# Patient Record
Sex: MALE | Race: WHITE
[De-identification: names, ages, dates, MRNs, and addresses within clinical notes are randomized per-mention and may not be internally consistent; named-entity substitution may affect disease eponyms.]

---

## 2017-07-19 NOTE — NUR
CALLED [facility]'S TELESTROKE HOTLINE, SPOKE WITH LYNN, PRESENTED PT, AWAITING 
CALL BACK FROM  (NEUROLOGIST)
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
## 2017-07-19 NOTE — NUR
PT BIB [facility] C/O CP 5/10 DULL ACHY AND SLURRED SPEECH SINCE 1530 TODAY. CP 
NON-PROVOKED AND NO RADIATION. UPON NEURO ASSESSMENT, NOTED WEAK  STRENGTH 
ON L ARM AND SLIGHTLY WEAKER LLE. REPORTS MILD SENSORY LOSS ON L SIDE. RESP 
EVEN UNLABORED. SKIN WARM NONDIAPHORETIC. REPORTS HX OF CVA 2 MONTHS AGO WHICH 
LEFT HIM UNABLE TO AMBULATE. DENIES SLURRED SPEECH OR LUE RESIDUAL DEFICITS. IN 
ER BED 10. MD NOTIFIED IMMEDIATELY OF PT'S CONDITION.

## 2017-07-19 NOTE — NUR
PT TRANSPORTED TO [facility] IN STABLE CONDITION VIA ACLS PROTOCOL. ECHO WAS 
COMPLETED PRIOR TO TRANSFER.
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
## 2017-07-20 NOTE — NUR
RN NOTES

PT REFUSED SCDS. EDUCATION PROVIDED REGARDING IMPORTANCE OF KEEPING THEM ON BUT PT STILL 
REFUSED.

## 2017-07-20 NOTE — NUR
RN NOTES:



RECEIVED PATIENT AWAKE IN BED WITH NO RESPIRATORY DISTRESS OR SHORTNESS OF BREATH. BREATHING 
EVEN AND UNLABORED. COMPLAINT OF LEFT SHOULDER PAIN. ALERT AND ORIENTED. VERBALIZES NEEDS. 
NEEDS ATTENDED. KEPT CLEAN AND DRY.

## 2017-07-20 NOTE — NUR
RN NOTES

PT IN STABLE CONDITION RESTING COMFORTABLY IN BED. RESPIRATIONS EVEN AND UNLABORED 2L NASAL 
CANNULA. NO NEUROLOGICAL CHANGES NOTED THROUGHOUT THIS SHIFT.  DVT PUMPS AT BEDSIDE, PT 
REFUSED. SIDE RAILS UP X3, CALL LIGHT WITHIN EASY REACH, BED LOCKED AND IN LOWEST POSITION 
WITH BED ALARM ON FOR PT SAFETY.

## 2017-07-20 NOTE — NUR
met with patient at bedside. Patient was oriented to place, self, and  
situation. Patient's mood and affect were anxious. Patient was cooperative and willing to 
answer the social workers questions. Per patient, he is currently homeless and was recently 
at [facility] in [place] in the behavioral health unit for one day and 
than felt chest pains and was transferred to the [facility]. Per patient, he 
would like to go back to [facility] in [place] to there behavioral health 
unit or he wants to go to a short term rehab facility (SNF). Per patient, he recently came 
from [facility] and has been in [place] for a couple of days. Patient stated that 
he is single and has no family in [place]. Patient stated that he receives SSI 
($807/month). Patient denied drug and alcohol abuse. Patient denied suicidal and homicidal 
ideations. Patient became more anxious toward the end of the assessment and stated, "I don't 
know, I am really confused."  informed patient that she was available if 
needed.

## 2017-07-20 NOTE — NUR
RN NOTES

PT RECEIVED ON BED IN STABLE CONDITION. A/O X3 EXPRESSIVE APHASIA WITH SOME SLURRED SPEECH 
NOTED. ON TELE MONITOR SR IN 80S. RESPIRATIONS EVEN AND UNLABORED ON 2L NASAL CANNULA. USES 
URINAL. LEFT SIDED WEAKNESS NOTED. R HAND 18 G IV SITE CLEAN, DRY, AND INTACT. NPO THIS AM 
FOR SWALLOW EVAL. SIDE RAILS UP X3, CALL LIGHT WITHIN REACH WILL CONTINUE TO MONITOR. .

## 2017-07-21 NOTE — NUR
CALLED [facility] FOR THE PT DISCHARGE ORDER . TALKED TO NURSE GABY , 
GAVE THE SUPERVISOR (ARCHIE) NUMBER [phone]   TO TALK TO REGARDING THE DISCHARGE .

## 2017-07-21 NOTE — NUR
RN NOTES 



RECEIVED PATIENT IN BED ALERT, AWAKE, ORIENTED X3 WITH BREATHING NORMAL, EVEN AND UNLABORED. 
NO SOB NOTED. ON 2L O2 VIA NC, SATURATING WELL. NO ACUTE DISTRESS NOTED. TELE MONITOR 
REVEALS SR, HR=84. IV R HAND IS PATENT AND INTACT, NO INFILTRATION NOTED. BOWELS SOUND 
PRESENT. PULSES PRESENT. SAFETY MEASURE OBSERVED. ALL NEEDS ATTENDED. CALL LIGHT WITH IN 
REACH. WILL CONT TO MONITOR.

## 2017-07-21 NOTE — NUR
CALLED [facility] FOR THE TRANSPORTATION . TALKED TO MAMTA, VERBALIZED THAT 
THEY WILL BE HERE IN 30 MINS TO  .

## 2017-07-21 NOTE — NUR
RN CLOSING NOTES



NO EPISODE OF DISTRESS, OBTUNDED. GTUBE PATENT WITH LARGE AMOUNT OF RESIDUAL. PATIENT;S 
BLOOD SUGAR AT 6AM LOW 52MG/DL. DEXTROSE ADMINISTERED. STILL WAITING FOR RESULTS. WOUND 
DRESSING CHANGED. NO NEW SKIN ISSUE. KEPT CLEAN AND DRY

-------------------------------------------------------------------------------

Addendum: 07/21/17 at 0757 by GAYATHRI GARBER RN

-------------------------------------------------------------------------------

RN CLOSING NOTES FOR ANOTHER PATIENT: PLS SEE ANOTHER CLOSING NOTES

## 2017-07-21 NOTE — NUR
CALLED SUPERVISOR ARCHIE AGAIN AND GAVE HER REPORT AND WANTS US TO FAX PT's TODAY's BMP REPORT 
THAN SHE WILL LET US KNOW IF SHE ACCEPT THE PATIENT OR NOT. PRIMARY NURSE GAYATHRI  FAXED THE 
BMP . WILL F/O

## 2017-07-21 NOTE — NUR
Patient would like to be discharged to [facility] at [place] [address] 
[facility] [address]. ([phone])  spoke to Arianne from [facility] who stated that they can take the patient back as long as he is 
medically cleared. Arianne requested that Labs and a note that states, "Patient is 
medically cleared for discharge." should be faxed over to ([phone]) and intake should 
be called ([phone]) before patient can return.  will follow-up.

## 2017-07-21 NOTE — NUR
ARCHIE THE SUPERVISOR FROM [facility] CALLED TO INFORM OF ACCEPTANCE THE PT 
AND SAID THAT THE PRIMARY NURSE (GABY) WILL CALL WITH THE BED NUMBER . WILL CALL [facility] FOR THE TRANSPORTATION.

## 2017-07-21 NOTE — NUR
RN CLOSING NOTES.



ALERT AND ORIENTED. WITH EPISODES OF RESTLESSNESS AND ANXIETY. COMPLAINED OF PAIN. MORPHINE 
ADMINISTERED EVERY FOUR HOURS, WITH RELIEF. NO EPISODE OF DISTRESS. BREATHING EVEN AND 
UNLABORED. KEPT CLEAN AND DRY. ENDORSED TO AM SHIFT FOR CONTINUITY OF CARE.

## 2017-07-21 NOTE — NUR
CALLED BEVOR NURSE TO ASK FOR THE ROOM NUMBER WHERE PT IS GOING TO , ROOM NUMBER PROVIDED. 
PT IS GOING TO ROOM -A .

## 2017-07-21 NOTE — NUR
CALLED SUPERVISOR ARCHIE , VERBALIZED SHE CANNOT TALK RIGHT NOW COZ SHE IS DOING HUDDLE RIGHT 
NOW AND TO CALL HER LATER AGAIN . WILL CALL HER LATER .

## 2017-07-21 NOTE — NUR
faxed Labs and a note that states, "Patient is medically cleared for discharge 
to ([phone]) [facility] the intake department.  spoke to 
Omid from intake ([phone]) who stated that patient is to medically compromised for 
their [place] locations and had faxed over the paperwork to their [place] location as 
that location has an ER department. Omid stated that they are just waiting for a response 
from the [place] location.  informed Soon the charge nurse and provided 
Omid with charge nurse Steve's number for follow-up.  informed case management 
and notified them that patient is pending acceptance to [facility] in 
[place] [address] ([phone]).

## 2017-07-21 NOTE — NUR
ARCHIE THE SUPERVISOR FROM [facility] CALLED TO INFORM OF ACCEPTANCE THE PT 
AND SAID THAT . WILL CALL [facility] FOR THE TRANSPORTATION.  

-------------------------------------------------------------------------------

Addendum: 07/22/17 at 0005 by MANUEL SALOMON RN

-------------------------------------------------------------------------------

PLS AVOID THIS NOTE AND SEE OTHER NOTE.

## 2017-07-21 NOTE — NUR
RN NOTES 



PATIENT ENDORSED TO NEXT SHIFT IN STABLE CONDITION FOR CONTINUITY OF CARE. NO SIGNIFICANT 
CHANGES NOTED. KEPT CLEAN, DRY AND COMFORTABLE. ALL NEEDS ATTENDED. SAFETY MEASURE OBSERVED. 
CALL LIGHT WITH IN REACH. WILL CONT TO MONITOR.

## 2017-07-22 NOTE — NUR
RN NOTES:



[id number] RECEIVED PATIENT AWAKE STANDING ON THE DOORWAY ASKING FOR SANDWICH. ALERT AND 
ORIENTED, VERBALLY COMMUNICATE NEEDS. NOTED WITH MOOD SWINGS FROM VERY AGITATED TO VERY 
APOLOGETIC. NO RESPIRATORY DISTRESS OR SHORTNESS OF BREATH. BREATHING EVEN AND UNLABORED. NO 
COMPLAINT OF PAIN OR DISCOMFORT. 



2015 PATIENT IS SCHEDULED FOR TRANSFER TO [facility]. CALLED HOSPITAL TO 
GIVE REPORT REGARDING PATIENT. CALLED WAS ANSWERED BY ONE OF THE NURSE AND SAID HE NEEDS TO 
REFER IT TO HIS SUPERVISOR AS THERE WAS NO NOTICE TO THEM OF THE ADMISSION. TOLD STAFF THAT 
[employer] HAS BEEN MADE FOR HIS TRANSFER UNDER THE CARE OF DR. JIMENEZ. HE ASKED FOR 
[employer] NUMBER AND WILL VERY FROM HIS SUPERVISOR.



2205 CALLED JOES[facility] SUPERVISOR, AND GAVE REPORT REGARDING THE PATIENT. SUPERVISOR WAS ASKING 
FOR BMP RESULT AND NEEDS TO FAX IT TO THEM BEFORE SHE COULD ACCEPT THE PATIENT BECAUSE THE 
REPORT SHE HAS WAS THAT THE PATIENTS GLUCOSE IN BLOOD .  GOT THE COPY OF THE BMP 
RESULT AND FAX TO THEM. VERIFIED IF FAXED WAS RECEIVED AFTER ABOUT 15 MINUTES.

CHARGE NURSE MANUEL ARRANGE FOR THE TRANSFER.



PATIENT WAS SENT OUT VIA AMBULANCE ON A GURNEY ACCOMPANIED BY 2 EMTS @0000 IN STABLE 
CONDITION. VITAL SIGNS WITHIN NORMAL LEVEL

## 2017-09-27 ENCOUNTER — HOSPITAL ENCOUNTER (INPATIENT)
Dept: HOSPITAL 72 - EMR | Age: 37
LOS: 6 days | Discharge: NURSING, REHAB OR LONG-TERM CARE | DRG: 191 | End: 2017-10-03
Payer: MEDICARE

## 2017-09-27 VITALS — SYSTOLIC BLOOD PRESSURE: 131 MMHG | DIASTOLIC BLOOD PRESSURE: 79 MMHG

## 2017-09-27 VITALS
DIASTOLIC BLOOD PRESSURE: 67 MMHG | SYSTOLIC BLOOD PRESSURE: 129 MMHG | WEIGHT: 232 LBS | HEIGHT: 71 IN | BODY MASS INDEX: 32.48 KG/M2

## 2017-09-27 VITALS — SYSTOLIC BLOOD PRESSURE: 129 MMHG | DIASTOLIC BLOOD PRESSURE: 79 MMHG

## 2017-09-27 DIAGNOSIS — G89.29: ICD-10-CM

## 2017-09-27 DIAGNOSIS — F41.9: ICD-10-CM

## 2017-09-27 DIAGNOSIS — E11.65: ICD-10-CM

## 2017-09-27 DIAGNOSIS — J20.9: ICD-10-CM

## 2017-09-27 DIAGNOSIS — E11.42: ICD-10-CM

## 2017-09-27 DIAGNOSIS — M50.30: ICD-10-CM

## 2017-09-27 DIAGNOSIS — F11.20: ICD-10-CM

## 2017-09-27 DIAGNOSIS — Q07.02: ICD-10-CM

## 2017-09-27 DIAGNOSIS — M51.36: ICD-10-CM

## 2017-09-27 DIAGNOSIS — J44.0: ICD-10-CM

## 2017-09-27 DIAGNOSIS — I10: ICD-10-CM

## 2017-09-27 DIAGNOSIS — F17.210: ICD-10-CM

## 2017-09-27 DIAGNOSIS — J44.1: Primary | ICD-10-CM

## 2017-09-27 DIAGNOSIS — F25.0: ICD-10-CM

## 2017-09-27 LAB
ALBUMIN/GLOB SERPL: 1.6 {RATIO} (ref 1–2.7)
ALT SERPL-CCNC: 25 U/L (ref 3–41)
ANION GAP SERPL CALC-SCNC: 15 MMOL/L (ref 5–15)
APPEARANCE UR: CLEAR
AST SERPL-CCNC: 19 U/L (ref 5–40)
BACTERIA #/AREA URNS HPF: (no result) /HPF
BASOPHILS NFR BLD AUTO: 0.8 % (ref 0–2)
CALCIUM SERPL-MCNC: 9.5 MG/DL (ref 8.6–10.2)
CHLORIDE SERPL-SCNC: 101 MEQ/L (ref 98–107)
CK MB SERPL-MCNC: 3.4 NG/ML (ref ?–6.7)
CO2 SERPL-SCNC: 25 MEQ/L (ref 20–30)
CREAT SERPL-MCNC: 0.8 MG/DL (ref 0.7–1.2)
EOSINOPHIL NFR BLD AUTO: 1.2 % (ref 0–3)
ERYTHROCYTE [DISTWIDTH] IN BLOOD BY AUTOMATED COUNT: 12.5 % (ref 11.6–14.8)
GFR SERPLBLD BASED ON 1.73 SQ M-ARVRAT: > 60 ML/MIN (ref 60–?)
GLOBULIN SER-MCNC: 2.5 G/DL
HEMOLYSIS: 5
KETONES UR QL STRIP: (no result)
LEUKOCYTE ESTERASE UR QL STRIP: (no result)
LYMPHOCYTES NFR BLD AUTO: 24.9 % (ref 20–45)
MAGNESIUM SERPL-MCNC: 1.8 MG/DL (ref 1.7–2.5)
MCH RBC QN AUTO: 29.8 PG (ref 27–31)
MCHC RBC AUTO-ENTMCNC: 34.4 G/DL (ref 32–36)
MCV RBC AUTO: 87 FL (ref 80–99)
MONOCYTES NFR BLD AUTO: 9.7 % (ref 1–10)
NEUTROPHILS NFR BLD AUTO: 63.4 % (ref 45–75)
NITRITE UR QL STRIP: NEGATIVE
PH UR STRIP: 7 [PH] (ref 4.5–8)
PLATELET # BLD: 266 K/UL (ref 150–450)
PMV BLD AUTO: 7.8 FL (ref 6.5–10.1)
POTASSIUM SERPL-SCNC: 4 MEQ/L (ref 3.4–4.9)
PROT SERPL-MCNC: 6.6 G/DL (ref 6.6–8.7)
PROT UR QL STRIP: NEGATIVE
RBC # BLD AUTO: 4.49 M/UL (ref 4.7–6.1)
RBC #/AREA URNS HPF: (no result) /HPF (ref 0–0)
SODIUM SERPL-SCNC: 141 MEQ/L (ref 135–145)
SP GR UR STRIP: 1.01 (ref 1–1.03)
SQUAMOUS #/AREA URNS LPF: (no result) /LPF
TROPONIN I SERPL-MCNC: < 0.3 NG/ML (ref ?–0.3)
UROBILINOGEN UR-MCNC: NORMAL MG/DL (ref 0–1)
WBC # BLD AUTO: 12 K/UL (ref 4.8–10.8)
WBC #/AREA URNS HPF: (no result) /HPF (ref 0–0)

## 2017-09-27 PROCEDURE — 87040 BLOOD CULTURE FOR BACTERIA: CPT

## 2017-09-27 PROCEDURE — 94760 N-INVAS EAR/PLS OXIMETRY 1: CPT

## 2017-09-27 PROCEDURE — 99285 EMERGENCY DEPT VISIT HI MDM: CPT

## 2017-09-27 PROCEDURE — 80048 BASIC METABOLIC PNL TOTAL CA: CPT

## 2017-09-27 PROCEDURE — 82553 CREATINE MB FRACTION: CPT

## 2017-09-27 PROCEDURE — 85007 BL SMEAR W/DIFF WBC COUNT: CPT

## 2017-09-27 PROCEDURE — 71010: CPT

## 2017-09-27 PROCEDURE — 87081 CULTURE SCREEN ONLY: CPT

## 2017-09-27 PROCEDURE — 82550 ASSAY OF CK (CPK): CPT

## 2017-09-27 PROCEDURE — 94640 AIRWAY INHALATION TREATMENT: CPT

## 2017-09-27 PROCEDURE — 80053 COMPREHEN METABOLIC PANEL: CPT

## 2017-09-27 PROCEDURE — 84484 ASSAY OF TROPONIN QUANT: CPT

## 2017-09-27 PROCEDURE — 94664 DEMO&/EVAL PT USE INHALER: CPT

## 2017-09-27 PROCEDURE — 83735 ASSAY OF MAGNESIUM: CPT

## 2017-09-27 PROCEDURE — 85025 COMPLETE CBC W/AUTO DIFF WBC: CPT

## 2017-09-27 PROCEDURE — 83605 ASSAY OF LACTIC ACID: CPT

## 2017-09-27 PROCEDURE — 93005 ELECTROCARDIOGRAM TRACING: CPT

## 2017-09-27 PROCEDURE — 83880 ASSAY OF NATRIURETIC PEPTIDE: CPT

## 2017-09-27 PROCEDURE — 82009 KETONE BODYS QUAL: CPT

## 2017-09-27 PROCEDURE — 36415 COLL VENOUS BLD VENIPUNCTURE: CPT

## 2017-09-27 PROCEDURE — 81003 URINALYSIS AUTO W/O SCOPE: CPT

## 2017-09-27 PROCEDURE — 82962 GLUCOSE BLOOD TEST: CPT

## 2017-09-27 NOTE — EMERGENCY ROOM REPORT
History of Present Illness


General


Chief Complaint:  Upper Respiratory Illness


Source:  Patient





Present Illness


HPI


36-year-old male presents ED for evaluation.  Patient resides in a skilled 

nursing facility.  Patient has had a persistent cough times one week with 

wheezing.  Has history of COPD.  Patient denies any fevers or chills.  States 

cough is dry.  Denies chest pain or shortness of breath.  Accu-Chek at nursing 

home was also high fashion 400.  Patient has a history of diabetes.  Patient 

was given insulin afterwards.  No aggravating relieving factors.  Denies any 

other associated symptoms


Allergies:  


Coded Allergies:  


     No Known Allergies (Unverified , 4/14/15)





Patient History


Past Medical History:  DM, HTN


Past Surgical History:  none


Pertinent Family History:  none


Social History:  Denies: smoking, alcohol use, drug use


Immunizations:  UTD


Reviewed Nursing Documentation:  PMH: Agreed, PSxH: Agreed





Nursing Documentation-PMH


Past Medical History:  Deferred


Hx Cardiac Problems:  Yes


Hx Hypertension:  Yes


Hx Diabetes:  Yes


Hx Cancer:  No


Hx Gastrointestinal Problems:  No


Hx Neurological Problems:  No


Hx Peripheral Neuropathy:  Yes


Hx Dizziness:  Yes


Hx Weakness:  Yes





Review of Systems


All Other Systems:  negative except mentioned in HPI





Physical Exam





Vital Signs








  Date Time  Temp Pulse Resp B/P (MAP) Pulse Ox O2 Delivery O2 Flow Rate FiO2


 


9/27/17 14:12 97.3 103 15 129/67 97 Room Air  








Sp02 EP Interpretation:  reviewed, normal


General Appearance:  no apparent distress, alert, GCS 15, non-toxic


Head:  normocephalic, atraumatic


Eyes:  bilateral eye normal inspection, bilateral eye PERRL


ENT:  hearing grossly normal, normal pharynx, no angioedema, normal voice


Neck:  full range of motion, supple/symm/no masses


Respiratory:  speaking full sentences, wheezing


Cardiovascular #1:  regular rate, rhythm, no edema


Cardiovascular #2:  2+ carotid (R), 2+ carotid (L), 2+ radial (R), 2+ radial (L)

, 2+ dorsalis pedis (R), 2+ dorsalis pedis (L)


Gastrointestinal:  normal bowel sounds, non tender, soft, non-distended, no 

guarding, no rebound


Rectal:  deferred


Genitourinary:  normal inspection, no CVA tenderness


Musculoskeletal:  back normal, gait/station normal, normal range of motion, non-

tender


Neurologic:  alert, oriented x3, responsive, motor strength/tone normal, 

sensory intact, speech normal


Psychiatric:  judgement/insight normal, memory normal, mood/affect normal, no 

suicidal/homicidal ideation


Reflexes:  3+ bicep (R), 3+ bicep (L), 3+ tricep (R), 3+ tricep (L), 3+ knee (R)

, 3+ knee (L)


Skin:  normal color, no rash, warm/dry, well hydrated


Lymphatic:  no adenopathy





Medical Decision Making


Diagnostic Impression:  


 Primary Impression:  


 COPD exacerbation


ER Course


Hospital Course 


36-year-old M presenting to ED with cough, wheezing  h/o COPD





Differential diagnoses include: Pneumonia, CHF exacerbation, pneumothorax, 

fluid overload





Clinical course


Patient placed on stretcher.  On cardiac monitor with stable vitals.  After 

initial history and physical, I ordered nebulizer treatments.  I ordered labs, 

IV fluids, EKG, chest x-ray, blood cultures, UA.  





Labs - minimal leukocytosis noted, hemoglobin/hematocrit stable, electrolytes  

okay, lactate okay, troponins negative 


CXR - hyperinflated lungs. no infiltrates 





abx given. IVFs given. 





Case discussed with Dr. Irving and he agreed to the patient to his service for 

further care and support





I feel this is a highly complex case requiring extensive working including EKG/

Rhythm strip, Xray/CT/US, Blood/urine lab work, repeat exams while in ED, and 

administration of strong opiates/narcotics for pain control, admission to 

hospital or close patient follow up.  





Diagnosis - COPD exacerbation 





Patient admitted to floor in serious condition





Labs








Test


  9/27/17


15:55 9/27/17


16:45


 


White Blood Count


  12.0 K/UL


(4.8-10.8) 


 


 


Red Blood Count


  4.49 M/UL


(4.70-6.10) 


 


 


Hemoglobin


  13.4 G/DL


(14.2-18.0) 


 


 


Hematocrit


  38.9 %


(42.0-52.0) 


 


 


Mean Corpuscular Volume 87 FL (80-99)  


 


Mean Corpuscular Hemoglobin


  29.8 PG


(27.0-31.0) 


 


 


Mean Corpuscular Hemoglobin


Concent 34.4 G/DL


(32.0-36.0) 


 


 


Red Cell Distribution Width


  12.5 %


(11.6-14.8) 


 


 


Platelet Count


  266 K/UL


(150-450) 


 


 


Mean Platelet Volume


  7.8 FL


(6.5-10.1) 


 


 


Neutrophils (%) (Auto)


  63.4 %


(45.0-75.0) 


 


 


Lymphocytes (%) (Auto)


  24.9 %


(20.0-45.0) 


 


 


Monocytes (%) (Auto)


  9.7 %


(1.0-10.0) 


 


 


Eosinophils (%) (Auto)


  1.2 %


(0.0-3.0) 


 


 


Basophils (%) (Auto)


  0.8 %


(0.0-2.0) 


 


 


Sodium Level


  141 mEQ/L


(135-145) 


 


 


Potassium Level


  4.0 mEQ/L


(3.4-4.9) 


 


 


Chloride Level


  101 mEQ/L


() 


 


 


Carbon Dioxide Level


  25 mEQ/L


(20-30) 


 


 


Anion Gap 15 (5-15)  


 


Blood Urea Nitrogen


  12 mg/dL


(7-23) 


 


 


Creatinine


  0.8 mg/dL


(0.7-1.2) 


 


 


Estimat Glomerular Filtration


Rate > 60 mL/min


(>60) 


 


 


Glucose Level


  78 mg/dL


() 


 


 


Lactic Acid Level


  1.90 mmol/L


(0.66-2.22) 


 


 


Calcium Level


  9.5 mg/dL


(8.6-10.2) 


 


 


Magnesium Level


  1.8 mg/dL


(1.7-2.5) 


 


 


Total Bilirubin


  0.2 mg/dL


(0.0-1.2) 


 


 


Aspartate Amino Transf


(AST/SGOT) 19 U/L (5-40) 


  


 


 


Alanine Aminotransferase


(ALT/SGPT) 25 U/L (3-41) 


  


 


 


Alkaline Phosphatase


  66 U/L


() 


 


 


Total Creatine Kinase


  562 U/L


() 


 


 


Creatine Kinase MB


  3.4 ng/mL (<


6.7) 


 


 


Creatine Kinase MB Relative


Index 0.6 


  


 


 


Troponin I


  < 0.30 ng/mL


(<=0.30) 


 


 


Pro-B-Type Natriuretic Peptide


  42 pg/mL


(0-125) 


 


 


Total Protein


  6.6 g/dL


(6.6-8.7) 


 


 


Albumin


  4.1 g/dL


(3.5-5.2) 


 


 


Globulin 2.5 g/dL  


 


Albumin/Globulin Ratio 1.6 (1.0-2.7)  


 


Acetone Level


  Negative


(NEGATIVE) 


 








EKG Diagnostic Results


Rate:  normal


Rhythm:  NSR


ST Segments:  no acute changes


ASA given to the pt in ED:  No





Rhythm Strip Diag. Results


EP Interpretation:  yes


Rhythm:  NSR, no PVC's, no ectopy





Chest X-Ray Diagnostic Results


Chest X-Ray Diagnostic Results :  


   Chest X-Ray Ordered:  Yes


   # of Views/Limited/Complete:  1 View


   Indication:  Shortness of Breath


   EP Interpretation:  Yes


   Interpretation:  no consolidation, no effusion, no pneumothorax, no acute 

cardiopulmonary disease


   Impression:  No acute disease


   Electronically Signed by:  Electronically signed by Shoaib Goodwin MD





Last Vital Signs








  Date Time  Temp Pulse Resp B/P (MAP) Pulse Ox O2 Delivery O2 Flow Rate FiO2


 


9/27/17 16:10  96 18  99 Room Air  


 


9/27/17 14:21 97.3   129/67    








Status:  improved


Disposition:  ADMITTED AS INPATIENT


Condition:  Serious


Referrals:  


ROSE MARIE IRVING (PCP)











SHOAIB GOODWIN M.D. Sep 27, 2017 16:43

## 2017-09-27 NOTE — DIAGNOSTIC IMAGING REPORT
Indication: SOB



Technique: One view of the chest



Comparison: 1/25/2016



Findings: Lungs and pleural spaces are clear. Heart size is normal. No 

significant

change



Impression: No acute process

## 2017-09-27 NOTE — HISTORY AND PHYSICAL REPORT
DATE OF ADMISSION:  09/27/2017



TIME:  2 p.m.



CONSULTANTS:

1. Lissette Steward M.D.

2. Dr. Kumar.

3. Gulshan Armstrong M.D.

4. Dusty Denis M.D.

5. Behnoush Zarrini, M.D.



CHIEF COMPLAINT:  Cough, wheeze, and hyperglycemia.



Brief History:  The patient is a 36-year-old male from [facility]

presents with above-mentioned diagnoses, cough and wheezing for about two

days, getting worse, lost his voice.  He also had blood sugar of about 450

this morning.  The patient came to [place], diagnosed with the above,

awaiting bed to be transferred to medical floor.  Currently, calm in bed,

slight short of breath,.  No complaints.



PAST MEDICAL HISTORY:  Diabetes, COPD, and encephalopathy.



PAST SURGICAL HISTORY:  Neck.



MEDICATIONS:  Albuterol, Atrovent, Solu-Medrol, IV fluids.



ALLERGIES:  The patient claims Norco.



Social History:  Positive smoke.  No alcohol.  No intravenous drug abuse.



FAMILY HISTORY:  Noncontributory.



Review of Systems:  No chest pain.  Slight short of breath.  No nausea,

vomiting or diarrhea.



PHYSICAL EXAMINATION:

GENERAL:  Slightly anxious in bed, oriented x3, no acute distress.

Vital Signs:  Temperature is 97 degrees, pulse 103, respirations 15, and

blood pressure 129/67.

CARDIOVASCULAR:  No murmurs.

LUNGS:  Poor exchange.  Slight wheeze bilaterally.

ABDOMEN:  Bowel sounds are positive.  Nontender and nondistended.

EXTREMITIES:  No cyanosis, clubbing, or edema.

NEUROLOGICAL:  The patient moves all extremities, slightly weak.



LABORATORY DATA:  The lab results are pending.



ASSESSMENT:

1. Cough.

2. Wheeze.

3. Chronic obstructive pulmonary disease exacerbation.

4. Diabetes.

5. Hyperglycemia.

6. Encephalopathy.

7. Chronic pain.



Plan:  Continue pre-medications.  OT/PT.  Dietary evaluation.  O2 and

pulmonary treatment.  Taper off steroids.  Antibiotics per Infectious

Diseases.  Resume home medications.  CBC and BMP in the morning.  Dr. Steward, Dr. Kumar, Dr. Armstrong, Dr. Denis, and Dr. Franco to consult.  We

will continue to follow this patient medically.









  ______________________________________________

  Samir Cabrera D.O.





DR:  DONELL

D:  09/27/2017 14:56

T:  09/27/2017 22:59

JOB#:  [id number]

CC:

## 2017-09-28 VITALS — DIASTOLIC BLOOD PRESSURE: 63 MMHG | SYSTOLIC BLOOD PRESSURE: 107 MMHG

## 2017-09-28 VITALS — SYSTOLIC BLOOD PRESSURE: 116 MMHG | DIASTOLIC BLOOD PRESSURE: 60 MMHG

## 2017-09-28 VITALS — DIASTOLIC BLOOD PRESSURE: 51 MMHG | SYSTOLIC BLOOD PRESSURE: 116 MMHG

## 2017-09-28 VITALS — SYSTOLIC BLOOD PRESSURE: 127 MMHG | DIASTOLIC BLOOD PRESSURE: 63 MMHG

## 2017-09-28 VITALS — DIASTOLIC BLOOD PRESSURE: 65 MMHG | SYSTOLIC BLOOD PRESSURE: 123 MMHG

## 2017-09-28 VITALS — SYSTOLIC BLOOD PRESSURE: 126 MMHG | DIASTOLIC BLOOD PRESSURE: 68 MMHG

## 2017-09-28 LAB
ALBUMIN/GLOB SERPL: 1.5 {RATIO} (ref 1–2.7)
ALT SERPL-CCNC: 22 U/L (ref 3–41)
ANION GAP SERPL CALC-SCNC: 17 MMOL/L (ref 5–15)
AST SERPL-CCNC: 14 U/L (ref 5–40)
BASOPHILS NFR BLD MANUAL: 0 % (ref 0–2)
CALCIUM SERPL-MCNC: 9.1 MG/DL (ref 8.6–10.2)
CHLORIDE SERPL-SCNC: 96 MEQ/L (ref 98–107)
CO2 SERPL-SCNC: 23 MEQ/L (ref 20–30)
CREAT SERPL-MCNC: 0.8 MG/DL (ref 0.7–1.2)
EOSINOPHIL NFR BLD MANUAL: 0 % (ref 0–3)
ERYTHROCYTE [DISTWIDTH] IN BLOOD BY AUTOMATED COUNT: 12.6 % (ref 11.6–14.8)
GFR SERPLBLD BASED ON 1.73 SQ M-ARVRAT: > 60 ML/MIN (ref 60–?)
GLOBULIN SER-MCNC: 2.6 G/DL
HEMOLYSIS: 0
MCH RBC QN AUTO: 30.2 PG (ref 27–31)
MCHC RBC AUTO-ENTMCNC: 34.3 G/DL (ref 32–36)
MCV RBC AUTO: 88 FL (ref 80–99)
NEUTS BAND NFR BLD MANUAL: 6 % (ref 0–8)
NEUTS BAND NFR BLD MANUAL: 78 % (ref 45–75)
PLAT MORPH BLD: NORMAL
PLATELET # BLD EST: ADEQUATE 10*3/UL
PLATELET # BLD: 232 K/UL (ref 150–450)
PMV BLD AUTO: 7.7 FL (ref 6.5–10.1)
POTASSIUM SERPL-SCNC: 4.2 MEQ/L (ref 3.4–4.9)
PROT SERPL-MCNC: 6.5 G/DL (ref 6.6–8.7)
RBC # BLD AUTO: 4.31 M/UL (ref 4.7–6.1)
SODIUM SERPL-SCNC: 136 MEQ/L (ref 135–145)
TOTAL CELLS COUNTED BLD: 100
VARIANT LYMPHS NFR BLD MANUAL: 16 % (ref 20–45)
WBC # BLD AUTO: 11.8 K/UL (ref 4.8–10.8)

## 2017-09-28 NOTE — INFECTIOUS DISEASES PROG NOTE
Assessment/Plan


Problems:  


(1) COPD exacerbation


Assessment & Plan:  will start doxycycline, continue inhalers and steroids





(2) Hyperglycemia due to type 2 diabetes mellitus


Assessment & Plan:  suspect poor compliance , monitor blood sugurar to keep 

tight glycemic control between 





(3) Diabetes mellitus


Assessment & Plan:  recommend tight glycemic control to keep blood glucose 

between 








Subjective


Allergies:  


Coded Allergies:  


     No Known Allergies (Unverified , 4/14/15)





Objective


Vital Signs





Last 24 Hour Vital Signs








  Date Time  Temp Pulse Resp B/P (MAP) Pulse Ox O2 Delivery O2 Flow Rate FiO2


 


9/28/17 13:31 97.9       


 


9/28/17 12:00 97.9 124 19 107/63 94 Nasal Cannula 4.0 


 


9/28/17 11:10  103 16  98 Nasal Cannula 2.0 28 9/28/17 11:00  103 18  99 Nasal Cannula 2.0 28 9/28/17 10:11 97.7       


 


9/28/17 08:00 98.1 90 18 116/51 96 Nasal Cannula  


 


9/28/17 07:38  105 16  98 Nasal Cannula 2.0 28 9/28/17 07:25     99 Nasal Cannula 2.0 28 9/28/17 07:25  105 20   Room Air  


 


9/28/17 07:25      Nasal Cannula 2.0 28 9/28/17 07:25  105 20  99 Nasal Cannula 2.0 28 9/28/17 04:00 97.7 112 19 116/60 95 Room Air  


 


9/28/17 03:05  109 16  98 Nasal Cannula 2.0 28 9/28/17 02:56  112 18  95 Nasal Cannula 2.0 28 9/28/17 00:00 97.9 101 18 126/68 95 Nasal Cannula 2.0 


 


9/27/17 23:08  109 18  99 Nasal Cannula 2.0 28 9/27/17 23:01  109 18  97 Nasal Cannula 2.0 28 9/27/17 20:50  105 18  97 Nasal Cannula 2.0 28 9/27/17 20:49  102 20   Nasal Cannula 2.0 28 9/27/17 20:49      Nasal Cannula 2.0 28 9/27/17 20:49     94 Nasal Cannula 2.0 28 9/27/17 20:40  102 20  94 Nasal Cannula 2.0 28 9/27/17 20:06 98.1 102 20 131/79 93 Room Air  


 


9/27/17 18:58 98.2 106 18 129/79 95   


 


9/27/17 17:48 97.3 97 19 127/67 98 Room Air  


 


9/27/17 16:10  96 18  99 Room Air  


 


9/27/17 16:00  100 20   Room Air  


 


9/27/17 15:33  103 20  100 Room Air  


 


9/27/17 15:16  101 18  99 Room Air  


 


9/27/17 15:15  99 20  100 Room Air  


 


9/27/17 15:00  99 18   Room Air  


 


9/27/17 15:00  99 18  99 Room Air  


 


9/27/17 14:21 97.3 100 15 129/67 97 Room Air  


 


9/27/17 14:12 97.3 103 15 129/67 97 Room Air  








Height (Feet):  5


Height (Inches):  11.00


Weight (Pounds):  232





Laboratory Tests








Test


  9/27/17


15:55 9/27/17


16:45 9/28/17


05:10


 


White Blood Count


  12.0 K/UL


(4.8-10.8)  H 


  11.8 K/UL


(4.8-10.8)  H


 


Red Blood Count


  4.49 M/UL


(4.70-6.10)  L 


  4.31 M/UL


(4.70-6.10)  L


 


Hemoglobin


  13.4 G/DL


(14.2-18.0)  L 


  13.0 G/DL


(14.2-18.0)  L


 


Hematocrit


  38.9 %


(42.0-52.0)  L 


  37.9 %


(42.0-52.0)  L


 


Mean Corpuscular Volume 87 FL (80-99)    88 FL (80-99)  


 


Mean Corpuscular Hemoglobin


  29.8 PG


(27.0-31.0) 


  30.2 PG


(27.0-31.0)


 


Mean Corpuscular Hemoglobin


Concent 34.4 G/DL


(32.0-36.0) 


  34.3 G/DL


(32.0-36.0)


 


Red Cell Distribution Width


  12.5 %


(11.6-14.8) 


  12.6 %


(11.6-14.8)


 


Platelet Count


  266 K/UL


(150-450) 


  232 K/UL


(150-450)


 


Mean Platelet Volume


  7.8 FL


(6.5-10.1) 


  7.7 FL


(6.5-10.1)


 


Neutrophils (%) (Auto)


  63.4 %


(45.0-75.0) 


  % (45.0-75.0)


 


 


Lymphocytes (%) (Auto)


  24.9 %


(20.0-45.0) 


  % (20.0-45.0)


 


 


Monocytes (%) (Auto)


  9.7 %


(1.0-10.0) 


   % (1.0-10.0)  


 


 


Eosinophils (%) (Auto)


  1.2 %


(0.0-3.0) 


   % (0.0-3.0)  


 


 


Basophils (%) (Auto)


  0.8 %


(0.0-2.0) 


   % (0.0-2.0)  


 


 


Sodium Level


  141 mEQ/L


(135-145) 


  136 mEQ/L


(135-145)


 


Potassium Level


  4.0 mEQ/L


(3.4-4.9) 


  4.2 mEQ/L


(3.4-4.9)


 


Chloride Level


  101 mEQ/L


() 


  96 mEQ/L


()  L


 


Carbon Dioxide Level


  25 mEQ/L


(20-30) 


  23 mEQ/L


(20-30)


 


Anion Gap 15 (5-15)    17 (5-15)  H


 


Blood Urea Nitrogen


  12 mg/dL


(7-23) 


  15 mg/dL


(7-23)


 


Creatinine


  0.8 mg/dL


(0.7-1.2) 


  0.8 mg/dL


(0.7-1.2)


 


Estimat Glomerular Filtration


Rate > 60 mL/min


(>60) 


  > 60 mL/min


(>60)


 


Glucose Level


  78 mg/dL


() 


  335 mg/dL


()  #H


 


Lactic Acid Level


  1.90 mmol/L


(0.66-2.22) 


  


 


 


Calcium Level


  9.5 mg/dL


(8.6-10.2) 


  9.1 mg/dL


(8.6-10.2)


 


Magnesium Level


  1.8 mg/dL


(1.7-2.5) 


  


 


 


Total Bilirubin


  0.2 mg/dL


(0.0-1.2) 


  0.3 mg/dL


(0.0-1.2)


 


Aspartate Amino Transf


(AST/SGOT) 19 U/L (5-40)  


  


  14 U/L (5-40)  


 


 


Alanine Aminotransferase


(ALT/SGPT) 25 U/L (3-41)  


  


  22 U/L (3-41)  


 


 


Alkaline Phosphatase


  66 U/L


() 


  64 U/L


()


 


Total Creatine Kinase


  562 U/L


()  H 


  


 


 


Creatine Kinase MB


  3.4 ng/mL (<


6.7) 


  


 


 


Creatine Kinase MB Relative


Index 0.6  


  


  


 


 


Troponin I


  < 0.30 ng/mL


(<=0.30) 


  


 


 


Pro-B-Type Natriuretic Peptide


  42 pg/mL


(0-125) 


  


 


 


Total Protein


  6.6 g/dL


(6.6-8.7) 


  6.5 g/dL


(6.6-8.7)  L


 


Albumin


  4.1 g/dL


(3.5-5.2) 


  3.9 g/dL


(3.5-5.2)


 


Globulin 2.5 g/dL    2.6 g/dL  


 


Albumin/Globulin Ratio 1.6 (1.0-2.7)    1.5 (1.0-2.7)  


 


Acetone Level


  Negative


(NEGATIVE) 


  


 


 


Urine Color  Pale yellow   


 


Urine Appearance  Clear   


 


Urine pH  7 (4.5-8.0)   


 


Urine Specific Gravity


  


  1.010


(1.005-1.035) 


 


 


Urine Protein


  


  Negative


(NEGATIVE) 


 


 


Urine Glucose (UA)


  


  Negative


(NEGATIVE) 


 


 


Urine Ketones


  


  1+ (NEGATIVE)


H 


 


 


Urine Occult Blood


  


  Negative


(NEGATIVE) 


 


 


Urine Nitrite


  


  Negative


(NEGATIVE) 


 


 


Urine Bilirubin


  


  Negative


(NEGATIVE) 


 


 


Urine Urobilinogen


  


  Normal MG/DL


(0.0-1.0) 


 


 


Urine Leukocyte Esterase


  


  1+ (NEGATIVE)


H 


 


 


Urine RBC


  


  0-2 /HPF (0 -


0)  H 


 


 


Urine WBC


  


  0-2 /HPF (0 -


0) 


 


 


Urine Squamous Epithelial


Cells 


  None /LPF


(NONE/OCC) 


 


 


Urine Bacteria


  


  None /HPF


(NONE) 


 


 


Differential Total Cells


Counted 


  


  100  


 


 


Neutrophils % (Manual)   78 % (45-75)  H


 


Lymphocytes % (Manual)   16 % (20-45)  L


 


Monocytes % (Manual)   0 % (1-10)  L


 


Eosinophils % (Manual)   0 % (0-3)  


 


Basophils % (Manual)   0 % (0-2)  


 


Band Neutrophils   6 % (0-8)  


 


Platelet Estimate   Adequate  


 


Platelet Morphology   Normal  











Current Medications








 Medications


  (Trade)  Dose


 Ordered  Sig/Henry


 Route


 PRN Reason  Start Time


 Stop Time Status Last Admin


Dose Admin


 


 Acetaminophen


  (Tylenol)  650 mg  Q4H  PRN


 ORAL


 FEVER>100.5  9/27/17 15:30


 10/27/17 15:29   


 


 


 Albuterol/


 Ipratropium


  (DuoNeb


 0.5-3(2.5)mg/3ml)  3 ml  Q4HRT


 HHN


   9/27/17 23:00


 10/2/17 22:59  9/28/17 11:00


 


 


 Clonidine HCl


  (Catapres)  0.1 mg  Q4H  PRN


 ORAL


 sbp more than 160  9/27/17 15:30


 10/27/17 15:29   


 


 


 Dextrose


  (Dextrose 50%)    STAT  PRN


 IV


 Hypoglycemia  9/27/17 18:30


 10/27/17 18:29   


 


 


 Heparin Sodium


  (Porcine)


  (Heparin 5000


 units/ml)  5,000 units  EVERY 12  HOURS


 SUBQ


   9/27/17 21:00


 10/27/17 20:59  9/28/17 08:34


 


 


 Insulin Aspart


  (NovoLOG)    BEFORE MEALS AND  HS


 SUBQ


   9/27/17 21:00


 10/27/17 20:59  9/28/17 11:39


 


 


 Insulin Aspart


  (NovoLOG)  8 units  NOVOTIAC


 SUBQ


   9/28/17 06:30


 10/28/17 06:29  9/28/17 11:38


 


 


 Insulin Detemir


  (Levemir)  10 units  BEDTIME


 SUBQ


   9/27/17 21:00


 10/27/17 20:59  9/27/17 20:49


 


 


 Lorazepam


  (Ativan 2mg/ml


 1ml)  0.5 mg  Q4H  PRN


 IV


 For Anxiety  9/27/17 15:30


 10/4/17 15:29   


 


 


 Methylprednisolone


 Sodium Succinate


  (Solu-MEDROL)  60 mg  EVERY 6  HOURS


 IV


   9/27/17 19:30


 10/27/17 19:29  9/28/17 12:32


 


 


 Morphine Sulfate


  (Morphine


 Sulfate)  2 mg  Q4H  PRN


 IVP


 severe pain 7-10  9/27/17 15:30


 10/4/17 15:29  9/28/17 13:49


 


 


 Nitroglycerin


  (Ntg)  0.4 mg  Q5M X 3 DOSES PRN


 SL


 Prn Chest Pain  9/27/17 15:30


 10/27/17 15:29   


 


 


 Ondansetron HCl


  (Zofran)  4 mg  Q6H  PRN


 IVP


 Nausea & Vomiting  9/27/17 15:30


 10/27/17 15:29   


 


 


 Pregabalin


  (Lyrica)  75 mg  THREE TIMES A  DAY


 ORAL


   9/27/17 19:30


 10/27/17 19:29  9/28/17 12:32


 


 


 Promethazine HCl/


 Codeine


  (Phenergan with


 Codeine)  5 ml  Q6H  PRN


 ORAL


 cough  9/27/17 15:30


 10/27/17 15:29   


 


 


 Temazepam


  (Restoril)  15 mg  HSPRN  PRN


 ORAL


 Insomnia  9/27/17 21:00


 10/4/17 20:59  9/27/17 22:57


 


 


 Theophylline


  (Florin-Dur)  100 mg  EVERY 12  HOURS


 ORAL


   9/27/17 21:00


 10/27/17 20:59  9/28/17 08:27


 


 


 Ziprasidone


  (Geodon)  20 mg  DAILY


 ORAL


   9/28/17 09:00


 10/28/17 08:59  9/28/17 08:29


 

















Donnie North M.D. Sep 28, 2017 14:07

## 2017-09-28 NOTE — GENERAL PROGRESS NOTE
Assessment/Plan


Problem List:  


(1) Diabetes mellitus


ICD Codes:  E11.9 - Diabetes mellitus


SNOMED:  48549131


(2) Generalized weakness


ICD Codes:  R53.1 - Weakness


SNOMED:  46888868


(3) COPD exacerbation


ICD Codes:  J44.1 - Chronic obstructive pulmonary disease with (acute) 

exacerbation


SNOMED:  869055091, 556980941


(4) Hyperglycemia due to type 2 diabetes mellitus


ICD Codes:  E11.65 - Type 2 diabetes mellitus with hyperglycemia


SNOMED:  012451123295164


(5) Opiate dependence


ICD Codes:  F11.20 - Opioid dependence


SNOMED:  36816258


(6) Anxiety


ICD Codes:  F41.9 - Anxiety disorder, unspecified; Z68.41 - Body mass index (BMI

) 40.0-44.9, adult


SNOMED:  59066593


Status:  stable, progressing, tolerating diet


Assessment/Plan


o2 pulm tx abx ot pt diet cbc bmp am





Subjective


Constitutional:  Reports: weakness


Respiratory:  Reports: shortness of breath


Allergies:  


Coded Allergies:  


     No Known Allergies (Unverified , 4/14/15)


All Systems:  reviewed and negative except above


Subjective


o2 nc sleepy





Objective





Last 24 Hour Vital Signs








  Date Time  Temp Pulse Resp B/P (MAP) Pulse Ox O2 Delivery O2 Flow Rate FiO2


 


9/28/17 12:00 97.9 124 19 107/63 94 Nasal Cannula 4.0 


 


9/28/17 11:00  103 18  99 Nasal Cannula 2.0 28 9/28/17 10:11 97.7       


 


9/28/17 09:28 97.7       


 


9/28/17 08:00 98.1 90 18 116/51 96 Nasal Cannula  


 


9/28/17 07:38  105 16  98 Nasal Cannula 2.0 28 9/28/17 07:25     99 Nasal Cannula 2.0 28 9/28/17 07:25  105 20   Room Air  


 


9/28/17 07:25      Nasal Cannula 2.0 28 9/28/17 07:25  105 20  99 Nasal Cannula 2.0 28 9/28/17 04:00 97.7 112 19 116/60 95 Room Air  


 


9/28/17 03:05  109 16  98 Nasal Cannula 2.0 28 9/28/17 02:56  112 18  95 Nasal Cannula 2.0 28 9/28/17 00:00 97.9 101 18 126/68 95 Nasal Cannula 2.0 


 


9/27/17 23:08  109 18  99 Nasal Cannula 2.0 28 9/27/17 23:01  109 18  97 Nasal Cannula 2.0 28 9/27/17 20:50  105 18  97 Nasal Cannula 2.0 28 9/27/17 20:49  102 20   Nasal Cannula 2.0 28 9/27/17 20:49      Nasal Cannula 2.0 28 9/27/17 20:49     94 Nasal Cannula 2.0 28 9/27/17 20:40  102 20  94 Nasal Cannula 2.0 28 9/27/17 20:06 98.1 102 20 131/79 93 Room Air  


 


9/27/17 18:58 98.2 106 18 129/79 95   


 


9/27/17 17:48 97.3 97 19 127/67 98 Room Air  


 


9/27/17 16:10  96 18  99 Room Air  


 


9/27/17 16:00  100 20   Room Air  


 


9/27/17 15:33  103 20  100 Room Air  


 


9/27/17 15:16  101 18  99 Room Air  


 


9/27/17 15:15  99 20  100 Room Air  


 


9/27/17 15:00  99 18   Room Air  


 


9/27/17 15:00  99 18  99 Room Air  


 


9/27/17 14:21 97.3 100 15 129/67 97 Room Air  


 


9/27/17 14:12 97.3 103 15 129/67 97 Room Air  








Laboratory Tests


9/27/17 15:55: 


White Blood Count 12.0H, Red Blood Count 4.49L, Hemoglobin 13.4L, Hematocrit 

38.9L, Mean Corpuscular Volume 87, Mean Corpuscular Hemoglobin 29.8, Mean 

Corpuscular Hemoglobin Concent 34.4, Red Cell Distribution Width 12.5, Platelet 

Count 266, Mean Platelet Volume 7.8, Neutrophils (%) (Auto) 63.4, Lymphocytes (%

) (Auto) 24.9, Monocytes (%) (Auto) 9.7, Eosinophils (%) (Auto) 1.2, Basophils (

%) (Auto) 0.8, Sodium Level 141, Potassium Level 4.0, Chloride Level 101, 

Carbon Dioxide Level 25, Anion Gap 15, Blood Urea Nitrogen 12, Creatinine 0.8, 

Estimat Glomerular Filtration Rate > 60, Glucose Level 78, Lactic Acid Level 

1.90, Calcium Level 9.5, Magnesium Level 1.8, Total Bilirubin 0.2, Aspartate 

Amino Transf (AST/SGOT) 19, Alanine Aminotransferase (ALT/SGPT) 25, Alkaline 

Phosphatase 66, Total Creatine Kinase 562H, Creatine Kinase MB 3.4, Creatine 

Kinase MB Relative Index 0.6, Troponin I < 0.30, Pro-B-Type Natriuretic Peptide 

42, Total Protein 6.6, Albumin 4.1, Globulin 2.5, Albumin/Globulin Ratio 1.6, 

Acetone Level Negative


9/27/17 16:45: 


Urine Color Pale yellow, Urine Appearance Clear, Urine pH 7, Urine Specific 

Gravity 1.010, Urine Protein Negative, Urine Glucose (UA) Negative, Urine 

Ketones 1+H, Urine Occult Blood Negative, Urine Nitrite Negative, Urine 

Bilirubin Negative, Urine Urobilinogen Normal, Urine Leukocyte Esterase 1+H, 

Urine RBC 0-2H, Urine WBC 0-2, Urine Squamous Epithelial Cells None, Urine 

Bacteria None


9/28/17 05:10: 


White Blood Count 11.8H, Red Blood Count 4.31L, Hemoglobin 13.0L, Hematocrit 

37.9L, Mean Corpuscular Volume 88, Mean Corpuscular Hemoglobin 30.2, Mean 

Corpuscular Hemoglobin Concent 34.3, Red Cell Distribution Width 12.6, Platelet 

Count 232, Mean Platelet Volume 7.7, Neutrophils (%) (Auto) , Lymphocytes (%) (

Auto) , Monocytes (%) (Auto) , Eosinophils (%) (Auto) , Basophils (%) (Auto) , 

Sodium Level 136, Potassium Level 4.2, Chloride Level 96L, Carbon Dioxide Level 

23, Anion Gap 17H, Blood Urea Nitrogen 15, Creatinine 0.8, Estimat Glomerular 

Filtration Rate > 60, Glucose Level 335#H, Calcium Level 9.1, Total Bilirubin 

0.3, Aspartate Amino Transf (AST/SGOT) 14, Alanine Aminotransferase (ALT/SGPT) 

22, Alkaline Phosphatase 64, Total Protein 6.5L, Albumin 3.9, Globulin 2.6, 

Albumin/Globulin Ratio 1.5, Differential Total Cells Counted 100, Neutrophils % 

(Manual) 78H, Lymphocytes % (Manual) 16L, Monocytes % (Manual) 0L, Eosinophils 

% (Manual) 0, Basophils % (Manual) 0, Band Neutrophils 6, Platelet Estimate 

Adequate, Platelet Morphology Normal


Height (Feet):  5


Height (Inches):  11.00


Weight (Pounds):  232


General Appearance:  lethargic


EENT:  normal ENT inspection


Neck:  normal alignment


Cardiovascular:  normal peripheral pulses, normal rate, regular rhythm


Respiratory/Chest:  chest wall non-tender, lungs clear, decreased breath sounds


Abdomen:  normal bowel sounds, non tender, soft


Extremities:  normal inspection


Edema:  no edema noted Arm (L), no edema noted Arm (R), no edema noted Leg (L), 

no edema noted Leg (R), no edema noted Pedal (L), no edema noted Pedal (R), no 

edema noted Generalized


Neurologic:  responsive, motor weakness


Skin:  normal pigmentation, warm/dry











ROSE MARIE IRVING Sep 28, 2017 13:11

## 2017-09-28 NOTE — GENERAL PROGRESS NOTE
Assessment/Plan


Assessment/Plan


(1) Chiari malformation type II


(2) Degenerative disc disease, cervical


(3) Lumbar degenerative disc disease


(4) Arthritis, lumbar spine


(5) Peripheral neuropathy


(6) Hydrocephalus


(7) Diabetes mellitus


Pt will be continued on Morphine as needed. 


Pt was d/w Dr. Franco and he concurred. 


Thank you for the courtesy of this consultation.





Subjective


Date patient seen:  Sep 28, 2017


Time patient seen:  07:30 - am


Allergies:  


Coded Allergies:  


     No Known Allergies (Unverified , 4/14/15)


Subjective


Constitutional:  Reports: weakness, Denies: chills, diaphoresis, fever, malaise

, no symptoms, other


HEENT:  Denies: blurred vision, double vision, ear discharge, ear pain, eye pain

, mouth pain, mouth swelling, no symptoms, nose congestion, nose pain, other, 

tearing, throat pain, throat swelling


Cardiovascular:  Denies: chest pain, edema, irregular heart rate, 

lightheadedness, no symptoms, other, palpitations, syncope


Respiratory:  Denies: SOB at rest, SOB with excertion, cough, no symptoms, 

orthopnea, other, shortness of breath, sputum, stridor, wheezing


Gastrointestinal/Abdominal:  Denies: abdomen distended, abdominal pain, black 

stools, blood in stool, constipated, diarrhea, difficulty swallowing, nausea, 

no symptoms, other, poor appetite, poor fluid intake, rectal bleeding, tarry 

stools, vomiting


Genitourinary:  Denies: burning, discharge, flank pain, frequency, hematuria, 

incontinence, no symptoms, other, pain, urgency


Neurologic/Psychiatric:  Reports: headache, numbness, tingling, weakness, Denies

: anxiety, depressed, emotional problems, no symptoms, other, paresthesia, pre-

existing deficit, seizure, tremors


Endocrine:  Denies: excessive sweating, flushing, increased hunger, increased 

thirst, increased urine, intolerance to cold, intolerance to heat, no symptoms, 

other, unexplained weight gain, unexplained weight loss


Hematologic/Lymphatic:  Denies: anemia, easy bleeding, easy bruising, no 

symptoms, other


 


Subjective


Pt is a known patient from prior admissions and has been admitted under the 

care of Dr. Cabrera for COPD exacerbation. He has been started on Morphine 2mg IV 

Q4H PRN which has help to reduce his pain as per the patient.





Objective





Last 24 Hour Vital Signs








  Date Time  Temp Pulse Resp B/P (MAP) Pulse Ox O2 Delivery O2 Flow Rate FiO2


 


9/28/17 07:38  105 16  98 Nasal Cannula 2.0 28 9/28/17 07:25     99 Nasal Cannula 2.0 28 9/28/17 07:25  105 20   Room Air  


 


9/28/17 07:25      Nasal Cannula 2.0 28 9/28/17 07:25  105 20  99 Nasal Cannula 2.0 28 9/28/17 04:00 97.7 112 19 116/60 95 Room Air  


 


9/28/17 03:05  109 16  98 Nasal Cannula 2.0 28 9/28/17 02:56  112 18  95 Nasal Cannula 2.0 28 9/28/17 00:00 97.9 101 18 126/68 95 Nasal Cannula 2.0 


 


9/27/17 23:08  109 18  99 Nasal Cannula 2.0 28 9/27/17 23:01  109 18  97 Nasal Cannula 2.0 28 9/27/17 20:50  105 18  97 Nasal Cannula 2.0 28 9/27/17 20:49  102 20   Nasal Cannula 2.0 28 9/27/17 20:49      Nasal Cannula 2.0 28 9/27/17 20:49     94 Nasal Cannula 2.0 28 9/27/17 20:40  102 20  94 Nasal Cannula 2.0 28 9/27/17 20:06 98.1 102 20 131/79 93 Room Air  


 


9/27/17 18:58 98.2 106 18 129/79 95   


 


9/27/17 17:48 97.3 97 19 127/67 98 Room Air  


 


9/27/17 16:10  96 18  99 Room Air  


 


9/27/17 16:00  100 20   Room Air  


 


9/27/17 15:33  103 20  100 Room Air  


 


9/27/17 15:16  101 18  99 Room Air  


 


9/27/17 15:15  99 20  100 Room Air  


 


9/27/17 15:00  99 18   Room Air  


 


9/27/17 15:00  99 18  99 Room Air  


 


9/27/17 14:21 97.3 100 15 129/67 97 Room Air  


 


9/27/17 14:12 97.3 103 15 129/67 97 Room Air  








Laboratory Tests


9/27/17 15:55: 


White Blood Count 12.0H, Red Blood Count 4.49L, Hemoglobin 13.4L, Hematocrit 

38.9L, Mean Corpuscular Volume 87, Mean Corpuscular Hemoglobin 29.8, Mean 

Corpuscular Hemoglobin Concent 34.4, Red Cell Distribution Width 12.5, Platelet 

Count 266, Mean Platelet Volume 7.8, Neutrophils (%) (Auto) 63.4, Lymphocytes (%

) (Auto) 24.9, Monocytes (%) (Auto) 9.7, Eosinophils (%) (Auto) 1.2, Basophils (

%) (Auto) 0.8, Sodium Level 141, Potassium Level 4.0, Chloride Level 101, 

Carbon Dioxide Level 25, Anion Gap 15, Blood Urea Nitrogen 12, Creatinine 0.8, 

Estimat Glomerular Filtration Rate > 60, Glucose Level 78, Lactic Acid Level 

1.90, Calcium Level 9.5, Magnesium Level 1.8, Total Bilirubin 0.2, Aspartate 

Amino Transf (AST/SGOT) 19, Alanine Aminotransferase (ALT/SGPT) 25, Alkaline 

Phosphatase 66, Total Creatine Kinase 562H, Creatine Kinase MB 3.4, Creatine 

Kinase MB Relative Index 0.6, Troponin I < 0.30, Pro-B-Type Natriuretic Peptide 

42, Total Protein 6.6, Albumin 4.1, Globulin 2.5, Albumin/Globulin Ratio 1.6, 

Acetone Level Negative


9/27/17 16:45: 


Urine Color Pale yellow, Urine Appearance Clear, Urine pH 7, Urine Specific 

Gravity 1.010, Urine Protein Negative, Urine Glucose (UA) Negative, Urine 

Ketones 1+H, Urine Occult Blood Negative, Urine Nitrite Negative, Urine 

Bilirubin Negative, Urine Urobilinogen Normal, Urine Leukocyte Esterase 1+H, 

Urine RBC 0-2H, Urine WBC 0-2, Urine Squamous Epithelial Cells None, Urine 

Bacteria None


9/28/17 05:10: 


White Blood Count 11.8H, Red Blood Count 4.31L, Hemoglobin 13.0L, Hematocrit 

37.9L, Mean Corpuscular Volume 88, Mean Corpuscular Hemoglobin 30.2, Mean 

Corpuscular Hemoglobin Concent 34.3, Red Cell Distribution Width 12.6, Platelet 

Count 232, Mean Platelet Volume 7.7, Neutrophils (%) (Auto) , Lymphocytes (%) (

Auto) , Monocytes (%) (Auto) , Eosinophils (%) (Auto) , Basophils (%) (Auto) , 

Sodium Level 136, Potassium Level 4.2, Chloride Level 96L, Carbon Dioxide Level 

23, Anion Gap 17H, Blood Urea Nitrogen 15, Creatinine 0.8, Estimat Glomerular 

Filtration Rate > 60, Glucose Level 335#H, Calcium Level 9.1, Total Bilirubin 

0.3, Aspartate Amino Transf (AST/SGOT) 14, Alanine Aminotransferase (ALT/SGPT) 

22, Alkaline Phosphatase 64, Total Protein 6.5L, Albumin 3.9, Globulin 2.6, 

Albumin/Globulin Ratio 1.5


Height (Feet):  5


Height (Inches):  11.00


Weight (Pounds):  232


Objective


General Appearance:  no apparent distress, alert


EENT:  normal ENT inspection, TMs normal


Neck:  supple, limited range of motion, muscle spasm


Cardiovascular:  normal rate, regular rhythm


Respiratory/Chest:  decreased breath sounds


Abdomen:  non tender, soft


Extremities:  non-tender


Edema:  no edema noted Arm (L), no edema noted Arm (R), no edema noted Leg (L), 

no edema noted Leg (R), no edema noted Pedal (L), no edema noted Pedal (R), no 

edema noted Generalized


Neurologic:  alert, oriented x 3


Skin:  warm/dry











AMAN CABRERA Sep 28, 2017 08:18

## 2017-09-28 NOTE — GENERAL PROGRESS NOTE
Assessment/Plan


Problem List:  


(1) Hyperglycemia due to type 2 diabetes mellitus


ICD Codes:  E11.65 - Type 2 diabetes mellitus with hyperglycemia


SNOMED:  092644943738120


(2) COPD exacerbation


ICD Codes:  J44.1 - Chronic obstructive pulmonary disease with (acute) 

exacerbation


SNOMED:  868048383, [id number]


Assessment/Plan


increase Levemir to 24 units qhs 


increase Novolog to 18 units ac tid 


continue sliding scale ac / hs 


add Metformin





Subjective


Allergies:  


Coded Allergies:  


     No Known Allergies (Unverified , 4/14/15)


All Systems:  reviewed and negative except above


Subjective


admitted with bronchospasm started on treatment with steroid which exacerbated 

his diabetes





Objective





Last 24 Hour Vital Signs








  Date Time  Temp Pulse Resp B/P (MAP) Pulse Ox O2 Delivery O2 Flow Rate FiO2


 


9/28/17 18:16 97.9       


 


9/28/17 16:00 97.9 116 17 127/63 94 Nasal Cannula 4.0 


 


9/28/17 14:57  100 16  99 Nasal Cannula 3.0 32 9/28/17 14:47  100 18  99 Nasal Cannula 2.0 28 9/28/17 13:31 97.9       


 


9/28/17 12:00 97.9 124 19 107/63 94 Nasal Cannula 4.0 


 


9/28/17 11:10  103 16  98 Nasal Cannula 2.0 28 9/28/17 11:00  103 18  99 Nasal Cannula 2.0 28 9/28/17 08:00 98.1 90 18 116/51 96 Nasal Cannula  


 


9/28/17 07:38  105 16  98 Nasal Cannula 2.0 28 9/28/17 07:25     99 Nasal Cannula 2.0 28 9/28/17 07:25  105 20   Room Air  


 


9/28/17 07:25      Nasal Cannula 2.0 28 9/28/17 07:25  105 20  99 Nasal Cannula 2.0 28 9/28/17 04:00 97.7 112 19 116/60 95 Room Air  


 


9/28/17 03:05  109 16  98 Nasal Cannula 2.0 28 9/28/17 02:56  112 18  95 Nasal Cannula 2.0 28 9/28/17 00:00 97.9 101 18 126/68 95 Nasal Cannula 2.0 


 


9/27/17 23:08  109 18  99 Nasal Cannula 2.0 28 9/27/17 23:01  109 18  97 Nasal Cannula 2.0 28 9/27/17 20:50  105 18  97 Nasal Cannula 2.0 28 9/27/17 20:49  102 20   Nasal Cannula 2.0 28 9/27/17 20:49      Nasal Cannula 2.0 28 9/27/17 20:49     94 Nasal Cannula 2.0 28 9/27/17 20:40  102 20  94 Nasal Cannula 2.0 28 9/27/17 20:06 98.1 102 20 131/79 93 Room Air  


 


9/27/17 18:58 98.2 106 18 129/79 95   








Laboratory Tests


9/28/17 05:10: 


White Blood Count 11.8H, Red Blood Count 4.31L, Hemoglobin 13.0L, Hematocrit 

37.9L, Mean Corpuscular Volume 88, Mean Corpuscular Hemoglobin 30.2, Mean 

Corpuscular Hemoglobin Concent 34.3, Red Cell Distribution Width 12.6, Platelet 

Count 232, Mean Platelet Volume 7.7, Neutrophils (%) (Auto) , Lymphocytes (%) (

Auto) , Monocytes (%) (Auto) , Eosinophils (%) (Auto) , Basophils (%) (Auto) , 

Differential Total Cells Counted 100, Neutrophils % (Manual) 78H, Lymphocytes % 

(Manual) 16L, Monocytes % (Manual) 0L, Eosinophils % (Manual) 0, Basophils % (

Manual) 0, Band Neutrophils 6, Platelet Estimate Adequate, Platelet Morphology 

Normal, Sodium Level 136, Potassium Level 4.2, Chloride Level 96L, Carbon 

Dioxide Level 23, Anion Gap 17H, Blood Urea Nitrogen 15, Creatinine 0.8, 

Estimat Glomerular Filtration Rate > 60, Glucose Level 335#H, Calcium Level 9.1

, Total Bilirubin 0.3, Aspartate Amino Transf (AST/SGOT) 14, Alanine 

Aminotransferase (ALT/SGPT) 22, Alkaline Phosphatase 64, Total Protein 6.5L, 

Albumin 3.9, Globulin 2.6, Albumin/Globulin Ratio 1.5


Height (Feet):  5


Height (Inches):  11.00


Weight (Pounds):  232


General Appearance:  no apparent distress


Neck:  normal alignment


Cardiovascular:  normal peripheral pulses


Respiratory/Chest:  crackles/rales


Abdomen:  normal bowel sounds


Pelvis:  normal external exam


Edema:  no edema noted Arm (L), no edema noted Arm (R), no edema noted Leg (L), 

no edema noted Leg (R), no edema noted Pedal (L), no edema noted Pedal (R), no 

edema noted Generalized


Objective





Current Medications








 Medications


  (Trade)  Dose


 Ordered  Sig/Henry


 Route


 PRN Reason  Start Time


 Stop Time Status Last Admin


Dose Admin


 


 Acetaminophen


  (Tylenol)  650 mg  Q4H  PRN


 ORAL


 FEVER>100.5  9/27/17 15:30


 10/27/17 15:29   


 


 


 Albuterol/


 Ipratropium


  (DuoNeb


 0.5-3(2.5)mg/3ml)  3 ml  Q4HRT


 HHN


   9/27/17 23:00


 10/2/17 22:59  9/28/17 14:47


 


 


 Clonidine HCl


  (Catapres)  0.1 mg  Q4H  PRN


 ORAL


 sbp more than 160  9/27/17 15:30


 10/27/17 15:29   


 


 


 Dextrose


  (Dextrose 50%)    STAT  PRN


 IV


 Hypoglycemia  9/27/17 18:30


 10/27/17 18:29   


 


 


 Doxycycline


 Monohydrate


  (Vibramycin)  100 mg  EVERY 12  HOURS


 ORAL


   9/28/17 15:00


 10/5/17 14:59  9/28/17 15:08


 


 


 Heparin Sodium


  (Porcine)


  (Heparin 5000


 units/ml)  5,000 units  EVERY 12  HOURS


 SUBQ


   9/27/17 21:00


 10/27/17 20:59  9/28/17 08:34


 


 


 Insulin Aspart


  (NovoLOG)    BEFORE MEALS AND  HS


 SUBQ


   9/27/17 21:00


 10/27/17 20:59  9/28/17 17:06


 


 


 Insulin Aspart


  (NovoLOG)  8 units  NOVOTIAC


 SUBQ


   9/28/17 06:30


 10/28/17 06:29  9/28/17 17:06


 


 


 Insulin Detemir


  (Levemir)  10 units  BEDTIME


 SUBQ


   9/27/17 21:00


 10/27/17 20:59  9/27/17 20:49


 


 


 Lorazepam


  (Ativan 2mg/ml


 1ml)  0.5 mg  Q4H  PRN


 IV


 For Anxiety  9/27/17 15:30


 10/4/17 15:29   


 


 


 Methylprednisolone


 Sodium Succinate


  (Solu-MEDROL)  40 mg  EVERY 8  HOURS


 IV


   9/28/17 22:00


 10/27/17 19:29   


 


 


 Morphine Sulfate


  (Morphine


 Sulfate)  2 mg  Q4H  PRN


 IVP


 severe pain 7-10  9/27/17 15:30


 10/4/17 15:29  9/28/17 17:46


 


 


 Nitroglycerin


  (Ntg)  0.4 mg  Q5M X 3 DOSES PRN


 SL


 Prn Chest Pain  9/27/17 15:30


 10/27/17 15:29   


 


 


 Ondansetron HCl


  (Zofran)  4 mg  Q6H  PRN


 IVP


 Nausea & Vomiting  9/27/17 15:30


 10/27/17 15:29   


 


 


 Pregabalin


  (Lyrica)  75 mg  THREE TIMES A  DAY


 ORAL


   9/27/17 19:30


 10/27/17 19:29  9/28/17 18:21


 


 


 Promethazine HCl/


 Codeine


  (Phenergan with


 Codeine)  5 ml  Q6H  PRN


 ORAL


 cough  9/27/17 15:30


 10/27/17 15:29   


 


 


 Temazepam


  (Restoril)  15 mg  HSPRN  PRN


 ORAL


 Insomnia  9/27/17 21:00


 10/4/17 20:59  9/27/17 22:57


 


 


 Theophylline


  (Florin-Dur)  100 mg  EVERY 12  HOURS


 ORAL


   9/27/17 21:00


 10/27/17 20:59  9/28/17 08:27


 


 


 Ziprasidone


  (Geodon)  20 mg  DAILY


 ORAL


   9/28/17 09:00


 10/28/17 08:59  9/28/17 08:29


 














Item Value  Date Time


 


Bedside Blood Glucose 413 mg/dl H 9/28/17 1706


 


Bedside Blood Glucose 332 mg/dl H 9/28/17 1139


 


Bedside Blood Glucose 307 mg/dl H 9/28/17 0616

















FITO DENTON Sep 28, 2017 18:51

## 2017-09-28 NOTE — PULMONOLOGY PROGRESS NOTE
Assessment/Plan


Problems:  


(1) COPD exacerbation


(2) Lumbar back pain


(3) Anxiety


(4) Diabetes mellitus


Assessment/Plan


taper steroids


IV abx


check sputum


sliding scale





Subjective


ROS Limited/Unobtainable:  No


Interval Events:  not much change, still coughing


Allergies:  


Coded Allergies:  


     No Known Allergies (Unverified , 4/14/15)





Objective





Last 24 Hour Vital Signs








  Date Time  Temp Pulse Resp B/P (MAP) Pulse Ox O2 Delivery O2 Flow Rate FiO2


 


9/28/17 14:57  100 16  99 Nasal Cannula 3.0 32


 


9/28/17 14:47  100 18  99 Nasal Cannula 2.0 28 9/28/17 14:19 97.9       


 


9/28/17 13:31 97.9       


 


9/28/17 12:00 97.9 124 19 107/63 94 Nasal Cannula 4.0 


 


9/28/17 11:10  103 16  98 Nasal Cannula 2.0 28 9/28/17 11:00  103 18  99 Nasal Cannula 2.0 28 9/28/17 08:00 98.1 90 18 116/51 96 Nasal Cannula  


 


9/28/17 07:38  105 16  98 Nasal Cannula 2.0 28 9/28/17 07:25     99 Nasal Cannula 2.0 28 9/28/17 07:25  105 20   Room Air  


 


9/28/17 07:25      Nasal Cannula 2.0 28 9/28/17 07:25  105 20  99 Nasal Cannula 2.0 28 9/28/17 04:00 97.7 112 19 116/60 95 Room Air  


 


9/28/17 03:05  109 16  98 Nasal Cannula 2.0 28 9/28/17 02:56  112 18  95 Nasal Cannula 2.0 28 9/28/17 00:00 97.9 101 18 126/68 95 Nasal Cannula 2.0 


 


9/27/17 23:08  109 18  99 Nasal Cannula 2.0 28 9/27/17 23:01  109 18  97 Nasal Cannula 2.0 28 9/27/17 20:50  105 18  97 Nasal Cannula 2.0 28 9/27/17 20:49  102 20   Nasal Cannula 2.0 28 9/27/17 20:49      Nasal Cannula 2.0 28 9/27/17 20:49     94 Nasal Cannula 2.0 28 9/27/17 20:40  102 20  94 Nasal Cannula 2.0 28 9/27/17 20:06 98.1 102 20 131/79 93 Room Air  


 


9/27/17 18:58 98.2 106 18 129/79 95   


 


9/27/17 17:48 97.3 97 19 127/67 98 Room Air  








General Appearance:  WD/WN


HEENT:  normocephalic, atraumatic


Respiratory/Chest:  chest wall non-tender, lungs clear


Cardiovascular:  normal peripheral pulses, normal rate


Abdomen:  normal bowel sounds, soft, non tender


Genitourinary:  normal external genitalia


Extremities:  no cyanosis


Laboratory Tests


9/27/17 16:45: 


Urine Color Pale yellow, Urine Appearance Clear, Urine pH 7, Urine Specific 

Gravity 1.010, Urine Protein Negative, Urine Glucose (UA) Negative, Urine 

Ketones 1+H, Urine Occult Blood Negative, Urine Nitrite Negative, Urine 

Bilirubin Negative, Urine Urobilinogen Normal, Urine Leukocyte Esterase 1+H, 

Urine RBC 0-2H, Urine WBC 0-2, Urine Squamous Epithelial Cells None, Urine 

Bacteria None


9/28/17 05:10: 


White Blood Count 11.8H, Red Blood Count 4.31L, Hemoglobin 13.0L, Hematocrit 

37.9L, Mean Corpuscular Volume 88, Mean Corpuscular Hemoglobin 30.2, Mean 

Corpuscular Hemoglobin Concent 34.3, Red Cell Distribution Width 12.6, Platelet 

Count 232, Mean Platelet Volume 7.7, Neutrophils (%) (Auto) , Lymphocytes (%) (

Auto) , Monocytes (%) (Auto) , Eosinophils (%) (Auto) , Basophils (%) (Auto) , 

Differential Total Cells Counted 100, Neutrophils % (Manual) 78H, Lymphocytes % 

(Manual) 16L, Monocytes % (Manual) 0L, Eosinophils % (Manual) 0, Basophils % (

Manual) 0, Band Neutrophils 6, Platelet Estimate Adequate, Platelet Morphology 

Normal, Sodium Level 136, Potassium Level 4.2, Chloride Level 96L, Carbon 

Dioxide Level 23, Anion Gap 17H, Blood Urea Nitrogen 15, Creatinine 0.8, 

Estimat Glomerular Filtration Rate > 60, Glucose Level 335#H, Calcium Level 9.1

, Total Bilirubin 0.3, Aspartate Amino Transf (AST/SGOT) 14, Alanine 

Aminotransferase (ALT/SGPT) 22, Alkaline Phosphatase 64, Total Protein 6.5L, 

Albumin 3.9, Globulin 2.6, Albumin/Globulin Ratio 1.5





Current Medications








 Medications


  (Trade)  Dose


 Ordered  Sig/Henry


 Route


 PRN Reason  Start Time


 Stop Time Status Last Admin


Dose Admin


 


 Acetaminophen


  (Tylenol)  650 mg  Q4H  PRN


 ORAL


 FEVER>100.5  9/27/17 15:30


 10/27/17 15:29   


 


 


 Albuterol/


 Ipratropium


  (DuoNeb


 0.5-3(2.5)mg/3ml)  3 ml  Q4HRT


 HHN


   9/27/17 23:00


 10/2/17 22:59  9/28/17 14:47


 


 


 Clonidine HCl


  (Catapres)  0.1 mg  Q4H  PRN


 ORAL


 sbp more than 160  9/27/17 15:30


 10/27/17 15:29   


 


 


 Dextrose


  (Dextrose 50%)    STAT  PRN


 IV


 Hypoglycemia  9/27/17 18:30


 10/27/17 18:29   


 


 


 Doxycycline


 Monohydrate


  (Vibramycin)  100 mg  EVERY 12  HOURS


 ORAL


   9/28/17 15:00


 10/5/17 14:59  9/28/17 15:08


 


 


 Heparin Sodium


  (Porcine)


  (Heparin 5000


 units/ml)  5,000 units  EVERY 12  HOURS


 SUBQ


   9/27/17 21:00


 10/27/17 20:59  9/28/17 08:34


 


 


 Insulin Aspart


  (NovoLOG)    BEFORE MEALS AND  HS


 SUBQ


   9/27/17 21:00


 10/27/17 20:59  9/28/17 11:39


 


 


 Insulin Aspart


  (NovoLOG)  8 units  NOVOTIAC


 SUBQ


   9/28/17 06:30


 10/28/17 06:29  9/28/17 11:38


 


 


 Insulin Detemir


  (Levemir)  10 units  BEDTIME


 SUBQ


   9/27/17 21:00


 10/27/17 20:59  9/27/17 20:49


 


 


 Lorazepam


  (Ativan 2mg/ml


 1ml)  0.5 mg  Q4H  PRN


 IV


 For Anxiety  9/27/17 15:30


 10/4/17 15:29   


 


 


 Methylprednisolone


 Sodium Succinate


  (Solu-MEDROL)  60 mg  EVERY 6  HOURS


 IV


   9/27/17 19:30


 10/27/17 19:29  9/28/17 12:32


 


 


 Morphine Sulfate


  (Morphine


 Sulfate)  2 mg  Q4H  PRN


 IVP


 severe pain 7-10  9/27/17 15:30


 10/4/17 15:29  9/28/17 13:49


 


 


 Nitroglycerin


  (Ntg)  0.4 mg  Q5M X 3 DOSES PRN


 SL


 Prn Chest Pain  9/27/17 15:30


 10/27/17 15:29   


 


 


 Ondansetron HCl


  (Zofran)  4 mg  Q6H  PRN


 IVP


 Nausea & Vomiting  9/27/17 15:30


 10/27/17 15:29   


 


 


 Pregabalin


  (Lyrica)  75 mg  THREE TIMES A  DAY


 ORAL


   9/27/17 19:30


 10/27/17 19:29  9/28/17 12:32


 


 


 Promethazine HCl/


 Codeine


  (Phenergan with


 Codeine)  5 ml  Q6H  PRN


 ORAL


 cough  9/27/17 15:30


 10/27/17 15:29   


 


 


 Temazepam


  (Restoril)  15 mg  HSPRN  PRN


 ORAL


 Insomnia  9/27/17 21:00


 10/4/17 20:59  9/27/17 22:57


 


 


 Theophylline


  (Florin-Dur)  100 mg  EVERY 12  HOURS


 ORAL


   9/27/17 21:00


 10/27/17 20:59  9/28/17 08:27


 


 


 Ziprasidone


  (Geodon)  20 mg  DAILY


 ORAL


   9/28/17 09:00


 10/28/17 08:59  9/28/17 08:29


 

















SERENITY MOCTEZUMA Sep 28, 2017 16:21

## 2017-09-29 VITALS — SYSTOLIC BLOOD PRESSURE: 120 MMHG | DIASTOLIC BLOOD PRESSURE: 76 MMHG

## 2017-09-29 VITALS — SYSTOLIC BLOOD PRESSURE: 107 MMHG | DIASTOLIC BLOOD PRESSURE: 58 MMHG

## 2017-09-29 VITALS — SYSTOLIC BLOOD PRESSURE: 142 MMHG | DIASTOLIC BLOOD PRESSURE: 69 MMHG

## 2017-09-29 VITALS — DIASTOLIC BLOOD PRESSURE: 56 MMHG | SYSTOLIC BLOOD PRESSURE: 102 MMHG

## 2017-09-29 VITALS — DIASTOLIC BLOOD PRESSURE: 53 MMHG | SYSTOLIC BLOOD PRESSURE: 122 MMHG

## 2017-09-29 VITALS — SYSTOLIC BLOOD PRESSURE: 136 MMHG | DIASTOLIC BLOOD PRESSURE: 74 MMHG

## 2017-09-29 LAB
ANION GAP SERPL CALC-SCNC: 12 MMOL/L (ref 5–15)
BASOPHILS NFR BLD MANUAL: 0 % (ref 0–2)
CALCIUM SERPL-MCNC: 9.3 MG/DL (ref 8.6–10.2)
CHLORIDE SERPL-SCNC: 96 MEQ/L (ref 98–107)
CO2 SERPL-SCNC: 24 MEQ/L (ref 20–30)
CREAT SERPL-MCNC: 0.8 MG/DL (ref 0.7–1.2)
EOSINOPHIL NFR BLD MANUAL: 0 % (ref 0–3)
ERYTHROCYTE [DISTWIDTH] IN BLOOD BY AUTOMATED COUNT: 12.8 % (ref 11.6–14.8)
GFR SERPLBLD BASED ON 1.73 SQ M-ARVRAT: > 60 ML/MIN (ref 60–?)
HEMOLYSIS: 1
MCH RBC QN AUTO: 30.3 PG (ref 27–31)
MCHC RBC AUTO-ENTMCNC: 34.2 G/DL (ref 32–36)
MCV RBC AUTO: 89 FL (ref 80–99)
NEUTS BAND NFR BLD MANUAL: 3 % (ref 0–8)
NEUTS BAND NFR BLD MANUAL: 83 % (ref 45–75)
PLAT MORPH BLD: NORMAL
PLATELET # BLD EST: ADEQUATE 10*3/UL
PLATELET # BLD: 245 K/UL (ref 150–450)
PMV BLD AUTO: 7.6 FL (ref 6.5–10.1)
POTASSIUM SERPL-SCNC: 4.8 MEQ/L (ref 3.4–4.9)
RBC # BLD AUTO: 4.11 M/UL (ref 4.7–6.1)
SODIUM SERPL-SCNC: 132 MEQ/L (ref 135–145)
TOTAL CELLS COUNTED BLD: 100
VARIANT LYMPHS NFR BLD MANUAL: 9 % (ref 20–45)
WBC # BLD AUTO: 18.1 K/UL (ref 4.8–10.8)

## 2017-09-29 NOTE — GENERAL PROGRESS NOTE
Assessment/Plan


Assessment/Plan


(1) Chiari malformation type II


(2) Degenerative disc disease, cervical


(3) Lumbar degenerative disc disease


(4) Arthritis, lumbar spine


(5) Peripheral neuropathy


(6) Hydrocephalus


(7) Diabetes mellitus


Pt will be continued on Morphine as needed. 


Pt was d/w Dr. Franco and he concurred.





Subjective


Date patient seen:  Sep 29, 2017


Time patient seen:  11:00 - am


Allergies:  


Coded Allergies:  


     No Known Allergies (Unverified , 4/14/15)


Subjective


Constitutional:  Reports: weakness, Denies: chills, diaphoresis, fever, malaise

, no symptoms, other


HEENT:  Denies: blurred vision, double vision, ear discharge, ear pain, eye pain

, mouth pain, mouth swelling, no symptoms, nose congestion, nose pain, other, 

tearing, throat pain, throat swelling


Cardiovascular:  Denies: chest pain, edema, irregular heart rate, 

lightheadedness, no symptoms, other, palpitations, syncope


Respiratory:  Denies: SOB at rest, SOB with excertion, cough, no symptoms, 

orthopnea, other, shortness of breath, sputum, stridor, wheezing


Gastrointestinal/Abdominal:  Denies: abdomen distended, abdominal pain, black 

stools, blood in stool, constipated, diarrhea, difficulty swallowing, nausea, 

no symptoms, other, poor appetite, poor fluid intake, rectal bleeding, tarry 

stools, vomiting


Genitourinary:  Denies: burning, discharge, flank pain, frequency, hematuria, 

incontinence, no symptoms, other, pain, urgency


Neurologic/Psychiatric:  Reports: headache, numbness, tingling, weakness, Denies

: anxiety, depressed, emotional problems, no symptoms, other, paresthesia, pre-

existing deficit, seizure, tremors


Endocrine:  Denies: excessive sweating, flushing, increased hunger, increased 

thirst, increased urine, intolerance to cold, intolerance to heat, no symptoms, 

other, unexplained weight gain, unexplained weight loss


Hematologic/Lymphatic:  Denies: anemia, easy bleeding, easy bruising, no 

symptoms, other


 


Subjective


Pt is in bed no signs of pain or distress at this time the pain is tolerated on 

the Morphine rating it a 5/10.





Objective





Last 24 Hour Vital Signs








  Date Time  Temp Pulse Resp B/P (MAP) Pulse Ox O2 Delivery O2 Flow Rate FiO2


 


9/29/17 11:06  116 18  93 Nasal Cannula 3.0 32


 


9/29/17 11:06  115 20  97 Nasal Cannula 3.0 32


 


9/29/17 08:03  115 20  98 Nasal Cannula 3.0 32


 


9/29/17 08:00 97.0 113 20 120/76 97 Nasal Cannula 7.0 


 


9/29/17 07:58  110 20  93 Nasal Cannula 3.0 32


 


9/29/17 07:58      Nasal Cannula 3.0 32


 


9/29/17 07:58     93 Nasal Cannula 3.0 32


 


9/29/17 06:28 97.9       


 


9/29/17 04:00 97.9 123 21 102/56 96 Nasal Cannula 2.0 


 


9/29/17 03:38  110 20  98 Nasal Cannula 3.0 32


 


9/29/17 03:29  109 20  94 Nasal Cannula 3.0 32


 


9/29/17 00:00 98.1 106 19 136/74 93 Nasal Cannula 2.0 


 


9/28/17 23:24  116 20  99 Nasal Cannula 3.0 32


 


9/28/17 23:15  112 20  95 Nasal Cannula 3.0 32


 


9/28/17 20:00 98.4 123 19 123/65 93 Nasal Cannula 2.0 


 


9/28/17 19:27  114 20  98 Nasal Cannula 3.0 32


 


9/28/17 19:17     94 Nasal Cannula 3.0 32


 


9/28/17 19:17  110 18  94 Nasal Cannula 3.0 32


 


9/28/17 19:17      Nasal Cannula 3.0 32


 


9/28/17 16:00 97.9 116 17 127/63 94 Nasal Cannula 4.0 


 


9/28/17 14:57  100 16  99 Nasal Cannula 3.0 32


 


9/28/17 14:47  100 18  99 Nasal Cannula 2.0 28


 


9/28/17 13:31 97.9       


 


9/28/17 12:00 97.9 124 19 107/63 94 Nasal Cannula 4.0 

















Intake and Output  


 


 9/29/17 9/30/17





 19:00 07:00


 


Intake Total 240 ml 


 


Output Total 500 ml 


 


Balance -260 ml 


 


  


 


Intake Oral 240 ml 


 


Output Urine Total 500 ml 


 


# Bowel Movements 1 








Laboratory Tests


9/29/17 05:15: 


White Blood Count 18.1#H, Red Blood Count 4.11L, Hemoglobin 12.5L, Hematocrit 

36.5L, Mean Corpuscular Volume 89, Mean Corpuscular Hemoglobin 30.3, Mean 

Corpuscular Hemoglobin Concent 34.2, Red Cell Distribution Width 12.8, Platelet 

Count 245, Mean Platelet Volume 7.6, Neutrophils (%) (Auto) , Lymphocytes (%) (

Auto) , Monocytes (%) (Auto) , Eosinophils (%) (Auto) , Basophils (%) (Auto) , 

Differential Total Cells Counted 100, Neutrophils % (Manual) 83H, Lymphocytes % 

(Manual) 9L, Monocytes % (Manual) 5, Eosinophils % (Manual) 0, Basophils % (

Manual) 0, Band Neutrophils 3, Platelet Estimate Adequate, Platelet Morphology 

Normal, Sodium Level 132L, Potassium Level 4.8, Chloride Level 96L, Carbon 

Dioxide Level 24, Anion Gap 12, Blood Urea Nitrogen 21, Creatinine 0.8, Estimat 

Glomerular Filtration Rate > 60, Glucose Level 475#H, Calcium Level 9.3


Height (Feet):  5


Height (Inches):  11.00


Weight (Pounds):  232


Objective


General Appearance:  no apparent distress, alert


EENT:  normal ENT inspection, TMs normal


Neck:  supple, limited range of motion, muscle spasm


Cardiovascular:  normal rate, regular rhythm


Respiratory/Chest:  decreased breath sounds


Abdomen:  non tender, soft


Extremities:  non-tender


Edema:  no edema noted Arm (L), no edema noted Arm (R), no edema noted Leg (L), 

no edema noted Leg (R), no edema noted Pedal (L), no edema noted Pedal (R), no 

edema noted Generalized


Neurologic:  alert, oriented x 3


Skin:  warm/dry











AMAN CABRERA Sep 29, 2017 11:54

## 2017-09-29 NOTE — GENERAL PROGRESS NOTE
Assessment/Plan


Problem List:  


(1) Hyperglycemia due to type 2 diabetes mellitus


ICD Codes:  E11.65 - Type 2 diabetes mellitus with hyperglycemia


SNOMED:  064280765510616


(2) COPD exacerbation


ICD Codes:  J44.1 - Chronic obstructive pulmonary disease with (acute) 

exacerbation


SNOMED:  479824074, [id number]


Assessment/Plan


increase Levemir to 40 units qhs 


increase Novolog to 30 units ac tid 


continue sliding scale ac / hs 


continue Metformin





Subjective


Allergies:  


Coded Allergies:  


     No Known Allergies (Unverified , 4/14/15)


All Systems:  reviewed and negative except above


Subjective


glucose out of control despite insulin dose adjustment 


patient is ordering food from outside





Objective





Last 24 Hour Vital Signs








  Date Time  Temp Pulse Resp B/P (MAP) Pulse Ox O2 Delivery O2 Flow Rate FiO2


 


9/29/17 15:13  120 20  94 Nasal Cannula 3.0 32


 


9/29/17 15:00  115 20  98 Nasal Cannula 3.0 32


 


9/29/17 12:00 98.1 115 21 122/53 93 Nasal Cannula 7.0 


 


9/29/17 11:06  116 18  93 Nasal Cannula 3.0 32


 


9/29/17 11:06  115 20  97 Nasal Cannula 3.0 32


 


9/29/17 08:03  115 20  98 Nasal Cannula 3.0 32


 


9/29/17 08:00 97.0 113 20 120/76 97 Nasal Cannula 7.0 


 


9/29/17 07:58  110 20  93 Nasal Cannula 3.0 32


 


9/29/17 07:58      Nasal Cannula 3.0 32


 


9/29/17 07:58     93 Nasal Cannula 3.0 32


 


9/29/17 06:28 97.9       


 


9/29/17 04:00 97.9 123 21 102/56 96 Nasal Cannula 2.0 


 


9/29/17 03:38  110 20  98 Nasal Cannula 3.0 32


 


9/29/17 03:29  109 20  94 Nasal Cannula 3.0 32


 


9/29/17 00:00 98.1 106 19 136/74 93 Nasal Cannula 2.0 


 


9/28/17 23:24  116 20  99 Nasal Cannula 3.0 32


 


9/28/17 23:15  112 20  95 Nasal Cannula 3.0 32


 


9/28/17 20:00 98.4 123 19 123/65 93 Nasal Cannula 2.0 


 


9/28/17 19:27  114 20  98 Nasal Cannula 3.0 32


 


9/28/17 19:17     94 Nasal Cannula 3.0 32


 


9/28/17 19:17  110 18  94 Nasal Cannula 3.0 32


 


9/28/17 19:17      Nasal Cannula 3.0 32

















Intake and Output  


 


 9/29/17 9/30/17





 19:00 07:00


 


Intake Total 240 ml 


 


Output Total 500 ml 


 


Balance -260 ml 


 


  


 


Intake Oral 240 ml 


 


Output Urine Total 500 ml 


 


# Bowel Movements 1 








Laboratory Tests


9/29/17 05:15: 


White Blood Count 18.1#H, Red Blood Count 4.11L, Hemoglobin 12.5L, Hematocrit 

36.5L, Mean Corpuscular Volume 89, Mean Corpuscular Hemoglobin 30.3, Mean 

Corpuscular Hemoglobin Concent 34.2, Red Cell Distribution Width 12.8, Platelet 

Count 245, Mean Platelet Volume 7.6, Neutrophils (%) (Auto) , Lymphocytes (%) (

Auto) , Monocytes (%) (Auto) , Eosinophils (%) (Auto) , Basophils (%) (Auto) , 

Differential Total Cells Counted 100, Neutrophils % (Manual) 83H, Lymphocytes % 

(Manual) 9L, Monocytes % (Manual) 5, Eosinophils % (Manual) 0, Basophils % (

Manual) 0, Band Neutrophils 3, Platelet Estimate Adequate, Platelet Morphology 

Normal, Sodium Level 132L, Potassium Level 4.8, Chloride Level 96L, Carbon 

Dioxide Level 24, Anion Gap 12, Blood Urea Nitrogen 21, Creatinine 0.8, Estimat 

Glomerular Filtration Rate > 60, Glucose Level 475#H, Calcium Level 9.3


Height (Feet):  5


Height (Inches):  11.00


Weight (Pounds):  232


General Appearance:  no apparent distress


Neck:  normal alignment


Cardiovascular:  normal rate


Respiratory/Chest:  lungs clear


Abdomen:  normal bowel sounds


Edema:  no edema noted Arm (L), no edema noted Arm (R), no edema noted Leg (L), 

no edema noted Leg (R), no edema noted Pedal (L), no edema noted Pedal (R), no 

edema noted Generalized


Objective





Current Medications








 Medications


  (Trade)  Dose


 Ordered  Sig/Henry


 Route


 PRN Reason  Start Time


 Stop Time Status Last Admin


Dose Admin


 


 Acetaminophen


  (Tylenol)  650 mg  Q4H  PRN


 ORAL


 FEVER>100.5  9/27/17 15:30


 10/27/17 15:29   


 


 


 Albuterol/


 Ipratropium


  (DuoNeb


 0.5-3(2.5)mg/3ml)  3 ml  Q4HRT


 HHN


   9/27/17 23:00


 10/2/17 22:59  9/29/17 15:11


 


 


 Clonidine HCl


  (Catapres)  0.1 mg  Q4H  PRN


 ORAL


 sbp more than 160  9/27/17 15:30


 10/27/17 15:29   


 


 


 Dextrose


  (Dextrose 50%)    STAT  PRN


 IV


 Hypoglycemia  9/27/17 18:30


 10/27/17 18:29   


 


 


 Divalproex Sodium


  (Depakote ER)  500 mg  EVERY 12  HOURS


 ORAL


   9/29/17 21:00


 10/29/17 20:59   


 


 


 Doxycycline


 Monohydrate


  (Vibramycin)  100 mg  EVERY 12  HOURS


 ORAL


   9/28/17 15:00


 10/5/17 14:59  9/29/17 08:46


 


 


 Heparin Sodium


  (Porcine)


  (Heparin 5000


 units/ml)  5,000 units  EVERY 12  HOURS


 SUBQ


   9/27/17 21:00


 10/27/17 20:59  9/28/17 08:34


 


 


 Insulin Aspart


  (NovoLOG)    BEFORE MEALS AND  HS


 SUBQ


   9/27/17 21:00


 10/27/17 20:59  9/29/17 12:02


 


 


 Insulin Aspart


  (NovoLOG)  18 units  NOVOTIAC


 SUBQ


   9/29/17 06:30


 10/29/17 06:29  9/29/17 12:01


 


 


 Insulin Detemir


  (Levemir)  24 units  BEDTIME


 SUBQ


   9/28/17 21:00


 10/28/17 20:59  9/28/17 20:42


 


 


 Lorazepam


  (Ativan 2mg/ml


 1ml)  0.5 mg  Q4H  PRN


 IV


 For Anxiety  9/27/17 15:30


 10/4/17 15:29   


 


 


 Metformin HCl


  (Glucophage)  500 mg  TIAC


 ORAL


   9/29/17 06:30


 10/29/17 06:29  9/29/17 12:00


 


 


 Methylprednisolone


 Sodium Succinate


  (Solu-MEDROL)  40 mg  EVERY 8  HOURS


 IV


   9/28/17 22:00


 10/27/17 19:29  9/29/17 14:51


 


 


 Morphine Sulfate


  (Morphine


 Sulfate)  2 mg  Q4H  PRN


 IVP


 severe pain 7-10  9/27/17 15:30


 10/4/17 15:29  9/29/17 14:02


 


 


 Nitroglycerin


  (Ntg)  0.4 mg  Q5M X 3 DOSES PRN


 SL


 Prn Chest Pain  9/27/17 15:30


 10/27/17 15:29   


 


 


 Ondansetron HCl


  (Zofran)  4 mg  Q6H  PRN


 IVP


 Nausea & Vomiting  9/27/17 15:30


 10/27/17 15:29   


 


 


 Pregabalin


  (Lyrica)  75 mg  THREE TIMES A  DAY


 ORAL


   9/27/17 19:30


 10/27/17 19:29  9/29/17 13:01


 


 


 Promethazine HCl/


 Codeine


  (Phenergan with


 Codeine)  5 ml  Q6H  PRN


 ORAL


 cough  9/27/17 15:30


 10/27/17 15:29   


 


 


 Quetiapine


 Fumarate


  (SEROquel)  300 mg  QHS


 ORAL


   9/29/17 21:00


 10/29/17 20:59   


 


 


 Theophylline


  (Florin-Dur)  100 mg  EVERY 12  HOURS


 ORAL


   9/27/17 21:00


 10/27/17 20:59  9/29/17 08:46


 


 


 Ziprasidone


  (Geodon)  20 mg  DAILY


 ORAL


   9/28/17 09:00


 10/28/17 08:59  9/29/17 09:14


 


 


 Zolpidem Tartrate


  (Ambien)  5 mg  HSPRN  PRN


 ORAL


 Insomnia  9/29/17 21:00


 10/6/17 20:59   


 














Item Value  Date Time


 


Bedside Blood Glucose 492 mg/dl H 9/29/17 1202


 


Bedside Blood Glucose Critically High Result 9/29/17 0621


 


Bedside Blood Glucose 466 mg/dl H 9/28/17 2043

















FITO DENTON Sep 29, 2017 16:17

## 2017-09-29 NOTE — CONSULTATION
DATE OF CONSULTATION:  09/28/2017



NOTE:  "INCOMPLETE DICTATION"



INFECTIOUS DISEASE CONSULTATION



CONSULTING PHYSICIAN:  Donnie North M.D.



REQUESTING PHYSICIAN:  Samir Cabrera D.O.



Reason For Consultation:  Bronchitis, recommendation for antibiotics

treatment.



History Of Present Illness:  The patient is a 36-year-old male, who is a

chronic smoker, was sent from [facility] nursing [facility] for cough

productive with shortness of breath and wheezing.  The patient has been

smoker since age of 9 and he continued to smoke up to the date of

admission.  He denies any fever or chills, but his cough was productive of

yellowish phlegm.  The patient was found to have hyperglycemia on

admission, so he was admitted to the hospital for antibiotics treatment

and further management and I was consulted by the primary provider for

further evaluation and medication treatment of his acute bronchitis.



PAST MEDICAL HISTORY:  Significant for diabetes and hypertension.



PAST SURGICAL HISTORY:  Negative.



SOCIAL HISTORY:  The patient is a chronic smoker since the age of

9.



FAMILY HISTORY:  Noncontributory.









  ______________________________________________

  Donnie North M.D.





DR:  YANDEL

D:  09/28/2017 18:21

T:  09/29/2017 00:23

JOB#:  [id number]

CC:

## 2017-09-29 NOTE — INFECTIOUS DISEASES PROG NOTE
Assessment/Plan


Problems:  


(1) COPD exacerbation


Assessment & Plan:  continue  doxycycline,  inhalers and taper steroids





(2) Hyperglycemia due to type 2 diabetes mellitus


Assessment & Plan:  suspect poor compliance , monitor blood sugurar to keep 

tight glycemic control between 





(3) Diabetes mellitus


Assessment & Plan:  recommend tight glycemic control to keep blood glucose 

between 








Subjective


Constitutional:  Reports: no symptoms


HEENT:  Reports: no symptoms


Respiratory:  Reports: dry cough


Breasts:  Reports: no symptoms


Cardiovascular:  Reports: no symptoms


Gastrointestinal/Abdominal:  Reports: no symptoms


Genitourinary:  Reports: no symptoms


Neurologic:  Reports: no symptoms


Psychiatric:  Reports: no symptoms


Skin:  Reports: no symptoms


Endocrine:  Reports: no symptoms


Allergies:  


Coded Allergies:  


     No Known Allergies (Unverified , 4/14/15)





Objective


Vital Signs





Last 24 Hour Vital Signs








  Date Time  Temp Pulse Resp B/P (MAP) Pulse Ox O2 Delivery O2 Flow Rate FiO2


 


9/29/17 16:00 98.1 121 18 107/58 93 Nasal Cannula 4.0 


 


9/29/17 15:13  120 20  94 Nasal Cannula 3.0 32


 


9/29/17 15:00  115 20  98 Nasal Cannula 3.0 32


 


9/29/17 12:00 98.1 115 21 122/53 93 Nasal Cannula 7.0 


 


9/29/17 11:06  116 18  93 Nasal Cannula 3.0 32


 


9/29/17 11:06  115 20  97 Nasal Cannula 3.0 32


 


9/29/17 08:03  115 20  98 Nasal Cannula 3.0 32


 


9/29/17 08:00 97.0 113 20 120/76 97 Nasal Cannula 7.0 


 


9/29/17 07:58  110 20  93 Nasal Cannula 3.0 32


 


9/29/17 07:58      Nasal Cannula 3.0 32


 


9/29/17 07:58     93 Nasal Cannula 3.0 32


 


9/29/17 06:28 97.9       


 


9/29/17 04:00 97.9 123 21 102/56 96 Nasal Cannula 2.0 


 


9/29/17 03:38  110 20  98 Nasal Cannula 3.0 32


 


9/29/17 03:29  109 20  94 Nasal Cannula 3.0 32


 


9/29/17 00:00 98.1 106 19 136/74 93 Nasal Cannula 2.0 


 


9/28/17 23:24  116 20  99 Nasal Cannula 3.0 32


 


9/28/17 23:15  112 20  95 Nasal Cannula 3.0 32


 


9/28/17 20:00 98.4 123 19 123/65 93 Nasal Cannula 2.0 


 


9/28/17 19:27  114 20  98 Nasal Cannula 3.0 32


 


9/28/17 19:17     94 Nasal Cannula 3.0 32


 


9/28/17 19:17  110 18  94 Nasal Cannula 3.0 32


 


9/28/17 19:17      Nasal Cannula 3.0 32








Height (Feet):  5


Height (Inches):  11.00


Weight (Pounds):  232


General Appearance:  WD/WN, no acute distress


HEENT:  normocephalic, atraumatic, anicteric, mucous membranes moist, PERRL


Respiratory/Chest:  chest wall non-tender, no respiratory distress, no 

accessory muscle use, decreased breath sounds, inspiratory wheezing


Cardiovascular:  normal peripheral pulses, normal rate, regular rhythm, no 

gallop/murmur, no JVD


Abdomen:  normal bowel sounds, soft, non tender, no organomegaly, non distended

, no mass, no scars


Extremities:  no cyanosis, no clubbing


Skin:  no rash, no lesions, no ulcers





Microbiology








 Date/Time


Source Procedure


Growth Status


 


 


 9/27/17 16:00


Blood Blood Culture - Preliminary


NO GROWTH AFTER 24 HOURS Resulted


 


 9/27/17 15:45


Blood Blood Culture - Preliminary


NO GROWTH AFTER 24 HOURS Resulted


 


 9/27/17 17:05


Nasal Nares MRSA Culture - Final


Staphylococcus Aureus - Mrsa Complete


 


 9/27/17 17:05


Rectum VRE Culture - Final


NO VANCOMYCIN RESISTANT ENTEROCOCCUS ... Complete











Laboratory Tests








Test


  9/29/17


05:15


 


White Blood Count


  18.1 K/UL


(4.8-10.8)  #H


 


Red Blood Count


  4.11 M/UL


(4.70-6.10)  L


 


Hemoglobin


  12.5 G/DL


(14.2-18.0)  L


 


Hematocrit


  36.5 %


(42.0-52.0)  L


 


Mean Corpuscular Volume 89 FL (80-99)  


 


Mean Corpuscular Hemoglobin


  30.3 PG


(27.0-31.0)


 


Mean Corpuscular Hemoglobin


Concent 34.2 G/DL


(32.0-36.0)


 


Red Cell Distribution Width


  12.8 %


(11.6-14.8)


 


Platelet Count


  245 K/UL


(150-450)


 


Mean Platelet Volume


  7.6 FL


(6.5-10.1)


 


Neutrophils (%) (Auto)


  % (45.0-75.0)


 


 


Lymphocytes (%) (Auto)


  % (20.0-45.0)


 


 


Monocytes (%) (Auto)  % (1.0-10.0)  


 


Eosinophils (%) (Auto)  % (0.0-3.0)  


 


Basophils (%) (Auto)  % (0.0-2.0)  


 


Differential Total Cells


Counted 100  


 


 


Neutrophils % (Manual) 83 % (45-75)  H


 


Lymphocytes % (Manual) 9 % (20-45)  L


 


Monocytes % (Manual) 5 % (1-10)  


 


Eosinophils % (Manual) 0 % (0-3)  


 


Basophils % (Manual) 0 % (0-2)  


 


Band Neutrophils 3 % (0-8)  


 


Platelet Estimate Adequate  


 


Platelet Morphology Normal  


 


Sodium Level


  132 mEQ/L


(135-145)  L


 


Potassium Level


  4.8 mEQ/L


(3.4-4.9)


 


Chloride Level


  96 mEQ/L


()  L


 


Carbon Dioxide Level


  24 mEQ/L


(20-30)


 


Anion Gap 12 (5-15)  


 


Blood Urea Nitrogen


  21 mg/dL


(7-23)


 


Creatinine


  0.8 mg/dL


(0.7-1.2)


 


Estimat Glomerular Filtration


Rate > 60 mL/min


(>60)


 


Glucose Level


  475 mg/dL


()  #H


 


Calcium Level


  9.3 mg/dL


(8.6-10.2)











Current Medications








 Medications


  (Trade)  Dose


 Ordered  Sig/Henry


 Route


 PRN Reason  Start Time


 Stop Time Status Last Admin


Dose Admin


 


 Acetaminophen


  (Tylenol)  650 mg  Q4H  PRN


 ORAL


 FEVER>100.5  9/27/17 15:30


 10/27/17 15:29   


 


 


 Albuterol/


 Ipratropium


  (DuoNeb


 0.5-3(2.5)mg/3ml)  3 ml  Q4HRT


 HHN


   9/27/17 23:00


 10/2/17 22:59  9/29/17 15:11


 


 


 Clonidine HCl


  (Catapres)  0.1 mg  Q4H  PRN


 ORAL


 sbp more than 160  9/27/17 15:30


 10/27/17 15:29   


 


 


 Dextrose


  (Dextrose 50%)    STAT  PRN


 IV


 Hypoglycemia  9/27/17 18:30


 10/27/17 18:29   


 


 


 Divalproex Sodium


  (Depakote ER)  500 mg  EVERY 12  HOURS


 ORAL


   9/29/17 21:00


 10/29/17 20:59   


 


 


 Doxycycline


 Monohydrate


  (Vibramycin)  100 mg  EVERY 12  HOURS


 ORAL


   9/28/17 15:00


 10/5/17 14:59  9/29/17 08:46


 


 


 Heparin Sodium


  (Porcine)


  (Heparin 5000


 units/ml)  5,000 units  EVERY 12  HOURS


 SUBQ


   9/27/17 21:00


 10/27/17 20:59  9/28/17 08:34


 


 


 Insulin Aspart


  (NovoLOG)    BEFORE MEALS AND  HS


 SUBQ


   9/27/17 21:00


 10/27/17 20:59  9/29/17 17:24


 


 


 Insulin Aspart


  (NovoLOG)  30 units  NOVOTIAC


 SUBQ


   9/29/17 16:50


 10/29/17 16:49  9/29/17 17:25


 


 


 Insulin Detemir


  (Levemir)  40 units  BEDTIME


 SUBQ


   9/29/17 21:00


 10/29/17 20:59   


 


 


 Lorazepam


  (Ativan 2mg/ml


 1ml)  0.5 mg  Q4H  PRN


 IV


 For Anxiety  9/27/17 15:30


 10/4/17 15:29   


 


 


 Metformin HCl


  (Glucophage)  500 mg  TIAC


 ORAL


   9/29/17 06:30


 10/29/17 06:29  9/29/17 17:22


 


 


 Methylprednisolone


 Sodium Succinate


  (Solu-MEDROL)  40 mg  EVERY 8  HOURS


 IV


   9/28/17 22:00


 10/27/17 19:29  9/29/17 14:51


 


 


 Morphine Sulfate


  (Morphine


 Sulfate)  2 mg  Q4H  PRN


 IVP


 severe pain 7-10  9/27/17 15:30


 10/4/17 15:29  9/29/17 18:07


 


 


 Nitroglycerin


  (Ntg)  0.4 mg  Q5M X 3 DOSES PRN


 SL


 Prn Chest Pain  9/27/17 15:30


 10/27/17 15:29   


 


 


 Ondansetron HCl


  (Zofran)  4 mg  Q6H  PRN


 IVP


 Nausea & Vomiting  9/27/17 15:30


 10/27/17 15:29   


 


 


 Pregabalin


  (Lyrica)  75 mg  THREE TIMES A  DAY


 ORAL


   9/27/17 19:30


 10/27/17 19:29  9/29/17 18:07


 


 


 Promethazine HCl/


 Codeine


  (Phenergan with


 Codeine)  5 ml  Q6H  PRN


 ORAL


 cough  9/27/17 15:30


 10/27/17 15:29   


 


 


 Quetiapine


 Fumarate


  (SEROquel)  300 mg  QHS


 ORAL


   9/29/17 21:00


 10/29/17 20:59   


 


 


 Theophylline


  (Florin-Dur)  100 mg  EVERY 12  HOURS


 ORAL


   9/27/17 21:00


 10/27/17 20:59  9/29/17 08:46


 


 


 Ziprasidone


  (Geodon)  20 mg  DAILY


 ORAL


   9/28/17 09:00


 10/28/17 08:59  9/29/17 09:14


 


 


 Zolpidem Tartrate


  (Ambien)  5 mg  HSPRN  PRN


 ORAL


 Insomnia  9/29/17 21:00


 10/6/17 20:59   


 

















Donnie North M.D. Sep 29, 2017 18:35

## 2017-09-29 NOTE — PULMONOLOGY PROGRESS NOTE
Assessment/Plan


Problems:  


(1) COPD exacerbation


(2) Lumbar back pain


(3) Anxiety


(4) Diabetes mellitus


Assessment/Plan


taper steroids


IV abx


check sputum


sliding scale


improving slowly





Subjective


ROS Limited/Unobtainable:  No


Constitutional:  Reports: no symptoms


HEENT:  Repors: no symptoms


Respiratory:  Reports: no symptoms


Allergies:  


Coded Allergies:  


     No Known Allergies (Unverified , 4/14/15)





Objective





Last 24 Hour Vital Signs








  Date Time  Temp Pulse Resp B/P (MAP) Pulse Ox O2 Delivery O2 Flow Rate FiO2


 


9/29/17 15:13  120 20  94 Nasal Cannula 3.0 32


 


9/29/17 15:00  115 20  98 Nasal Cannula 3.0 32


 


9/29/17 12:00 98.1 115 21 122/53 93 Nasal Cannula 7.0 


 


9/29/17 11:06  116 18  93 Nasal Cannula 3.0 32


 


9/29/17 11:06  115 20  97 Nasal Cannula 3.0 32


 


9/29/17 08:03  115 20  98 Nasal Cannula 3.0 32


 


9/29/17 08:00 97.0 113 20 120/76 97 Nasal Cannula 7.0 


 


9/29/17 07:58  110 20  93 Nasal Cannula 3.0 32


 


9/29/17 07:58      Nasal Cannula 3.0 32


 


9/29/17 07:58     93 Nasal Cannula 3.0 32


 


9/29/17 06:28 97.9       


 


9/29/17 04:00 97.9 123 21 102/56 96 Nasal Cannula 2.0 


 


9/29/17 03:38  110 20  98 Nasal Cannula 3.0 32


 


9/29/17 03:29  109 20  94 Nasal Cannula 3.0 32


 


9/29/17 00:00 98.1 106 19 136/74 93 Nasal Cannula 2.0 


 


9/28/17 23:24  116 20  99 Nasal Cannula 3.0 32


 


9/28/17 23:15  112 20  95 Nasal Cannula 3.0 32


 


9/28/17 20:00 98.4 123 19 123/65 93 Nasal Cannula 2.0 


 


9/28/17 19:27  114 20  98 Nasal Cannula 3.0 32


 


9/28/17 19:17     94 Nasal Cannula 3.0 32


 


9/28/17 19:17  110 18  94 Nasal Cannula 3.0 32


 


9/28/17 19:17      Nasal Cannula 3.0 32


 


9/28/17 16:00 97.9 116 17 127/63 94 Nasal Cannula 4.0 

















Intake and Output  


 


 9/29/17 9/30/17





 19:00 07:00


 


Intake Total 240 ml 


 


Output Total 500 ml 


 


Balance -260 ml 


 


  


 


Intake Oral 240 ml 


 


Output Urine Total 500 ml 


 


# Bowel Movements 1 








General Appearance:  WD/WN


HEENT:  normocephalic, atraumatic


Respiratory/Chest:  chest wall non-tender, lungs clear


Cardiovascular:  normal peripheral pulses, normal rate


Abdomen:  soft, non tender


Extremities:  no cyanosis


Skin:  no rash


Neurologic/Psychiatric:  CNs II-XII grossly normal, no motor/sensory deficits


Lymphatic:  no neck adenopathy





Microbiology








 Date/Time


Source Procedure


Growth Status


 


 


 9/27/17 16:00


Blood Blood Culture - Preliminary


NO GROWTH AFTER 24 HOURS Resulted


 


 9/27/17 15:45


Blood Blood Culture - Preliminary


NO GROWTH AFTER 24 HOURS Resulted


 


 9/27/17 17:05


Nasal Nares MRSA Culture - Final


Staphylococcus Aureus - Mrsa Complete


 


 9/27/17 17:05


Rectum VRE Culture - Final


NO VANCOMYCIN RESISTANT ENTEROCOCCUS ... Complete








Laboratory Tests


9/29/17 05:15: 


White Blood Count 18.1#H, Red Blood Count 4.11L, Hemoglobin 12.5L, Hematocrit 

36.5L, Mean Corpuscular Volume 89, Mean Corpuscular Hemoglobin 30.3, Mean 

Corpuscular Hemoglobin Concent 34.2, Red Cell Distribution Width 12.8, Platelet 

Count 245, Mean Platelet Volume 7.6, Neutrophils (%) (Auto) , Lymphocytes (%) (

Auto) , Monocytes (%) (Auto) , Eosinophils (%) (Auto) , Basophils (%) (Auto) , 

Differential Total Cells Counted 100, Neutrophils % (Manual) 83H, Lymphocytes % 

(Manual) 9L, Monocytes % (Manual) 5, Eosinophils % (Manual) 0, Basophils % (

Manual) 0, Band Neutrophils 3, Platelet Estimate Adequate, Platelet Morphology 

Normal, Sodium Level 132L, Potassium Level 4.8, Chloride Level 96L, Carbon 

Dioxide Level 24, Anion Gap 12, Blood Urea Nitrogen 21, Creatinine 0.8, Estimat 

Glomerular Filtration Rate > 60, Glucose Level 475#H, Calcium Level 9.3





Current Medications








 Medications


  (Trade)  Dose


 Ordered  Sig/Henry


 Route


 PRN Reason  Start Time


 Stop Time Status Last Admin


Dose Admin


 


 Acetaminophen


  (Tylenol)  650 mg  Q4H  PRN


 ORAL


 FEVER>100.5  9/27/17 15:30


 10/27/17 15:29   


 


 


 Albuterol/


 Ipratropium


  (DuoNeb


 0.5-3(2.5)mg/3ml)  3 ml  Q4HRT


 HHN


   9/27/17 23:00


 10/2/17 22:59  9/29/17 15:11


 


 


 Clonidine HCl


  (Catapres)  0.1 mg  Q4H  PRN


 ORAL


 sbp more than 160  9/27/17 15:30


 10/27/17 15:29   


 


 


 Dextrose


  (Dextrose 50%)    STAT  PRN


 IV


 Hypoglycemia  9/27/17 18:30


 10/27/17 18:29   


 


 


 Divalproex Sodium


  (Depakote ER)  500 mg  EVERY 12  HOURS


 ORAL


   9/29/17 21:00


 10/29/17 20:59   


 


 


 Doxycycline


 Monohydrate


  (Vibramycin)  100 mg  EVERY 12  HOURS


 ORAL


   9/28/17 15:00


 10/5/17 14:59  9/29/17 08:46


 


 


 Heparin Sodium


  (Porcine)


  (Heparin 5000


 units/ml)  5,000 units  EVERY 12  HOURS


 SUBQ


   9/27/17 21:00


 10/27/17 20:59  9/28/17 08:34


 


 


 Insulin Aspart


  (NovoLOG)    BEFORE MEALS AND  HS


 SUBQ


   9/27/17 21:00


 10/27/17 20:59  9/29/17 12:02


 


 


 Insulin Aspart


  (NovoLOG)  18 units  NOVOTIAC


 SUBQ


   9/29/17 06:30


 10/29/17 06:29  9/29/17 12:01


 


 


 Insulin Detemir


  (Levemir)  24 units  BEDTIME


 SUBQ


   9/28/17 21:00


 10/28/17 20:59  9/28/17 20:42


 


 


 Lorazepam


  (Ativan 2mg/ml


 1ml)  0.5 mg  Q4H  PRN


 IV


 For Anxiety  9/27/17 15:30


 10/4/17 15:29   


 


 


 Metformin HCl


  (Glucophage)  500 mg  TIAC


 ORAL


   9/29/17 06:30


 10/29/17 06:29  9/29/17 12:00


 


 


 Methylprednisolone


 Sodium Succinate


  (Solu-MEDROL)  40 mg  EVERY 8  HOURS


 IV


   9/28/17 22:00


 10/27/17 19:29  9/29/17 14:51


 


 


 Morphine Sulfate


  (Morphine


 Sulfate)  2 mg  Q4H  PRN


 IVP


 severe pain 7-10  9/27/17 15:30


 10/4/17 15:29  9/29/17 14:02


 


 


 Nitroglycerin


  (Ntg)  0.4 mg  Q5M X 3 DOSES PRN


 SL


 Prn Chest Pain  9/27/17 15:30


 10/27/17 15:29   


 


 


 Ondansetron HCl


  (Zofran)  4 mg  Q6H  PRN


 IVP


 Nausea & Vomiting  9/27/17 15:30


 10/27/17 15:29   


 


 


 Pregabalin


  (Lyrica)  75 mg  THREE TIMES A  DAY


 ORAL


   9/27/17 19:30


 10/27/17 19:29  9/29/17 13:01


 


 


 Promethazine HCl/


 Codeine


  (Phenergan with


 Codeine)  5 ml  Q6H  PRN


 ORAL


 cough  9/27/17 15:30


 10/27/17 15:29   


 


 


 Quetiapine


 Fumarate


  (SEROquel)  300 mg  QHS


 ORAL


   9/29/17 21:00


 10/29/17 20:59   


 


 


 Theophylline


  (Florin-Dur)  100 mg  EVERY 12  HOURS


 ORAL


   9/27/17 21:00


 10/27/17 20:59  9/29/17 08:46


 


 


 Ziprasidone


  (Geodon)  20 mg  DAILY


 ORAL


   9/28/17 09:00


 10/28/17 08:59  9/29/17 09:14


 


 


 Zolpidem Tartrate


  (Ambien)  5 mg  HSPRN  PRN


 ORAL


 Insomnia  9/29/17 21:00


 10/6/17 20:59   


 

















SERENITY MOCTEZUMA Sep 29, 2017 15:35

## 2017-09-29 NOTE — GENERAL PROGRESS NOTE
Assessment/Plan


Problem List:  


(1) Diabetes mellitus


ICD Codes:  E11.9 - Diabetes mellitus


SNOMED:  49173485


(2) Generalized weakness


ICD Codes:  R53.1 - Weakness


SNOMED:  18333460


(3) COPD exacerbation


ICD Codes:  J44.1 - Chronic obstructive pulmonary disease with (acute) 

exacerbation


SNOMED:  763262681, 177079687


(4) Hyperglycemia due to type 2 diabetes mellitus


ICD Codes:  E11.65 - Type 2 diabetes mellitus with hyperglycemia


SNOMED:  904151502774803


(5) Opiate dependence


ICD Codes:  F11.20 - Opioid dependence


SNOMED:  02560744


(6) Anxiety


ICD Codes:  F41.9 - Anxiety disorder, unspecified; Z68.41 - Body mass index (BMI

) 40.0-44.9, adult


SNOMED:  91574898


Status:  stable, progressing, tolerating diet


Assessment/Plan


o2 pulm tx abx ot pt diet cbc bmp am





Subjective


Constitutional:  Reports: weakness


Allergies:  


Coded Allergies:  


     No Known Allergies (Unverified , 4/14/15)


All Systems:  reviewed and negative except above


Subjective


o2 nc sleepy





Objective





Last 24 Hour Vital Signs








  Date Time  Temp Pulse Resp B/P (MAP) Pulse Ox O2 Delivery O2 Flow Rate FiO2


 


9/29/17 12:00 98.1 115 21 122/53 93 Nasal Cannula 7.0 


 


9/29/17 11:06  116 18  93 Nasal Cannula 3.0 32


 


9/29/17 11:06  115 20  97 Nasal Cannula 3.0 32


 


9/29/17 08:03  115 20  98 Nasal Cannula 3.0 32


 


9/29/17 08:00 97.0 113 20 120/76 97 Nasal Cannula 7.0 


 


9/29/17 07:58  110 20  93 Nasal Cannula 3.0 32


 


9/29/17 07:58      Nasal Cannula 3.0 32


 


9/29/17 07:58     93 Nasal Cannula 3.0 32


 


9/29/17 06:28 97.9       


 


9/29/17 04:00 97.9 123 21 102/56 96 Nasal Cannula 2.0 


 


9/29/17 03:38  110 20  98 Nasal Cannula 3.0 32


 


9/29/17 03:29  109 20  94 Nasal Cannula 3.0 32


 


9/29/17 00:00 98.1 106 19 136/74 93 Nasal Cannula 2.0 


 


9/28/17 23:24  116 20  99 Nasal Cannula 3.0 32


 


9/28/17 23:15  112 20  95 Nasal Cannula 3.0 32


 


9/28/17 20:00 98.4 123 19 123/65 93 Nasal Cannula 2.0 


 


9/28/17 19:27  114 20  98 Nasal Cannula 3.0 32


 


9/28/17 19:17     94 Nasal Cannula 3.0 32


 


9/28/17 19:17  110 18  94 Nasal Cannula 3.0 32


 


9/28/17 19:17      Nasal Cannula 3.0 32


 


9/28/17 16:00 97.9 116 17 127/63 94 Nasal Cannula 4.0 

















Intake and Output  


 


 9/29/17 9/30/17





 19:00 07:00


 


Intake Total 240 ml 


 


Output Total 500 ml 


 


Balance -260 ml 


 


  


 


Intake Oral 240 ml 


 


Output Urine Total 500 ml 


 


# Bowel Movements 1 








Laboratory Tests


9/29/17 05:15: 


White Blood Count 18.1#H, Red Blood Count 4.11L, Hemoglobin 12.5L, Hematocrit 

36.5L, Mean Corpuscular Volume 89, Mean Corpuscular Hemoglobin 30.3, Mean 

Corpuscular Hemoglobin Concent 34.2, Red Cell Distribution Width 12.8, Platelet 

Count 245, Mean Platelet Volume 7.6, Neutrophils (%) (Auto) , Lymphocytes (%) (

Auto) , Monocytes (%) (Auto) , Eosinophils (%) (Auto) , Basophils (%) (Auto) , 

Differential Total Cells Counted 100, Neutrophils % (Manual) 83H, Lymphocytes % 

(Manual) 9L, Monocytes % (Manual) 5, Eosinophils % (Manual) 0, Basophils % (

Manual) 0, Band Neutrophils 3, Platelet Estimate Adequate, Platelet Morphology 

Normal, Sodium Level 132L, Potassium Level 4.8, Chloride Level 96L, Carbon 

Dioxide Level 24, Anion Gap 12, Blood Urea Nitrogen 21, Creatinine 0.8, Estimat 

Glomerular Filtration Rate > 60, Glucose Level 475#H, Calcium Level 9.3


Height (Feet):  5


Height (Inches):  11.00


Weight (Pounds):  232


General Appearance:  lethargic


EENT:  normal ENT inspection


Neck:  normal alignment


Cardiovascular:  normal peripheral pulses, normal rate, regular rhythm


Respiratory/Chest:  chest wall non-tender, lungs clear, normal breath sounds


Abdomen:  normal bowel sounds, non tender, soft


Extremities:  normal inspection


Edema:  no edema noted Arm (L), no edema noted Arm (R), no edema noted Leg (L), 

no edema noted Leg (R), no edema noted Pedal (L), no edema noted Pedal (R), no 

edema noted Generalized











ROSE MARIE IRVING Sep 29, 2017 15:08

## 2017-09-30 VITALS — SYSTOLIC BLOOD PRESSURE: 121 MMHG | DIASTOLIC BLOOD PRESSURE: 64 MMHG

## 2017-09-30 VITALS — SYSTOLIC BLOOD PRESSURE: 132 MMHG | DIASTOLIC BLOOD PRESSURE: 60 MMHG

## 2017-09-30 VITALS — DIASTOLIC BLOOD PRESSURE: 99 MMHG | SYSTOLIC BLOOD PRESSURE: 151 MMHG

## 2017-09-30 VITALS — SYSTOLIC BLOOD PRESSURE: 116 MMHG | DIASTOLIC BLOOD PRESSURE: 78 MMHG

## 2017-09-30 VITALS — SYSTOLIC BLOOD PRESSURE: 115 MMHG | DIASTOLIC BLOOD PRESSURE: 64 MMHG

## 2017-09-30 VITALS — DIASTOLIC BLOOD PRESSURE: 62 MMHG | SYSTOLIC BLOOD PRESSURE: 119 MMHG

## 2017-09-30 VITALS — SYSTOLIC BLOOD PRESSURE: 119 MMHG | DIASTOLIC BLOOD PRESSURE: 63 MMHG

## 2017-09-30 LAB
ANION GAP SERPL CALC-SCNC: 16 MMOL/L (ref 5–15)
BASOPHILS NFR BLD MANUAL: 0 % (ref 0–2)
CALCIUM SERPL-MCNC: 9.1 MG/DL (ref 8.6–10.2)
CHLORIDE SERPL-SCNC: 94 MEQ/L (ref 98–107)
CO2 SERPL-SCNC: 23 MEQ/L (ref 20–30)
CREAT SERPL-MCNC: 0.8 MG/DL (ref 0.7–1.2)
EOSINOPHIL NFR BLD MANUAL: 0 % (ref 0–3)
ERYTHROCYTE [DISTWIDTH] IN BLOOD BY AUTOMATED COUNT: 12.9 % (ref 11.6–14.8)
GFR SERPLBLD BASED ON 1.73 SQ M-ARVRAT: > 60 ML/MIN (ref 60–?)
HEMOLYSIS: 5
MCH RBC QN AUTO: 30.1 PG (ref 27–31)
MCHC RBC AUTO-ENTMCNC: 34.1 G/DL (ref 32–36)
MCV RBC AUTO: 88 FL (ref 80–99)
NEUTS BAND NFR BLD MANUAL: 4 % (ref 0–8)
NEUTS BAND NFR BLD MANUAL: 87 % (ref 45–75)
PLAT MORPH BLD: NORMAL
PLATELET # BLD EST: ADEQUATE 10*3/UL
PLATELET # BLD: 245 K/UL (ref 150–450)
PMV BLD AUTO: 8 FL (ref 6.5–10.1)
POTASSIUM SERPL-SCNC: 4.4 MEQ/L (ref 3.4–4.9)
RBC # BLD AUTO: 4.12 M/UL (ref 4.7–6.1)
SODIUM SERPL-SCNC: 133 MEQ/L (ref 135–145)
TOTAL CELLS COUNTED BLD: 100
VARIANT LYMPHS NFR BLD MANUAL: 8 % (ref 20–45)
WBC # BLD AUTO: 14 K/UL (ref 4.8–10.8)

## 2017-09-30 NOTE — INFECTIOUS DISEASES PROG NOTE
Assessment/Plan


Problems:  


(1) COPD exacerbation


Assessment & Plan:  continue  doxycycline,  inhalers and taper steroids





(2) Hyperglycemia due to type 2 diabetes mellitus


Assessment & Plan:  suspect poor compliance , monitor blood sugurar to keep 

tight glycemic control between 





(3) Diabetes mellitus


Assessment & Plan:  recommend tight glycemic control to keep blood glucose 

between 








Subjective


Constitutional:  Reports: no symptoms


HEENT:  Reports: no symptoms


Respiratory:  Reports: no symptoms


Breasts:  Reports: no symptoms


Cardiovascular:  Reports: no symptoms


Gastrointestinal/Abdominal:  Reports: no symptoms


Genitourinary:  Reports: no symptoms


Neurologic:  Reports: no symptoms


Psychiatric:  Reports: no symptoms


Skin:  Reports: no symptoms


Endocrine:  Reports: no symptoms


Hematologic:  Reports: no symptoms


Allergies:  


Coded Allergies:  


     No Known Allergies (Unverified , 4/14/15)





Objective


Vital Signs





Last 24 Hour Vital Signs








  Date Time  Temp Pulse Resp B/P (MAP) Pulse Ox O2 Delivery O2 Flow Rate FiO2


 


9/30/17 15:44 97.6 110 20 119/63 99 Room Air  


 


9/30/17 15:05  110 22  98 Nasal Cannula 3.0 32


 


9/30/17 14:55  104 22  95 Nasal Cannula 3.0 32


 


9/30/17 14:34 97.6       


 


9/30/17 14:34 97.6       


 


9/30/17 12:06 98.6 108 22 116/78 99 Room Air  


 


9/30/17 11:49  102 20  100 Nasal Cannula 3.0 32


 


9/30/17 11:39  100 20  96 Nasal Cannula 3.0 32


 


9/30/17 08:08  92 22  94 Nasal Cannula 3.0 32


 


9/30/17 08:00 97.7 114 20 115/64 92 Nasal Cannula 2.0 


 


9/30/17 07:58  92 22  92 Nasal Cannula 3.0 32


 


9/30/17 07:57      Nasal Cannula 3.0 32


 


9/30/17 07:56     92 Nasal Cannula 3.0 32


 


9/30/17 04:00 98.2 102 20 121/64 94 Room Air  


 


9/30/17 03:41  105 20  95 Nasal Cannula 3.0 32


 


9/30/17 03:33  102 20  95 Nasal Cannula 3.0 32


 


9/30/17 00:00 97.0 109 20 119/62 92 Nasal Cannula  


 


9/29/17 23:37  111 20  94 Nasal Cannula 3.0 32


 


9/29/17 23:29  110 20  91 Nasal Cannula 3.0 32


 


9/29/17 20:00 98.1 115 20 142/69 95 Nasal Cannula  


 


9/29/17 19:37  116 20  92 Nasal Cannula 3.0 32


 


9/29/17 19:29  111 20  93 Nasal Cannula 3.0 32


 


9/29/17 19:28      Nasal Cannula 3.0 32


 


9/29/17 19:28     93 Nasal Cannula 3.0 32








Height (Feet):  5


Height (Inches):  11.00


Weight (Pounds):  232


General Appearance:  WD/WN, no acute distress


HEENT:  normocephalic, atraumatic, anicteric, mucous membranes moist


Respiratory/Chest:  chest wall non-tender, lungs clear, no respiratory distress

, no accessory muscle use, decreased breath sounds


Cardiovascular:  normal peripheral pulses, normal rate, regular rhythm, no 

gallop/murmur, no JVD


Abdomen:  normal bowel sounds, soft, non tender, no organomegaly, non distended

, no mass


Extremities:  no cyanosis, no clubbing


Skin:  no rash, no lesions





Microbiology








 Date/Time


Source Procedure


Growth Status


 


 


 9/27/17 17:05


Nasal Nares MRSA Culture - Final


Staphylococcus Aureus - Mrsa Complete


 


 9/27/17 17:05


Rectum VRE Culture - Final


NO VANCOMYCIN RESISTANT ENTEROCOCCUS ... Complete











Laboratory Tests








Test


  9/30/17


07:47


 


White Blood Count


  14.0 K/UL


(4.8-10.8)  H


 


Red Blood Count


  4.12 M/UL


(4.70-6.10)  L


 


Hemoglobin


  12.4 G/DL


(14.2-18.0)  L


 


Hematocrit


  36.4 %


(42.0-52.0)  L


 


Mean Corpuscular Volume 88 FL (80-99)  


 


Mean Corpuscular Hemoglobin


  30.1 PG


(27.0-31.0)


 


Mean Corpuscular Hemoglobin


Concent 34.1 G/DL


(32.0-36.0)


 


Red Cell Distribution Width


  12.9 %


(11.6-14.8)


 


Platelet Count


  245 K/UL


(150-450)


 


Mean Platelet Volume


  8.0 FL


(6.5-10.1)


 


Neutrophils (%) (Auto)


  % (45.0-75.0)


 


 


Lymphocytes (%) (Auto)


  % (20.0-45.0)


 


 


Monocytes (%) (Auto)  % (1.0-10.0)  


 


Eosinophils (%) (Auto)  % (0.0-3.0)  


 


Basophils (%) (Auto)  % (0.0-2.0)  


 


Differential Total Cells


Counted 100  


 


 


Neutrophils % (Manual) 87 % (45-75)  H


 


Lymphocytes % (Manual) 8 % (20-45)  L


 


Monocytes % (Manual) 1 % (1-10)  


 


Eosinophils % (Manual) 0 % (0-3)  


 


Basophils % (Manual) 0 % (0-2)  


 


Band Neutrophils 4 % (0-8)  


 


Platelet Estimate Adequate  


 


Platelet Morphology Normal  


 


Sodium Level


  133 mEQ/L


(135-145)  L


 


Potassium Level


  4.4 mEQ/L


(3.4-4.9)


 


Chloride Level


  94 mEQ/L


()  L


 


Carbon Dioxide Level


  23 mEQ/L


(20-30)


 


Anion Gap 16 (5-15)  H


 


Blood Urea Nitrogen


  20 mg/dL


(7-23)


 


Creatinine


  0.8 mg/dL


(0.7-1.2)


 


Estimat Glomerular Filtration


Rate > 60 mL/min


(>60)


 


Glucose Level


  434 mg/dL


()  H


 


Calcium Level


  9.1 mg/dL


(8.6-10.2)











Current Medications








 Medications


  (Trade)  Dose


 Ordered  Sig/Henry


 Route


 PRN Reason  Start Time


 Stop Time Status Last Admin


Dose Admin


 


 Acetaminophen


  (Tylenol)  650 mg  Q4H  PRN


 ORAL


 FEVER>100.5  9/27/17 15:30


 10/27/17 15:29   


 


 


 Albuterol/


 Ipratropium


  (DuoNeb


 0.5-3(2.5)mg/3ml)  3 ml  Q4HRT


 HHN


   9/27/17 23:00


 10/2/17 22:59  9/30/17 14:55


 


 


 Clonidine HCl


  (Catapres)  0.1 mg  Q4H  PRN


 ORAL


 sbp more than 160  9/27/17 15:30


 10/27/17 15:29   


 


 


 Dextrose


  (Dextrose 50%)    STAT  PRN


 IV


 Hypoglycemia  9/27/17 18:30


 10/27/17 18:29   


 


 


 Divalproex Sodium


  (Depakote ER)  500 mg  EVERY 12  HOURS


 ORAL


   9/29/17 21:00


 10/29/17 20:59  9/30/17 09:45


 


 


 Doxycycline


 Monohydrate


  (Vibramycin)  100 mg  EVERY 12  HOURS


 ORAL


   9/28/17 15:00


 10/5/17 14:59  9/30/17 09:45


 


 


 Heparin Sodium


  (Porcine)


  (Heparin 5000


 units/ml)  5,000 units  EVERY 12  HOURS


 SUBQ


   9/27/17 21:00


 10/27/17 20:59  9/30/17 09:47


 


 


 Insulin Aspart


  (NovoLOG)    BEFORE MEALS AND  HS


 SUBQ


   9/27/17 21:00


 10/27/17 20:59  9/30/17 16:18


 


 


 Insulin Aspart


  (NovoLOG)  30 units  NOVOTIAC


 SUBQ


   9/29/17 16:50


 10/29/17 16:49  9/30/17 16:19


 


 


 Insulin Detemir


  (Levemir)  40 units  BEDTIME


 SUBQ


   9/29/17 21:00


 10/29/17 20:59  9/29/17 21:29


 


 


 Lorazepam


  (Ativan 2mg/ml


 1ml)  0.5 mg  Q4H  PRN


 IV


 For Anxiety  9/27/17 15:30


 10/4/17 15:29   


 


 


 Metformin HCl


  (Glucophage)  500 mg  TIAC


 ORAL


   9/29/17 06:30


 10/29/17 06:29  9/30/17 16:03


 


 


 Methylprednisolone


 Sodium Succinate


  (Solu-MEDROL)  40 mg  EVERY 8  HOURS


 IV


   9/28/17 22:00


 10/27/17 19:29  9/30/17 13:35


 


 


 Morphine Sulfate


  (Morphine


 Sulfate)  2 mg  Q4H  PRN


 IVP


 severe pain 7-10  9/27/17 15:30


 10/4/17 15:29  9/30/17 14:33


 


 


 Nitroglycerin


  (Ntg)  0.4 mg  Q5M X 3 DOSES PRN


 SL


 Prn Chest Pain  9/27/17 15:30


 10/27/17 15:29   


 


 


 Ondansetron HCl


  (Zofran)  4 mg  Q6H  PRN


 IVP


 Nausea & Vomiting  9/27/17 15:30


 10/27/17 15:29   


 


 


 Pregabalin


  (Lyrica)  75 mg  THREE TIMES A  DAY


 ORAL


   9/27/17 19:30


 10/27/17 19:29  9/30/17 13:35


 


 


 Promethazine HCl/


 Codeine


  (Phenergan with


 Codeine)  5 ml  Q6H  PRN


 ORAL


 cough  9/27/17 15:30


 10/27/17 15:29   


 


 


 Quetiapine


 Fumarate


  (SEROquel)  300 mg  QHS


 ORAL


   9/29/17 21:00


 10/29/17 20:59  9/29/17 21:24


 


 


 Theophylline


  (Florin-Dur)  100 mg  EVERY 12  HOURS


 ORAL


   9/27/17 21:00


 10/27/17 20:59  9/30/17 09:45


 


 


 Ziprasidone


  (Geodon)  20 mg  DAILY


 ORAL


   9/28/17 09:00


 10/28/17 08:59  9/30/17 09:45


 


 


 Zolpidem Tartrate


  (Ambien)  5 mg  HSPRN  PRN


 ORAL


 Insomnia  9/29/17 21:00


 10/6/17 20:59  9/29/17 22:14


 

















Donnie North M.D. Sep 30, 2017 16:30

## 2017-09-30 NOTE — GENERAL PROGRESS NOTE
Assessment/Plan


Problem List:  


(1) Diabetes mellitus


ICD Codes:  E11.9 - Diabetes mellitus


SNOMED:  05319213


(2) Generalized weakness


ICD Codes:  R53.1 - Weakness


SNOMED:  16264872


(3) COPD exacerbation


ICD Codes:  J44.1 - Chronic obstructive pulmonary disease with (acute) 

exacerbation


SNOMED:  969406498, 117407598


(4) Hyperglycemia due to type 2 diabetes mellitus


ICD Codes:  E11.65 - Type 2 diabetes mellitus with hyperglycemia


SNOMED:  973729148642843


(5) Opiate dependence


ICD Codes:  F11.20 - Opioid dependence


SNOMED:  44371875


(6) Anxiety


ICD Codes:  F41.9 - Anxiety disorder, unspecified; Z68.41 - Body mass index (BMI

) 40.0-44.9, adult


SNOMED:  35238127


Status:  stable, progressing, tolerating diet


Assessment/Plan


o2 pulm tx abx ot pt diet cbc bmp am ltach eval transfer





Subjective


Constitutional:  Reports: weakness


Respiratory:  Reports: shortness of breath


Allergies:  


Coded Allergies:  


     No Known Allergies (Unverified , 4/14/15)


All Systems:  reviewed and negative except above


Subjective


o2 nc sleepy





Objective





Last 24 Hour Vital Signs








  Date Time  Temp Pulse Resp B/P (MAP) Pulse Ox O2 Delivery O2 Flow Rate FiO2


 


9/30/17 08:08  92 22  94 Nasal Cannula 3.0 32


 


9/30/17 08:00 97.7 114 20 115/64 92 Nasal Cannula 2.0 


 


9/30/17 07:58  92 22  92 Nasal Cannula 3.0 32


 


9/30/17 07:57      Nasal Cannula 3.0 32


 


9/30/17 07:56     92 Nasal Cannula 3.0 32


 


9/30/17 04:00 98.2 102 20 121/64 94 Room Air  


 


9/30/17 03:41  105 20  95 Nasal Cannula 3.0 32


 


9/30/17 03:33  102 20  95 Nasal Cannula 3.0 32


 


9/30/17 00:00 97.0 109 20 119/62 92 Nasal Cannula  


 


9/29/17 23:37  111 20  94 Nasal Cannula 3.0 32


 


9/29/17 23:29  110 20  91 Nasal Cannula 3.0 32


 


9/29/17 20:00 98.1 115 20 142/69 95 Nasal Cannula  


 


9/29/17 19:37  116 20  92 Nasal Cannula 3.0 32


 


9/29/17 19:29  111 20  93 Nasal Cannula 3.0 32


 


9/29/17 19:28      Nasal Cannula 3.0 32


 


9/29/17 19:28     93 Nasal Cannula 3.0 32


 


9/29/17 16:00 98.1 121 18 107/58 93 Nasal Cannula 4.0 


 


9/29/17 15:13  120 20  94 Nasal Cannula 3.0 32


 


9/29/17 15:00  115 20  98 Nasal Cannula 3.0 32


 


9/29/17 12:00 98.1 115 21 122/53 93 Nasal Cannula 7.0 


 


9/29/17 11:06  116 18  93 Nasal Cannula 3.0 32


 


9/29/17 11:06  115 20  97 Nasal Cannula 3.0 32

















Intake and Output  


 


 9/30/17 10/1/17





 19:00 07:00


 


Intake Total 240 ml 


 


Balance 240 ml 


 


  


 


Intake Oral 240 ml 








Laboratory Tests


9/30/17 07:47: 


White Blood Count 14.0H, Red Blood Count 4.12L, Hemoglobin 12.4L, Hematocrit 

36.4L, Mean Corpuscular Volume 88, Mean Corpuscular Hemoglobin 30.1, Mean 

Corpuscular Hemoglobin Concent 34.1, Red Cell Distribution Width 12.9, Platelet 

Count 245, Mean Platelet Volume 8.0, Neutrophils (%) (Auto) , Lymphocytes (%) (

Auto) , Monocytes (%) (Auto) , Eosinophils (%) (Auto) , Basophils (%) (Auto) , 

Neutrophils % (Manual) [Pending], Lymphocytes % (Manual) [Pending], Platelet 

Estimate [Pending], Platelet Morphology [Pending], Sodium Level 133L, Potassium 

Level 4.4, Chloride Level 94L, Carbon Dioxide Level 23, Anion Gap 16H, Blood 

Urea Nitrogen 20, Creatinine 0.8, Estimat Glomerular Filtration Rate > 60, 

Glucose Level 434H, Calcium Level 9.1


Height (Feet):  5


Height (Inches):  11.00


Weight (Pounds):  232


General Appearance:  lethargic


EENT:  normal ENT inspection


Neck:  normal alignment


Cardiovascular:  normal peripheral pulses, normal rate, regular rhythm


Respiratory/Chest:  chest wall non-tender, lungs clear, normal breath sounds


Abdomen:  normal bowel sounds, non tender, soft


Extremities:  normal inspection


Edema:  no edema noted Arm (L), no edema noted Arm (R), no edema noted Leg (L), 

no edema noted Leg (R), no edema noted Pedal (L), no edema noted Pedal (R), no 

edema noted Generalized


Neurologic:  responsive, motor weakness


Skin:  normal pigmentation, warm/dry











ROSE MARIE IRVING Sep 30, 2017 10:14

## 2017-09-30 NOTE — PULMONOLOGY PROGRESS NOTE
Assessment/Plan


Problems:  


(1) COPD exacerbation


(2) Lumbar back pain


(3) Anxiety


(4) Diabetes mellitus


Assessment/Plan


taper steroids


IV abx


check sputum


sliding scale


improving slowly





Subjective


ROS Limited/Unobtainable:  No


Constitutional:  Reports: no symptoms


Respiratory:  Reports: no symptoms


Allergies:  


Coded Allergies:  


     No Known Allergies (Unverified , 4/14/15)





Objective





Last 24 Hour Vital Signs








  Date Time  Temp Pulse Resp B/P (MAP) Pulse Ox O2 Delivery O2 Flow Rate FiO2


 


9/30/17 15:05  110 22  98 Nasal Cannula 3.0 32


 


9/30/17 14:55  104 22  95 Nasal Cannula 3.0 32


 


9/30/17 12:06 98.6 108 22 116/78 99 Room Air  


 


9/30/17 11:49  102 20  100 Nasal Cannula 3.0 32


 


9/30/17 11:39  100 20  96 Nasal Cannula 3.0 32


 


9/30/17 08:08  92 22  94 Nasal Cannula 3.0 32


 


9/30/17 08:00 97.7 114 20 115/64 92 Nasal Cannula 2.0 


 


9/30/17 07:58  92 22  92 Nasal Cannula 3.0 32


 


9/30/17 07:57      Nasal Cannula 3.0 32


 


9/30/17 07:56     92 Nasal Cannula 3.0 32


 


9/30/17 04:00 98.2 102 20 121/64 94 Room Air  


 


9/30/17 03:41  105 20  95 Nasal Cannula 3.0 32


 


9/30/17 03:33  102 20  95 Nasal Cannula 3.0 32


 


9/30/17 00:00 97.0 109 20 119/62 92 Nasal Cannula  


 


9/29/17 23:37  111 20  94 Nasal Cannula 3.0 32


 


9/29/17 23:29  110 20  91 Nasal Cannula 3.0 32


 


9/29/17 20:00 98.1 115 20 142/69 95 Nasal Cannula  


 


9/29/17 19:37  116 20  92 Nasal Cannula 3.0 32


 


9/29/17 19:29  111 20  93 Nasal Cannula 3.0 32


 


9/29/17 19:28      Nasal Cannula 3.0 32


 


9/29/17 19:28     93 Nasal Cannula 3.0 32


 


9/29/17 16:00 98.1 121 18 107/58 93 Nasal Cannula 4.0 

















Intake and Output  


 


 9/30/17 10/1/17





 19:00 07:00


 


Intake Total 240 ml 


 


Balance 240 ml 


 


  


 


Intake Oral 240 ml 








Objective


General Appearance:  WD/WN


HEENT:  normocephalic, atraumatic


Respiratory/Chest:  chest wall non-tender, lungs clear


Cardiovascular:  normal peripheral pulses, normal rate


Abdomen:  soft, non tender


Extremities:  no cyanosis


Skin:  no rash


Neurologic/Psychiatric:  CNs II-XII grossly normal, no motor/sensory deficits


Lymphatic:  no neck adenopathy





Microbiology








 Date/Time


Source Procedure


Growth Status


 


 


 9/27/17 16:00


Blood Blood Culture - Preliminary


NO GROWTH AFTER 48 HOURS Resulted


 


 9/27/17 15:45


Blood Blood Culture - Preliminary


NO GROWTH AFTER 48 HOURS Resulted


 


 9/27/17 17:05


Nasal Nares MRSA Culture - Final


Staphylococcus Aureus - Mrsa Complete


 


 9/27/17 17:05


Rectum VRE Culture - Final


NO VANCOMYCIN RESISTANT ENTEROCOCCUS ... Complete








Laboratory Tests


9/30/17 07:47: 


White Blood Count 14.0H, Red Blood Count 4.12L, Hemoglobin 12.4L, Hematocrit 

36.4L, Mean Corpuscular Volume 88, Mean Corpuscular Hemoglobin 30.1, Mean 

Corpuscular Hemoglobin Concent 34.1, Red Cell Distribution Width 12.9, Platelet 

Count 245, Mean Platelet Volume 8.0, Neutrophils (%) (Auto) , Lymphocytes (%) (

Auto) , Monocytes (%) (Auto) , Eosinophils (%) (Auto) , Basophils (%) (Auto) , 

Differential Total Cells Counted 100, Neutrophils % (Manual) 87H, Lymphocytes % 

(Manual) 8L, Monocytes % (Manual) 1, Eosinophils % (Manual) 0, Basophils % (

Manual) 0, Band Neutrophils 4, Platelet Estimate Adequate, Platelet Morphology 

Normal, Sodium Level 133L, Potassium Level 4.4, Chloride Level 94L, Carbon 

Dioxide Level 23, Anion Gap 16H, Blood Urea Nitrogen 20, Creatinine 0.8, 

Estimat Glomerular Filtration Rate > 60, Glucose Level 434H, Calcium Level 9.1





Current Medications








 Medications


  (Trade)  Dose


 Ordered  Sig/Henry


 Route


 PRN Reason  Start Time


 Stop Time Status Last Admin


Dose Admin


 


 Acetaminophen


  (Tylenol)  650 mg  Q4H  PRN


 ORAL


 FEVER>100.5  9/27/17 15:30


 10/27/17 15:29   


 


 


 Albuterol/


 Ipratropium


  (DuoNeb


 0.5-3(2.5)mg/3ml)  3 ml  Q4HRT


 HHN


   9/27/17 23:00


 10/2/17 22:59  9/30/17 14:55


 


 


 Clonidine HCl


  (Catapres)  0.1 mg  Q4H  PRN


 ORAL


 sbp more than 160  9/27/17 15:30


 10/27/17 15:29   


 


 


 Dextrose


  (Dextrose 50%)    STAT  PRN


 IV


 Hypoglycemia  9/27/17 18:30


 10/27/17 18:29   


 


 


 Divalproex Sodium


  (Depakote ER)  500 mg  EVERY 12  HOURS


 ORAL


   9/29/17 21:00


 10/29/17 20:59  9/30/17 09:45


 


 


 Doxycycline


 Monohydrate


  (Vibramycin)  100 mg  EVERY 12  HOURS


 ORAL


   9/28/17 15:00


 10/5/17 14:59  9/30/17 09:45


 


 


 Heparin Sodium


  (Porcine)


  (Heparin 5000


 units/ml)  5,000 units  EVERY 12  HOURS


 SUBQ


   9/27/17 21:00


 10/27/17 20:59  9/30/17 09:47


 


 


 Insulin Aspart


  (NovoLOG)    BEFORE MEALS AND  HS


 SUBQ


   9/27/17 21:00


 10/27/17 20:59  9/30/17 11:30


 


 


 Insulin Aspart


  (NovoLOG)  30 units  NOVOTIAC


 SUBQ


   9/29/17 16:50


 10/29/17 16:49  9/30/17 11:30


 


 


 Insulin Detemir


  (Levemir)  40 units  BEDTIME


 SUBQ


   9/29/17 21:00


 10/29/17 20:59  9/29/17 21:29


 


 


 Lorazepam


  (Ativan 2mg/ml


 1ml)  0.5 mg  Q4H  PRN


 IV


 For Anxiety  9/27/17 15:30


 10/4/17 15:29   


 


 


 Metformin HCl


  (Glucophage)  500 mg  TIAC


 ORAL


   9/29/17 06:30


 10/29/17 06:29  9/30/17 11:31


 


 


 Methylprednisolone


 Sodium Succinate


  (Solu-MEDROL)  40 mg  EVERY 8  HOURS


 IV


   9/28/17 22:00


 10/27/17 19:29  9/30/17 13:35


 


 


 Morphine Sulfate


  (Morphine


 Sulfate)  2 mg  Q4H  PRN


 IVP


 severe pain 7-10  9/27/17 15:30


 10/4/17 15:29  9/30/17 14:33


 


 


 Nitroglycerin


  (Ntg)  0.4 mg  Q5M X 3 DOSES PRN


 SL


 Prn Chest Pain  9/27/17 15:30


 10/27/17 15:29   


 


 


 Ondansetron HCl


  (Zofran)  4 mg  Q6H  PRN


 IVP


 Nausea & Vomiting  9/27/17 15:30


 10/27/17 15:29   


 


 


 Pregabalin


  (Lyrica)  75 mg  THREE TIMES A  DAY


 ORAL


   9/27/17 19:30


 10/27/17 19:29  9/30/17 13:35


 


 


 Promethazine HCl/


 Codeine


  (Phenergan with


 Codeine)  5 ml  Q6H  PRN


 ORAL


 cough  9/27/17 15:30


 10/27/17 15:29   


 


 


 Quetiapine


 Fumarate


  (SEROquel)  300 mg  QHS


 ORAL


   9/29/17 21:00


 10/29/17 20:59  9/29/17 21:24


 


 


 Theophylline


  (Florin-Dur)  100 mg  EVERY 12  HOURS


 ORAL


   9/27/17 21:00


 10/27/17 20:59  9/30/17 09:45


 


 


 Ziprasidone


  (Geodon)  20 mg  DAILY


 ORAL


   9/28/17 09:00


 10/28/17 08:59  9/30/17 09:45


 


 


 Zolpidem Tartrate


  (Ambien)  5 mg  HSPRN  PRN


 ORAL


 Insomnia  9/29/17 21:00


 10/6/17 20:59  9/29/17 22:14


 

















SERENITY MOCTEZUMA Sep 30, 2017 15:24

## 2017-10-01 VITALS — SYSTOLIC BLOOD PRESSURE: 140 MMHG | DIASTOLIC BLOOD PRESSURE: 67 MMHG

## 2017-10-01 VITALS — DIASTOLIC BLOOD PRESSURE: 65 MMHG | SYSTOLIC BLOOD PRESSURE: 122 MMHG

## 2017-10-01 VITALS — SYSTOLIC BLOOD PRESSURE: 138 MMHG | DIASTOLIC BLOOD PRESSURE: 70 MMHG

## 2017-10-01 VITALS — SYSTOLIC BLOOD PRESSURE: 114 MMHG | DIASTOLIC BLOOD PRESSURE: 65 MMHG

## 2017-10-01 VITALS — DIASTOLIC BLOOD PRESSURE: 67 MMHG | SYSTOLIC BLOOD PRESSURE: 137 MMHG

## 2017-10-01 VITALS — DIASTOLIC BLOOD PRESSURE: 72 MMHG | SYSTOLIC BLOOD PRESSURE: 133 MMHG

## 2017-10-01 LAB
ANION GAP SERPL CALC-SCNC: 18 MMOL/L (ref 5–15)
BASOPHILS NFR BLD AUTO: 0.4 % (ref 0–2)
CALCIUM SERPL-MCNC: 9.1 MG/DL (ref 8.6–10.2)
CHLORIDE SERPL-SCNC: 92 MEQ/L (ref 98–107)
CO2 SERPL-SCNC: 21 MEQ/L (ref 20–30)
CREAT SERPL-MCNC: 0.8 MG/DL (ref 0.7–1.2)
EOSINOPHIL NFR BLD AUTO: 0 % (ref 0–3)
ERYTHROCYTE [DISTWIDTH] IN BLOOD BY AUTOMATED COUNT: 12.6 % (ref 11.6–14.8)
GFR SERPLBLD BASED ON 1.73 SQ M-ARVRAT: > 60 ML/MIN (ref 60–?)
HEMOLYSIS: 2
LYMPHOCYTES NFR BLD AUTO: 10.5 % (ref 20–45)
MCH RBC QN AUTO: 29.5 PG (ref 27–31)
MCHC RBC AUTO-ENTMCNC: 33.5 G/DL (ref 32–36)
MCV RBC AUTO: 88 FL (ref 80–99)
MONOCYTES NFR BLD AUTO: 5.3 % (ref 1–10)
NEUTROPHILS NFR BLD AUTO: 83.8 % (ref 45–75)
PLATELET # BLD: 221 K/UL (ref 150–450)
PMV BLD AUTO: 7.6 FL (ref 6.5–10.1)
POTASSIUM SERPL-SCNC: 4.1 MEQ/L (ref 3.4–4.9)
RBC # BLD AUTO: 4.25 M/UL (ref 4.7–6.1)
SODIUM SERPL-SCNC: 131 MEQ/L (ref 135–145)
WBC # BLD AUTO: 14.1 K/UL (ref 4.8–10.8)

## 2017-10-01 RX ADMIN — INSULIN ASPART SCH UNITS: 100 INJECTION, SOLUTION INTRAVENOUS; SUBCUTANEOUS at 16:18

## 2017-10-01 RX ADMIN — IPRATROPIUM BROMIDE AND ALBUTEROL SULFATE SCH ML: .5; 3 SOLUTION RESPIRATORY (INHALATION) at 23:00

## 2017-10-01 RX ADMIN — MORPHINE SULFATE PRN MG: 2 INJECTION, SOLUTION INTRAMUSCULAR; INTRAVENOUS at 18:37

## 2017-10-01 RX ADMIN — METHYLPREDNISOLONE SODIUM SUCCINATE SCH MG: 125 INJECTION, POWDER, FOR SOLUTION INTRAMUSCULAR; INTRAVENOUS at 14:01

## 2017-10-01 RX ADMIN — PREGABALIN SCH MG: 75 CAPSULE ORAL at 13:39

## 2017-10-01 RX ADMIN — INSULIN DETEMIR SCH UNITS: 100 INJECTION, SOLUTION SUBCUTANEOUS at 18:15

## 2017-10-01 RX ADMIN — PREGABALIN SCH MG: 75 CAPSULE ORAL at 18:09

## 2017-10-01 RX ADMIN — THEOPHYLLINE ANHYDROUS SCH MG: 100 CAPSULE, EXTENDED RELEASE ORAL at 09:28

## 2017-10-01 RX ADMIN — METFORMIN HYDROCHLORIDE SCH MG: 500 TABLET ORAL at 05:49

## 2017-10-01 RX ADMIN — PREGABALIN SCH MG: 75 CAPSULE ORAL at 09:28

## 2017-10-01 RX ADMIN — MORPHINE SULFATE PRN MG: 2 INJECTION, SOLUTION INTRAMUSCULAR; INTRAVENOUS at 14:34

## 2017-10-01 RX ADMIN — INSULIN ASPART SCH UNITS: 100 INJECTION, SOLUTION INTRAVENOUS; SUBCUTANEOUS at 05:50

## 2017-10-01 RX ADMIN — NITROGLYCERIN PRN MG: 0.4 TABLET SUBLINGUAL at 23:42

## 2017-10-01 RX ADMIN — INSULIN DETEMIR SCH UNITS: 100 INJECTION, SOLUTION SUBCUTANEOUS at 09:27

## 2017-10-01 RX ADMIN — METFORMIN HYDROCHLORIDE SCH MG: 500 TABLET ORAL at 16:15

## 2017-10-01 RX ADMIN — METHYLPREDNISOLONE SODIUM SUCCINATE SCH MG: 125 INJECTION, POWDER, FOR SOLUTION INTRAMUSCULAR; INTRAVENOUS at 05:48

## 2017-10-01 RX ADMIN — INSULIN ASPART SCH UNITS: 100 INJECTION, SOLUTION INTRAVENOUS; SUBCUTANEOUS at 21:00

## 2017-10-01 RX ADMIN — ZOLPIDEM TARTRATE PRN MG: 5 TABLET ORAL at 22:43

## 2017-10-01 RX ADMIN — DIVALPROEX SODIUM SCH MG: 500 TABLET, FILM COATED, EXTENDED RELEASE ORAL at 20:57

## 2017-10-01 RX ADMIN — INSULIN ASPART SCH UNITS: 100 INJECTION, SOLUTION INTRAVENOUS; SUBCUTANEOUS at 11:13

## 2017-10-01 RX ADMIN — HEPARIN SODIUM SCH UNITS: 5000 INJECTION INTRAVENOUS; SUBCUTANEOUS at 20:59

## 2017-10-01 RX ADMIN — NITROGLYCERIN PRN MG: 0.4 TABLET SUBLINGUAL at 23:35

## 2017-10-01 RX ADMIN — MORPHINE SULFATE PRN MG: 2 INJECTION, SOLUTION INTRAMUSCULAR; INTRAVENOUS at 06:37

## 2017-10-01 RX ADMIN — THEOPHYLLINE ANHYDROUS SCH MG: 100 CAPSULE, EXTENDED RELEASE ORAL at 20:57

## 2017-10-01 RX ADMIN — MORPHINE SULFATE PRN MG: 2 INJECTION, SOLUTION INTRAMUSCULAR; INTRAVENOUS at 10:35

## 2017-10-01 RX ADMIN — IPRATROPIUM BROMIDE AND ALBUTEROL SULFATE SCH ML: .5; 3 SOLUTION RESPIRATORY (INHALATION) at 11:12

## 2017-10-01 RX ADMIN — HEPARIN SODIUM SCH UNITS: 5000 INJECTION INTRAVENOUS; SUBCUTANEOUS at 09:28

## 2017-10-01 RX ADMIN — IPRATROPIUM BROMIDE AND ALBUTEROL SULFATE SCH ML: .5; 3 SOLUTION RESPIRATORY (INHALATION) at 15:00

## 2017-10-01 RX ADMIN — MORPHINE SULFATE PRN MG: 2 INJECTION, SOLUTION INTRAMUSCULAR; INTRAVENOUS at 02:34

## 2017-10-01 RX ADMIN — IPRATROPIUM BROMIDE AND ALBUTEROL SULFATE SCH ML: .5; 3 SOLUTION RESPIRATORY (INHALATION) at 07:34

## 2017-10-01 RX ADMIN — IPRATROPIUM BROMIDE AND ALBUTEROL SULFATE SCH ML: .5; 3 SOLUTION RESPIRATORY (INHALATION) at 20:04

## 2017-10-01 RX ADMIN — INSULIN ASPART SCH UNITS: 100 INJECTION, SOLUTION INTRAVENOUS; SUBCUTANEOUS at 11:12

## 2017-10-01 RX ADMIN — MORPHINE SULFATE PRN MG: 2 INJECTION, SOLUTION INTRAMUSCULAR; INTRAVENOUS at 22:40

## 2017-10-01 RX ADMIN — DIVALPROEX SODIUM SCH MG: 500 TABLET, FILM COATED, EXTENDED RELEASE ORAL at 09:28

## 2017-10-01 RX ADMIN — IPRATROPIUM BROMIDE AND ALBUTEROL SULFATE SCH ML: .5; 3 SOLUTION RESPIRATORY (INHALATION) at 03:29

## 2017-10-01 RX ADMIN — METFORMIN HYDROCHLORIDE SCH MG: 500 TABLET ORAL at 11:11

## 2017-10-01 RX ADMIN — NITROGLYCERIN PRN MG: 0.4 TABLET SUBLINGUAL at 23:26

## 2017-10-01 RX ADMIN — ZIPRASIDONE HYDROCHLORIDE SCH MG: 20 CAPSULE ORAL at 09:28

## 2017-10-01 NOTE — NEPHROLOGY PROGRESS NOTE
Objective


Objective





Last 24 Hour Vital Signs








  Date Time  Temp Pulse Resp B/P (MAP) Pulse Ox O2 Delivery O2 Flow Rate FiO2


 


10/1/17 23:42    86/54    


 


10/1/17 23:35    93/55    


 


10/1/17 23:26    110/59    


 


10/1/17 23:14  107 18  97 Nasal Cannula 3.0 32


 


10/1/17 23:00  105 18  96 Nasal Cannula 3.0 32


 


10/1/17 23:00        32


 


10/1/17 20:37  120 18  96 Nasal Cannula 3.0 32


 


10/1/17 20:07  117 18   Nasal Cannula 3.0 32


 


10/1/17 20:04  117 18  97 Nasal Cannula 3.0 32


 


10/1/17 20:04     97 Nasal Cannula 3.0 32


 


10/1/17 20:04      Nasal Cannula 3.0 32


 


10/1/17 20:04        32


 


10/1/17 20:00 98.6 119 21 137/67 99 Nasal Cannula 2.0 


 


10/1/17 15:57 97.4 103 21 133/72 97 Room Air  


 


10/1/17 15:35  96 20  100 Nasal Cannula 3.0 32


 


10/1/17 15:24  104 20  96 Nasal Cannula 3.0 32


 


10/1/17 14:38 98.8       


 


10/1/17 14:38 98.8       


 


10/1/17 12:00 98.8 98 18 138/70 95 Room Air  


 


10/1/17 11:10  115 20  98 Nasal Cannula 3.0 32


 


10/1/17 11:05  114 20  95 Nasal Cannula 3.0 32


 


10/1/17 08:00 97.7 115 18 140/67  Room Air  


 


10/1/17 07:25      Nasal Cannula 3.0 32


 


10/1/17 07:25  111 20  98 Nasal Cannula 3.0 32


 


10/1/17 07:25     98 Nasal Cannula 3.0 32


 


10/1/17 07:20  110 20  96 Nasal Cannula 3.0 32


 


10/1/17 04:00 98.1 117 20 114/65 94   


 


10/1/17 03:39  118 20  97 Nasal Cannula 3.0 32


 


10/1/17 03:29  112 20  94 Nasal Cannula 3.0 32


 


10/1/17 00:00 97.5 108 21 122/65 95 Nasal Cannula 2.0 

















Intake and Output  


 


 10/1/17 10/2/17





 19:00 07:00


 


Intake Total 420 ml 


 


Balance 420 ml 


 


  


 


IV Total 420 ml 








Laboratory Tests


10/1/17 07:20: 


White Blood Count 14.1H, Red Blood Count 4.25L, Hemoglobin 12.5L, Hematocrit 

37.4L, Mean Corpuscular Volume 88, Mean Corpuscular Hemoglobin 29.5, Mean 

Corpuscular Hemoglobin Concent 33.5, Red Cell Distribution Width 12.6, Platelet 

Count 221, Mean Platelet Volume 7.6, Neutrophils (%) (Auto) 83.8H, Lymphocytes (

%) (Auto) 10.5L, Monocytes (%) (Auto) 5.3, Eosinophils (%) (Auto) 0.0, 

Basophils (%) (Auto) 0.4, Sodium Level 131L, Potassium Level 4.1, Chloride 

Level 92L, Carbon Dioxide Level 21, Anion Gap 18H, Blood Urea Nitrogen 20, 

Creatinine 0.8, Estimat Glomerular Filtration Rate > 60, Glucose Level 463H, 

Calcium Level 9.1


Height (Feet):  5


Height (Inches):  11.00


Weight (Pounds):  232











ANDRIY VILLEDA Oct 1, 2017 23:47

## 2017-10-01 NOTE — PROGRESS NOTE
DATE:  10/01/2017



SUBJECTIVE:  The patient is a 36-year-old male patient with COPD.



Plan:  I will continue treatment with Geodon, Depakote, and Ambien and

continued followed by psychiatry throughout hospital course.  Chart

reviewed and discussed with staff.  Seen and assessed at the bedside.









  ______________________________________________

  Gulshan Armstrong M.D.





DR:  MYRA

D:  10/01/2017 14:42

T:  10/01/2017 21:01

JOB#:  [id number]

CC:

## 2017-10-01 NOTE — GENERAL PROGRESS NOTE
Assessment/Plan


Assessment/Plan


(1) Chiari malformation type II


(2) Degenerative disc disease, cervical


(3) Lumbar degenerative disc disease


(4) Arthritis, lumbar spine


(5) Peripheral neuropathy


(6) Hydrocephalus


(7) Diabetes mellitus


Pt will be continued on Morphine as needed. 


Pt was d/w Dr. Franco and he concurred.





Subjective


Date patient seen:  Oct 1, 2017


Time patient seen:  10:30 - am


Allergies:  


Coded Allergies:  


     No Known Allergies (Unverified , 4/14/15)


Subjective


Constitutional:  Reports: weakness, Denies: chills, diaphoresis, fever, malaise

, no symptoms, other


HEENT:  Denies: blurred vision, double vision, ear discharge, ear pain, eye pain

, mouth pain, mouth swelling, no symptoms, nose congestion, nose pain, other, 

tearing, throat pain, throat swelling


Cardiovascular:  Denies: chest pain, edema, irregular heart rate, 

lightheadedness, no symptoms, other, palpitations, syncope


Respiratory:  Denies: SOB at rest, SOB with excertion, cough, no symptoms, 

orthopnea, other, shortness of breath, sputum, stridor, wheezing


Gastrointestinal/Abdominal:  Denies: abdomen distended, abdominal pain, black 

stools, blood in stool, constipated, diarrhea, difficulty swallowing, nausea, 

no symptoms, other, poor appetite, poor fluid intake, rectal bleeding, tarry 

stools, vomiting


Genitourinary:  Denies: burning, discharge, flank pain, frequency, hematuria, 

incontinence, no symptoms, other, pain, urgency


Neurologic/Psychiatric:  Reports: headache, numbness, tingling, weakness, Denies

: anxiety, depressed, emotional problems, no symptoms, other, paresthesia, pre-

existing deficit, seizure, tremors


Endocrine:  Denies: excessive sweating, flushing, increased hunger, increased 

thirst, increased urine, intolerance to cold, intolerance to heat, no symptoms, 

other, unexplained weight gain, unexplained weight loss


Hematologic/Lymphatic:  Denies: anemia, easy bleeding, easy bruising, no 

symptoms, other


 


Subjective


Patient is in bed is c/o pain which has been at a moderate level on the 

morphine.





Objective





Last 24 Hour Vital Signs








  Date Time  Temp Pulse Resp B/P (MAP) Pulse Ox O2 Delivery O2 Flow Rate FiO2


 


10/1/17 11:10  115 20  98 Nasal Cannula 3.0 32


 


10/1/17 11:05  114 20  95 Nasal Cannula 3.0 32


 


10/1/17 11:05 98.1       


 


10/1/17 10:27 98.1       


 


10/1/17 08:00 97.7 115 18 140/67  Room Air  


 


10/1/17 07:25      Nasal Cannula 3.0 32


 


10/1/17 07:25  111 20  98 Nasal Cannula 3.0 32


 


10/1/17 07:25     98 Nasal Cannula 3.0 32


 


10/1/17 07:20  110 20  96 Nasal Cannula 3.0 32


 


10/1/17 04:00 98.1 117 20 114/65 94   


 


10/1/17 03:39  118 20  97 Nasal Cannula 3.0 32


 


10/1/17 03:29  112 20  94 Nasal Cannula 3.0 32


 


10/1/17 00:00 97.5 108 21 122/65 95 Nasal Cannula 2.0 


 


9/30/17 23:30  110 20  92 Nasal Cannula 3.0 32


 


9/30/17 23:23  96 20  93 Nasal Cannula 3.0 32


 


9/30/17 20:00 97.5 115 22 132/60 92 Room Air  


 


9/30/17 19:35  122 20  91 Nasal Cannula 3.0 32


 


9/30/17 19:26  111 20  92 Nasal Cannula 3.0 32


 


9/30/17 19:22      Nasal Cannula 3.0 32


 


9/30/17 19:22     92 Nasal Cannula 3.0 32


 


9/30/17 17:20 98.2 87 22 151/99 92 Nasal Cannula 2.0 


 


9/30/17 15:44 97.6 110 20 119/63 99 Room Air  


 


9/30/17 15:05  110 22  98 Nasal Cannula 3.0 32


 


9/30/17 14:55  104 22  95 Nasal Cannula 3.0 32


 


9/30/17 12:06 98.6 108 22 116/78 99 Room Air  


 


9/30/17 11:49  102 20  100 Nasal Cannula 3.0 32


 


9/30/17 11:39  100 20  96 Nasal Cannula 3.0 32








Laboratory Tests


10/1/17 07:20: 


White Blood Count 14.1H, Red Blood Count 4.25L, Hemoglobin 12.5L, Hematocrit 

37.4L, Mean Corpuscular Volume 88, Mean Corpuscular Hemoglobin 29.5, Mean 

Corpuscular Hemoglobin Concent 33.5, Red Cell Distribution Width 12.6, Platelet 

Count 221, Mean Platelet Volume 7.6, Neutrophils (%) (Auto) 83.8H, Lymphocytes (

%) (Auto) 10.5L, Monocytes (%) (Auto) 5.3, Eosinophils (%) (Auto) 0.0, 

Basophils (%) (Auto) 0.4, Sodium Level 131L, Potassium Level 4.1, Chloride 

Level 92L, Carbon Dioxide Level 21, Anion Gap 18H, Blood Urea Nitrogen 20, 

Creatinine 0.8, Estimat Glomerular Filtration Rate > 60, Glucose Level 463H, 

Calcium Level 9.1


Height (Feet):  5


Height (Inches):  11.00


Weight (Pounds):  232


Objective


General Appearance:  no apparent distress, alert


EENT:  normal ENT inspection, TMs normal


Neck:  supple, limited range of motion, muscle spasm


Cardiovascular:  normal rate, regular rhythm


Respiratory/Chest:  decreased breath sounds


Abdomen:  non tender, soft


Extremities:  non-tender


Edema:  no edema noted Arm (L), no edema noted Arm (R), no edema noted Leg (L), 

no edema noted Leg (R), no edema noted Pedal (L), no edema noted Pedal (R), no 

edema noted Generalized


Neurologic:  alert, oriented x 3


Skin:  warm/dry











AMAN CABRERA Oct 1, 2017 11:36

## 2017-10-01 NOTE — INFECTIOUS DISEASES PROG NOTE
Assessment/Plan


Problems:  


(1) COPD exacerbation


Assessment & Plan:  continue  doxycycline,  inhalers and taper steroids





(2) Hyperglycemia due to type 2 diabetes mellitus


Assessment & Plan:  suspect poor compliance , monitor blood sugurar to keep 

tight glycemic control between 





(3) Diabetes mellitus


Assessment & Plan:  recommend tight glycemic control to keep blood glucose 

between 








Subjective


Constitutional:  Reports: no symptoms


HEENT:  Reports: no symptoms


Respiratory:  Reports: dry cough


Breasts:  Reports: no symptoms


Cardiovascular:  Reports: no symptoms


Gastrointestinal/Abdominal:  Reports: no symptoms


Genitourinary:  Reports: no symptoms


Neurologic:  Reports: no symptoms


Psychiatric:  Reports: no symptoms


Allergies:  


Coded Allergies:  


     No Known Allergies (Unverified , 4/14/15)





Objective


Vital Signs





Last 24 Hour Vital Signs








  Date Time  Temp Pulse Resp B/P (MAP) Pulse Ox O2 Delivery O2 Flow Rate FiO2


 


10/1/17 15:24  104 20  96 Nasal Cannula 3.0 32


 


10/1/17 14:38 98.8       


 


10/1/17 14:38 98.8       


 


10/1/17 12:00 98.8 98 18 138/70 95 Room Air  


 


10/1/17 11:10  115 20  98 Nasal Cannula 3.0 32


 


10/1/17 11:05  114 20  95 Nasal Cannula 3.0 32


 


10/1/17 08:00 97.7 115 18 140/67  Room Air  


 


10/1/17 07:25      Nasal Cannula 3.0 32


 


10/1/17 07:25  111 20  98 Nasal Cannula 3.0 32


 


10/1/17 07:25     98 Nasal Cannula 3.0 32


 


10/1/17 07:20  110 20  96 Nasal Cannula 3.0 32


 


10/1/17 04:00 98.1 117 20 114/65 94   


 


10/1/17 03:39  118 20  97 Nasal Cannula 3.0 32


 


10/1/17 03:29  112 20  94 Nasal Cannula 3.0 32


 


10/1/17 00:00 97.5 108 21 122/65 95 Nasal Cannula 2.0 


 


9/30/17 23:30  110 20  92 Nasal Cannula 3.0 32


 


9/30/17 23:23  96 20  93 Nasal Cannula 3.0 32


 


9/30/17 20:00 97.5 115 22 132/60 92 Room Air  


 


9/30/17 19:35  122 20  91 Nasal Cannula 3.0 32


 


9/30/17 19:26  111 20  92 Nasal Cannula 3.0 32


 


9/30/17 19:22      Nasal Cannula 3.0 32


 


9/30/17 19:22     92 Nasal Cannula 3.0 32


 


9/30/17 17:20 98.2 87 22 151/99 92 Nasal Cannula 2.0 








Height (Feet):  5


Height (Inches):  11.00


Weight (Pounds):  232


General Appearance:  WD/WN, no acute distress


HEENT:  normocephalic, atraumatic, anicteric, mucous membranes moist


Respiratory/Chest:  chest wall non-tender, no respiratory distress, no 

accessory muscle use, decreased breath sounds, expiratory wheezing


Cardiovascular:  normal peripheral pulses, normal rate, regular rhythm, no 

gallop/murmur


Abdomen:  normal bowel sounds, soft, non tender, no organomegaly, non distended

, no mass


Extremities:  no cyanosis, no clubbing


Skin:  no rash, no lesions, no ulcers





Laboratory Tests








Test


  10/1/17


07:20


 


White Blood Count


  14.1 K/UL


(4.8-10.8)  H


 


Red Blood Count


  4.25 M/UL


(4.70-6.10)  L


 


Hemoglobin


  12.5 G/DL


(14.2-18.0)  L


 


Hematocrit


  37.4 %


(42.0-52.0)  L


 


Mean Corpuscular Volume 88 FL (80-99)  


 


Mean Corpuscular Hemoglobin


  29.5 PG


(27.0-31.0)


 


Mean Corpuscular Hemoglobin


Concent 33.5 G/DL


(32.0-36.0)


 


Red Cell Distribution Width


  12.6 %


(11.6-14.8)


 


Platelet Count


  221 K/UL


(150-450)


 


Mean Platelet Volume


  7.6 FL


(6.5-10.1)


 


Neutrophils (%) (Auto)


  83.8 %


(45.0-75.0)  H


 


Lymphocytes (%) (Auto)


  10.5 %


(20.0-45.0)  L


 


Monocytes (%) (Auto)


  5.3 %


(1.0-10.0)


 


Eosinophils (%) (Auto)


  0.0 %


(0.0-3.0)


 


Basophils (%) (Auto)


  0.4 %


(0.0-2.0)


 


Sodium Level


  131 mEQ/L


(135-145)  L


 


Potassium Level


  4.1 mEQ/L


(3.4-4.9)


 


Chloride Level


  92 mEQ/L


()  L


 


Carbon Dioxide Level


  21 mEQ/L


(20-30)


 


Anion Gap 18 (5-15)  H


 


Blood Urea Nitrogen


  20 mg/dL


(7-23)


 


Creatinine


  0.8 mg/dL


(0.7-1.2)


 


Estimat Glomerular Filtration


Rate > 60 mL/min


(>60)


 


Glucose Level


  463 mg/dL


()  H


 


Calcium Level


  9.1 mg/dL


(8.6-10.2)











Current Medications








 Medications


  (Trade)  Dose


 Ordered  Sig/Henry


 Route


 PRN Reason  Start Time


 Stop Time Status Last Admin


Dose Admin


 


 Acetaminophen


  (Tylenol)  650 mg  Q4H  PRN


 ORAL


 FEVER>100.5  9/27/17 15:30


 10/27/17 15:29   


 


 


 Albuterol/


 Ipratropium


  (DuoNeb


 0.5-3(2.5)mg/3ml)  3 ml  Q4HRT


 HHN


   9/27/17 23:00


 10/2/17 22:59  10/1/17 15:00


 


 


 Clonidine HCl


  (Catapres)  0.1 mg  Q4H  PRN


 ORAL


 sbp more than 160  9/27/17 15:30


 10/27/17 15:29   


 


 


 Dextrose


  (Dextrose 50%)    STAT  PRN


 IV


 Hypoglycemia  9/27/17 18:30


 10/27/17 18:29   


 


 


 Divalproex Sodium


  (Depakote ER)  500 mg  EVERY 12  HOURS


 ORAL


   9/29/17 21:00


 10/29/17 20:59  10/1/17 09:28


 


 


 Doxycycline


 Monohydrate


  (Vibramycin)  100 mg  EVERY 12  HOURS


 ORAL


   9/28/17 15:00


 10/5/17 14:59  10/1/17 09:28


 


 


 Heparin Sodium


  (Porcine)


  (Heparin 5000


 units/ml)  5,000 units  EVERY 12  HOURS


 SUBQ


   9/27/17 21:00


 10/27/17 20:59  10/1/17 09:28


 


 


 Insulin Aspart


  (NovoLOG)    BEFORE MEALS AND  HS


 SUBQ


   9/27/17 21:00


 10/27/17 20:59  10/1/17 11:12


 


 


 Insulin Aspart


  (NovoLOG)  45 units  NOVOTIAC


 SUBQ


   10/1/17 11:50


 10/31/17 11:49  10/1/17 11:13


 


 


 Insulin Detemir


  (Levemir)  30 units  BID


 SUBQ


   10/1/17 09:00


 10/29/17 20:59  10/1/17 09:27


 


 


 Lorazepam


  (Ativan 2mg/ml


 1ml)  0.5 mg  Q4H  PRN


 IV


 For Anxiety  9/27/17 15:30


 10/4/17 15:29   


 


 


 Metformin HCl


  (Glucophage)  500 mg  TIAC


 ORAL


   9/29/17 06:30


 10/29/17 06:29  10/1/17 11:11


 


 


 Methylprednisolone


 Sodium Succinate


  (Solu-MEDROL)  40 mg  EVERY 8  HOURS


 IV


   9/28/17 22:00


 10/27/17 19:29  10/1/17 14:01


 


 


 Morphine Sulfate


  (Morphine


 Sulfate)  2 mg  Q4H  PRN


 IVP


 severe pain 7-10  9/27/17 15:30


 10/4/17 15:29  10/1/17 14:34


 


 


 Nitroglycerin


  (Ntg)  0.4 mg  Q5M X 3 DOSES PRN


 SL


 Prn Chest Pain  9/27/17 15:30


 10/27/17 15:29   


 


 


 Ondansetron HCl


  (Zofran)  4 mg  Q6H  PRN


 IVP


 Nausea & Vomiting  9/27/17 15:30


 10/27/17 15:29   


 


 


 Pregabalin


  (Lyrica)  75 mg  THREE TIMES A  DAY


 ORAL


   9/27/17 19:30


 10/27/17 19:29  10/1/17 13:39


 


 


 Promethazine HCl/


 Codeine


  (Phenergan with


 Codeine)  5 ml  Q6H  PRN


 ORAL


 cough  9/27/17 15:30


 10/27/17 15:29   


 


 


 Quetiapine


 Fumarate


  (SEROquel)  300 mg  QHS


 ORAL


   9/29/17 21:00


 10/29/17 20:59  9/30/17 20:31


 


 


 Sodium Chloride  1,000 ml @ 


 60 mls/hr  [id number]


 IV


   10/1/17 11:00


 10/31/17 10:59  10/1/17 11:13


 


 


 Theophylline


  (Florin-Dur)  100 mg  EVERY 12  HOURS


 ORAL


   9/27/17 21:00


 10/27/17 20:59  10/1/17 09:28


 


 


 Ziprasidone


  (Geodon)  20 mg  DAILY


 ORAL


   9/28/17 09:00


 10/28/17 08:59  10/1/17 09:28


 


 


 Zolpidem Tartrate


  (Ambien)  5 mg  HSPRN  PRN


 ORAL


 Insomnia  9/29/17 21:00


 10/6/17 20:59  9/30/17 23:48


 

















Donnie North M.D. Oct 1, 2017 15:55

## 2017-10-01 NOTE — CONSULTATION
DATE OF CONSULTATION:  09/29/2017



NOTE:  "POOR AUDIO QUALITY"



PSYCHIATRIC CONSULTATION



History of Present Illness:  The patient is a 36-year-old male patient,

_____.  The patient admitted with diagnoses of COPD, hyperglycemia, _____,

mood lability, _____ distress, but not on the CPAP _____.  It affected

_____.  So, the consultation requested for this patient _____

complications _____.



Social History:  The patient _____.  Financially supported by [employer] and

Medicare.  He lives in _____.



SUBSTANCE ABUSE HISTORY:  Denies drug and alcohol use.



PSYCHIATRIC HISTORY:  Schizoaffective disorder. _____



ALLERGIES:  No known drug allergies.



Medical History:  There is a history of diabetes, COPD, wheezing, and

chronic cough with shortness of breath.



Mental Status Examination:  This is a male, 36 years old with psychomotor

agitation.  Mood is irritable and agitated.  Affect is guarded and

restricted.  Thought process disorganized.  He denies any suicidal or

homicidal thoughts.  Insight and judgment is poor.



DIAGNOSES:  Schizoaffective, bipolar type.



Plan:  We will go ahead and restart his Seroquel 300 mg nightly.  Continue

Geodon 20 mg daily.  Also, restart his Depakote 500 mg twice a day to

stabilize his mood and encourage him to interact appropriately with staff.

Medications _____.



I would like to thank, Dr. Samir Cabrera, for this interesting

consultation.  Chart reviewed.  Discussed with staff.









  ______________________________________________

  Gulshan Armstrong M.D.





DR:  FLIP

D:  09/29/2017 15:14

T:  09/30/2017 04:40

JOB#:  [id number]

CC:

## 2017-10-01 NOTE — GENERAL PROGRESS NOTE
Assessment/Plan


Problem List:  


(1) Diabetes mellitus


ICD Codes:  E11.9 - Diabetes mellitus


SNOMED:  57682645


(2) Generalized weakness


ICD Codes:  R53.1 - Weakness


SNOMED:  07712626


(3) COPD exacerbation


ICD Codes:  J44.1 - Chronic obstructive pulmonary disease with (acute) 

exacerbation


SNOMED:  565266277, 977233017


(4) Hyperglycemia due to type 2 diabetes mellitus


ICD Codes:  E11.65 - Type 2 diabetes mellitus with hyperglycemia


SNOMED:  581633893185772


(5) Opiate dependence


ICD Codes:  F11.20 - Opioid dependence


SNOMED:  54145667


(6) Anxiety


ICD Codes:  F41.9 - Anxiety disorder, unspecified; Z68.41 - Body mass index (BMI

) 40.0-44.9, adult


SNOMED:  09804635


Status:  stable, progressing, tolerating diet


Assessment/Plan


o2 pulm tx abx ot pt diet cbc bmp am ltach eval transfer





Subjective


Constitutional:  Reports: weakness


Respiratory:  Reports: shortness of breath


Allergies:  


Coded Allergies:  


     No Known Allergies (Unverified , 4/14/15)


All Systems:  reviewed and negative except above


Subjective


o2 nc sleepy





Objective





Last 24 Hour Vital Signs








  Date Time  Temp Pulse Resp B/P (MAP) Pulse Ox O2 Delivery O2 Flow Rate FiO2


 


10/1/17 08:00 97.7 115 18 140/67  Room Air  


 


10/1/17 07:25      Nasal Cannula 3.0 32


 


10/1/17 07:25  111 20  98 Nasal Cannula 3.0 32


 


10/1/17 07:25     98 Nasal Cannula 3.0 32


 


10/1/17 07:20  110 20  96 Nasal Cannula 3.0 32


 


10/1/17 04:00 98.1 117 20 114/65 94   


 


10/1/17 03:39  118 20  97 Nasal Cannula 3.0 32


 


10/1/17 03:29  112 20  94 Nasal Cannula 3.0 32


 


10/1/17 00:00 97.5 108 21 122/65 95 Nasal Cannula 2.0 


 


9/30/17 23:30  110 20  92 Nasal Cannula 3.0 32


 


9/30/17 23:23  96 20  93 Nasal Cannula 3.0 32


 


9/30/17 20:00 97.5 115 22 132/60 92 Room Air  


 


9/30/17 19:35  122 20  91 Nasal Cannula 3.0 32


 


9/30/17 19:26  111 20  92 Nasal Cannula 3.0 32


 


9/30/17 19:22      Nasal Cannula 3.0 32


 


9/30/17 19:22     92 Nasal Cannula 3.0 32


 


9/30/17 18:07 98.2       


 


9/30/17 17:20 98.2 87 22 151/99 92 Nasal Cannula 2.0 


 


9/30/17 15:44 97.6 110 20 119/63 99 Room Air  


 


9/30/17 15:05  110 22  98 Nasal Cannula 3.0 32


 


9/30/17 14:55  104 22  95 Nasal Cannula 3.0 32


 


9/30/17 14:34 97.6       


 


9/30/17 12:06 98.6 108 22 116/78 99 Room Air  


 


9/30/17 11:49  102 20  100 Nasal Cannula 3.0 32


 


9/30/17 11:39  100 20  96 Nasal Cannula 3.0 32








Laboratory Tests


10/1/17 07:20: 


White Blood Count 14.1H, Red Blood Count 4.25L, Hemoglobin 12.5L, Hematocrit 

37.4L, Mean Corpuscular Volume 88, Mean Corpuscular Hemoglobin 29.5, Mean 

Corpuscular Hemoglobin Concent 33.5, Red Cell Distribution Width 12.6, Platelet 

Count 221, Mean Platelet Volume 7.6, Neutrophils (%) (Auto) 83.8H, Lymphocytes (

%) (Auto) 10.5L, Monocytes (%) (Auto) 5.3, Eosinophils (%) (Auto) 0.0, 

Basophils (%) (Auto) 0.4, Sodium Level 131L, Potassium Level 4.1, Chloride 

Level 92L, Carbon Dioxide Level 21, Anion Gap 18H, Blood Urea Nitrogen 20, 

Creatinine 0.8, Estimat Glomerular Filtration Rate > 60, Glucose Level 463H, 

Calcium Level 9.1


Height (Feet):  5


Height (Inches):  11.00


Weight (Pounds):  232


General Appearance:  lethargic


EENT:  normal ENT inspection


Neck:  normal alignment


Cardiovascular:  normal peripheral pulses, normal rate, regular rhythm


Respiratory/Chest:  chest wall non-tender, lungs clear, decreased breath sounds


Abdomen:  normal bowel sounds, non tender, soft


Extremities:  normal inspection


Edema:  no edema noted Arm (L), no edema noted Arm (R), no edema noted Leg (L), 

no edema noted Leg (R), no edema noted Pedal (L), no edema noted Pedal (R), no 

edema noted Generalized


Neurologic:  responsive, motor weakness


Skin:  normal pigmentation, warm/dry











ROSE MARIE IRVING Oct 1, 2017 09:54

## 2017-10-01 NOTE — GENERAL PROGRESS NOTE
Assessment/Plan


Problem List:  


(1) Hyperglycemia due to type 2 diabetes mellitus


ICD Codes:  E11.65 - Type 2 diabetes mellitus with hyperglycemia


SNOMED:  145330943078646


(2) COPD exacerbation


ICD Codes:  J44.1 - Chronic obstructive pulmonary disease with (acute) 

exacerbation


SNOMED:  311805555, 780743831


Assessment/Plan


diabetes exacerbated by IVSM and non compliance with diet 


increase Levemir to 30 units bid units qhs 


increase Novolog to 45 units ac tid 


continue sliding scale ac / hs 


continue Metformin





Subjective


Allergies:  


Coded Allergies:  


     No Known Allergies (Unverified , 4/14/15)


All Systems:  reviewed and negative except above


Subjective


glucose out of control despite insulin dose adjustment 


non compliant with diet





Objective





Last 24 Hour Vital Signs








  Date Time  Temp Pulse Resp B/P (MAP) Pulse Ox O2 Delivery O2 Flow Rate FiO2


 


10/1/17 07:25      Nasal Cannula 3.0 32


 


10/1/17 07:25  111 20  98 Nasal Cannula 3.0 32


 


10/1/17 07:25     98 Nasal Cannula 3.0 32


 


10/1/17 07:20  110 20  96 Nasal Cannula 3.0 32


 


10/1/17 04:00 98.1 117 20 114/65 94   


 


10/1/17 03:39  118 20  97 Nasal Cannula 3.0 32


 


10/1/17 03:29  112 20  94 Nasal Cannula 3.0 32


 


10/1/17 00:00 97.5 108 21 122/65 95 Nasal Cannula 2.0 


 


9/30/17 23:30  110 20  92 Nasal Cannula 3.0 32


 


9/30/17 23:23  96 20  93 Nasal Cannula 3.0 32


 


9/30/17 20:00 97.5 115 22 132/60 92 Room Air  


 


9/30/17 19:35  122 20  91 Nasal Cannula 3.0 32


 


9/30/17 19:26  111 20  92 Nasal Cannula 3.0 32


 


9/30/17 19:22      Nasal Cannula 3.0 32


 


9/30/17 19:22     92 Nasal Cannula 3.0 32


 


9/30/17 18:07 98.2       


 


9/30/17 17:20 98.2 87 22 151/99 92 Nasal Cannula 2.0 


 


9/30/17 15:44 97.6 110 20 119/63 99 Room Air  


 


9/30/17 15:05  110 22  98 Nasal Cannula 3.0 32


 


9/30/17 14:55  104 22  95 Nasal Cannula 3.0 32


 


9/30/17 14:34 97.6       


 


9/30/17 12:06 98.6 108 22 116/78 99 Room Air  


 


9/30/17 11:49  102 20  100 Nasal Cannula 3.0 32


 


9/30/17 11:39  100 20  96 Nasal Cannula 3.0 32


 


9/30/17 08:08  92 22  94 Nasal Cannula 3.0 32


 


9/30/17 08:00 97.7 114 20 115/64 92 Nasal Cannula 2.0 


 


9/30/17 07:58  92 22  92 Nasal Cannula 3.0 32


 


9/30/17 07:57      Nasal Cannula 3.0 32


 


9/30/17 07:56     92 Nasal Cannula 3.0 32








Laboratory Tests


9/30/17 07:47: 


White Blood Count 14.0H, Red Blood Count 4.12L, Hemoglobin 12.4L, Hematocrit 

36.4L, Mean Corpuscular Volume 88, Mean Corpuscular Hemoglobin 30.1, Mean 

Corpuscular Hemoglobin Concent 34.1, Red Cell Distribution Width 12.9, Platelet 

Count 245, Mean Platelet Volume 8.0, Neutrophils (%) (Auto) , Lymphocytes (%) (

Auto) , Monocytes (%) (Auto) , Eosinophils (%) (Auto) , Basophils (%) (Auto) , 

Differential Total Cells Counted 100, Neutrophils % (Manual) 87H, Lymphocytes % 

(Manual) 8L, Monocytes % (Manual) 1, Eosinophils % (Manual) 0, Basophils % (

Manual) 0, Band Neutrophils 4, Platelet Estimate Adequate, Platelet Morphology 

Normal, Sodium Level 133L, Potassium Level 4.4, Chloride Level 94L, Carbon 

Dioxide Level 23, Anion Gap 16H, Blood Urea Nitrogen 20, Creatinine 0.8, 

Estimat Glomerular Filtration Rate > 60, Glucose Level 434H, Calcium Level 9.1


Height (Feet):  5


Height (Inches):  11.00


Weight (Pounds):  232


General Appearance:  no apparent distress


EENT:  PERRL/EOMI


Neck:  normal alignment


Cardiovascular:  normal rate


Respiratory/Chest:  decreased breath sounds


Abdomen:  normal bowel sounds


Pelvis:  normal external exam


Edema:  no edema noted Arm (L), no edema noted Arm (R), no edema noted Leg (L), 

no edema noted Leg (R), no edema noted Pedal (L), no edema noted Pedal (R), no 

edema noted Generalized


Objective





Current Medications








 Medications


  (Trade)  Dose


 Ordered  Sig/Henry


 Route


 PRN Reason  Start Time


 Stop Time Status Last Admin


Dose Admin


 


 Acetaminophen


  (Tylenol)  650 mg  Q4H  PRN


 ORAL


 FEVER>100.5  9/27/17 15:30


 10/27/17 15:29   


 


 


 Albuterol/


 Ipratropium


  (DuoNeb


 0.5-3(2.5)mg/3ml)  3 ml  Q4HRT


 HHN


   9/27/17 23:00


 10/2/17 22:59  10/1/17 07:34


 


 


 Clonidine HCl


  (Catapres)  0.1 mg  Q4H  PRN


 ORAL


 sbp more than 160  9/27/17 15:30


 10/27/17 15:29   


 


 


 Dextrose


  (Dextrose 50%)    STAT  PRN


 IV


 Hypoglycemia  9/27/17 18:30


 10/27/17 18:29   


 


 


 Divalproex Sodium


  (Depakote ER)  500 mg  EVERY 12  HOURS


 ORAL


   9/29/17 21:00


 10/29/17 20:59  9/30/17 20:31


 


 


 Doxycycline


 Monohydrate


  (Vibramycin)  100 mg  EVERY 12  HOURS


 ORAL


   9/28/17 15:00


 10/5/17 14:59  9/30/17 20:31


 


 


 Heparin Sodium


  (Porcine)


  (Heparin 5000


 units/ml)  5,000 units  EVERY 12  HOURS


 SUBQ


   9/27/17 21:00


 10/27/17 20:59  9/30/17 09:47


 


 


 Insulin Aspart


  (NovoLOG)    BEFORE MEALS AND  HS


 SUBQ


   9/27/17 21:00


 10/27/17 20:59  10/1/17 05:50


 


 


 Insulin Aspart


  (NovoLOG)  30 units  NOVOTIAC


 SUBQ


   9/29/17 16:50


 10/29/17 16:49  10/1/17 05:50


 


 


 Insulin Detemir


  (Levemir)  40 units  BEDTIME


 SUBQ


   9/29/17 21:00


 10/29/17 20:59  9/30/17 20:38


 


 


 Lorazepam


  (Ativan 2mg/ml


 1ml)  0.5 mg  Q4H  PRN


 IV


 For Anxiety  9/27/17 15:30


 10/4/17 15:29   


 


 


 Metformin HCl


  (Glucophage)  500 mg  TIAC


 ORAL


   9/29/17 06:30


 10/29/17 06:29  10/1/17 05:49


 


 


 Methylprednisolone


 Sodium Succinate


  (Solu-MEDROL)  40 mg  EVERY 8  HOURS


 IV


   9/28/17 22:00


 10/27/17 19:29  10/1/17 05:48


 


 


 Morphine Sulfate


  (Morphine


 Sulfate)  2 mg  Q4H  PRN


 IVP


 severe pain 7-10  9/27/17 15:30


 10/4/17 15:29  10/1/17 06:37


 


 


 Nitroglycerin


  (Ntg)  0.4 mg  Q5M X 3 DOSES PRN


 SL


 Prn Chest Pain  9/27/17 15:30


 10/27/17 15:29   


 


 


 Ondansetron HCl


  (Zofran)  4 mg  Q6H  PRN


 IVP


 Nausea & Vomiting  9/27/17 15:30


 10/27/17 15:29   


 


 


 Pregabalin


  (Lyrica)  75 mg  THREE TIMES A  DAY


 ORAL


   9/27/17 19:30


 10/27/17 19:29  9/30/17 17:08


 


 


 Promethazine HCl/


 Codeine


  (Phenergan with


 Codeine)  5 ml  Q6H  PRN


 ORAL


 cough  9/27/17 15:30


 10/27/17 15:29   


 


 


 Quetiapine


 Fumarate


  (SEROquel)  300 mg  QHS


 ORAL


   9/29/17 21:00


 10/29/17 20:59  9/30/17 20:31


 


 


 Theophylline


  (Florin-Dur)  100 mg  EVERY 12  HOURS


 ORAL


   9/27/17 21:00


 10/27/17 20:59  9/30/17 20:31


 


 


 Ziprasidone


  (Geodon)  20 mg  DAILY


 ORAL


   9/28/17 09:00


 10/28/17 08:59  9/30/17 09:45


 


 


 Zolpidem Tartrate


  (Ambien)  5 mg  HSPRN  PRN


 ORAL


 Insomnia  9/29/17 21:00


 10/6/17 20:59  9/30/17 23:48


 














Item Value  Date Time


 


Bedside Blood Glucose 455 mg/dl H 10/1/17 0630


 


Bedside Blood Glucose 476 mg/dl H 9/30/17 2100


 


Bedside Blood Glucose 400 mg/dl H 9/30/17 1619


 


Bedside Blood Glucose 443 mg/dl H 9/30/17 1130

















FITO DENTON Oct 1, 2017 07:39

## 2017-10-01 NOTE — CONSULTATION
DATE OF CONSULTATION:  09/28/2017



PSYCHOTHERAPY CONSULTATION AND PROGRESS NOTE



CONSULTING PHYSICIAN:  Mynor Mayen M.D.



TREATING ATTENDING PHYSICIAN:  Samir Cabrera D.O.



History of Present Illness:  The patient is a 36-year-old male patient.

The patient has a history of schizoaffective disorder and history of

possible bipolar disorder.  The patient brought to the hospital for

chronic obstructive pulmonary disorder and hyperglycemia.  The patient is

from [facility].  The patient states that he was

brought to the hospital, admitted for treatment due to _____ mood was

fluctuating, _____ his current medical condition.  _____ hypertension,

some depression and for these reasons the patient is referred for

psychotherapeutic services.  The patient does have a history of

schizoaffective disorder, bipolar type.  Denies suicidal or homicidal

thoughts of ideation.  Denies auditory or visual hallucinations at this

time.  _____.



Past Medical History:  Includes a history of neuropathy, diabetes, and

lower extremity weakness.



ALLERGIES:  The patient has no known drug allergies.



Substance Abuse History:  The patient does have a history of smoking

cigarettes; however, denies history of alcohol use or illicit drug use.



Psychiatric History:  The patient schizoaffective disorder, bipolar type.

The patient has been treated with psychotropic medications in the past.

 



Social History:  The patient is a 36-year-old male patient.  The patient is

currently a resident at [facility].  The patient states that

he is .



Mental Status Examination:  The patient is alert and oriented x4 to person,

place, time, and situation.  His mood is depressed.  Affect is blunted.

Thought process is fairly disorganized.  The patient has poor attention

and concentration.  Poor insight, judgment, and impulse control.



DIAGNOSES:

AXIS I  Schizoaffective disorder, bipolar type.

AXIS II  Deferred.

AXIS III  Per History and Physical.



Plan:  This clinician assessed the patient, provided the patient with

reality orientation and supportive psychotherapy.  Encouraging the patient

to participate in treatment milieu as well as _____.  Continue with

medication management and behavioral management.  This clinician has

reviewed the patient's chart and discussed the treatment with the nursing

staff.









  ______________________________________________

  Mynor Mayen PsyD.





DR:  AA/as

D:  09/29/2017 16:09

T:  09/30/2017 04:31

JOB#:  [id number]

CC:

## 2017-10-01 NOTE — PULMONOLOGY PROGRESS NOTE
Assessment/Plan


Problems:  


(1) COPD exacerbation


(2) Lumbar back pain


(3) Anxiety


(4) Diabetes mellitus


Assessment/Plan


taper steroids


IV abx


check sputum


sliding scale


improving slowly





Subjective


ROS Limited/Unobtainable:  No


Constitutional:  Reports: no symptoms


HEENT:  Repors: no symptoms


Allergies:  


Coded Allergies:  


     No Known Allergies (Unverified , 4/14/15)





Objective





Last 24 Hour Vital Signs








  Date Time  Temp Pulse Resp B/P (MAP) Pulse Ox O2 Delivery O2 Flow Rate FiO2


 


10/1/17 15:57 97.4 103 21 133/72 97 Room Air  


 


10/1/17 15:35  96 20  100 Nasal Cannula 3.0 32


 


10/1/17 15:24  104 20  96 Nasal Cannula 3.0 32


 


10/1/17 14:38 98.8       


 


10/1/17 14:38 98.8       


 


10/1/17 12:00 98.8 98 18 138/70 95 Room Air  


 


10/1/17 11:10  115 20  98 Nasal Cannula 3.0 32


 


10/1/17 11:05  114 20  95 Nasal Cannula 3.0 32


 


10/1/17 08:00 97.7 115 18 140/67  Room Air  


 


10/1/17 07:25      Nasal Cannula 3.0 32


 


10/1/17 07:25  111 20  98 Nasal Cannula 3.0 32


 


10/1/17 07:25     98 Nasal Cannula 3.0 32


 


10/1/17 07:20  110 20  96 Nasal Cannula 3.0 32


 


10/1/17 04:00 98.1 117 20 114/65 94   


 


10/1/17 03:39  118 20  97 Nasal Cannula 3.0 32


 


10/1/17 03:29  112 20  94 Nasal Cannula 3.0 32


 


10/1/17 00:00 97.5 108 21 122/65 95 Nasal Cannula 2.0 


 


9/30/17 23:30  110 20  92 Nasal Cannula 3.0 32


 


9/30/17 23:23  96 20  93 Nasal Cannula 3.0 32


 


9/30/17 20:00 97.5 115 22 132/60 92 Room Air  


 


9/30/17 19:35  122 20  91 Nasal Cannula 3.0 32


 


9/30/17 19:26  111 20  92 Nasal Cannula 3.0 32


 


9/30/17 19:22      Nasal Cannula 3.0 32


 


9/30/17 19:22     92 Nasal Cannula 3.0 32


 


9/30/17 17:20 98.2 87 22 151/99 92 Nasal Cannula 2.0 

















Intake and Output  


 


 10/1/17 10/2/17





 19:00 07:00


 


Intake Total 240 ml 


 


Balance 240 ml 


 


  


 


IV Total 240 ml 








General Appearance:  WD/WN


HEENT:  normocephalic, atraumatic


Respiratory/Chest:  chest wall non-tender, lungs clear


Cardiovascular:  normal peripheral pulses, normal rate


Abdomen:  normal bowel sounds, no organomegaly


Extremities:  no cyanosis


Skin:  no rash


Neurologic/Psychiatric:  CNs II-XII grossly normal


Laboratory Tests


10/1/17 07:20: 


White Blood Count 14.1H, Red Blood Count 4.25L, Hemoglobin 12.5L, Hematocrit 

37.4L, Mean Corpuscular Volume 88, Mean Corpuscular Hemoglobin 29.5, Mean 

Corpuscular Hemoglobin Concent 33.5, Red Cell Distribution Width 12.6, Platelet 

Count 221, Mean Platelet Volume 7.6, Neutrophils (%) (Auto) 83.8H, Lymphocytes (

%) (Auto) 10.5L, Monocytes (%) (Auto) 5.3, Eosinophils (%) (Auto) 0.0, 

Basophils (%) (Auto) 0.4, Sodium Level 131L, Potassium Level 4.1, Chloride 

Level 92L, Carbon Dioxide Level 21, Anion Gap 18H, Blood Urea Nitrogen 20, 

Creatinine 0.8, Estimat Glomerular Filtration Rate > 60, Glucose Level 463H, 

Calcium Level 9.1





Current Medications








 Medications


  (Trade)  Dose


 Ordered  Sig/Henry


 Route


 PRN Reason  Start Time


 Stop Time Status Last Admin


Dose Admin


 


 Acetaminophen


  (Tylenol)  650 mg  Q4H  PRN


 ORAL


 FEVER>100.5  9/27/17 15:30


 10/27/17 15:29   


 


 


 Albuterol/


 Ipratropium


  (DuoNeb


 0.5-3(2.5)mg/3ml)  3 ml  Q4HRT


 HHN


   9/27/17 23:00


 10/2/17 22:59  10/1/17 15:00


 


 


 Clonidine HCl


  (Catapres)  0.1 mg  Q4H  PRN


 ORAL


 sbp more than 160  9/27/17 15:30


 10/27/17 15:29   


 


 


 Dextrose


  (Dextrose 50%)    STAT  PRN


 IV


 Hypoglycemia  9/27/17 18:30


 10/27/17 18:29   


 


 


 Divalproex Sodium


  (Depakote ER)  500 mg  EVERY 12  HOURS


 ORAL


   9/29/17 21:00


 10/29/17 20:59  10/1/17 09:28


 


 


 Doxycycline


 Monohydrate


  (Vibramycin)  100 mg  EVERY 12  HOURS


 ORAL


   9/28/17 15:00


 10/5/17 14:59  10/1/17 09:28


 


 


 Heparin Sodium


  (Porcine)


  (Heparin 5000


 units/ml)  5,000 units  EVERY 12  HOURS


 SUBQ


   9/27/17 21:00


 10/27/17 20:59  10/1/17 09:28


 


 


 Insulin Aspart


  (NovoLOG)    BEFORE MEALS AND  HS


 SUBQ


   9/27/17 21:00


 10/27/17 20:59  10/1/17 16:18


 


 


 Insulin Aspart


  (NovoLOG)  45 units  NOVOTIAC


 SUBQ


   10/1/17 11:50


 10/31/17 11:49  10/1/17 16:18


 


 


 Insulin Detemir


  (Levemir)  30 units  BID


 SUBQ


   10/1/17 09:00


 10/29/17 20:59  10/1/17 09:27


 


 


 Lorazepam


  (Ativan 2mg/ml


 1ml)  0.5 mg  Q4H  PRN


 IV


 For Anxiety  9/27/17 15:30


 10/4/17 15:29   


 


 


 Metformin HCl


  (Glucophage)  500 mg  TIAC


 ORAL


   9/29/17 06:30


 10/29/17 06:29  10/1/17 16:15


 


 


 Methylprednisolone


 Sodium Succinate


  (Solu-MEDROL)  40 mg  EVERY 8  HOURS


 IV


   9/28/17 22:00


 10/27/17 19:29  10/1/17 14:01


 


 


 Morphine Sulfate


  (Morphine


 Sulfate)  2 mg  Q4H  PRN


 IVP


 severe pain 7-10  9/27/17 15:30


 10/4/17 15:29  10/1/17 14:34


 


 


 Nitroglycerin


  (Ntg)  0.4 mg  Q5M X 3 DOSES PRN


 SL


 Prn Chest Pain  9/27/17 15:30


 10/27/17 15:29   


 


 


 Ondansetron HCl


  (Zofran)  4 mg  Q6H  PRN


 IVP


 Nausea & Vomiting  9/27/17 15:30


 10/27/17 15:29   


 


 


 Pregabalin


  (Lyrica)  75 mg  THREE TIMES A  DAY


 ORAL


   9/27/17 19:30


 10/27/17 19:29  10/1/17 13:39


 


 


 Promethazine HCl/


 Codeine


  (Phenergan with


 Codeine)  5 ml  Q6H  PRN


 ORAL


 cough  9/27/17 15:30


 10/27/17 15:29   


 


 


 Quetiapine


 Fumarate


  (SEROquel)  300 mg  QHS


 ORAL


   9/29/17 21:00


 10/29/17 20:59  9/30/17 20:31


 


 


 Sodium Chloride  1,000 ml @ 


 60 mls/hr  L36P20H


 IV


   10/1/17 11:00


 10/31/17 10:59  10/1/17 11:13


 


 


 Theophylline


  (Florin-Dur)  100 mg  EVERY 12  HOURS


 ORAL


   9/27/17 21:00


 10/27/17 20:59  10/1/17 09:28


 


 


 Ziprasidone


  (Geodon)  20 mg  DAILY


 ORAL


   9/28/17 09:00


 10/28/17 08:59  10/1/17 09:28


 


 


 Zolpidem Tartrate


  (Ambien)  5 mg  HSPRN  PRN


 ORAL


 Insomnia  9/29/17 21:00


 10/6/17 20:59  9/30/17 23:48


 

















SERENITY MOCTEZUMA Oct 1, 2017 16:20

## 2017-10-02 VITALS — SYSTOLIC BLOOD PRESSURE: 99 MMHG | DIASTOLIC BLOOD PRESSURE: 48 MMHG

## 2017-10-02 VITALS — SYSTOLIC BLOOD PRESSURE: 120 MMHG | DIASTOLIC BLOOD PRESSURE: 61 MMHG

## 2017-10-02 VITALS — SYSTOLIC BLOOD PRESSURE: 116 MMHG | DIASTOLIC BLOOD PRESSURE: 71 MMHG

## 2017-10-02 VITALS — SYSTOLIC BLOOD PRESSURE: 101 MMHG | DIASTOLIC BLOOD PRESSURE: 60 MMHG

## 2017-10-02 VITALS — SYSTOLIC BLOOD PRESSURE: 107 MMHG | DIASTOLIC BLOOD PRESSURE: 58 MMHG

## 2017-10-02 VITALS — DIASTOLIC BLOOD PRESSURE: 60 MMHG | SYSTOLIC BLOOD PRESSURE: 119 MMHG

## 2017-10-02 LAB
ANION GAP SERPL CALC-SCNC: 14 MMOL/L (ref 5–15)
BASOPHILS NFR BLD AUTO: 0.6 % (ref 0–2)
CALCIUM SERPL-MCNC: 9 MG/DL (ref 8.6–10.2)
CHLORIDE SERPL-SCNC: 95 MEQ/L (ref 98–107)
CO2 SERPL-SCNC: 25 MEQ/L (ref 20–30)
CREAT SERPL-MCNC: 0.9 MG/DL (ref 0.7–1.2)
EOSINOPHIL NFR BLD AUTO: 0.1 % (ref 0–3)
ERYTHROCYTE [DISTWIDTH] IN BLOOD BY AUTOMATED COUNT: 12.8 % (ref 11.6–14.8)
GFR SERPLBLD BASED ON 1.73 SQ M-ARVRAT: > 60 ML/MIN (ref 60–?)
HEMOLYSIS: 2
LYMPHOCYTES NFR BLD AUTO: 31.9 % (ref 20–45)
MCH RBC QN AUTO: 30.1 PG (ref 27–31)
MCHC RBC AUTO-ENTMCNC: 34.5 G/DL (ref 32–36)
MCV RBC AUTO: 87 FL (ref 80–99)
MONOCYTES NFR BLD AUTO: 5.4 % (ref 1–10)
NEUTROPHILS NFR BLD AUTO: 62 % (ref 45–75)
PLATELET # BLD: 209 K/UL (ref 150–450)
PMV BLD AUTO: 7.7 FL (ref 6.5–10.1)
POTASSIUM SERPL-SCNC: 3.7 MEQ/L (ref 3.4–4.9)
RBC # BLD AUTO: 4.38 M/UL (ref 4.7–6.1)
SODIUM SERPL-SCNC: 134 MEQ/L (ref 135–145)
TROPONIN I SERPL-MCNC: < 0.3 NG/ML (ref ?–0.3)
WBC # BLD AUTO: 14.6 K/UL (ref 4.8–10.8)

## 2017-10-02 RX ADMIN — INSULIN ASPART SCH UNITS: 100 INJECTION, SOLUTION INTRAVENOUS; SUBCUTANEOUS at 21:02

## 2017-10-02 RX ADMIN — METFORMIN HYDROCHLORIDE SCH MG: 500 TABLET ORAL at 06:41

## 2017-10-02 RX ADMIN — MORPHINE SULFATE PRN MG: 2 INJECTION, SOLUTION INTRAMUSCULAR; INTRAVENOUS at 02:42

## 2017-10-02 RX ADMIN — INSULIN ASPART SCH UNITS: 100 INJECTION, SOLUTION INTRAVENOUS; SUBCUTANEOUS at 12:07

## 2017-10-02 RX ADMIN — HEPARIN SODIUM SCH UNITS: 5000 INJECTION INTRAVENOUS; SUBCUTANEOUS at 20:58

## 2017-10-02 RX ADMIN — INSULIN ASPART SCH UNITS: 100 INJECTION, SOLUTION INTRAVENOUS; SUBCUTANEOUS at 17:07

## 2017-10-02 RX ADMIN — DIVALPROEX SODIUM SCH MG: 500 TABLET, FILM COATED, EXTENDED RELEASE ORAL at 20:57

## 2017-10-02 RX ADMIN — IPRATROPIUM BROMIDE AND ALBUTEROL SULFATE SCH ML: .5; 3 SOLUTION RESPIRATORY (INHALATION) at 15:27

## 2017-10-02 RX ADMIN — INSULIN DETEMIR SCH UNITS: 100 INJECTION, SOLUTION SUBCUTANEOUS at 09:41

## 2017-10-02 RX ADMIN — MORPHINE SULFATE PRN MG: 2 INJECTION, SOLUTION INTRAMUSCULAR; INTRAVENOUS at 10:41

## 2017-10-02 RX ADMIN — METFORMIN HYDROCHLORIDE SCH MG: 500 TABLET ORAL at 17:04

## 2017-10-02 RX ADMIN — PREGABALIN SCH MG: 75 CAPSULE ORAL at 08:29

## 2017-10-02 RX ADMIN — DIVALPROEX SODIUM SCH MG: 500 TABLET, FILM COATED, EXTENDED RELEASE ORAL at 08:29

## 2017-10-02 RX ADMIN — INSULIN ASPART SCH UNITS: 100 INJECTION, SOLUTION INTRAVENOUS; SUBCUTANEOUS at 06:45

## 2017-10-02 RX ADMIN — MORPHINE SULFATE PRN MG: 2 INJECTION, SOLUTION INTRAMUSCULAR; INTRAVENOUS at 06:42

## 2017-10-02 RX ADMIN — PREGABALIN SCH MG: 75 CAPSULE ORAL at 17:56

## 2017-10-02 RX ADMIN — MORPHINE SULFATE PRN MG: 2 INJECTION, SOLUTION INTRAMUSCULAR; INTRAVENOUS at 18:46

## 2017-10-02 RX ADMIN — INSULIN DETEMIR SCH UNITS: 100 INJECTION, SOLUTION SUBCUTANEOUS at 17:58

## 2017-10-02 RX ADMIN — INSULIN ASPART SCH UNITS: 100 INJECTION, SOLUTION INTRAVENOUS; SUBCUTANEOUS at 17:09

## 2017-10-02 RX ADMIN — IPRATROPIUM BROMIDE AND ALBUTEROL SULFATE SCH ML: .5; 3 SOLUTION RESPIRATORY (INHALATION) at 11:42

## 2017-10-02 RX ADMIN — METFORMIN HYDROCHLORIDE SCH MG: 500 TABLET ORAL at 11:29

## 2017-10-02 RX ADMIN — INSULIN ASPART SCH UNITS: 100 INJECTION, SOLUTION INTRAVENOUS; SUBCUTANEOUS at 06:46

## 2017-10-02 RX ADMIN — THEOPHYLLINE ANHYDROUS SCH MG: 100 CAPSULE, EXTENDED RELEASE ORAL at 20:58

## 2017-10-02 RX ADMIN — MORPHINE SULFATE PRN MG: 2 INJECTION, SOLUTION INTRAMUSCULAR; INTRAVENOUS at 22:43

## 2017-10-02 RX ADMIN — PREGABALIN SCH MG: 75 CAPSULE ORAL at 13:00

## 2017-10-02 RX ADMIN — MORPHINE SULFATE PRN MG: 2 INJECTION, SOLUTION INTRAMUSCULAR; INTRAVENOUS at 14:47

## 2017-10-02 RX ADMIN — IPRATROPIUM BROMIDE AND ALBUTEROL SULFATE SCH ML: .5; 3 SOLUTION RESPIRATORY (INHALATION) at 19:00

## 2017-10-02 RX ADMIN — HEPARIN SODIUM SCH UNITS: 5000 INJECTION INTRAVENOUS; SUBCUTANEOUS at 08:32

## 2017-10-02 RX ADMIN — INSULIN ASPART SCH UNITS: 100 INJECTION, SOLUTION INTRAVENOUS; SUBCUTANEOUS at 11:29

## 2017-10-02 RX ADMIN — IPRATROPIUM BROMIDE AND ALBUTEROL SULFATE SCH ML: .5; 3 SOLUTION RESPIRATORY (INHALATION) at 07:37

## 2017-10-02 RX ADMIN — THEOPHYLLINE ANHYDROUS SCH MG: 100 CAPSULE, EXTENDED RELEASE ORAL at 08:29

## 2017-10-02 RX ADMIN — IPRATROPIUM BROMIDE AND ALBUTEROL SULFATE SCH ML: .5; 3 SOLUTION RESPIRATORY (INHALATION) at 03:44

## 2017-10-02 RX ADMIN — ZIPRASIDONE HYDROCHLORIDE SCH MG: 20 CAPSULE ORAL at 08:30

## 2017-10-02 NOTE — PROGRESS NOTE
DATE:  09/29/2017



PSYCHOTHERAPY CONSULTATION PROGRESS NOTE



TREATING ATTENDING PHYSICIAN:  Samir Cabrera D.O.



Subjective:  The patient is a 36-year-old male patient diagnosed with

schizoaffective disorder, bipolar type.  The patient has been irritable

and agitated.  He states that he is concerned about _____00:30.



Plan:  This clinician assessed the patient.  Provided the patient with

reality orientation and supportive psychotherapy.  Encouraging the patient

to participate in treatment milieu.  Continue with medication management

and behavioral management. This clinician has reviewed the patient's chart

and discussed the treatment with the nursing staff.









  ______________________________________________

  Mynor Mayen PsyD.





DR:  YAMILETH

D:  10/01/2017 18:00

T:  10/01/2017 23:39

JOB#:  [id number]

CC:

## 2017-10-02 NOTE — GENERAL PROGRESS NOTE
Assessment/Plan


Problem List:  


(1) Diabetes mellitus


ICD Codes:  E11.9 - Diabetes mellitus


SNOMED:  94174513


(2) Generalized weakness


ICD Codes:  R53.1 - Weakness


SNOMED:  80137212


(3) COPD exacerbation


ICD Codes:  J44.1 - Chronic obstructive pulmonary disease with (acute) 

exacerbation


SNOMED:  154222504, 104507168


(4) Hyperglycemia due to type 2 diabetes mellitus


ICD Codes:  E11.65 - Type 2 diabetes mellitus with hyperglycemia


SNOMED:  413848680819006


(5) Opiate dependence


ICD Codes:  F11.20 - Opioid dependence


SNOMED:  20884842


(6) Anxiety


ICD Codes:  F41.9 - Anxiety disorder, unspecified; Z68.41 - Body mass index (BMI

) 40.0-44.9, adult


SNOMED:  01188079


Status:  stable, progressing, tolerating diet


Assessment/Plan


o2 pulm tx abx ot pt diet cbc bmp am ltach eval transfer





Subjective


Constitutional:  Reports: weakness


Respiratory:  Reports: shortness of breath


Allergies:  


Coded Allergies:  


     No Known Allergies (Unverified , 4/14/15)


All Systems:  reviewed and negative except above


Subjective


o2 nc sleepy





Objective





Last 24 Hour Vital Signs








  Date Time  Temp Pulse Resp B/P (MAP) Pulse Ox O2 Delivery O2 Flow Rate FiO2


 


10/2/17 12:00 98.2 119 20 107/58 95 Nasal Cannula 2.0 


 


10/2/17 11:51  105 16  99 Nasal Cannula 3.0 32


 


10/2/17 11:40        32


 


10/2/17 11:40  102 18  96 Nasal Cannula 3.0 32


 


10/2/17 11:11 97.9       


 


10/2/17 09:28 97.9       


 


10/2/17 08:00 97.5 113 20 119/60 94 Nasal Cannula 2.0 


 


10/2/17 07:42  108 18  99 Nasal Cannula 3.0 32


 


10/2/17 07:39      Nasal Cannula 3.0 32


 


10/2/17 07:30  102 18  96 Nasal Cannula 3.0 32


 


10/2/17 07:30        32


 


10/2/17 07:25     96 Nasal Cannula 3.0 32


 


10/2/17 04:00 97.9 104 20 101/60 94 Nasal Cannula 2.0 


 


10/2/17 03:47  105 18  96 Nasal Cannula 3.0 32


 


10/2/17 03:45  104 18  94 Nasal Cannula 3.0 32


 


10/2/17 03:45        32


 


10/2/17 00:00 97.7 113 21 99/48 93 Nasal Cannula 2.0 


 


10/1/17 23:42    86/54    


 


10/1/17 23:35    93/55    


 


10/1/17 23:26    110/59    


 


10/1/17 23:14  107 18  97 Nasal Cannula 3.0 32


 


10/1/17 23:00  105 18  96 Nasal Cannula 3.0 32


 


10/1/17 23:00        32


 


10/1/17 20:37  120 18  96 Nasal Cannula 3.0 32


 


10/1/17 20:07  117 18   Nasal Cannula 3.0 32


 


10/1/17 20:04  117 18  97 Nasal Cannula 3.0 32


 


10/1/17 20:04     97 Nasal Cannula 3.0 32


 


10/1/17 20:04      Nasal Cannula 3.0 32


 


10/1/17 20:04        32


 


10/1/17 20:00 98.6 119 21 137/67 99 Nasal Cannula 2.0 


 


10/1/17 15:57 97.4 103 21 133/72 97 Room Air  


 


10/1/17 15:35  96 20  100 Nasal Cannula 3.0 32


 


10/1/17 15:24  104 20  96 Nasal Cannula 3.0 32


 


10/1/17 14:38 98.8       








Laboratory Tests


10/2/17 08:35: 


White Blood Count 14.6H, Red Blood Count 4.38L, Hemoglobin 13.2L, Hematocrit 

38.3L, Mean Corpuscular Volume 87, Mean Corpuscular Hemoglobin 30.1, Mean 

Corpuscular Hemoglobin Concent 34.5, Red Cell Distribution Width 12.8, Platelet 

Count 209, Mean Platelet Volume 7.7, Neutrophils (%) (Auto) 62.0, Lymphocytes (%

) (Auto) 31.9, Monocytes (%) (Auto) 5.4, Eosinophils (%) (Auto) 0.1, Basophils (

%) (Auto) 0.6, Sodium Level 134L, Potassium Level 3.7, Chloride Level 95L, 

Carbon Dioxide Level 25, Anion Gap 14, Blood Urea Nitrogen 23, Creatinine 0.9, 

Estimat Glomerular Filtration Rate > 60, Glucose Level 285#H, Calcium Level 9.0

, Troponin I < 0.30


Height (Feet):  5


Height (Inches):  11.00


Weight (Pounds):  232


General Appearance:  lethargic


EENT:  normal ENT inspection


Neck:  normal alignment


Cardiovascular:  normal peripheral pulses, normal rate, regular rhythm


Respiratory/Chest:  chest wall non-tender, lungs clear, decreased breath sounds


Abdomen:  normal bowel sounds, non tender, soft


Extremities:  normal inspection


Edema:  no edema noted Arm (L), no edema noted Arm (R), no edema noted Leg (L), 

no edema noted Leg (R), no edema noted Pedal (L), no edema noted Pedal (R), no 

edema noted Generalized


Neurologic:  responsive, motor weakness


Skin:  normal pigmentation, warm/dry











ROSE MARIE IRVING Oct 2, 2017 13:53

## 2017-10-02 NOTE — PULMONOLOGY PROGRESS NOTE
Assessment/Plan


Problems:  


(1) COPD exacerbation


(2) Lumbar back pain


(3) Anxiety


(4) Diabetes mellitus


Assessment/Plan


taper steroids


IV abx


check sputum


sliding scale


improving slowly





Subjective


ROS Limited/Unobtainable:  No


Constitutional:  Reports: no symptoms


HEENT:  Repors: no symptoms


Respiratory:  Reports: no symptoms


Allergies:  


Coded Allergies:  


     No Known Allergies (Unverified , 4/14/15)





Objective





Last 24 Hour Vital Signs








  Date Time  Temp Pulse Resp B/P (MAP) Pulse Ox O2 Delivery O2 Flow Rate FiO2


 


10/2/17 16:00 97.9 120 21 120/61 95 Nasal Cannula 2.0 


 


10/2/17 15:28  101 16   Nasal Cannula 3.0 32


 


10/2/17 15:20  99 18  96 Nasal Cannula 3.0 32


 


10/2/17 15:20        32


 


10/2/17 15:17 98.2       


 


10/2/17 12:00 98.2 119 20 107/58 95 Nasal Cannula 2.0 


 


10/2/17 11:51  105 16  99 Nasal Cannula 3.0 32


 


10/2/17 11:40        32


 


10/2/17 11:40  102 18  96 Nasal Cannula 3.0 32


 


10/2/17 09:28 97.9       


 


10/2/17 08:00 97.5 113 20 119/60 94 Nasal Cannula 2.0 


 


10/2/17 07:42  108 18  99 Nasal Cannula 3.0 32


 


10/2/17 07:39      Nasal Cannula 3.0 32


 


10/2/17 07:30  102 18  96 Nasal Cannula 3.0 32


 


10/2/17 07:30        32


 


10/2/17 07:25     96 Nasal Cannula 3.0 32


 


10/2/17 04:00 97.9 104 20 101/60 94 Nasal Cannula 2.0 


 


10/2/17 03:47  105 18  96 Nasal Cannula 3.0 32


 


10/2/17 03:45  104 18  94 Nasal Cannula 3.0 32


 


10/2/17 03:45        32


 


10/2/17 00:00 97.7 113 21 99/48 93 Nasal Cannula 2.0 


 


10/1/17 23:42    86/54    


 


10/1/17 23:35    93/55    


 


10/1/17 23:26    110/59    


 


10/1/17 23:14  107 18  97 Nasal Cannula 3.0 32


 


10/1/17 23:00  105 18  96 Nasal Cannula 3.0 32


 


10/1/17 23:00        32


 


10/1/17 20:37  120 18  96 Nasal Cannula 3.0 32


 


10/1/17 20:07  117 18   Nasal Cannula 3.0 32


 


10/1/17 20:04  117 18  97 Nasal Cannula 3.0 32


 


10/1/17 20:04     97 Nasal Cannula 3.0 32


 


10/1/17 20:04      Nasal Cannula 3.0 32


 


10/1/17 20:04        32


 


10/1/17 20:00 98.6 119 21 137/67 99 Nasal Cannula 2.0 








Objective


General Appearance:  WD/WN


HEENT:  normocephalic, atraumatic


Respiratory/Chest:  chest wall non-tender, lungs clear


Cardiovascular:  normal peripheral pulses, normal rate


Abdomen:  soft, non tender


Extremities:  no cyanosis


Skin:  no rash


Neurologic/Psychiatric:  CNs II-XII grossly normal, no motor/sensory deficits


Lymphatic:  no neck adenopathy


Laboratory Tests


10/2/17 08:35: 


White Blood Count 14.6H, Red Blood Count 4.38L, Hemoglobin 13.2L, Hematocrit 

38.3L, Mean Corpuscular Volume 87, Mean Corpuscular Hemoglobin 30.1, Mean 

Corpuscular Hemoglobin Concent 34.5, Red Cell Distribution Width 12.8, Platelet 

Count 209, Mean Platelet Volume 7.7, Neutrophils (%) (Auto) 62.0, Lymphocytes (%

) (Auto) 31.9, Monocytes (%) (Auto) 5.4, Eosinophils (%) (Auto) 0.1, Basophils (

%) (Auto) 0.6, Sodium Level 134L, Potassium Level 3.7, Chloride Level 95L, 

Carbon Dioxide Level 25, Anion Gap 14, Blood Urea Nitrogen 23, Creatinine 0.9, 

Estimat Glomerular Filtration Rate > 60, Glucose Level 285#H, Calcium Level 9.0

, Troponin I < 0.30





Current Medications








 Medications


  (Trade)  Dose


 Ordered  Sig/Henry


 Route


 PRN Reason  Start Time


 Stop Time Status Last Admin


Dose Admin


 


 Acetaminophen


  (Tylenol)  650 mg  Q4H  PRN


 ORAL


 FEVER>100.5  9/27/17 15:30


 10/27/17 15:29   


 


 


 Albuterol/


 Ipratropium


  (DuoNeb


 0.5-3(2.5)mg/3ml)  3 ml  Q4HRT


 HHN


   9/27/17 23:00


 10/2/17 22:59  10/2/17 15:27


 


 


 Clonidine HCl


  (Catapres)  0.1 mg  Q4H  PRN


 ORAL


 sbp more than 160  9/27/17 15:30


 10/27/17 15:29   


 


 


 Dextrose


  (Dextrose 50%)    STAT  PRN


 IV


 Hypoglycemia  9/27/17 18:30


 10/27/17 18:29   


 


 


 Divalproex Sodium


  (Depakote ER)  500 mg  EVERY 12  HOURS


 ORAL


   9/29/17 21:00


 10/29/17 20:59  10/2/17 08:29


 


 


 Doxycycline


 Monohydrate


  (Vibramycin)  100 mg  EVERY 12  HOURS


 ORAL


   9/28/17 15:00


 10/5/17 14:59  10/2/17 08:29


 


 


 Heparin Sodium


  (Porcine)


  (Heparin 5000


 units/ml)  5,000 units  EVERY 12  HOURS


 SUBQ


   9/27/17 21:00


 10/27/17 20:59  10/1/17 20:59


 


 


 Insulin Aspart


  (NovoLOG)    BEFORE MEALS AND  HS


 SUBQ


   9/27/17 21:00


 10/27/17 20:59  10/2/17 17:07


 


 


 Insulin Aspart


  (NovoLOG)  30 units  NOVOTIAC


 SUBQ


   10/2/17 11:50


 11/1/17 11:49  10/2/17 17:09


 


 


 Insulin Detemir


  (Levemir)  30 units  BID


 SUBQ


   10/1/17 09:00


 10/29/17 20:59  10/2/17 17:58


 


 


 Lorazepam


  (Ativan 2mg/ml


 1ml)  0.5 mg  Q4H  PRN


 IV


 For Anxiety  9/27/17 15:30


 10/4/17 15:29   


 


 


 Metformin HCl


  (Glucophage)  500 mg  TIAC


 ORAL


   9/29/17 06:30


 10/29/17 06:29  10/2/17 17:04


 


 


 Morphine Sulfate


  (Morphine


 Sulfate)  2 mg  Q4H  PRN


 IVP


 severe pain 7-10  10/2/17 11:30


 10/9/17 23:59  10/2/17 14:47


 


 


 Nitroglycerin


  (Ntg)  0.4 mg  Q5M X 3 DOSES PRN


 SL


 Prn Chest Pain  9/27/17 15:30


 10/27/17 15:29  10/1/17 23:42


 


 


 Ondansetron HCl


  (Zofran)  4 mg  Q6H  PRN


 IVP


 Nausea & Vomiting  9/27/17 15:30


 10/27/17 15:29   


 


 


 Pregabalin


  (Lyrica)  150 mg  THREE TIMES A  DAY


 ORAL


   10/2/17 18:00


 11/1/17 17:59  10/2/17 17:56


 


 


 Promethazine HCl/


 Codeine


  (Phenergan with


 Codeine)  5 ml  Q6H  PRN


 ORAL


 cough  9/27/17 15:30


 10/27/17 15:29   


 


 


 Quetiapine


 Fumarate


  (SEROquel)  300 mg  QHS


 ORAL


   9/29/17 21:00


 10/29/17 20:59  10/1/17 20:57


 


 


 Theophylline


  (Florin-Dur)  100 mg  EVERY 12  HOURS


 ORAL


   9/27/17 21:00


 10/27/17 20:59  10/2/17 08:29


 


 


 Ziprasidone


  (Geodon)  20 mg  DAILY


 ORAL


   9/28/17 09:00


 10/28/17 08:59  10/2/17 08:30


 


 


 Zolpidem Tartrate


  (Ambien)  5 mg  HSPRN  PRN


 ORAL


 Insomnia  9/29/17 21:00


 10/6/17 20:59  10/1/17 22:43


 

















SERENITY MOCTEZUMA Oct 2, 2017 18:32

## 2017-10-02 NOTE — NEPHROLOGY PROGRESS NOTE
Assessment/Plan


Problem List:  


(1) Diabetes mellitus


(2) Anxiety


(3) Generalized weakness


(4) Leukocytosis


(5) COPD exacerbation


Plan


Continue current treatment plan


Free water restriction


Strict glycemic control


Monitor lytes, correct prn


O2 therapy prn


Pain management prn





Objective


Objective





Last 24 Hour Vital Signs








  Date Time  Temp Pulse Resp B/P (MAP) Pulse Ox O2 Delivery O2 Flow Rate FiO2


 


10/2/17 22:01   20     


 


10/2/17 22:00   20     


 


10/2/17 20:00 98.4 123 21 116/71 94 Room Air  


 


10/2/17 19:30      Nasal Cannula 2.0 28


 


10/2/17 19:30     95 Nasal Cannula 2.0 28


 


10/2/17 19:16 97.9       


 


10/2/17 18:55 97.9       


 


10/2/17 16:00 97.9 120 21 120/61 95 Nasal Cannula 2.0 


 


10/2/17 15:28  101 16   Nasal Cannula 3.0 32


 


10/2/17 15:20  99 18  96 Nasal Cannula 3.0 32


 


10/2/17 15:20        32


 


10/2/17 12:00 98.2 119 20 107/58 95 Nasal Cannula 2.0 


 


10/2/17 11:51  105 16  99 Nasal Cannula 3.0 32


 


10/2/17 11:40        32


 


10/2/17 11:40  102 18  96 Nasal Cannula 3.0 32


 


10/2/17 09:28 97.9       


 


10/2/17 08:00 97.5 113 20 119/60 94 Nasal Cannula 2.0 


 


10/2/17 07:42  108 18  99 Nasal Cannula 3.0 32


 


10/2/17 07:39      Nasal Cannula 3.0 32


 


10/2/17 07:30  102 18  96 Nasal Cannula 3.0 32


 


10/2/17 07:30        32


 


10/2/17 07:25     96 Nasal Cannula 3.0 32


 


10/2/17 04:00 97.9 104 20 101/60 94 Nasal Cannula 2.0 


 


10/2/17 03:47  105 18  96 Nasal Cannula 3.0 32


 


10/2/17 03:45  104 18  94 Nasal Cannula 3.0 32


 


10/2/17 03:45        32


 


10/2/17 00:00 97.7 113 21 99/48 93 Nasal Cannula 2.0 


 


10/1/17 23:42    86/54    


 


10/1/17 23:35    93/55    


 


10/1/17 23:26    110/59    


 


10/1/17 23:14  107 18  97 Nasal Cannula 3.0 32


 


10/1/17 23:00  105 18  96 Nasal Cannula 3.0 32


 


10/1/17 23:00        32

















Intake and Output  


 


 10/2/17 10/3/17





 19:00 07:00


 


Intake Total 600 ml 


 


Output Total 2200 ml 


 


Balance -1600 ml 


 


  


 


Intake Oral 600 ml 


 


Output Urine Total 2200 ml 


 


# Voids 2 








Laboratory Tests


10/2/17 08:35: 


White Blood Count 14.6H, Red Blood Count 4.38L, Hemoglobin 13.2L, Hematocrit 

38.3L, Mean Corpuscular Volume 87, Mean Corpuscular Hemoglobin 30.1, Mean 

Corpuscular Hemoglobin Concent 34.5, Red Cell Distribution Width 12.8, Platelet 

Count 209, Mean Platelet Volume 7.7, Neutrophils (%) (Auto) 62.0, Lymphocytes (%

) (Auto) 31.9, Monocytes (%) (Auto) 5.4, Eosinophils (%) (Auto) 0.1, Basophils (

%) (Auto) 0.6, Sodium Level 134L, Potassium Level 3.7, Chloride Level 95L, 

Carbon Dioxide Level 25, Anion Gap 14, Blood Urea Nitrogen 23, Creatinine 0.9, 

Estimat Glomerular Filtration Rate > 60, Glucose Level 285#H, Calcium Level 9.0

, Troponin I < 0.30


Height (Feet):  5


Height (Inches):  11.00


Weight (Pounds):  232











ANDRIY VILLEDA Oct 2, 2017 22:57

## 2017-10-02 NOTE — PROGRESS NOTE
Subjective:  The patient is a 36-year-old male patient with COPD, diagnosis

of bipolar II  _____.



Plan:  Treat him with Geodon 40 mg daily _____ to stabilize his mood and

Ambien for insomnia _____.  Chart reviewed and discussed with staff.  Seen

and assessed at the bedside.









  ______________________________________________

  Gulshan Armstrong M.D.





DR:  PHYLLIS

D:  09/30/2017 16:22

T:  10/01/2017 17:47

JOB#:  [id number]

CC:

## 2017-10-02 NOTE — INFECTIOUS DISEASES PROG NOTE
Assessment/Plan


Problems:  


(1) COPD exacerbation


Assessment & Plan:  improving on doxycycline,  inhalers and steroids, recommend 

pulmonary function test as an out patient, , taper steroids 





(2) Hyperglycemia due to type 2 diabetes mellitus


Assessment & Plan:  suspect poor compliance , monitor blood sugurar to keep 

tight glycemic control between 





(3) Diabetes mellitus


Assessment & Plan:  recommend tight glycemic control to keep blood glucose 

between 








Subjective


Constitutional:  Reports: no symptoms


HEENT:  Reports: no symptoms


Respiratory:  Reports: no symptoms


Breasts:  Reports: no symptoms


Cardiovascular:  Reports: no symptoms


Gastrointestinal/Abdominal:  Reports: no symptoms


Genitourinary:  Reports: no symptoms


Neurologic:  Reports: no symptoms


Psychiatric:  Reports: no symptoms


Skin:  Reports: no symptoms


Endocrine:  Reports: no symptoms


Hematologic:  Reports: no symptoms


Allergies:  


Coded Allergies:  


     No Known Allergies (Unverified , 4/14/15)





Objective


Vital Signs





Last 24 Hour Vital Signs








  Date Time  Temp Pulse Resp B/P (MAP) Pulse Ox O2 Delivery O2 Flow Rate FiO2


 


10/2/17 16:00 97.9 120 21 120/61 95 Nasal Cannula 2.0 


 


10/2/17 15:28  101 16   Nasal Cannula 3.0 32


 


10/2/17 15:20  99 18  96 Nasal Cannula 3.0 32


 


10/2/17 15:20        32


 


10/2/17 15:17 98.2       


 


10/2/17 12:00 98.2 119 20 107/58 95 Nasal Cannula 2.0 


 


10/2/17 11:51  105 16  99 Nasal Cannula 3.0 32


 


10/2/17 11:40        32


 


10/2/17 11:40  102 18  96 Nasal Cannula 3.0 32


 


10/2/17 09:28 97.9       


 


10/2/17 08:00 97.5 113 20 119/60 94 Nasal Cannula 2.0 


 


10/2/17 07:42  108 18  99 Nasal Cannula 3.0 32


 


10/2/17 07:39      Nasal Cannula 3.0 32


 


10/2/17 07:30  102 18  96 Nasal Cannula 3.0 32


 


10/2/17 07:30        32


 


10/2/17 07:25     96 Nasal Cannula 3.0 32


 


10/2/17 04:00 97.9 104 20 101/60 94 Nasal Cannula 2.0 


 


10/2/17 03:47  105 18  96 Nasal Cannula 3.0 32


 


10/2/17 03:45  104 18  94 Nasal Cannula 3.0 32


 


10/2/17 03:45        32


 


10/2/17 00:00 97.7 113 21 99/48 93 Nasal Cannula 2.0 


 


10/1/17 23:42    86/54    


 


10/1/17 23:35    93/55    


 


10/1/17 23:26    110/59    


 


10/1/17 23:14  107 18  97 Nasal Cannula 3.0 32


 


10/1/17 23:00  105 18  96 Nasal Cannula 3.0 32


 


10/1/17 23:00        32


 


10/1/17 20:37  120 18  96 Nasal Cannula 3.0 32


 


10/1/17 20:07  117 18   Nasal Cannula 3.0 32


 


10/1/17 20:04  117 18  97 Nasal Cannula 3.0 32


 


10/1/17 20:04     97 Nasal Cannula 3.0 32


 


10/1/17 20:04      Nasal Cannula 3.0 32


 


10/1/17 20:04        32


 


10/1/17 20:00 98.6 119 21 137/67 99 Nasal Cannula 2.0 








Height (Feet):  5


Height (Inches):  11.00


Weight (Pounds):  232


General Appearance:  WD/WN, no acute distress


HEENT:  normocephalic, atraumatic, anicteric


Respiratory/Chest:  chest wall non-tender, no respiratory distress, no 

accessory muscle use, respiratory distress, decreased breath sounds, expiratory 

wheezing


Cardiovascular:  normal peripheral pulses, normal rate, regular rhythm, no 

gallop/murmur, no JVD


Abdomen:  normal bowel sounds, soft, non tender, no organomegaly, non distended

, no mass


Extremities:  no cyanosis, no clubbing


Skin:  no rash, no lesions, no ulcers


Neurologic/Psychiatric:  alert, oriented x 3





Laboratory Tests








Test


  10/2/17


08:35


 


White Blood Count


  14.6 K/UL


(4.8-10.8)  H


 


Red Blood Count


  4.38 M/UL


(4.70-6.10)  L


 


Hemoglobin


  13.2 G/DL


(14.2-18.0)  L


 


Hematocrit


  38.3 %


(42.0-52.0)  L


 


Mean Corpuscular Volume 87 FL (80-99)  


 


Mean Corpuscular Hemoglobin


  30.1 PG


(27.0-31.0)


 


Mean Corpuscular Hemoglobin


Concent 34.5 G/DL


(32.0-36.0)


 


Red Cell Distribution Width


  12.8 %


(11.6-14.8)


 


Platelet Count


  209 K/UL


(150-450)


 


Mean Platelet Volume


  7.7 FL


(6.5-10.1)


 


Neutrophils (%) (Auto)


  62.0 %


(45.0-75.0)


 


Lymphocytes (%) (Auto)


  31.9 %


(20.0-45.0)


 


Monocytes (%) (Auto)


  5.4 %


(1.0-10.0)


 


Eosinophils (%) (Auto)


  0.1 %


(0.0-3.0)


 


Basophils (%) (Auto)


  0.6 %


(0.0-2.0)


 


Sodium Level


  134 mEQ/L


(135-145)  L


 


Potassium Level


  3.7 mEQ/L


(3.4-4.9)


 


Chloride Level


  95 mEQ/L


()  L


 


Carbon Dioxide Level


  25 mEQ/L


(20-30)


 


Anion Gap 14 (5-15)  


 


Blood Urea Nitrogen


  23 mg/dL


(7-23)


 


Creatinine


  0.9 mg/dL


(0.7-1.2)


 


Estimat Glomerular Filtration


Rate > 60 mL/min


(>60)


 


Glucose Level


  285 mg/dL


()  #H


 


Calcium Level


  9.0 mg/dL


(8.6-10.2)


 


Troponin I


  < 0.30 ng/mL


(<=0.30)











Current Medications








 Medications


  (Trade)  Dose


 Ordered  Sig/Henry


 Route


 PRN Reason  Start Time


 Stop Time Status Last Admin


Dose Admin


 


 Acetaminophen


  (Tylenol)  650 mg  Q4H  PRN


 ORAL


 FEVER>100.5  9/27/17 15:30


 10/27/17 15:29   


 


 


 Albuterol/


 Ipratropium


  (DuoNeb


 0.5-3(2.5)mg/3ml)  3 ml  Q4HRT


 HHN


   9/27/17 23:00


 10/2/17 22:59  10/2/17 15:27


 


 


 Clonidine HCl


  (Catapres)  0.1 mg  Q4H  PRN


 ORAL


 sbp more than 160  9/27/17 15:30


 10/27/17 15:29   


 


 


 Dextrose


  (Dextrose 50%)    STAT  PRN


 IV


 Hypoglycemia  9/27/17 18:30


 10/27/17 18:29   


 


 


 Divalproex Sodium


  (Depakote ER)  500 mg  EVERY 12  HOURS


 ORAL


   9/29/17 21:00


 10/29/17 20:59  10/2/17 08:29


 


 


 Doxycycline


 Monohydrate


  (Vibramycin)  100 mg  EVERY 12  HOURS


 ORAL


   9/28/17 15:00


 10/5/17 14:59  10/2/17 08:29


 


 


 Heparin Sodium


  (Porcine)


  (Heparin 5000


 units/ml)  5,000 units  EVERY 12  HOURS


 SUBQ


   9/27/17 21:00


 10/27/17 20:59  10/1/17 20:59


 


 


 Insulin Aspart


  (NovoLOG)    BEFORE MEALS AND  HS


 SUBQ


   9/27/17 21:00


 10/27/17 20:59  10/2/17 17:07


 


 


 Insulin Aspart


  (NovoLOG)  30 units  NOVOTIAC


 SUBQ


   10/2/17 11:50


 11/1/17 11:49  10/2/17 17:09


 


 


 Insulin Detemir


  (Levemir)  30 units  BID


 SUBQ


   10/1/17 09:00


 10/29/17 20:59  10/2/17 17:58


 


 


 Lorazepam


  (Ativan 2mg/ml


 1ml)  0.5 mg  Q4H  PRN


 IV


 For Anxiety  9/27/17 15:30


 10/4/17 15:29   


 


 


 Metformin HCl


  (Glucophage)  500 mg  TIAC


 ORAL


   9/29/17 06:30


 10/29/17 06:29  10/2/17 17:04


 


 


 Morphine Sulfate


  (Morphine


 Sulfate)  2 mg  Q4H  PRN


 IVP


 severe pain 7-10  10/2/17 11:30


 10/9/17 23:59  10/2/17 14:47


 


 


 Nitroglycerin


  (Ntg)  0.4 mg  Q5M X 3 DOSES PRN


 SL


 Prn Chest Pain  9/27/17 15:30


 10/27/17 15:29  10/1/17 23:42


 


 


 Ondansetron HCl


  (Zofran)  4 mg  Q6H  PRN


 IVP


 Nausea & Vomiting  9/27/17 15:30


 10/27/17 15:29   


 


 


 Pregabalin


  (Lyrica)  150 mg  THREE TIMES A  DAY


 ORAL


   10/2/17 18:00


 11/1/17 17:59  10/2/17 17:56


 


 


 Promethazine HCl/


 Codeine


  (Phenergan with


 Codeine)  5 ml  Q6H  PRN


 ORAL


 cough  9/27/17 15:30


 10/27/17 15:29   


 


 


 Quetiapine


 Fumarate


  (SEROquel)  300 mg  QHS


 ORAL


   9/29/17 21:00


 10/29/17 20:59  10/1/17 20:57


 


 


 Theophylline


  (Florin-Dur)  100 mg  EVERY 12  HOURS


 ORAL


   9/27/17 21:00


 10/27/17 20:59  10/2/17 08:29


 


 


 Ziprasidone


  (Geodon)  20 mg  DAILY


 ORAL


   9/28/17 09:00


 10/28/17 08:59  10/2/17 08:30


 


 


 Zolpidem Tartrate


  (Ambien)  5 mg  HSPRN  PRN


 ORAL


 Insomnia  9/29/17 21:00


 10/6/17 20:59  10/1/17 22:43


 

















Donnie North M.D. Oct 2, 2017 18:06

## 2017-10-02 NOTE — GENERAL PROGRESS NOTE
Assessment/Plan


Problem List:  


(1) Hyperglycemia due to type 2 diabetes mellitus


ICD Codes:  E11.65 - Type 2 diabetes mellitus with hyperglycemia


SNOMED:  440564453472645


(2) COPD exacerbation


ICD Codes:  J44.1 - Chronic obstructive pulmonary disease with (acute) 

exacerbation


SNOMED:  873660289, [id number]


Assessment/Plan


diabetes still out of control but IVSM is DC'ed therefore lower insulin 

requirement is expected 


continue Levemir 30 units bid  


reduce Novolog to 30 units ac tid 


continue sliding scale ac / hs 


continue Metformin





Subjective


Allergies:  


Coded Allergies:  


     No Known Allergies (Unverified , 4/14/15)


All Systems:  reviewed and negative except above


Subjective


events noted





Objective





Last 24 Hour Vital Signs








  Date Time  Temp Pulse Resp B/P (MAP) Pulse Ox O2 Delivery O2 Flow Rate FiO2


 


10/2/17 07:42  108 18  99 Nasal Cannula 3.0 32


 


10/2/17 07:39      Nasal Cannula 3.0 32


 


10/2/17 07:30  102 18  96 Nasal Cannula 3.0 32


 


10/2/17 07:30        32


 


10/2/17 07:25     96 Nasal Cannula 3.0 32


 


10/2/17 04:00 97.9 104 20 101/60 94 Nasal Cannula 2.0 


 


10/2/17 03:47  105 18  96 Nasal Cannula 3.0 32


 


10/2/17 03:45  104 18  94 Nasal Cannula 3.0 32


 


10/2/17 03:45        32


 


10/2/17 00:00 97.7 113 21 99/48 93 Nasal Cannula 2.0 


 


10/1/17 23:42    86/54    


 


10/1/17 23:35    93/55    


 


10/1/17 23:26    110/59    


 


10/1/17 23:14  107 18  97 Nasal Cannula 3.0 32


 


10/1/17 23:00  105 18  96 Nasal Cannula 3.0 32


 


10/1/17 23:00        32


 


10/1/17 20:37  120 18  96 Nasal Cannula 3.0 32


 


10/1/17 20:07  117 18   Nasal Cannula 3.0 32


 


10/1/17 20:04  117 18  97 Nasal Cannula 3.0 32


 


10/1/17 20:04     97 Nasal Cannula 3.0 32


 


10/1/17 20:04      Nasal Cannula 3.0 32


 


10/1/17 20:04        32


 


10/1/17 20:00 98.6 119 21 137/67 99 Nasal Cannula 2.0 


 


10/1/17 15:57 97.4 103 21 133/72 97 Room Air  


 


10/1/17 15:35  96 20  100 Nasal Cannula 3.0 32


 


10/1/17 15:24  104 20  96 Nasal Cannula 3.0 32


 


10/1/17 14:38 98.8       


 


10/1/17 14:38 98.8       


 


10/1/17 12:00 98.8 98 18 138/70 95 Room Air  


 


10/1/17 11:10  115 20  98 Nasal Cannula 3.0 32


 


10/1/17 11:05  114 20  95 Nasal Cannula 3.0 32








Height (Feet):  5


Height (Inches):  11.00


Weight (Pounds):  232


General Appearance:  no apparent distress


Neck:  normal alignment


Cardiovascular:  normal rate


Respiratory/Chest:  normal breath sounds


Abdomen:  normal bowel sounds


Pelvis:  normal external exam


Edema:  no edema noted Arm (L), no edema noted Arm (R), no edema noted Leg (L), 

no edema noted Leg (R), no edema noted Pedal (L), no edema noted Pedal (R), no 

edema noted Generalized


Objective





Current Medications








 Medications


  (Trade)  Dose


 Ordered  Sig/Henry


 Route


 PRN Reason  Start Time


 Stop Time Status Last Admin


Dose Admin


 


 Acetaminophen


  (Tylenol)  650 mg  Q4H  PRN


 ORAL


 FEVER>100.5  9/27/17 15:30


 10/27/17 15:29   


 


 


 Albuterol/


 Ipratropium


  (DuoNeb


 0.5-3(2.5)mg/3ml)  3 ml  Q4HRT


 HHN


   9/27/17 23:00


 10/2/17 22:59  10/2/17 07:37


 


 


 Clonidine HCl


  (Catapres)  0.1 mg  Q4H  PRN


 ORAL


 sbp more than 160  9/27/17 15:30


 10/27/17 15:29   


 


 


 Dextrose


  (Dextrose 50%)    STAT  PRN


 IV


 Hypoglycemia  9/27/17 18:30


 10/27/17 18:29   


 


 


 Divalproex Sodium


  (Depakote ER)  500 mg  EVERY 12  HOURS


 ORAL


   9/29/17 21:00


 10/29/17 20:59  10/1/17 20:57


 


 


 Doxycycline


 Monohydrate


  (Vibramycin)  100 mg  EVERY 12  HOURS


 ORAL


   9/28/17 15:00


 10/5/17 14:59  10/1/17 20:57


 


 


 Heparin Sodium


  (Porcine)


  (Heparin 5000


 units/ml)  5,000 units  EVERY 12  HOURS


 SUBQ


   9/27/17 21:00


 10/27/17 20:59  10/1/17 20:59


 


 


 Insulin Aspart


  (NovoLOG)    BEFORE MEALS AND  HS


 SUBQ


   9/27/17 21:00


 10/27/17 20:59  10/2/17 06:45


 


 


 Insulin Aspart


  (NovoLOG)  45 units  NOVOTIAC


 SUBQ


   10/1/17 11:50


 10/31/17 11:49  10/2/17 06:46


 


 


 Insulin Detemir


  (Levemir)  30 units  BID


 SUBQ


   10/1/17 09:00


 10/29/17 20:59  10/1/17 18:15


 


 


 Lorazepam


  (Ativan 2mg/ml


 1ml)  0.5 mg  Q4H  PRN


 IV


 For Anxiety  9/27/17 15:30


 10/4/17 15:29   


 


 


 Metformin HCl


  (Glucophage)  500 mg  TIAC


 ORAL


   9/29/17 06:30


 10/29/17 06:29  10/2/17 06:41


 


 


 Morphine Sulfate


  (Morphine


 Sulfate)  2 mg  Q4H  PRN


 IVP


 severe pain 7-10  9/27/17 15:30


 10/4/17 15:29  10/2/17 06:42


 


 


 Nitroglycerin


  (Ntg)  0.4 mg  Q5M X 3 DOSES PRN


 SL


 Prn Chest Pain  9/27/17 15:30


 10/27/17 15:29  10/1/17 23:42


 


 


 Ondansetron HCl


  (Zofran)  4 mg  Q6H  PRN


 IVP


 Nausea & Vomiting  9/27/17 15:30


 10/27/17 15:29   


 


 


 Pregabalin


  (Lyrica)  75 mg  THREE TIMES A  DAY


 ORAL


   9/27/17 19:30


 10/27/17 19:29  10/1/17 18:09


 


 


 Promethazine HCl/


 Codeine


  (Phenergan with


 Codeine)  5 ml  Q6H  PRN


 ORAL


 cough  9/27/17 15:30


 10/27/17 15:29   


 


 


 Quetiapine


 Fumarate


  (SEROquel)  300 mg  QHS


 ORAL


   9/29/17 21:00


 10/29/17 20:59  10/1/17 20:57


 


 


 Theophylline


  (Florin-Dur)  100 mg  EVERY 12  HOURS


 ORAL


   9/27/17 21:00


 10/27/17 20:59  10/1/17 20:57


 


 


 Ziprasidone


  (Geodon)  20 mg  DAILY


 ORAL


   9/28/17 09:00


 10/28/17 08:59  10/1/17 09:28


 


 


 Zolpidem Tartrate


  (Ambien)  5 mg  HSPRN  PRN


 ORAL


 Insomnia  9/29/17 21:00


 10/6/17 20:59  10/1/17 22:43


 














Item Value  Date Time


 


Bedside Blood Glucose 392 mg/dl H 10/2/17 0646


 


Bedside Blood Glucose 380 mg/dl H 10/1/17 2100


 


Bedside Blood Glucose 319 mg/dl H 10/1/17 1815


 


Bedside Blood Glucose 401 mg/dl H 10/1/17 1113


 


Bedside Blood Glucose 455 mg/dl H 10/1/17 0927


 


Bedside Blood Glucose 455 mg/dl H 10/1/17 0630

















FITO DENTON Oct 2, 2017 08:04

## 2017-10-02 NOTE — GENERAL PROGRESS NOTE
Assessment/Plan


Assessment/Plan


(1) Chiari malformation type II


(2) Degenerative disc disease, cervical


(3) Lumbar degenerative disc disease


(4) Arthritis, lumbar spine


(5) Peripheral neuropathy


(6) Hydrocephalus


(7) Diabetes mellitus


Pt will be continued on Morphine as needed. 


Pt was d/w Dr. Franco and he concurred.





Subjective


Date patient seen:  Oct 2, 2017


Time patient seen:  07:45 - am


Allergies:  


Coded Allergies:  


     No Known Allergies (Unverified , 4/14/15)


Subjective


Constitutional:  Reports: weakness, Denies: chills, diaphoresis, fever, malaise

, no symptoms, other


HEENT:  Denies: blurred vision, double vision, ear discharge, ear pain, eye pain

, mouth pain, mouth swelling, no symptoms, nose congestion, nose pain, other, 

tearing, throat pain, throat swelling


Cardiovascular:  Denies: chest pain, edema, irregular heart rate, 

lightheadedness, no symptoms, other, palpitations, syncope


Respiratory:  Denies: SOB at rest, SOB with excertion, cough, no symptoms, 

orthopnea, other, shortness of breath, sputum, stridor, wheezing


Gastrointestinal/Abdominal:  Denies: abdomen distended, abdominal pain, black 

stools, blood in stool, constipated, diarrhea, difficulty swallowing, nausea, 

no symptoms, other, poor appetite, poor fluid intake, rectal bleeding, tarry 

stools, vomiting


Genitourinary:  Denies: burning, discharge, flank pain, frequency, hematuria, 

incontinence, no symptoms, other, pain, urgency


Neurologic/Psychiatric:  Reports: headache, numbness, tingling, weakness, Denies

: anxiety, depressed, emotional problems, no symptoms, other, paresthesia, pre-

existing deficit, seizure, tremors


Endocrine:  Denies: excessive sweating, flushing, increased hunger, increased 

thirst, increased urine, intolerance to cold, intolerance to heat, no symptoms, 

other, unexplained weight gain, unexplained weight loss


Hematologic/Lymphatic:  Denies: anemia, easy bleeding, easy bruising, no 

symptoms, other


 


Subjective


Patient reports that his pain is at a stable and tolerated on the morphine.





Objective





Last 24 Hour Vital Signs








  Date Time  Temp Pulse Resp B/P (MAP) Pulse Ox O2 Delivery O2 Flow Rate FiO2


 


10/2/17 07:42  108 18  99 Nasal Cannula 3.0 32


 


10/2/17 07:39      Nasal Cannula 3.0 32


 


10/2/17 07:30  102 18  96 Nasal Cannula 3.0 32


 


10/2/17 07:30        32


 


10/2/17 07:25     96 Nasal Cannula 3.0 32


 


10/2/17 04:00 97.9 104 20 101/60 94 Nasal Cannula 2.0 


 


10/2/17 03:47  105 18  96 Nasal Cannula 3.0 32


 


10/2/17 03:45  104 18  94 Nasal Cannula 3.0 32


 


10/2/17 03:45        32


 


10/2/17 00:00 97.7 113 21 99/48 93 Nasal Cannula 2.0 


 


10/1/17 23:42    86/54    


 


10/1/17 23:35    93/55    


 


10/1/17 23:26    110/59    


 


10/1/17 23:14  107 18  97 Nasal Cannula 3.0 32


 


10/1/17 23:00  105 18  96 Nasal Cannula 3.0 32


 


10/1/17 23:00        32


 


10/1/17 20:37  120 18  96 Nasal Cannula 3.0 32


 


10/1/17 20:07  117 18   Nasal Cannula 3.0 32


 


10/1/17 20:04  117 18  97 Nasal Cannula 3.0 32


 


10/1/17 20:04     97 Nasal Cannula 3.0 32


 


10/1/17 20:04      Nasal Cannula 3.0 32


 


10/1/17 20:04        32


 


10/1/17 20:00 98.6 119 21 137/67 99 Nasal Cannula 2.0 


 


10/1/17 15:57 97.4 103 21 133/72 97 Room Air  


 


10/1/17 15:35  96 20  100 Nasal Cannula 3.0 32


 


10/1/17 15:24  104 20  96 Nasal Cannula 3.0 32


 


10/1/17 14:38 98.8       


 


10/1/17 14:38 98.8       


 


10/1/17 12:00 98.8 98 18 138/70 95 Room Air  


 


10/1/17 11:10  115 20  98 Nasal Cannula 3.0 32


 


10/1/17 11:05  114 20  95 Nasal Cannula 3.0 32








Height (Feet):  5


Height (Inches):  11.00


Weight (Pounds):  232


Objective


General Appearance:  no apparent distress, alert


EENT:  normal ENT inspection, TMs normal


Neck:  supple, limited range of motion, muscle spasm


Cardiovascular:  normal rate, regular rhythm


Respiratory/Chest:  decreased breath sounds


Abdomen:  non tender, soft


Extremities:  non-tender


Edema:  no edema noted Arm (L), no edema noted Arm (R), no edema noted Leg (L), 

no edema noted Leg (R), no edema noted Pedal (L), no edema noted Pedal (R), no 

edema noted Generalized


Neurologic:  alert, oriented x 3


Skin:  warm/dry











AMAN CABRERA Oct 2, 2017 08:20

## 2017-10-03 VITALS — DIASTOLIC BLOOD PRESSURE: 61 MMHG | SYSTOLIC BLOOD PRESSURE: 107 MMHG

## 2017-10-03 VITALS — DIASTOLIC BLOOD PRESSURE: 55 MMHG | SYSTOLIC BLOOD PRESSURE: 102 MMHG

## 2017-10-03 LAB
ANION GAP SERPL CALC-SCNC: 12 MMOL/L (ref 5–15)
BASOPHILS NFR BLD AUTO: 0.6 % (ref 0–2)
CALCIUM SERPL-MCNC: 9.2 MG/DL (ref 8.6–10.2)
CHLORIDE SERPL-SCNC: 95 MEQ/L (ref 98–107)
CO2 SERPL-SCNC: 26 MEQ/L (ref 20–30)
CREAT SERPL-MCNC: 0.8 MG/DL (ref 0.7–1.2)
EOSINOPHIL NFR BLD AUTO: 0.9 % (ref 0–3)
ERYTHROCYTE [DISTWIDTH] IN BLOOD BY AUTOMATED COUNT: 13 % (ref 11.6–14.8)
GFR SERPLBLD BASED ON 1.73 SQ M-ARVRAT: > 60 ML/MIN (ref 60–?)
HEMOLYSIS: 3
LYMPHOCYTES NFR BLD AUTO: 31.8 % (ref 20–45)
MCH RBC QN AUTO: 29.4 PG (ref 27–31)
MCHC RBC AUTO-ENTMCNC: 33.7 G/DL (ref 32–36)
MCV RBC AUTO: 87 FL (ref 80–99)
MONOCYTES NFR BLD AUTO: 5.7 % (ref 1–10)
NEUTROPHILS NFR BLD AUTO: 61 % (ref 45–75)
PLATELET # BLD: 194 K/UL (ref 150–450)
PMV BLD AUTO: 7.8 FL (ref 6.5–10.1)
POTASSIUM SERPL-SCNC: 3.9 MEQ/L (ref 3.4–4.9)
RBC # BLD AUTO: 4.46 M/UL (ref 4.7–6.1)
SODIUM SERPL-SCNC: 133 MEQ/L (ref 135–145)
WBC # BLD AUTO: 13.6 K/UL (ref 4.8–10.8)

## 2017-10-03 RX ADMIN — METFORMIN HYDROCHLORIDE SCH MG: 500 TABLET ORAL at 06:37

## 2017-10-03 RX ADMIN — IPRATROPIUM BROMIDE AND ALBUTEROL SULFATE SCH ML: .5; 3 SOLUTION RESPIRATORY (INHALATION) at 00:45

## 2017-10-03 RX ADMIN — DIVALPROEX SODIUM SCH MG: 500 TABLET, FILM COATED, EXTENDED RELEASE ORAL at 09:39

## 2017-10-03 RX ADMIN — MORPHINE SULFATE PRN MG: 2 INJECTION, SOLUTION INTRAMUSCULAR; INTRAVENOUS at 06:35

## 2017-10-03 RX ADMIN — INSULIN ASPART SCH UNITS: 100 INJECTION, SOLUTION INTRAVENOUS; SUBCUTANEOUS at 06:36

## 2017-10-03 RX ADMIN — ZOLPIDEM TARTRATE PRN MG: 5 TABLET ORAL at 00:15

## 2017-10-03 RX ADMIN — INSULIN ASPART SCH UNITS: 100 INJECTION, SOLUTION INTRAVENOUS; SUBCUTANEOUS at 12:39

## 2017-10-03 RX ADMIN — INSULIN ASPART SCH UNITS: 100 INJECTION, SOLUTION INTRAVENOUS; SUBCUTANEOUS at 06:37

## 2017-10-03 RX ADMIN — ZIPRASIDONE HYDROCHLORIDE SCH MG: 20 CAPSULE ORAL at 09:57

## 2017-10-03 RX ADMIN — IPRATROPIUM BROMIDE AND ALBUTEROL SULFATE SCH ML: .5; 3 SOLUTION RESPIRATORY (INHALATION) at 07:20

## 2017-10-03 RX ADMIN — PREGABALIN SCH MG: 75 CAPSULE ORAL at 13:56

## 2017-10-03 RX ADMIN — THEOPHYLLINE ANHYDROUS SCH MG: 100 CAPSULE, EXTENDED RELEASE ORAL at 09:40

## 2017-10-03 RX ADMIN — HEPARIN SODIUM SCH UNITS: 5000 INJECTION INTRAVENOUS; SUBCUTANEOUS at 09:00

## 2017-10-03 RX ADMIN — INSULIN ASPART SCH UNITS: 100 INJECTION, SOLUTION INTRAVENOUS; SUBCUTANEOUS at 12:36

## 2017-10-03 RX ADMIN — METFORMIN HYDROCHLORIDE SCH MG: 500 TABLET ORAL at 12:34

## 2017-10-03 RX ADMIN — IPRATROPIUM BROMIDE AND ALBUTEROL SULFATE SCH ML: .5; 3 SOLUTION RESPIRATORY (INHALATION) at 03:41

## 2017-10-03 RX ADMIN — MORPHINE SULFATE PRN MG: 2 INJECTION, SOLUTION INTRAMUSCULAR; INTRAVENOUS at 02:42

## 2017-10-03 RX ADMIN — PREGABALIN SCH MG: 75 CAPSULE ORAL at 09:40

## 2017-10-03 RX ADMIN — INSULIN DETEMIR SCH UNITS: 100 INJECTION, SOLUTION SUBCUTANEOUS at 09:48

## 2017-10-03 RX ADMIN — IPRATROPIUM BROMIDE AND ALBUTEROL SULFATE SCH ML: .5; 3 SOLUTION RESPIRATORY (INHALATION) at 11:12

## 2017-10-03 NOTE — GENERAL PROGRESS NOTE
Assessment/Plan


Problem List:  


(1) Hyperglycemia due to type 2 diabetes mellitus


ICD Codes:  E11.65 - Type 2 diabetes mellitus with hyperglycemia


SNOMED:  165367254715056


(2) COPD exacerbation


ICD Codes:  J44.1 - Chronic obstructive pulmonary disease with (acute) 

exacerbation


SNOMED:  972383678, 245021576


Assessment/Plan


glycemic control improving 


continue Levemir 30 units bid  


continue Novolog to 30 units ac tid 


continue sliding scale ac / hs 


continue Metformin





Subjective


Allergies:  


Coded Allergies:  


     No Known Allergies (Unverified , 4/14/15)


All Systems:  reviewed and negative except above


Subjective


events noted





Objective





Last 24 Hour Vital Signs








  Date Time  Temp Pulse Resp B/P (MAP) Pulse Ox O2 Delivery O2 Flow Rate FiO2


 


10/3/17 07:25  114 20  99 Room Air  21


 


10/3/17 07:23      Nasal Cannula 2.0 28


 


10/3/17 07:16  118 20  96 Room Air  21


 


10/3/17 07:15     96 Room Air  21


 


10/3/17 04:00 97.9 103 21 102/55 95 Nasal Cannula 2.0 


 


10/3/17 03:42  104 20  99 Nasal Cannula 2.0 28


 


10/3/17 03:41  101 20  97 Nasal Cannula 2.0 28


 


10/3/17 00:00 98.2 108 20 107/61 96 Room Air  


 


10/2/17 23:00   20     


 


10/2/17 23:00   20     


 


10/2/17 22:01   20     


 


10/2/17 22:00   20     


 


10/2/17 20:00 98.4 123 21 116/71 94 Room Air  


 


10/2/17 19:30      Nasal Cannula 2.0 28


 


10/2/17 19:30     95 Nasal Cannula 2.0 28


 


10/2/17 19:16 97.9       


 


10/2/17 18:55 97.9       


 


10/2/17 16:00 97.9 120 21 120/61 95 Nasal Cannula 2.0 


 


10/2/17 15:28  101 16   Nasal Cannula 3.0 32


 


10/2/17 15:20  99 18  96 Nasal Cannula 3.0 32


 


10/2/17 15:20        32


 


10/2/17 12:00 98.2 119 20 107/58 95 Nasal Cannula 2.0 


 


10/2/17 11:51  105 16  99 Nasal Cannula 3.0 32


 


10/2/17 11:40        32


 


10/2/17 11:40  102 18  96 Nasal Cannula 3.0 32


 


10/2/17 09:28 97.9       








Height (Feet):  5


Height (Inches):  11.00


Weight (Pounds):  232


General Appearance:  no apparent distress


Neck:  non-tender


Cardiovascular:  normal rate


Respiratory/Chest:  lungs clear


Abdomen:  normal bowel sounds


Pelvis:  normal external exam


Objective





Current Medications








 Medications


  (Trade)  Dose


 Ordered  Sig/Henry


 Route


 PRN Reason  Start Time


 Stop Time Status Last Admin


Dose Admin


 


 Acetaminophen


  (Tylenol)  650 mg  Q4H  PRN


 ORAL


 FEVER>100.5  9/27/17 15:30


 10/27/17 15:29   


 


 


 Albuterol/


 Ipratropium


  (DuoNeb


 0.5-3(2.5)mg/3ml)  3 ml  Q4HRT


 HHN


   10/3/17 00:45


 10/8/17 00:44  10/3/17 07:20


 


 


 Clonidine HCl


  (Catapres)  0.1 mg  Q4H  PRN


 ORAL


 sbp more than 160  9/27/17 15:30


 10/27/17 15:29   


 


 


 Dextrose


  (Dextrose 50%)    STAT  PRN


 IV


 Hypoglycemia  9/27/17 18:30


 10/27/17 18:29   


 


 


 Divalproex Sodium


  (Depakote ER)  500 mg  EVERY 12  HOURS


 ORAL


   9/29/17 21:00


 10/29/17 20:59  10/2/17 20:57


 


 


 Doxycycline


 Monohydrate


  (Vibramycin)  100 mg  EVERY 12  HOURS


 ORAL


   9/28/17 15:00


 10/5/17 14:59  10/2/17 20:58


 


 


 Heparin Sodium


  (Porcine)


  (Heparin 5000


 units/ml)  5,000 units  EVERY 12  HOURS


 SUBQ


   9/27/17 21:00


 10/27/17 20:59  10/1/17 20:59


 


 


 Insulin Aspart


  (NovoLOG)    BEFORE MEALS AND  HS


 SUBQ


   9/27/17 21:00


 10/27/17 20:59  10/3/17 06:37


 


 


 Insulin Aspart


  (NovoLOG)  30 units  NOVOTIAC


 SUBQ


   10/2/17 11:50


 11/1/17 11:49  10/3/17 06:36


 


 


 Insulin Detemir


  (Levemir)  30 units  BID


 SUBQ


   10/1/17 09:00


 10/29/17 20:59  10/2/17 17:58


 


 


 Lorazepam


  (Ativan 2mg/ml


 1ml)  0.5 mg  Q4H  PRN


 IV


 For Anxiety  9/27/17 15:30


 10/4/17 15:29   


 


 


 Metformin HCl


  (Glucophage)  500 mg  TIAC


 ORAL


   9/29/17 06:30


 10/29/17 06:29  10/3/17 06:37


 


 


 Nitroglycerin


  (Ntg)  0.4 mg  Q5M X 3 DOSES PRN


 SL


 Prn Chest Pain  9/27/17 15:30


 10/27/17 15:29  10/1/17 23:42


 


 


 Ondansetron HCl


  (Zofran)  4 mg  Q6H  PRN


 IVP


 Nausea & Vomiting  9/27/17 15:30


 10/27/17 15:29   


 


 


 Oxycodone/


 Acetaminophen


  (Percocet 5-325)  1 tab  Q4H  PRN


 ORAL


 severe pain   10/3/17 08:15


 10/10/17 08:14   


 


 


 Pregabalin


  (Lyrica)  150 mg  THREE TIMES A  DAY


 ORAL


   10/2/17 18:00


 11/1/17 17:59  10/2/17 17:56


 


 


 Promethazine HCl/


 Codeine


  (Phenergan with


 Codeine)  5 ml  Q6H  PRN


 ORAL


 cough  9/27/17 15:30


 10/27/17 15:29   


 


 


 Quetiapine


 Fumarate


  (SEROquel)  300 mg  QHS


 ORAL


   9/29/17 21:00


 10/29/17 20:59  10/2/17 20:58


 


 


 Theophylline


  (Florin-Dur)  100 mg  EVERY 12  HOURS


 ORAL


   9/27/17 21:00


 10/27/17 20:59  10/2/17 20:58


 


 


 Ziprasidone


  (Geodon)  20 mg  DAILY


 ORAL


   9/28/17 09:00


 10/28/17 08:59  10/2/17 08:30


 


 


 Zolpidem Tartrate


  (Ambien)  5 mg  HSPRN  PRN


 ORAL


 Insomnia  9/29/17 21:00


 10/6/17 20:59  10/3/17 00:15


 














Item Value  Date Time


 


Bedside Blood Glucose 253 mg/dl H 10/3/17 0637


 


Bedside Blood Glucose 225 mg/dl H 10/2/17 2102


 


Bedside Blood Glucose 279 mg/dl H 10/2/17 1758


 


Bedside Blood Glucose 288 mg/dl H 10/2/17 1207


 


Bedside Blood Glucose 392 mg/dl H 10/2/17 0941


 


Bedside Blood Glucose 392 mg/dl H 10/2/17 0646

















FITO DENTON Oct 3, 2017 09:19

## 2017-10-03 NOTE — PULMONOLOGY PROGRESS NOTE
Assessment/Plan


Problems:  


(1) COPD exacerbation


(2) Lumbar back pain


(3) Anxiety


(4) Diabetes mellitus


Assessment/Plan


taper steroids


IV abx


check sputum


sliding scale


improving slowly





Subjective


ROS Limited/Unobtainable:  No


Constitutional:  Reports: no symptoms


HEENT:  Repors: no symptoms


Respiratory:  Reports: no symptoms


Cardiovascular:  Reports: no symptoms


Allergies:  


Coded Allergies:  


     No Known Allergies (Unverified , 4/14/15)





Objective





Last 24 Hour Vital Signs








  Date Time  Temp Pulse Resp B/P (MAP) Pulse Ox O2 Delivery O2 Flow Rate FiO2


 


10/3/17 11:25  112 20   Room Air  21


 


10/3/17 11:10  108 20   Room Air  21


 


10/3/17 07:25  114 20  99 Room Air  21


 


10/3/17 07:23      Nasal Cannula 2.0 28


 


10/3/17 07:16  118 20  96 Room Air  21


 


10/3/17 07:15     96 Room Air  21


 


10/3/17 04:00 97.9 103 21 102/55 95 Nasal Cannula 2.0 


 


10/3/17 03:42  104 20  99 Nasal Cannula 2.0 28


 


10/3/17 03:41  101 20  97 Nasal Cannula 2.0 28


 


10/3/17 00:00 98.2 108 20 107/61 96 Room Air  


 


10/2/17 23:00   20     


 


10/2/17 23:00   20     








Objective


General Appearance:  WD/WN


HEENT:  normocephalic, atraumatic


Respiratory/Chest:  chest wall non-tender, lungs clear


Cardiovascular:  normal peripheral pulses, normal rate


Abdomen:  soft, non tender


Extremities:  no cyanosis


Skin:  no rash


Neurologic/Psychiatric:  CNs II-XII grossly normal, no motor/sensory deficits


Lymphatic:  no neck adenopathy


Laboratory Tests


10/3/17 09:20: 


White Blood Count 13.6H, Red Blood Count 4.46L, Hemoglobin 13.1L, Hematocrit 

38.8L, Mean Corpuscular Volume 87, Mean Corpuscular Hemoglobin 29.4, Mean 

Corpuscular Hemoglobin Concent 33.7, Red Cell Distribution Width 13.0, Platelet 

Count 194, Mean Platelet Volume 7.8, Neutrophils (%) (Auto) 61.0, Lymphocytes (%

) (Auto) 31.8, Monocytes (%) (Auto) 5.7, Eosinophils (%) (Auto) 0.9, Basophils (

%) (Auto) 0.6, Sodium Level 133L, Potassium Level 3.9, Chloride Level 95L, 

Carbon Dioxide Level 26, Anion Gap 12, Blood Urea Nitrogen 21, Creatinine 0.8, 

Estimat Glomerular Filtration Rate > 60, Glucose Level 238H, Calcium Level 9.2











SERENITY MOCTEZUMA Oct 3, 2017 22:01

## 2017-10-03 NOTE — GENERAL PROGRESS NOTE
Assessment/Plan


Assessment/Plan


(1) Chiari malformation type II


(2) Degenerative disc disease, cervical


(3) Lumbar degenerative disc disease


(4) Arthritis, lumbar spine


(5) Peripheral neuropathy


(6) Hydrocephalus


(7) Diabetes mellitus


Pt will be discontinued off Morphine and we will start Percocet 5/325mg PO 1 

tab Q4H PRN severe pain. 


Pt was d/w Dr. Franco and he concurred.





Subjective


Date patient seen:  Oct 3, 2017


Time patient seen:  07:00 - am


Allergies:  


Coded Allergies:  


     No Known Allergies (Unverified , 4/14/15)


Subjective


Constitutional:  Reports: weakness, Denies: chills, diaphoresis, fever, malaise

, no symptoms, other


HEENT:  Denies: blurred vision, double vision, ear discharge, ear pain, eye pain

, mouth pain, mouth swelling, no symptoms, nose congestion, nose pain, other, 

tearing, throat pain, throat swelling


Cardiovascular:  Denies: chest pain, edema, irregular heart rate, 

lightheadedness, no symptoms, other, palpitations, syncope


Respiratory:  Denies: SOB at rest, SOB with excertion, cough, no symptoms, 

orthopnea, other, shortness of breath, sputum, stridor, wheezing


Gastrointestinal/Abdominal:  Denies: abdomen distended, abdominal pain, black 

stools, blood in stool, constipated, diarrhea, difficulty swallowing, nausea, 

no symptoms, other, poor appetite, poor fluid intake, rectal bleeding, tarry 

stools, vomiting


Genitourinary:  Denies: burning, discharge, flank pain, frequency, hematuria, 

incontinence, no symptoms, other, pain, urgency


Neurologic/Psychiatric:  Reports: headache, numbness, tingling, weakness, Denies

: anxiety, depressed, emotional problems, no symptoms, other, paresthesia, pre-

existing deficit, seizure, tremors


Endocrine:  Denies: excessive sweating, flushing, increased hunger, increased 

thirst, increased urine, intolerance to cold, intolerance to heat, no symptoms, 

other, unexplained weight gain, unexplained weight loss


Hematologic/Lymphatic:  Denies: anemia, easy bleeding, easy bruising, no 

symptoms, other


 


Subjective


Pain has been stable and is comfortable. I d/w pt about stopping the morphine 

and starting Percocet he understands.





Objective





Last 24 Hour Vital Signs








  Date Time  Temp Pulse Resp B/P (MAP) Pulse Ox O2 Delivery O2 Flow Rate FiO2


 


10/3/17 07:25  114 20  99 Room Air  21


 


10/3/17 07:23      Nasal Cannula 2.0 28


 


10/3/17 07:16  118 20  96 Room Air  21


 


10/3/17 07:15     96 Room Air  21


 


10/3/17 04:00 97.9 103 21 102/55 95 Nasal Cannula 2.0 


 


10/3/17 03:42  104 20  99 Nasal Cannula 2.0 28


 


10/3/17 03:41  101 20  97 Nasal Cannula 2.0 28


 


10/3/17 00:00 98.2 108 20 107/61 96 Room Air  


 


10/2/17 23:00   20     


 


10/2/17 23:00   20     


 


10/2/17 22:01   20     


 


10/2/17 22:00   20     


 


10/2/17 20:00 98.4 123 21 116/71 94 Room Air  


 


10/2/17 19:30      Nasal Cannula 2.0 28


 


10/2/17 19:30     95 Nasal Cannula 2.0 28


 


10/2/17 19:16 97.9       


 


10/2/17 18:55 97.9       


 


10/2/17 16:00 97.9 120 21 120/61 95 Nasal Cannula 2.0 


 


10/2/17 15:28  101 16   Nasal Cannula 3.0 32


 


10/2/17 15:20  99 18  96 Nasal Cannula 3.0 32


 


10/2/17 15:20        32


 


10/2/17 12:00 98.2 119 20 107/58 95 Nasal Cannula 2.0 


 


10/2/17 11:51  105 16  99 Nasal Cannula 3.0 32


 


10/2/17 11:40        32


 


10/2/17 11:40  102 18  96 Nasal Cannula 3.0 32


 


10/2/17 09:28 97.9       








Laboratory Tests


10/2/17 08:35: 


White Blood Count 14.6H, Red Blood Count 4.38L, Hemoglobin 13.2L, Hematocrit 

38.3L, Mean Corpuscular Volume 87, Mean Corpuscular Hemoglobin 30.1, Mean 

Corpuscular Hemoglobin Concent 34.5, Red Cell Distribution Width 12.8, Platelet 

Count 209, Mean Platelet Volume 7.7, Neutrophils (%) (Auto) 62.0, Lymphocytes (%

) (Auto) 31.9, Monocytes (%) (Auto) 5.4, Eosinophils (%) (Auto) 0.1, Basophils (

%) (Auto) 0.6, Sodium Level 134L, Potassium Level 3.7, Chloride Level 95L, 

Carbon Dioxide Level 25, Anion Gap 14, Blood Urea Nitrogen 23, Creatinine 0.9, 

Estimat Glomerular Filtration Rate > 60, Glucose Level 285#H, Calcium Level 9.0

, Troponin I < 0.30


Height (Feet):  5


Height (Inches):  11.00


Weight (Pounds):  232


Objective


General Appearance:  no apparent distress, alert


EENT:  normal ENT inspection, TMs normal


Neck:  supple, limited range of motion, muscle spasm


Cardiovascular:  normal rate, regular rhythm


Respiratory/Chest:  decreased breath sounds


Abdomen:  non tender, soft


Extremities:  non-tender


Edema:  no edema noted Arm (L), no edema noted Arm (R), no edema noted Leg (L), 

no edema noted Leg (R), no edema noted Pedal (L), no edema noted Pedal (R), no 

edema noted Generalized


Neurologic:  alert, oriented x 3


Skin:  warm/dry











AMAN CABRERA PARMANDO Oct 3, 2017 08:20

## 2017-10-03 NOTE — CARDIOLOGY REPORT
--------------- APPROVED REPORT --------------





EKG Measurement

Heart [id number]

KS [id number]

[id number]

[id number]

[id number]





Sinus tachycardia

Otherwise normal ECG

## 2017-10-03 NOTE — PROGRESS NOTE
DATE:  10/02/2017



Subjective:  This is a 36-year-old male patient with bipolar II disorder

and schizoaffective, bipolar type.



Plan:  Continue him on Geodon 20 mg daily and also continue him on Depakote

_____ to stabilize his mood.  We would continue to monitor the patient's

course.  I encouraged him to interact appropriately with staff and other

patients.  Chart reviewed and discussed with staff.  Seen and assessed at

the bedside.









  ______________________________________________

  Gulshan Armstrong M.D.





DR:  MATEUSZ

D:  10/02/2017 05:17

T:  10/02/2017 21:43

JOB#:  [id number]

CC:

## 2017-10-03 NOTE — GENERAL PROGRESS NOTE
Assessment/Plan


Problem List:  


(1) Diabetes mellitus


ICD Codes:  E11.9 - Diabetes mellitus


SNOMED:  34811030


(2) Generalized weakness


ICD Codes:  R53.1 - Weakness


SNOMED:  92280434


(3) COPD exacerbation


ICD Codes:  J44.1 - Chronic obstructive pulmonary disease with (acute) 

exacerbation


SNOMED:  469189877, 065122700


(4) Hyperglycemia due to type 2 diabetes mellitus


ICD Codes:  E11.65 - Type 2 diabetes mellitus with hyperglycemia


SNOMED:  321541615847487


(5) Opiate dependence


ICD Codes:  F11.20 - Opioid dependence


SNOMED:  31077751


(6) Anxiety


ICD Codes:  F41.9 - Anxiety disorder, unspecified; Z68.41 - Body mass index (BMI

) 40.0-44.9, adult


SNOMED:  92863831


Status:  stable, progressing, tolerating diet


Assessment/Plan


o2 pulm tx abx ot pt diet cbc bmp am dc if clear





Subjective


Allergies:  


Coded Allergies:  


     No Known Allergies (Unverified , 4/14/15)


All Systems:  reviewed and negative except above


Subjective


calm no complaints





Objective





Last 24 Hour Vital Signs








  Date Time  Temp Pulse Resp B/P (MAP) Pulse Ox O2 Delivery O2 Flow Rate FiO2


 


10/3/17 11:25  112 20   Room Air  21


 


10/3/17 11:10  108 20   Room Air  21


 


10/3/17 07:25  114 20  99 Room Air  21


 


10/3/17 07:23      Nasal Cannula 2.0 28


 


10/3/17 07:16  118 20  96 Room Air  21


 


10/3/17 07:15     96 Room Air  21


 


10/3/17 04:00 97.9 103 21 102/55 95 Nasal Cannula 2.0 


 


10/3/17 03:42  104 20  99 Nasal Cannula 2.0 28


 


10/3/17 03:41  101 20  97 Nasal Cannula 2.0 28


 


10/3/17 00:00 98.2 108 20 107/61 96 Room Air  


 


10/2/17 23:00   20     


 


10/2/17 23:00   20     


 


10/2/17 22:01   20     


 


10/2/17 22:00   20     


 


10/2/17 20:00 98.4 123 21 116/71 94 Room Air  


 


10/2/17 19:30      Nasal Cannula 2.0 28


 


10/2/17 19:30     95 Nasal Cannula 2.0 28


 


10/2/17 19:16 97.9       


 


10/2/17 18:55 97.9       


 


10/2/17 16:00 97.9 120 21 120/61 95 Nasal Cannula 2.0 


 


10/2/17 15:28  101 16   Nasal Cannula 3.0 32


 


10/2/17 15:20  99 18  96 Nasal Cannula 3.0 32


 


10/2/17 15:20        32








Laboratory Tests


10/3/17 09:20: 


White Blood Count 13.6H, Red Blood Count 4.46L, Hemoglobin 13.1L, Hematocrit 

38.8L, Mean Corpuscular Volume 87, Mean Corpuscular Hemoglobin 29.4, Mean 

Corpuscular Hemoglobin Concent 33.7, Red Cell Distribution Width 13.0, Platelet 

Count 194, Mean Platelet Volume 7.8, Neutrophils (%) (Auto) 61.0, Lymphocytes (%

) (Auto) 31.8, Monocytes (%) (Auto) 5.7, Eosinophils (%) (Auto) 0.9, Basophils (

%) (Auto) 0.6, Sodium Level 133L, Potassium Level 3.9, Chloride Level 95L, 

Carbon Dioxide Level 26, Anion Gap 12, Blood Urea Nitrogen 21, Creatinine 0.8, 

Estimat Glomerular Filtration Rate > 60, Glucose Level 238H, Calcium Level 9.2


Height (Feet):  5


Height (Inches):  11.00


Weight (Pounds):  232


General Appearance:  alert


EENT:  normal ENT inspection


Neck:  normal alignment


Cardiovascular:  normal peripheral pulses, normal rate, regular rhythm


Respiratory/Chest:  chest wall non-tender, lungs clear, normal breath sounds


Abdomen:  normal bowel sounds, non tender, soft


Edema:  no edema noted Arm (L), no edema noted Arm (R), no edema noted Leg (L), 

no edema noted Leg (R), no edema noted Pedal (L), no edema noted Pedal (R), no 

edema noted Generalized


Neurologic:  responsive, motor weakness


Skin:  normal pigmentation, warm/dry











ROSE MARIE IRVING Oct 3, 2017 13:03

## 2017-10-05 NOTE — DISCHARGE SUMMARY
Discharge Summary


Hospital Course


Date of Admission


Sep 27, 2017 at 16:00


Date of Discharge


Oct 3, 2017 at 15:26


Admitting Diagnosis


COPD, hyperglycemia


HPI


Shadi Zapien is a 36 year old male who was admitted on Sep 27, 2017 at 16:00 

for Chronic Obstructive Pulmonary Disorder, Hyperglyce


Hospital Course


[id number]





Discharge


Discharge Disposition


Patient was discharged to SNF/Subacute Facility(03)


Discharge Diagnoses:  











Evelyn Garcia NP Oct 5, 2017 11:27

## 2017-10-05 NOTE — PROGRESS NOTE
DATE:  10/03/2017



Subjective:  The patient is a 36-year-old  male patient who came

in with COPD.  _____ altered mental status.  Continue treatment with

Geodon and Depakote, Geodon to stabilize his mood and to prevent mood

lability _____.  Chart reviewed and discussed with staff.  Seen and

assessed at the bedside.









  ______________________________________________

  Gulshan Armstrong M.D.





DR:  GJ/PM

D:  10/04/2017 05:29

T:  10/04/2017 22:28

JOB#:  [id number]

CC:

## 2017-10-06 NOTE — DISCHARGE SUMMARY 2 SIG
DATE OF ADMISSION:  09/27/2017



DATE OF DISCHARGE:  10/03/2017



CONSULTANTS:

1. Lissette Steward M.D.

2. Donnie North M.D.

3. Dusty Denis M.D.

4. Gulshan Armstrong M.D.

5. Mynor Mayen M.D.

6. Behnoush Zarrini, M.D.



Brief Hospital Course:  The patient is a 36-year-old male from [facility] presented to ED with cough, wheezing,  for 2

days which was getting worse and lost his voice.  Blood sugar was 450.  On

evaluation at ED, laboratories showed minimal leukocytosis.  Lactate was

negative.  Chest x-ray done showed no consolidation, effusion or

pneumothorax.  No acute cardiopulmonary disease.  EKG was in normal sinus

rhythm.  He was admitted for COPD exacerbation and was started on IV

Solu-Medrol and was given doxycycline.  He had poor compliance with

medications.  He had hyperglycemia secondary to type 2 diabetes mellitus.

Insulin were titrated and he was started on metformin.  At that time of

discharge, he was being given Levemir 30 units b.i.d. with NovoLog 30

units before meals b.i.d. and metformin 500 mg t.i.d. before meals.  He

has schizoaffective bipolar-type disorder and was given Geodon and

Depakote to help stabilize mood.  Steroid was eventually tapered, and the

patient was discharged back to nursing home.



FINAL DIAGNOSES:

1. Acute chronic obstructive pulmonary disease exacerbation.

2. Diabetes mellitus, out of control.

3. Opiate dependence.

4. Anxiety.

5. Generalized weakness.

6. Lumbar back pain.

7. Schizoaffective bipolar-type disorder.

8. Diabetes mellitus, out of control.



DISPOSITION:  The patient was discharged back to SNF.



DISCHARGE MEDICATIONS:  Refer to med list.







  ______________________________________________

  Samir Cabrera D.O.



I have been assigned to dictate discharge summary on this account and I

was not involved in the patient's management.



  ______________________________________________

  Evelyn Garcia N.P.





DR:  JL/PM

D:  10/05/2017 11:26

T:  10/06/2017 01:13

JOB#:  [id number]

CC:



DANIEL

## 2017-10-07 NOTE — CONSULTATION
DATE OF CONSULTATION:  09/27/2017



INFECTIOUS DISEASES CONSULTATION



ADDENDUM



MEDICATIONS:  Please refer to the MAR.



ALLERGIES:  The patient has no known drug allergy.



PHYSICAL EXAMINATION:

General:  A middle-aged male, lying in bed, comfortable, awake, alert,

oriented, not in distress, coughing and wheezing.

Vital Signs:  Temperature 97.3 degrees, pulse 100, respirations 15, blood

pressure 129/67 and saturation 97% on room air.

HEENT:  Normocephalic and atraumatic.  Pupils are reactive to light.  Moist

oral mucosa.  No exudate or thrush.

NECK:  Supple.  No lymphadenopathy.

CARDIOVASCULAR:  Regular rate and rhythm.  No murmur or gallop.

Lungs:  He had wheezing and poor air entry to both lung fields.  No

rhonchi.

Abdomen:  Soft, obese, nontender, and nondistended.  Positive bowel sounds.

No hepatosplenomegaly.

EXTREMITIES:  No edema or cyanosis.



Laboratory And Diagnostic Data:  Labs showed white count of 12,000,

hemoglobin of 13.4 and platelet count of 266.  BUN of 12 and creatinine of

0.8.  AST of 19 and ALT of 25.  Urinalysis showed +1 leukocyte esterase

and 0 to 2 WBC.  Imaging, chest x-ray on admission showed no acute

process.



ASSESSMENT AND RECOMMENDATION:

1. Chronic obstructive pulmonary disease exacerbation.  We will start

the patient on doxycycline.  Continue inhalers and steroids.  Recommend

pulmonary function test as an outpatient.  Taper steroids.

2. Hyperglycemia due to poorly controlled diabetes and steroid.  Suspect

poor compliance.  Monitor blood sugar to keep tight glycemic control

between 80 to 120.

3. Diabetes mellitus, poorly controlled.  Recommend tight glycemic

control to keep blood glucose between 80 to 120.



Thank you for the consult.  ID will continue to follow.









  ______________________________________________

  Donnie North M.D.





DR:  KATIE

D:  10/06/2017 15:30

T:  10/07/2017 02:17

JOB#:  [id number]

CC:

## 2017-10-07 NOTE — CARDIOLOGY REPORT
--------------- APPROVED REPORT --------------





EKG Measurement

Heart [id number]

CA [id number]

[id number]

[id number]

[id number]





Normal sinus rhythm

Normal ECG

## 2018-04-03 ENCOUNTER — HOSPITAL ENCOUNTER (INPATIENT)
Dept: HOSPITAL 72 - EMR | Age: 38
LOS: 8 days | Discharge: NURSING, REHAB OR LONG-TERM CARE | DRG: 313 | End: 2018-04-11
Payer: MEDICARE

## 2018-04-03 VITALS — BODY MASS INDEX: 33.18 KG/M2 | HEIGHT: 71 IN | WEIGHT: 237 LBS

## 2018-04-03 DIAGNOSIS — Z79.4: ICD-10-CM

## 2018-04-03 DIAGNOSIS — F41.9: ICD-10-CM

## 2018-04-03 DIAGNOSIS — F11.20: ICD-10-CM

## 2018-04-03 DIAGNOSIS — J44.9: ICD-10-CM

## 2018-04-03 DIAGNOSIS — G89.29: ICD-10-CM

## 2018-04-03 DIAGNOSIS — Z86.73: ICD-10-CM

## 2018-04-03 DIAGNOSIS — M79.602: ICD-10-CM

## 2018-04-03 DIAGNOSIS — I10: ICD-10-CM

## 2018-04-03 DIAGNOSIS — R07.89: Primary | ICD-10-CM

## 2018-04-03 DIAGNOSIS — G81.94: ICD-10-CM

## 2018-04-03 DIAGNOSIS — F17.210: ICD-10-CM

## 2018-04-03 DIAGNOSIS — R20.0: ICD-10-CM

## 2018-04-03 DIAGNOSIS — E78.5: ICD-10-CM

## 2018-04-03 DIAGNOSIS — F25.0: ICD-10-CM

## 2018-04-03 DIAGNOSIS — E11.42: ICD-10-CM

## 2018-04-03 DIAGNOSIS — F45.9: ICD-10-CM

## 2018-04-03 PROCEDURE — 72141 MRI NECK SPINE W/O DYE: CPT

## 2018-04-03 PROCEDURE — 82962 GLUCOSE BLOOD TEST: CPT

## 2018-04-03 PROCEDURE — 80307 DRUG TEST PRSMV CHEM ANLYZR: CPT

## 2018-04-03 PROCEDURE — 70450 CT HEAD/BRAIN W/O DYE: CPT

## 2018-04-03 PROCEDURE — 71045 X-RAY EXAM CHEST 1 VIEW: CPT

## 2018-04-03 PROCEDURE — 87081 CULTURE SCREEN ONLY: CPT

## 2018-04-03 PROCEDURE — 99285 EMERGENCY DEPT VISIT HI MDM: CPT

## 2018-04-03 PROCEDURE — 80053 COMPREHEN METABOLIC PANEL: CPT

## 2018-04-03 PROCEDURE — 36415 COLL VENOUS BLD VENIPUNCTURE: CPT

## 2018-04-03 PROCEDURE — 78452 HT MUSCLE IMAGE SPECT MULT: CPT

## 2018-04-03 PROCEDURE — 85610 PROTHROMBIN TIME: CPT

## 2018-04-03 PROCEDURE — 70551 MRI BRAIN STEM W/O DYE: CPT

## 2018-04-03 PROCEDURE — 94664 DEMO&/EVAL PT USE INHALER: CPT

## 2018-04-03 PROCEDURE — 85730 THROMBOPLASTIN TIME PARTIAL: CPT

## 2018-04-03 PROCEDURE — 94640 AIRWAY INHALATION TREATMENT: CPT

## 2018-04-03 PROCEDURE — 83735 ASSAY OF MAGNESIUM: CPT

## 2018-04-03 PROCEDURE — 81003 URINALYSIS AUTO W/O SCOPE: CPT

## 2018-04-03 PROCEDURE — 80048 BASIC METABOLIC PNL TOTAL CA: CPT

## 2018-04-03 PROCEDURE — 93005 ELECTROCARDIOGRAM TRACING: CPT

## 2018-04-03 PROCEDURE — 93017 CV STRESS TEST TRACING ONLY: CPT

## 2018-04-03 PROCEDURE — 93306 TTE W/DOPPLER COMPLETE: CPT

## 2018-04-03 PROCEDURE — 84484 ASSAY OF TROPONIN QUANT: CPT

## 2018-04-03 PROCEDURE — 83880 ASSAY OF NATRIURETIC PEPTIDE: CPT

## 2018-04-03 PROCEDURE — 85025 COMPLETE CBC W/AUTO DIFF WBC: CPT

## 2018-04-03 NOTE — EMERGENCY ROOM REPORT
History of Present Illness


General


Chief Complaint:  Chest Pain


Source:  Patient





Present Illness


HPI


37-year-old male, history of stroke 2 months ago, diabetes, unknown other past 

medical history, very poor historian with odd affect, presenting with chest pain

, high blood sugar


Patient states that the last time he took his blood sugar was 2 days ago, 

states that it was normal, but feels like it is high


Also complaining of vague generalized chest pain.  Started today.  No shortness 

of breath


Also states that his left side felt weak 2 days ago, but states that it now 

feels weak again today, does not recall the last time that he felt like it was 

normal, states that it was may be yesterday


No headache no neck pain no fever no chills





ekg stickers noted to be on patient's chest, patient states that he lives in 

[place]. he was in the hospital 2 days ago and left because he said "they 

were not helping me"


Allergies:  


Coded Allergies:  


     No Known Allergies (Unverified , 4/14/15)





Patient History


Past Medical History:  see triage record


Past Surgical History:  none


Pertinent Family History:  none


Reviewed Nursing Documentation:  PMH: Agreed; PSxH: Agreed





Nursing Documentation-PMH


Hx Cardiac Problems:  Yes - STROKE 2 MONTHS AGO


Hx Hypertension:  Yes


Hx COPD:  Yes


Hx Diabetes:  Yes


Hx Cancer:  No


Hx Gastrointestinal Problems:  No


Hx Neurological Problems:  No


Hx Peripheral Neuropathy:  Yes


Hx Dizziness:  Yes


Hx Weakness:  Yes





Review of Systems


All Other Systems:  negative except mentioned in HPI





Physical Exam





Vital Signs








  Date Time  Temp Pulse Resp B/P (MAP) Pulse Ox O2 Delivery O2 Flow Rate FiO2


 


4/3/18 23:25 98.2 119 18 124/74 96 Room Air  





 98.2       








Sp02 EP Interpretation:  reviewed, normal


General Appearance:  other - Withdrawn affect, however not in acute distress, 

calm and cooperative


Head:  normocephalic, atraumatic


Eyes:  bilateral eye normal inspection, bilateral eye PERRL, bilateral eye EOMI


ENT:  normal ENT inspection, normal pharynx, normal voice, moist mucus membranes


Neck:  normal inspection, full range of motion, supple


Respiratory:  normal inspection, lungs clear, normal breath sounds, no 

respiratory distress, no retraction, no wheezing, speaking full sentences, 

chest symmetrical


Cardiovascular #1:  tachycardia


Cardiovascular #2:  2+ radial (R), 2+ radial (L)


Gastrointestinal:  normal inspection, non tender, soft, non-distended, no 

guarding


Genitourinary:  no CVA tenderness


Musculoskeletal:  normal inspection, back normal, normal range of motion, non-

tender


Neurologic:  other - Oriented 3, cranial nerves are intact, no facial droop, 

left-sided motor strength upper and lower extremity is 3-5, right-sided is 5 

out of 5, sensory intact, gait is normal


Psychiatric:  normal inspection, judgement/insight normal, memory normal


Skin:  normal inspection, normal color, no rash, warm/dry, well hydrated, 

normal turgor





Medical Decision Making


Diagnostic Impression:  


 Primary Impression:  


 Acute coronary syndrome


 Additional Impression:  


 Left hemiparesis


ER Course


37-year-old male with chest pain, left-sided weakness, reports of blood sugar 

is high





DDX: 


ACS vs. CHF vs. pneumonia vs. gastritis/GERD vs. pneumothorax 


Left-sided hemiparesis, last normal was 24 hours ago, CVA, electrolyte 

disturbance








Plan:


IV access, obtain labs including troponin, EKG, CXR


CT head





ER course:


Glucose is normal


CT head normal


VSS


trop neg





Disposition:


Patient will be admitted to telemetry


DW Dr Samir Cabrera





Please note that this Emergency Department Report was dictated using [employer] technology software, occasionally this can lead to 

erroneous entry secondary to interpretation by the dictation equipment.





EKG Diagnostic Results


EP Interpretation: Yes


Rate: tachy


Rhythm: NSR


ST Segments: No acute changes 


ASA given to patient: no





Rhythm Strip


EP Interpretation: Yes


Rate: 110


Rhythm: NSR, no PVCs, no ectopy





Chest X-ray 


CXR: Ordered: Yes


1 view


Indication: Chest pain


EP interpretation: Yes


Interpretation: No consolidation, no effusion, no PTX, no acute cardiopulmonary 

disease


Impression: No acute disease





Electronically signed by Kitty Guerra MD





Laboratory Tests








Test


  4/4/18


00:20 4/4/18


00:28


 


White Blood Count


  13.8 K/UL


(4.8-10.8)  H 


 


 


Red Blood Count


  5.07 M/UL


(4.70-6.10) 


 


 


Hemoglobin


  14.8 G/DL


(14.2-18.0) 


 


 


Hematocrit


  42.7 %


(42.0-52.0) 


 


 


Mean Corpuscular Volume 84 FL (80-99)   


 


Mean Corpuscular Hemoglobin


  29.2 PG


(27.0-31.0) 


 


 


Mean Corpuscular Hemoglobin


Concent 34.8 G/DL


(32.0-36.0) 


 


 


Red Cell Distribution Width


  11.9 %


(11.6-14.8) 


 


 


Platelet Count


  247 K/UL


(150-450) 


 


 


Mean Platelet Volume


  7.7 FL


(6.5-10.1) 


 


 


Neutrophils (%) (Auto)


  70.5 %


(45.0-75.0) 


 


 


Lymphocytes (%) (Auto)


  20.7 %


(20.0-45.0) 


 


 


Monocytes (%) (Auto)


  6.8 %


(1.0-10.0) 


 


 


Eosinophils (%) (Auto)


  1.2 %


(0.0-3.0) 


 


 


Basophils (%) (Auto)


  0.9 %


(0.0-2.0) 


 


 


Prothrombin Time


  8.8 SEC


(9.30-11.50)  L 


 


 


Prothrombin Time INR


  0.8 (0.9-1.1)


L 


 


 


PTT


  26 SEC (23-33)


  


 


 


Sodium Level


  138 MMOL/L


(136-145) 


 


 


Potassium Level


  4.0 MMOL/L


(3.5-5.1) 


 


 


Chloride Level


  103 MMOL/L


() 


 


 


Carbon Dioxide Level


  28 MMOL/L


(21-32) 


 


 


Anion Gap


  7 mmol/L


(5-15) 


 


 


Blood Urea Nitrogen


  16 mg/dL


(7-18) 


 


 


Creatinine


  0.9 MG/DL


(0.55-1.30) 


 


 


Estimate Glomerular


Filtration Rate > 60 mL/min


(>60) 


 


 


Glucose Level


  154 MG/DL


()  H 


 


 


Calcium Level


  9.0 MG/DL


(8.5-10.1) 


 


 


Magnesium Level


  1.8 MG/DL


(1.8-2.4) 


 


 


Total Bilirubin


  0.3 MG/DL


(0.2-1.0) 


 


 


Aspartate Amino Transferase


(AST) 19 U/L (15-37)


  


 


 


Alanine Aminotransferase (ALT)


  31 U/L (12-78)


  


 


 


Alkaline Phosphatase


  73 U/L


() 


 


 


Troponin I


  0.000 ng/mL


(0.000-0.056) 


 


 


Pro-B-Type Natriuretic Peptide


  146 pg/mL


(0-125)  H 


 


 


Total Protein


  6.7 G/DL


(6.4-8.2) 


 


 


Albumin


  3.8 G/DL


(3.4-5.0) 


 


 


Globulin 2.9 g/dL   


 


Albumin/Globulin Ratio 1.3 (1.0-2.7)   


 


Urine Color  Yellow  


 


Urine Appearance  Clear  


 


Urine pH  6 (4.5-8.0)  


 


Urine Specific Gravity


  


  1.015


(1.005-1.035)


 


Urine Protein


  


  Negative


(NEGATIVE)


 


Urine Glucose (UA)


  


  Negative


(NEGATIVE)


 


Urine Ketones


  


  Negative


(NEGATIVE)


 


Urine Occult Blood


  


  Negative


(NEGATIVE)


 


Urine Nitrite


  


  Negative


(NEGATIVE)


 


Urine Bilirubin


  


  Negative


(NEGATIVE)


 


Urine Urobilinogen


  


  Normal MG/DL


(0.0-1.0)


 


Urine Leukocyte Esterase


  


  Negative


(NEGATIVE)








CT/MRI/US Diagnostic Results


CT/MRI/US Diagnostic Results :  


   Imaging Test Ordered:  CT head


   Impression


CT HEAD:





Comparison: 10/30/15





No acute intracranial findings or substantial change.





Last Vital Signs








  Date Time  Temp Pulse Resp B/P (MAP) Pulse Ox O2 Delivery O2 Flow Rate FiO2


 


4/3/18 23:25 98.2 119 18 124/74 96 Room Air  





 98.2       








Disposition:  ADMITTED AS INPATIENT


Condition:  Serious











Kitty Guerra M.D. Apr 3, 2018 23:51

## 2018-04-04 VITALS — SYSTOLIC BLOOD PRESSURE: 124 MMHG | DIASTOLIC BLOOD PRESSURE: 73 MMHG

## 2018-04-04 VITALS — SYSTOLIC BLOOD PRESSURE: 113 MMHG | DIASTOLIC BLOOD PRESSURE: 72 MMHG

## 2018-04-04 VITALS — DIASTOLIC BLOOD PRESSURE: 71 MMHG | SYSTOLIC BLOOD PRESSURE: 124 MMHG

## 2018-04-04 VITALS — SYSTOLIC BLOOD PRESSURE: 111 MMHG | DIASTOLIC BLOOD PRESSURE: 67 MMHG

## 2018-04-04 VITALS — SYSTOLIC BLOOD PRESSURE: 123 MMHG | DIASTOLIC BLOOD PRESSURE: 69 MMHG

## 2018-04-04 VITALS — DIASTOLIC BLOOD PRESSURE: 70 MMHG | SYSTOLIC BLOOD PRESSURE: 139 MMHG

## 2018-04-04 VITALS — SYSTOLIC BLOOD PRESSURE: 130 MMHG | DIASTOLIC BLOOD PRESSURE: 78 MMHG

## 2018-04-04 VITALS — SYSTOLIC BLOOD PRESSURE: 131 MMHG | DIASTOLIC BLOOD PRESSURE: 70 MMHG

## 2018-04-04 LAB
ADD MANUAL DIFF: NO
ALBUMIN SERPL-MCNC: 3.8 G/DL (ref 3.4–5)
ALBUMIN/GLOB SERPL: 1.3 {RATIO} (ref 1–2.7)
ALP SERPL-CCNC: 73 U/L (ref 46–116)
ALT SERPL-CCNC: 31 U/L (ref 12–78)
ANION GAP SERPL CALC-SCNC: 7 MMOL/L (ref 5–15)
APPEARANCE UR: CLEAR
APTT BLD: 26 SEC (ref 23–33)
APTT PPP: YELLOW S
AST SERPL-CCNC: 19 U/L (ref 15–37)
BASOPHILS NFR BLD AUTO: 0.9 % (ref 0–2)
BILIRUB SERPL-MCNC: 0.3 MG/DL (ref 0.2–1)
BUN SERPL-MCNC: 16 MG/DL (ref 7–18)
CALCIUM SERPL-MCNC: 9 MG/DL (ref 8.5–10.1)
CHLORIDE SERPL-SCNC: 103 MMOL/L (ref 98–107)
CO2 SERPL-SCNC: 28 MMOL/L (ref 21–32)
CREAT SERPL-MCNC: 0.9 MG/DL (ref 0.55–1.3)
EOSINOPHIL NFR BLD AUTO: 1.2 % (ref 0–3)
ERYTHROCYTE [DISTWIDTH] IN BLOOD BY AUTOMATED COUNT: 11.9 % (ref 11.6–14.8)
GLOBULIN SER-MCNC: 2.9 G/DL
GLUCOSE UR STRIP-MCNC: NEGATIVE MG/DL
HCT VFR BLD CALC: 42.7 % (ref 42–52)
HGB BLD-MCNC: 14.8 G/DL (ref 14.2–18)
INR PPP: 0.8 (ref 0.9–1.1)
KETONES UR QL STRIP: NEGATIVE
LEUKOCYTE ESTERASE UR QL STRIP: NEGATIVE
LYMPHOCYTES NFR BLD AUTO: 20.7 % (ref 20–45)
MCV RBC AUTO: 84 FL (ref 80–99)
MONOCYTES NFR BLD AUTO: 6.8 % (ref 1–10)
NEUTROPHILS NFR BLD AUTO: 70.5 % (ref 45–75)
NITRITE UR QL STRIP: NEGATIVE
PH UR STRIP: 6 [PH] (ref 4.5–8)
PLATELET # BLD: 247 K/UL (ref 150–450)
POTASSIUM SERPL-SCNC: 4 MMOL/L (ref 3.5–5.1)
PROT UR QL STRIP: NEGATIVE
RBC # BLD AUTO: 5.07 M/UL (ref 4.7–6.1)
SODIUM SERPL-SCNC: 138 MMOL/L (ref 136–145)
SP GR UR STRIP: 1.01 (ref 1–1.03)
UROBILINOGEN UR-MCNC: NORMAL MG/DL (ref 0–1)
WBC # BLD AUTO: 13.8 K/UL (ref 4.8–10.8)

## 2018-04-04 RX ADMIN — HEPARIN SODIUM SCH UNITS: 5000 INJECTION INTRAVENOUS; SUBCUTANEOUS at 21:14

## 2018-04-04 RX ADMIN — HEPARIN SODIUM SCH UNITS: 5000 INJECTION INTRAVENOUS; SUBCUTANEOUS at 10:43

## 2018-04-04 RX ADMIN — PREGABALIN SCH MG: 75 CAPSULE ORAL at 15:12

## 2018-04-04 RX ADMIN — DIVALPROEX SODIUM SCH MG: 500 TABLET, FILM COATED, EXTENDED RELEASE ORAL at 21:10

## 2018-04-04 RX ADMIN — ASPIRIN SCH MG: 81 TABLET, DELAYED RELEASE ORAL at 13:57

## 2018-04-04 RX ADMIN — INSULIN ASPART SCH UNITS: 100 INJECTION, SOLUTION INTRAVENOUS; SUBCUTANEOUS at 16:30

## 2018-04-04 RX ADMIN — PREGABALIN SCH MG: 75 CAPSULE ORAL at 10:17

## 2018-04-04 RX ADMIN — REGADENOSON SCH MG: 0.08 INJECTION, SOLUTION INTRAVENOUS at 15:15

## 2018-04-04 RX ADMIN — ZIPRASIDONE HYDROCHLORIDE SCH MG: 20 CAPSULE ORAL at 10:17

## 2018-04-04 RX ADMIN — ZIPRASIDONE HYDROCHLORIDE SCH MG: 20 CAPSULE ORAL at 18:36

## 2018-04-04 RX ADMIN — MORPHINE SULFATE PRN MG: 4 INJECTION INTRAVENOUS at 09:05

## 2018-04-04 RX ADMIN — INSULIN ASPART SCH UNITS: 100 INJECTION, SOLUTION INTRAVENOUS; SUBCUTANEOUS at 12:47

## 2018-04-04 RX ADMIN — DIVALPROEX SODIUM SCH MG: 500 TABLET, FILM COATED, EXTENDED RELEASE ORAL at 10:18

## 2018-04-04 RX ADMIN — PREGABALIN SCH MG: 75 CAPSULE ORAL at 21:10

## 2018-04-04 RX ADMIN — INSULIN ASPART SCH UNITS: 100 INJECTION, SOLUTION INTRAVENOUS; SUBCUTANEOUS at 21:14

## 2018-04-04 NOTE — DIAGNOSTIC IMAGING REPORT
Indication: Headache

 

Technique: Contiguous 5 mm thick transaxial imaging of the head obtained in a Siemens

Sensation 64 slice CT scanner.  Soft tissue and bone windows generated.  Automatic

Exposure Control was utilized.

 

 

Total Dose length Product (DLP):  1379.49 mGycm

 

CT Dose Index Volume (CTDIvol):   70.38 mGy

 

Comparison: 10/30/2015

 

Findings: The size and configuration of the cortical sulci, basal cisterns, and

ventricles are within normal limits for age. There is no mass effect, midline shift,

or edema identified. There is no evidence of acute hemorrhage or abnormal intra-axial

or extra-axial fluid collections. The bones and soft tissues are unremarkable.

Suboccipital craniectomy again noted.

 

Impression: No mass effect, edema or acute bleed.

 

 

 

The CT scanner at [facility] is accredited by the American College of

Radiology and the scans are performed using dose optimization techniques as

appropriate to a performed exam including Automatic Exposure control.

## 2018-04-04 NOTE — DIAGNOSTIC IMAGING REPORT
Indication: Chest pain

 

Comparison:  9/27/2017

 

A single view chest radiograph was obtained.

 

Findings:

 

Cardiomediastinal appearance is within normal limits for age.  Pulmonary vascularity

is appropriate. The diaphragmatic contour is smooth and costophrenic angles are

sharp. No pleural effusions are identified. The bones are unremarkable.

 

Impression: No acute findings

## 2018-04-04 NOTE — CARDIOLOGY REPORT
--------------- APPROVED REPORT --------------





EKG Measurement

Heart [id number]

WI [id number]

[id number]

[id number]

[id number]





Sinus tachycardia

Otherwise normal ECG

## 2018-04-04 NOTE — CONSULTATION
DATE OF CONSULTATION:  04/04/2018



CARDIOLOGY CONSULTATION



CONSULTING PHYSICIAN:  Oskar Prieto M.D.



REFERRING PHYSICIAN:  Samir Cabrera D.O.



REASON FOR CONSULTATION:  Chest pain.



HISTORY OF PRESENT ILLNESS:  The patient is a 37-year-old 

gentleman, who states that he had a stroke about two months ago.  The

patient also has diabetes who takes insulin as well as hypertension and

COPD.  The patient presented to the emergency room complaining of chest

pain.  The patient also had radiation of pain to the left arm.  In the

emergency room, the patient was noted to have EKG stickers seen on his

chest and he states that he lives in _____ and was hospitalized two days

ago, but he left _____ because he stated they were not helping him.



REVIEW OF SYSTEMS:  Performed and was negative other than what was

mentioned in the history of present illness.



PAST MEDICAL HISTORY:

1. Hypertension.

2. Diabetes.

3. History of prior CVA.

4. Peripheral neuropathy.



FAMILY HISTORY:  Noncontributory.



SOCIAL HISTORY:  He apparently has been using drugs including crystal meth,

but no urine toxicology screen was not performed yet.



PHYSICAL EXAMINATION:

VITAL SIGNS:  Blood pressure of 122/69, pulse is 92, respirations 18, and

temperature 97.9 degrees.

HEAD AND NECK:  No JVD.

LUNGS:  Clear.

CARDIOVASCULAR:  Regular S1 and S2 with no gallop or murmur.

ABDOMEN:  Soft.

EXTREMITIES:  No pitting edema.



LABORATORY AND DIAGNOSTIC DATA:  EKG showed sinus tachycardia, otherwise

normal electrocardiogram.  Rate is around 105.  His labs show INR 0.8.

Sodium 138, potassium 4.0, BUN of 16 and creatinine 0.9 and glucose 174.

First troponin is negative.  BNP is 146.  White count 13.8, hemoglobin

14.8, hematocrit 42.7, and platelet count of 247.



ASSESSMENT AND PLAN:

1. Chest pain.  The pain is atypical.  First troponin is negative and

EKG does not show any acute ischemic changes.  We will repeat cardiac

enzymes as well as echocardiogram.  Schedule the patient for a nuclear

stress test in the morning.

2. Hypertension.  Currently, blood pressure is a stable, hold off on

antihypertensives.

3. Diabetes.

4. History of prior CVA, on aspirin.

5. Asthma, theophylline.



Thank you very much, Dr. Cabrera, for allowing me to participate in the

care of this patient.  Please do not hesitate to contact me for any

questions regarding my evaluation.









  ______________________________________________

  Oskar Prieto M.D.





DR:  RACHEAL

D:  04/04/2018 15:07

T:  04/04/2018 18:43

JOB#:  [id number]

CC:

## 2018-04-04 NOTE — CARDIAC ELECTROPHYSIOLOGY PN
Subjective


Subjective


[id number]





Objective





Last 24 Hour Vital Signs








  Date Time  Temp Pulse Resp B/P (MAP) Pulse Ox O2 Delivery O2 Flow Rate FiO2


 


4/4/18 12:00 97.9 92 19 123/69 96 Room Air  





 97.9       


 


4/4/18 08:00 97.2 95 21 124/71 95 Room Air  





 97.2       


 


4/4/18 04:00 97.5 84 18 113/72 96 Room Air  21





 97.5       


 


4/4/18 04:00  87      


 


4/4/18 03:04  88      


 


4/4/18 02:40 97.3 87 20 130/78 97 Room Air  21





 97.3       


 


4/4/18 02:35 97.7 101 18 139/70 96 Room Air  





 97.7       


 


4/4/18 02:00 97.0 97 22 124/73 95 Room Air  





 97.0       


 


4/4/18 01:26 97.7       


 


4/4/18 01:04 97.7 101 18 139/70 96 Room Air  





 97.7       


 


4/4/18 00:56 98.2       


 


4/3/18 23:44  119 18     


 


4/3/18 23:25 98.2 119 18 124/74 96 Room Air  





 98.2       

















Intake and Output  


 


 4/3/18 4/4/18





 19:00 07:00


 


Intake Total  240 ml


 


Balance  240 ml


 


  


 


Intake Oral  240 ml


 


# Voids  2


 


# Bowel Movements  1











Laboratory Tests








Test


  4/4/18


00:20 4/4/18


00:28


 


White Blood Count


  13.8 K/UL


(4.8-10.8)  H 


 


 


Red Blood Count


  5.07 M/UL


(4.70-6.10) 


 


 


Hemoglobin


  14.8 G/DL


(14.2-18.0) 


 


 


Hematocrit


  42.7 %


(42.0-52.0) 


 


 


Mean Corpuscular Volume 84 FL (80-99)   


 


Mean Corpuscular Hemoglobin


  29.2 PG


(27.0-31.0) 


 


 


Mean Corpuscular Hemoglobin


Concent 34.8 G/DL


(32.0-36.0) 


 


 


Red Cell Distribution Width


  11.9 %


(11.6-14.8) 


 


 


Platelet Count


  247 K/UL


(150-450) 


 


 


Mean Platelet Volume


  7.7 FL


(6.5-10.1) 


 


 


Neutrophils (%) (Auto)


  70.5 %


(45.0-75.0) 


 


 


Lymphocytes (%) (Auto)


  20.7 %


(20.0-45.0) 


 


 


Monocytes (%) (Auto)


  6.8 %


(1.0-10.0) 


 


 


Eosinophils (%) (Auto)


  1.2 %


(0.0-3.0) 


 


 


Basophils (%) (Auto)


  0.9 %


(0.0-2.0) 


 


 


Prothrombin Time


  8.8 SEC


(9.30-11.50)  L 


 


 


Prothromb Time International


Ratio 0.8 (0.9-1.1)


L 


 


 


Activated Partial


Thromboplast Time 26 SEC (23-33)


  


 


 


Sodium Level


  138 MMOL/L


(136-145) 


 


 


Potassium Level


  4.0 MMOL/L


(3.5-5.1) 


 


 


Chloride Level


  103 MMOL/L


() 


 


 


Carbon Dioxide Level


  28 MMOL/L


(21-32) 


 


 


Anion Gap


  7 mmol/L


(5-15) 


 


 


Blood Urea Nitrogen


  16 mg/dL


(7-18) 


 


 


Creatinine


  0.9 MG/DL


(0.55-1.30) 


 


 


Estimat Glomerular Filtration


Rate > 60 mL/min


(>60) 


 


 


Glucose Level


  154 MG/DL


()  H 


 


 


Calcium Level


  9.0 MG/DL


(8.5-10.1) 


 


 


Magnesium Level


  1.8 MG/DL


(1.8-2.4) 


 


 


Total Bilirubin


  0.3 MG/DL


(0.2-1.0) 


 


 


Aspartate Amino Transf


(AST/SGOT) 19 U/L (15-37)


  


 


 


Alanine Aminotransferase


(ALT/SGPT) 31 U/L (12-78)


  


 


 


Alkaline Phosphatase


  73 U/L


() 


 


 


Troponin I


  0.000 ng/mL


(0.000-0.056) 


 


 


Pro-B-Type Natriuretic Peptide


  146 pg/mL


(0-125)  H 


 


 


Total Protein


  6.7 G/DL


(6.4-8.2) 


 


 


Albumin


  3.8 G/DL


(3.4-5.0) 


 


 


Globulin 2.9 g/dL   


 


Albumin/Globulin Ratio 1.3 (1.0-2.7)   


 


Urine Color  Yellow  


 


Urine Appearance  Clear  


 


Urine pH  6 (4.5-8.0)  


 


Urine Specific Gravity


  


  1.015


(1.005-1.035)


 


Urine Protein


  


  Negative


(NEGATIVE)


 


Urine Glucose (UA)


  


  Negative


(NEGATIVE)


 


Urine Ketones


  


  Negative


(NEGATIVE)


 


Urine Occult Blood


  


  Negative


(NEGATIVE)


 


Urine Nitrite


  


  Negative


(NEGATIVE)


 


Urine Bilirubin


  


  Negative


(NEGATIVE)


 


Urine Urobilinogen


  


  Normal MG/DL


(0.0-1.0)


 


Urine Leukocyte Esterase


  


  Negative


(NEGATIVE)

















Oskar Prieto MD Apr 4, 2018 15:07

## 2018-04-04 NOTE — HISTORY AND PHYSICAL REPORT
DATE OF ADMISSION:  04/04/2018



TIME:  2 p.m.



CONSULTANTS:

1. Oskar Prieto M.D.

2. Sam Estrada M.D.

3. Lissette Steward M.D.



CHIEF COMPLAINT:  Chest pain, shortness of breath, and left

weakness.



BRIEF HISTORY:  This is a 37-year-old male, who was in the nursing home

prior became homeless, presents with two days of increased left chest pain

radiating to left arm, slight weakness, and was very short of breath.  The

patient came to the [place] ER, diagnosed with the above, and admitted to

telemetry for further care.  Currently, calm in bed.  Chest pain and left

arm pain 8/10.  No complaints.



PAST MEDICAL HISTORY:  Diabetes, hypertension, and chronic pain.



PAST SURGICAL HISTORY:  Back and neck.



MEDICATION:  Include Seroquel, NicoDerm, NovoLog, Lyrica, Geodon, Depakote,

heparin, Florin-Dur, morphine, dextrose, Percocet, and MiraLAX.



ALLERGIES:  Denies.



SOCIAL HISTORY:  Positive smoking.  No alcohol.  No intravenous drug use.



FAMILY HISTORY:  Noncontributory.



REVIEW OF SYSTEMS:  Slight chest pain.  Slightly short of breath.  No

nausea, vomiting, or diarrhea.



PHYSICAL EXAMINATION:

GENERAL:  Calm in bed, oriented x3, and in no acute distress.

VITAL SIGNS:  Temperature 97, pulse 92, respirations 19, and blood pressure

123/69.

CARDIOVASCULAR:  No murmur.

LUNGS:  Distant and clear.

ABDOMEN:  Bowel sounds positive.  Nontender.  Nondistended.

EXTREMITIES:  No cyanosis or edema.

NEUROLOGIC:  The patient moves all extremities.  Slightly weak.



LABORATORY AND DIAGNOSTIC DATA:  White count 13.8, otherwise CBC is normal.

BMP show glucose 154, otherwise BMP is normal.  INR is 0.8.  Urinalysis

is negative.



ASSESSMENT:

1. Chest pain and left arm pain.

2. Shortness of breath.

3. Left hemiparesis.

4. Diabetes.

5. Hypertension.

6. Chronic pain.

7. Psych history.



PLAN:

1. Continue premeds.

2. Troponin q.8 h. x3.

3. EKG.

4. O2 and pulmonary treatment.

5. Pain control.

6. Blood pressure and blood sugar control.

7. Resume home medications.

8. We will continue to follow this patient.









  ______________________________________________

  Samir Cabrera D.O.





DR:  JACINTO

D:  04/04/2018 14:41

T:  04/04/2018 17:33

JOB#:  [id number]

CC:

## 2018-04-04 NOTE — CONSULTATION
DATE OF CONSULTATION:  04/04/2018



NEUROLOGICAL CONSULTATION



CONSULTING PHYSICIAN:  Sam Estrada M.D.



REQUESTING PHYSICIAN:  Samir Cabrera D.O.



HISTORY OF PRESENT ILLNESS:  The patient is a 37-year-old man, seen in

neurological consultation to evaluate possible acute stroke.  According to

the patient, approximately two months ago, he had episodes of left-sided

weakness, for which he was hospitalized and then following day, he was

discharged as his symptoms obviously were improving.  He was symptom-free

until couple of days ago when he developed weakness in left upper and left

lower extremity and dysarthric speech.  When presented to this hospital,

he was complaining of chest pain, high blood sugars, vague generalized

chest pain.  The patient informed that he now resides in [place], [place] and two days ago when developed left-sided weakness, he was

seen in the emergency room, but then released home.



Following current admission, vital signs were stable, heart rate 119,

had left-sided weakness.  He had a stat CT of the brain done.  This was

negative study.  No acute abnormalities.



EKG, normal sinus rhythm.  Chest x-ray, no acute disease noted.  Lab

work was obtained revealing a CBC study with WBC of 13.8.  Normal

coagulation panel, urinalysis, chemistry panel with BNP of 146 and glucose

154, but otherwise normal including normal troponin.



Following admission until present, there was not much changes in his

status.



PAST MEDICAL HISTORY:  The patient has a history of suboccipital

craniotomy, surgery in upper and lower back, hypertension, diabetes,

diabetic neuropathy, and chronic psychiatric disorder.



MEDICATIONS:  The patient's treatment prior to admission included Depakote

500 mg b.i.d., Dilaudid, insulin, metformin, Percocet p.r.n., Lyrica 75 mg

t.i.d., Seroquel 300 mg at bedtime, Geodon 20 mg daily, and zolpidem.



ALLERGIES:  None reported.



FAMILY HISTORY:  Noncontributory.



SOCIAL HISTORY:  The patient used to be homeless, now lives in apartment,

but apparently he will be moving to _____.



REVIEW OF SYSTEMS:  Left-sided weakness, abnormal speech, normal

swallowing, numbness and weakness in left upper and left lower extremity,

difficulty ambulation.  Currently, no chest pain.  No palpitations.  No

respiratory difficulties.  Denies abdominal pain discomfort.  No urine or

bowel incontinence.  Normal swallowing.



Addendum, the patient has been seen in Neurology consultation in 2015

when he presented with left arm numbness, give-away weakness in left leg.

This was attributed to previous surgery, namely Chiari malformation type 2

surgery.  At that time, MR angiogram of the brain was normal and MRI of

the lumbar spine and cervical spine revealed no acute changes.



PHYSICAL EXAMINATION:

GENERAL:  A well-developed, well-nourished man, not in acute distress,

lying comfortably in bed.

VITAL SIGNS:  His vital signs are stable.  Blood pressure 144/71 and

temperature 97.2 degrees.

HEENT:  Head, normocephalic.  No evidence of trauma.  There is suboccipital

scarring.

MUSCULOSKELETAL:  Peripheral pulses 1+ and symmetric.

MENTAL STATUS:  He is alert and oriented x3 with no evidence of aphasia or

apraxia.  He is a poor historian, poor concentration, but follows

commands.

CRANIAL NERVE II:  Pupils both responding to light and accommodation.

Extraocular movements intact.  No nystagmus.

CRANIAL NERVE V:  Normal corneal responses.  Reduced pin sensation on the

left side of the face.

CRANIAL NERVE VII:  No facial asymmetry.

CRANIAL NERVE VIII:  Normal hearing.

CRANIAL NERVES IX THROUGH XII:  Normal limits.

MOTOR EXAMINATION:  Weakness 3/5 left upper and left lower extremity with

proper movement in his both arms and legs when turning around and

attention is away.  There is a give-away weakness in the left lower

extremity.  This is intermittent.  There is action tremor in his left arm

and left leg.  Deep tendon reflexes depressed, bilaterally symmetric.

Plantar responses flexor.  No pathological responses.

SENSORY EXAMINATION:  Decreased response to pin stimulation on left side of

the face and left side of the body.  Gait unable to test.  The patient was

able to stand up, but was violently shaking, went back to bed.



IMPRESSION:

1. New onset of left sided hemiparesis, hemisensory loss with dysarthria

and give-away weakness.  Rule out acute stroke, rule out exacerbation of

cervical myelopathy following old surgeries.  Rule out a psychosomatic

disorder.

2. Hypertension.

3. Hyperlipidemia.

4. Diabetes type 2 with diabetic polyneuropathy.



RECOMMENDATION:  Get MRI of the brain and MRI of the cervical spine.  Get

PT/OT.  Psychiatry reevaluation to adjust the treatment given significant

behavioral abnormalities.



Treatment will include aspirin and statins.



We will follow with you.  Thank you for allowing me to see this

interesting patient in neurological consultation.









  ______________________________________________

  Sam GUMARO Estrada





DR:  MIGUEL

D:  04/04/2018 11:16

T:  04/04/2018 17:59

JOB#:  [id number]

CC:

## 2018-04-04 NOTE — NEUROLOGY PROGRESS NOTE
Objective


Physical Exam





Last Vital Signs








  Date Time  Temp Pulse Resp B/P (MAP) Pulse Ox O2 Delivery O2 Flow Rate FiO2


 


4/4/18 08:00 97.2 95 21 124/71 95 Room Air  





 97.2       


 


4/4/18 04:00        21











Laboratory Tests








Test


  4/4/18


00:20 4/4/18


00:28


 


White Blood Count


  13.8 K/UL


(4.8-10.8)  H 


 


 


Red Blood Count


  5.07 M/UL


(4.70-6.10) 


 


 


Hemoglobin


  14.8 G/DL


(14.2-18.0) 


 


 


Hematocrit


  42.7 %


(42.0-52.0) 


 


 


Mean Corpuscular Volume 84 FL (80-99)   


 


Mean Corpuscular Hemoglobin


  29.2 PG


(27.0-31.0) 


 


 


Mean Corpuscular Hemoglobin


Concent 34.8 G/DL


(32.0-36.0) 


 


 


Red Cell Distribution Width


  11.9 %


(11.6-14.8) 


 


 


Platelet Count


  247 K/UL


(150-450) 


 


 


Mean Platelet Volume


  7.7 FL


(6.5-10.1) 


 


 


Neutrophils (%) (Auto)


  70.5 %


(45.0-75.0) 


 


 


Lymphocytes (%) (Auto)


  20.7 %


(20.0-45.0) 


 


 


Monocytes (%) (Auto)


  6.8 %


(1.0-10.0) 


 


 


Eosinophils (%) (Auto)


  1.2 %


(0.0-3.0) 


 


 


Basophils (%) (Auto)


  0.9 %


(0.0-2.0) 


 


 


Prothrombin Time


  8.8 SEC


(9.30-11.50)  L 


 


 


Prothromb Time International


Ratio 0.8 (0.9-1.1)


L 


 


 


Activated Partial


Thromboplast Time 26 SEC (23-33)


  


 


 


Sodium Level


  138 MMOL/L


(136-145) 


 


 


Potassium Level


  4.0 MMOL/L


(3.5-5.1) 


 


 


Chloride Level


  103 MMOL/L


() 


 


 


Carbon Dioxide Level


  28 MMOL/L


(21-32) 


 


 


Anion Gap


  7 mmol/L


(5-15) 


 


 


Blood Urea Nitrogen


  16 mg/dL


(7-18) 


 


 


Creatinine


  0.9 MG/DL


(0.55-1.30) 


 


 


Estimat Glomerular Filtration


Rate > 60 mL/min


(>60) 


 


 


Glucose Level


  154 MG/DL


()  H 


 


 


Calcium Level


  9.0 MG/DL


(8.5-10.1) 


 


 


Magnesium Level


  1.8 MG/DL


(1.8-2.4) 


 


 


Total Bilirubin


  0.3 MG/DL


(0.2-1.0) 


 


 


Aspartate Amino Transf


(AST/SGOT) 19 U/L (15-37)


  


 


 


Alanine Aminotransferase


(ALT/SGPT) 31 U/L (12-78)


  


 


 


Alkaline Phosphatase


  73 U/L


() 


 


 


Troponin I


  0.000 ng/mL


(0.000-0.056) 


 


 


Pro-B-Type Natriuretic Peptide


  146 pg/mL


(0-125)  H 


 


 


Total Protein


  6.7 G/DL


(6.4-8.2) 


 


 


Albumin


  3.8 G/DL


(3.4-5.0) 


 


 


Globulin 2.9 g/dL   


 


Albumin/Globulin Ratio 1.3 (1.0-2.7)   


 


Urine Color  Yellow  


 


Urine Appearance  Clear  


 


Urine pH  6 (4.5-8.0)  


 


Urine Specific Gravity


  


  1.015


(1.005-1.035)


 


Urine Protein


  


  Negative


(NEGATIVE)


 


Urine Glucose (UA)


  


  Negative


(NEGATIVE)


 


Urine Ketones


  


  Negative


(NEGATIVE)


 


Urine Occult Blood


  


  Negative


(NEGATIVE)


 


Urine Nitrite


  


  Negative


(NEGATIVE)


 


Urine Bilirubin


  


  Negative


(NEGATIVE)


 


Urine Urobilinogen


  


  Normal MG/DL


(0.0-1.0)


 


Urine Leukocyte Esterase


  


  Negative


(NEGATIVE)











Impression/Recommendations


Recommendations


#[id number]











COMPA FONSECA Apr 4, 2018 11:17

## 2018-04-04 NOTE — CONSULTATION
History of Present Illness


General


Date patient seen:  Apr 4, 2018


Chief Complaint:  Chest Pain


Referring physician:  Dr. Cabrera


Reason for Consultation:  chest pain, dyspnea





Present Illness


HPI


37-year-old male, wtih history of stroke 2 months ago, diabetes, homeless, very 

poor historian presented to ER  with chest pain. 


Also complaining of vague generalized chest pain.   No headache no neck pain no 

fever no chills.  he was in the hospital 2 days ago and left because he said 

"they were not helping me"


Allergies:  


Coded Allergies:  


     No Known Allergies (Unverified , 4/14/15)





Medication History


Scheduled


Divalproex Sodium* (Depakote*), 500 MG PO Q12HR, (Reported)


Insulin Aspart (Novolog Flexpen), 30 SQ TID, (Reported)


Insulin Detemir (Levemir), 30 SUBQ BID, (Reported)


Metformin Hcl* (Metformin Hcl*), 500 MG ORAL TIDAC, (Reported)


Pregabalin* (Lyrica*), 75 MG ORAL THREE TIMES A DAY, (Reported)


Quetiapine Fumarate* (Seroquel*), 300 MG ORAL BEDTIME, (Reported)


Ziprasidone Hcl* (Geodon*), 20 MG ORAL DAILY, (Reported)





Scheduled PRN


Hydromorphone Hcl (Dilaudid), 1 MG PO Q4HR PRN for For Pain, (Reported)


Oxycodone/Acetaminophen 5-325* (Percocet 5-325 Mg Tablet*), 1 TAB ORAL Q4H PRN 

for For Pain, (Reported)


Zolpidem Tartrate* (Ambien*), 5 MG ORAL BEDTIME PRN for Insomnia, (Reported)





Miscellaneous Medications


Insulin Aspart* (Novolog*), 0 SUBQ, (Reported)


Insulin Aspart* (Novolog*), 0 SUBQ, (Reported)


Unable to Obtain Medications (Unable To Obtain Meds), (Reported)





Patient History


Healthcare decision maker





Resuscitation status


Full Code


Advanced Directive on File








Past Medical/Surgical History


Past Medical/Surgical History:  


(1) Acute coronary syndrome


(2) Opiate dependence


(3) Anxiety


(4) Diabetes mellitus


(5) hemiparesis/hemiataxia , left. acute





Review of Systems


All Other Systems:  negative except mentioned in HPI





Physical Exam


General Appearance:  WD/WN


Lines, tubes and drains:  peripheral


HEENT:  normocephalic, atraumatic


Neck:  non-tender, normal alignment


Respiratory/Chest:  chest wall non-tender, lungs clear


Breasts:  no masses


Cardiovascular/Chest:  normal rate


Abdomen:  non tender, soft


Genitourinary/Rectal:  normal genital exam


Extremities:  normal range of motion, non-tender, normal inspection





Last 24 Hour Vital Signs








  Date Time  Temp Pulse Resp B/P (MAP) Pulse Ox O2 Delivery O2 Flow Rate FiO2


 


4/4/18 12:00 97.9 92 19 123/69 96 Room Air  





 97.9       


 


4/4/18 08:00 97.2 95 21 124/71 95 Room Air  





 97.2       


 


4/4/18 04:00 97.5 84 18 113/72 96 Room Air  21





 97.5       


 


4/4/18 04:00  87      


 


4/4/18 03:04  88      


 


4/4/18 02:40 97.3 87 20 130/78 97 Room Air  21





 97.3       


 


4/4/18 02:35 97.7 101 18 139/70 96 Room Air  





 97.7       


 


4/4/18 02:00 97.0 97 22 124/73 95 Room Air  





 97.0       


 


4/4/18 01:26 97.7       


 


4/4/18 01:04 97.7 101 18 139/70 96 Room Air  





 97.7       


 


4/4/18 00:56 98.2       


 


4/3/18 23:44  119 18     


 


4/3/18 23:25 98.2 119 18 124/74 96 Room Air  





 98.2       

















Intake and Output  


 


 4/3/18 4/4/18





 19:00 07:00


 


Intake Total  240 ml


 


Balance  240 ml


 


  


 


Intake Oral  240 ml


 


# Voids  2


 


# Bowel Movements  1











Laboratory Tests








Test


  4/4/18


00:20 4/4/18


00:28


 


White Blood Count


  13.8 K/UL


(4.8-10.8)  H 


 


 


Red Blood Count


  5.07 M/UL


(4.70-6.10) 


 


 


Hemoglobin


  14.8 G/DL


(14.2-18.0) 


 


 


Hematocrit


  42.7 %


(42.0-52.0) 


 


 


Mean Corpuscular Volume 84 FL (80-99)   


 


Mean Corpuscular Hemoglobin


  29.2 PG


(27.0-31.0) 


 


 


Mean Corpuscular Hemoglobin


Concent 34.8 G/DL


(32.0-36.0) 


 


 


Red Cell Distribution Width


  11.9 %


(11.6-14.8) 


 


 


Platelet Count


  247 K/UL


(150-450) 


 


 


Mean Platelet Volume


  7.7 FL


(6.5-10.1) 


 


 


Neutrophils (%) (Auto)


  70.5 %


(45.0-75.0) 


 


 


Lymphocytes (%) (Auto)


  20.7 %


(20.0-45.0) 


 


 


Monocytes (%) (Auto)


  6.8 %


(1.0-10.0) 


 


 


Eosinophils (%) (Auto)


  1.2 %


(0.0-3.0) 


 


 


Basophils (%) (Auto)


  0.9 %


(0.0-2.0) 


 


 


Prothrombin Time


  8.8 SEC


(9.30-11.50)  L 


 


 


Prothromb Time International


Ratio 0.8 (0.9-1.1)


L 


 


 


Activated Partial


Thromboplast Time 26 SEC (23-33)


  


 


 


Sodium Level


  138 MMOL/L


(136-145) 


 


 


Potassium Level


  4.0 MMOL/L


(3.5-5.1) 


 


 


Chloride Level


  103 MMOL/L


() 


 


 


Carbon Dioxide Level


  28 MMOL/L


(21-32) 


 


 


Anion Gap


  7 mmol/L


(5-15) 


 


 


Blood Urea Nitrogen


  16 mg/dL


(7-18) 


 


 


Creatinine


  0.9 MG/DL


(0.55-1.30) 


 


 


Estimat Glomerular Filtration


Rate > 60 mL/min


(>60) 


 


 


Glucose Level


  154 MG/DL


()  H 


 


 


Calcium Level


  9.0 MG/DL


(8.5-10.1) 


 


 


Magnesium Level


  1.8 MG/DL


(1.8-2.4) 


 


 


Total Bilirubin


  0.3 MG/DL


(0.2-1.0) 


 


 


Aspartate Amino Transf


(AST/SGOT) 19 U/L (15-37)


  


 


 


Alanine Aminotransferase


(ALT/SGPT) 31 U/L (12-78)


  


 


 


Alkaline Phosphatase


  73 U/L


() 


 


 


Troponin I


  0.000 ng/mL


(0.000-0.056) 


 


 


Pro-B-Type Natriuretic Peptide


  146 pg/mL


(0-125)  H 


 


 


Total Protein


  6.7 G/DL


(6.4-8.2) 


 


 


Albumin


  3.8 G/DL


(3.4-5.0) 


 


 


Globulin 2.9 g/dL   


 


Albumin/Globulin Ratio 1.3 (1.0-2.7)   


 


Urine Color  Yellow  


 


Urine Appearance  Clear  


 


Urine pH  6 (4.5-8.0)  


 


Urine Specific Gravity


  


  1.015


(1.005-1.035)


 


Urine Protein


  


  Negative


(NEGATIVE)


 


Urine Glucose (UA)


  


  Negative


(NEGATIVE)


 


Urine Ketones


  


  Negative


(NEGATIVE)


 


Urine Occult Blood


  


  Negative


(NEGATIVE)


 


Urine Nitrite


  


  Negative


(NEGATIVE)


 


Urine Bilirubin


  


  Negative


(NEGATIVE)


 


Urine Urobilinogen


  


  Normal MG/DL


(0.0-1.0)


 


Urine Leukocyte Esterase


  


  Negative


(NEGATIVE)








Height (Feet):  5


Height (Inches):  11.00


Weight (Pounds):  237


Medications





Current Medications








 Medications


  (Trade)  Dose


 Ordered  Sig/Henry


 Route


 PRN Reason  Start Time


 Stop Time Status Last Admin


Dose Admin


 


 Acetaminophen


  (Tylenol)  650 mg  Q4H  PRN


 ORAL


 fever>100.5  4/4/18 07:30


 5/4/18 07:29   


 


 


 Al Hydroxide/Mg


 Hydroxide


  (Mylanta II)  30 ml  Q6H  PRN


 ORAL


 dyspepsia  4/4/18 07:30


 5/4/18 07:29   


 


 


 Aspirin


  (Ecotrin)  81 mg  DAILY


 ORAL


   4/4/18 13:00


 5/4/18 12:59  4/4/18 13:57


 


 


 Dextrose


  (Dextrose 50%)  25 ml  PRN  PRN


 IV


 BS 60 TO 69 MG/DL  4/4/18 07:45


 5/4/18 07:44   


 


 


 Dextrose


  (Dextrose 50%)  50 ml  PRN  PRN


 IV


 BS LESS THAN 60 MG/DL  4/4/18 08:00


 5/4/18 07:59   


 


 


 Divalproex Sodium


  (Depakote ER)  500 mg  EVERY 12  HOURS


 ORAL


   4/4/18 09:00


 5/4/18 08:59  4/4/18 10:18


 


 


 Heparin Sodium


  (Porcine)


  (Heparin 5000


 units/ml)  5,000 units  EVERY 12  HOURS


 SUBQ


   4/4/18 09:00


 5/4/18 08:59  4/4/18 10:43


 


 


 Insulin Aspart


  (NovoLOG)    BEFORE MEALS AND  HS


 SUBQ


   4/4/18 11:30


 5/4/18 11:29  4/4/18 12:47


 


 


 Lorazepam


  (Ativan 2mg/ml


 1ml)  0.5 mg  Q4H  PRN


 IV


 For Anxiety  4/4/18 07:30


 4/11/18 07:29   


 


 


 Morphine Sulfate


  (Morphine


 Sulfate)  1 mg  Q4H  PRN


 IVP


 Severe Pain (Pain Scale 7-10)  4/4/18 09:00


 4/11/18 08:59  4/4/18 09:05


 


 


 Nicotine


  (Nicoderm)  1 patch  Q24H


 TDERMAL


   4/4/18 13:30


 5/4/18 13:29  4/4/18 13:58


 


 


 Ondansetron HCl


  (Zofran)  4 mg  Q6H  PRN


 IVP


 Nausea & Vomiting  4/4/18 07:30


 5/4/18 07:29   


 


 


 Oxycodone/


 Acetaminophen


  (Percocet 5-325)  1 tab  Q4H  PRN


 ORAL


 moderate pain (4 to 6)  4/4/18 07:30


 4/11/18 07:29   


 


 


 Polyethylene


 Glycol


  (Miralax)  17 gm  HSPRN  PRN


 ORAL


 Constipation  4/4/18 07:30


 5/4/18 07:29   


 


 


 Pregabalin


  (Lyrica)  75 mg  TID@1000,1500,2000


 ORAL


   4/4/18 10:00


 5/4/18 09:59  4/4/18 10:17


 


 


 Promethazine HCl/


 Codeine


  (Phenergan with


 Codeine)  5 ml  Q4H  PRN


 ORAL


 For Cough  4/4/18 07:30


 5/4/18 07:29   


 


 


 Quetiapine


 Fumarate


  (SEROquel)  300 mg  BEDTIME


 ORAL


   4/4/18 21:00


 5/4/18 20:59   


 


 


 Theophylline


  (Florin-Dur)  100 mg  EVERY 12  HOURS


 ORAL


   4/4/18 09:00


 5/4/18 08:59  4/4/18 10:16


 


 


 Ziprasidone


  (Geodon)  20 mg  TWICE A  DAY


 ORAL


   4/4/18 09:00


 5/4/18 08:59  4/4/18 10:17


 


 


 Zolpidem Tartrate


  (Ambien)  5 mg  HSPRN  PRN


 ORAL


 Insomnia  4/4/18 07:30


 4/11/18 07:29   


 











Assessment/Plan


Problem List:  


(1) Acute coronary syndrome


ICD Codes:  I24.9 - Acute ischemic heart disease, unspecified


SNOMED:  739985153


(2) Opiate dependence


ICD Codes:  F11.20 - Opioid dependence


SNOMED:  45430121


(3) Generalized weakness


ICD Codes:  R53.1 - Weakness


SNOMED:  58903311


(4) hemiparesis/hemiataxia , left. acute


(5) COPD (chronic obstructive pulmonary disease)


ICD Codes:  J44.9 - Chronic obstructive pulmonary disease, unspecified


SNOMED:  56481516


(6) Anxiety


ICD Codes:  F41.9 - Anxiety disorder, unspecified; Z68.41 - Body mass index (BMI

) 40.0-44.9, adult


SNOMED:  50796056


(7) Diabetes mellitus


ICD Codes:  E11.9 - Diabetes mellitus


SNOMED:  37137947


Assessment/Plan


serial ekg, troponin


echo


respiratory treatment


titrate fio2


d/w cardiologist


social service consult











Lissette Steward MD Apr 4, 2018 15:08

## 2018-04-05 VITALS — SYSTOLIC BLOOD PRESSURE: 120 MMHG | DIASTOLIC BLOOD PRESSURE: 65 MMHG

## 2018-04-05 VITALS — DIASTOLIC BLOOD PRESSURE: 54 MMHG | SYSTOLIC BLOOD PRESSURE: 120 MMHG

## 2018-04-05 VITALS — SYSTOLIC BLOOD PRESSURE: 123 MMHG | DIASTOLIC BLOOD PRESSURE: 67 MMHG

## 2018-04-05 VITALS — SYSTOLIC BLOOD PRESSURE: 112 MMHG | DIASTOLIC BLOOD PRESSURE: 68 MMHG

## 2018-04-05 VITALS — SYSTOLIC BLOOD PRESSURE: 111 MMHG | DIASTOLIC BLOOD PRESSURE: 64 MMHG

## 2018-04-05 VITALS — DIASTOLIC BLOOD PRESSURE: 79 MMHG | SYSTOLIC BLOOD PRESSURE: 121 MMHG

## 2018-04-05 RX ADMIN — PREGABALIN SCH MG: 75 CAPSULE ORAL at 09:32

## 2018-04-05 RX ADMIN — MORPHINE SULFATE PRN MG: 4 INJECTION INTRAVENOUS at 23:03

## 2018-04-05 RX ADMIN — INSULIN ASPART SCH UNITS: 100 INJECTION, SOLUTION INTRAVENOUS; SUBCUTANEOUS at 17:26

## 2018-04-05 RX ADMIN — ZIPRASIDONE HYDROCHLORIDE SCH MG: 20 CAPSULE ORAL at 17:23

## 2018-04-05 RX ADMIN — MORPHINE SULFATE PRN MG: 4 INJECTION INTRAVENOUS at 10:02

## 2018-04-05 RX ADMIN — PREGABALIN SCH MG: 75 CAPSULE ORAL at 21:55

## 2018-04-05 RX ADMIN — PREGABALIN SCH MG: 75 CAPSULE ORAL at 16:02

## 2018-04-05 RX ADMIN — ZIPRASIDONE HYDROCHLORIDE SCH MG: 20 CAPSULE ORAL at 09:30

## 2018-04-05 RX ADMIN — HEPARIN SODIUM SCH UNITS: 5000 INJECTION INTRAVENOUS; SUBCUTANEOUS at 22:00

## 2018-04-05 RX ADMIN — HEPARIN SODIUM SCH UNITS: 5000 INJECTION INTRAVENOUS; SUBCUTANEOUS at 09:34

## 2018-04-05 RX ADMIN — MORPHINE SULFATE PRN MG: 4 INJECTION INTRAVENOUS at 18:49

## 2018-04-05 RX ADMIN — INSULIN ASPART SCH UNITS: 100 INJECTION, SOLUTION INTRAVENOUS; SUBCUTANEOUS at 11:30

## 2018-04-05 RX ADMIN — DIVALPROEX SODIUM SCH MG: 500 TABLET, FILM COATED, EXTENDED RELEASE ORAL at 21:54

## 2018-04-05 RX ADMIN — REGADENOSON SCH MG: 0.08 INJECTION, SOLUTION INTRAVENOUS at 15:15

## 2018-04-05 RX ADMIN — DIVALPROEX SODIUM SCH MG: 500 TABLET, FILM COATED, EXTENDED RELEASE ORAL at 09:31

## 2018-04-05 RX ADMIN — MORPHINE SULFATE PRN MG: 4 INJECTION INTRAVENOUS at 14:31

## 2018-04-05 RX ADMIN — INSULIN ASPART SCH UNITS: 100 INJECTION, SOLUTION INTRAVENOUS; SUBCUTANEOUS at 21:53

## 2018-04-05 RX ADMIN — ASPIRIN SCH MG: 81 TABLET, DELAYED RELEASE ORAL at 09:31

## 2018-04-05 RX ADMIN — INSULIN ASPART SCH UNITS: 100 INJECTION, SOLUTION INTRAVENOUS; SUBCUTANEOUS at 06:14

## 2018-04-05 RX ADMIN — MORPHINE SULFATE PRN MG: 4 INJECTION INTRAVENOUS at 05:50

## 2018-04-05 NOTE — NEUROLOGY PROGRESS NOTE
Interim History


Interim History


Complaints:  not  better


Events:  stable





Objective


Physical Exam





Last Vital Signs








  Date Time  Temp Pulse Resp B/P (MAP) Pulse Ox O2 Delivery O2 Flow Rate FiO2


 


4/5/18 10:02 97.7       


 


4/5/18 08:00  86 21 112/68 95 Room Air  


 


4/5/18 06:40        21








General:  well developed, well nourished, no acute distress


Head:  normocophalic, atraumatic


Neck:  no rigidity


EENT:  benign





Neurologic Exam


Mental Status:  awake, alert, oriented x4, other - inappropriate affect


Speech:  normal speech, no dysarthia


Language:  normal language, no aphasia


Cranial Nerve II:  fundus normal, visual fields, no papilledema


Cranial Nerves III, IV, VI:  PERRLA, EOMI, pupils


Cranial Nerve V:  other - L face numbness


Cranial Nerve VII:  normal facial expressions


Cranial Nerve VIII:  normal hearing, no nystagmus


Cranial Nerve IX:  normal palate elevation, gag response


Cranial Nerve X:  no voice hoarseness


Cranial Nerve XI:  SCM symmetric, trapezii function normal


Cranial Nerve XII:  tongue midline, no tongue atrophy/fasciculations


Motor System:  normal muscle tone, other - able to lift all limbs poor effort


Sensory:  other - L hemisensory loss


Coordination:  other - clumsy L side.


Deep Tendon Reflexes:  1+ bicep (L), 1+ bicep (R), 1+ tricep (L), 1+ tricep (R)

, 1+ brachioradialis (L), 1+ brachioradialis (R), 1+ knee (L), 1+ knee (R), 1+ 

ankle (L), 1+ ankle (R)


Reflexes:  mute plantar (L), mute plantar (R)





Impression/Recommendations


Problems:  


(1) hemiparesis/hemiataxia , left. acute


(2) Psychosomatic disorder


Status:  unchanged


Recommendations


#[id number]


pt/ot


psych eval


hold opiates











COMPA FONSECA Apr 5, 2018 13:00

## 2018-04-05 NOTE — DIAGNOSTIC IMAGING REPORT
Indication: Altered mental status 

 

Technique: Sagittal T1 FLAIR PROPELLER, sagittal T2 , sagittal STIR, axial T2

PROPELLER, axial 3D COSMIC ASPIR images were obtained through the cervical spine

 

Comparison: 4/15/2015

 

Findings: 

Images somewhat degraded by patient motion.

 

There is mild tonsillar herniation. There is evidence of prior suboccipital

craniectomy. 

 

There is no definite focal cord signal abnormality.

 

There is multilevel degenerative change of the cervical spine with multilevel disc

desiccation, disc space narrowing and uncovertebral joint hypertrophy and disc

osteophyte complexes. Level by level analysis as below:

 

C2/C3: Unremarkable.

 

C3/C4: Mild to moderate disc disease with uncovertebral joint hypertrophy. There is

mild central canal stenosis. Probable mild right foraminal narrowing.

 

C4/C5: There is a moderate-sized disc osteophyte complex with mild to moderate

central canal stenosis and bilateral foraminal narrowing.

 

C5/C6: Small disc osteophyte complex with mild indentation of the thecal sac.

Question mild foraminal stenosis on the right.

 

C6/C7: Unremarkable.

 

C7/T1: Unremarkable.

 

Imaged paraspinal soft tissues appear grossly unremarkable. No focal abnormality is

seen on large field of view localizer sequences.

 

IMPRESSION:

Motion degraded exam.

 

No definite focal cord signal abnormality.

 

Multilevel degenerative change of the cervical spine most pronounced at C4-C5.

Overall not significantly progressed compared to prior exam of 4/15/2015. 

 

Chiari 1 malformation. Status post suboccipital clinically.

## 2018-04-05 NOTE — PROGRESS NOTE
DATE:  04/05/2018



SUBJECTIVE:  The patient is a 37-year-old male patient with chest pain.  He

has come confusion and disorganized thought process and mood lability and

increased depression.  That is why, his attending has requested daily

psychiatric consultation.  Diagnosis, schizoaffective, bipolar type.



MENTAL STATUS EXAMINATION:  The patient is a 37-year-old male.  Appearance

is disheveled.  Attitude is irritable and agitated.  Affect guarded and

restricted.  Intellect poor.  Mood depressed and anxious.  Motor activity,

psychomotor agitation.  Attention span is poor.  Orientation x2.  Speech

is pressured.  Thought process, disorganized and illogical.  Thought

content, no auditory hallucinations or paranoid delusions.  Insight and

judgment is poor.



DIAGNOSIS:  Schizoaffective, bipolar type.



PLAN:  Plan for this patient is to treat him with Geodon 20 mg twice a day,

Depakote 500 mg twice a day, Seroquel 300 mg at bedtime.  Provided 18-20

minutes of supportive therapy.  Chart is reviewed and discussed with

staff.  Seen and assessed in his room.









  ______________________________________________

  Gulshan Armstrong M.D.





DR:  FLIP

D:  04/05/2018 12:21

T:  04/05/2018 16:49

JOB#:  [id number]

CC:

## 2018-04-05 NOTE — CARDIAC ELECTROPHYSIOLOGY PN
Assessment/Plan


Assessment/Plan


1. Typical Chest pain.  Ruled out for MI.


EKG does not show any acute ischemic changes.  Echocardiogram EF 70%.  Nuclear 

stress test done today. Results pending


2. Hypertension.  Stable, hold off on antihypertensives.


3. Diabetes.


4. History of prior CVA, on aspirin.


5. Asthma, theophylline.





MARCELINO RN





Subjective


Subjective


Comfortable in NAD. No chest pain or SOB or any arrhythmias.





Objective





Last 24 Hour Vital Signs








  Date Time  Temp Pulse Resp B/P (MAP) Pulse Ox O2 Delivery O2 Flow Rate FiO2


 


4/5/18 16:02 97.7       


 


4/5/18 16:00 98.1 97 18 123/67 96 Room Air  21





 98.1       


 


4/5/18 15:25  92 18  96 Room Air  21


 


4/5/18 15:20  92 20  96 Room Air  21


 


4/5/18 14:31 97.7       


 


4/5/18 12:00  77      


 


4/5/18 12:00 97.5 84 20 121/79 95 Room Air  21





 97.5       


 


4/5/18 11:28      Room Air  21


 


4/5/18 11:28      Room Air  21


 


4/5/18 10:31 97.7       


 


4/5/18 10:31 97.7       


 


4/5/18 10:02 97.7       


 


4/5/18 08:00 97.7 86 21 112/68 95 Room Air  





 97.7       


 


4/5/18 08:00  95      


 


4/5/18 06:40  88 18  94 Room Air  21


 


4/5/18 06:38  87 18  94 Room Air  21


 


4/5/18 04:00  78      


 


4/5/18 04:00 97.9 86 18 111/64 95 Room Air  





 97.9       


 


4/5/18 02:31      Room Air  21


 


4/5/18 02:31      Room Air  21


 


4/5/18 00:00  86      


 


4/5/18 00:00 97.2 87 20 120/54 98 Room Air  





 97.2       


 


4/4/18 23:04      Room Air  21


 


4/4/18 23:04      Room Air  21


 


4/4/18 20:00 97.5 75 20 111/67 98 Room Air  





 97.5       


 


4/4/18 20:00  84      


 


4/4/18 18:33  82 18  98 Room Air  21


 


4/4/18 18:32  94 20  97 Room Air  

















Intake and Output  


 


 4/4/18 4/5/18





 19:00 07:00


 


Intake Total 500 ml 400 ml


 


Balance 500 ml 400 ml


 


  


 


Intake Oral 500 ml 400 ml


 


# Voids 2 








Objective


HEAD AND NECK:  No JVD.


LUNGS:  Clear.


CARDIOVASCULAR:  Regular S1 and S2 with no gallop or murmur.


ABDOMEN:  Soft.


EXTREMITIES:  No pitting edema.











Oskar Prieto MD Apr 5, 2018 16:53

## 2018-04-05 NOTE — CARDIOLOGY REPORT
--------------- APPROVED REPORT --------------





EKG Measurement

Heart [id number]

MI [id number]

[id number]

[id number]

[id number]





Normal sinus rhythm

Normal ECG

## 2018-04-05 NOTE — PULMONOLOGY PROGRESS NOTE
Assessment/Plan


Problems:  


(1) Acute coronary syndrome


(2) Opiate dependence


(3) Generalized weakness


(4) hemiparesis/hemiataxia , left. acute


(5) COPD (chronic obstructive pulmonary disease)


(6) Anxiety


(7) Diabetes mellitus


Assessment/Plan


stress test done, 


Imaging done


symptomatic treatment


titrate fio2 to sat of 92%


respiratory treatment


sliding scale





Subjective


ROS Limited/Unobtainable:  No


Interval Events:  comfortable, nad


Allergies:  


Coded Allergies:  


     No Known Allergies (Unverified , 4/14/15)





Objective





Last 24 Hour Vital Signs








  Date Time  Temp Pulse Resp B/P (MAP) Pulse Ox O2 Delivery O2 Flow Rate FiO2


 


4/5/18 14:31 97.7       


 


4/5/18 12:00  77      


 


4/5/18 11:28      Room Air  21 4/5/18 11:28      Room Air  21 4/5/18 10:02 97.7       


 


4/5/18 08:00 97.7 86 21 112/68 95 Room Air  





 97.7       


 


4/5/18 08:00  95      


 


4/5/18 06:40  88 18  94 Room Air  21 4/5/18 06:38  87 18  94 Room Air  21 4/5/18 04:00  78      


 


4/5/18 04:00 97.9 86 18 111/64 95 Room Air  





 97.9       


 


4/5/18 02:31      Room Air  21


 


4/5/18 02:31      Room Air  21


 


4/5/18 00:00  86      


 


4/5/18 00:00 97.2 87 20 120/54 98 Room Air  





 97.2       


 


4/4/18 23:04      Room Air  21 4/4/18 23:04      Room Air  21 4/4/18 20:00 97.5 75 20 111/67 98 Room Air  





 97.5       


 


4/4/18 20:00  84      


 


4/4/18 18:33  82 18  98 Room Air  21


 


4/4/18 18:32  94 20  97 Room Air  


 


4/4/18 16:00  84      


 


4/4/18 16:00 97.9 86 21 131/70 96 Room Air  





 97.9       

















Intake and Output  


 


 4/4/18 4/5/18





 19:00 07:00


 


Intake Total 500 ml 400 ml


 


Balance 500 ml 400 ml


 


  


 


Intake Oral 500 ml 400 ml


 


# Voids 2 








General Appearance:  WD/WN


HEENT:  normocephalic, atraumatic


Respiratory/Chest:  chest wall non-tender, lungs clear


Cardiovascular:  normal peripheral pulses, normal rate


Abdomen:  normal bowel sounds, soft, non tender, no scars


Extremities:  no cyanosis


Neurologic/Psychiatric:  CNs II-XII grossly normal


Lymphatic:  no neck adenopathy





Current Medications








 Medications


  (Trade)  Dose


 Ordered  Sig/Henry


 Route


 PRN Reason  Start Time


 Stop Time Status Last Admin


Dose Admin


 


 Acetaminophen


  (Tylenol)  650 mg  Q4H  PRN


 ORAL


 fever>100.5  4/4/18 07:30


 5/4/18 07:29   


 


 


 Al Hydroxide/Mg


 Hydroxide


  (Mylanta II)  30 ml  Q6H  PRN


 ORAL


 dyspepsia  4/4/18 07:30


 5/4/18 07:29   


 


 


 Aspirin


  (Ecotrin)  81 mg  DAILY


 ORAL


   4/4/18 13:00


 5/4/18 12:59  4/5/18 09:31


 


 


 Dextrose


  (Dextrose 50%)  25 ml  PRN  PRN


 IV


 BS 60 TO 69 MG/DL  4/4/18 07:45


 5/4/18 07:44   


 


 


 Dextrose


  (Dextrose 50%)  50 ml  PRN  PRN


 IV


 BS LESS THAN 60 MG/DL  4/4/18 08:00


 5/4/18 07:59   


 


 


 Divalproex Sodium


  (Depakote ER)  500 mg  EVERY 12  HOURS


 ORAL


   4/4/18 09:00


 5/4/18 08:59  4/5/18 09:31


 


 


 Heparin Sodium


  (Porcine)


  (Heparin 5000


 units/ml)  5,000 units  EVERY 12  HOURS


 SUBQ


   4/4/18 09:00


 5/4/18 08:59  4/5/18 09:34


 


 


 Insulin Aspart


  (NovoLOG)    BEFORE MEALS AND  HS


 SUBQ


   4/4/18 11:30


 5/4/18 11:29  4/5/18 06:14


 


 


 Lorazepam


  (Ativan 2mg/ml


 1ml)  0.5 mg  Q4H  PRN


 IV


 For Anxiety  4/4/18 07:30


 4/11/18 07:29   


 


 


 Morphine Sulfate


  (Morphine


 Sulfate)  1 mg  Q4H  PRN


 IVP


 Severe Pain (Pain Scale 7-10)  4/4/18 09:00


 4/11/18 08:59  4/5/18 14:31


 


 


 Nicotine


  (Nicoderm)  1 patch  Q24H


 TDERMAL


   4/4/18 13:30


 5/4/18 13:29  4/5/18 14:31


 


 


 Ondansetron HCl


  (Zofran)  4 mg  Q6H  PRN


 IVP


 Nausea & Vomiting  4/4/18 07:30


 5/4/18 07:29   


 


 


 Oxycodone/


 Acetaminophen


  (Percocet 5-325)  1 tab  Q4H  PRN


 ORAL


 moderate pain (4 to 6)  4/4/18 07:30


 4/11/18 07:29   


 


 


 Polyethylene


 Glycol


  (Miralax)  17 gm  HSPRN  PRN


 ORAL


 Constipation  4/4/18 07:30


 5/4/18 07:29   


 


 


 Pregabalin


  (Lyrica)  75 mg  TID@1000,1500,2000


 ORAL


   4/4/18 10:00


 5/4/18 09:59  4/5/18 09:32


 


 


 Promethazine HCl/


 Codeine


  (Phenergan with


 Codeine)  5 ml  Q4H  PRN


 ORAL


 For Cough  4/4/18 07:30


 5/4/18 07:29   


 


 


 Quetiapine


 Fumarate


  (SEROquel)  300 mg  BEDTIME


 ORAL


   4/4/18 21:00


 5/4/18 20:59  4/4/18 21:11


 


 


 Regadenoson


  (Lexiscan)  0.4 mg  ONCE


 IV


   4/4/18 15:15


 4/5/18 23:59   


 


 


 Theophylline


  (Florin-Dur)  100 mg  EVERY 12  HOURS


 ORAL


   4/6/18 09:00


 5/6/18 08:59   


 


 


 Ziprasidone


  (Geodon)  20 mg  TWICE A  DAY


 ORAL


   4/4/18 09:00


 5/4/18 08:59  4/5/18 09:30


 


 


 Zolpidem Tartrate


  (Ambien)  5 mg  HSPRN  PRN


 ORAL


 Insomnia  4/4/18 07:30


 4/11/18 07:29   


 

















Lissette Steward MD Apr 5, 2018 15:19

## 2018-04-05 NOTE — GENERAL PROGRESS NOTE
Assessment/Plan


Problem List:  


(1) Left arm numbness prob old residual to neck surgery


(2) Chest wall pain


ICD Codes:  R07.89 - Other chest pain


SNOMED:  102941104


(3) Diabetes


ICD Codes:  E11.9 - Type 2 diabetes mellitus without complications


SNOMED:  19779504


(4) Chest pain


ICD Codes:  R07.9 - Chest pain


SNOMED:  29468831


Status:  unchanged


Assessment/Plan


ot pt diet pain control cbc bmp am dc plan





Subjective


Constitutional:  Reports: weakness


Allergies:  


Coded Allergies:  


     No Known Allergies (Unverified , 4/14/15)


All Systems:  reviewed and negative except above


Subjective


calm in bed sl cp





Objective





Last 24 Hour Vital Signs








  Date Time  Temp Pulse Resp B/P (MAP) Pulse Ox O2 Delivery O2 Flow Rate FiO2


 


4/5/18 11:28      Room Air  21 4/5/18 11:28      Room Air  21 4/5/18 10:02 97.7       


 


4/5/18 08:00 97.7 86 21 112/68 95 Room Air  





 97.7       


 


4/5/18 08:00  95      


 


4/5/18 06:40  88 18  94 Room Air  21 4/5/18 06:38  87 18  94 Room Air  21 4/5/18 04:00  78      


 


4/5/18 04:00 97.9 86 18 111/64 95 Room Air  





 97.9       


 


4/5/18 02:31      Room Air  21 4/5/18 02:31      Room Air  21 4/5/18 00:00  86      


 


4/5/18 00:00 97.2 87 20 120/54 98 Room Air  





 97.2       


 


4/4/18 23:04      Room Air  21 4/4/18 23:04      Room Air  21


 


4/4/18 20:00 97.5 75 20 111/67 98 Room Air  





 97.5       


 


4/4/18 20:00  84      


 


4/4/18 18:33  82 18  98 Room Air  21


 


4/4/18 18:32  94 20  97 Room Air  


 


4/4/18 16:00  84      


 


4/4/18 16:00 97.9 86 21 131/70 96 Room Air  





 97.9       


 


4/4/18 15:00  92 18  98 Room Air  

















Intake and Output  


 


 4/4/18 4/5/18





 18:59 06:59


 


Intake Total 500 ml 400 ml


 


Balance 500 ml 400 ml


 


  


 


Intake Oral 500 ml 400 ml


 


# Voids 2 








Height (Feet):  5


Height (Inches):  11.00


Weight (Pounds):  237


General Appearance:  alert


EENT:  normal ENT inspection


Neck:  normal alignment


Cardiovascular:  normal peripheral pulses, normal rate, regular rhythm


Respiratory/Chest:  chest wall non-tender, lungs clear, normal breath sounds


Abdomen:  normal bowel sounds, non tender, soft


Extremities:  normal inspection


Edema:  no edema noted Arm (L), no edema noted Arm (R), no edema noted Leg (L), 

no edema noted Leg (R), no edema noted Pedal (L), no edema noted Pedal (R), no 

edema noted Generalized


Neurologic:  responsive, motor weakness


Skin:  normal pigmentation, warm/dry











ROSE MARIE IRVING Apr 5, 2018 12:57

## 2018-04-05 NOTE — DIAGNOSTIC IMAGING REPORT
Indication: Altered mental status

 

Technique: MRI the brain performed utilizing T1 sagittal, T2 axial, T1 FLAIR axial,

T2 FLAIR axial, T2*GRE and diffusion axial images without gadolinium. 

 

Comparison: Noncontrast CT of the head 4/3/2018; MRI of the brain 4/15/2015

 

Findings: 

Mild motion degradation.

 

No diffusion abnormalities are seen on diffusion weighted imaging. 

 

The sulci, ventricles and cisterns are within normal limits for age and stable

compared to the prior exam. There is no shift of midline structures. No significant

extra-axial collections of fluid or blood are demonstrated. The sella and parasellar

regions are unremarkable. Expected signal flow voids are seen of the vessels of the

skull base.

 

Visualized mastoid air cells are clear. Very minimal mucosal thickening noted in some

ethmoid air cells. Patient again noted to be status post suboccipital craniotomy.

Very minimal downward cerebellar tonsillar herniation stable.

 

IMPRESSION: 

No evidence of acute infarct. No intracranial hemorrhage. No mass effect, edema or

midline shift.

 

Unchanged mild downward cerebellar tonsillar herniation likely reflective of a Chiari

I. Evidence of prior suboccipital craniectomy.

## 2018-04-06 VITALS — DIASTOLIC BLOOD PRESSURE: 66 MMHG | SYSTOLIC BLOOD PRESSURE: 118 MMHG

## 2018-04-06 VITALS — DIASTOLIC BLOOD PRESSURE: 64 MMHG | SYSTOLIC BLOOD PRESSURE: 119 MMHG

## 2018-04-06 VITALS — SYSTOLIC BLOOD PRESSURE: 117 MMHG | DIASTOLIC BLOOD PRESSURE: 79 MMHG

## 2018-04-06 VITALS — DIASTOLIC BLOOD PRESSURE: 65 MMHG | SYSTOLIC BLOOD PRESSURE: 120 MMHG

## 2018-04-06 VITALS — DIASTOLIC BLOOD PRESSURE: 74 MMHG | SYSTOLIC BLOOD PRESSURE: 127 MMHG

## 2018-04-06 VITALS — DIASTOLIC BLOOD PRESSURE: 66 MMHG | SYSTOLIC BLOOD PRESSURE: 107 MMHG

## 2018-04-06 LAB
ADD MANUAL DIFF: NO
ANION GAP SERPL CALC-SCNC: 6 MMOL/L (ref 5–15)
BASOPHILS NFR BLD AUTO: 0.9 % (ref 0–2)
BUN SERPL-MCNC: 14 MG/DL (ref 7–18)
CALCIUM SERPL-MCNC: 8.6 MG/DL (ref 8.5–10.1)
CHLORIDE SERPL-SCNC: 105 MMOL/L (ref 98–107)
CO2 SERPL-SCNC: 27 MMOL/L (ref 21–32)
CREAT SERPL-MCNC: 0.9 MG/DL (ref 0.55–1.3)
EOSINOPHIL NFR BLD AUTO: 2.6 % (ref 0–3)
ERYTHROCYTE [DISTWIDTH] IN BLOOD BY AUTOMATED COUNT: 12.1 % (ref 11.6–14.8)
HCT VFR BLD CALC: 43 % (ref 42–52)
HGB BLD-MCNC: 14.4 G/DL (ref 14.2–18)
LYMPHOCYTES NFR BLD AUTO: 34.7 % (ref 20–45)
MCV RBC AUTO: 86 FL (ref 80–99)
MONOCYTES NFR BLD AUTO: 7 % (ref 1–10)
NEUTROPHILS NFR BLD AUTO: 54.9 % (ref 45–75)
PLATELET # BLD: 201 K/UL (ref 150–450)
POTASSIUM SERPL-SCNC: 3.8 MMOL/L (ref 3.5–5.1)
RBC # BLD AUTO: 5.01 M/UL (ref 4.7–6.1)
SODIUM SERPL-SCNC: 138 MMOL/L (ref 136–145)
WBC # BLD AUTO: 9.4 K/UL (ref 4.8–10.8)

## 2018-04-06 RX ADMIN — ZIPRASIDONE HYDROCHLORIDE SCH MG: 20 CAPSULE ORAL at 09:31

## 2018-04-06 RX ADMIN — MORPHINE SULFATE PRN MG: 4 INJECTION INTRAVENOUS at 17:16

## 2018-04-06 RX ADMIN — MORPHINE SULFATE PRN MG: 4 INJECTION INTRAVENOUS at 04:27

## 2018-04-06 RX ADMIN — INSULIN ASPART SCH UNITS: 100 INJECTION, SOLUTION INTRAVENOUS; SUBCUTANEOUS at 21:29

## 2018-04-06 RX ADMIN — ZIPRASIDONE HYDROCHLORIDE SCH MG: 20 CAPSULE ORAL at 18:00

## 2018-04-06 RX ADMIN — INSULIN ASPART SCH UNITS: 100 INJECTION, SOLUTION INTRAVENOUS; SUBCUTANEOUS at 06:05

## 2018-04-06 RX ADMIN — DIVALPROEX SODIUM SCH MG: 500 TABLET, FILM COATED, EXTENDED RELEASE ORAL at 21:27

## 2018-04-06 RX ADMIN — PREGABALIN SCH MG: 75 CAPSULE ORAL at 09:32

## 2018-04-06 RX ADMIN — HEPARIN SODIUM SCH UNITS: 5000 INJECTION INTRAVENOUS; SUBCUTANEOUS at 21:28

## 2018-04-06 RX ADMIN — THEOPHYLLINE ANHYDROUS SCH MG: 100 CAPSULE, EXTENDED RELEASE ORAL at 09:31

## 2018-04-06 RX ADMIN — THEOPHYLLINE ANHYDROUS SCH MG: 100 CAPSULE, EXTENDED RELEASE ORAL at 21:27

## 2018-04-06 RX ADMIN — MORPHINE SULFATE PRN MG: 4 INJECTION INTRAVENOUS at 21:27

## 2018-04-06 RX ADMIN — PREGABALIN SCH MG: 75 CAPSULE ORAL at 21:27

## 2018-04-06 RX ADMIN — MORPHINE SULFATE PRN MG: 4 INJECTION INTRAVENOUS at 08:28

## 2018-04-06 RX ADMIN — PREGABALIN SCH MG: 75 CAPSULE ORAL at 16:07

## 2018-04-06 RX ADMIN — INSULIN ASPART SCH UNITS: 100 INJECTION, SOLUTION INTRAVENOUS; SUBCUTANEOUS at 16:16

## 2018-04-06 RX ADMIN — INSULIN ASPART SCH UNITS: 100 INJECTION, SOLUTION INTRAVENOUS; SUBCUTANEOUS at 12:43

## 2018-04-06 RX ADMIN — DIVALPROEX SODIUM SCH MG: 500 TABLET, FILM COATED, EXTENDED RELEASE ORAL at 09:31

## 2018-04-06 RX ADMIN — MORPHINE SULFATE PRN MG: 4 INJECTION INTRAVENOUS at 12:42

## 2018-04-06 RX ADMIN — ASPIRIN SCH MG: 81 TABLET, DELAYED RELEASE ORAL at 09:31

## 2018-04-06 RX ADMIN — HEPARIN SODIUM SCH UNITS: 5000 INJECTION INTRAVENOUS; SUBCUTANEOUS at 09:36

## 2018-04-06 NOTE — NEUROLOGY PROGRESS NOTE
Interim History


Interim History


ROS Limited/Unobtainable:  No


Complaints:  not  better/ mood ok


Events:  stable/movement improving





Objective


Physical Exam





Last Vital Signs








  Date Time  Temp Pulse Resp B/P (MAP) Pulse Ox O2 Delivery O2 Flow Rate FiO2


 


4/6/18 10:31 97.5       


 


4/6/18 08:00  93      


 


4/6/18 08:00   20 118/66 97 Room Air  21











Laboratory Tests








Test


  4/5/18


18:25 4/6/18


09:45


 


Urine Opiates Screen


  Negative


(NEGATIVE) 


 


 


Urine Barbiturates Screen


  Negative


(NEGATIVE) 


 


 


Phencyclidine (PCP) Screen


  Negative


(NEGATIVE) 


 


 


Urine Amphetamines Screen


  Negative


(NEGATIVE) 


 


 


Urine Benzodiazepines Screen


  Negative


(NEGATIVE) 


 


 


Urine Cocaine Screen


  Negative


(NEGATIVE) 


 


 


Urine Marijuana (THC) Screen


  Negative


(NEGATIVE) 


 


 


White Blood Count


  


  9.4 K/UL


(4.8-10.8)


 


Red Blood Count


  


  5.01 M/UL


(4.70-6.10)


 


Hemoglobin


  


  14.4 G/DL


(14.2-18.0)


 


Hematocrit


  


  43.0 %


(42.0-52.0)


 


Mean Corpuscular Volume  86 FL (80-99)  


 


Mean Corpuscular Hemoglobin


  


  28.8 PG


(27.0-31.0)


 


Mean Corpuscular Hemoglobin


Concent 


  33.6 G/DL


(32.0-36.0)


 


Red Cell Distribution Width


  


  12.1 %


(11.6-14.8)


 


Platelet Count


  


  201 K/UL


(150-450)


 


Mean Platelet Volume


  


  7.2 FL


(6.5-10.1)


 


Neutrophils (%) (Auto)


  


  54.9 %


(45.0-75.0)


 


Lymphocytes (%) (Auto)


  


  34.7 %


(20.0-45.0)


 


Monocytes (%) (Auto)


  


  7.0 %


(1.0-10.0)


 


Eosinophils (%) (Auto)


  


  2.6 %


(0.0-3.0)


 


Basophils (%) (Auto)


  


  0.9 %


(0.0-2.0)


 


Sodium Level


  


  138 MMOL/L


(136-145)


 


Potassium Level


  


  3.8 MMOL/L


(3.5-5.1)


 


Chloride Level


  


  105 MMOL/L


()


 


Carbon Dioxide Level


  


  27 MMOL/L


(21-32)


 


Anion Gap


  


  6 mmol/L


(5-15)


 


Blood Urea Nitrogen


  


  14 mg/dL


(7-18)


 


Creatinine


  


  0.9 MG/DL


(0.55-1.30)


 


Estimat Glomerular Filtration


Rate 


  > 60 mL/min


(>60)


 


Glucose Level


  


  136 MG/DL


()  H


 


Calcium Level


  


  8.6 MG/DL


(8.5-10.1)








General:  well developed, well nourished, no acute distress


Head:  normocophalic, atraumatic


Neck:  no rigidity


EENT:  benign





Neurologic Exam


Mental Status:  awake - arousable, alert, oriented x4, other - inappropriate 

affect


Speech:  normal speech, no dysarthia


Language:  normal language, no aphasia


Cranial Nerve II:  fundus normal, visual fields, no papilledema


Cranial Nerves III, IV, VI:  PERRLA, EOMI, pupils


Cranial Nerve V:  other - L face numbness


Cranial Nerve VII:  normal facial expressions


Cranial Nerve VIII:  normal hearing, no nystagmus


Cranial Nerve IX:  normal palate elevation, gag response


Cranial Nerve X:  no voice hoarseness


Cranial Nerve XI:  SCM symmetric, trapezii function normal


Cranial Nerve XII:  tongue midline, no tongue atrophy/fasciculations


Motor System:  normal muscle tone, other - able to lift all limbs poor effort


Sensory:  other - L hemisensory loss


Coordination:  other - clumsy L side.


Deep Tendon Reflexes:  1+ bicep (L), 1+ bicep (R), 1+ tricep (L), 1+ tricep (R)

, 1+ brachioradialis (L), 1+ brachioradialis (R), 1+ knee (L), 1+ knee (R), 1+ 

ankle (L), 1+ ankle (R)


Reflexes:  mute plantar (L), mute plantar (R)


Gait:  other - with assistance





Impression/Recommendations


Problems:  


(1) hemiparesis/hemiataxia , left. acute


(2) Psychosomatic disorder


Status:  unchanged


Recommendations


#[id number]


pt/ot


psych eval


hold opiates











COMPA FONSECA Apr 6, 2018 12:14

## 2018-04-06 NOTE — DIAGNOSTIC IMAGING REPORT
Indications: Chest pain

 

Technique: Single day single isotope protocol utilized. Initially, resting images

obtained using IV administration 9.9 millicuries 99M technetium Myoview.

Subsequently, patient underwent  lexiscan stress testing. See cardiology report for

details. During Lexiscan infusion, IV administration 30.8 mCi 99 M technetium

Myoview. SPECT and planar images obtained. SPECT images gated to 8 phases of the

cardiac cycle were also obtained, and reformatted into cine images for evaluation of

ejection fraction.

 

Comparison:

 

Findings: Presence or absence of symptoms during infusion is not described on the

cardiology report. Per cardiology report, resting EKG demonstrates normal sinus

rhythm presence or absence of EKG changes during the infusion is not described on the

cardiology report.  Imaging demonstrates no fixed or reversible poststress perfusion

defects. Normal cardiac chamber size.. Calculated post stress ejection fraction 68%.

No focal wall motion abnormality demonstrated.

 

Impression: Nonischemic clinical response to pharmacologic stress, per cardiology

report

 

Nonischemic electrocardiographic response to pharmacologic stress, per cardiology

report

 

No imaging findings to suggest ischemia, at level of stress achieved.

 

Calculated post stress ejection fraction 68%

## 2018-04-06 NOTE — GENERAL PROGRESS NOTE
Assessment/Plan


Problem List:  


(1) Left arm numbness prob old residual to neck surgery


(2) Chest wall pain


ICD Codes:  R07.89 - Other chest pain


SNOMED:  520642374


(3) Diabetes


ICD Codes:  E11.9 - Type 2 diabetes mellitus without complications


SNOMED:  80811221


(4) Chest pain


ICD Codes:  R07.9 - Chest pain


SNOMED:  92016694


Status:  unchanged


Assessment/Plan


ot pt diet pain control cbc bmp am dc plan snf





Subjective


Constitutional:  Reports: weakness


Allergies:  


Coded Allergies:  


     No Known Allergies (Unverified , 4/14/15)


All Systems:  reviewed and negative except above


Subjective


calm in bed sl cp





Objective





Last 24 Hour Vital Signs








  Date Time  Temp Pulse Resp B/P (MAP) Pulse Ox O2 Delivery O2 Flow Rate FiO2


 


4/6/18 12:42 98.0       


 


4/6/18 12:00 98.0 95 20 120/65 96 Room Air  21





 98.0       


 


4/6/18 10:31 97.5       


 


4/6/18 09:32 97.5       


 


4/6/18 08:58 97.5       


 


4/6/18 08:28 97.5       


 


4/6/18 08:00  93      


 


4/6/18 08:00 97.5 91 20 118/66 97 Room Air  21





 97.5       


 


4/6/18 07:38      Room Air  


 


4/6/18 07:37      Room Air  


 


4/6/18 04:00  96      


 


4/6/18 04:00 97.5 91 22 107/66 97 Room Air  21





 97.5       


 


4/6/18 03:06      Room Air  


 


4/6/18 03:06      Room Air  


 


4/6/18 00:00 96.9 99 20 119/64 99 Room Air  21





 96.9       


 


4/6/18 00:00  98      


 


4/5/18 23:11  91 20  97 Room Air  21


 


4/5/18 23:08  91 20  97 Room Air  21


 


4/5/18 20:00 98.6 98 18 120/65 97 Room Air  21





 98.6       


 


4/5/18 20:00  97      


 


4/5/18 19:23      Room Air  21


 


4/5/18 19:20  96 20  97 Room Air  21


 


4/5/18 18:49 97.7       


 


4/5/18 16:02 97.7       


 


4/5/18 16:00 98.1 97 18 123/67 96 Room Air  21





 98.1       


 


4/5/18 16:00  78      


 


4/5/18 15:25  92 18  96 Room Air  21


 


4/5/18 15:20  92 20  96 Room Air  21


 


4/5/18 14:31 97.7       

















Intake and Output  


 


 4/5/18 4/6/18





 19:00 07:00


 


Intake Total 350 ml 600 ml


 


Output Total 1200 ml 800 ml


 


Balance -850 ml -200 ml


 


  


 


Intake Oral 350 ml 600 ml


 


Output Urine Total 1200 ml 800 ml








Laboratory Tests


4/5/18 18:25: 


Urine Opiates Screen Negative, Urine Barbiturates Screen Negative, 

Phencyclidine (PCP) Screen Negative, Urine Amphetamines Screen Negative, Urine 

Benzodiazepines Screen Negative, Urine Cocaine Screen Negative, Urine Marijuana 

(THC) Screen Negative


4/6/18 09:45: 


White Blood Count 9.4, Red Blood Count 5.01, Hemoglobin 14.4, Hematocrit 43.0, 

Mean Corpuscular Volume 86, Mean Corpuscular Hemoglobin 28.8, Mean Corpuscular 

Hemoglobin Concent 33.6, Red Cell Distribution Width 12.1, Platelet Count 201, 

Mean Platelet Volume 7.2, Neutrophils (%) (Auto) 54.9, Lymphocytes (%) (Auto) 

34.7, Monocytes (%) (Auto) 7.0, Eosinophils (%) (Auto) 2.6, Basophils (%) (Auto

) 0.9, Sodium Level 138, Potassium Level 3.8, Chloride Level 105, Carbon 

Dioxide Level 27, Anion Gap 6, Blood Urea Nitrogen 14, Creatinine 0.9, Estimat 

Glomerular Filtration Rate > 60, Glucose Level 136H, Calcium Level 8.6


Height (Feet):  5


Height (Inches):  11.00


Weight (Pounds):  237


General Appearance:  alert


EENT:  normal ENT inspection


Neck:  normal alignment


Cardiovascular:  normal peripheral pulses, normal rate, regular rhythm


Respiratory/Chest:  chest wall non-tender, lungs clear, normal breath sounds


Abdomen:  normal bowel sounds, non tender, soft


Extremities:  normal inspection


Edema:  no edema noted Arm (L), no edema noted Arm (R), no edema noted Leg (L), 

no edema noted Leg (R), no edema noted Pedal (L), no edema noted Pedal (R), no 

edema noted Generalized


Neurologic:  responsive, motor weakness


Skin:  normal pigmentation, warm/dry











ROSE MARIE IRVING Apr 6, 2018 12:53

## 2018-04-06 NOTE — CARDIAC ELECTROPHYSIOLOGY PN
Assessment/Plan


Assessment/Plan


1. Atypical Chest pain.  Ruled out for MI.


    EKG does not show any acute ischemic changes.  Echocardiogram EF 70%.  


    Nuclear stress test done yesterday. Results still pending


2. Hypertension.  Stable off on antihypertensives.


3. Diabetes.


4. History of prior CVA, on aspirin.


5. Asthma, theophylline.





MARCELINO RN





Subjective


Subjective


Comfortable in NAD. No chest pain or SOB .





Objective





Last 24 Hour Vital Signs








  Date Time  Temp Pulse Resp B/P (MAP) Pulse Ox O2 Delivery O2 Flow Rate FiO2


 


4/6/18 08:28 97.5       


 


4/6/18 07:38      Room Air  


 


4/6/18 07:37      Room Air  


 


4/6/18 04:00  96      


 


4/6/18 04:00 97.5 91 22 107/66 97 Room Air  21





 97.5       


 


4/6/18 03:06      Room Air  


 


4/6/18 03:06      Room Air  


 


4/6/18 00:00 96.9 99 20 119/64 99 Room Air  21





 96.9       


 


4/6/18 00:00  98      


 


4/5/18 23:11  91 20  97 Room Air  21


 


4/5/18 23:08  91 20  97 Room Air  21


 


4/5/18 20:00 98.6 98 18 120/65 97 Room Air  21





 98.6       


 


4/5/18 20:00  97      


 


4/5/18 19:23      Room Air  21


 


4/5/18 19:20  96 20  97 Room Air  21


 


4/5/18 18:49 97.7       


 


4/5/18 17:07 97.7       


 


4/5/18 16:02 97.7       


 


4/5/18 16:00 98.1 97 18 123/67 96 Room Air  21





 98.1       


 


4/5/18 16:00  78      


 


4/5/18 15:25  92 18  96 Room Air  21


 


4/5/18 15:20  92 20  96 Room Air  21


 


4/5/18 14:31 97.7       


 


4/5/18 12:00  77      


 


4/5/18 12:00 97.5 84 20 121/79 95 Room Air  21





 97.5       


 


4/5/18 11:28      Room Air  21


 


4/5/18 11:28      Room Air  21


 


4/5/18 10:31 97.7       


 


4/5/18 10:02 97.7       

















Intake and Output  


 


 4/5/18 4/6/18





 19:00 07:00


 


Intake Total 350 ml 600 ml


 


Output Total 1200 ml 800 ml


 


Balance -850 ml -200 ml


 


  


 


Intake Oral 350 ml 600 ml


 


Output Urine Total 1200 ml 800 ml











Laboratory Tests








Test


  4/5/18


18:25


 


Urine Opiates Screen


  Negative


(NEGATIVE)


 


Urine Barbiturates Screen


  Negative


(NEGATIVE)


 


Phencyclidine (PCP) Screen


  Negative


(NEGATIVE)


 


Urine Amphetamines Screen


  Negative


(NEGATIVE)


 


Urine Benzodiazepines Screen


  Negative


(NEGATIVE)


 


Urine Cocaine Screen


  Negative


(NEGATIVE)


 


Urine Marijuana (THC) Screen


  Negative


(NEGATIVE)








Objective


HEAD AND NECK:  No JVD.


LUNGS:  Clear.


CARDIOVASCULAR:  Regular S1 and S2 with no gallop or murmur.


ABDOMEN:  Soft.


EXTREMITIES:  No pitting edema.











Oskar Prieto MD Apr 6, 2018 09:09

## 2018-04-06 NOTE — PULMONOLOGY PROGRESS NOTE
Assessment/Plan


Problems:  


(1) Acute coronary syndrome


(2) Opiate dependence


(3) Generalized weakness


(4) hemiparesis/hemiataxia , left. acute


(5) COPD (chronic obstructive pulmonary disease)


(6) Anxiety


(7) Diabetes mellitus


Assessment/Plan


stress test done, negative





Imaging done


symptomatic treatment


titrate fio2 to sat of 92%


respiratory treatment


sliding scale


All medications and treatment were reviewed./





Subjective


ROS Limited/Unobtainable:  No


Constitutional:  Reports: no symptoms


HEENT:  Repors: no symptoms


Respiratory:  Reports: no symptoms


Allergies:  


Coded Allergies:  


     No Known Allergies (Unverified , 4/14/15)





Objective





Last 24 Hour Vital Signs








  Date Time  Temp Pulse Resp B/P (MAP) Pulse Ox O2 Delivery O2 Flow Rate FiO2


 


4/6/18 13:12 98.0       


 


4/6/18 12:42 98.0       


 


4/6/18 12:00  91      


 


4/6/18 12:00 98.0 95 20 120/65 96 Room Air  21





 98.0       


 


4/6/18 10:31 97.5       


 


4/6/18 09:32 97.5       


 


4/6/18 08:28 97.5       


 


4/6/18 08:00  93      


 


4/6/18 08:00 97.5 91 20 118/66 97 Room Air  21





 97.5       


 


4/6/18 07:38      Room Air  


 


4/6/18 07:37      Room Air  


 


4/6/18 04:00  96      


 


4/6/18 04:00 97.5 91 22 107/66 97 Room Air  21





 97.5       


 


4/6/18 03:06      Room Air  


 


4/6/18 03:06      Room Air  


 


4/6/18 00:00 96.9 99 20 119/64 99 Room Air  21





 96.9       


 


4/6/18 00:00  98      


 


4/5/18 23:11  91 20  97 Room Air  21


 


4/5/18 23:08  91 20  97 Room Air  21


 


4/5/18 20:00 98.6 98 18 120/65 97 Room Air  21





 98.6       


 


4/5/18 20:00  97      


 


4/5/18 19:23      Room Air  21


 


4/5/18 19:20  96 20  97 Room Air  21


 


4/5/18 18:49 97.7       


 


4/5/18 16:02 97.7       


 


4/5/18 16:00 98.1 97 18 123/67 96 Room Air  21





 98.1       


 


4/5/18 16:00  78      

















Intake and Output  


 


 4/5/18 4/6/18





 19:00 07:00


 


Intake Total 350 ml 600 ml


 


Output Total 1200 ml 800 ml


 


Balance -850 ml -200 ml


 


  


 


Intake Oral 350 ml 600 ml


 


Output Urine Total 1200 ml 800 ml








General Appearance:  WD/WN


HEENT:  normocephalic, atraumatic


Respiratory/Chest:  chest wall non-tender, normal breath sounds, no accessory 

muscle use


Cardiovascular:  normal peripheral pulses, normal rate


Abdomen:  normal bowel sounds, no organomegaly


Laboratory Tests


4/5/18 18:25: 


Urine Opiates Screen Negative, Urine Barbiturates Screen Negative, 

Phencyclidine (PCP) Screen Negative, Urine Amphetamines Screen Negative, Urine 

Benzodiazepines Screen Negative, Urine Cocaine Screen Negative, Urine Marijuana 

(THC) Screen Negative


4/6/18 09:45: 


White Blood Count 9.4, Red Blood Count 5.01, Hemoglobin 14.4, Hematocrit 43.0, 

Mean Corpuscular Volume 86, Mean Corpuscular Hemoglobin 28.8, Mean Corpuscular 

Hemoglobin Concent 33.6, Red Cell Distribution Width 12.1, Platelet Count 201, 

Mean Platelet Volume 7.2, Neutrophils (%) (Auto) 54.9, Lymphocytes (%) (Auto) 

34.7, Monocytes (%) (Auto) 7.0, Eosinophils (%) (Auto) 2.6, Basophils (%) (Auto

) 0.9, Sodium Level 138, Potassium Level 3.8, Chloride Level 105, Carbon 

Dioxide Level 27, Anion Gap 6, Blood Urea Nitrogen 14, Creatinine 0.9, Estimat 

Glomerular Filtration Rate > 60, Glucose Level 136H, Calcium Level 8.6





Current Medications








 Medications


  (Trade)  Dose


 Ordered  Sig/Henry


 Route


 PRN Reason  Start Time


 Stop Time Status Last Admin


Dose Admin


 


 Acetaminophen


  (Tylenol)  650 mg  Q4H  PRN


 ORAL


 fever>100.5  4/4/18 07:30


 5/4/18 07:29   


 


 


 Al Hydroxide/Mg


 Hydroxide


  (Mylanta II)  30 ml  Q6H  PRN


 ORAL


 dyspepsia  4/4/18 07:30


 5/4/18 07:29   


 


 


 Aspirin


  (Ecotrin)  81 mg  DAILY


 ORAL


   4/4/18 13:00


 5/4/18 12:59  4/6/18 09:31


 


 


 Dextrose


  (Dextrose 50%)  25 ml  PRN  PRN


 IV


 BS 60 TO 69 MG/DL  4/4/18 07:45


 5/4/18 07:44   


 


 


 Dextrose


  (Dextrose 50%)  50 ml  PRN  PRN


 IV


 BS LESS THAN 60 MG/DL  4/4/18 08:00


 5/4/18 07:59   


 


 


 Divalproex Sodium


  (Depakote ER)  500 mg  EVERY 12  HOURS


 ORAL


   4/4/18 09:00


 5/4/18 08:59  4/6/18 09:31


 


 


 Heparin Sodium


  (Porcine)


  (Heparin 5000


 units/ml)  5,000 units  EVERY 12  HOURS


 SUBQ


   4/4/18 09:00


 5/4/18 08:59  4/6/18 09:36


 


 


 Insulin Aspart


  (NovoLOG)    BEFORE MEALS AND  HS


 SUBQ


   4/4/18 11:30


 5/4/18 11:29  4/6/18 12:43


 


 


 Lorazepam


  (Ativan 2mg/ml


 1ml)  0.5 mg  Q4H  PRN


 IV


 For Anxiety  4/4/18 07:30


 4/11/18 07:29   


 


 


 Morphine Sulfate


  (Morphine


 Sulfate)  1 mg  Q4H  PRN


 IVP


 Severe Pain (Pain Scale 7-10)  4/4/18 09:00


 4/11/18 08:59  4/6/18 12:42


 


 


 Nicotine


  (Nicoderm)  1 patch  Q24H


 TDERMAL


   4/4/18 13:30


 5/4/18 13:29  4/6/18 12:41


 


 


 Ondansetron HCl


  (Zofran)  4 mg  Q6H  PRN


 IVP


 Nausea & Vomiting  4/4/18 07:30


 5/4/18 07:29   


 


 


 Oxycodone/


 Acetaminophen


  (Percocet 5-325)  1 tab  Q4H  PRN


 ORAL


 moderate pain (4 to 6)  4/4/18 07:30


 4/11/18 07:29   


 


 


 Polyethylene


 Glycol


  (Miralax)  17 gm  HSPRN  PRN


 ORAL


 Constipation  4/4/18 07:30


 5/4/18 07:29   


 


 


 Pregabalin


  (Lyrica)  75 mg  TID@1000,1500,2000


 ORAL


   4/4/18 10:00


 5/4/18 09:59  4/6/18 09:32


 


 


 Promethazine HCl/


 Codeine


  (Phenergan with


 Codeine)  5 ml  Q4H  PRN


 ORAL


 For Cough  4/4/18 07:30


 5/4/18 07:29   


 


 


 Quetiapine


 Fumarate


  (SEROquel)  300 mg  BEDTIME


 ORAL


   4/4/18 21:00


 5/4/18 20:59  4/5/18 21:54


 


 


 Theophylline


  (Florin-Dur)  100 mg  EVERY 12  HOURS


 ORAL


   4/6/18 09:00


 5/6/18 08:59  4/6/18 09:31


 


 


 Ziprasidone


  (Geodon)  20 mg  TWICE A  DAY


 ORAL


   4/4/18 09:00


 5/4/18 08:59  4/6/18 09:31


 


 


 Zolpidem Tartrate


  (Ambien)  5 mg  HSPRN  PRN


 ORAL


 Insomnia  4/4/18 07:30


 4/11/18 07:29   


 

















Lissette Steward MD Apr 6, 2018 15:48

## 2018-04-06 NOTE — CONSULTATION
DATE OF CONSULTATION:  04/04/2018



NOTE:  POOR AUDIO



HISTORY OF PRESENT ILLNESS:  The patient is a 37-year-old male patient.  He

was admitted to [facility] secondary to chest pain and

dizziness.  This patient has lot of mood lability and agitation worsened

by the stress of his medical illness.  That is why his attending physician

requested daily psychiatric consultation.  This patient to be seen by

Psychiatry.   I saw and assessed him at bedside.  He continues to be

disorganized.  He does have some mood lability.  He has got no logical

plan for own his self-care, feelings of helplessness and hopelessness, low

energy, poor appetite, and lost of interest in activity.  Insight and

judgment is poor _____ attending physician has requested daily psychiatric

consultation.



MEDICATIONS:  As far as psychotropic medications, _____ Geodon 20 mg

b.i.d., Depakote 500 mg twice a day, and Seroquel 300 mg at bedtime.



SOCIAL HISTORY:  He is financially supported by [employer] and Medicare _____.  He

is currently homeless.



SUBSTANCE ABUSE HISTORY:  Denies any _____ drug and alcohol use.  ______.

 



PSYCHIATRIC HISTORY:  Schizoaffective, bipolar type.  He has multiple

psychiatric admissions.



STRENGTHS:  He is motivated to get better.



WEAKNESSES:  He is impulsive  _____.



MENTAL STATUS EXAMINATION:  The patient is a 37-year-old male.  Appearance

is disheveled.  Attitude is irritable and agitated.  Affect is guarded and

restricted.  Intellect poor.  Mood depressed and anxious.  Motor activity,

psychomotor agitation.  Attention span is poor.  Orientation x2.  Speech

is pressured.  Thought process, disorganized.  Thought content, paranoid

delusions and auditory hallucinations.  Insight and judgment is poor.



DIAGNOSES:

1. Schizoaffective, bipolar type.

2. Medical problems, chest pain.

3. Psychosocial stressors, financial.



PLAN:  I am going to restart him on Geodon 20 mg twice a day for psychosis,

Depakote 500 mg twice a day _____, and Seroquel 300 mg at bedtime.

Provide 20 minutes supportive therapy and encourage him to interact

appropriately with staff and other patients.  Chart reviewed and discussed

with staff.  The patient was seen and assessed in his room ______.









  ______________________________________________

  Gulshan Armstrong M.D.





DR:  Thor

D:  04/04/2018 05:06

T:  04/06/2018 00:02

JOB#:  [id number]

CC:

## 2018-04-07 VITALS — DIASTOLIC BLOOD PRESSURE: 74 MMHG | SYSTOLIC BLOOD PRESSURE: 102 MMHG

## 2018-04-07 VITALS — DIASTOLIC BLOOD PRESSURE: 65 MMHG | SYSTOLIC BLOOD PRESSURE: 116 MMHG

## 2018-04-07 VITALS — DIASTOLIC BLOOD PRESSURE: 69 MMHG | SYSTOLIC BLOOD PRESSURE: 114 MMHG

## 2018-04-07 VITALS — SYSTOLIC BLOOD PRESSURE: 108 MMHG | DIASTOLIC BLOOD PRESSURE: 64 MMHG

## 2018-04-07 VITALS — SYSTOLIC BLOOD PRESSURE: 132 MMHG | DIASTOLIC BLOOD PRESSURE: 77 MMHG

## 2018-04-07 VITALS — DIASTOLIC BLOOD PRESSURE: 72 MMHG | SYSTOLIC BLOOD PRESSURE: 119 MMHG

## 2018-04-07 LAB
ADD MANUAL DIFF: NO
ANION GAP SERPL CALC-SCNC: 7 MMOL/L (ref 5–15)
BASOPHILS NFR BLD AUTO: 0.8 % (ref 0–2)
BUN SERPL-MCNC: 18 MG/DL (ref 7–18)
CALCIUM SERPL-MCNC: 8.7 MG/DL (ref 8.5–10.1)
CHLORIDE SERPL-SCNC: 103 MMOL/L (ref 98–107)
CO2 SERPL-SCNC: 27 MMOL/L (ref 21–32)
CREAT SERPL-MCNC: 1 MG/DL (ref 0.55–1.3)
EOSINOPHIL NFR BLD AUTO: 2.7 % (ref 0–3)
ERYTHROCYTE [DISTWIDTH] IN BLOOD BY AUTOMATED COUNT: 12.1 % (ref 11.6–14.8)
HCT VFR BLD CALC: 39.6 % (ref 42–52)
HGB BLD-MCNC: 14.2 G/DL (ref 14.2–18)
LYMPHOCYTES NFR BLD AUTO: 31.6 % (ref 20–45)
MCV RBC AUTO: 85 FL (ref 80–99)
MONOCYTES NFR BLD AUTO: 6.8 % (ref 1–10)
NEUTROPHILS NFR BLD AUTO: 58.2 % (ref 45–75)
PLATELET # BLD: 197 K/UL (ref 150–450)
POTASSIUM SERPL-SCNC: 3.6 MMOL/L (ref 3.5–5.1)
RBC # BLD AUTO: 4.65 M/UL (ref 4.7–6.1)
SODIUM SERPL-SCNC: 137 MMOL/L (ref 136–145)
WBC # BLD AUTO: 9.4 K/UL (ref 4.8–10.8)

## 2018-04-07 RX ADMIN — INSULIN ASPART SCH UNITS: 100 INJECTION, SOLUTION INTRAVENOUS; SUBCUTANEOUS at 16:30

## 2018-04-07 RX ADMIN — MORPHINE SULFATE PRN MG: 4 INJECTION INTRAVENOUS at 15:10

## 2018-04-07 RX ADMIN — PREGABALIN SCH MG: 75 CAPSULE ORAL at 09:04

## 2018-04-07 RX ADMIN — INSULIN ASPART SCH UNITS: 100 INJECTION, SOLUTION INTRAVENOUS; SUBCUTANEOUS at 11:51

## 2018-04-07 RX ADMIN — INSULIN ASPART SCH UNITS: 100 INJECTION, SOLUTION INTRAVENOUS; SUBCUTANEOUS at 06:27

## 2018-04-07 RX ADMIN — PREGABALIN SCH MG: 75 CAPSULE ORAL at 21:03

## 2018-04-07 RX ADMIN — MORPHINE SULFATE PRN MG: 4 INJECTION INTRAVENOUS at 23:53

## 2018-04-07 RX ADMIN — INSULIN ASPART SCH UNITS: 100 INJECTION, SOLUTION INTRAVENOUS; SUBCUTANEOUS at 21:03

## 2018-04-07 RX ADMIN — THEOPHYLLINE ANHYDROUS SCH MG: 100 CAPSULE, EXTENDED RELEASE ORAL at 21:01

## 2018-04-07 RX ADMIN — DIVALPROEX SODIUM SCH MG: 500 TABLET, FILM COATED, EXTENDED RELEASE ORAL at 09:03

## 2018-04-07 RX ADMIN — HEPARIN SODIUM SCH UNITS: 5000 INJECTION INTRAVENOUS; SUBCUTANEOUS at 09:10

## 2018-04-07 RX ADMIN — MORPHINE SULFATE PRN MG: 4 INJECTION INTRAVENOUS at 01:51

## 2018-04-07 RX ADMIN — PREGABALIN SCH MG: 75 CAPSULE ORAL at 15:40

## 2018-04-07 RX ADMIN — DIVALPROEX SODIUM SCH MG: 500 TABLET, FILM COATED, EXTENDED RELEASE ORAL at 21:01

## 2018-04-07 RX ADMIN — MORPHINE SULFATE PRN MG: 4 INJECTION INTRAVENOUS at 06:22

## 2018-04-07 RX ADMIN — MORPHINE SULFATE PRN MG: 4 INJECTION INTRAVENOUS at 10:43

## 2018-04-07 RX ADMIN — ZIPRASIDONE HYDROCHLORIDE SCH MG: 20 CAPSULE ORAL at 17:25

## 2018-04-07 RX ADMIN — ZIPRASIDONE HYDROCHLORIDE SCH MG: 20 CAPSULE ORAL at 09:03

## 2018-04-07 RX ADMIN — HEPARIN SODIUM SCH UNITS: 5000 INJECTION INTRAVENOUS; SUBCUTANEOUS at 21:00

## 2018-04-07 RX ADMIN — MORPHINE SULFATE PRN MG: 4 INJECTION INTRAVENOUS at 19:34

## 2018-04-07 RX ADMIN — THEOPHYLLINE ANHYDROUS SCH MG: 100 CAPSULE, EXTENDED RELEASE ORAL at 09:03

## 2018-04-07 RX ADMIN — ASPIRIN SCH MG: 81 TABLET, DELAYED RELEASE ORAL at 09:03

## 2018-04-07 NOTE — PROGRESS NOTE
DATE:  04/07/2018



SUBJECTIVE:  The patient is a 37-year-old male patient with chest pain,

weakness, confused, disorganized, and mood lability worsened by the stress

of his medical illness.  So, his attending has requested daily psychiatric

consultation.



MENTAL STATUS EXAMINATION:  The patient is a 37-year-old male with

psychomotor agitation.  Mood is irritable and agitated.  Affect is guarded

and restricted.  Thought process is disorganized and illogical.  Endorses

suicidal ideations ______.  Insight and judgment is poor.



DIAGNOSIS:  Schizoaffective, bipolar type.



PLAN:  Plan is to treat with Geodon 20 mg twice a day, Depakote 500 mg

twice a day, and Seroquel 300 mg nightly.  Provided 18-20 minutes of

supportive therapy.  Chart was reviewed and discussed with staff.  Seen

and assessed at bedside.









  ______________________________________________

  Gulshan Armstrong M.D.





DR:  MYRA

D:  04/07/2018 11:41

T:  04/07/2018 22:53

JOB#:  [id number]

CC:

## 2018-04-07 NOTE — CONSULTATION
DATE OF CONSULTATION:  04/05/2018



NOTE:  POOR AUDIO



PSYCHOTHERAPY CONSULTATION PROGRESS NOTE



CONSULTING PHYSICIAN:  Mynor Mayen M.D.



TREATING ATTENDING PHYSICIAN:  Samir Cabrera D.O.



HISTORY:  The patient is a 37-year-old male patient who has a history of

mental illness including schizoaffective disorder, bipolar type.  The

patient has been unstable in his mood, is anxious, irritable, and labile.

For these reasons, he was referred for psychotherapeutic services _____

hospital due to chest pain and dizziness.  When this clinician assessed

this patient, the patient states that he believes that he had a stroke a

few months ago, has been feeling very helpless, hopeless, and devastated

at this time.  This has impacted his functioning, his speech, his

relationships.  For these reasons, he has been having a difficult time

regulating _____ affect.  He denies any suicidal or homicidal thoughts of

ideation.  Denies any auditory or visual hallucinations.



PAST MEDICAL HISTORY:  History of diabetes, hypertension, and chronic

pain.



ALLERGIES:  The patient has no known drug allergies.



SUBSTANCE ABUSE HISTORY:  The patient has a history of smoking cigarettes.

Denies history of alcohol use.  Denies history of illicit substance use.



PSYCHIATRIC HISTORY:  The patient has a history of schizoaffective

disorder, bipolar type and has been treated with psychotropic medications

in the past.



SOCIAL HISTORY:  The patient is a 37-year-old male patient.  He was very

_____ the patient states that he has a fiancee and a son and is

financially sustained through [employer].



MENTAL STATUS EXAMINATION:  The patient is alert and oriented to person and

place.  Mood is irritable.  Affect is labile.  Thought process is

disorganized.  Thought content has been _____.  He has poor attention and

concentration.



PLAN:  This clinician assessed the patient and assessed the patient's

mental status.  Provided the patient reality orientation and supportive

psychotherapy.  Encouraging the patient to participate in treatment

milieu.  Providing him with coping skills.  Continue behavioral

management.  This clinician has reviewed the patient's chart and discussed

the treatment with treatment team.









  ______________________________________________

  Mynor Mayen PsyD.





DR:  TYRON

D:  04/06/2018 14:14

T:  04/07/2018 02:21

JOB#:  [id number]

CC:

## 2018-04-07 NOTE — GENERAL PROGRESS NOTE
Assessment/Plan


Problem List:  


(1) Left arm numbness prob old residual to neck surgery


(2) Chest wall pain


ICD Codes:  R07.89 - Other chest pain


SNOMED:  117930599


(3) Diabetes


ICD Codes:  E11.9 - Type 2 diabetes mellitus without complications


SNOMED:  66867793


(4) Chest pain


ICD Codes:  R07.9 - Chest pain


SNOMED:  00765496


Status:  stable, progressing, tolerating diet


Assessment/Plan


ot pt diet pain control cbc bmp am dc plan snf





Subjective


Constitutional:  Reports: weakness


Allergies:  


Coded Allergies:  


     No Known Allergies (Unverified , 4/14/15)


All Systems:  reviewed and negative except above


Subjective


calm in bed sl cp





Objective





Last 24 Hour Vital Signs








  Date Time  Temp Pulse Resp B/P (MAP) Pulse Ox O2 Delivery O2 Flow Rate FiO2


 


4/7/18 09:04 98.1       


 


4/7/18 08:57 98.1 92 20 114/69 93   





 98.1       


 


4/7/18 04:00 98.1 89 17 108/64 96   





 98.1       


 


4/7/18 02:21 98.2       


 


4/7/18 01:51 98.2       


 


4/7/18 00:00 97.3 102 16 119/72    





 97.3       


 


4/6/18 22:26 98.2       


 


4/6/18 21:27 98.2       


 


4/6/18 21:27 98.2       


 


4/6/18 20:00 98.2 105 16 117/79    





 98.2       


 


4/6/18 16:00 97.7 86 20 127/74 95 Room Air  21





 97.7       


 


4/6/18 13:12 98.0       


 


4/6/18 12:42 98.0       


 


4/6/18 12:00  91      


 


4/6/18 12:00 98.0 95 20 120/65 96 Room Air  21





 98.0       

















Intake and Output  


 


 4/6/18 4/7/18





 19:00 07:00


 


Intake Total 1040 ml 


 


Output Total 1850 ml 900 ml


 


Balance -810 ml -900 ml


 


  


 


Intake Oral 1040 ml 


 


Output Urine Total 1850 ml 900 ml








Laboratory Tests


4/7/18 05:30: 


White Blood Count 9.4, Red Blood Count 4.65L, Hemoglobin 14.2, Hematocrit 39.6L

, Mean Corpuscular Volume 85, Mean Corpuscular Hemoglobin 30.6, Mean 

Corpuscular Hemoglobin Concent 35.9, Red Cell Distribution Width 12.1, Platelet 

Count 197, Mean Platelet Volume 7.8, Neutrophils (%) (Auto) 58.2, Lymphocytes (%

) (Auto) 31.6, Monocytes (%) (Auto) 6.8, Eosinophils (%) (Auto) 2.7, Basophils (

%) (Auto) 0.8, Sodium Level 137, Potassium Level 3.6, Chloride Level 103, 

Carbon Dioxide Level 27, Anion Gap 7, Blood Urea Nitrogen 18, Creatinine 1.0, 

Estimat Glomerular Filtration Rate > 60, Glucose Level 130H, Calcium Level 8.7


Height (Feet):  5


Height (Inches):  11.00


Weight (Pounds):  237


General Appearance:  alert


EENT:  normal ENT inspection


Neck:  non-tender, normal alignment, supple


Cardiovascular:  normal peripheral pulses, normal rate, regular rhythm


Respiratory/Chest:  chest wall non-tender, lungs clear, normal breath sounds


Abdomen:  normal bowel sounds, non tender, soft


Extremities:  normal inspection


Edema:  no edema noted Arm (L), no edema noted Arm (R), no edema noted Leg (L), 

no edema noted Leg (R), no edema noted Pedal (L), no edema noted Pedal (R), no 

edema noted Generalized


Neurologic:  responsive, motor weakness


Skin:  normal pigmentation, warm/dry











ROSE MARIE IRVING Apr 7, 2018 09:49

## 2018-04-07 NOTE — CARDIOLOGY REPORT
--------------- APPROVED REPORT --------------





EXAM: Two-dimensional and M-mode echocardiogram with Doppler and color 

Doppler.



INDICATION

Chest Pain 



M-Mode DIMENSIONS 

IVSd1.2 (0.7-1.1cm)Left Atrium (MM)4.1 (1.6-4.0cm)

LVDd4.4 (3.5-5.6cm)Aortic Root3.8 (2.0-3.7cm)

PWd1.4 (0.7-1.1cm)Aortic Cusp Exc.2.5 (1.5-2.0cm)

IVSs2.3 cm

LVDs2.0 (2.5-4.0cm)





Normal left ventricular chamber size, systolic function and wall motion.

Left ventricular ejection fraction estimated to be 55%.

No evidence of  left ventricular hypertrophy.

No evidence of pericardial effusion. 

All other cardiac chamber sizes are within normal limits. 

Focal aortic valve sclerosis with adequate cusp excursion.

Mildly Thickened mitral valve leaflets with normal excursion.

Mildly Mitral annulus and aortic root calcification.

Normal pulmonic valve structure. 

Normal tricuspid valve structure. 

IVC at normal size with physiologic collapse.



A  color flow and spectral Doppler study was performed and revealed:

No aortic regurgitation.

Trace  mitral regurgitation.

Mitral diastolic velocities suggest reduced left ventricular relaxation c/w mild LV 

diastolic dysfunction (Grade I )

Tricuspid  systolic velocities suggests peak right ventricular systolic pressure of  24  

mmHg

No Pulmonic regurgitation present.

## 2018-04-07 NOTE — CARDIAC ELECTROPHYSIOLOGY PN
Assessment/Plan


Assessment/Plan


1. Atypical Chest pain.  Ruled out for MI.


    EKG does not show any acute ischemic changes.  Echocardiogram EF 70%.  


    Nuclear stress test showed no ischemia


2. Hypertension.  Stable off on antihypertensives.


3. Diabetes.


4. History of prior CVA, on aspirin.


5. Asthma, theophylline.





Awaiting place,ent





Subjective


Subjective


Transferred to [facility]. No chest pain or SOB .





Objective





Last 24 Hour Vital Signs








  Date Time  Temp Pulse Resp B/P (MAP) Pulse Ox O2 Delivery O2 Flow Rate FiO2


 


4/7/18 15:48 98.0 96 20 102/74 97   





 98.0       


 


4/7/18 15:10 98.4       


 


4/7/18 12:05 98.4 90 20 116/65 95   





 98.4       


 


4/7/18 11:13 98.1       


 


4/7/18 10:43 98.1       


 


4/7/18 10:03 98.1       


 


4/7/18 09:04 98.1       


 


4/7/18 08:57 98.1 92 20 114/69 93   





 98.1       


 


4/7/18 04:00 98.1 89 17 108/64 96   





 98.1       


 


4/7/18 01:51 98.2       


 


4/7/18 00:00 97.3 102 16 119/72    





 97.3       


 


4/6/18 22:26 98.2       


 


4/6/18 21:27 98.2       


 


4/6/18 21:27 98.2       


 


4/6/18 20:00 98.2 105 16 117/79    





 98.2       

















Intake and Output  


 


 4/6/18 4/7/18





 19:00 07:00


 


Intake Total 1040 ml 


 


Output Total 1850 ml 900 ml


 


Balance -810 ml -900 ml


 


  


 


Intake Oral 1040 ml 


 


Output Urine Total 1850 ml 900 ml











Laboratory Tests








Test


  4/7/18


05:30


 


White Blood Count


  9.4 K/UL


(4.8-10.8)


 


Red Blood Count


  4.65 M/UL


(4.70-6.10)  L


 


Hemoglobin


  14.2 G/DL


(14.2-18.0)


 


Hematocrit


  39.6 %


(42.0-52.0)  L


 


Mean Corpuscular Volume 85 FL (80-99)  


 


Mean Corpuscular Hemoglobin


  30.6 PG


(27.0-31.0)


 


Mean Corpuscular Hemoglobin


Concent 35.9 G/DL


(32.0-36.0)


 


Red Cell Distribution Width


  12.1 %


(11.6-14.8)


 


Platelet Count


  197 K/UL


(150-450)


 


Mean Platelet Volume


  7.8 FL


(6.5-10.1)


 


Neutrophils (%) (Auto)


  58.2 %


(45.0-75.0)


 


Lymphocytes (%) (Auto)


  31.6 %


(20.0-45.0)


 


Monocytes (%) (Auto)


  6.8 %


(1.0-10.0)


 


Eosinophils (%) (Auto)


  2.7 %


(0.0-3.0)


 


Basophils (%) (Auto)


  0.8 %


(0.0-2.0)


 


Sodium Level


  137 MMOL/L


(136-145)


 


Potassium Level


  3.6 MMOL/L


(3.5-5.1)


 


Chloride Level


  103 MMOL/L


()


 


Carbon Dioxide Level


  27 MMOL/L


(21-32)


 


Anion Gap


  7 mmol/L


(5-15)


 


Blood Urea Nitrogen


  18 mg/dL


(7-18)


 


Creatinine


  1.0 MG/DL


(0.55-1.30)


 


Estimat Glomerular Filtration


Rate > 60 mL/min


(>60)


 


Glucose Level


  130 MG/DL


()  H


 


Calcium Level


  8.7 MG/DL


(8.5-10.1)











Microbiology








 Date/Time


Source Procedure


Growth Status


 


 


 4/5/18 12:25


Nasal Nares MRSA Culture - Final


Staphylococcus Aureus - Mrsa Complete


 


 4/5/18 12:25


Rectum VRE Culture - Final


NO VANCOMYCIN RESISTANT ENTEROCOCCUS ... Complete








Objective


HEAD AND NECK:  No JVD.


LUNGS:  Clear.


CARDIOVASCULAR:  Regular S1 and S2 with no gallop or murmur.


ABDOMEN:  Soft.


EXTREMITIES:  No pitting edema.











Oskar Prieto MD Apr 7, 2018 16:08

## 2018-04-07 NOTE — PULMONOLOGY PROGRESS NOTE
Assessment/Plan


Problems:  


(1) Acute coronary syndrome


(2) Opiate dependence


(3) Generalized weakness


(4) hemiparesis/hemiataxia , left. acute


(5) COPD (chronic obstructive pulmonary disease)


(6) Anxiety


(7) Diabetes mellitus


Assessment/Plan


no new complains


symptomatic treatment


titrate fio2 to sat of 92%


respiratory treatment


sliding scale


dc planning in process


All medications and treatment were reviewed./





Subjective


ROS Limited/Unobtainable:  No


Interval Events:  doing better


Allergies:  


Coded Allergies:  


     No Known Allergies (Unverified , 4/14/15)





Objective





Last 24 Hour Vital Signs








  Date Time  Temp Pulse Resp B/P (MAP) Pulse Ox O2 Delivery O2 Flow Rate FiO2


 


4/7/18 16:39 98.0       


 


4/7/18 15:48 98.0 96 20 102/74 97   





 98.0       


 


4/7/18 15:40 98.0       


 


4/7/18 15:40 98.0       


 


4/7/18 15:10 98.4       


 


4/7/18 12:05 98.4 90 20 116/65 95   





 98.4       


 


4/7/18 10:43 98.1       


 


4/7/18 09:04 98.1       


 


4/7/18 08:57 98.1 92 20 114/69 93   





 98.1       


 


4/7/18 04:00 98.1 89 17 108/64 96   





 98.1       


 


4/7/18 01:51 98.2       


 


4/7/18 00:00 97.3 102 16 119/72    





 97.3       


 


4/6/18 22:26 98.2       


 


4/6/18 21:27 98.2       


 


4/6/18 21:27 98.2       


 


4/6/18 20:00 98.2 105 16 117/79    





 98.2       

















Intake and Output  


 


 4/6/18 4/7/18





 19:00 07:00


 


Intake Total 1040 ml 


 


Output Total 1850 ml 900 ml


 


Balance -810 ml -900 ml


 


  


 


Intake Oral 1040 ml 


 


Output Urine Total 1850 ml 900 ml








General Appearance:  WD/WN


HEENT:  atraumatic, anicteric


Respiratory/Chest:  chest wall non-tender, lungs clear


Cardiovascular:  normal peripheral pulses, regular rhythm


Abdomen:  normal bowel sounds, soft, non tender


Genitourinary:  normal external genitalia


Extremities:  no clubbing


Neurologic/Psychiatric:  CNs II-XII grossly normal, no motor/sensory deficits





Microbiology








 Date/Time


Source Procedure


Growth Status


 


 


 4/5/18 12:25


Nasal Nares MRSA Culture - Final


Staphylococcus Aureus - Mrsa Complete


 


 4/5/18 12:25


Rectum VRE Culture - Final


NO VANCOMYCIN RESISTANT ENTEROCOCCUS ... Complete








Laboratory Tests


4/7/18 05:30: 


White Blood Count 9.4, Red Blood Count 4.65L, Hemoglobin 14.2, Hematocrit 39.6L

, Mean Corpuscular Volume 85, Mean Corpuscular Hemoglobin 30.6, Mean 

Corpuscular Hemoglobin Concent 35.9, Red Cell Distribution Width 12.1, Platelet 

Count 197, Mean Platelet Volume 7.8, Neutrophils (%) (Auto) 58.2, Lymphocytes (%

) (Auto) 31.6, Monocytes (%) (Auto) 6.8, Eosinophils (%) (Auto) 2.7, Basophils (

%) (Auto) 0.8, Sodium Level 137, Potassium Level 3.6, Chloride Level 103, 

Carbon Dioxide Level 27, Anion Gap 7, Blood Urea Nitrogen 18, Creatinine 1.0, 

Estimat Glomerular Filtration Rate > 60, Glucose Level 130H, Calcium Level 8.7





Current Medications








 Medications


  (Trade)  Dose


 Ordered  Sig/Henry


 Route


 PRN Reason  Start Time


 Stop Time Status Last Admin


Dose Admin


 


 Acetaminophen


  (Tylenol)  650 mg  Q4H  PRN


 ORAL


 fever>100.5  4/6/18 22:00


 5/4/18 21:59   


 


 


 Al Hydroxide/Mg


 Hydroxide


  (Mylanta II)  30 ml  Q6H  PRN


 ORAL


 dyspepsia  4/6/18 22:00


 5/6/18 21:59   


 


 


 Aspirin


  (Ecotrin)  81 mg  DAILY


 ORAL


   4/7/18 09:00


 5/4/18 12:59  4/7/18 09:03


 


 


 Dextrose


  (Dextrose 50%)  25 ml  PRN  PRN


 IV


 BS 60 TO 69 MG/DL  4/6/18 22:00


 5/6/18 21:59   


 


 


 Dextrose


  (Dextrose 50%)  50 ml  PRN  PRN


 IV


 BS LESS THAN 60 MG/DL  4/6/18 22:00


 5/6/18 21:59   


 


 


 Divalproex Sodium


  (Depakote ER)  500 mg  EVERY 12  HOURS


 ORAL


   4/7/18 09:00


 5/7/18 08:59  4/7/18 09:03


 


 


 Heparin Sodium


  (Porcine)


  (Heparin 5000


 units/ml)  5,000 units  EVERY 12  HOURS


 SUBQ


   4/7/18 09:00


 5/7/18 08:59  4/7/18 09:10


 


 


 Insulin Aspart


  (NovoLOG)    BEFORE MEALS AND  HS


 SUBQ


   4/7/18 06:30


 5/4/18 11:29  4/7/18 16:30


 


 


 Lorazepam


  (Ativan 2mg/ml


 1ml)  0.5 mg  Q4H  PRN


 IV


 For Anxiety  4/6/18 22:00


 4/11/18 21:59   


 


 


 Morphine Sulfate


  (Morphine


 Sulfate)  1 mg  Q4H  PRN


 IVP


 Severe Pain (Pain Scale 7-10)  4/6/18 22:00


 4/13/18 21:59  4/7/18 15:10


 


 


 Nicotine


  (Nicoderm)  1 patch  Q24H


 TDERMAL


   4/7/18 13:30


 5/4/18 13:29  4/7/18 13:49


 


 


 Ondansetron HCl


  (Zofran)  4 mg  Q6H  PRN


 IVP


 Nausea & Vomiting  4/6/18 22:00


 5/6/18 21:59  4/7/18 06:39


 


 


 Oxycodone/


 Acetaminophen


  (Percocet 5-325)  1 tab  Q4H  PRN


 ORAL


 Moderate Pain (Pain Scale 4-6)  4/6/18 22:00


 4/11/18 21:59   


 


 


 Polyethylene


 Glycol


  (Miralax)  17 gm  HSPRN  PRN


 ORAL


 Constipation  4/6/18 21:00


 5/6/18 20:59   


 


 


 Pregabalin


  (Lyrica)  75 mg  TID@1000,1500,2000


 ORAL


   4/7/18 10:00


 5/4/18 09:59  4/7/18 15:40


 


 


 Promethazine HCl/


 Codeine


  (Phenergan with


 Codeine)  5 ml  Q4H  PRN


 ORAL


 For Cough  4/6/18 22:00


 5/4/18 21:59   


 


 


 Quetiapine


 Fumarate


  (SEROquel)  300 mg  BEDTIME


 ORAL


   4/7/18 21:00


 5/4/18 20:59   


 


 


 Theophylline


  (Florin-Dur)  100 mg  EVERY 12  HOURS


 ORAL


   4/7/18 09:00


 5/7/18 08:59  4/7/18 09:03


 


 


 Ziprasidone


  (Geodon)  20 mg  TWICE A  DAY


 ORAL


   4/7/18 09:00


 5/4/18 08:59  4/7/18 17:25


 


 


 Zolpidem Tartrate


  (Ambien)  5 mg  HSPRN  PRN


 ORAL


 Insomnia  4/6/18 22:00


 4/13/18 21:59   


 

















Lissette Steward MD Apr 7, 2018 17:52

## 2018-04-08 VITALS — DIASTOLIC BLOOD PRESSURE: 67 MMHG | SYSTOLIC BLOOD PRESSURE: 114 MMHG

## 2018-04-08 VITALS — SYSTOLIC BLOOD PRESSURE: 115 MMHG | DIASTOLIC BLOOD PRESSURE: 72 MMHG

## 2018-04-08 VITALS — DIASTOLIC BLOOD PRESSURE: 75 MMHG | SYSTOLIC BLOOD PRESSURE: 115 MMHG

## 2018-04-08 LAB
ADD MANUAL DIFF: NO
ANION GAP SERPL CALC-SCNC: 8 MMOL/L (ref 5–15)
BASOPHILS NFR BLD AUTO: 0.7 % (ref 0–2)
BUN SERPL-MCNC: 18 MG/DL (ref 7–18)
CALCIUM SERPL-MCNC: 9.4 MG/DL (ref 8.5–10.1)
CHLORIDE SERPL-SCNC: 104 MMOL/L (ref 98–107)
CO2 SERPL-SCNC: 27 MMOL/L (ref 21–32)
CREAT SERPL-MCNC: 0.9 MG/DL (ref 0.55–1.3)
EOSINOPHIL NFR BLD AUTO: 2.7 % (ref 0–3)
ERYTHROCYTE [DISTWIDTH] IN BLOOD BY AUTOMATED COUNT: 11.9 % (ref 11.6–14.8)
HCT VFR BLD CALC: 41.9 % (ref 42–52)
HGB BLD-MCNC: 14.1 G/DL (ref 14.2–18)
LYMPHOCYTES NFR BLD AUTO: 35 % (ref 20–45)
MCV RBC AUTO: 86 FL (ref 80–99)
MONOCYTES NFR BLD AUTO: 7.5 % (ref 1–10)
NEUTROPHILS NFR BLD AUTO: 54.1 % (ref 45–75)
PLATELET # BLD: 179 K/UL (ref 150–450)
POTASSIUM SERPL-SCNC: 3.7 MMOL/L (ref 3.5–5.1)
RBC # BLD AUTO: 4.85 M/UL (ref 4.7–6.1)
SODIUM SERPL-SCNC: 139 MMOL/L (ref 136–145)
WBC # BLD AUTO: 8.9 K/UL (ref 4.8–10.8)

## 2018-04-08 RX ADMIN — INSULIN ASPART SCH UNITS: 100 INJECTION, SOLUTION INTRAVENOUS; SUBCUTANEOUS at 21:28

## 2018-04-08 RX ADMIN — PREGABALIN SCH MG: 75 CAPSULE ORAL at 15:17

## 2018-04-08 RX ADMIN — MORPHINE SULFATE PRN MG: 4 INJECTION INTRAVENOUS at 08:31

## 2018-04-08 RX ADMIN — DIVALPROEX SODIUM SCH MG: 500 TABLET, FILM COATED, EXTENDED RELEASE ORAL at 20:35

## 2018-04-08 RX ADMIN — THEOPHYLLINE ANHYDROUS SCH MG: 100 CAPSULE, EXTENDED RELEASE ORAL at 08:31

## 2018-04-08 RX ADMIN — PREGABALIN SCH MG: 75 CAPSULE ORAL at 10:08

## 2018-04-08 RX ADMIN — MORPHINE SULFATE PRN MG: 4 INJECTION INTRAVENOUS at 16:32

## 2018-04-08 RX ADMIN — INSULIN ASPART SCH UNITS: 100 INJECTION, SOLUTION INTRAVENOUS; SUBCUTANEOUS at 11:41

## 2018-04-08 RX ADMIN — HEPARIN SODIUM SCH UNITS: 5000 INJECTION INTRAVENOUS; SUBCUTANEOUS at 08:33

## 2018-04-08 RX ADMIN — INSULIN ASPART SCH UNITS: 100 INJECTION, SOLUTION INTRAVENOUS; SUBCUTANEOUS at 17:07

## 2018-04-08 RX ADMIN — MORPHINE SULFATE PRN MG: 4 INJECTION INTRAVENOUS at 20:34

## 2018-04-08 RX ADMIN — PREGABALIN SCH MG: 75 CAPSULE ORAL at 20:23

## 2018-04-08 RX ADMIN — INSULIN ASPART SCH UNITS: 100 INJECTION, SOLUTION INTRAVENOUS; SUBCUTANEOUS at 06:13

## 2018-04-08 RX ADMIN — DIVALPROEX SODIUM SCH MG: 500 TABLET, FILM COATED, EXTENDED RELEASE ORAL at 08:31

## 2018-04-08 RX ADMIN — IPRATROPIUM BROMIDE AND ALBUTEROL SULFATE PRN ML: .5; 3 SOLUTION RESPIRATORY (INHALATION) at 10:23

## 2018-04-08 RX ADMIN — ASPIRIN SCH MG: 81 TABLET, DELAYED RELEASE ORAL at 08:31

## 2018-04-08 RX ADMIN — THEOPHYLLINE ANHYDROUS SCH MG: 100 CAPSULE, EXTENDED RELEASE ORAL at 20:34

## 2018-04-08 RX ADMIN — HEPARIN SODIUM SCH UNITS: 5000 INJECTION INTRAVENOUS; SUBCUTANEOUS at 20:24

## 2018-04-08 RX ADMIN — PREGABALIN SCH MG: 75 CAPSULE ORAL at 10:09

## 2018-04-08 RX ADMIN — IPRATROPIUM BROMIDE AND ALBUTEROL SULFATE PRN ML: .5; 3 SOLUTION RESPIRATORY (INHALATION) at 16:28

## 2018-04-08 RX ADMIN — MORPHINE SULFATE PRN MG: 4 INJECTION INTRAVENOUS at 12:32

## 2018-04-08 RX ADMIN — IPRATROPIUM BROMIDE AND ALBUTEROL SULFATE PRN ML: .5; 3 SOLUTION RESPIRATORY (INHALATION) at 22:47

## 2018-04-08 RX ADMIN — METOPROLOL TARTRATE SCH MG: 25 TABLET, FILM COATED ORAL at 20:34

## 2018-04-08 RX ADMIN — ZIPRASIDONE HYDROCHLORIDE SCH MG: 20 CAPSULE ORAL at 08:31

## 2018-04-08 RX ADMIN — MORPHINE SULFATE PRN MG: 4 INJECTION INTRAVENOUS at 04:31

## 2018-04-08 RX ADMIN — ZIPRASIDONE HYDROCHLORIDE SCH MG: 20 CAPSULE ORAL at 17:07

## 2018-04-08 RX ADMIN — METOPROLOL TARTRATE SCH MG: 25 TABLET, FILM COATED ORAL at 10:08

## 2018-04-08 NOTE — GENERAL PROGRESS NOTE
Assessment/Plan


Problem List:  


(1) Left arm numbness prob old residual to neck surgery


(2) Chest wall pain


ICD Codes:  R07.89 - Other chest pain


SNOMED:  650214315


(3) Diabetes


ICD Codes:  E11.9 - Type 2 diabetes mellitus without complications


SNOMED:  12247877


(4) Chest pain


ICD Codes:  R07.9 - Chest pain


SNOMED:  51188401


Status:  stable, progressing, tolerating diet


Assessment/Plan


ot pt diet pain control cbc bmp am dc plan snf





Subjective


Constitutional:  Reports: weakness


Allergies:  


Coded Allergies:  


     No Known Allergies (Unverified , 4/14/15)


All Systems:  reviewed and negative except above


Subjective


calm in bed sl cp





Objective





Last 24 Hour Vital Signs








  Date Time  Temp Pulse Resp B/P (MAP) Pulse Ox O2 Delivery O2 Flow Rate FiO2


 


4/8/18 05:03 98.1       


 


4/8/18 04:36      Room Air  


 


4/8/18 04:35 98.1 75 17 114/67 95   





 98.1       


 


4/8/18 04:31 98.6       


 


4/8/18 00:26 98.6 99 19 115/72 93   





 98.6       


 


4/8/18 00:00      Room Air  


 


4/7/18 23:53 98.3       


 


4/7/18 22:02 98.3       


 


4/7/18 21:03 98.3       


 


4/7/18 20:00      Room Air  


 


4/7/18 19:52 98.3 88 18 132/77 96   





 98.3       


 


4/7/18 19:34 98.0       


 


4/7/18 15:48 98.0 96 20 102/74 97   





 98.0       


 


4/7/18 15:40 98.0       


 


4/7/18 15:10 98.4       


 


4/7/18 12:05 98.4 90 20 116/65 95   





 98.4       


 


4/7/18 10:43 98.1       


 


4/7/18 09:04 98.1       


 


4/7/18 08:57 98.1 92 20 114/69 93   





 98.1       

















Intake and Output  


 


 4/7/18 4/8/18





 19:00 07:00


 


Intake Total 1410 ml 800 ml


 


Balance 1410 ml 800 ml


 


  


 


Intake Oral 1410 ml 800 ml


 


# Voids 6 2








Laboratory Tests


4/8/18 04:50: 


White Blood Count 8.9, Red Blood Count 4.85, Hemoglobin 14.1L, Hematocrit 41.9L

, Mean Corpuscular Volume 86, Mean Corpuscular Hemoglobin 29.1, Mean 

Corpuscular Hemoglobin Concent 33.7, Red Cell Distribution Width 11.9, Platelet 

Count 179, Mean Platelet Volume 7.5, Neutrophils (%) (Auto) 54.1, Lymphocytes (%

) (Auto) 35.0, Monocytes (%) (Auto) 7.5, Eosinophils (%) (Auto) 2.7, Basophils (

%) (Auto) 0.7, Sodium Level 139, Potassium Level 3.7, Chloride Level 104, 

Carbon Dioxide Level 27, Anion Gap 8, Blood Urea Nitrogen 18, Creatinine 0.9, 

Estimat Glomerular Filtration Rate > 60, Glucose Level 100, Calcium Level 9.4


Height (Feet):  5


Height (Inches):  11.00


Weight (Pounds):  237


General Appearance:  alert


EENT:  normal ENT inspection


Neck:  normal alignment


Cardiovascular:  normal peripheral pulses, normal rate, regular rhythm


Respiratory/Chest:  chest wall non-tender, lungs clear, normal breath sounds


Abdomen:  normal bowel sounds, non tender, soft


Extremities:  normal inspection


Edema:  no edema noted Arm (L), no edema noted Arm (R), no edema noted Leg (L), 

no edema noted Leg (R), no edema noted Pedal (L), no edema noted Pedal (R), no 

edema noted Generalized


Neurologic:  responsive, motor weakness


Skin:  normal pigmentation, warm/dry











ROSE MARIE IRVING Apr 8, 2018 08:38

## 2018-04-09 VITALS — SYSTOLIC BLOOD PRESSURE: 105 MMHG | DIASTOLIC BLOOD PRESSURE: 62 MMHG

## 2018-04-09 VITALS — DIASTOLIC BLOOD PRESSURE: 68 MMHG | SYSTOLIC BLOOD PRESSURE: 114 MMHG

## 2018-04-09 VITALS — SYSTOLIC BLOOD PRESSURE: 126 MMHG | DIASTOLIC BLOOD PRESSURE: 67 MMHG

## 2018-04-09 VITALS — DIASTOLIC BLOOD PRESSURE: 58 MMHG | SYSTOLIC BLOOD PRESSURE: 129 MMHG

## 2018-04-09 VITALS — DIASTOLIC BLOOD PRESSURE: 65 MMHG | SYSTOLIC BLOOD PRESSURE: 112 MMHG

## 2018-04-09 VITALS — SYSTOLIC BLOOD PRESSURE: 111 MMHG | DIASTOLIC BLOOD PRESSURE: 64 MMHG

## 2018-04-09 LAB
ADD MANUAL DIFF: NO
ANION GAP SERPL CALC-SCNC: 10 MMOL/L (ref 5–15)
BASOPHILS NFR BLD AUTO: 0.8 % (ref 0–2)
BUN SERPL-MCNC: 12 MG/DL (ref 7–18)
CALCIUM SERPL-MCNC: 8.8 MG/DL (ref 8.5–10.1)
CHLORIDE SERPL-SCNC: 103 MMOL/L (ref 98–107)
CO2 SERPL-SCNC: 26 MMOL/L (ref 21–32)
CREAT SERPL-MCNC: 0.8 MG/DL (ref 0.55–1.3)
EOSINOPHIL NFR BLD AUTO: 2.4 % (ref 0–3)
ERYTHROCYTE [DISTWIDTH] IN BLOOD BY AUTOMATED COUNT: 12 % (ref 11.6–14.8)
HCT VFR BLD CALC: 39 % (ref 42–52)
HGB BLD-MCNC: 13.4 G/DL (ref 14.2–18)
LYMPHOCYTES NFR BLD AUTO: 32 % (ref 20–45)
MCV RBC AUTO: 86 FL (ref 80–99)
MONOCYTES NFR BLD AUTO: 8.2 % (ref 1–10)
NEUTROPHILS NFR BLD AUTO: 56.6 % (ref 45–75)
PLATELET # BLD: 188 K/UL (ref 150–450)
POTASSIUM SERPL-SCNC: 3.6 MMOL/L (ref 3.5–5.1)
RBC # BLD AUTO: 4.53 M/UL (ref 4.7–6.1)
SODIUM SERPL-SCNC: 138 MMOL/L (ref 136–145)
WBC # BLD AUTO: 9.3 K/UL (ref 4.8–10.8)

## 2018-04-09 RX ADMIN — INSULIN ASPART SCH UNITS: 100 INJECTION, SOLUTION INTRAVENOUS; SUBCUTANEOUS at 16:54

## 2018-04-09 RX ADMIN — MORPHINE SULFATE PRN MG: 4 INJECTION INTRAVENOUS at 09:38

## 2018-04-09 RX ADMIN — MORPHINE SULFATE PRN MG: 4 INJECTION INTRAVENOUS at 13:47

## 2018-04-09 RX ADMIN — ZIPRASIDONE HYDROCHLORIDE SCH MG: 20 CAPSULE ORAL at 09:36

## 2018-04-09 RX ADMIN — INSULIN ASPART SCH UNITS: 100 INJECTION, SOLUTION INTRAVENOUS; SUBCUTANEOUS at 12:19

## 2018-04-09 RX ADMIN — MORPHINE SULFATE PRN MG: 4 INJECTION INTRAVENOUS at 17:50

## 2018-04-09 RX ADMIN — HEPARIN SODIUM SCH UNITS: 5000 INJECTION INTRAVENOUS; SUBCUTANEOUS at 09:00

## 2018-04-09 RX ADMIN — MORPHINE SULFATE PRN MG: 4 INJECTION INTRAVENOUS at 21:48

## 2018-04-09 RX ADMIN — MORPHINE SULFATE PRN MG: 4 INJECTION INTRAVENOUS at 00:34

## 2018-04-09 RX ADMIN — DIVALPROEX SODIUM SCH MG: 500 TABLET, FILM COATED, EXTENDED RELEASE ORAL at 09:37

## 2018-04-09 RX ADMIN — PREGABALIN SCH MG: 75 CAPSULE ORAL at 20:26

## 2018-04-09 RX ADMIN — ASPIRIN SCH MG: 81 TABLET, DELAYED RELEASE ORAL at 09:37

## 2018-04-09 RX ADMIN — HEPARIN SODIUM SCH UNITS: 5000 INJECTION INTRAVENOUS; SUBCUTANEOUS at 20:26

## 2018-04-09 RX ADMIN — ZIPRASIDONE HYDROCHLORIDE SCH MG: 20 CAPSULE ORAL at 17:49

## 2018-04-09 RX ADMIN — PREGABALIN SCH MG: 75 CAPSULE ORAL at 09:36

## 2018-04-09 RX ADMIN — METOPROLOL TARTRATE SCH MG: 25 TABLET, FILM COATED ORAL at 09:37

## 2018-04-09 RX ADMIN — PREGABALIN SCH MG: 75 CAPSULE ORAL at 15:55

## 2018-04-09 RX ADMIN — THEOPHYLLINE ANHYDROUS SCH MG: 100 CAPSULE, EXTENDED RELEASE ORAL at 09:37

## 2018-04-09 RX ADMIN — INSULIN ASPART SCH UNITS: 100 INJECTION, SOLUTION INTRAVENOUS; SUBCUTANEOUS at 06:18

## 2018-04-09 RX ADMIN — THEOPHYLLINE ANHYDROUS SCH MG: 100 CAPSULE, EXTENDED RELEASE ORAL at 21:47

## 2018-04-09 RX ADMIN — INSULIN ASPART SCH UNITS: 100 INJECTION, SOLUTION INTRAVENOUS; SUBCUTANEOUS at 20:28

## 2018-04-09 RX ADMIN — MORPHINE SULFATE PRN MG: 4 INJECTION INTRAVENOUS at 04:42

## 2018-04-09 RX ADMIN — DIVALPROEX SODIUM SCH MG: 500 TABLET, FILM COATED, EXTENDED RELEASE ORAL at 21:47

## 2018-04-09 NOTE — PULMONOLOGY PROGRESS NOTE
Assessment/Plan


Problems:  


(1) Acute coronary syndrome


(2) Opiate dependence


(3) Generalized weakness


(4) hemiparesis/hemiataxia , left. acute


(5) COPD (chronic obstructive pulmonary disease)


(6) Anxiety


(7) Diabetes mellitus


Assessment/Plan


no new complains


symptomatic treatment


titrate fio2 to sat of 92%


respiratory treatment


sliding scale


dc planning in process


All medications and treatment were reviewed./





Subjective


ROS Limited/Unobtainable:  No


HEENT:  Repors: no symptoms


Allergies:  


Coded Allergies:  


     No Known Allergies (Unverified , 4/14/15)





Objective





Last 24 Hour Vital Signs








  Date Time  Temp Pulse Resp B/P (MAP) Pulse Ox O2 Delivery O2 Flow Rate FiO2


 


4/9/18 19:28  95 20   Room Air  21


 


4/9/18 15:48 98.1 109 19 112/65 97   





 98.1       


 


4/9/18 12:00 98.6 63 19 105/62 95   





 98.6       


 


4/9/18 10:40  97 20   Room Air  21


 


4/9/18 09:37  98  126/67    


 


4/9/18 08:00 98.4 98 21 126/67 99   





 98.4       


 


4/9/18 05:12 98.1       


 


4/9/18 04:46 98.1 94 18 111/64 94 Room Air  





 98.1       


 


4/9/18 04:42 97.5       


 


4/9/18 01:13      Room Air  


 


4/9/18 00:34 97.5       


 


4/9/18 00:00 97.5 104 18 129/58 92 Room Air  





 97.5       


 


4/8/18 23:03  101 20  97 Room Air  21


 


4/8/18 22:46  105 20  97 Room Air  21


 


4/8/18 22:46        21


 


4/8/18 21:33  105 20   Room Air  21


 


4/8/18 21:22 98.1       


 


4/8/18 20:34  105  115/75    


 


4/8/18 20:34 98.1       


 


4/8/18 20:23 98.1       


 


4/8/18 20:00      Room Air  


 


4/8/18 20:00 98.2 105 20 115/75 97 Room Air  





 98.2       

















Intake and Output  


 


 4/8/18 4/9/18





 19:00 07:00


 


Intake Total 2000 ml 680 ml


 


Output Total 800 ml 


 


Balance 1200 ml 680 ml


 


  


 


Intake Oral 2000 ml 680 ml


 


Output Urine Total 800 ml 


 


# Voids 3 3








General Appearance:  WD/WN


HEENT:  normocephalic, atraumatic


Respiratory/Chest:  chest wall non-tender, lungs clear, chest wall tender


Cardiovascular:  regular rhythm


Abdomen:  non distended


Extremities:  no cyanosis


Neurologic/Psychiatric:  no motor/sensory deficits


Laboratory Tests


4/9/18 05:45: 


White Blood Count 9.3, Red Blood Count 4.53L, Hemoglobin 13.4L, Hematocrit 39.0L

, Mean Corpuscular Volume 86, Mean Corpuscular Hemoglobin 29.5, Mean 

Corpuscular Hemoglobin Concent 34.2, Red Cell Distribution Width 12.0, Platelet 

Count 188, Mean Platelet Volume 8.2, Neutrophils (%) (Auto) 56.6, Lymphocytes (%

) (Auto) 32.0, Monocytes (%) (Auto) 8.2, Eosinophils (%) (Auto) 2.4, Basophils (

%) (Auto) 0.8, Sodium Level 138, Potassium Level 3.6, Chloride Level 103, 

Carbon Dioxide Level 26, Anion Gap 10, Blood Urea Nitrogen 12, Creatinine 0.8, 

Estimat Glomerular Filtration Rate > 60, Glucose Level 168H, Calcium Level 8.8





Current Medications








 Medications


  (Trade)  Dose


 Ordered  Sig/Henry


 Route


 PRN Reason  Start Time


 Stop Time Status Last Admin


Dose Admin


 


 Acetaminophen


  (Tylenol)  650 mg  Q4H  PRN


 ORAL


 fever>100.5  4/6/18 22:00


 5/4/18 21:59   


 


 


 Al Hydroxide/Mg


 Hydroxide


  (Mylanta II)  30 ml  Q6H  PRN


 ORAL


 dyspepsia  4/6/18 22:00


 5/6/18 21:59   


 


 


 Albuterol/


 Ipratropium


  (Albuterol/


 Ipratropium)  3 ml  Q4H  PRN


 HHN


 Shortness of Breath  4/8/18 10:15


 4/13/18 10:14  4/8/18 22:47


 


 


 Aspirin


  (Ecotrin)  81 mg  DAILY


 ORAL


   4/7/18 09:00


 5/4/18 12:59  4/9/18 09:37


 


 


 Dextrose


  (Dextrose 50%)  25 ml  PRN  PRN


 IV


 BS 60 TO 69 MG/DL  4/6/18 22:00


 5/6/18 21:59   


 


 


 Dextrose


  (Dextrose 50%)  50 ml  PRN  PRN


 IV


 BS LESS THAN 60 MG/DL  4/6/18 22:00


 5/6/18 21:59   


 


 


 Divalproex Sodium


  (Depakote ER)  500 mg  EVERY 12  HOURS


 ORAL


   4/7/18 09:00


 5/7/18 08:59  4/9/18 09:37


 


 


 Heparin Sodium


  (Porcine)


  (Heparin 5000


 units/ml)  5,000 units  EVERY 12  HOURS


 SUBQ


   4/7/18 09:00


 5/7/18 08:59  4/8/18 08:33


 


 


 Insulin Aspart


  (NovoLOG)    BEFORE MEALS AND  HS


 SUBQ


   4/7/18 06:30


 5/4/18 11:29  4/9/18 16:54


 


 


 Lisinopril


  (Zestril)  10 mg  DAILY


 ORAL


   4/10/18 09:00


 5/10/18 08:59   


 


 


 Lorazepam


  (Ativan 2mg/ml


 1ml)  0.5 mg  Q4H  PRN


 IV


 For Anxiety  4/6/18 22:00


 4/11/18 21:59  4/9/18 12:45


 


 


 Morphine Sulfate


  (Morphine


 Sulfate)  1 mg  Q4H  PRN


 IVP


 Severe Pain (Pain Scale 7-10)  4/6/18 22:00


 4/13/18 21:59  4/9/18 17:50


 


 


 Nicotine


  (Nicoderm)  1 patch  Q24H


 TDERMAL


   4/8/18 10:30


 5/8/18 10:29  4/9/18 09:50


 


 


 Ondansetron HCl


  (Zofran)  4 mg  Q6H  PRN


 IVP


 Nausea & Vomiting  4/6/18 22:00


 5/6/18 21:59  4/7/18 06:39


 


 


 Oxycodone/


 Acetaminophen


  (Percocet 5-325)  1 tab  Q4H  PRN


 ORAL


 Moderate Pain (Pain Scale 4-6)  4/6/18 22:00


 4/11/18 21:59   


 


 


 Polyethylene


 Glycol


  (Miralax)  17 gm  HSPRN  PRN


 ORAL


 Constipation  4/6/18 21:00


 5/6/18 20:59   


 


 


 Pregabalin


  (Lyrica)  75 mg  TID@1000,1500,2000


 ORAL


   4/7/18 10:00


 5/4/18 09:59  4/9/18 15:55


 


 


 Promethazine HCl/


 Codeine


  (Phenergan with


 Codeine)  5 ml  Q4H  PRN


 ORAL


 For Cough  4/6/18 22:00


 5/4/18 21:59   


 


 


 Quetiapine


 Fumarate


  (SEROquel)  300 mg  BEDTIME


 ORAL


   4/7/18 21:00


 5/4/18 20:59  4/8/18 20:35


 


 


 Theophylline


  (Florin-Dur)  100 mg  EVERY 12  HOURS


 ORAL


   4/7/18 09:00


 5/7/18 08:59  4/9/18 09:37


 


 


 Ziprasidone


  (Geodon)  20 mg  TWICE A  DAY


 ORAL


   4/7/18 09:00


 5/4/18 08:59  4/9/18 17:49


 


 


 Zolpidem Tartrate


  (Ambien)  5 mg  HSPRN  PRN


 ORAL


 Insomnia  4/6/18 22:00


 4/13/18 21:59   


 

















Lissette Steward MD Apr 9, 2018 19:36

## 2018-04-09 NOTE — PROGRESS NOTE
DATE:  04/08/2018



NOTE: "POOR AUDIO QUALITY"



SUBJECTIVE:  The patient is a 37-year-old male patient admitted to the

hospital because of chest pain, _____ mood lability and agitation worsened

by the stress of his medical illness, so his attending physician has

requested daily psychiatric consultation for the patient.  As far as _____

mood labile, irritable, and agitated.



MENTAL STATUS EXAMINATION:  This is a 37-year-old male with psychomotor

agitation.  Mood is irritable and agitated.  Affect guarded and

restricted.  Thought process is disorganized and illogical.  Thought

content, auditory hallucinations and paranoid delusions.  Insight and

judgment is poor _____.



DIAGNOSIS:  Schizoaffective, bipolar type.



PLAN:  Continue him on  Geodon 20 twice a day and Depakote to stabilize his

mood.  Provided 18-20 minutes of supportive therapy and encouraged him to

interact appropriately with staff and other patients.  Chart was reviewed

and discussed with staff.  Seen and assessed in his room.









  ______________________________________________

  Gulshan Armstrong M.D.





DR:  Navi

D:  04/08/2018 04:15

T:  04/09/2018 00:22

JOB#:  [id number]

CC:

## 2018-04-09 NOTE — CONSULTATION
DATE OF CONSULTATION:  04/06/2018



CONSULTING PHYSICIAN:  Gulshan Armstrong M.D.



ATTENDING PHYSICIAN:  Samir Cabrera D.O.



REASON FOR ADMISSION:  This is a 37-year-old patient with chest pain.  This

patient has altered mental status and confusion.  Cognition has declined

below baseline.  That is why, his attending has requested daily

psychiatric consultation.  Diagnosis is _____.



MENTAL STATUS EXAMINATION:  This is a 37-year-old male with psychomotor

agitation.  Mood is irritable and agitated.  Affect guarded and

restricted.  Intellect poor.  Mood depressed and anxious.  Motor activity,

psychomotor agitation.  Attention span is poor.  Orientation x2.  Speech

is pressured.  Thought process is disorganized and illogical.  Thought

content is auditory hallucinations and paranoid delusions.  Insight and

judgment is poor.  _____



DIAGNOSIS:  Schizoaffective, bipolar type.



PLAN:  Plan is to treat him with Geodon 20 mg twice a day, Depakote 500

twice a day, Seroquel 300 nightly.  Provided 18-20 minutes of supportive

therapy.  Encouraged him to interact appropriately with staff.  Chart

reviewed.  Discussed with staff.









  ______________________________________________

  Gulshan Armstrong M.D.





DR:  Nguyễn

D:  04/06/2018 19:39

T:  04/06/2018 20:30

JOB#:  [id number]

CC:

## 2018-04-09 NOTE — CARDIAC ELECTROPHYSIOLOGY PN
Assessment/Plan


Assessment/Plan


1. Atypical Chest pain. Ruled out for MI.


    EKG no acute ischemic changes.  Echocardiogram EF 70%.  


    Nuclear stress test showed no ischemia


2. Hypertension.  Change Lopressor to Lisinopril 10 daily in view of Asthma and 

DM


3. Diabetes.


4. History of prior CVA, on aspirin.


5. Asthma, theophylline.





Awaiting placement


MARCELINO RN





Subjective


Subjective


On NMB. No chest pain or SOB .Awaiting placement





Objective





Last 24 Hour Vital Signs








  Date Time  Temp Pulse Resp B/P (MAP) Pulse Ox O2 Delivery O2 Flow Rate FiO2


 


4/9/18 10:40  97 20   Room Air  21


 


4/9/18 09:37  98  126/67    


 


4/9/18 08:00 98.4 98 21 126/67 99   





 98.4       


 


4/9/18 05:12 98.1       


 


4/9/18 04:46 98.1 94 18 111/64 94 Room Air  





 98.1       


 


4/9/18 04:42 97.5       


 


4/9/18 01:13      Room Air  


 


4/9/18 00:34 97.5       


 


4/9/18 00:00 97.5 104 18 129/58 92 Room Air  





 97.5       


 


4/8/18 23:03  101 20  97 Room Air  21


 


4/8/18 22:46  105 20  97 Room Air  21


 


4/8/18 22:46        21


 


4/8/18 21:33  105 20   Room Air  21


 


4/8/18 21:22 98.1       


 


4/8/18 20:34  105  115/75    


 


4/8/18 20:34 98.1       


 


4/8/18 20:23 98.1       


 


4/8/18 20:00      Room Air  


 


4/8/18 20:00 98.2 105 20 115/75 97 Room Air  





 98.2       


 


4/8/18 16:35  85 14   Room Air  21


 


4/8/18 16:32 98.1       


 


4/8/18 16:26  84 14   Room Air  21


 


4/8/18 15:17 98.1       


 


4/8/18 12:32 98.1       

















Intake and Output  


 


 4/8/18 4/9/18





 19:00 07:00


 


Intake Total 2000 ml 680 ml


 


Output Total 800 ml 


 


Balance 1200 ml 680 ml


 


  


 


Intake Oral 2000 ml 680 ml


 


Output Urine Total 800 ml 


 


# Voids 3 3











Laboratory Tests








Test


  4/9/18


05:45


 


White Blood Count


  9.3 K/UL


(4.8-10.8)


 


Red Blood Count


  4.53 M/UL


(4.70-6.10)  L


 


Hemoglobin


  13.4 G/DL


(14.2-18.0)  L


 


Hematocrit


  39.0 %


(42.0-52.0)  L


 


Mean Corpuscular Volume 86 FL (80-99)  


 


Mean Corpuscular Hemoglobin


  29.5 PG


(27.0-31.0)


 


Mean Corpuscular Hemoglobin


Concent 34.2 G/DL


(32.0-36.0)


 


Red Cell Distribution Width


  12.0 %


(11.6-14.8)


 


Platelet Count


  188 K/UL


(150-450)


 


Mean Platelet Volume


  8.2 FL


(6.5-10.1)


 


Neutrophils (%) (Auto)


  56.6 %


(45.0-75.0)


 


Lymphocytes (%) (Auto)


  32.0 %


(20.0-45.0)


 


Monocytes (%) (Auto)


  8.2 %


(1.0-10.0)


 


Eosinophils (%) (Auto)


  2.4 %


(0.0-3.0)


 


Basophils (%) (Auto)


  0.8 %


(0.0-2.0)


 


Sodium Level


  138 MMOL/L


(136-145)


 


Potassium Level


  3.6 MMOL/L


(3.5-5.1)


 


Chloride Level


  103 MMOL/L


()


 


Carbon Dioxide Level


  26 MMOL/L


(21-32)


 


Anion Gap


  10 mmol/L


(5-15)


 


Blood Urea Nitrogen


  12 mg/dL


(7-18)


 


Creatinine


  0.8 MG/DL


(0.55-1.30)


 


Estimat Glomerular Filtration


Rate > 60 mL/min


(>60)


 


Glucose Level


  168 MG/DL


()  H


 


Calcium Level


  8.8 MG/DL


(8.5-10.1)








Objective


HEAD AND NECK:  No JVD.


LUNGS:  Clear.


CARDIOVASCULAR:  Regular S1 and S2 with no gallop or murmur.


ABDOMEN:  Soft.


EXTREMITIES:  No pitting edema.











Oskar Prieto MD Apr 9, 2018 11:02

## 2018-04-09 NOTE — PULMONOLOGY PROGRESS NOTE
Assessment/Plan


Problems:  


(1) Acute coronary syndrome


(2) Opiate dependence


(3) Generalized weakness


(4) hemiparesis/hemiataxia , left. acute


(5) COPD (chronic obstructive pulmonary disease)


(6) Anxiety


(7) Diabetes mellitus


Assessment/Plan


no new complains


symptomatic treatment


titrate fio2 to sat of 92%


respiratory treatment


sliding scale


dc planning in process


All medications and treatment were reviewed./





Subjective


Interval Events:  late note for 4/8/15


Constitutional:  Reports: no symptoms


HEENT:  Repors: no symptoms


Allergies:  


Coded Allergies:  


     No Known Allergies (Unverified , 4/14/15)





Objective





Last 24 Hour Vital Signs








  Date Time  Temp Pulse Resp B/P (MAP) Pulse Ox O2 Delivery O2 Flow Rate FiO2


 


4/9/18 19:28  95 20   Room Air  21


 


4/9/18 15:48 98.1 109 19 112/65 97   





 98.1       


 


4/9/18 12:00 98.6 63 19 105/62 95   





 98.6       


 


4/9/18 10:40  97 20   Room Air  21


 


4/9/18 09:37  98  126/67    


 


4/9/18 08:00 98.4 98 21 126/67 99   





 98.4       


 


4/9/18 05:12 98.1       


 


4/9/18 04:46 98.1 94 18 111/64 94 Room Air  





 98.1       


 


4/9/18 04:42 97.5       


 


4/9/18 01:13      Room Air  


 


4/9/18 00:34 97.5       


 


4/9/18 00:00 97.5 104 18 129/58 92 Room Air  





 97.5       


 


4/8/18 23:03  101 20  97 Room Air  21


 


4/8/18 22:46  105 20  97 Room Air  21


 


4/8/18 22:46        21


 


4/8/18 21:33  105 20   Room Air  21


 


4/8/18 21:22 98.1       


 


4/8/18 20:34  105  115/75    


 


4/8/18 20:34 98.1       


 


4/8/18 20:23 98.1       


 


4/8/18 20:00      Room Air  


 


4/8/18 20:00 98.2 105 20 115/75 97 Room Air  





 98.2       

















Intake and Output  


 


 4/8/18 4/9/18





 19:00 07:00


 


Intake Total 2000 ml 680 ml


 


Output Total 800 ml 


 


Balance 1200 ml 680 ml


 


  


 


Intake Oral 2000 ml 680 ml


 


Output Urine Total 800 ml 


 


# Voids 3 3








General Appearance:  WD/WN


HEENT:  normocephalic, atraumatic


Respiratory/Chest:  chest wall non-tender, lungs clear, no respiratory distress


Cardiovascular:  normal peripheral pulses, normal rate


Abdomen:  normal bowel sounds, soft, non tender


Genitourinary:  normal external genitalia


Skin:  no rash


Neurologic/Psychiatric:  no motor/sensory deficits, oriented x 3, normal mood/

affect


Laboratory Tests


4/9/18 05:45: 


White Blood Count 9.3, Red Blood Count 4.53L, Hemoglobin 13.4L, Hematocrit 39.0L

, Mean Corpuscular Volume 86, Mean Corpuscular Hemoglobin 29.5, Mean 

Corpuscular Hemoglobin Concent 34.2, Red Cell Distribution Width 12.0, Platelet 

Count 188, Mean Platelet Volume 8.2, Neutrophils (%) (Auto) 56.6, Lymphocytes (%

) (Auto) 32.0, Monocytes (%) (Auto) 8.2, Eosinophils (%) (Auto) 2.4, Basophils (

%) (Auto) 0.8, Sodium Level 138, Potassium Level 3.6, Chloride Level 103, 

Carbon Dioxide Level 26, Anion Gap 10, Blood Urea Nitrogen 12, Creatinine 0.8, 

Estimat Glomerular Filtration Rate > 60, Glucose Level 168H, Calcium Level 8.8





Current Medications








 Medications


  (Trade)  Dose


 Ordered  Sig/Henry


 Route


 PRN Reason  Start Time


 Stop Time Status Last Admin


Dose Admin


 


 Acetaminophen


  (Tylenol)  650 mg  Q4H  PRN


 ORAL


 fever>100.5  4/6/18 22:00


 5/4/18 21:59   


 


 


 Al Hydroxide/Mg


 Hydroxide


  (Mylanta II)  30 ml  Q6H  PRN


 ORAL


 dyspepsia  4/6/18 22:00


 5/6/18 21:59   


 


 


 Albuterol/


 Ipratropium


  (Albuterol/


 Ipratropium)  3 ml  Q4H  PRN


 HHN


 Shortness of Breath  4/8/18 10:15


 4/13/18 10:14  4/8/18 22:47


 


 


 Aspirin


  (Ecotrin)  81 mg  DAILY


 ORAL


   4/7/18 09:00


 5/4/18 12:59  4/9/18 09:37


 


 


 Dextrose


  (Dextrose 50%)  25 ml  PRN  PRN


 IV


 BS 60 TO 69 MG/DL  4/6/18 22:00


 5/6/18 21:59   


 


 


 Dextrose


  (Dextrose 50%)  50 ml  PRN  PRN


 IV


 BS LESS THAN 60 MG/DL  4/6/18 22:00


 5/6/18 21:59   


 


 


 Divalproex Sodium


  (Depakote ER)  500 mg  EVERY 12  HOURS


 ORAL


   4/7/18 09:00


 5/7/18 08:59  4/9/18 09:37


 


 


 Heparin Sodium


  (Porcine)


  (Heparin 5000


 units/ml)  5,000 units  EVERY 12  HOURS


 SUBQ


   4/7/18 09:00


 5/7/18 08:59  4/8/18 08:33


 


 


 Insulin Aspart


  (NovoLOG)    BEFORE MEALS AND  HS


 SUBQ


   4/7/18 06:30


 5/4/18 11:29  4/9/18 16:54


 


 


 Lisinopril


  (Zestril)  10 mg  DAILY


 ORAL


   4/10/18 09:00


 5/10/18 08:59   


 


 


 Lorazepam


  (Ativan 2mg/ml


 1ml)  0.5 mg  Q4H  PRN


 IV


 For Anxiety  4/6/18 22:00


 4/11/18 21:59  4/9/18 12:45


 


 


 Morphine Sulfate


  (Morphine


 Sulfate)  1 mg  Q4H  PRN


 IVP


 Severe Pain (Pain Scale 7-10)  4/6/18 22:00


 4/13/18 21:59  4/9/18 17:50


 


 


 Nicotine


  (Nicoderm)  1 patch  Q24H


 TDERMAL


   4/8/18 10:30


 5/8/18 10:29  4/9/18 09:50


 


 


 Ondansetron HCl


  (Zofran)  4 mg  Q6H  PRN


 IVP


 Nausea & Vomiting  4/6/18 22:00


 5/6/18 21:59  4/7/18 06:39


 


 


 Oxycodone/


 Acetaminophen


  (Percocet 5-325)  1 tab  Q4H  PRN


 ORAL


 Moderate Pain (Pain Scale 4-6)  4/6/18 22:00


 4/11/18 21:59   


 


 


 Polyethylene


 Glycol


  (Miralax)  17 gm  HSPRN  PRN


 ORAL


 Constipation  4/6/18 21:00


 5/6/18 20:59   


 


 


 Pregabalin


  (Lyrica)  75 mg  TID@1000,1500,2000


 ORAL


   4/7/18 10:00


 5/4/18 09:59  4/9/18 15:55


 


 


 Promethazine HCl/


 Codeine


  (Phenergan with


 Codeine)  5 ml  Q4H  PRN


 ORAL


 For Cough  4/6/18 22:00


 5/4/18 21:59   


 


 


 Quetiapine


 Fumarate


  (SEROquel)  300 mg  BEDTIME


 ORAL


   4/7/18 21:00


 5/4/18 20:59  4/8/18 20:35


 


 


 Theophylline


  (Florin-Dur)  100 mg  EVERY 12  HOURS


 ORAL


   4/7/18 09:00


 5/7/18 08:59  4/9/18 09:37


 


 


 Ziprasidone


  (Geodon)  20 mg  TWICE A  DAY


 ORAL


   4/7/18 09:00


 5/4/18 08:59  4/9/18 17:49


 


 


 Zolpidem Tartrate


  (Ambien)  5 mg  HSPRN  PRN


 ORAL


 Insomnia  4/6/18 22:00


 4/13/18 21:59   


 

















Lissette Steward MD Apr 9, 2018 19:34

## 2018-04-10 VITALS — DIASTOLIC BLOOD PRESSURE: 53 MMHG | SYSTOLIC BLOOD PRESSURE: 115 MMHG

## 2018-04-10 VITALS — SYSTOLIC BLOOD PRESSURE: 116 MMHG | DIASTOLIC BLOOD PRESSURE: 65 MMHG

## 2018-04-10 VITALS — DIASTOLIC BLOOD PRESSURE: 56 MMHG | SYSTOLIC BLOOD PRESSURE: 116 MMHG

## 2018-04-10 VITALS — DIASTOLIC BLOOD PRESSURE: 68 MMHG | SYSTOLIC BLOOD PRESSURE: 122 MMHG

## 2018-04-10 VITALS — DIASTOLIC BLOOD PRESSURE: 96 MMHG | SYSTOLIC BLOOD PRESSURE: 108 MMHG

## 2018-04-10 VITALS — DIASTOLIC BLOOD PRESSURE: 73 MMHG | SYSTOLIC BLOOD PRESSURE: 123 MMHG

## 2018-04-10 LAB
ADD MANUAL DIFF: NO
ANION GAP SERPL CALC-SCNC: 12 MMOL/L (ref 5–15)
BASOPHILS NFR BLD AUTO: 0.8 % (ref 0–2)
BUN SERPL-MCNC: 15 MG/DL (ref 7–18)
CALCIUM SERPL-MCNC: 9.4 MG/DL (ref 8.5–10.1)
CHLORIDE SERPL-SCNC: 103 MMOL/L (ref 98–107)
CO2 SERPL-SCNC: 23 MMOL/L (ref 21–32)
CREAT SERPL-MCNC: 0.9 MG/DL (ref 0.55–1.3)
EOSINOPHIL NFR BLD AUTO: 3.1 % (ref 0–3)
ERYTHROCYTE [DISTWIDTH] IN BLOOD BY AUTOMATED COUNT: 12.1 % (ref 11.6–14.8)
HCT VFR BLD CALC: 42.5 % (ref 42–52)
HGB BLD-MCNC: 15 G/DL (ref 14.2–18)
LYMPHOCYTES NFR BLD AUTO: 34.8 % (ref 20–45)
MCV RBC AUTO: 87 FL (ref 80–99)
MONOCYTES NFR BLD AUTO: 7.3 % (ref 1–10)
NEUTROPHILS NFR BLD AUTO: 54.1 % (ref 45–75)
PLATELET # BLD: 214 K/UL (ref 150–450)
POTASSIUM SERPL-SCNC: 3.8 MMOL/L (ref 3.5–5.1)
RBC # BLD AUTO: 4.91 M/UL (ref 4.7–6.1)
SODIUM SERPL-SCNC: 138 MMOL/L (ref 136–145)
WBC # BLD AUTO: 9.5 K/UL (ref 4.8–10.8)

## 2018-04-10 RX ADMIN — ZIPRASIDONE HYDROCHLORIDE SCH MG: 20 CAPSULE ORAL at 09:10

## 2018-04-10 RX ADMIN — THEOPHYLLINE ANHYDROUS SCH MG: 100 CAPSULE, EXTENDED RELEASE ORAL at 09:10

## 2018-04-10 RX ADMIN — PREGABALIN SCH MG: 75 CAPSULE ORAL at 21:09

## 2018-04-10 RX ADMIN — MORPHINE SULFATE PRN MG: 4 INJECTION INTRAVENOUS at 05:06

## 2018-04-10 RX ADMIN — ZIPRASIDONE HYDROCHLORIDE SCH MG: 20 CAPSULE ORAL at 17:10

## 2018-04-10 RX ADMIN — MORPHINE SULFATE PRN MG: 4 INJECTION INTRAVENOUS at 21:27

## 2018-04-10 RX ADMIN — DIVALPROEX SODIUM SCH MG: 500 TABLET, FILM COATED, EXTENDED RELEASE ORAL at 09:10

## 2018-04-10 RX ADMIN — PREGABALIN SCH MG: 75 CAPSULE ORAL at 15:40

## 2018-04-10 RX ADMIN — PREGABALIN SCH MG: 75 CAPSULE ORAL at 09:48

## 2018-04-10 RX ADMIN — MORPHINE SULFATE PRN MG: 4 INJECTION INTRAVENOUS at 13:07

## 2018-04-10 RX ADMIN — DIVALPROEX SODIUM SCH MG: 500 TABLET, FILM COATED, EXTENDED RELEASE ORAL at 21:09

## 2018-04-10 RX ADMIN — INSULIN ASPART SCH UNITS: 100 INJECTION, SOLUTION INTRAVENOUS; SUBCUTANEOUS at 21:19

## 2018-04-10 RX ADMIN — MORPHINE SULFATE PRN MG: 4 INJECTION INTRAVENOUS at 09:07

## 2018-04-10 RX ADMIN — THEOPHYLLINE ANHYDROUS SCH MG: 100 CAPSULE, EXTENDED RELEASE ORAL at 21:09

## 2018-04-10 RX ADMIN — INSULIN ASPART SCH UNITS: 100 INJECTION, SOLUTION INTRAVENOUS; SUBCUTANEOUS at 16:32

## 2018-04-10 RX ADMIN — HEPARIN SODIUM SCH UNITS: 5000 INJECTION INTRAVENOUS; SUBCUTANEOUS at 21:11

## 2018-04-10 RX ADMIN — HEPARIN SODIUM SCH UNITS: 5000 INJECTION INTRAVENOUS; SUBCUTANEOUS at 09:00

## 2018-04-10 RX ADMIN — INSULIN ASPART SCH UNITS: 100 INJECTION, SOLUTION INTRAVENOUS; SUBCUTANEOUS at 05:56

## 2018-04-10 RX ADMIN — LISINOPRIL SCH MG: 10 TABLET ORAL at 09:10

## 2018-04-10 RX ADMIN — MORPHINE SULFATE PRN MG: 4 INJECTION INTRAVENOUS at 17:10

## 2018-04-10 RX ADMIN — INSULIN ASPART SCH UNITS: 100 INJECTION, SOLUTION INTRAVENOUS; SUBCUTANEOUS at 11:49

## 2018-04-10 RX ADMIN — ASPIRIN SCH MG: 81 TABLET, DELAYED RELEASE ORAL at 09:10

## 2018-04-10 NOTE — GENERAL PROGRESS NOTE
Assessment/Plan


Problem List:  


(1) Left arm numbness prob old residual to neck surgery


(2) Chest wall pain


ICD Codes:  R07.89 - Other chest pain


SNOMED:  131354580


(3) Diabetes


ICD Codes:  E11.9 - Type 2 diabetes mellitus without complications


SNOMED:  41849304


(4) Chest pain


ICD Codes:  R07.9 - Chest pain


SNOMED:  41766899


Status:  stable, progressing, tolerating diet


Assessment/Plan


ot pt diet pain control dc w legned hh





Subjective


Constitutional:  Reports: weakness


Allergies:  


Coded Allergies:  


     No Known Allergies (Unverified , 4/14/15)


All Systems:  reviewed and negative except above


Subjective


calm in bed sl cp





Objective





Last 24 Hour Vital Signs








  Date Time  Temp Pulse Resp B/P (MAP) Pulse Ox O2 Delivery O2 Flow Rate FiO2


 


4/10/18 13:37 98.1       


 


4/10/18 13:07 98.1       


 


4/10/18 11:38 98.1 102 19 108/96 96   





 98.1       


 


4/10/18 10:47 98.4       


 


4/10/18 09:48 98.4       


 


4/10/18 09:10    122/68    


 


4/10/18 09:07 98.4       


 


4/10/18 08:00 98.4 102 19 122/68 96   





 98.4       


 


4/10/18 07:25  93 16   Room Air  21


 


4/10/18 05:06 97.9       


 


4/10/18 04:00 97.9 101 22 123/73 98 Room Air  





 97.9       


 


4/10/18 00:00 98.6 97 22 116/65 97 Room Air  





 98.6       


 


4/9/18 21:48 98.1       


 


4/9/18 20:26 98.1       


 


4/9/18 20:00 98.2 78 18 114/68 95 Room Air  





 98.2       


 


4/9/18 19:28  95 20   Room Air  21


 


4/9/18 15:48 98.1 109 19 112/65 97   





 98.1       

















Intake and Output  


 


 4/9/18 4/10/18





 19:00 07:00


 


Intake Total 480 ml 200 ml


 


Balance 480 ml 200 ml


 


  


 


Intake Oral 480 ml 200 ml








Laboratory Tests


4/10/18 05:10: 


White Blood Count 9.5, Red Blood Count 4.91, Hemoglobin 15.0, Hematocrit 42.5, 

Mean Corpuscular Volume 87, Mean Corpuscular Hemoglobin 30.5, Mean Corpuscular 

Hemoglobin Concent 35.2, Red Cell Distribution Width 12.1, Platelet Count 214, 

Mean Platelet Volume 8.4, Neutrophils (%) (Auto) 54.1, Lymphocytes (%) (Auto) 

34.8, Monocytes (%) (Auto) 7.3, Eosinophils (%) (Auto) 3.1H, Basophils (%) (Auto

) 0.8, Sodium Level 138, Potassium Level 3.8, Chloride Level 103, Carbon 

Dioxide Level 23, Anion Gap 12, Blood Urea Nitrogen 15, Creatinine 0.9, Estimat 

Glomerular Filtration Rate > 60, Glucose Level 133H, Calcium Level 9.4


Height (Feet):  5


Height (Inches):  11.00


Weight (Pounds):  237


General Appearance:  alert


EENT:  normal ENT inspection


Neck:  normal alignment


Cardiovascular:  normal peripheral pulses, normal rate, regular rhythm


Respiratory/Chest:  chest wall non-tender, lungs clear, normal breath sounds


Abdomen:  normal bowel sounds, non tender, soft


Extremities:  normal inspection


Edema:  no edema noted Arm (L), no edema noted Arm (R), no edema noted Leg (L), 

no edema noted Leg (R), no edema noted Pedal (L), no edema noted Pedal (R), no 

edema noted Generalized


Neurologic:  responsive, motor weakness


Skin:  normal pigmentation, warm/dry











ROSE MARIE IRVING Apr 10, 2018 15:22

## 2018-04-10 NOTE — CARDIAC ELECTROPHYSIOLOGY PN
Assessment/Plan


Assessment/Plan


1. Atypical Chest pain. Ruled out for MI.


    EKG no acute ischemic changes.  Echocardiogram EF 70%.  


    Nuclear stress test showed no ischemia


2. Hypertension. On Lisinopril 10 daily  


3. Diabetes.


4. History of prior CVA, on aspirin.


5. Asthma, theophylline.


 


MARCELINO RN





Subjective


Subjective


On NMB. No chest pain or SOB





Objective





Last 24 Hour Vital Signs








  Date Time  Temp Pulse Resp B/P (MAP) Pulse Ox O2 Delivery O2 Flow Rate FiO2


 


4/10/18 13:37 98.1       


 


4/10/18 13:07 98.1       


 


4/10/18 11:38 98.1 102 19 108/96 96   





 98.1       


 


4/10/18 10:47 98.4       


 


4/10/18 09:48 98.4       


 


4/10/18 09:10    122/68    


 


4/10/18 09:07 98.4       


 


4/10/18 08:00 98.4 102 19 122/68 96   





 98.4       


 


4/10/18 07:25  93 16   Room Air  21


 


4/10/18 05:06 97.9       


 


4/10/18 04:00 97.9 101 22 123/73 98 Room Air  





 97.9       


 


4/10/18 00:00 98.6 97 22 116/65 97 Room Air  





 98.6       


 


4/9/18 21:48 98.1       


 


4/9/18 20:26 98.1       


 


4/9/18 20:00 98.2 78 18 114/68 95 Room Air  





 98.2       


 


4/9/18 19:28  95 20   Room Air  21


 


4/9/18 15:48 98.1 109 19 112/65 97   





 98.1       

















Intake and Output  


 


 4/9/18 4/10/18





 19:00 07:00


 


Intake Total 480 ml 200 ml


 


Balance 480 ml 200 ml


 


  


 


Intake Oral 480 ml 200 ml











Laboratory Tests








Test


  4/10/18


05:10


 


White Blood Count


  9.5 K/UL


(4.8-10.8)


 


Red Blood Count


  4.91 M/UL


(4.70-6.10)


 


Hemoglobin


  15.0 G/DL


(14.2-18.0)


 


Hematocrit


  42.5 %


(42.0-52.0)


 


Mean Corpuscular Volume 87 FL (80-99)  


 


Mean Corpuscular Hemoglobin


  30.5 PG


(27.0-31.0)


 


Mean Corpuscular Hemoglobin


Concent 35.2 G/DL


(32.0-36.0)


 


Red Cell Distribution Width


  12.1 %


(11.6-14.8)


 


Platelet Count


  214 K/UL


(150-450)


 


Mean Platelet Volume


  8.4 FL


(6.5-10.1)


 


Neutrophils (%) (Auto)


  54.1 %


(45.0-75.0)


 


Lymphocytes (%) (Auto)


  34.8 %


(20.0-45.0)


 


Monocytes (%) (Auto)


  7.3 %


(1.0-10.0)


 


Eosinophils (%) (Auto)


  3.1 %


(0.0-3.0)  H


 


Basophils (%) (Auto)


  0.8 %


(0.0-2.0)


 


Sodium Level


  138 MMOL/L


(136-145)


 


Potassium Level


  3.8 MMOL/L


(3.5-5.1)


 


Chloride Level


  103 MMOL/L


()


 


Carbon Dioxide Level


  23 MMOL/L


(21-32)


 


Anion Gap


  12 mmol/L


(5-15)


 


Blood Urea Nitrogen


  15 mg/dL


(7-18)


 


Creatinine


  0.9 MG/DL


(0.55-1.30)


 


Estimat Glomerular Filtration


Rate > 60 mL/min


(>60)


 


Glucose Level


  133 MG/DL


()  H


 


Calcium Level


  9.4 MG/DL


(8.5-10.1)








Objective


HEAD AND NECK:  No JVD.


LUNGS:  Clear.


CARDIOVASCULAR:  Regular S1 and S2 with no gallop or murmur.


ABDOMEN:  Soft.


EXTREMITIES:  No pitting edema.











Oskar Prieto MD Apr 10, 2018 15:28

## 2018-04-10 NOTE — PROGRESS NOTE
DATE:  04/09/2018



NOTE:  POOR AUDIO



SUBJECTIVE:  The patient is a 37-year-old male patient with

schizoaffective, bipolar type, _____, disorganized thought process,

worsened by the stress of his medical illness.  He is very depressed,

confused.  Mood labile.  Insight and judgment is poor.  _____ daily

psychiatric consultation.  _____ agitation and irritability.  _____.



MENTAL STATUS EXAMINATION:  This is a 37-year-old male.  Appearance,

disheveled, agitated, _____.  Affect guarded and restricted.  Intellect

poor.  _____.  Motor activity, psychomotor agitation.  _____.  Insight and

judgement is poor. ______.



DIAGNOSIS:  Schizoaffective, bipolar type.



PLAN:  This patient is to continue treatment with Geodon and Depakote to

stabilize his mood.  Provided 18-20 minutes of supportive therapy.  Chart

was reviewed and discussed with staff.  _____.









  ______________________________________________

  Gulshan Armstrong M.D.





DR:  FLIP

D:  04/09/2018 08:48

T:  04/10/2018 02:14

JOB#:  [id number]

CC:

## 2018-04-10 NOTE — CONSULTATION
DATE OF CONSULTATION:  04/10/2018



HISTORY OF PRESENT ILLNESS:  This is a 37-year-old male patient, again who

is admitted to [facility] secondary to chest pain and

weakness, but this patient does have some extreme mood lability worsened

by stress of his medical illness.  He has a previous diagnosis of

schizoaffective bipolar type and because his mood lability is worsening

secondary to stress of his medical illness, his attending has continued to

request daily psychiatric consultation for this patient to be seen.



MENTAL STATUS EXAMINATION:  This is a 37-year-old male with psychomotor

agitation.  Appearance is disheveled.  Attitude, irritable and agitated.

Affect, guarded and restricted.  Intellect poor.  Mood, depressed and

anxious.  Motor activity, psychomotor retardation.  Attention span is

poor.  Orientation x2.  Speech is pressured.  Thought process,

disorganized and illogical.  Thought content, auditory hallucinations and

paranoid delusions.  Insight and judgment is poor.  _____ is poor.

Description of delusions, delusions of people following him.



Change his medications recently.



DIAGNOSIS:  Schizoaffective, bipolar type.



PLAN:  Plan for this patient is to treat him with Geodon 20 mg twice a day

with meals, Depakote 500 mg q.12 hours, Seroquel 300 mg at bedtime, and

Ativan 0.5 mg IV q.4 h. hours as needed, and encourage him to interact

appropriately with staff and other patients.  An 18 to 20 minutes of

supportive therapy provided.  Chart reviewed and discussed with staff.

Seen and assessed at bedside.  The patient continued to be followed by

Psychiatry.









  ______________________________________________

  Gulshan Armstrong M.D.





DR:  SIMONA

D:  04/10/2018 04:08

T:  04/10/2018 13:19

JOB#:  [id number]

CC:

## 2018-04-10 NOTE — PHYSICIAN QUERY
--------- THIS DOCUMENT IS A PERMANENT PART OF THE MEDICAL RECORD ---------



*****PLEASE COMPLETE THE DOCUMENT BEFORE SIGNING*****



Dear ROSE MARIE Kent                        Date 04/10/18

/CDS' Name Jah Wilhelm          /CDS Phone#: [phone]



Exercise your independent professional judgment when responding to query.  
Questions asked do not imply particular answer is desired or expected.  We 
greatly appreciate your clarification on this issue.



Clinical Documentation States:

Cardiology Progress Note: Dr. Prieto (04/10/18)

"Atypical Chest pain. Ruled out for MI. EKG no acute ischemic changes. 
Echocardiogram EF 70%. Nuclear stress test showed no ischemia."

Progress Note: Dr. Irving (04/09/18)

" Chest wall pain"

Clinical Findings Show: 

 EKG =   no acute ischemic changes       Troponin = 0.000               

 O2% = 94-99   EF = 70%   

________________________________________________________________________________
_______

Please document the suspected etiology of Chest Pain:



a.Type:      []Cardiac      []Non-cardiac        []Unspecified



b.Etiology - cardiac    

   [] Aortic dissection                    []Mitral valve prolapsed      

   [] Acute myocardial infarction               []Spasm of coronary arteries   

   [] Coronary Artery Disease                    []Pericarditis   



c.Etiology - non-cardiac

   [] Anxiety                         []Pleurisy

   [] Cancer                          []Pneumonia, type _____________ 

   [] Costochondritis              []Pneumothorax      

   [] GERD/Esophagitis          []Pulmonary embolism   

   [] Unable to determine   

   []Other:___________________



Condition Present on Admission:       [] Yes                      [] No        
     []Clinically Undeterminable



Please also document in your Progress Notes and/or Discharge Summary and 
indicate if the condition was present on admission.



_____________________                ____________

Dr. ROSE MARIE IRVING                              Date/Time
MTDD

## 2018-04-11 VITALS — SYSTOLIC BLOOD PRESSURE: 114 MMHG | DIASTOLIC BLOOD PRESSURE: 61 MMHG

## 2018-04-11 VITALS — SYSTOLIC BLOOD PRESSURE: 109 MMHG | DIASTOLIC BLOOD PRESSURE: 54 MMHG

## 2018-04-11 VITALS — SYSTOLIC BLOOD PRESSURE: 125 MMHG | DIASTOLIC BLOOD PRESSURE: 59 MMHG

## 2018-04-11 VITALS — DIASTOLIC BLOOD PRESSURE: 55 MMHG | SYSTOLIC BLOOD PRESSURE: 106 MMHG

## 2018-04-11 VITALS — SYSTOLIC BLOOD PRESSURE: 106 MMHG | DIASTOLIC BLOOD PRESSURE: 54 MMHG

## 2018-04-11 RX ADMIN — MORPHINE SULFATE PRN MG: 4 INJECTION INTRAVENOUS at 20:00

## 2018-04-11 RX ADMIN — MORPHINE SULFATE PRN MG: 4 INJECTION INTRAVENOUS at 15:33

## 2018-04-11 RX ADMIN — THEOPHYLLINE ANHYDROUS SCH MG: 100 CAPSULE, EXTENDED RELEASE ORAL at 10:10

## 2018-04-11 RX ADMIN — MORPHINE SULFATE PRN MG: 4 INJECTION INTRAVENOUS at 06:57

## 2018-04-11 RX ADMIN — ASPIRIN SCH MG: 81 TABLET, DELAYED RELEASE ORAL at 10:16

## 2018-04-11 RX ADMIN — PREGABALIN SCH MG: 75 CAPSULE ORAL at 16:08

## 2018-04-11 RX ADMIN — PREGABALIN SCH MG: 75 CAPSULE ORAL at 10:20

## 2018-04-11 RX ADMIN — HEPARIN SODIUM SCH UNITS: 5000 INJECTION INTRAVENOUS; SUBCUTANEOUS at 09:00

## 2018-04-11 RX ADMIN — MORPHINE SULFATE PRN MG: 4 INJECTION INTRAVENOUS at 11:22

## 2018-04-11 RX ADMIN — INSULIN ASPART SCH UNITS: 100 INJECTION, SOLUTION INTRAVENOUS; SUBCUTANEOUS at 11:30

## 2018-04-11 RX ADMIN — LISINOPRIL SCH MG: 10 TABLET ORAL at 10:15

## 2018-04-11 RX ADMIN — MORPHINE SULFATE PRN MG: 4 INJECTION INTRAVENOUS at 02:38

## 2018-04-11 RX ADMIN — ZIPRASIDONE HYDROCHLORIDE SCH MG: 20 CAPSULE ORAL at 18:33

## 2018-04-11 RX ADMIN — INSULIN ASPART SCH UNITS: 100 INJECTION, SOLUTION INTRAVENOUS; SUBCUTANEOUS at 17:11

## 2018-04-11 RX ADMIN — DIVALPROEX SODIUM SCH MG: 500 TABLET, FILM COATED, EXTENDED RELEASE ORAL at 10:35

## 2018-04-11 RX ADMIN — ZIPRASIDONE HYDROCHLORIDE SCH MG: 20 CAPSULE ORAL at 10:34

## 2018-04-11 RX ADMIN — INSULIN ASPART SCH UNITS: 100 INJECTION, SOLUTION INTRAVENOUS; SUBCUTANEOUS at 05:54

## 2018-04-11 NOTE — GENERAL PROGRESS NOTE
Assessment/Plan


Problem List:  


(1) Left arm numbness prob old residual to neck surgery


(2) Chest wall pain


ICD Codes:  R07.89 - Other chest pain


SNOMED:  252619705


(3) Diabetes


ICD Codes:  E11.9 - Type 2 diabetes mellitus without complications


SNOMED:  18660690


(4) Chest pain


ICD Codes:  R07.9 - Chest pain


SNOMED:  34762366


Status:  stable, progressing, tolerating diet


Assessment/Plan


ot pt diet pain control dc w legned hh





Subjective


Constitutional:  Reports: malaise, weakness


Allergies:  


Coded Allergies:  


     No Known Allergies (Unverified , 4/14/15)


All Systems:  reviewed and negative except above


Subjective


calm in bed sl cp





Objective





Last 24 Hour Vital Signs








  Date Time  Temp Pulse Resp B/P (MAP) Pulse Ox O2 Delivery O2 Flow Rate FiO2


 


4/11/18 12:00      Room Air  


 


4/11/18 12:00 97.7 97 17 114/61 99   





 97.7       


 


4/11/18 10:20 97.6       


 


4/11/18 10:15    118/70    


 


4/11/18 08:00 97.2 98 19 106/55 98   





 97.2       


 


4/11/18 08:00      Room Air  


 


4/11/18 06:57 97.5       


 


4/11/18 04:00 97.5 89 19 106/54 100   





 97.5       


 


4/11/18 03:08 98.1       


 


4/11/18 02:38 98.1       


 


4/10/18 23:53 98.1 110 20 116/56 93   





 98.1       


 


4/10/18 21:27 98.2       


 


4/10/18 21:09 98.2       


 


4/10/18 21:00 98.2       


 


4/10/18 20:00 98.2 108 20 115/53 93   





 98.2       


 


4/10/18 19:30  99 18   Room Air  21


 


4/10/18 17:10 98.1       


 


4/10/18 15:43 98.1 94 19 115/53 97   





 98.1       


 


4/10/18 15:40 98.1       

















Intake and Output  


 


 4/10/18 4/11/18





 19:00 07:00


 


Intake Total 580 ml 1000 ml


 


Balance 580 ml 1000 ml


 


  


 


Intake Oral 580 ml 1000 ml


 


# Voids  3








Height (Feet):  5


Height (Inches):  11.00


Weight (Pounds):  237


General Appearance:  alert


EENT:  normal ENT inspection


Neck:  normal alignment


Cardiovascular:  normal peripheral pulses, normal rate, regular rhythm


Respiratory/Chest:  chest wall non-tender, lungs clear, normal breath sounds


Abdomen:  normal bowel sounds, non tender, soft


Extremities:  normal inspection


Edema:  no edema noted Arm (L), no edema noted Arm (R), no edema noted Leg (L), 

no edema noted Leg (R), no edema noted Pedal (L), no edema noted Pedal (R), no 

edema noted Generalized


Neurologic:  responsive, motor weakness


Skin:  normal pigmentation, warm/dry











ROSE MARIE IRVING Apr 11, 2018 13:54

## 2018-04-11 NOTE — CARDIAC ELECTROPHYSIOLOGY PN
Assessment/Plan


Assessment/Plan


1. Atypical Chest pain. Ruled out for MI.


    EKG no acute ischemic changes.  Echocardiogram EF 70%.  


    Nuclear stress test showed no ischemia


 No further


2. Hypertension. On Lisinopril 10 daily  


3. Diabetes.


4. History of prior CVA, on aspirin.


5. Asthma, theophylline.


 


MARCELINO RN


DC today





Subjective


Subjective


On NMB. No chest pain or SOB. DC planning today. Awaiting transportation.





Objective





Last 24 Hour Vital Signs








  Date Time  Temp Pulse Resp B/P (MAP) Pulse Ox O2 Delivery O2 Flow Rate FiO2


 


4/11/18 12:00      Room Air  


 


4/11/18 12:00 97.7 97 17 114/61 99   





 97.7       


 


4/11/18 10:20 97.6       


 


4/11/18 10:15    118/70    


 


4/11/18 08:00 97.2 98 19 106/55 98   





 97.2       


 


4/11/18 08:00      Room Air  


 


4/11/18 06:57 97.5       


 


4/11/18 04:00 97.5 89 19 106/54 100   





 97.5       


 


4/11/18 03:08 98.1       


 


4/11/18 02:38 98.1       


 


4/10/18 23:53 98.1 110 20 116/56 93   





 98.1       


 


4/10/18 21:27 98.2       


 


4/10/18 21:09 98.2       


 


4/10/18 21:00 98.2       


 


4/10/18 20:00 98.2 108 20 115/53 93   





 98.2       


 


4/10/18 19:30  99 18   Room Air  21


 


4/10/18 17:10 98.1       


 


4/10/18 15:43 98.1 94 19 115/53 97   





 98.1       


 


4/10/18 15:40 98.1       

















Intake and Output  


 


 4/10/18 4/11/18





 19:00 07:00


 


Intake Total 580 ml 1000 ml


 


Balance 580 ml 1000 ml


 


  


 


Intake Oral 580 ml 1000 ml


 


# Voids  3








Objective


HEAD AND NECK:  No JVD.


LUNGS:  Clear.


CARDIOVASCULAR:  Regular S1 and S2 with no gallop or murmur.


ABDOMEN:  Soft.


EXTREMITIES:  No pitting edema.











Oskar Prieto MD Apr 11, 2018 14:04

## 2018-04-11 NOTE — PROGRESS NOTE
DATE:  04/11/2018



SUBJECTIVE:  This is a 37-year-old patient with chest pain and weakness

with altered mental status and confusion, but he has a diagnosis of

schizoaffective, bipolar type, and so, _____ requires daily psychiatric

consultation to stabilize his mood.



MENTAL STATUS EXAMINATION:  This is a 37-year-old male.  Appearance

disheveled, irritable, and agitated.  Affect guarded and restricted.

Intellect poor.  Mood depressed and anxious.  Motor activity, psychomotor

agitation.  Attention span is poor.  Orientation x2.  Speech is pressured.

Thought process, disorganized and illogical.  Insight and judgment is

poor.



DIAGNOSIS:  Schizoaffective, bipolar type.



PLAN:  Treat him with Geodon 20 mg twice a day, Depakote 500 mg q.12 h.,

Seroquel 300 mg at bedtime.  Provide 18 to 20 minutes of supportive

therapy.  Encouraged him to interact appropriately with staff and other

patients.  18 to 20 minutes of supportive therapy provided.  Chart

reviewed.  Discussed with staff.  The patient seen and assessed in his

room.









  ______________________________________________

  Gulshan Armstorng M.D.





DR:  MYRA

D:  04/11/2018 11:42

T:  04/11/2018 18:50

JOB#:  [id number]

CC:

## 2018-04-11 NOTE — PULMONOLOGY PROGRESS NOTE
Assessment/Plan


Problems:  


(1) Acute coronary syndrome


(2) Opiate dependence


(3) Generalized weakness


(4) hemiparesis/hemiataxia , left. acute


(5) COPD (chronic obstructive pulmonary disease)


(6) Anxiety


(7) Diabetes mellitus


Assessment/Plan


no new complains


symptomatic treatment


titrate fio2 to sat of 92%


respiratory treatment


sliding scale


dc planning in process


All medications and treatment were reviewed./





dc planning in progress





Subjective


Allergies:  


Coded Allergies:  


     No Known Allergies (Unverified , 4/14/15)





Objective





Last 24 Hour Vital Signs








  Date Time  Temp Pulse Resp B/P (MAP) Pulse Ox O2 Delivery O2 Flow Rate FiO2


 


4/11/18 15:25 97.0 106 18 109/54 98 Room Air  





 97.0       


 


4/11/18 12:00      Room Air  


 


4/11/18 12:00 97.7 97 17 114/61 99   





 97.7       


 


4/11/18 10:20 97.6       


 


4/11/18 10:15    118/70    


 


4/11/18 08:00 97.2 98 19 106/55 98   





 97.2       


 


4/11/18 08:00      Room Air  


 


4/11/18 06:57 97.5       


 


4/11/18 04:00 97.5 89 19 106/54 100   





 97.5       


 


4/11/18 03:08 98.1       


 


4/11/18 02:38 98.1       


 


4/10/18 23:53 98.1 110 20 116/56 93   





 98.1       


 


4/10/18 21:27 98.2       


 


4/10/18 21:09 98.2       


 


4/10/18 21:00 98.2       


 


4/10/18 20:00 98.2 108 20 115/53 93   





 98.2       


 


4/10/18 19:30  99 18   Room Air  21

















Intake and Output  


 


 4/10/18 4/11/18





 19:00 07:00


 


Intake Total 580 ml 1000 ml


 


Balance 580 ml 1000 ml


 


  


 


Intake Oral 580 ml 1000 ml


 


# Voids  3








Objective


General Appearance:  WD/WN


HEENT:  normocephalic


Respiratory/Chest:  chest wall non-tender, lungs clear


Cardiovascular:  normal peripheral pulses, regular rhythm


Abdomen:  normal bowel sounds, no organomegaly


Genitourinary:  normal external genitalia


Extremities:  no clubbing


Neurologic/Psychiatric:  CNs II-XII grossly normal, no motor/sensory deficits, 

oriented x 3





Current Medications








 Medications


  (Trade)  Dose


 Ordered  Sig/Henry


 Route


 PRN Reason  Start Time


 Stop Time Status Last Admin


Dose Admin


 


 Acetaminophen


  (Tylenol)  650 mg  Q4H  PRN


 ORAL


 fever>100.5  4/6/18 22:00


 5/4/18 21:59   


 


 


 Al Hydroxide/Mg


 Hydroxide


  (Mylanta II)  30 ml  Q6H  PRN


 ORAL


 dyspepsia  4/6/18 22:00


 5/6/18 21:59   


 


 


 Albuterol/


 Ipratropium


  (Albuterol/


 Ipratropium)  3 ml  Q4H  PRN


 HHN


 Shortness of Breath  4/8/18 10:15


 4/13/18 10:14  4/8/18 22:47


 


 


 Aspirin


  (Ecotrin)  81 mg  DAILY


 ORAL


   4/7/18 09:00


 5/4/18 12:59  4/11/18 10:16


 


 


 Dextrose


  (Dextrose 50%)  25 ml  PRN  PRN


 IV


 BS 60 TO 69 MG/DL  4/6/18 22:00


 5/6/18 21:59   


 


 


 Dextrose


  (Dextrose 50%)  50 ml  PRN  PRN


 IV


 BS LESS THAN 60 MG/DL  4/6/18 22:00


 5/6/18 21:59   


 


 


 Divalproex Sodium


  (Depakote ER)  500 mg  EVERY 12  HOURS


 ORAL


   4/7/18 09:00


 5/7/18 08:59  4/11/18 10:35


 


 


 Heparin Sodium


  (Porcine)


  (Heparin 5000


 units/ml)  5,000 units  EVERY 12  HOURS


 SUBQ


   4/7/18 09:00


 5/7/18 08:59  4/10/18 21:11


 


 


 Insulin Aspart


  (NovoLOG)    BEFORE MEALS AND  HS


 SUBQ


   4/7/18 06:30


 5/4/18 11:29  4/11/18 17:11


 


 


 Lisinopril


  (Zestril)  10 mg  DAILY


 ORAL


   4/10/18 09:00


 5/10/18 08:59  4/11/18 10:15


 


 


 Lorazepam


  (Ativan 2mg/ml


 1ml)  0.5 mg  Q4H  PRN


 IV


 For Anxiety  4/6/18 22:00


 4/11/18 21:59  4/9/18 12:45


 


 


 Morphine Sulfate


  (Morphine


 Sulfate)  1 mg  Q4H  PRN


 IVP


 Severe Pain (Pain Scale 7-10)  4/6/18 22:00


 4/13/18 21:59  4/11/18 15:33


 


 


 Nicotine


  (Nicoderm)  1 patch  Q24H


 TDERMAL


   4/8/18 10:30


 5/8/18 10:29  4/11/18 10:09


 


 


 Ondansetron HCl


  (Zofran)  4 mg  Q6H  PRN


 IVP


 Nausea & Vomiting  4/6/18 22:00


 5/6/18 21:59  4/10/18 17:51


 


 


 Oxycodone/


 Acetaminophen


  (Percocet 5-325)  1 tab  Q4H  PRN


 ORAL


 Moderate Pain (Pain Scale 4-6)  4/6/18 22:00


 4/11/18 21:59   


 


 


 Polyethylene


 Glycol


  (Miralax)  17 gm  HSPRN  PRN


 ORAL


 Constipation  4/6/18 21:00


 5/6/18 20:59   


 


 


 Pregabalin


  (Lyrica)  75 mg  TID@1000,1500,2000


 ORAL


   4/7/18 10:00


 5/4/18 09:59  4/11/18 16:08


 


 


 Promethazine HCl/


 Codeine


  (Phenergan with


 Codeine)  5 ml  Q4H  PRN


 ORAL


 For Cough  4/6/18 22:00


 5/4/18 21:59   


 


 


 Quetiapine


 Fumarate


  (SEROquel)  300 mg  BEDTIME


 ORAL


   4/7/18 21:00


 5/4/18 20:59  4/10/18 21:09


 


 


 Theophylline


  (Florin-Dur)  100 mg  EVERY 12  HOURS


 ORAL


   4/7/18 09:00


 5/7/18 08:59  4/11/18 10:10


 


 


 Ziprasidone


  (Geodon)  20 mg  TWICE A  DAY


 ORAL


   4/7/18 09:00


 5/4/18 08:59  4/11/18 18:33


 


 


 Zolpidem Tartrate


  (Ambien)  5 mg  HSPRN  PRN


 ORAL


 Insomnia  4/6/18 22:00


 4/13/18 21:59  4/9/18 21:56


 

















Lissette Steward MD Apr 11, 2018 19:21

## 2018-04-12 NOTE — DISCHARGE SUMMARY
Discharge Summary


DATE OF ADMISSION: 04/04/2018


DATE OF DISCHARGE: 04/11/2018





CONSULTANTS: 


Dr. Lissette Mayen PsyD





BRIEF HOSPITAL COURSE:


Patient is a 37-year-old male who was at the nursing home became homeless, 

presented with 2 days of increased left chest pain radiating to the left arm, 

with slight weakness and was very short of breath.  He has medical history of 

COPD, hypertension, diabetes, and chronic pain.  He complained of vague 

generalized chest pain that started on the day of admission.  He is a very poor 

historian.


On evaluation at ED, blood work showed elevated WBC 13.8, initial troponin was 

negative, probe BNP was 146.  Urinalysis was negative.  Chest x-ray showed no 

acute findings.  Head CT with no mass effect, edema or acute bleed.  He was 

admitted for evaluation of chest pain possible acute coronary syndrome, 

diabetes mellitus, anxiety, COPD and generalized weakness.  EKG did not show 

any acute ischemic changes.  Echocardiogram with ejection fraction of 55%, no 

evidence of pericardial effusion, no aortic regurgitation.


Neurological consultation was done to evaluate possible acute stroke.  

According to the patient 2 months ago he had episodes of left-sided weakness, 

following day, his symptoms improved and was discharged.  He had been symptom-

free until couple of days prior to admission, he developed weakness in the left 

upper and left lower extremity with dysarthric speech.  CT of the brain done at 

ED was negative.  Brain MRI was negative for acute infarct.  No intracranial 

hemorrhage, no mass effect edema or midline shift.  Cervical spine MRI showed 

no definite focal cord signal abnormality.  There was multilevel degenerative 

changes more pronounced at C4-C5.  Overall not significantly progressed 

compared to prior exam on 04/15/2015.


Psychiatric evaluation was done.  He has a lot of mood lability and agitation 

worsened by the stress of his medical illness.  He was diagnosed to have 

schizoaffective disorder, bipolar type.  He was started on Reyes and 20 mg 

twice a day for psychosis, Depakote and Seroquel.  He was given PT/OT.


He underwent myocardial perfusion scan, results were negative.  Calculated post 

stress ejection fraction 68%.  He was eventually discharged to a SNF.





FINAL DIAGNOSES: 


Left arm numbness probably old residual to neck surgery


Diabetes type 2


Atypical chest pain ruled out for MI


Old CVA


Asthma/COPD


Hypertension


Schizoaffective bipolar type


Anxiety


Left acute hemiparesis/josesito-ataxia


Psychosomatic disorder





DISPOSITION: He was discharged to [facility].





DISCHARGE MEDICATIONS: Refer to Discharge Medication List.





I have been assigned to dictate discharge summary on this account, and I was 

not involved in the patient's management.











Evelyn Garcia NP Apr 12, 2018 16:10

## 2018-05-11 ENCOUNTER — HOSPITAL ENCOUNTER (EMERGENCY)
Dept: HOSPITAL 15 - ER | Age: 38
Discharge: HOME | End: 2018-05-11
Payer: COMMERCIAL

## 2018-05-11 VITALS — DIASTOLIC BLOOD PRESSURE: 60 MMHG | SYSTOLIC BLOOD PRESSURE: 113 MMHG

## 2018-05-11 VITALS — WEIGHT: 240 LBS | BODY MASS INDEX: 33.6 KG/M2 | HEIGHT: 71 IN

## 2018-05-11 DIAGNOSIS — I10: ICD-10-CM

## 2018-05-11 DIAGNOSIS — B34.9: Primary | ICD-10-CM

## 2018-05-11 DIAGNOSIS — F17.210: ICD-10-CM

## 2018-05-11 DIAGNOSIS — E11.9: ICD-10-CM

## 2018-05-11 PROCEDURE — 93005 ELECTROCARDIOGRAM TRACING: CPT

## 2018-05-11 PROCEDURE — 71046 X-RAY EXAM CHEST 2 VIEWS: CPT

## 2018-05-20 ENCOUNTER — HOSPITAL ENCOUNTER (EMERGENCY)
Dept: HOSPITAL 15 - ER | Age: 38
LOS: 1 days | Discharge: HOME | End: 2018-05-21
Payer: COMMERCIAL

## 2018-05-20 VITALS — HEIGHT: 71 IN | BODY MASS INDEX: 30.8 KG/M2 | WEIGHT: 220 LBS

## 2018-05-20 DIAGNOSIS — R51: ICD-10-CM

## 2018-05-20 DIAGNOSIS — E87.6: ICD-10-CM

## 2018-05-20 DIAGNOSIS — R55: Primary | ICD-10-CM

## 2018-05-20 LAB
ALBUMIN SERPL-MCNC: 3.2 G/DL (ref 3.4–5)
ALCOHOL, URINE: < 3 MG/DL (ref 0–5)
ALP SERPL-CCNC: 51 U/L (ref 45–117)
ALT SERPL-CCNC: 24 U/L (ref 16–61)
AMPHETAMINES UR QL SCN: NEGATIVE
ANION GAP SERPL CALCULATED.3IONS-SCNC: 9 MMOL/L (ref 5–15)
BARBITURATES UR QL SCN: NEGATIVE
BENZODIAZ UR QL SCN: NEGATIVE
BILIRUB SERPL-MCNC: 0.3 MG/DL (ref 0.2–1)
BUN SERPL-MCNC: 10 MG/DL (ref 7–18)
BUN/CREAT SERPL: 15.6
BZE UR QL SCN: NEGATIVE
CALCIUM SERPL-MCNC: 8 MG/DL (ref 8.5–10.1)
CANNABINOIDS UR QL SCN: NEGATIVE
CHLORIDE SERPL-SCNC: 109 MMOL/L (ref 98–107)
CO2 SERPL-SCNC: 23 MMOL/L (ref 21–32)
GLUCOSE SERPL-MCNC: 102 MG/DL (ref 74–106)
HCT VFR BLD AUTO: 36.7 % (ref 41–53)
HGB BLD-MCNC: 12.6 G/DL (ref 13.5–17.5)
MCH RBC QN AUTO: 29.5 PG (ref 28–32)
MCV RBC AUTO: 85.9 FL (ref 80–100)
NRBC BLD QL AUTO: 0 %
OPIATES UR QL SCN: NEGATIVE
PCP UR QL SCN: NEGATIVE
POTASSIUM SERPL-SCNC: 3.3 MMOL/L (ref 3.5–5.1)
PROT SERPL-MCNC: 5.9 G/DL (ref 6.4–8.2)
SODIUM SERPL-SCNC: 141 MMOL/L (ref 136–145)

## 2018-05-20 PROCEDURE — 71045 X-RAY EXAM CHEST 1 VIEW: CPT

## 2018-05-20 PROCEDURE — 81001 URINALYSIS AUTO W/SCOPE: CPT

## 2018-05-20 PROCEDURE — 80053 COMPREHEN METABOLIC PANEL: CPT

## 2018-05-20 PROCEDURE — 36415 COLL VENOUS BLD VENIPUNCTURE: CPT

## 2018-05-20 PROCEDURE — 93005 ELECTROCARDIOGRAM TRACING: CPT

## 2018-05-20 PROCEDURE — 82962 GLUCOSE BLOOD TEST: CPT

## 2018-05-20 PROCEDURE — 85025 COMPLETE CBC W/AUTO DIFF WBC: CPT

## 2018-05-20 PROCEDURE — 80307 DRUG TEST PRSMV CHEM ANLYZR: CPT

## 2018-05-20 PROCEDURE — 70450 CT HEAD/BRAIN W/O DYE: CPT

## 2018-05-21 VITALS — SYSTOLIC BLOOD PRESSURE: 128 MMHG | DIASTOLIC BLOOD PRESSURE: 72 MMHG

## 2018-05-31 ENCOUNTER — HOSPITAL ENCOUNTER (EMERGENCY)
Dept: HOSPITAL 15 - ER | Age: 38
Discharge: HOME | End: 2018-05-31
Payer: COMMERCIAL

## 2018-05-31 VITALS — WEIGHT: 220 LBS | BODY MASS INDEX: 30.8 KG/M2 | HEIGHT: 71 IN

## 2018-05-31 VITALS — SYSTOLIC BLOOD PRESSURE: 122 MMHG | DIASTOLIC BLOOD PRESSURE: 71 MMHG

## 2018-05-31 DIAGNOSIS — F17.210: ICD-10-CM

## 2018-05-31 DIAGNOSIS — Z59.0: ICD-10-CM

## 2018-05-31 DIAGNOSIS — Z86.73: ICD-10-CM

## 2018-05-31 DIAGNOSIS — E11.9: ICD-10-CM

## 2018-05-31 DIAGNOSIS — J44.9: ICD-10-CM

## 2018-05-31 DIAGNOSIS — I10: ICD-10-CM

## 2018-05-31 DIAGNOSIS — R07.89: Primary | ICD-10-CM

## 2018-05-31 LAB
ALBUMIN SERPL-MCNC: 3.6 G/DL (ref 3.4–5)
ALP SERPL-CCNC: 64 U/L (ref 45–117)
ALT SERPL-CCNC: 23 U/L (ref 16–61)
ANION GAP SERPL CALCULATED.3IONS-SCNC: 8 MMOL/L (ref 5–15)
APTT PPP: 25 SEC (ref 23.78–33.04)
BILIRUB SERPL-MCNC: 0.4 MG/DL (ref 0.2–1)
BUN SERPL-MCNC: 9 MG/DL (ref 7–18)
BUN/CREAT SERPL: 9.9
CALCIUM SERPL-MCNC: 8.6 MG/DL (ref 8.5–10.1)
CHLORIDE SERPL-SCNC: 111 MMOL/L (ref 98–107)
CO2 SERPL-SCNC: 24 MMOL/L (ref 21–32)
GLUCOSE SERPL-MCNC: 122 MG/DL (ref 74–106)
HCT VFR BLD AUTO: 42.8 % (ref 41–53)
HGB BLD-MCNC: 14.7 G/DL (ref 13.5–17.5)
INR PPP: 0.89 (ref 0.9–1.15)
MAGNESIUM SERPL-MCNC: 2.1 MG/DL (ref 1.6–2.6)
MCH RBC QN AUTO: 29.5 PG (ref 28–32)
MCV RBC AUTO: 85.9 FL (ref 80–100)
NRBC BLD QL AUTO: 0.1 %
POTASSIUM SERPL-SCNC: 3.3 MMOL/L (ref 3.5–5.1)
PROT SERPL-MCNC: 6.7 G/DL (ref 6.4–8.2)
PROTHROMBIN TIME: 9.6 SEC (ref 9.27–12.13)
SODIUM SERPL-SCNC: 143 MMOL/L (ref 136–145)

## 2018-05-31 PROCEDURE — 80053 COMPREHEN METABOLIC PANEL: CPT

## 2018-05-31 PROCEDURE — 71046 X-RAY EXAM CHEST 2 VIEWS: CPT

## 2018-05-31 PROCEDURE — 96361 HYDRATE IV INFUSION ADD-ON: CPT

## 2018-05-31 PROCEDURE — 85730 THROMBOPLASTIN TIME PARTIAL: CPT

## 2018-05-31 PROCEDURE — 84484 ASSAY OF TROPONIN QUANT: CPT

## 2018-05-31 PROCEDURE — 85025 COMPLETE CBC W/AUTO DIFF WBC: CPT

## 2018-05-31 PROCEDURE — 96376 TX/PRO/DX INJ SAME DRUG ADON: CPT

## 2018-05-31 PROCEDURE — 93005 ELECTROCARDIOGRAM TRACING: CPT

## 2018-05-31 PROCEDURE — 83735 ASSAY OF MAGNESIUM: CPT

## 2018-05-31 PROCEDURE — 96375 TX/PRO/DX INJ NEW DRUG ADDON: CPT

## 2018-05-31 PROCEDURE — 94761 N-INVAS EAR/PLS OXIMETRY MLT: CPT

## 2018-05-31 PROCEDURE — 85610 PROTHROMBIN TIME: CPT

## 2018-05-31 PROCEDURE — 99285 EMERGENCY DEPT VISIT HI MDM: CPT

## 2018-05-31 PROCEDURE — 36415 COLL VENOUS BLD VENIPUNCTURE: CPT

## 2018-05-31 PROCEDURE — 96374 THER/PROPH/DIAG INJ IV PUSH: CPT

## 2018-05-31 SDOH — ECONOMIC STABILITY - HOUSING INSECURITY: HOMELESSNESS: Z59.0

## 2018-06-13 ENCOUNTER — HOSPITAL ENCOUNTER (INPATIENT)
Dept: HOSPITAL 72 - EMR | Age: 38
LOS: 5 days | Discharge: HOME | DRG: 606 | End: 2018-06-18
Payer: MEDICARE

## 2018-06-13 VITALS — SYSTOLIC BLOOD PRESSURE: 122 MMHG | DIASTOLIC BLOOD PRESSURE: 77 MMHG

## 2018-06-13 VITALS — SYSTOLIC BLOOD PRESSURE: 118 MMHG | DIASTOLIC BLOOD PRESSURE: 77 MMHG

## 2018-06-13 VITALS — DIASTOLIC BLOOD PRESSURE: 69 MMHG | SYSTOLIC BLOOD PRESSURE: 125 MMHG

## 2018-06-13 VITALS — WEIGHT: 230 LBS | HEIGHT: 71 IN | BODY MASS INDEX: 32.2 KG/M2

## 2018-06-13 DIAGNOSIS — Z59.0: ICD-10-CM

## 2018-06-13 DIAGNOSIS — M46.86: ICD-10-CM

## 2018-06-13 DIAGNOSIS — G62.9: ICD-10-CM

## 2018-06-13 DIAGNOSIS — S90.821A: ICD-10-CM

## 2018-06-13 DIAGNOSIS — M51.36: ICD-10-CM

## 2018-06-13 DIAGNOSIS — J44.9: ICD-10-CM

## 2018-06-13 DIAGNOSIS — E11.65: ICD-10-CM

## 2018-06-13 DIAGNOSIS — G93.40: ICD-10-CM

## 2018-06-13 DIAGNOSIS — Q07.02: ICD-10-CM

## 2018-06-13 DIAGNOSIS — M50.30: ICD-10-CM

## 2018-06-13 DIAGNOSIS — Z86.73: ICD-10-CM

## 2018-06-13 DIAGNOSIS — F19.21: ICD-10-CM

## 2018-06-13 DIAGNOSIS — I10: ICD-10-CM

## 2018-06-13 DIAGNOSIS — Z91.81: ICD-10-CM

## 2018-06-13 DIAGNOSIS — R53.1: ICD-10-CM

## 2018-06-13 DIAGNOSIS — S90.822A: Primary | ICD-10-CM

## 2018-06-13 DIAGNOSIS — F25.0: ICD-10-CM
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
## 2018-06-13 DIAGNOSIS — R26.2: ICD-10-CM

## 2018-06-13 LAB
ADD MANUAL DIFF: NO
ALBUMIN SERPL-MCNC: 3.7 G/DL (ref 3.4–5)
ALBUMIN/GLOB SERPL: 1.2 {RATIO} (ref 1–2.7)
ALP SERPL-CCNC: 67 U/L (ref 46–116)
ALT SERPL-CCNC: 29 U/L (ref 12–78)
ANION GAP SERPL CALC-SCNC: 8 MMOL/L (ref 5–15)
AST SERPL-CCNC: 21 U/L (ref 15–37)
BASOPHILS NFR BLD AUTO: 0.9 % (ref 0–2)
BILIRUB SERPL-MCNC: 0.4 MG/DL (ref 0.2–1)
BUN SERPL-MCNC: 17 MG/DL (ref 7–18)
CALCIUM SERPL-MCNC: 9.3 MG/DL (ref 8.5–10.1)
CHLORIDE SERPL-SCNC: 105 MMOL/L (ref 98–107)
CK SERPL-CCNC: 317 U/L (ref 26–308)
CO2 SERPL-SCNC: 26 MMOL/L (ref 21–32)
CREAT SERPL-MCNC: 1 MG/DL (ref 0.55–1.3)
EOSINOPHIL NFR BLD AUTO: 1.7 % (ref 0–3)
ERYTHROCYTE [DISTWIDTH] IN BLOOD BY AUTOMATED COUNT: 12.2 % (ref 11.6–14.8)
GLOBULIN SER-MCNC: 3.1 G/DL
HCT VFR BLD CALC: 45.6 % (ref 42–52)
HGB BLD-MCNC: 15.1 G/DL (ref 14.2–18)
LYMPHOCYTES NFR BLD AUTO: 23.4 % (ref 20–45)
MCV RBC AUTO: 86 FL (ref 80–99)
MONOCYTES NFR BLD AUTO: 9.7 % (ref 1–10)
NEUTROPHILS NFR BLD AUTO: 64.3 % (ref 45–75)
PLATELET # BLD: 216 K/UL (ref 150–450)
POTASSIUM SERPL-SCNC: 3.9 MMOL/L (ref 3.5–5.1)
RBC # BLD AUTO: 5.33 M/UL (ref 4.7–6.1)
SODIUM SERPL-SCNC: 139 MMOL/L (ref 136–145)
WBC # BLD AUTO: 10.7 K/UL (ref 4.8–10.8)

## 2018-06-13 PROCEDURE — 82550 ASSAY OF CK (CPK): CPT

## 2018-06-13 PROCEDURE — 99285 EMERGENCY DEPT VISIT HI MDM: CPT

## 2018-06-13 PROCEDURE — 36415 COLL VENOUS BLD VENIPUNCTURE: CPT

## 2018-06-13 PROCEDURE — 80061 LIPID PANEL: CPT

## 2018-06-13 PROCEDURE — 80048 BASIC METABOLIC PNL TOTAL CA: CPT

## 2018-06-13 PROCEDURE — 94640 AIRWAY INHALATION TREATMENT: CPT

## 2018-06-13 PROCEDURE — 83735 ASSAY OF MAGNESIUM: CPT

## 2018-06-13 PROCEDURE — 72110 X-RAY EXAM L-2 SPINE 4/>VWS: CPT

## 2018-06-13 PROCEDURE — 82962 GLUCOSE BLOOD TEST: CPT

## 2018-06-13 PROCEDURE — 94664 DEMO&/EVAL PT USE INHALER: CPT

## 2018-06-13 PROCEDURE — 94760 N-INVAS EAR/PLS OXIMETRY 1: CPT

## 2018-06-13 PROCEDURE — 83605 ASSAY OF LACTIC ACID: CPT

## 2018-06-13 PROCEDURE — 83036 HEMOGLOBIN GLYCOSYLATED A1C: CPT

## 2018-06-13 PROCEDURE — 80053 COMPREHEN METABOLIC PANEL: CPT

## 2018-06-13 PROCEDURE — 85025 COMPLETE CBC W/AUTO DIFF WBC: CPT

## 2018-06-13 PROCEDURE — 71045 X-RAY EXAM CHEST 1 VIEW: CPT

## 2018-06-13 PROCEDURE — 84100 ASSAY OF PHOSPHORUS: CPT

## 2018-06-13 RX ADMIN — MORPHINE SULFATE PRN MG: 2 INJECTION, SOLUTION INTRAMUSCULAR; INTRAVENOUS at 21:15

## 2018-06-13 RX ADMIN — PREGABALIN SCH MG: 75 CAPSULE ORAL at 17:15

## 2018-06-13 RX ADMIN — MORPHINE SULFATE PRN MG: 2 INJECTION, SOLUTION INTRAMUSCULAR; INTRAVENOUS at 17:15

## 2018-06-13 RX ADMIN — INSULIN ASPART SCH UNITS: 100 INJECTION, SOLUTION INTRAVENOUS; SUBCUTANEOUS at 20:49

## 2018-06-13 RX ADMIN — TRAZODONE HYDROCHLORIDE SCH MG: 50 TABLET ORAL at 20:50

## 2018-06-13 RX ADMIN — INSULIN ASPART SCH UNITS: 100 INJECTION, SOLUTION INTRAVENOUS; SUBCUTANEOUS at 17:00

## 2018-06-13 RX ADMIN — THEOPHYLLINE ANHYDROUS SCH MG: 100 CAPSULE, EXTENDED RELEASE ORAL at 20:50

## 2018-06-13 RX ADMIN — HEPARIN SODIUM SCH UNITS: 5000 INJECTION INTRAVENOUS; SUBCUTANEOUS at 20:48

## 2018-06-13 SDOH — ECONOMIC STABILITY - HOUSING INSECURITY: HOMELESSNESS: Z59.0

## 2018-06-13 NOTE — HISTORY AND PHYSICAL REPORT
DATE OF ADMISSION:  06/13/2018



CONSULTANTS:

1. Lissette Steward M.D.

2. Gulshan Armstrong M.D.

3. Judson Gregorio D.P.M.

4. Endy Redding M.D.

5. Behnoush Zarrini, M.D.



CHIEF COMPLAINT:  Blister feet, confusion, and weakness.



BRIEF HISTORY:  This is a 37-year-old male, who lives with friends,

apparently became homeless and got kicked out.  He came into [facility]

last night with severe foot pain and weakness.  He was found to have

multiple blisters on the bottom of the sole.  The patient is being

admitted to medical floor for further treatment.  Currently, calm in bed,

slight general pain.  No complaints.



PAST MEDICAL HISTORY:  Diabetes, hypertension, and CVA.



PAST SURGICAL HISTORY:  Back and neck.



MEDICATIONS:  Currently, just Toradol and intravenous fluids.



ALLERGIES:  Denies.



SOCIAL HISTORY:  Positive smoking.  No alcohol.  No intravenous drug

abuse.



FAMILY HISTORY:  Noncontributory.



REVIEW OF SYSTEMS:  No chest pain.  No shortness of breath.  No nausea,

vomiting, or diarrhea.



PHYSICAL EXAMINATION:

GENERAL:  Calm in bed, in the ER gurney, oriented x3, and in no acute

distress.

VITAL SIGNS:  Temperature 97 degrees, pulse 89, respirations 25, and blood

pressure 118/77.

CARDIOVASCULAR:  No murmurs.

LUNGS:  Distant and clear.

ABDOMEN:  Bowel sounds positive.  Nontender.  Nondistended.

EXTREMITIES:  No cyanosis, clubbing, or edema.  Bottom of the sole with

multiple 1 inch x 0.5 inch blister in the bottom of metatarsal

bilaterally.

NEUROLOGIC:  The patient moves all extremities.  Slightly weak.



LABORATORY DATA:  CBC is normal.  BMP show glucose 138 and creatine kinase

is 317, otherwise BMP is normal.



ASSESSMENT:

1. Severe blister to the foot.

2. Encephalopathy.

3. Weakness.

4. Diabetes.

5. Hypertension.

6. Cerebrovascular accident.



PLAN:

1. Continue premedications.

2. Wound care.

3. OT, PT, and dietary followup.

4. Blood pressure and blood sugar control.

5. Pain control.

6. CBC and BMP in the morning.

7. Dr. Steward, Dr. Armstrong, Dr. Gregorio, Dr. Redding, and Dr. Franco to

consult.









  ______________________________________________

  Samir Cabrera D.O.





DR:  JACINTO

D:  06/13/2018 12:54

T:  06/13/2018 17:17

JOB#:  [id number]

CC:

## 2018-06-13 NOTE — CONSULTATION
History of Present Illness


General


Date patient seen:  Jun 13, 2018


Chief Complaint:  Pain





Present Illness


HPI


37 year old male with history of diabetes, COPD, CVA.  He states that he has 

been walking a lot.  He states that he currently is living on the street 

because he doesn't have enough money to pay for an apartment.  He states that 

he has blisters on both of his feet and this is causing severe pain and 

difficulty walking.  He states that he is also had multiple falls.  He states 

that he has been unsteady and feels disoriented.  He denies chest pain or 

shortness of breath.  He denies abdominal pain.  He has no other complaints.


Allergies:  


Coded Allergies:  


     No Known Allergies (Unverified , 4/14/15)





Medication History


Scheduled


Aspirin Ec* (Aspirin Ec*), 81 MG ORAL DAILY, (Reported)


Divalproex Sodium* (Depakote*), 500 MG PO Q12HR, (Reported)


Divalproex Sodium* (Depakote*), 250 MG PO Q12HR, (Reported)


Insulin Aspart (Novolog Flexpen), 30 SQ TID, (Reported)


Insulin Aspart (Novolog), SUBQ AC+HS, (Reported)


Insulin Detemir (Levemir), 30 SUBQ BID, (Reported)


Lisinopril (Lisinopril*), 10 MG ORAL DAILY, (Reported)


Metformin Hcl* (Metformin Hcl*), 500 MG ORAL TIDAC, (Reported)


Nicotine 14MG Patch* (Nicoderm Cq 14MG*), 1 EACH TD DAILY, (Reported)


Nicotine 14MG Patch* (Nicoderm Cq 14MG*), 1 EACH TD DAILY, (Reported)


Olanzapine* (Zyprexa*), 2.5 MG ORAL DAILY, (Reported)


Olanzapine* (Zyprexa*), 2.5 MG ORAL DAILY, (Reported)


Pregabalin* (Lyrica*), 75 MG ORAL THREE TIMES A DAY, (Reported)


Quetiapine Fumarate* (Seroquel*), 300 MG ORAL BEDTIME, (Reported)


Theophylline (Theodur*), 100 MG ORAL Q12HR, (Reported)


Trazodone* (Trazodone*), 150 MG ORAL BEDTIME, (Reported)


Ziprasidone Hcl* (Geodon*), 20 MG ORAL DAILY, (Reported)





Scheduled PRN


Acetaminophen* (Acetaminophen 325MG Tablet*), 325 MG ORAL Q4H PRN for Mild Pain/

Temp > 100.5, (Reported)


Acetaminophen* (Acetaminophen 325MG Tablet*), 650 MG ORAL Q4H PRN for Prn 

Headache/Temp > 101, (Reported)


Al Hydroxide/mg Hydroxide (Mag-Al Plus Suspension), 30 ML ORAL Q6HR PRN for 

dyspepsia, (Reported)


Codeine/Promethazine Hcl* (Promethazine-Codeine Syrup*), 5 ML ORAL Q4H PRN for 

For Cough, (Reported)


Hydromorphone Hcl (Dilaudid), 1 MG PO Q4HR PRN for For Pain, (Reported)


Ipratropium/Albuterol Sulfate (DuoNeb 0.5-3(2.5)mg/3ml), 3 ML HHN Q4HR PRN for 

Shortness of Breath, (Reported)


Oxycodone Hcl/Acetaminophen 5-325* (Oxycodone-Acetaminophen 5-325*), 1 TAB ORAL 

Q4H PRN for For Pain, (Reported)


Oxycodone Hcl/Acetaminophen 5-325* (Oxycodone-Acetaminophen 5-325*), 1 TAB ORAL 

Q4H PRN for 4-6, (Reported)


Oxycodone/Acetaminophen 5-325* (Percocet 5-325 Mg Tablet*), 1 TAB ORAL Q4H PRN 

for For Pain, (Reported)


Polyethylene Glycol 3350* (Miralax*), 17 GM ORAL DAILY PRN for Constipation, (

Reported)


Zolpidem Tartrate* (Ambien*), 5 MG ORAL BEDTIME PRN for Insomnia, (Reported)





Miscellaneous Medications


Insulin Aspart* (Novolog*), 0 SUBQ, (Reported)


Insulin Aspart* (Novolog*), 0 SUBQ, (Reported)


Unable to Obtain Medications (Unable To Obtain Meds), (Reported)





Patient History


Healthcare decision maker





Resuscitation status





Advanced Directive on File








Review of Systems


All Other Systems:  negative except mentioned in HPI





Physical Exam


General Appearance:  WD/WN


Lines, tubes and drains:  peripheral


HEENT:  anicteric


Neck:  normal alignment, supple


Respiratory/Chest:  lungs clear, normal breath sounds, no accessory muscle use


Cardiovascular/Chest:  normal peripheral pulses, regularly irregular


Abdomen:  normal bowel sounds, soft


Genitourinary/Rectal:  normal genital exam





Last 24 Hour Vital Signs








  Date Time  Temp Pulse Resp B/P (MAP) Pulse Ox O2 Delivery O2 Flow Rate FiO2


 


6/13/18 13:57 97.6 95 23 123/69 96 Room Air  





 97.5       


 


6/13/18 11:49 97.5 89 25 118/77 95 Room Air  





 97.5       


 


6/13/18 09:08 97.2 80 13 122/77 97 Room Air  





 97.2       


 


6/13/18 08:58 97.2       


 


6/13/18 08:28 98.5       


 


6/13/18 07:44 98.5 100 18 127/69 95 Room Air  





 98.4       











Laboratory Tests








Test


  6/13/18


09:00


 


White Blood Count


  10.7 K/UL


(4.8-10.8)


 


Red Blood Count


  5.33 M/UL


(4.70-6.10)


 


Hemoglobin


  15.1 G/DL


(14.2-18.0)


 


Hematocrit


  45.6 %


(42.0-52.0)


 


Mean Corpuscular Volume 86 FL (80-99)  


 


Mean Corpuscular Hemoglobin


  28.3 PG


(27.0-31.0)


 


Mean Corpuscular Hemoglobin


Concent 33.1 G/DL


(32.0-36.0)


 


Red Cell Distribution Width


  12.2 %


(11.6-14.8)


 


Platelet Count


  216 K/UL


(150-450)


 


Mean Platelet Volume


  8.0 FL


(6.5-10.1)


 


Neutrophils (%) (Auto)


  64.3 %


(45.0-75.0)


 


Lymphocytes (%) (Auto)


  23.4 %


(20.0-45.0)


 


Monocytes (%) (Auto)


  9.7 %


(1.0-10.0)


 


Eosinophils (%) (Auto)


  1.7 %


(0.0-3.0)


 


Basophils (%) (Auto)


  0.9 %


(0.0-2.0)


 


Sodium Level


  139 MMOL/L


(136-145)


 


Potassium Level


  3.9 MMOL/L


(3.5-5.1)


 


Chloride Level


  105 MMOL/L


()


 


Carbon Dioxide Level


  26 MMOL/L


(21-32)


 


Anion Gap


  8 mmol/L


(5-15)


 


Blood Urea Nitrogen


  17 mg/dL


(7-18)


 


Creatinine


  1.0 MG/DL


(0.55-1.30)


 


Estimat Glomerular Filtration


Rate > 60 mL/min


(>60)


 


Glucose Level


  138 MG/DL


()  H


 


Lactic Acid Level


  1.40 mmol/L


(0.4-2.0)


 


Calcium Level


  9.3 MG/DL


(8.5-10.1)


 


Total Bilirubin


  0.4 MG/DL


(0.2-1.0)


 


Aspartate Amino Transf


(AST/SGOT) 21 U/L (15-37)


 


 


Alanine Aminotransferase


(ALT/SGPT) 29 U/L (12-78)


 


 


Alkaline Phosphatase


  67 U/L


()


 


Total Creatine Kinase


  317 U/L


()  H


 


Total Protein


  6.8 G/DL


(6.4-8.2)


 


Albumin


  3.7 G/DL


(3.4-5.0)


 


Globulin 3.1 g/dL  


 


Albumin/Globulin Ratio 1.2 (1.0-2.7)  








Height (Feet):  5


Height (Inches):  11.00


Weight (Pounds):  230


Medications





Current Medications








 Medications


  (Trade)  Dose


 Ordered  Sig/Henry


 Route


 PRN Reason  Start Time


 Stop Time Status Last Admin


Dose Admin


 


 Acetaminophen


  (Tylenol)  650 mg  Q4H  PRN


 ORAL


 T>100.5  6/13/18 15:45


 7/13/18 15:44   


 


 


 Al Hydroxide/Mg


 Hydroxide


  (Mylanta II)  30 ml  Q6H  PRN


 ORAL


 dyspepsia  6/13/18 15:45


 7/13/18 15:44   


 


 


 Dextrose


  (Dextrose 50%)  25 ml  PRN


 IV


 Hypoglycemia  6/13/18 16:00


 7/13/18 15:59   


 


 


 Dextrose


  (Dextrose 50%)  50 ml  PRN


 IV


 hypoglycemia  6/13/18 16:00


 7/13/18 15:59   


 


 


 Divalproex Sodium


  (Depakote)  250 mg  Q12HR


 ORAL


   6/13/18 21:00


 7/13/18 20:59   


 


 


 Heparin Sodium


  (Porcine)


  (Heparin 5000


 units/ml)  5,000 units  EVERY 12  HOURS


 SUBQ


   6/13/18 21:00


 7/13/18 20:59   


 


 


 Insulin Aspart


  (NovoLOG)    BEFORE MEALS AND  HS


 SUBQ


   6/13/18 17:00


 7/13/18 16:59   


 


 


 Lorazepam


  (Ativan 2mg/ml


 1ml)  0.5 mg  Q4H  PRN


 IV


 For Anxiety  6/13/18 16:00


 6/20/18 15:59   


 


 


 Morphine Sulfate


  (Morphine


 Sulfate)  1 mg  Q4H  PRN


 IVP


 Severe Pain (Pain Scale 7-10)  6/13/18 15:45


 6/20/18 15:44   


 


 


 Olanzapine


  (ZyPREXA)  2.5 mg  DAILY


 ORAL


   6/14/18 09:00


 7/14/18 08:59   


 


 


 Ondansetron HCl


  (Zofran)  4 mg  Q6H  PRN


 IVP


 Nausea & Vomiting  6/13/18 15:45


 7/13/18 15:44   


 


 


 Oxycodone/


 Acetaminophen


  (Percocet 5-325)  1 tab  Q4H  PRN


 ORAL


 Moderate Pain (Pain Scale 4-6)  6/13/18 15:45


 6/20/18 15:29   


 


 


 Polyethylene


 Glycol


  (Miralax)  17 gm  HSPRN  PRN


 ORAL


 Constipation  6/13/18 21:00


 7/13/18 20:59   


 


 


 Pregabalin


  (Lyrica)  75 mg  Q8HR


 ORAL


   6/13/18 18:00


 7/13/18 17:59   


 


 


 Quetiapine


 Fumarate


  (SEROquel)  300 mg  BEDTIME


 ORAL


   6/13/18 21:00


 7/13/18 20:59   


 


 


 Theophylline


  (Florin-Dur)  100 mg  Q12HR


 ORAL


   6/13/18 21:00


 7/13/18 20:59   


 


 


 Trazodone HCl


  (Desyrel)  150 mg  BEDTIME


 ORAL


   6/13/18 21:00


 7/13/18 20:59   


 


 


 Ziprasidone


  (Geodon)  20 mg  DAILY


 ORAL


   6/14/18 09:00


 7/14/18 08:59   


 


 


 Zolpidem Tartrate


  (Ambien)  5 mg  HSPRN  PRN


 ORAL


 Insomnia  6/13/18 21:00


 6/20/18 20:59   


 











Assessment/Plan


Problem List:  


(1) COPD (chronic obstructive pulmonary disease)


ICD Codes:  J44.9 - Chronic obstructive pulmonary disease, unspecified


SNOMED:  79534798


(2) HTN (hypertension)


ICD Codes:  I10 - Essential (primary) hypertension


SNOMED:  83181102


(3) Diabetes


ICD Codes:  E11.9 - Diabetes mellitus


SNOMED:  99070752


(4) CVA (cerebral vascular accident)


ICD Codes:  I63.9 - Cerebral infarction, unspecified


SNOMED:  832355607


Assessment/Plan


respiratory treatment


symptomatic treatment


check electrolytes


dvt prophylaxis











Lissette Steward MD Jun 13, 2018 16:31

## 2018-06-13 NOTE — CONSULTATION
History of Present Illness


General


Date patient seen:  Jun 13, 2018


Chief Complaint:  Pain





Present Illness


HPI


38 y/o M with hx of COPD, DM, HTN, CVA/TIA, back and neck surgery, peripheral 

neuropathy, psych hx, homelessness presents to ED on 6/13 with blisters on b/l 

feet that is causing severe pain and difficulty walking. Patient has been 

walking a lot recently since now is homeless. Also refers multiple falls. 

Endorses feeling unsteady and disoriented.





Denies CP, SOB, abd pain





afebrile


no leukocytosis


off abx


Allergies:  


Coded Allergies:  


     No Known Allergies (Unverified , 4/14/15)





Medication History


Scheduled


Aspirin Ec* (Aspirin Ec*), 81 MG ORAL DAILY, (Reported)


Divalproex Sodium* (Depakote*), 500 MG PO Q12HR, (Reported)


Divalproex Sodium* (Depakote*), 250 MG PO Q12HR, (Reported)


Insulin Aspart (Novolog Flexpen), 30 SQ TID, (Reported)


Insulin Aspart (Novolog), SUBQ AC+HS, (Reported)


Insulin Detemir (Levemir), 30 SUBQ BID, (Reported)


Lisinopril (Lisinopril*), 10 MG ORAL DAILY, (Reported)


Metformin Hcl* (Metformin Hcl*), 500 MG ORAL TIDAC, (Reported)


Nicotine 14MG Patch* (Nicoderm Cq 14MG*), 1 EACH TD DAILY, (Reported)


Nicotine 14MG Patch* (Nicoderm Cq 14MG*), 1 EACH TD DAILY, (Reported)


Olanzapine* (Zyprexa*), 2.5 MG ORAL DAILY, (Reported)


Pregabalin* (Lyrica*), 75 MG ORAL THREE TIMES A DAY, (Reported)


Quetiapine Fumarate* (Seroquel*), 300 MG ORAL BEDTIME, (Reported)


Theophylline (Theodur*), 100 MG ORAL Q12HR, (Reported)


Trazodone* (Trazodone*), 150 MG ORAL BEDTIME, (Reported)


Ziprasidone Hcl* (Geodon*), 20 MG ORAL DAILY, (Reported)





Scheduled PRN


Acetaminophen* (Acetaminophen 325MG Tablet*), 325 MG ORAL Q4H PRN for Mild Pain/

Temp > 100.5, (Reported)


Al Hydroxide/mg Hydroxide (Mag-Al Plus Suspension), 30 ML ORAL Q6HR PRN for 

dyspepsia, (Reported)


Codeine/Promethazine Hcl* (Promethazine-Codeine Syrup*), 5 ML ORAL Q4H PRN for 

For Cough, (Reported)


Hydromorphone Hcl (Dilaudid), 1 MG PO Q4HR PRN for For Pain, (Reported)


Ipratropium/Albuterol Sulfate (DuoNeb 0.5-3(2.5)mg/3ml), 3 ML HHN Q4HR PRN for 

Shortness of Breath, (Reported)


Oxycodone Hcl/Acetaminophen 5-325* (Oxycodone-Acetaminophen 5-325*), 1 TAB ORAL 

Q4H PRN for For Pain, (Reported)


Oxycodone/Acetaminophen 5-325* (Percocet 5-325 Mg Tablet*), 1 TAB ORAL Q4H PRN 

for For Pain, (Reported)


Polyethylene Glycol 3350* (Miralax*), 17 GM ORAL DAILY PRN for Constipation, (

Reported)


Zolpidem Tartrate* (Ambien*), 5 MG ORAL BEDTIME PRN for Insomnia, (Reported)





Miscellaneous Medications


Insulin Aspart* (Novolog*), 0 SUBQ, (Reported)


Insulin Aspart* (Novolog*), 0 SUBQ, (Reported)


Unable to Obtain Medications (Unable To Obtain Meds), (Reported)





Patient History


Healthcare decision maker





Resuscitation status





Advanced Directive on File





Patient History Narrative








Pmhx: as above





Shx: Reports: smoking; Denies: alcohol use, drug use





Fhx: non contributory





Review of Systems


All Other Systems:  negative except mentioned in HPI





Physical Exam


Physical Exam Narrative





GENERAL:  Calm in bed, in the ER gurney, oriented x3, and in no acute


distress.


CARDIOVASCULAR:  No murmurs.


LUNGS:  Distant and clear.


ABDOMEN:  Bowel sounds positive.  Nontender.  Nondistended.


EXTREMITIES:  No cyanosis, clubbing, or edema.  Bottom of the sole with 

multiple 1 inch x 0.5 inch blister in the bottom of metatarsal bilaterally. no 

purulent discharge


NEUROLOGIC:  The patient moves all extremities.  Slightly weak.





Last 24 Hour Vital Signs








  Date Time  Temp Pulse Resp B/P (MAP) Pulse Ox O2 Delivery O2 Flow Rate FiO2


 


6/13/18 18:14 97.6       


 


6/13/18 17:45 97.6       


 


6/13/18 17:15 97.6       


 


6/13/18 17:15 97.6       


 


6/13/18 13:57 97.6 95 23 123/69 96 Room Air  





 97.5       


 


6/13/18 11:49 97.5 89 25 118/77 95 Room Air  





 97.5       


 


6/13/18 09:08 97.2 80 13 122/77 97 Room Air  





 97.2       


 


6/13/18 08:58 97.2       


 


6/13/18 08:28 98.5       


 


6/13/18 07:44 98.5 100 18 127/69 95 Room Air  





 98.4       











Laboratory Tests








Test


  6/13/18


09:00


 


White Blood Count


  10.7 K/UL


(4.8-10.8)


 


Red Blood Count


  5.33 M/UL


(4.70-6.10)


 


Hemoglobin


  15.1 G/DL


(14.2-18.0)


 


Hematocrit


  45.6 %


(42.0-52.0)


 


Mean Corpuscular Volume 86 FL (80-99)  


 


Mean Corpuscular Hemoglobin


  28.3 PG


(27.0-31.0)


 


Mean Corpuscular Hemoglobin


Concent 33.1 G/DL


(32.0-36.0)


 


Red Cell Distribution Width


  12.2 %


(11.6-14.8)


 


Platelet Count


  216 K/UL


(150-450)


 


Mean Platelet Volume


  8.0 FL


(6.5-10.1)


 


Neutrophils (%) (Auto)


  64.3 %


(45.0-75.0)


 


Lymphocytes (%) (Auto)


  23.4 %


(20.0-45.0)


 


Monocytes (%) (Auto)


  9.7 %


(1.0-10.0)


 


Eosinophils (%) (Auto)


  1.7 %


(0.0-3.0)


 


Basophils (%) (Auto)


  0.9 %


(0.0-2.0)


 


Sodium Level


  139 MMOL/L


(136-145)


 


Potassium Level


  3.9 MMOL/L


(3.5-5.1)


 


Chloride Level


  105 MMOL/L


()


 


Carbon Dioxide Level


  26 MMOL/L


(21-32)


 


Anion Gap


  8 mmol/L


(5-15)


 


Blood Urea Nitrogen


  17 mg/dL


(7-18)


 


Creatinine


  1.0 MG/DL


(0.55-1.30)


 


Estimat Glomerular Filtration


Rate > 60 mL/min


(>60)


 


Glucose Level


  138 MG/DL


()  H


 


Lactic Acid Level


  1.40 mmol/L


(0.4-2.0)


 


Calcium Level


  9.3 MG/DL


(8.5-10.1)


 


Total Bilirubin


  0.4 MG/DL


(0.2-1.0)


 


Aspartate Amino Transf


(AST/SGOT) 21 U/L (15-37)


 


 


Alanine Aminotransferase


(ALT/SGPT) 29 U/L (12-78)


 


 


Alkaline Phosphatase


  67 U/L


()


 


Total Creatine Kinase


  317 U/L


()  H


 


Total Protein


  6.8 G/DL


(6.4-8.2)


 


Albumin


  3.7 G/DL


(3.4-5.0)


 


Globulin 3.1 g/dL  


 


Albumin/Globulin Ratio 1.2 (1.0-2.7)  








Height (Feet):  5


Height (Inches):  11.00


Weight (Pounds):  230


Medications





Current Medications








 Medications


  (Trade)  Dose


 Ordered  Sig/Henry


 Route


 PRN Reason  Start Time


 Stop Time Status Last Admin


Dose Admin


 


 Acetaminophen


  (Tylenol)  650 mg  Q4H  PRN


 ORAL


 T>100.5  6/13/18 15:45


 7/13/18 15:44   


 


 


 Al Hydroxide/Mg


 Hydroxide


  (Mylanta II)  30 ml  Q6H  PRN


 ORAL


 dyspepsia  6/13/18 15:45


 7/13/18 15:44   


 


 


 Dextrose


  (Dextrose 50%)  25 ml  PRN


 IV


 Hypoglycemia  6/13/18 16:00


 7/13/18 15:59   


 


 


 Dextrose


  (Dextrose 50%)  50 ml  PRN


 IV


 hypoglycemia  6/13/18 16:00


 7/13/18 15:59   


 


 


 Divalproex Sodium


  (Depakote)  250 mg  Q12HR


 ORAL


   6/13/18 21:00


 7/13/18 20:59   


 


 


 Heparin Sodium


  (Porcine)


  (Heparin 5000


 units/ml)  5,000 units  EVERY 12  HOURS


 SUBQ


   6/13/18 21:00


 7/13/18 20:59   


 


 


 Insulin Aspart


  (NovoLOG)    BEFORE MEALS AND  HS


 SUBQ


   6/13/18 17:00


 7/13/18 16:59   


 


 


 Lorazepam


  (Ativan 2mg/ml


 1ml)  0.5 mg  Q4H  PRN


 IV


 For Anxiety  6/13/18 16:00


 6/20/18 15:59   


 


 


 Morphine Sulfate


  (Morphine


 Sulfate)  1 mg  Q4H  PRN


 IVP


 Severe Pain (Pain Scale 7-10)  6/13/18 15:45


 6/20/18 15:44  6/13/18 17:15


 


 


 Olanzapine


  (ZyPREXA)  2.5 mg  DAILY


 ORAL


   6/14/18 09:00


 7/14/18 08:59   


 


 


 Ondansetron HCl


  (Zofran)  4 mg  Q6H  PRN


 IVP


 Nausea & Vomiting  6/13/18 15:45


 7/13/18 15:44   


 


 


 Oxycodone/


 Acetaminophen


  (Percocet 5-325)  1 tab  Q4H  PRN


 ORAL


 Moderate Pain (Pain Scale 4-6)  6/13/18 15:45


 6/20/18 15:29   


 


 


 Polyethylene


 Glycol


  (Miralax)  17 gm  HSPRN  PRN


 ORAL


 Constipation  6/13/18 21:00


 7/13/18 20:59   


 


 


 Pregabalin


  (Lyrica)  75 mg  Q8HR


 ORAL


   6/13/18 18:00


 7/13/18 17:59  6/13/18 17:15


 


 


 Quetiapine


 Fumarate


  (SEROquel)  300 mg  BEDTIME


 ORAL


   6/13/18 21:00


 7/13/18 20:59   


 


 


 Theophylline


  (Florin-Dur)  100 mg  Q12HR


 ORAL


   6/13/18 21:00


 7/13/18 20:59   


 


 


 Trazodone HCl


  (Desyrel)  150 mg  BEDTIME


 ORAL


   6/13/18 21:00


 7/13/18 20:59   


 


 


 Ziprasidone


  (Geodon)  20 mg  DAILY


 ORAL


   6/14/18 09:00


 7/14/18 08:59   


 


 


 Zolpidem Tartrate


  (Ambien)  5 mg  HSPRN  PRN


 ORAL


 Insomnia  6/13/18 21:00


 6/20/18 20:59   


 











Assessment/Plan


Assessment/Plan





Abx:


None





Assessment:


b/l Feet blisters (from extensive walking in likely component of diabetic 

neuropathy)- no signs of infection


afebrile, no leukocytosis





COPD


 DM


HTN


CVA/TIA


 back and neck surgery


 peripheral neuropathy


psych hx


homelessness





Plan:


-Continue to monitor off abx


-wound care, pain control


-podiatry eval


-f/u cx


-Monitor CBC/CMP, temperatures





Thank you for this consultation. Will continue to follow along with you.





Discussed with NACHO.











Shagufta Arias M.D. Jun 13, 2018 19:44

## 2018-06-13 NOTE — EMERGENCY ROOM REPORT
History of Present Illness


General


Chief Complaint:  Pain


Source:  Patient





Present Illness


HPI


This patient has a history of diabetes, COPD, CVA.  He states that he has been 

walking a lot.  He states that he currently is living on the street because he 

doesn't have enough money to pay for an apartment.  He states that he has 

blisters on both of his feet and this is causing severe pain and difficulty 

walking.  He states that he is also had multiple falls.  He states that he has 

been unsteady and feels disoriented.  He denies chest pain or shortness of 

breath.  He denies abdominal pain.  He has no other complaints.


Allergies:  


Coded Allergies:  


     No Known Allergies (Unverified , 4/14/15)





Patient History


Past Medical History:  see triage record, DM, HTN, COPD, CVA/TIA, psych hx


Social History:  Reports: smoking; Denies: alcohol use, drug use


Reviewed Nursing Documentation:  PMH: Agreed; PSxH: Agreed





Nursing Documentation-PMH


Hx Cardiac Problems:  Yes


Hx Hypertension:  Yes


Hx COPD:  Yes


Hx Diabetes:  Yes


Hx Cancer:  No


Hx Gastrointestinal Problems:  Yes


Hx Neurological Problems:  Yes - BACK & NECK SURGERY


Hx Cerebrovascular Accident:  Yes - February 2018


Hx Peripheral Neuropathy:  Yes


Hx Head Trauma:  Yes


Hx Dizziness:  Yes


Hx Weakness:  Yes





Review of Systems


All Other Systems:  negative except mentioned in HPI





Physical Exam





Vital Signs








  Date Time  Temp Pulse Resp B/P (MAP) Pulse Ox O2 Delivery O2 Flow Rate FiO2


 


6/13/18 07:44 98.5 100 18 127/69 95 Room Air  





 98.4       








Sp02 EP Interpretation:  reviewed, normal


General Appearance:  no apparent distress, alert, GCS 15, non-toxic


Head:  normocephalic, atraumatic


Eyes:  bilateral eye normal inspection, bilateral eye PERRL


ENT:  hearing grossly normal, normal pharynx, no angioedema, normal voice


Neck:  full range of motion, supple/symm/no masses


Respiratory:  chest non-tender, lungs clear, normal breath sounds, no 

respiratory distress, no retraction, no accessory muscle use, speaking full 

sentences


Cardiovascular #1:  regular rate, rhythm, no edema


Gastrointestinal:  normal bowel sounds, non tender, soft, non-distended, no 

guarding, no rebound


Rectal:  deferred


Musculoskeletal:  back normal, normal range of motion, other - Antalgic gait, 

Multiple bulla on bilateral bottom of feet.


Neurologic:  alert, oriented x3, responsive, motor strength/tone normal, 

sensory intact, speech normal


Psychiatric:  judgement/insight normal, memory normal, mood/affect normal, no 

suicidal/homicidal ideation


Skin:  warm/dry, well hydrated, other - See MSK exam





Medical Decision Making


Diagnostic Impression:  


 Primary Impression:  


 Gravely disabled


 Additional Impression:  


 Blisters of multiple sites


ER Course


This patient has multiple chronic medical conditions.  He is unable to care for 

himself.  He has blisters on his feet from walking all over the city and not 

being able to manage his personal life and social situation.  He has severe 

pain on his feet from blisters from walking.  He is admitted for being gravely 

disabled.  His primary care physician will plan on finding placement for this 

patient where he can receive some medical/nursing assistance with his chronic 

medical conditions and assist in his living conditions.





Laboratory Tests








Test


  6/13/18


09:00


 


White Blood Count


  10.7 K/UL


(4.8-10.8)


 


Red Blood Count


  5.33 M/UL


(4.70-6.10)


 


Hemoglobin


  15.1 G/DL


(14.2-18.0)


 


Hematocrit


  45.6 %


(42.0-52.0)


 


Mean Corpuscular Volume 86 FL (80-99)  


 


Mean Corpuscular Hemoglobin


  28.3 PG


(27.0-31.0)


 


Mean Corpuscular Hemoglobin


Concent 33.1 G/DL


(32.0-36.0)


 


Red Cell Distribution Width


  12.2 %


(11.6-14.8)


 


Platelet Count


  216 K/UL


(150-450)


 


Mean Platelet Volume


  8.0 FL


(6.5-10.1)


 


Neutrophils (%) (Auto)


  64.3 %


(45.0-75.0)


 


Lymphocytes (%) (Auto)


  23.4 %


(20.0-45.0)


 


Monocytes (%) (Auto)


  9.7 %


(1.0-10.0)


 


Eosinophils (%) (Auto)


  1.7 %


(0.0-3.0)


 


Basophils (%) (Auto)


  0.9 %


(0.0-2.0)


 


Sodium Level


  139 MMOL/L


(136-145)


 


Potassium Level


  3.9 MMOL/L


(3.5-5.1)


 


Chloride Level


  105 MMOL/L


()


 


Carbon Dioxide Level


  26 MMOL/L


(21-32)


 


Anion Gap


  8 mmol/L


(5-15)


 


Blood Urea Nitrogen


  17 mg/dL


(7-18)


 


Creatinine


  1.0 MG/DL


(0.55-1.30)


 


Estimate Glomerular


Filtration Rate > 60 mL/min


(>60)


 


Glucose Level


  138 MG/DL


()  H


 


Lactic Acid Level


  1.40 mmol/L


(0.4-2.0)


 


Calcium Level


  9.3 MG/DL


(8.5-10.1)


 


Total Bilirubin


  0.4 MG/DL


(0.2-1.0)


 


Aspartate Amino Transferase


(AST) 21 U/L (15-37)


 


 


Alanine Aminotransferase (ALT)


  29 U/L (12-78)


 


 


Alkaline Phosphatase


  67 U/L


()


 


Total Creatine Kinase


  317 U/L


()  H


 


Total Protein


  6.8 G/DL


(6.4-8.2)


 


Albumin


  3.7 G/DL


(3.4-5.0)


 


Globulin 3.1 g/dL  


 


Albumin/Globulin Ratio 1.2 (1.0-2.7)  











Last Vital Signs








  Date Time  Temp Pulse Resp B/P (MAP) Pulse Ox O2 Delivery O2 Flow Rate FiO2


 


6/13/18 08:28 98.5       


 


6/13/18 07:44  100 18 127/69 95 Room Air  








Disposition:  ADMITTED AS INPATIENT


Condition:  Stable


Referrals:  


Samir Cabrera DO (PCP)











Kathleen Peter DO Jun 13, 2018 09:15

## 2018-06-14 VITALS — DIASTOLIC BLOOD PRESSURE: 75 MMHG | SYSTOLIC BLOOD PRESSURE: 119 MMHG

## 2018-06-14 VITALS — DIASTOLIC BLOOD PRESSURE: 80 MMHG | SYSTOLIC BLOOD PRESSURE: 133 MMHG

## 2018-06-14 VITALS — DIASTOLIC BLOOD PRESSURE: 67 MMHG | SYSTOLIC BLOOD PRESSURE: 105 MMHG

## 2018-06-14 VITALS — SYSTOLIC BLOOD PRESSURE: 102 MMHG | DIASTOLIC BLOOD PRESSURE: 67 MMHG

## 2018-06-14 VITALS — SYSTOLIC BLOOD PRESSURE: 109 MMHG | DIASTOLIC BLOOD PRESSURE: 65 MMHG

## 2018-06-14 VITALS — SYSTOLIC BLOOD PRESSURE: 104 MMHG | DIASTOLIC BLOOD PRESSURE: 65 MMHG

## 2018-06-14 LAB
ADD MANUAL DIFF: NO
ALBUMIN SERPL-MCNC: 3.1 G/DL (ref 3.4–5)
ALBUMIN/GLOB SERPL: 1.1 {RATIO} (ref 1–2.7)
ALP SERPL-CCNC: 60 U/L (ref 46–116)
ALT SERPL-CCNC: 26 U/L (ref 12–78)
ANION GAP SERPL CALC-SCNC: 8 MMOL/L (ref 5–15)
AST SERPL-CCNC: 15 U/L (ref 15–37)
BASOPHILS NFR BLD AUTO: 1.1 % (ref 0–2)
BILIRUB SERPL-MCNC: 0.5 MG/DL (ref 0.2–1)
BUN SERPL-MCNC: 19 MG/DL (ref 7–18)
CALCIUM SERPL-MCNC: 8.5 MG/DL (ref 8.5–10.1)
CHLORIDE SERPL-SCNC: 107 MMOL/L (ref 98–107)
CHOLEST SERPL-MCNC: 99 MG/DL (ref ?–200)
CO2 SERPL-SCNC: 25 MMOL/L (ref 21–32)
CREAT SERPL-MCNC: 1 MG/DL (ref 0.55–1.3)
EOSINOPHIL NFR BLD AUTO: 2.2 % (ref 0–3)
ERYTHROCYTE [DISTWIDTH] IN BLOOD BY AUTOMATED COUNT: 11.9 % (ref 11.6–14.8)
GLOBULIN SER-MCNC: 2.9 G/DL
HCT VFR BLD CALC: 41.1 % (ref 42–52)
HDLC SERPL-MCNC: 30 MG/DL (ref 40–60)
HGB BLD-MCNC: 14 G/DL (ref 14.2–18)
LYMPHOCYTES NFR BLD AUTO: 43 % (ref 20–45)
MCV RBC AUTO: 86 FL (ref 80–99)
MONOCYTES NFR BLD AUTO: 8.9 % (ref 1–10)
NEUTROPHILS NFR BLD AUTO: 44.8 % (ref 45–75)
PLATELET # BLD: 168 K/UL (ref 150–450)
POTASSIUM SERPL-SCNC: 4.1 MMOL/L (ref 3.5–5.1)
RBC # BLD AUTO: 4.79 M/UL (ref 4.7–6.1)
SODIUM SERPL-SCNC: 140 MMOL/L (ref 136–145)
TRIGL SERPL-MCNC: 97 MG/DL (ref 30–150)
WBC # BLD AUTO: 7.1 K/UL (ref 4.8–10.8)

## 2018-06-14 RX ADMIN — MORPHINE SULFATE PRN MG: 2 INJECTION, SOLUTION INTRAMUSCULAR; INTRAVENOUS at 21:15

## 2018-06-14 RX ADMIN — INSULIN ASPART SCH UNITS: 100 INJECTION, SOLUTION INTRAVENOUS; SUBCUTANEOUS at 17:30

## 2018-06-14 RX ADMIN — INSULIN ASPART SCH UNITS: 100 INJECTION, SOLUTION INTRAVENOUS; SUBCUTANEOUS at 05:46

## 2018-06-14 RX ADMIN — INSULIN ASPART SCH UNITS: 100 INJECTION, SOLUTION INTRAVENOUS; SUBCUTANEOUS at 21:21

## 2018-06-14 RX ADMIN — IPRATROPIUM BROMIDE AND ALBUTEROL SULFATE PRN ML: .5; 3 SOLUTION RESPIRATORY (INHALATION) at 10:13

## 2018-06-14 RX ADMIN — PREGABALIN SCH MG: 75 CAPSULE ORAL at 22:18

## 2018-06-14 RX ADMIN — INSULIN ASPART SCH UNITS: 100 INJECTION, SOLUTION INTRAVENOUS; SUBCUTANEOUS at 11:30

## 2018-06-14 RX ADMIN — MORPHINE SULFATE PRN MG: 2 INJECTION, SOLUTION INTRAMUSCULAR; INTRAVENOUS at 03:52

## 2018-06-14 RX ADMIN — THEOPHYLLINE ANHYDROUS SCH MG: 100 CAPSULE, EXTENDED RELEASE ORAL at 08:21

## 2018-06-14 RX ADMIN — TRAZODONE HYDROCHLORIDE SCH MG: 50 TABLET ORAL at 21:08

## 2018-06-14 RX ADMIN — PREGABALIN SCH MG: 75 CAPSULE ORAL at 15:13

## 2018-06-14 RX ADMIN — PREGABALIN SCH MG: 75 CAPSULE ORAL at 05:45

## 2018-06-14 RX ADMIN — HEPARIN SODIUM SCH UNITS: 5000 INJECTION INTRAVENOUS; SUBCUTANEOUS at 21:07

## 2018-06-14 RX ADMIN — MORPHINE SULFATE PRN MG: 2 INJECTION, SOLUTION INTRAMUSCULAR; INTRAVENOUS at 08:23

## 2018-06-14 RX ADMIN — OLANZAPINE SCH MG: 2.5 TABLET, FILM COATED ORAL at 08:22

## 2018-06-14 RX ADMIN — THEOPHYLLINE ANHYDROUS SCH MG: 100 CAPSULE, EXTENDED RELEASE ORAL at 21:02

## 2018-06-14 RX ADMIN — MORPHINE SULFATE PRN MG: 2 INJECTION, SOLUTION INTRAMUSCULAR; INTRAVENOUS at 16:42

## 2018-06-14 RX ADMIN — ZIPRASIDONE HYDROCHLORIDE SCH MG: 20 CAPSULE ORAL at 08:21

## 2018-06-14 RX ADMIN — MORPHINE SULFATE PRN MG: 2 INJECTION, SOLUTION INTRAMUSCULAR; INTRAVENOUS at 12:39

## 2018-06-14 RX ADMIN — IPRATROPIUM BROMIDE AND ALBUTEROL SULFATE PRN ML: .5; 3 SOLUTION RESPIRATORY (INHALATION) at 13:43

## 2018-06-14 RX ADMIN — HEPARIN SODIUM SCH UNITS: 5000 INJECTION INTRAVENOUS; SUBCUTANEOUS at 08:23

## 2018-06-14 NOTE — CONSULTATION
DATE OF CONSULTATION:  06/14/2018



REQUESTING PHYSICIAN:  Samir Cabrera D.O.



CONSULTING PHYSICIAN:  Judson Gregorio D.P.M.



REASON FOR CONSULTATION:  Blisters bilateral feet in the presence of

diabetes mellitus.



HISTORY OF PRESENT ILLNESS:  The patient is a 37-year-old male who was

admitted to [facility] on 06/13/2018 for possible diabetic

foot infection, blistering.  The patient states that he has been walking

with shoes on for long periods of time and has developed blisters on the

bottom of both feet that are painful.  The patient states that he prior to

having blisters had lower extremity pain extending from his lower back all

the way down to his feet with his right being worse than his left and the

pain is diffuse.  Also, states that he uses his friend's glucometer to

assess his blood sugars in order to give himself his insulin.



PAST MEDICAL HISTORY:  Significant for diabetes, hypertension, and

cerebrovascular accident.



PAST SURGICAL HISTORY:  Significant for back and neck surgeries.



MEDICATIONS:  Per MAR and include Lyrica and morphine.



SOCIAL HISTORY:  The patient lives with roommates and friends.  Positive

for smoking.



FAMILY HISTORY:  Noncontributory.



REVIEW OF SYSTEMS:  HEENT:  The patient denies any headaches, blurred

vision, or ringing in the ears.  CARDIOVASCULAR:  The patient denies any

chest pain or shortness of breath.  GENITOURINARY:  The patient denies any

urgency, frequency, burning upon urination, or hematuria.

GASTROINTESTINAL:  The patient denies any constipation, diarrhea or blood

in stool.



PHYSICAL EXAMINATION:

VITAL SIGNS:  Temperature is 98.6 degrees, pulse is 75, respirations 20,

and saturating 98% on room air.

EXTREMITIES:  Lower extremity physical exam, vascular, palpable pedal

pulses noted bilaterally.  Feet are equally warm.  There is no edema or

cyanosis noted.

DERMATOLOGICAL:  There is superficial blistering noted on the plantar

aspect of both feet at the plantar to the second and third

metatarsophalangeal joints bilaterally.  No periwound erythema is noted.

No drainage is noted.  Area is tender to palpation.  No malodor is noted

from the site.

MUSCULOSKELETAL:  He has 4/5 muscle strength noted on anterior lateral and

posterior muscle groups of bilateral lower extremities.  No gross

deformities are noted.

NEUROLOGIC:  The patient has paresthesias.



LABORATORY AND DIAGNOSTIC DATA:  White blood cell count is 7.1 and has been

normal since admission, hemoglobin and hematocrit are 14.0 and 41.1 and

platelet count is 168.  Creatinine is 1.0, BUN is 19, glucose is 108,

potassium is 4.1, and sodium is 140.  Lactic acid is 1.40.  Albumin is

3.1.  No imaging is noted on this admission.



ASSESSMENT:

1. Blisters, bilateral feet.

2. Pain in bilateral lower extremities, neuropathic versus

radiculopathy.

3. Uncontrolled diabetes mellitus.



PLAN:

1. Continue wound care with Adaptic on bilateral blisters, this is

appropriate for this type of injury.  No surgical intervention is

indicated at this time.

2. The patient has diffuse pain in bilateral lower extremities.  He also

complains of lower back pain.  Recommended increased dose of Lyrica, but I

will leave it up for pain management to make that determination.  Ordered

a set of lumbar spine films to evaluate lower back.

3. Hemoglobin A1c was ordered.

4. We will follow.



Thank you for the courtesy of this consultation.









  ______________________________________________

  Judson Gregorio D.P.M.





DR:  SAIMA

D:  06/14/2018 11:25

T:  06/14/2018 16:58

JOB#:  [id number]

CC:

## 2018-06-14 NOTE — PULMONOLOGY PROGRESS NOTE
Assessment/Plan


Problems:  


(1) COPD (chronic obstructive pulmonary disease)


(2) Generalized weakness


(3) HTN (hypertension)


(4) CVA (cerebral vascular accident)


Assessment/Plan


respiratory treatment


symptomatic treatment


check electrolytes


dvt prophylaxis





Subjective


ROS Limited/Unobtainable:  No


Allergies:  


Coded Allergies:  


     No Known Allergies (Unverified , 4/14/15)





Objective





Last 24 Hour Vital Signs








  Date Time  Temp Pulse Resp B/P (MAP) Pulse Ox O2 Delivery O2 Flow Rate FiO2


 


6/14/18 13:51  74 20  98 Nasal Cannula 3.0 32


 


6/14/18 13:43  73 18  98 Nasal Cannula 3.0 32


 


6/14/18 13:43        32


 


6/14/18 13:09 98.6       


 


6/14/18 12:39 98.6       


 


6/14/18 10:22  75 20  98 Nasal Cannula 3.0 32


 


6/14/18 10:13  75 20  97 Nasal Cannula 3.0 32


 


6/14/18 10:13        32


 


6/14/18 10:13  75 20   Nasal Cannula 3.0 32


 


6/14/18 08:23 98.6       


 


6/14/18 08:00 97.6 84 20 105/67 100   





 97.6       


 


6/14/18 06:45 98.6       


 


6/14/18 05:45 98.6       


 


6/14/18 04:00 98.0 83 20 104/65 94 Room Air  





 98.0       


 


6/14/18 03:52 98.6       


 


6/14/18 00:00 98.6 86 20 109/65 99 Room Air  





 98.6       


 


6/13/18 21:15 97.6       


 


6/13/18 21:00 98.4 89 20 125/69 94 Room Air  





 98.4       


 


6/13/18 21:00     94 Room Air  


 


6/13/18 17:15 97.6       


 


6/13/18 17:15 97.6       

















Intake and Output  


 


 6/13/18 6/14/18





 19:00 07:00


 


Intake Total 1150 ml 


 


Balance 1150 ml 


 


  


 


Intake Oral 150 ml 


 


IV Total 1000 ml 


 


# Voids 2 








General Appearance:  WD/WN


HEENT:  normocephalic


Respiratory/Chest:  chest wall non-tender, lungs clear


Cardiovascular:  normal peripheral pulses, normal rate


Abdomen:  normal bowel sounds, no organomegaly


Genitourinary:  normal external genitalia


Neurologic/Psychiatric:  CNs II-XII grossly normal, no motor/sensory deficits


Laboratory Tests


6/14/18 06:00: Hemoglobin A1c 6.2H


6/14/18 06:10: 


White Blood Count 7.1, Red Blood Count 4.79, Hemoglobin 14.0L, Hematocrit 41.1L

, Mean Corpuscular Volume 86, Mean Corpuscular Hemoglobin 29.2, Mean 

Corpuscular Hemoglobin Concent 34.1, Red Cell Distribution Width 11.9, Platelet 

Count 168, Mean Platelet Volume 8.8, Neutrophils (%) (Auto) 44.8L, Lymphocytes (

%) (Auto) 43.0, Monocytes (%) (Auto) 8.9, Eosinophils (%) (Auto) 2.2, Basophils 

(%) (Auto) 1.1, Sodium Level 140, Potassium Level 4.1, Chloride Level 107, 

Carbon Dioxide Level 25, Anion Gap 8, Blood Urea Nitrogen 19H, Creatinine 1.0, 

Estimat Glomerular Filtration Rate > 60, Glucose Level 108H, Calcium Level 8.5, 

Total Bilirubin 0.5, Aspartate Amino Transf (AST/SGOT) 15, Alanine 

Aminotransferase (ALT/SGPT) 26, Alkaline Phosphatase 60, Total Protein 6.0L, 

Albumin 3.1L, Globulin 2.9, Albumin/Globulin Ratio 1.1, Triglycerides Level 97, 

Cholesterol Level 99, LDL Cholesterol 66, HDL Cholesterol 30L, Cholesterol/HDL 

Ratio 3.3





Current Medications








 Medications


  (Trade)  Dose


 Ordered  Sig/Henry


 Route


 PRN Reason  Start Time


 Stop Time Status Last Admin


Dose Admin


 


 Acetaminophen


  (Tylenol)  650 mg  Q4H  PRN


 ORAL


 T>100.5  6/13/18 15:45


 7/13/18 15:44   


 


 


 Al Hydroxide/Mg


 Hydroxide


  (Mylanta II)  30 ml  Q6H  PRN


 ORAL


 dyspepsia  6/13/18 15:45


 7/13/18 15:44   


 


 


 Albuterol/


 Ipratropium


  (Albuterol/


 Ipratropium)  3 ml  Q4H  PRN


 HHN


 Shortness of Breath  6/14/18 10:07


 6/19/18 10:06  6/14/18 13:43


 


 


 Dextrose


  (Dextrose 50%)  25 ml  PRN


 IV


 Hypoglycemia  6/13/18 16:00


 7/13/18 15:59   


 


 


 Dextrose


  (Dextrose 50%)  50 ml  PRN


 IV


 hypoglycemia  6/13/18 16:00


 7/13/18 15:59   


 


 


 Divalproex Sodium


  (Depakote)  250 mg  Q12HR


 ORAL


   6/13/18 21:00


 7/13/18 20:59  6/14/18 08:21


 


 


 Heparin Sodium


  (Porcine)


  (Heparin 5000


 units/ml)  5,000 units  EVERY 12  HOURS


 SUBQ


   6/13/18 21:00


 7/13/18 20:59  6/13/18 20:48


 


 


 Insulin Aspart


  (NovoLOG)    BEFORE MEALS AND  HS


 SUBQ


   6/13/18 17:00


 7/13/18 16:59  6/14/18 05:46


 


 


 Lorazepam


  (Ativan 2mg/ml


 1ml)  0.5 mg  Q4H  PRN


 IV


 For Anxiety  6/13/18 16:00


 6/20/18 15:59   


 


 


 Morphine Sulfate


  (Morphine


 Sulfate)  1 mg  Q4H  PRN


 IVP


 Severe Pain (Pain Scale 7-10)  6/13/18 15:45


 6/20/18 15:44  6/14/18 12:39


 


 


 Olanzapine


  (ZyPREXA)  2.5 mg  DAILY


 ORAL


   6/14/18 09:00


 7/14/18 08:59  6/14/18 08:22


 


 


 Ondansetron HCl


  (Zofran)  4 mg  Q6H  PRN


 IVP


 Nausea & Vomiting  6/13/18 15:45


 7/13/18 15:44   


 


 


 Oxycodone/


 Acetaminophen


  (Percocet 5-325)  1 tab  Q4H  PRN


 ORAL


 Moderate Pain (Pain Scale 4-6)  6/13/18 15:45


 6/20/18 15:29   


 


 


 Polyethylene


 Glycol


  (Miralax)  17 gm  HSPRN  PRN


 ORAL


 Constipation  6/13/18 21:00


 7/13/18 20:59   


 


 


 Pregabalin


  (Lyrica)  75 mg  Q8HR


 ORAL


   6/13/18 18:00


 7/13/18 17:59  6/14/18 05:45


 


 


 Quetiapine


 Fumarate


  (SEROquel)  300 mg  BEDTIME


 ORAL


   6/13/18 21:00


 7/13/18 20:59  6/13/18 20:50


 


 


 Theophylline


  (Florin-Dur)  100 mg  Q12HR


 ORAL


   6/13/18 21:00


 7/13/18 20:59  6/14/18 08:21


 


 


 Trazodone HCl


  (Desyrel)  150 mg  BEDTIME


 ORAL


   6/13/18 21:00


 7/13/18 20:59  6/13/18 20:50


 


 


 Ziprasidone


  (Geodon)  20 mg  DAILY


 ORAL


   6/14/18 09:00


 7/14/18 08:59  6/14/18 08:21


 


 


 Zolpidem Tartrate


  (Ambien)  5 mg  HSPRN  PRN


 ORAL


 Insomnia  6/13/18 21:00


 6/20/18 20:59   


 

















Lissette Steward MD Jun 14, 2018 14:31

## 2018-06-14 NOTE — DIAGNOSTIC IMAGING REPORT
Indication: Cough, shortness of breath

 

Technique: One view of the chest

 

Comparison: 4/4/2018

 

Findings: Lungs and pleural spaces are clear. Heart size is normal. No significant

interim change

 

Impression: No acute process

## 2018-06-14 NOTE — INFECTIOUS DISEASES PROG NOTE
Assessment/Plan


Assessment/Plan





Abx:


None





Assessment:


b/l Feet blisters (from extensive walking in likely component of diabetic 

neuropathy)- no signs of infection


afebrile, no leukocytosis





COPD


 DM


HTN


CVA/TIA


 back and neck surgery


 peripheral neuropathy


psych hx


homelessness





Plan:


-Continue to monitor off abx


-wound care, pain control


-podiatry f/u


-f/u cx


-Monitor CBC/CMP, temperatures





Thank you for this consultation. Will continue to follow along with you.





Discussed with RN.





Subjective


Allergies:  


Coded Allergies:  


     No Known Allergies (Unverified , 4/14/15)


Subjective


afebrile


no leukocytosis





Objective


Vital Signs





Last 24 Hour Vital Signs








  Date Time  Temp Pulse Resp B/P (MAP) Pulse Ox O2 Delivery O2 Flow Rate FiO2


 


6/14/18 10:22  75 20  98 Nasal Cannula 3.0 32


 


6/14/18 10:13  75 20  97 Nasal Cannula 3.0 32


 


6/14/18 10:13        32


 


6/14/18 10:13  75 20   Nasal Cannula 3.0 32


 


6/14/18 08:53 98.6       


 


6/14/18 08:23 98.6       


 


6/14/18 08:00 97.6 84 20 105/67 100   





 97.6       


 


6/14/18 06:45 98.6       


 


6/14/18 05:45 98.6       


 


6/14/18 04:00 98.0 83 20 104/65 94 Room Air  





 98.0       


 


6/14/18 03:52 98.6       


 


6/14/18 00:00 98.6 86 20 109/65 99 Room Air  





 98.6       


 


6/13/18 21:15 97.6       


 


6/13/18 21:00 98.4 89 20 125/69 94 Room Air  





 98.4       


 


6/13/18 21:00     94 Room Air  


 


6/13/18 17:15 97.6       


 


6/13/18 17:15 97.6       


 


6/13/18 13:57 97.6 95 23 123/69 96 Room Air  





 97.5       


 


6/13/18 11:49 97.5 89 25 118/77 95 Room Air  





 97.5       








Height (Feet):  5


Height (Inches):  11.00


Weight (Pounds):  230


Objective





GENERAL:  Calm in bed, in the ER gurney, oriented x3, and in no acute


distress.


CARDIOVASCULAR:  No murmurs.


LUNGS:  Distant and clear.


ABDOMEN:  Bowel sounds positive.  Nontender.  Nondistended.


EXTREMITIES:  No cyanosis, clubbing, or edema.  Bottom of the sole with 

multiple 1 inch x 0.5 inch blister in the bottom of metatarsal bilaterally. no 

purulent discharge


NEUROLOGIC:  The patient moves all extremities.  Slightly weak.





Laboratory Tests








Test


  6/14/18


06:00 6/14/18


06:10


 


Hemoglobin A1c Pending   


 


White Blood Count


  


  7.1 K/UL


(4.8-10.8)


 


Red Blood Count


  


  4.79 M/UL


(4.70-6.10)


 


Hemoglobin


  


  14.0 G/DL


(14.2-18.0)  L


 


Hematocrit


  


  41.1 %


(42.0-52.0)  L


 


Mean Corpuscular Volume  86 FL (80-99)  


 


Mean Corpuscular Hemoglobin


  


  29.2 PG


(27.0-31.0)


 


Mean Corpuscular Hemoglobin


Concent 


  34.1 G/DL


(32.0-36.0)


 


Red Cell Distribution Width


  


  11.9 %


(11.6-14.8)


 


Platelet Count


  


  168 K/UL


(150-450)


 


Mean Platelet Volume


  


  8.8 FL


(6.5-10.1)


 


Neutrophils (%) (Auto)


  


  44.8 %


(45.0-75.0)  L


 


Lymphocytes (%) (Auto)


  


  43.0 %


(20.0-45.0)


 


Monocytes (%) (Auto)


  


  8.9 %


(1.0-10.0)


 


Eosinophils (%) (Auto)


  


  2.2 %


(0.0-3.0)


 


Basophils (%) (Auto)


  


  1.1 %


(0.0-2.0)


 


Sodium Level


  


  140 MMOL/L


(136-145)


 


Potassium Level


  


  4.1 MMOL/L


(3.5-5.1)


 


Chloride Level


  


  107 MMOL/L


()


 


Carbon Dioxide Level


  


  25 MMOL/L


(21-32)


 


Anion Gap


  


  8 mmol/L


(5-15)


 


Blood Urea Nitrogen


  


  19 mg/dL


(7-18)  H


 


Creatinine


  


  1.0 MG/DL


(0.55-1.30)


 


Estimat Glomerular Filtration


Rate 


  > 60 mL/min


(>60)


 


Glucose Level


  


  108 MG/DL


()  H


 


Calcium Level


  


  8.5 MG/DL


(8.5-10.1)


 


Total Bilirubin


  


  0.5 MG/DL


(0.2-1.0)


 


Aspartate Amino Transf


(AST/SGOT) 


  15 U/L (15-37)


 


 


Alanine Aminotransferase


(ALT/SGPT) 


  26 U/L (12-78)


 


 


Alkaline Phosphatase


  


  60 U/L


()


 


Total Protein


  


  6.0 G/DL


(6.4-8.2)  L


 


Albumin


  


  3.1 G/DL


(3.4-5.0)  L


 


Globulin  2.9 g/dL  


 


Albumin/Globulin Ratio  1.1 (1.0-2.7)  


 


Triglycerides Level


  


  97 MG/DL


()


 


Cholesterol Level


  


  99 MG/DL (<


200)


 


LDL Cholesterol


  


  66 mg/dL


(<100)


 


HDL Cholesterol


  


  30 MG/DL


(40-60)  L


 


Cholesterol/HDL Ratio  3.3 (3.3-4.4)  











Current Medications








 Medications


  (Trade)  Dose


 Ordered  Sig/Henry


 Route


 PRN Reason  Start Time


 Stop Time Status Last Admin


Dose Admin


 


 Acetaminophen


  (Tylenol)  650 mg  Q4H  PRN


 ORAL


 T>100.5  6/13/18 15:45


 7/13/18 15:44   


 


 


 Al Hydroxide/Mg


 Hydroxide


  (Mylanta II)  30 ml  Q6H  PRN


 ORAL


 dyspepsia  6/13/18 15:45


 7/13/18 15:44   


 


 


 Albuterol/


 Ipratropium


  (Albuterol/


 Ipratropium)  3 ml  Q4H  PRN


 HHN


 Shortness of Breath  6/14/18 10:07


 6/19/18 10:06  6/14/18 10:13


 


 


 Dextrose


  (Dextrose 50%)  25 ml  PRN


 IV


 Hypoglycemia  6/13/18 16:00


 7/13/18 15:59   


 


 


 Dextrose


  (Dextrose 50%)  50 ml  PRN


 IV


 hypoglycemia  6/13/18 16:00


 7/13/18 15:59   


 


 


 Divalproex Sodium


  (Depakote)  250 mg  Q12HR


 ORAL


   6/13/18 21:00


 7/13/18 20:59  6/14/18 08:21


 


 


 Heparin Sodium


  (Porcine)


  (Heparin 5000


 units/ml)  5,000 units  EVERY 12  HOURS


 SUBQ


   6/13/18 21:00


 7/13/18 20:59  6/13/18 20:48


 


 


 Insulin Aspart


  (NovoLOG)    BEFORE MEALS AND  HS


 SUBQ


   6/13/18 17:00


 7/13/18 16:59  6/14/18 05:46


 


 


 Lorazepam


  (Ativan 2mg/ml


 1ml)  0.5 mg  Q4H  PRN


 IV


 For Anxiety  6/13/18 16:00


 6/20/18 15:59   


 


 


 Morphine Sulfate


  (Morphine


 Sulfate)  1 mg  Q4H  PRN


 IVP


 Severe Pain (Pain Scale 7-10)  6/13/18 15:45


 6/20/18 15:44  6/14/18 08:23


 


 


 Olanzapine


  (ZyPREXA)  2.5 mg  DAILY


 ORAL


   6/14/18 09:00


 7/14/18 08:59  6/14/18 08:22


 


 


 Ondansetron HCl


  (Zofran)  4 mg  Q6H  PRN


 IVP


 Nausea & Vomiting  6/13/18 15:45


 7/13/18 15:44   


 


 


 Oxycodone/


 Acetaminophen


  (Percocet 5-325)  1 tab  Q4H  PRN


 ORAL


 Moderate Pain (Pain Scale 4-6)  6/13/18 15:45


 6/20/18 15:29   


 


 


 Polyethylene


 Glycol


  (Miralax)  17 gm  HSPRN  PRN


 ORAL


 Constipation  6/13/18 21:00


 7/13/18 20:59   


 


 


 Pregabalin


  (Lyrica)  75 mg  Q8HR


 ORAL


   6/13/18 18:00


 7/13/18 17:59  6/14/18 05:45


 


 


 Quetiapine


 Fumarate


  (SEROquel)  300 mg  BEDTIME


 ORAL


   6/13/18 21:00


 7/13/18 20:59  6/13/18 20:50


 


 


 Theophylline


  (Florin-Dur)  100 mg  Q12HR


 ORAL


   6/13/18 21:00


 7/13/18 20:59  6/14/18 08:21


 


 


 Trazodone HCl


  (Desyrel)  150 mg  BEDTIME


 ORAL


   6/13/18 21:00


 7/13/18 20:59  6/13/18 20:50


 


 


 Ziprasidone


  (Geodon)  20 mg  DAILY


 ORAL


   6/14/18 09:00


 7/14/18 08:59  6/14/18 08:21


 


 


 Zolpidem Tartrate


  (Ambien)  5 mg  HSPRN  PRN


 ORAL


 Insomnia  6/13/18 21:00


 6/20/18 20:59   


 

















Shagufta Arias M.D. Jun 14, 2018 11:50

## 2018-06-14 NOTE — GENERAL PROGRESS NOTE
Assessment/Plan


Problem List:  


(1) Blister (nonthermal), unspecified foot, sequela


ICD Codes:  S90.829S - Blister (nonthermal), unspecified foot, sequela


SNOMED:  451617334


(2) Weak


ICD Codes:  R53.1 - Weakness


SNOMED:  08749026


(3) HTN (hypertension)


ICD Codes:  I10 - Essential (primary) hypertension


SNOMED:  02119050


(4) CVA (cerebral vascular accident)


ICD Codes:  I63.9 - Cerebral infarction, unspecified


SNOMED:  413608031


(5) Diabetes


ICD Codes:  E11.9 - Diabetes mellitus


SNOMED:  06259399


(6) Generalized weakness


ICD Codes:  R53.1 - Weakness


SNOMED:  42100582


Status:  unchanged


Assessment/Plan


ot pt diet wound care abx cbc bmp am dc plan snf





Subjective


Constitutional:  Reports: weakness


Allergies:  


Coded Allergies:  


     No Known Allergies (Unverified , 4/14/15)


All Systems:  reviewed and negative except above


Subjective


o2nc calm in bed





Objective





Last 24 Hour Vital Signs








  Date Time  Temp Pulse Resp B/P (MAP) Pulse Ox O2 Delivery O2 Flow Rate FiO2


 


6/14/18 13:51  74 20  98 Nasal Cannula 3.0 32


 


6/14/18 13:43  73 18  98 Nasal Cannula 3.0 32


 


6/14/18 13:43        32


 


6/14/18 13:09 98.6       


 


6/14/18 12:39 98.6       


 


6/14/18 10:22  75 20  98 Nasal Cannula 3.0 32


 


6/14/18 10:13  75 20  97 Nasal Cannula 3.0 32


 


6/14/18 10:13        32


 


6/14/18 10:13  75 20   Nasal Cannula 3.0 32


 


6/14/18 08:23 98.6       


 


6/14/18 08:00 97.6 84 20 105/67 100   





 97.6       


 


6/14/18 06:45 98.6       


 


6/14/18 05:45 98.6       


 


6/14/18 04:00 98.0 83 20 104/65 94 Room Air  





 98.0       


 


6/14/18 03:52 98.6       


 


6/14/18 00:00 98.6 86 20 109/65 99 Room Air  





 98.6       


 


6/13/18 21:15 97.6       


 


6/13/18 21:00 98.4 89 20 125/69 94 Room Air  





 98.4       


 


6/13/18 21:00     94 Room Air  


 


6/13/18 17:15 97.6       


 


6/13/18 17:15 97.6       

















Intake and Output  


 


 6/13/18 6/14/18





 19:00 07:00


 


Intake Total 1150 ml 


 


Balance 1150 ml 


 


  


 


Intake Oral 150 ml 


 


IV Total 1000 ml 


 


# Voids 2 








Laboratory Tests


6/14/18 06:00: Hemoglobin A1c 6.2H


6/14/18 06:10: 


White Blood Count 7.1, Red Blood Count 4.79, Hemoglobin 14.0L, Hematocrit 41.1L

, Mean Corpuscular Volume 86, Mean Corpuscular Hemoglobin 29.2, Mean 

Corpuscular Hemoglobin Concent 34.1, Red Cell Distribution Width 11.9, Platelet 

Count 168, Mean Platelet Volume 8.8, Neutrophils (%) (Auto) 44.8L, Lymphocytes (

%) (Auto) 43.0, Monocytes (%) (Auto) 8.9, Eosinophils (%) (Auto) 2.2, Basophils 

(%) (Auto) 1.1, Sodium Level 140, Potassium Level 4.1, Chloride Level 107, 

Carbon Dioxide Level 25, Anion Gap 8, Blood Urea Nitrogen 19H, Creatinine 1.0, 

Estimat Glomerular Filtration Rate > 60, Glucose Level 108H, Calcium Level 8.5, 

Total Bilirubin 0.5, Aspartate Amino Transf (AST/SGOT) 15, Alanine 

Aminotransferase (ALT/SGPT) 26, Alkaline Phosphatase 60, Total Protein 6.0L, 

Albumin 3.1L, Globulin 2.9, Albumin/Globulin Ratio 1.1, Triglycerides Level 97, 

Cholesterol Level 99, LDL Cholesterol 66, HDL Cholesterol 30L, Cholesterol/HDL 

Ratio 3.3


Height (Feet):  5


Height (Inches):  11.00


Weight (Pounds):  230


General Appearance:  alert


EENT:  normal ENT inspection


Neck:  normal alignment


Cardiovascular:  normal peripheral pulses, normal rate, regular rhythm


Respiratory/Chest:  chest wall non-tender, lungs clear, normal breath sounds


Abdomen:  normal bowel sounds, non tender, soft


Extremities:  normal inspection


Edema:  no edema noted Arm (L), no edema noted Arm (R), no edema noted Leg (L), 

no edema noted Leg (R), no edema noted Pedal (L), no edema noted Pedal (R), no 

edema noted Generalized


Neurologic:  responsive, motor weakness


Skin:  normal pigmentation, warm/dry











Samir Cabrera DO Jun 14, 2018 14:03

## 2018-06-14 NOTE — CONSULTATION
Consult Note


Assessment/Plan


A/


1) Blisters bilateral feet


2) Pain bilateral lower extremities neurpathic vs radiculopathy


3) Uncontrolled DM





P/


1) Cont wound care with adaptic. This is appropriate. No surgical intervention 

indicated


2) Patient has diffuse pain bilateral LE. Also complains of lower back pain.  

Recommend increase dose of Lyrica, but will leave it up to pain management to 

decide. Ordered lumbar spine films to evaluate lower back. 


3) Will continue to follow. 





Thank you Judson Lim DPM Jun 14, 2018 11:16

## 2018-06-15 VITALS — DIASTOLIC BLOOD PRESSURE: 70 MMHG | SYSTOLIC BLOOD PRESSURE: 110 MMHG

## 2018-06-15 VITALS — SYSTOLIC BLOOD PRESSURE: 119 MMHG | DIASTOLIC BLOOD PRESSURE: 70 MMHG

## 2018-06-15 VITALS — DIASTOLIC BLOOD PRESSURE: 72 MMHG | SYSTOLIC BLOOD PRESSURE: 115 MMHG

## 2018-06-15 VITALS — DIASTOLIC BLOOD PRESSURE: 71 MMHG | SYSTOLIC BLOOD PRESSURE: 117 MMHG

## 2018-06-15 LAB
ADD MANUAL DIFF: NO
ALBUMIN SERPL-MCNC: 3.1 G/DL (ref 3.4–5)
ALBUMIN/GLOB SERPL: 1 {RATIO} (ref 1–2.7)
ALP SERPL-CCNC: 56 U/L (ref 46–116)
ALT SERPL-CCNC: 24 U/L (ref 12–78)
ANION GAP SERPL CALC-SCNC: 10 MMOL/L (ref 5–15)
AST SERPL-CCNC: 12 U/L (ref 15–37)
BASOPHILS NFR BLD AUTO: 0.8 % (ref 0–2)
BILIRUB SERPL-MCNC: 0.3 MG/DL (ref 0.2–1)
BUN SERPL-MCNC: 15 MG/DL (ref 7–18)
CALCIUM SERPL-MCNC: 8.6 MG/DL (ref 8.5–10.1)
CHLORIDE SERPL-SCNC: 105 MMOL/L (ref 98–107)
CO2 SERPL-SCNC: 25 MMOL/L (ref 21–32)
CREAT SERPL-MCNC: 1 MG/DL (ref 0.55–1.3)
EOSINOPHIL NFR BLD AUTO: 1.8 % (ref 0–3)
ERYTHROCYTE [DISTWIDTH] IN BLOOD BY AUTOMATED COUNT: 11.7 % (ref 11.6–14.8)
GLOBULIN SER-MCNC: 3 G/DL
HCT VFR BLD CALC: 42 % (ref 42–52)
HGB BLD-MCNC: 14.1 G/DL (ref 14.2–18)
LYMPHOCYTES NFR BLD AUTO: 32.3 % (ref 20–45)
MCV RBC AUTO: 85 FL (ref 80–99)
MONOCYTES NFR BLD AUTO: 7.4 % (ref 1–10)
NEUTROPHILS NFR BLD AUTO: 57.7 % (ref 45–75)
PHOSPHATE SERPL-MCNC: 3.7 MG/DL (ref 2.5–4.9)
PLATELET # BLD: 184 K/UL (ref 150–450)
POTASSIUM SERPL-SCNC: 3.7 MMOL/L (ref 3.5–5.1)
RBC # BLD AUTO: 4.95 M/UL (ref 4.7–6.1)
SODIUM SERPL-SCNC: 140 MMOL/L (ref 136–145)
WBC # BLD AUTO: 7.5 K/UL (ref 4.8–10.8)

## 2018-06-15 RX ADMIN — HEPARIN SODIUM SCH UNITS: 5000 INJECTION INTRAVENOUS; SUBCUTANEOUS at 08:03

## 2018-06-15 RX ADMIN — MORPHINE SULFATE PRN MG: 2 INJECTION, SOLUTION INTRAMUSCULAR; INTRAVENOUS at 06:21

## 2018-06-15 RX ADMIN — PREGABALIN SCH MG: 75 CAPSULE ORAL at 22:16

## 2018-06-15 RX ADMIN — MORPHINE SULFATE PRN MG: 2 INJECTION, SOLUTION INTRAMUSCULAR; INTRAVENOUS at 18:49

## 2018-06-15 RX ADMIN — PREGABALIN SCH MG: 75 CAPSULE ORAL at 13:30

## 2018-06-15 RX ADMIN — INSULIN ASPART SCH UNITS: 100 INJECTION, SOLUTION INTRAVENOUS; SUBCUTANEOUS at 16:39

## 2018-06-15 RX ADMIN — MORPHINE SULFATE PRN MG: 2 INJECTION, SOLUTION INTRAMUSCULAR; INTRAVENOUS at 10:28

## 2018-06-15 RX ADMIN — MORPHINE SULFATE PRN MG: 2 INJECTION, SOLUTION INTRAMUSCULAR; INTRAVENOUS at 14:31

## 2018-06-15 RX ADMIN — MORPHINE SULFATE PRN MG: 2 INJECTION, SOLUTION INTRAMUSCULAR; INTRAVENOUS at 22:50

## 2018-06-15 RX ADMIN — INSULIN ASPART SCH UNITS: 100 INJECTION, SOLUTION INTRAVENOUS; SUBCUTANEOUS at 20:49

## 2018-06-15 RX ADMIN — INSULIN ASPART SCH UNITS: 100 INJECTION, SOLUTION INTRAVENOUS; SUBCUTANEOUS at 12:12

## 2018-06-15 RX ADMIN — ZIPRASIDONE HYDROCHLORIDE SCH MG: 20 CAPSULE ORAL at 08:02

## 2018-06-15 RX ADMIN — THEOPHYLLINE ANHYDROUS SCH MG: 100 CAPSULE, EXTENDED RELEASE ORAL at 08:02

## 2018-06-15 RX ADMIN — HEPARIN SODIUM SCH UNITS: 5000 INJECTION INTRAVENOUS; SUBCUTANEOUS at 20:45

## 2018-06-15 RX ADMIN — OLANZAPINE SCH MG: 2.5 TABLET, FILM COATED ORAL at 08:03

## 2018-06-15 RX ADMIN — TRAZODONE HYDROCHLORIDE SCH MG: 50 TABLET ORAL at 20:44

## 2018-06-15 RX ADMIN — THEOPHYLLINE ANHYDROUS SCH MG: 100 CAPSULE, EXTENDED RELEASE ORAL at 20:45

## 2018-06-15 RX ADMIN — PREGABALIN SCH MG: 75 CAPSULE ORAL at 06:21

## 2018-06-15 RX ADMIN — INSULIN ASPART SCH UNITS: 100 INJECTION, SOLUTION INTRAVENOUS; SUBCUTANEOUS at 06:30

## 2018-06-15 NOTE — INFECTIOUS DISEASES PROG NOTE
Assessment/Plan


Assessment/Plan





Abx:


None





Assessment:


b/l Feet blisters (from extensive walking in likely component of diabetic 

neuropathy)- no signs of infection


afebrile, no leukocytosis





COPD


 DM


HTN


CVA/TIA


 back and neck surgery


 peripheral neuropathy


psych hx


homelessness





Plan:


-Continue to monitor off abx; ok go discharge from ID perspective


-wound care, pain control


-podiatry f/u


-f/u cx


-Monitor CBC/CMP, temperatures





Thank you for this consultation. Will continue to follow along with you.





Discussed with RN.





Subjective


Allergies:  


Coded Allergies:  


     No Known Allergies (Unverified , 4/14/15)


Subjective


afebrile


no leukocytosis





Objective


Vital Signs





Last 24 Hour Vital Signs








  Date Time  Temp Pulse Resp B/P (MAP) Pulse Ox O2 Delivery O2 Flow Rate FiO2


 


6/15/18 18:49 98.2       


 


6/15/18 16:00 98.2 83 20 115/72 98   





 98.2       


 


6/15/18 15:01 98.6       


 


6/15/18 14:31 98.6       


 


6/15/18 14:29 98.6       


 


6/15/18 13:30 98.6       


 


6/15/18 11:25 98.6 85 20 110/70 96   





 98.6       


 


6/15/18 10:28 97.7       


 


6/15/18 08:33      Room Air  


 


6/15/18 08:32     94 Room Air  


 


6/15/18 08:31  76 16   Room Air  


 


6/15/18 04:00 97.7 89 19 119/70 95 Room Air  





 97.7       








Height (Feet):  5


Height (Inches):  11.00


Weight (Pounds):  230


Objective





GENERAL:  Calm in bed, in the ER gurney, oriented x3, and in no acute


distress.


CARDIOVASCULAR:  No murmurs.


LUNGS:  Distant and clear.


ABDOMEN:  Bowel sounds positive.  Nontender.  Nondistended.


EXTREMITIES:  No cyanosis, clubbing, or edema.  Bottom of the sole with 

multiple 1 inch x 0.5 inch blister in the bottom of metatarsal bilaterally. no 

purulent discharge


NEUROLOGIC:  The patient moves all extremities.  Slightly weak.





Laboratory Tests








Test


  6/15/18


09:15


 


White Blood Count


  7.5 K/UL


(4.8-10.8)


 


Red Blood Count


  4.95 M/UL


(4.70-6.10)


 


Hemoglobin


  14.1 G/DL


(14.2-18.0)  L


 


Hematocrit


  42.0 %


(42.0-52.0)


 


Mean Corpuscular Volume 85 FL (80-99)  


 


Mean Corpuscular Hemoglobin


  28.5 PG


(27.0-31.0)


 


Mean Corpuscular Hemoglobin


Concent 33.6 G/DL


(32.0-36.0)


 


Red Cell Distribution Width


  11.7 %


(11.6-14.8)


 


Platelet Count


  184 K/UL


(150-450)


 


Mean Platelet Volume


  8.5 FL


(6.5-10.1)


 


Neutrophils (%) (Auto)


  57.7 %


(45.0-75.0)


 


Lymphocytes (%) (Auto)


  32.3 %


(20.0-45.0)


 


Monocytes (%) (Auto)


  7.4 %


(1.0-10.0)


 


Eosinophils (%) (Auto)


  1.8 %


(0.0-3.0)


 


Basophils (%) (Auto)


  0.8 %


(0.0-2.0)


 


Sodium Level


  140 MMOL/L


(136-145)


 


Potassium Level


  3.7 MMOL/L


(3.5-5.1)


 


Chloride Level


  105 MMOL/L


()


 


Carbon Dioxide Level


  25 MMOL/L


(21-32)


 


Anion Gap


  10 mmol/L


(5-15)


 


Blood Urea Nitrogen


  15 mg/dL


(7-18)


 


Creatinine


  1.0 MG/DL


(0.55-1.30)


 


Estimat Glomerular Filtration


Rate > 60 mL/min


(>60)


 


Glucose Level


  165 MG/DL


()  H


 


Calcium Level


  8.6 MG/DL


(8.5-10.1)


 


Phosphorus Level


  3.7 MG/DL


(2.5-4.9)


 


Magnesium Level


  1.6 MG/DL


(1.8-2.4)  L


 


Total Bilirubin


  0.3 MG/DL


(0.2-1.0)


 


Aspartate Amino Transf


(AST/SGOT) 12 U/L (15-37)


L


 


Alanine Aminotransferase


(ALT/SGPT) 24 U/L (12-78)


 


 


Alkaline Phosphatase


  56 U/L


()


 


Total Protein


  6.1 G/DL


(6.4-8.2)  L


 


Albumin


  3.1 G/DL


(3.4-5.0)  L


 


Globulin 3.0 g/dL  


 


Albumin/Globulin Ratio 1.0 (1.0-2.7)  











Current Medications








 Medications


  (Trade)  Dose


 Ordered  Sig/Henry


 Route


 PRN Reason  Start Time


 Stop Time Status Last Admin


Dose Admin


 


 Acetaminophen


  (Tylenol)  650 mg  Q4H  PRN


 ORAL


 T>100.5  6/13/18 15:45


 7/13/18 15:44   


 


 


 Al Hydroxide/Mg


 Hydroxide


  (Mylanta II)  30 ml  Q6H  PRN


 ORAL


 dyspepsia  6/13/18 15:45


 7/13/18 15:44   


 


 


 Albuterol/


 Ipratropium


  (Albuterol/


 Ipratropium)  3 ml  Q4H  PRN


 HHN


 Shortness of Breath  6/14/18 10:07


 6/19/18 10:06  6/14/18 13:43


 


 


 Dextrose


  (Dextrose 50%)  25 ml  PRN


 IV


 Hypoglycemia  6/13/18 16:00


 7/13/18 15:59   


 


 


 Dextrose


  (Dextrose 50%)  50 ml  PRN


 IV


 hypoglycemia  6/13/18 16:00


 7/13/18 15:59   


 


 


 Divalproex Sodium


  (Depakote)  250 mg  Q12HR


 ORAL


   6/13/18 21:00


 7/13/18 20:59  6/15/18 20:44


 


 


 Heparin Sodium


  (Porcine)


  (Heparin 5000


 units/ml)  5,000 units  EVERY 12  HOURS


 SUBQ


   6/13/18 21:00


 7/13/18 20:59  6/14/18 21:07


 


 


 Insulin Aspart


  (NovoLOG)    BEFORE MEALS AND  HS


 SUBQ


   6/13/18 17:00


 7/13/18 16:59  6/15/18 20:49


 


 


 Lorazepam


  (Ativan 2mg/ml


 1ml)  0.5 mg  Q4H  PRN


 IV


 For Anxiety  6/13/18 16:00


 6/20/18 15:59   


 


 


 Morphine Sulfate


  (Morphine


 Sulfate)  1 mg  Q4H  PRN


 IVP


 Severe Pain (Pain Scale 7-10)  6/13/18 15:45


 6/20/18 15:44  6/15/18 18:49


 


 


 Olanzapine


  (ZyPREXA)  2.5 mg  DAILY


 ORAL


   6/14/18 09:00


 7/14/18 08:59  6/15/18 08:03


 


 


 Ondansetron HCl


  (Zofran)  4 mg  Q6H  PRN


 IVP


 Nausea & Vomiting  6/13/18 15:45


 7/13/18 15:44   


 


 


 Oxycodone/


 Acetaminophen


  (Percocet 5-325)  1 tab  Q4H  PRN


 ORAL


 Moderate Pain (Pain Scale 4-6)  6/13/18 15:45


 6/20/18 15:29   


 


 


 Polyethylene


 Glycol


  (Miralax)  17 gm  HSPRN  PRN


 ORAL


 Constipation  6/13/18 21:00


 7/13/18 20:59   


 


 


 Pregabalin


  (Lyrica)  75 mg  Q8HR


 ORAL


   6/13/18 18:00


 7/13/18 17:59  6/15/18 13:30


 


 


 Quetiapine


 Fumarate


  (SEROquel)  300 mg  BEDTIME


 ORAL


   6/13/18 21:00


 7/13/18 20:59  6/15/18 20:45


 


 


 Theophylline


  (Florin-Dur)  100 mg  Q12HR


 ORAL


   6/13/18 21:00


 7/13/18 20:59  6/15/18 20:45


 


 


 Trazodone HCl


  (Desyrel)  150 mg  BEDTIME


 ORAL


   6/13/18 21:00


 7/13/18 20:59  6/15/18 20:44


 


 


 Ziprasidone


  (Geodon)  20 mg  DAILY


 ORAL


   6/14/18 09:00


 7/14/18 08:59  6/15/18 08:02


 


 


 Zolpidem Tartrate


  (Ambien)  5 mg  HSPRN  PRN


 ORAL


 Insomnia  6/13/18 21:00


 6/20/18 20:59   


 

















Shagufta Arias M.D. Remi 15, 2018 21:00

## 2018-06-15 NOTE — PULMONOLOGY PROGRESS NOTE
Assessment/Plan


Problems:  


(1) COPD (chronic obstructive pulmonary disease)


(2) CVA (cerebral vascular accident)


(3) HTN (hypertension)


(4) Diabetes


Assessment/Plan


respiratory treatment


symptomatic treatment


check electrolytes


dvt prophylaxis





Subjective


ROS Limited/Unobtainable:  No


Constitutional:  Reports: no symptoms


HEENT:  Repors: no symptoms


Respiratory:  Reports: no symptoms


Allergies:  


Coded Allergies:  


     No Known Allergies (Unverified , 4/14/15)





Objective





Last 24 Hour Vital Signs








  Date Time  Temp Pulse Resp B/P (MAP) Pulse Ox O2 Delivery O2 Flow Rate FiO2


 


6/15/18 15:01 98.6       


 


6/15/18 14:31 98.6       


 


6/15/18 14:29 98.6       


 


6/15/18 13:30 98.6       


 


6/15/18 11:25 98.6 85 20 110/70 96   





 98.6       


 


6/15/18 10:28 97.7       


 


6/15/18 08:33      Room Air  


 


6/15/18 08:32     94 Room Air  


 


6/15/18 08:31  76 16   Room Air  


 


6/15/18 04:00 97.7 89 19 119/70 95 Room Air  





 97.7       


 


6/14/18 20:07     98 Nasal Cannula 2.0 28


 


6/14/18 20:07      Nasal Cannula 2.0 28


 


6/14/18 20:07  83 20   Nasal Cannula 2.0 28


 


6/14/18 20:00 98.7 94 19 133/80 95 Room Air  





 98.7       


 


6/14/18 16:42 98.2       


 


6/14/18 16:22 98.2 94 20 119/75 98 Nasal Cannula 2.0 





 98.2       

















Intake and Output  


 


 6/14/18 6/15/18





 19:00 07:00


 


Intake Total 1645 ml 2000 ml


 


Balance 1645 ml 2000 ml


 


  


 


Intake Oral 1645 ml 2000 ml


 


# Voids 6 1








General Appearance:  WD/WN


HEENT:  normocephalic, atraumatic


Respiratory/Chest:  chest wall non-tender, lungs clear


Cardiovascular:  normal peripheral pulses, normal rate


Genitourinary:  normal external genitalia


Extremities:  no cyanosis


Neurologic/Psychiatric:  CNs II-XII grossly normal


Laboratory Tests


6/15/18 09:15: 


White Blood Count 7.5, Red Blood Count 4.95, Hemoglobin 14.1L, Hematocrit 42.0, 

Mean Corpuscular Volume 85, Mean Corpuscular Hemoglobin 28.5, Mean Corpuscular 

Hemoglobin Concent 33.6, Red Cell Distribution Width 11.7, Platelet Count 184, 

Mean Platelet Volume 8.5, Neutrophils (%) (Auto) 57.7, Lymphocytes (%) (Auto) 

32.3, Monocytes (%) (Auto) 7.4, Eosinophils (%) (Auto) 1.8, Basophils (%) (Auto

) 0.8, Sodium Level 140, Potassium Level 3.7, Chloride Level 105, Carbon 

Dioxide Level 25, Anion Gap 10, Blood Urea Nitrogen 15, Creatinine 1.0, Estimat 

Glomerular Filtration Rate > 60, Glucose Level 165H, Calcium Level 8.6, 

Phosphorus Level 3.7, Magnesium Level 1.6L, Total Bilirubin 0.3, Aspartate 

Amino Transf (AST/SGOT) 12L, Alanine Aminotransferase (ALT/SGPT) 24, Alkaline 

Phosphatase 56, Total Protein 6.1L, Albumin 3.1L, Globulin 3.0, Albumin/

Globulin Ratio 1.0





Current Medications








 Medications


  (Trade)  Dose


 Ordered  Sig/Henry


 Route


 PRN Reason  Start Time


 Stop Time Status Last Admin


Dose Admin


 


 Acetaminophen


  (Tylenol)  650 mg  Q4H  PRN


 ORAL


 T>100.5  6/13/18 15:45


 7/13/18 15:44   


 


 


 Al Hydroxide/Mg


 Hydroxide


  (Mylanta II)  30 ml  Q6H  PRN


 ORAL


 dyspepsia  6/13/18 15:45


 7/13/18 15:44   


 


 


 Albuterol/


 Ipratropium


  (Albuterol/


 Ipratropium)  3 ml  Q4H  PRN


 HHN


 Shortness of Breath  6/14/18 10:07


 6/19/18 10:06  6/14/18 13:43


 


 


 Dextrose


  (Dextrose 50%)  25 ml  PRN


 IV


 Hypoglycemia  6/13/18 16:00


 7/13/18 15:59   


 


 


 Dextrose


  (Dextrose 50%)  50 ml  PRN


 IV


 hypoglycemia  6/13/18 16:00


 7/13/18 15:59   


 


 


 Divalproex Sodium


  (Depakote)  250 mg  Q12HR


 ORAL


   6/13/18 21:00


 7/13/18 20:59  6/15/18 08:03


 


 


 Heparin Sodium


  (Porcine)


  (Heparin 5000


 units/ml)  5,000 units  EVERY 12  HOURS


 SUBQ


   6/13/18 21:00


 7/13/18 20:59  6/14/18 21:07


 


 


 Insulin Aspart


  (NovoLOG)    BEFORE MEALS AND  HS


 SUBQ


   6/13/18 17:00


 7/13/18 16:59  6/15/18 12:12


 


 


 Lorazepam


  (Ativan 2mg/ml


 1ml)  0.5 mg  Q4H  PRN


 IV


 For Anxiety  6/13/18 16:00


 6/20/18 15:59   


 


 


 Morphine Sulfate


  (Morphine


 Sulfate)  1 mg  Q4H  PRN


 IVP


 Severe Pain (Pain Scale 7-10)  6/13/18 15:45


 6/20/18 15:44  6/15/18 14:31


 


 


 Olanzapine


  (ZyPREXA)  2.5 mg  DAILY


 ORAL


   6/14/18 09:00


 7/14/18 08:59  6/15/18 08:03


 


 


 Ondansetron HCl


  (Zofran)  4 mg  Q6H  PRN


 IVP


 Nausea & Vomiting  6/13/18 15:45


 7/13/18 15:44   


 


 


 Oxycodone/


 Acetaminophen


  (Percocet 5-325)  1 tab  Q4H  PRN


 ORAL


 Moderate Pain (Pain Scale 4-6)  6/13/18 15:45


 6/20/18 15:29   


 


 


 Polyethylene


 Glycol


  (Miralax)  17 gm  HSPRN  PRN


 ORAL


 Constipation  6/13/18 21:00


 7/13/18 20:59   


 


 


 Pregabalin


  (Lyrica)  75 mg  Q8HR


 ORAL


   6/13/18 18:00


 7/13/18 17:59  6/15/18 13:30


 


 


 Quetiapine


 Fumarate


  (SEROquel)  300 mg  BEDTIME


 ORAL


   6/13/18 21:00


 7/13/18 20:59  6/14/18 21:02


 


 


 Theophylline


  (Florin-Dur)  100 mg  Q12HR


 ORAL


   6/13/18 21:00


 7/13/18 20:59  6/15/18 08:02


 


 


 Trazodone HCl


  (Desyrel)  150 mg  BEDTIME


 ORAL


   6/13/18 21:00


 7/13/18 20:59  6/14/18 21:08


 


 


 Ziprasidone


  (Geodon)  20 mg  DAILY


 ORAL


   6/14/18 09:00


 7/14/18 08:59  6/15/18 08:02


 


 


 Zolpidem Tartrate


  (Ambien)  5 mg  HSPRN  PRN


 ORAL


 Insomnia  6/13/18 21:00


 6/20/18 20:59   


 

















Lissette Steward MD Remi 15, 2018 15:36

## 2018-06-15 NOTE — GENERAL PROGRESS NOTE
Assessment/Plan


Assessment/Plan


(1) Chiari malformation type II


(2) Degenerative disc disease, cervical


(3) Lumbar degenerative disc disease


(4) Arthritis, lumbar spine


(5) Peripheral neuropathy


(6) Hydrocephalus


(7) Diabetes mellitus


Pt will be continued on Morphine and percocet. 


Pt was d/w Dr. Franco and he concurred.





Subjective


Date patient seen:  Remi 15, 2018


Time patient seen:  08:45 - am


Allergies:  


Coded Allergies:  


     No Known Allergies (Unverified , 4/14/15)


Subjective


Constitutional:  Reports: weakness, Denies: chills, diaphoresis, fever, malaise

, no symptoms, other


HEENT:  Denies: blurred vision, double vision, ear discharge, ear pain, eye pain

, mouth pain, mouth swelling, no symptoms, nose congestion, nose pain, other, 

tearing, throat pain, throat swelling


Cardiovascular:  Denies: chest pain, edema, irregular heart rate, 

lightheadedness, no symptoms, other, palpitations, syncope


Respiratory:  Denies: SOB at rest, SOB with excertion, cough, no symptoms, 

orthopnea, other, shortness of breath, sputum, stridor, wheezing


Gastrointestinal/Abdominal:  Denies: abdomen distended, abdominal pain, black 

stools, blood in stool, constipated, diarrhea, difficulty swallowing, nausea, 

no symptoms, other, poor appetite, poor fluid intake, rectal bleeding, tarry 

stools, vomiting


Genitourinary:  Denies: burning, discharge, flank pain, frequency, hematuria, 

incontinence, no symptoms, other, pain, urgency


Neurologic/Psychiatric:  Reports: headache, numbness, tingling, weakness, Denies

: anxiety, depressed, emotional problems, no symptoms, other, paresthesia, pre-

existing deficit, seizure, tremors


Endocrine:  Denies: excessive sweating, flushing, increased hunger, increased 

thirst, increased urine, intolerance to cold, intolerance to heat, no symptoms, 

other, unexplained weight gain, unexplained weight loss


Hematologic/Lymphatic:  Denies: anemia, easy bleeding, easy bruising, no 

symptoms, other


 


Subjective


Pt is a known patient admitted under the care of Dr. Cabrera.


He is in bed and showing no signs of pain. 


started on Morphine 1mg IV Q4H PRN severe pain 


and Percocet 5/325mg PO 1 tab Q4H PRN mod pain.





Objective





Last 24 Hour Vital Signs








  Date Time  Temp Pulse Resp B/P (MAP) Pulse Ox O2 Delivery O2 Flow Rate FiO2


 


6/15/18 04:00 97.7 89 19 119/70 95 Room Air  





 97.7       


 


6/14/18 20:07     98 Nasal Cannula 2.0 28


 


6/14/18 20:07      Nasal Cannula 2.0 28


 


6/14/18 20:07  83 20   Nasal Cannula 2.0 28


 


6/14/18 20:00 98.7 94 19 133/80 95 Room Air  





 98.7       


 


6/14/18 17:12 98.2       


 


6/14/18 16:42 98.2       


 


6/14/18 16:22 98.2 94 20 119/75 98 Nasal Cannula 2.0 





 98.2       


 


6/14/18 16:12 98.2       


 


6/14/18 15:13 98.6       


 


6/14/18 13:51  74 20  98 Nasal Cannula 3.0 32


 


6/14/18 13:43  73 18  98 Nasal Cannula 3.0 32


 


6/14/18 13:43        32


 


6/14/18 12:39 98.6       


 


6/14/18 12:00 98.0 85 20 102/67 97 Nasal Cannula 2.0 





 98.0       


 


6/14/18 10:22  75 20  98 Nasal Cannula 3.0 32


 


6/14/18 10:13  75 20  97 Nasal Cannula 3.0 32


 


6/14/18 10:13        32


 


6/14/18 10:13  75 20   Nasal Cannula 3.0 32

















Intake and Output  


 


 6/14/18 6/15/18





 19:00 07:00


 


Intake Total 1645 ml 2000 ml


 


Balance 1645 ml 2000 ml


 


  


 


Intake Oral 1645 ml 2000 ml


 


# Voids 6 1








Height (Feet):  5


Height (Inches):  11.00


Weight (Pounds):  230


Objective


General Appearance:  no apparent distress, alert


EENT:  normal ENT inspection, TMs normal


Neck:  supple, limited range of motion, muscle spasm


Cardiovascular:  normal rate, regular rhythm


Respiratory/Chest:  decreased breath sounds


Abdomen:  non tender, soft


Extremities:  non-tender


Edema:  no edema noted Arm (L), no edema noted Arm (R), no edema noted Leg (L), 

no edema noted Leg (R), no edema noted Pedal (L), no edema noted Pedal (R), no 

edema noted Generalized


Neurologic:  alert, oriented x 3


Skin:  warm/dry











Bill Sanz Remi 15, 2018 09:41

## 2018-06-15 NOTE — GENERAL PROGRESS NOTE
Assessment/Plan


Problem List:  


(1) Blister (nonthermal), unspecified foot, sequela


ICD Codes:  S90.829S - Blister (nonthermal), unspecified foot, sequela


SNOMED:  871335499


(2) Weak


ICD Codes:  R53.1 - Weakness


SNOMED:  80083626


(3) HTN (hypertension)


ICD Codes:  I10 - Essential (primary) hypertension


SNOMED:  09489146


(4) CVA (cerebral vascular accident)


ICD Codes:  I63.9 - Cerebral infarction, unspecified


SNOMED:  336585494


(5) Diabetes


ICD Codes:  E11.9 - Diabetes mellitus


SNOMED:  70324287


(6) Generalized weakness


ICD Codes:  R53.1 - Weakness


SNOMED:  80199488


Status:  stable, progressing


Assessment/Plan


ot pt diet wound care abx cbc bmp am dc to snf  if clear





Subjective


Constitutional:  Reports: weakness


Allergies:  


Coded Allergies:  


     No Known Allergies (Unverified , 4/14/15)


Subjective


calm in bed





Objective





Last 24 Hour Vital Signs








  Date Time  Temp Pulse Resp B/P (MAP) Pulse Ox O2 Delivery O2 Flow Rate FiO2


 


6/15/18 10:28 97.7       


 


6/15/18 04:00 97.7 89 19 119/70 95 Room Air  





 97.7       


 


6/14/18 20:07     98 Nasal Cannula 2.0 28


 


6/14/18 20:07      Nasal Cannula 2.0 28


 


6/14/18 20:07  83 20   Nasal Cannula 2.0 28


 


6/14/18 20:00 98.7 94 19 133/80 95 Room Air  





 98.7       


 


6/14/18 17:12 98.2       


 


6/14/18 16:42 98.2       


 


6/14/18 16:22 98.2 94 20 119/75 98 Nasal Cannula 2.0 





 98.2       


 


6/14/18 16:12 98.2       


 


6/14/18 15:13 98.6       


 


6/14/18 13:51  74 20  98 Nasal Cannula 3.0 32


 


6/14/18 13:43  73 18  98 Nasal Cannula 3.0 32


 


6/14/18 13:43        32


 


6/14/18 12:39 98.6       


 


6/14/18 12:00 98.0 85 20 102/67 97 Nasal Cannula 2.0 





 98.0       

















Intake and Output  


 


 6/14/18 6/15/18





 19:00 07:00


 


Intake Total 1645 ml 2000 ml


 


Balance 1645 ml 2000 ml


 


  


 


Intake Oral 1645 ml 2000 ml


 


# Voids 6 1








Laboratory Tests


6/15/18 09:15: 


White Blood Count 7.5, Red Blood Count 4.95, Hemoglobin 14.1L, Hematocrit 42.0, 

Mean Corpuscular Volume 85, Mean Corpuscular Hemoglobin 28.5, Mean Corpuscular 

Hemoglobin Concent 33.6, Red Cell Distribution Width 11.7, Platelet Count 184, 

Mean Platelet Volume 8.5, Neutrophils (%) (Auto) 57.7, Lymphocytes (%) (Auto) 

32.3, Monocytes (%) (Auto) 7.4, Eosinophils (%) (Auto) 1.8, Basophils (%) (Auto

) 0.8, Sodium Level 140, Potassium Level 3.7, Chloride Level 105, Carbon 

Dioxide Level 25, Anion Gap 10, Blood Urea Nitrogen 15, Creatinine 1.0, Estimat 

Glomerular Filtration Rate > 60, Glucose Level 165H, Calcium Level 8.6, 

Phosphorus Level 3.7, Magnesium Level 1.6L, Total Bilirubin 0.3, Aspartate 

Amino Transf (AST/SGOT) 12L, Alanine Aminotransferase (ALT/SGPT) 24, Alkaline 

Phosphatase 56, Total Protein 6.1L, Albumin 3.1L, Globulin 3.0, Albumin/

Globulin Ratio 1.0


Height (Feet):  5


Height (Inches):  11.00


Weight (Pounds):  230


General Appearance:  alert


EENT:  normal ENT inspection


Neck:  normal alignment


Cardiovascular:  normal peripheral pulses, normal rate, regular rhythm


Respiratory/Chest:  chest wall non-tender, lungs clear, normal breath sounds


Abdomen:  normal bowel sounds, non tender, soft


Extremities:  normal inspection


Edema:  no edema noted Arm (L), no edema noted Arm (R), no edema noted Leg (L), 

no edema noted Leg (R), no edema noted Pedal (L), no edema noted Pedal (R), no 

edema noted Generalized


Neurologic:  responsive, motor weakness


Skin:  normal pigmentation, warm/dry











Samir Cabrera DO Remi 15, 2018 10:48

## 2018-06-16 VITALS — DIASTOLIC BLOOD PRESSURE: 94 MMHG | SYSTOLIC BLOOD PRESSURE: 117 MMHG

## 2018-06-16 VITALS — SYSTOLIC BLOOD PRESSURE: 126 MMHG | DIASTOLIC BLOOD PRESSURE: 78 MMHG

## 2018-06-16 VITALS — DIASTOLIC BLOOD PRESSURE: 71 MMHG | SYSTOLIC BLOOD PRESSURE: 118 MMHG

## 2018-06-16 VITALS — SYSTOLIC BLOOD PRESSURE: 111 MMHG | DIASTOLIC BLOOD PRESSURE: 69 MMHG

## 2018-06-16 VITALS — SYSTOLIC BLOOD PRESSURE: 114 MMHG | DIASTOLIC BLOOD PRESSURE: 68 MMHG

## 2018-06-16 RX ADMIN — IPRATROPIUM BROMIDE AND ALBUTEROL SULFATE PRN ML: .5; 3 SOLUTION RESPIRATORY (INHALATION) at 11:46

## 2018-06-16 RX ADMIN — PREGABALIN SCH MG: 75 CAPSULE ORAL at 21:28

## 2018-06-16 RX ADMIN — THEOPHYLLINE ANHYDROUS SCH MG: 100 CAPSULE, EXTENDED RELEASE ORAL at 20:37

## 2018-06-16 RX ADMIN — DIVALPROEX SODIUM SCH MG: 500 TABLET, DELAYED RELEASE ORAL at 08:33

## 2018-06-16 RX ADMIN — OLANZAPINE SCH MG: 2.5 TABLET, FILM COATED ORAL at 08:33

## 2018-06-16 RX ADMIN — ZIPRASIDONE HYDROCHLORIDE SCH MG: 20 CAPSULE ORAL at 08:33

## 2018-06-16 RX ADMIN — DIVALPROEX SODIUM SCH MG: 500 TABLET, DELAYED RELEASE ORAL at 20:37

## 2018-06-16 RX ADMIN — MORPHINE SULFATE PRN MG: 2 INJECTION, SOLUTION INTRAMUSCULAR; INTRAVENOUS at 13:14

## 2018-06-16 RX ADMIN — HEPARIN SODIUM SCH UNITS: 5000 INJECTION INTRAVENOUS; SUBCUTANEOUS at 20:51

## 2018-06-16 RX ADMIN — THEOPHYLLINE ANHYDROUS SCH MG: 100 CAPSULE, EXTENDED RELEASE ORAL at 08:33

## 2018-06-16 RX ADMIN — INSULIN ASPART SCH UNITS: 100 INJECTION, SOLUTION INTRAVENOUS; SUBCUTANEOUS at 16:41

## 2018-06-16 RX ADMIN — MORPHINE SULFATE PRN MG: 2 INJECTION, SOLUTION INTRAMUSCULAR; INTRAVENOUS at 20:38

## 2018-06-16 RX ADMIN — PREGABALIN SCH MG: 75 CAPSULE ORAL at 14:07

## 2018-06-16 RX ADMIN — TRAZODONE HYDROCHLORIDE SCH MG: 50 TABLET ORAL at 20:36

## 2018-06-16 RX ADMIN — INSULIN ASPART SCH UNITS: 100 INJECTION, SOLUTION INTRAVENOUS; SUBCUTANEOUS at 20:50

## 2018-06-16 RX ADMIN — HEPARIN SODIUM SCH UNITS: 5000 INJECTION INTRAVENOUS; SUBCUTANEOUS at 09:00

## 2018-06-16 RX ADMIN — MORPHINE SULFATE PRN MG: 2 INJECTION, SOLUTION INTRAMUSCULAR; INTRAVENOUS at 03:19

## 2018-06-16 RX ADMIN — MORPHINE SULFATE PRN MG: 2 INJECTION, SOLUTION INTRAMUSCULAR; INTRAVENOUS at 08:24

## 2018-06-16 RX ADMIN — INSULIN ASPART SCH UNITS: 100 INJECTION, SOLUTION INTRAVENOUS; SUBCUTANEOUS at 12:06

## 2018-06-16 RX ADMIN — PREGABALIN SCH MG: 75 CAPSULE ORAL at 06:05

## 2018-06-16 RX ADMIN — INSULIN ASPART SCH UNITS: 100 INJECTION, SOLUTION INTRAVENOUS; SUBCUTANEOUS at 06:06

## 2018-06-16 NOTE — INFECTIOUS DISEASES PROG NOTE
Assessment/Plan


Assessment/Plan





Abx:


None





Assessment:


b/l Feet blisters (from extensive walking in likely component of diabetic 

neuropathy)- no signs of infection


afebrile, no leukocytosis





COPD


 DM


HTN


CVA/TIA


 back and neck surgery


 peripheral neuropathy


psych hx


homelessness





Plan:


-Continue to monitor off abx; ok go discharge from ID perspective


-wound care, pain control


-podiatry f/u


-f/u cx


-Monitor CBC/CMP, temperatures





Thank you for this consultation. Will continue to follow along with you.





Discussed with RN.





Subjective


Allergies:  


Coded Allergies:  


     No Known Allergies (Unverified , 4/14/15)


Subjective


afebrile


no leukocytosis


off abx


discharge planning





Objective


Vital Signs





Last 24 Hour Vital Signs








  Date Time  Temp Pulse Resp B/P (MAP) Pulse Ox O2 Delivery O2 Flow Rate FiO2


 


6/16/18 11:46        32


 


6/16/18 11:46  83 18  97 Room Air  


 


6/16/18 09:40  88 16   Room Air  


 


6/16/18 09:40      Room Air  


 


6/16/18 09:40     97 Room Air  


 


6/16/18 08:00 97.7 97 18 118/71 93 Room Air  





 97.7       


 


6/16/18 04:28 97.9 74 17 114/68 96   





 97.9       


 


6/16/18 00:00 98.2 85 16 111/69 95   





 98.2       


 


6/15/18 21:37     95 Room Air  


 


6/15/18 21:37  91 18   Room Air  


 


6/15/18 21:37      Room Air  


 


6/15/18 20:00 98.1 102 17 117/71 97   





 98.1       


 


6/15/18 18:49 98.2       


 


6/15/18 16:00 98.2 83 20 115/72 98   





 98.2       


 


6/15/18 15:01 98.6       


 


6/15/18 14:31 98.6       


 


6/15/18 14:29 98.6       


 


6/15/18 13:30 98.6       








Height (Feet):  5


Height (Inches):  11.00


Weight (Pounds):  230


Objective





GENERAL:  Calm in bed, in the ER gurney, oriented x3, and in no acute


distress.


CARDIOVASCULAR:  No murmurs.


LUNGS:  Distant and clear.


ABDOMEN:  Bowel sounds positive.  Nontender.  Nondistended.


EXTREMITIES:  No cyanosis, clubbing, or edema.  Bottom of the sole with 

multiple 1 inch x 0.5 inch blister in the bottom of metatarsal bilaterally. no 

purulent discharge


NEUROLOGIC:  The patient moves all extremities.  Slightly weak.





Current Medications








 Medications


  (Trade)  Dose


 Ordered  Sig/Henry


 Route


 PRN Reason  Start Time


 Stop Time Status Last Admin


Dose Admin


 


 Acetaminophen


  (Tylenol)  650 mg  Q4H  PRN


 ORAL


 T>100.5  6/13/18 15:45


 7/13/18 15:44   


 


 


 Al Hydroxide/Mg


 Hydroxide


  (Mylanta II)  30 ml  Q6H  PRN


 ORAL


 dyspepsia  6/13/18 15:45


 7/13/18 15:44   


 


 


 Albuterol/


 Ipratropium


  (Albuterol/


 Ipratropium)  3 ml  Q4H  PRN


 HHN


 Shortness of Breath  6/14/18 10:07


 6/19/18 10:06  6/16/18 11:46


 


 


 Dextrose


  (Dextrose 50%)  25 ml  PRN


 IV


 Hypoglycemia  6/13/18 16:00


 7/13/18 15:59   


 


 


 Dextrose


  (Dextrose 50%)  50 ml  PRN


 IV


 hypoglycemia  6/13/18 16:00


 7/13/18 15:59   


 


 


 Divalproex Sodium


  (Depakote)  500 mg  Q12HR


 ORAL


   6/16/18 09:00


 7/13/18 20:59  6/16/18 08:33


 


 


 Heparin Sodium


  (Porcine)


  (Heparin 5000


 units/ml)  5,000 units  EVERY 12  HOURS


 SUBQ


   6/13/18 21:00


 7/13/18 20:59  6/14/18 21:07


 


 


 Insulin Aspart


  (NovoLOG)    BEFORE MEALS AND  HS


 SUBQ


   6/13/18 17:00


 7/13/18 16:59  6/16/18 12:06


 


 


 Lorazepam


  (Ativan 2mg/ml


 1ml)  0.5 mg  Q4H  PRN


 IV


 For Anxiety  6/13/18 16:00


 6/20/18 15:59   


 


 


 Morphine Sulfate


  (Morphine


 Sulfate)  1 mg  Q4H  PRN


 IVP


 Severe Pain (Pain Scale 7-10)  6/13/18 15:45


 6/20/18 15:44  6/16/18 08:24


 


 


 Olanzapine


  (ZyPREXA)  2.5 mg  DAILY


 ORAL


   6/14/18 09:00


 7/14/18 08:59  6/16/18 08:33


 


 


 Ondansetron HCl


  (Zofran)  4 mg  Q6H  PRN


 IVP


 Nausea & Vomiting  6/13/18 15:45


 7/13/18 15:44   


 


 


 Oxycodone/


 Acetaminophen


  (Percocet 5-325)  1 tab  Q4H  PRN


 ORAL


 Moderate Pain (Pain Scale 4-6)  6/13/18 15:45


 6/20/18 15:29   


 


 


 Polyethylene


 Glycol


  (Miralax)  17 gm  HSPRN  PRN


 ORAL


 Constipation  6/13/18 21:00


 7/13/18 20:59   


 


 


 Pregabalin


  (Lyrica)  75 mg  Q8HR


 ORAL


   6/13/18 18:00


 7/13/18 17:59  6/16/18 06:05


 


 


 Quetiapine


 Fumarate


  (SEROquel)  300 mg  BEDTIME


 ORAL


   6/13/18 21:00


 7/13/18 20:59  6/15/18 20:45


 


 


 Theophylline


  (Florin-Dur)  100 mg  Q12HR


 ORAL


   6/13/18 21:00


 7/13/18 20:59  6/16/18 08:33


 


 


 Trazodone HCl


  (Desyrel)  150 mg  BEDTIME


 ORAL


   6/13/18 21:00


 7/13/18 20:59  6/15/18 20:44


 


 


 Ziprasidone


  (Geodon)  20 mg  DAILY


 ORAL


   6/14/18 09:00


 7/14/18 08:59  6/16/18 08:33


 


 


 Zolpidem Tartrate


  (Ambien)  5 mg  HSPRN  PRN


 ORAL


 Insomnia  6/13/18 21:00


 6/20/18 20:59   


 

















Shagufta Arias M.D. Jun 16, 2018 13:07

## 2018-06-16 NOTE — GENERAL PROGRESS NOTE
Assessment/Plan


Problem List:  


(1) Blister (nonthermal), unspecified foot, sequela


ICD Codes:  S90.829S - Blister (nonthermal), unspecified foot, sequela


SNOMED:  464444163


(2) Weak


ICD Codes:  R53.1 - Weakness


SNOMED:  76008829


(3) HTN (hypertension)


ICD Codes:  I10 - Essential (primary) hypertension


SNOMED:  65193159


(4) CVA (cerebral vascular accident)


ICD Codes:  I63.9 - Cerebral infarction, unspecified


SNOMED:  847055072


(5) Diabetes


ICD Codes:  E11.9 - Diabetes mellitus


SNOMED:  04822226


(6) Generalized weakness


ICD Codes:  R53.1 - Weakness


SNOMED:  75033539


Status:  stable, progressing


Assessment/Plan


ot pt diet wound care abx cbc bmp am dc to snf  if clear





Subjective


Constitutional:  Reports: weakness


Allergies:  


Coded Allergies:  


     No Known Allergies (Unverified , 4/14/15)


All Systems:  reviewed and negative except above


Subjective


calm in bed





Objective





Last 24 Hour Vital Signs








  Date Time  Temp Pulse Resp B/P (MAP) Pulse Ox O2 Delivery O2 Flow Rate FiO2


 


6/16/18 08:00 97.7 97 18 118/71 93 Room Air  





 97.7       


 


6/16/18 04:28 97.9 74 17 114/68 96   





 97.9       


 


6/16/18 00:00 98.2 85 16 111/69 95   





 98.2       


 


6/15/18 21:37     95 Room Air  


 


6/15/18 21:37  91 18   Room Air  


 


6/15/18 21:37      Room Air  


 


6/15/18 20:00 98.1 102 17 117/71 97   





 98.1       


 


6/15/18 18:49 98.2       


 


6/15/18 16:00 98.2 83 20 115/72 98   





 98.2       


 


6/15/18 15:01 98.6       


 


6/15/18 14:31 98.6       


 


6/15/18 14:29 98.6       


 


6/15/18 13:30 98.6       


 


6/15/18 11:25 98.6 85 20 110/70 96   





 98.6       


 


6/15/18 10:28 97.7       

















Intake and Output  


 


 6/15/18 6/16/18





 19:00 07:00


 


Intake Total 1200 ml 


 


Balance 1200 ml 


 


  


 


Intake Oral 1200 ml 


 


# Voids 4 2








Height (Feet):  5


Height (Inches):  11.00


Weight (Pounds):  230


General Appearance:  alert, lethargic


EENT:  normal ENT inspection


Neck:  normal alignment


Cardiovascular:  normal peripheral pulses, normal rate, regular rhythm


Respiratory/Chest:  chest wall non-tender, lungs clear, normal breath sounds


Abdomen:  normal bowel sounds, non tender, soft


Extremities:  normal inspection


Edema:  no edema noted Arm (L), no edema noted Arm (R), no edema noted Leg (L), 

no edema noted Leg (R), no edema noted Pedal (L), no edema noted Pedal (R), no 

edema noted Generalized


Neurologic:  responsive, motor weakness


Skin:  normal pigmentation, warm/dry











Samir Cabrera DO Jun 16, 2018 10:16

## 2018-06-17 VITALS — SYSTOLIC BLOOD PRESSURE: 130 MMHG | DIASTOLIC BLOOD PRESSURE: 73 MMHG

## 2018-06-17 VITALS — DIASTOLIC BLOOD PRESSURE: 64 MMHG | SYSTOLIC BLOOD PRESSURE: 108 MMHG

## 2018-06-17 VITALS — SYSTOLIC BLOOD PRESSURE: 124 MMHG | DIASTOLIC BLOOD PRESSURE: 72 MMHG

## 2018-06-17 VITALS — SYSTOLIC BLOOD PRESSURE: 121 MMHG | DIASTOLIC BLOOD PRESSURE: 78 MMHG

## 2018-06-17 VITALS — DIASTOLIC BLOOD PRESSURE: 67 MMHG | SYSTOLIC BLOOD PRESSURE: 105 MMHG

## 2018-06-17 VITALS — DIASTOLIC BLOOD PRESSURE: 69 MMHG | SYSTOLIC BLOOD PRESSURE: 105 MMHG

## 2018-06-17 LAB
ADD MANUAL DIFF: NO
ANION GAP SERPL CALC-SCNC: 9 MMOL/L (ref 5–15)
BASOPHILS NFR BLD AUTO: 0.9 % (ref 0–2)
BUN SERPL-MCNC: 15 MG/DL (ref 7–18)
CALCIUM SERPL-MCNC: 8.9 MG/DL (ref 8.5–10.1)
CHLORIDE SERPL-SCNC: 103 MMOL/L (ref 98–107)
CO2 SERPL-SCNC: 27 MMOL/L (ref 21–32)
CREAT SERPL-MCNC: 0.9 MG/DL (ref 0.55–1.3)
EOSINOPHIL NFR BLD AUTO: 2.1 % (ref 0–3)
ERYTHROCYTE [DISTWIDTH] IN BLOOD BY AUTOMATED COUNT: 11.7 % (ref 11.6–14.8)
HCT VFR BLD CALC: 42.9 % (ref 42–52)
HGB BLD-MCNC: 14.7 G/DL (ref 14.2–18)
LYMPHOCYTES NFR BLD AUTO: 34.3 % (ref 20–45)
MCV RBC AUTO: 85 FL (ref 80–99)
MONOCYTES NFR BLD AUTO: 7.7 % (ref 1–10)
NEUTROPHILS NFR BLD AUTO: 55.1 % (ref 45–75)
PLATELET # BLD: 192 K/UL (ref 150–450)
POTASSIUM SERPL-SCNC: 3.8 MMOL/L (ref 3.5–5.1)
RBC # BLD AUTO: 5.06 M/UL (ref 4.7–6.1)
SODIUM SERPL-SCNC: 139 MMOL/L (ref 136–145)
WBC # BLD AUTO: 8.7 K/UL (ref 4.8–10.8)

## 2018-06-17 RX ADMIN — MORPHINE SULFATE PRN MG: 2 INJECTION, SOLUTION INTRAMUSCULAR; INTRAVENOUS at 08:02

## 2018-06-17 RX ADMIN — INSULIN ASPART SCH UNITS: 100 INJECTION, SOLUTION INTRAVENOUS; SUBCUTANEOUS at 16:38

## 2018-06-17 RX ADMIN — HEPARIN SODIUM SCH UNITS: 5000 INJECTION INTRAVENOUS; SUBCUTANEOUS at 21:00

## 2018-06-17 RX ADMIN — THEOPHYLLINE ANHYDROUS SCH MG: 100 CAPSULE, EXTENDED RELEASE ORAL at 09:36

## 2018-06-17 RX ADMIN — OLANZAPINE SCH MG: 2.5 TABLET, FILM COATED ORAL at 09:36

## 2018-06-17 RX ADMIN — MORPHINE SULFATE PRN MG: 2 INJECTION, SOLUTION INTRAMUSCULAR; INTRAVENOUS at 20:50

## 2018-06-17 RX ADMIN — PREGABALIN SCH MG: 75 CAPSULE ORAL at 21:35

## 2018-06-17 RX ADMIN — THEOPHYLLINE ANHYDROUS SCH MG: 100 CAPSULE, EXTENDED RELEASE ORAL at 20:49

## 2018-06-17 RX ADMIN — INSULIN ASPART SCH UNITS: 100 INJECTION, SOLUTION INTRAVENOUS; SUBCUTANEOUS at 20:51

## 2018-06-17 RX ADMIN — MORPHINE SULFATE PRN MG: 2 INJECTION, SOLUTION INTRAMUSCULAR; INTRAVENOUS at 01:31

## 2018-06-17 RX ADMIN — INSULIN ASPART SCH UNITS: 100 INJECTION, SOLUTION INTRAVENOUS; SUBCUTANEOUS at 11:38

## 2018-06-17 RX ADMIN — PREGABALIN SCH MG: 75 CAPSULE ORAL at 05:56

## 2018-06-17 RX ADMIN — INSULIN ASPART SCH UNITS: 100 INJECTION, SOLUTION INTRAVENOUS; SUBCUTANEOUS at 06:00

## 2018-06-17 RX ADMIN — TRAZODONE HYDROCHLORIDE SCH MG: 50 TABLET ORAL at 20:49

## 2018-06-17 RX ADMIN — HEPARIN SODIUM SCH UNITS: 5000 INJECTION INTRAVENOUS; SUBCUTANEOUS at 09:36

## 2018-06-17 RX ADMIN — MORPHINE SULFATE PRN MG: 2 INJECTION, SOLUTION INTRAMUSCULAR; INTRAVENOUS at 12:23

## 2018-06-17 RX ADMIN — MORPHINE SULFATE PRN MG: 2 INJECTION, SOLUTION INTRAMUSCULAR; INTRAVENOUS at 16:37

## 2018-06-17 RX ADMIN — DIVALPROEX SODIUM SCH MG: 500 TABLET, DELAYED RELEASE ORAL at 20:49

## 2018-06-17 RX ADMIN — DIVALPROEX SODIUM SCH MG: 500 TABLET, DELAYED RELEASE ORAL at 09:35

## 2018-06-17 RX ADMIN — PREGABALIN SCH MG: 75 CAPSULE ORAL at 15:22

## 2018-06-17 RX ADMIN — ZIPRASIDONE HYDROCHLORIDE SCH MG: 20 CAPSULE ORAL at 09:36

## 2018-06-17 NOTE — GENERAL PROGRESS NOTE
Assessment/Plan


Problem List:  


(1) Blister (nonthermal), unspecified foot, sequela


ICD Codes:  S90.829S - Blister (nonthermal), unspecified foot, sequela


SNOMED:  485919050


(2) Weak


ICD Codes:  R53.1 - Weakness


SNOMED:  99056772


(3) HTN (hypertension)


ICD Codes:  I10 - Essential (primary) hypertension


SNOMED:  14118412


(4) CVA (cerebral vascular accident)


ICD Codes:  I63.9 - Cerebral infarction, unspecified


SNOMED:  617643241


(5) Diabetes


ICD Codes:  E11.9 - Diabetes mellitus


SNOMED:  97405804


(6) Generalized weakness


ICD Codes:  R53.1 - Weakness


SNOMED:  41265718


Status:  stable, progressing


Assessment/Plan


ot pt diet wound care abx cbc bmp am dc to snf  if clear





Subjective


Constitutional:  Reports: weakness


Allergies:  


Coded Allergies:  


     No Known Allergies (Unverified , 4/14/15)


All Systems:  reviewed and negative except above


Subjective


calm in bed





Objective





Last 24 Hour Vital Signs








  Date Time  Temp Pulse Resp B/P (MAP) Pulse Ox O2 Delivery O2 Flow Rate FiO2


 


6/17/18 08:00 97.8 89 19 108/64 93   





 97.8       


 


6/17/18 07:56     96 Room Air  21


 


6/17/18 07:56      Room Air  


 


6/17/18 07:56  59 18   Room Air  21


 


6/17/18 03:55 98.1 86 18 105/69 98 Nasal Cannula 2.0 





 98.1       


 


6/17/18 00:00 98.4 89 18 121/78 96 Nasal Cannula 2.0 





 98.4       


 


6/16/18 20:00 97.7 85 19 126/78 96 Nasal Cannula 2.0 





 97.7       


 


6/16/18 20:00  88 18   Nasal Cannula 2.0 28


 


6/16/18 20:00      Nasal Cannula 2.0 28


 


6/16/18 20:00     95 Nasal Cannula 2.0 28


 


6/16/18 15:06 97.3       


 


6/16/18 12:00 97.3 94 18 117/94 94 Room Air  





 97.3       


 


6/16/18 11:46        32


 


6/16/18 11:46  83 18  97 Room Air  

















Intake and Output  


 


 6/16/18 6/17/18





 19:00 07:00


 


Intake Total 1500 ml 1200 ml


 


Balance 1500 ml 1200 ml


 


  


 


Intake Oral 1500 ml 1200 ml


 


# Voids  3








Laboratory Tests


6/17/18 05:20: 


White Blood Count 8.7, Red Blood Count 5.06, Hemoglobin 14.7, Hematocrit 42.9, 

Mean Corpuscular Volume 85, Mean Corpuscular Hemoglobin 29.0, Mean Corpuscular 

Hemoglobin Concent 34.2, Red Cell Distribution Width 11.7, Platelet Count 192, 

Mean Platelet Volume 8.8, Neutrophils (%) (Auto) 55.1, Lymphocytes (%) (Auto) 

34.3, Monocytes (%) (Auto) 7.7, Eosinophils (%) (Auto) 2.1, Basophils (%) (Auto

) 0.9, Sodium Level 139, Potassium Level 3.8, Chloride Level 103, Carbon 

Dioxide Level 27, Anion Gap 9, Blood Urea Nitrogen 15, Creatinine 0.9, Estimat 

Glomerular Filtration Rate > 60, Glucose Level 143H, Calcium Level 8.9


Height (Feet):  5


Height (Inches):  11.00


Weight (Pounds):  230


General Appearance:  alert


EENT:  normal ENT inspection


Neck:  normal alignment


Cardiovascular:  normal peripheral pulses, normal rate, regular rhythm


Respiratory/Chest:  chest wall non-tender, lungs clear, normal breath sounds


Abdomen:  normal bowel sounds, non tender, soft


Extremities:  normal inspection


Edema:  no edema noted Arm (L), no edema noted Arm (R), no edema noted Leg (L), 

no edema noted Leg (R), no edema noted Pedal (L), no edema noted Pedal (R), no 

edema noted Generalized


Neurologic:  responsive, motor weakness


Skin:  normal pigmentation, warm/dry











Samir Cabrera DO Jun 17, 2018 11:38

## 2018-06-17 NOTE — GENERAL PROGRESS NOTE
Assessment/Plan


Assessment/Plan


(1) Chiari malformation type II


(2) Degenerative disc disease, cervical


(3) Lumbar degenerative disc disease


(4) Arthritis, lumbar spine


(5) Peripheral neuropathy


(6) Hydrocephalus


(7) Diabetes mellitus


Pt will be continued on Morphine and percocet. 


Pt was d/w Dr. Franco and he concurred.





Subjective


Date patient seen:  Jun 17, 2018


Time patient seen:  12:15 - pm


Allergies:  


Coded Allergies:  


     No Known Allergies (Unverified , 4/14/15)


Subjective


Constitutional:  Reports: weakness, Denies: chills, diaphoresis, fever, malaise

, no symptoms, other


HEENT:  Denies: blurred vision, double vision, ear discharge, ear pain, eye pain

, mouth pain, mouth swelling, no symptoms, nose congestion, nose pain, other, 

tearing, throat pain, throat swelling


Cardiovascular:  Denies: chest pain, edema, irregular heart rate, 

lightheadedness, no symptoms, other, palpitations, syncope


Respiratory:  Denies: SOB at rest, SOB with excertion, cough, no symptoms, 

orthopnea, other, shortness of breath, sputum, stridor, wheezing


Gastrointestinal/Abdominal:  Denies: abdomen distended, abdominal pain, black 

stools, blood in stool, constipated, diarrhea, difficulty swallowing, nausea, 

no symptoms, other, poor appetite, poor fluid intake, rectal bleeding, tarry 

stools, vomiting


Genitourinary:  Denies: burning, discharge, flank pain, frequency, hematuria, 

incontinence, no symptoms, other, pain, urgency


Neurologic/Psychiatric:  Reports: headache, numbness, tingling, weakness, Denies

: anxiety, depressed, emotional problems, no symptoms, other, paresthesia, pre-

existing deficit, seizure, tremors


Endocrine:  Denies: excessive sweating, flushing, increased hunger, increased 

thirst, increased urine, intolerance to cold, intolerance to heat, no symptoms, 

other, unexplained weight gain, unexplained weight loss


Hematologic/Lymphatic:  Denies: anemia, easy bleeding, easy bruising, no 

symptoms, other


 


Subjective


Pt is in bed no signs of pain or distress. 


Comfortable with no new complaints.





Objective





Last 24 Hour Vital Signs








  Date Time  Temp Pulse Resp B/P (MAP) Pulse Ox O2 Delivery O2 Flow Rate FiO2


 


6/17/18 12:00 97.7 92 18 105/67 97 Nasal Cannula 2.0 





 97.7       


 


6/17/18 08:00 97.8 89 19 108/64 93   





 97.8       


 


6/17/18 07:56     96 Room Air  21


 


6/17/18 07:56      Room Air  


 


6/17/18 07:56  59 18   Room Air  21


 


6/17/18 03:55 98.1 86 18 105/69 98 Nasal Cannula 2.0 





 98.1       


 


6/17/18 00:00 98.4 89 18 121/78 96 Nasal Cannula 2.0 





 98.4       


 


6/16/18 20:00 97.7 85 19 126/78 96 Nasal Cannula 2.0 





 97.7       


 


6/16/18 20:00  88 18   Nasal Cannula 2.0 28


 


6/16/18 20:00      Nasal Cannula 2.0 28


 


6/16/18 20:00     95 Nasal Cannula 2.0 28


 


6/16/18 15:06 97.3       

















Intake and Output  


 


 6/16/18 6/17/18





 19:00 07:00


 


Intake Total 1500 ml 1200 ml


 


Balance 1500 ml 1200 ml


 


  


 


Intake Oral 1500 ml 1200 ml


 


# Voids  3








Laboratory Tests


6/17/18 05:20: 


White Blood Count 8.7, Red Blood Count 5.06, Hemoglobin 14.7, Hematocrit 42.9, 

Mean Corpuscular Volume 85, Mean Corpuscular Hemoglobin 29.0, Mean Corpuscular 

Hemoglobin Concent 34.2, Red Cell Distribution Width 11.7, Platelet Count 192, 

Mean Platelet Volume 8.8, Neutrophils (%) (Auto) 55.1, Lymphocytes (%) (Auto) 

34.3, Monocytes (%) (Auto) 7.7, Eosinophils (%) (Auto) 2.1, Basophils (%) (Auto

) 0.9, Sodium Level 139, Potassium Level 3.8, Chloride Level 103, Carbon 

Dioxide Level 27, Anion Gap 9, Blood Urea Nitrogen 15, Creatinine 0.9, Estimat 

Glomerular Filtration Rate > 60, Glucose Level 143H, Calcium Level 8.9


Height (Feet):  5


Height (Inches):  11.00


Weight (Pounds):  230


Objective


General Appearance:  no apparent distress, alert


EENT:  normal ENT inspection, TMs normal


Neck:  supple, limited range of motion, muscle spasm


Cardiovascular:  normal rate, regular rhythm


Respiratory/Chest:  decreased breath sounds


Abdomen:  non tender, soft


Extremities:  non-tender


Edema:  no edema noted Arm (L), no edema noted Arm (R), no edema noted Leg (L), 

no edema noted Leg (R), no edema noted Pedal (L), no edema noted Pedal (R), no 

edema noted Generalized


Neurologic:  alert, oriented x 3


Skin:  warm/dry











Bill Sanz Jun 17, 2018 12:41

## 2018-06-18 VITALS — DIASTOLIC BLOOD PRESSURE: 63 MMHG | SYSTOLIC BLOOD PRESSURE: 111 MMHG

## 2018-06-18 VITALS — SYSTOLIC BLOOD PRESSURE: 110 MMHG | DIASTOLIC BLOOD PRESSURE: 70 MMHG

## 2018-06-18 VITALS — SYSTOLIC BLOOD PRESSURE: 108 MMHG | DIASTOLIC BLOOD PRESSURE: 58 MMHG

## 2018-06-18 VITALS — SYSTOLIC BLOOD PRESSURE: 120 MMHG | DIASTOLIC BLOOD PRESSURE: 59 MMHG

## 2018-06-18 LAB
ADD MANUAL DIFF: NO
ANION GAP SERPL CALC-SCNC: 9 MMOL/L (ref 5–15)
BASOPHILS NFR BLD AUTO: 1 % (ref 0–2)
BUN SERPL-MCNC: 17 MG/DL (ref 7–18)
CALCIUM SERPL-MCNC: 8.4 MG/DL (ref 8.5–10.1)
CHLORIDE SERPL-SCNC: 104 MMOL/L (ref 98–107)
CO2 SERPL-SCNC: 26 MMOL/L (ref 21–32)
CREAT SERPL-MCNC: 0.9 MG/DL (ref 0.55–1.3)
EOSINOPHIL NFR BLD AUTO: 2.1 % (ref 0–3)
ERYTHROCYTE [DISTWIDTH] IN BLOOD BY AUTOMATED COUNT: 11.9 % (ref 11.6–14.8)
HCT VFR BLD CALC: 43.5 % (ref 42–52)
HGB BLD-MCNC: 14.9 G/DL (ref 14.2–18)
LYMPHOCYTES NFR BLD AUTO: 32.1 % (ref 20–45)
MCV RBC AUTO: 85 FL (ref 80–99)
MONOCYTES NFR BLD AUTO: 8.2 % (ref 1–10)
NEUTROPHILS NFR BLD AUTO: 56.7 % (ref 45–75)
PLATELET # BLD: 178 K/UL (ref 150–450)
POTASSIUM SERPL-SCNC: 3.9 MMOL/L (ref 3.5–5.1)
RBC # BLD AUTO: 5.13 M/UL (ref 4.7–6.1)
SODIUM SERPL-SCNC: 139 MMOL/L (ref 136–145)
WBC # BLD AUTO: 8.2 K/UL (ref 4.8–10.8)

## 2018-06-18 RX ADMIN — INSULIN ASPART SCH UNITS: 100 INJECTION, SOLUTION INTRAVENOUS; SUBCUTANEOUS at 06:02

## 2018-06-18 RX ADMIN — THEOPHYLLINE ANHYDROUS SCH MG: 100 CAPSULE, EXTENDED RELEASE ORAL at 08:37

## 2018-06-18 RX ADMIN — MORPHINE SULFATE PRN MG: 2 INJECTION, SOLUTION INTRAMUSCULAR; INTRAVENOUS at 08:37

## 2018-06-18 RX ADMIN — MORPHINE SULFATE PRN MG: 2 INJECTION, SOLUTION INTRAMUSCULAR; INTRAVENOUS at 04:37

## 2018-06-18 RX ADMIN — OLANZAPINE SCH MG: 2.5 TABLET, FILM COATED ORAL at 08:37

## 2018-06-18 RX ADMIN — HEPARIN SODIUM SCH UNITS: 5000 INJECTION INTRAVENOUS; SUBCUTANEOUS at 08:38

## 2018-06-18 RX ADMIN — MORPHINE SULFATE PRN MG: 2 INJECTION, SOLUTION INTRAMUSCULAR; INTRAVENOUS at 12:42

## 2018-06-18 RX ADMIN — ZIPRASIDONE HYDROCHLORIDE SCH MG: 20 CAPSULE ORAL at 08:36

## 2018-06-18 RX ADMIN — PREGABALIN SCH MG: 75 CAPSULE ORAL at 14:10

## 2018-06-18 RX ADMIN — DIVALPROEX SODIUM SCH MG: 500 TABLET, DELAYED RELEASE ORAL at 08:37

## 2018-06-18 RX ADMIN — INSULIN ASPART SCH UNITS: 100 INJECTION, SOLUTION INTRAVENOUS; SUBCUTANEOUS at 12:01

## 2018-06-18 RX ADMIN — PREGABALIN SCH MG: 75 CAPSULE ORAL at 06:03

## 2018-06-18 NOTE — GENERAL PROGRESS NOTE
Assessment/Plan


Problem List:  


(1) Blister (nonthermal), unspecified foot, sequela


ICD Codes:  S90.829S - Blister (nonthermal), unspecified foot, sequela


SNOMED:  869385172


(2) Weak


ICD Codes:  R53.1 - Weakness


SNOMED:  72996284


(3) HTN (hypertension)


ICD Codes:  I10 - Essential (primary) hypertension


SNOMED:  28249624


(4) CVA (cerebral vascular accident)


ICD Codes:  I63.9 - Cerebral infarction, unspecified


SNOMED:  914179268


(5) Diabetes


ICD Codes:  E11.9 - Diabetes mellitus


SNOMED:  84782068


(6) Generalized weakness


ICD Codes:  R53.1 - Weakness


SNOMED:  35220003


Status:  stable, progressing


Assessment/Plan


ot pt diet wound care abx cbc bmp am dc to snf  if clear





Subjective


Constitutional:  Reports: weakness


Allergies:  


Coded Allergies:  


     No Known Allergies (Unverified , 4/14/15)


All Systems:  reviewed and negative except above


Subjective


calm in bed





Objective





Last 24 Hour Vital Signs








  Date Time  Temp Pulse Resp B/P (MAP) Pulse Ox O2 Delivery O2 Flow Rate FiO2


 


6/18/18 08:00 97.9 94 22 108/58 99   





 97.9       


 


6/18/18 05:07 97.3       


 


6/18/18 05:07 97.3       


 


6/18/18 04:00 97.3 87 17 120/59 96 Room Air  





 97.3       


 


6/18/18 00:00  87 18 110/70 94 Room Air  


 


6/17/18 20:16 98.1 91 18 124/72 95 Room Air  





 98.1       


 


6/17/18 20:00  90 18   Room Air  21


 


6/17/18 19:30     95 Room Air  21


 


6/17/18 19:30      Room Air  21


 


6/17/18 16:00 99.1 95 18 130/73 98 Nasal Cannula 2.0 





 99.1       


 


6/17/18 12:00 97.7 92 18 105/67 97 Nasal Cannula 2.0 





 97.7       

















Intake and Output  


 


 6/17/18 6/18/18





 19:00 07:00


 


Intake Total  1500 ml


 


Balance  1500 ml


 


  


 


Intake Oral  1500 ml


 


# Voids  3








Laboratory Tests


6/18/18 07:10: 


White Blood Count 8.2, Red Blood Count 5.13, Hemoglobin 14.9, Hematocrit 43.5, 

Mean Corpuscular Volume 85, Mean Corpuscular Hemoglobin 29.0, Mean Corpuscular 

Hemoglobin Concent 34.2, Red Cell Distribution Width 11.9, Platelet Count 178, 

Mean Platelet Volume 8.3, Neutrophils (%) (Auto) 56.7, Lymphocytes (%) (Auto) 

32.1, Monocytes (%) (Auto) 8.2, Eosinophils (%) (Auto) 2.1, Basophils (%) (Auto

) 1.0, Sodium Level 139, Potassium Level 3.9, Chloride Level 104, Carbon 

Dioxide Level 26, Anion Gap 9, Blood Urea Nitrogen 17, Creatinine 0.9, Estimat 

Glomerular Filtration Rate > 60, Glucose Level 131H, Calcium Level 8.4L


Height (Feet):  5


Height (Inches):  11.00


Weight (Pounds):  230


General Appearance:  alert


EENT:  normal ENT inspection


Neck:  normal alignment


Cardiovascular:  normal peripheral pulses, normal rate, regular rhythm


Respiratory/Chest:  chest wall non-tender, lungs clear, normal breath sounds


Abdomen:  normal bowel sounds, non tender, soft


Extremities:  normal inspection


Edema:  no edema noted Arm (L), no edema noted Arm (R), no edema noted Leg (L), 

no edema noted Leg (R), no edema noted Pedal (L), no edema noted Pedal (R), no 

edema noted Generalized


Neurologic:  responsive, motor weakness


Skin:  normal pigmentation, warm/dry











Samir Cabrera DO Jun 18, 2018 10:33

## 2018-06-18 NOTE — PULMONOLOGY PROGRESS NOTE
Assessment/Plan


Problems:  


(1) COPD (chronic obstructive pulmonary disease)


(2) Generalized weakness


(3) HTN (hypertension)


(4) CVA (cerebral vascular accident)


Assessment/Plan


respiratory treatment


symptomatic treatment


check electrolytes


dvt prophylaxis





Subjective


ROS Limited/Unobtainable:  No


Allergies:  


Coded Allergies:  


     No Known Allergies (Unverified , 4/14/15)





Objective





Last 24 Hour Vital Signs








  Date Time  Temp Pulse Resp B/P (MAP) Pulse Ox O2 Delivery O2 Flow Rate FiO2


 


6/18/18 12:00 97.7 91 21 111/63 97   





 97.7       


 


6/18/18 08:40      Room Air  21


 


6/18/18 08:40  102 20   Room Air  21


 


6/18/18 08:40     96 Room Air  21


 


6/18/18 08:00 97.9 94 22 108/58 99   





 97.9       


 


6/18/18 05:07 97.3       


 


6/18/18 05:07 97.3       


 


6/18/18 04:00 97.3 87 17 120/59 96 Room Air  





 97.3       


 


6/18/18 00:00  87 18 110/70 94 Room Air  


 


6/17/18 20:16 98.1 91 18 124/72 95 Room Air  





 98.1       


 


6/17/18 20:00  90 18   Room Air  21


 


6/17/18 19:30     95 Room Air  21


 


6/17/18 19:30      Room Air  21


 


6/17/18 16:00 99.1 95 18 130/73 98 Nasal Cannula 2.0 





 99.1       

















Intake and Output  


 


 6/17/18 6/18/18





 19:00 07:00


 


Intake Total  1500 ml


 


Balance  1500 ml


 


  


 


Intake Oral  1500 ml


 


# Voids  3








General Appearance:  WD/WN


HEENT:  normocephalic, atraumatic


Respiratory/Chest:  chest wall non-tender, lungs clear


Abdomen:  normal bowel sounds, soft, non tender, no scars


Skin:  no lesions


Neurologic/Psychiatric:  no motor/sensory deficits


Laboratory Tests


6/18/18 07:10: 


White Blood Count 8.2, Red Blood Count 5.13, Hemoglobin 14.9, Hematocrit 43.5, 

Mean Corpuscular Volume 85, Mean Corpuscular Hemoglobin 29.0, Mean Corpuscular 

Hemoglobin Concent 34.2, Red Cell Distribution Width 11.9, Platelet Count 178, 

Mean Platelet Volume 8.3, Neutrophils (%) (Auto) 56.7, Lymphocytes (%) (Auto) 

32.1, Monocytes (%) (Auto) 8.2, Eosinophils (%) (Auto) 2.1, Basophils (%) (Auto

) 1.0, Sodium Level 139, Potassium Level 3.9, Chloride Level 104, Carbon 

Dioxide Level 26, Anion Gap 9, Blood Urea Nitrogen 17, Creatinine 0.9, Estimat 

Glomerular Filtration Rate > 60, Glucose Level 131H, Calcium Level 8.4L





Current Medications








 Medications


  (Trade)  Dose


 Ordered  Sig/Henry


 Route


 PRN Reason  Start Time


 Stop Time Status Last Admin


Dose Admin


 


 Acetaminophen


  (Tylenol)  650 mg  Q4H  PRN


 ORAL


 T>100.5  6/13/18 15:45


 7/13/18 15:44   


 


 


 Al Hydroxide/Mg


 Hydroxide


  (Mylanta II)  30 ml  Q6H  PRN


 ORAL


 dyspepsia  6/13/18 15:45


 7/13/18 15:44   


 


 


 Albuterol/


 Ipratropium


  (Albuterol/


 Ipratropium)  3 ml  Q4H  PRN


 HHN


 Shortness of Breath  6/14/18 10:07


 6/19/18 10:06  6/16/18 11:46


 


 


 Dextrose


  (Dextrose 50%)  25 ml  PRN


 IV


 Hypoglycemia  6/13/18 16:00


 7/13/18 15:59   


 


 


 Dextrose


  (Dextrose 50%)  50 ml  PRN


 IV


 hypoglycemia  6/13/18 16:00


 7/13/18 15:59   


 


 


 Divalproex Sodium


  (Depakote)  500 mg  Q12HR


 ORAL


   6/16/18 09:00


 7/13/18 20:59  6/18/18 08:37


 


 


 Heparin Sodium


  (Porcine)


  (Heparin 5000


 units/ml)  5,000 units  EVERY 12  HOURS


 SUBQ


   6/13/18 21:00


 7/13/18 20:59  6/14/18 21:07


 


 


 Insulin Aspart


  (NovoLOG)    BEFORE MEALS AND  HS


 SUBQ


   6/13/18 17:00


 7/13/18 16:59  6/18/18 12:01


 


 


 Lorazepam


  (Ativan 2mg/ml


 1ml)  0.5 mg  Q4H  PRN


 IV


 For Anxiety  6/13/18 16:00


 6/20/18 15:59   


 


 


 Morphine Sulfate


  (Morphine


 Sulfate)  1 mg  Q4H  PRN


 IVP


 Severe Pain (Pain Scale 7-10)  6/13/18 15:45


 6/20/18 15:44  6/18/18 12:42


 


 


 Olanzapine


  (ZyPREXA)  2.5 mg  DAILY


 ORAL


   6/14/18 09:00


 7/14/18 08:59  6/18/18 08:37


 


 


 Ondansetron HCl


  (Zofran)  4 mg  Q6H  PRN


 IVP


 Nausea & Vomiting  6/13/18 15:45


 7/13/18 15:44   


 


 


 Oxycodone/


 Acetaminophen


  (Percocet 5-325)  1 tab  Q4H  PRN


 ORAL


 Moderate Pain (Pain Scale 4-6)  6/13/18 15:45


 6/20/18 15:29   


 


 


 Polyethylene


 Glycol


  (Miralax)  17 gm  HSPRN  PRN


 ORAL


 Constipation  6/13/18 21:00


 7/13/18 20:59   


 


 


 Pregabalin


  (Lyrica)  75 mg  Q8HR


 ORAL


   6/13/18 18:00


 7/13/18 17:59  6/18/18 06:03


 


 


 Quetiapine


 Fumarate


  (SEROquel)  300 mg  BEDTIME


 ORAL


   6/13/18 21:00


 7/13/18 20:59  6/17/18 20:49


 


 


 Theophylline


  (Florin-Dur)  100 mg  Q12HR


 ORAL


   6/13/18 21:00


 7/13/18 20:59  6/18/18 08:37


 


 


 Trazodone HCl


  (Desyrel)  150 mg  BEDTIME


 ORAL


   6/13/18 21:00


 7/13/18 20:59  6/17/18 20:49


 


 


 Ziprasidone


  (Geodon)  20 mg  DAILY


 ORAL


   6/14/18 09:00


 7/14/18 08:59  6/18/18 08:36


 


 


 Zolpidem Tartrate


  (Ambien)  5 mg  HSPRN  PRN


 ORAL


 Insomnia  6/13/18 21:00


 6/20/18 20:59   


 

















Lissette Steward MD Jun 18, 2018 14:10

## 2018-06-18 NOTE — PROGRESS NOTE
DATE:  06/18/2018



SUBJECTIVE:  This is a 37-year-old male patient is very disabled.  This

patient is confused and disorganized.  Mood labile.  _____.  He is very

depressed.  He feels helplessness, hopelessness.  That is why, his

attending has requested daily psychiatric consultation.  His cognition has

progressively declined and below baseline.  He has altered mental status

and confusion.



MENTAL STATUS EXAMINATION:  A 37-year-old male.  Appearance is disheveled.

Attitude, irritable and agitated.  Affect guarded and restricted.

Intellect poor.  Mood depressed and anxious.  Motor activity, psychomotor

agitation.  Attention span is poor.  Orientation x2.  Speech is pressured.

Thought process, disorganized and illogical.  Thought content, auditory

hallucinations and paranoid delusions.  Insight and judgement is poor.



DIAGNOSIS:  Schizoaffective, bipolar type.



PLAN:  Treat him with Seroquel 300 mg at bedtime, Trileptal 150 mg twice a

day, Depakote 500 mg twice a day,  Geodon 20 mg a day, Zyprexa 2.5 mg

daily.  Provided 20 minutes of supportive cognitive behavior therapy this

morning.  _____ labile.  Psychosocial stressors _____ with a cognitive

behavioral therapy.  _____ environment and that is worsening his

depression and anxiety.  Working on change _____  during the cognitive

behavioral therapy _____ so he has less depression and anxiety and thinks

about stressors in a more adaptive way so it could be better _____ less

depression and anxiety.  Provided 20 minutes of supportive

psychotherapy.









  ______________________________________________

  Gulshan Armstrong M.D.





DR:  MATEUSZ

D:  06/18/2018 07:53

T:  06/18/2018 13:59

JOB#:  [id number]

CC:

## 2018-06-18 NOTE — CONSULTATION
DATE OF CONSULTATION:  06/16/2018



NOTE: "VERY POOR AUDIO QUALITY"



HISTORY OF PRESENT ILLNESS:  This is a 37-year-old male patient gravely

disabled _____.  He came in to the [facility], confused,

disorganized, mood labile, _____ paranoid delusions with extreme mood

lability.  _____ altered because of decline in his cognition and medical

problems.  He stated that he had _____ disabled _____ and he also has

racing thoughts and pressured speech, so because of his decline in

cognition and altered mental status, his attending has requested daily

psychiatric consultation for that reason.



MEDICAL PROBLEMS:  Neuropathy of lower extremity, weakness of lower

extremity, and he has difficulty with ambulation.



ALLERGIES:  No known drug allergies.



SUBSTANCE ABUSE HISTORY:  He has history of polysubstance abuse, but

_____.



SOCIAL HISTORY:  He is financially supported by [employer] and Medicare.



ALLERGIES:  No known drug allergies.



FAMILY PSYCHIATRIC HISTORY:  Denies.



PSYCHIATRIC HISTORY:  He has history of schizoaffective bipolar type,

multiple psychiatric admissions.



STRENGTHS:  He is motivated to be better and relatively healthy _____.

WEAKNESSES:  He is impulsive and _____ support system.



MENTAL STATUS EXAMINATION:  This is a 37-year-old male.  Appearance is

disheveled.  Attitude, irritable and agitated.  Affect, guarded and

restricted.  Intellect poor.  Mood is depressed and anxious.  Motor

activity, psychomotor agitation.  Attention span is poor.  Orientation x2.

Speech is pressured.  Thought process, disorganized and illogical.

Thought content, auditory hallucinations and paranoid delusions.  Insight

and judgment is poor.



DIAGNOSIS:  Schizoaffective, bipolar type.



PLAN:  Plan for this patient is to continue titrating up on his medication

regimen to stabilize his mood.  My plan is to titrate upon his Depakote to

500 mg twice a day, treat him with Geodon 20 mg a day, Seroquel 300

nightly, trazodone at 150 mg nightly.  Provided 20 minutes of supportive

psychotherapy.  Also, Zyprexa 2.5 mg daily as well and encouraged him to

interact appropriately with staff and other patients.  Again, 20 minutes

of supportive therapy provided.  Chart reviewed and discussed with

staff._____03:23









  ______________________________________________

  Gulshan Armstrong M.D.





DR:  JOSSELIN

D:  06/16/2018 03:58

T:  06/16/2018 21:59

JOB#:  [id number]

CC:

## 2018-06-18 NOTE — GENERAL PROGRESS NOTE
Assessment/Plan


Assessment/Plan


(1) Chiari malformation type II


(2) Degenerative disc disease, cervical


(3) Lumbar degenerative disc disease


(4) Arthritis, lumbar spine


(5) Peripheral neuropathy


(6) Hydrocephalus


(7) Diabetes mellitus


Pt will be continued on Morphine and percocet. 


Pt was d/w Dr. Franco and he concurred.





Subjective


Date patient seen:  Jun 18, 2018


Time patient seen:  07:30 - am


Allergies:  


Coded Allergies:  


     No Known Allergies (Unverified , 4/14/15)


Subjective


Constitutional:  Reports: weakness, Denies: chills, diaphoresis, fever, malaise

, no symptoms, other


HEENT:  Denies: blurred vision, double vision, ear discharge, ear pain, eye pain

, mouth pain, mouth swelling, no symptoms, nose congestion, nose pain, other, 

tearing, throat pain, throat swelling


Cardiovascular:  Denies: chest pain, edema, irregular heart rate, 

lightheadedness, no symptoms, other, palpitations, syncope


Respiratory:  Denies: SOB at rest, SOB with excertion, cough, no symptoms, 

orthopnea, other, shortness of breath, sputum, stridor, wheezing


Gastrointestinal/Abdominal:  Denies: abdomen distended, abdominal pain, black 

stools, blood in stool, constipated, diarrhea, difficulty swallowing, nausea, 

no symptoms, other, poor appetite, poor fluid intake, rectal bleeding, tarry 

stools, vomiting


Genitourinary:  Denies: burning, discharge, flank pain, frequency, hematuria, 

incontinence, no symptoms, other, pain, urgency


Neurologic/Psychiatric:  Reports: headache, numbness, tingling, weakness, Denies

: anxiety, depressed, emotional problems, no symptoms, other, paresthesia, pre-

existing deficit, seizure, tremors


Endocrine:  Denies: excessive sweating, flushing, increased hunger, increased 

thirst, increased urine, intolerance to cold, intolerance to heat, no symptoms, 

other, unexplained weight gain, unexplained weight loss


Hematologic/Lymphatic:  Denies: anemia, easy bleeding, easy bruising, no 

symptoms, other


 


Subjective


Pt reports that his pain has been well controlled and tolerated on the Morphine 

and Percocet.


He has no new complaints.





Objective





Last 24 Hour Vital Signs








  Date Time  Temp Pulse Resp B/P (MAP) Pulse Ox O2 Delivery O2 Flow Rate FiO2


 


6/18/18 05:07 97.3       


 


6/18/18 05:07 97.3       


 


6/18/18 04:00 97.3 87 17 120/59 96 Room Air  





 97.3       


 


6/18/18 00:00  87 18 110/70 94 Room Air  


 


6/17/18 20:16 98.1 91 18 124/72 95 Room Air  





 98.1       


 


6/17/18 20:00  90 18   Room Air  21


 


6/17/18 19:30     95 Room Air  21


 


6/17/18 19:30      Room Air  21


 


6/17/18 16:00 99.1 95 18 130/73 98 Nasal Cannula 2.0 





 99.1       


 


6/17/18 12:00 97.7 92 18 105/67 97 Nasal Cannula 2.0 





 97.7       


 


6/17/18 08:00 97.8 89 19 108/64 93   





 97.8       


 


6/17/18 07:56     96 Room Air  21


 


6/17/18 07:56      Room Air  


 


6/17/18 07:56  59 18   Room Air  21

















Intake and Output  


 


 6/17/18 6/18/18





 19:00 07:00


 


Intake Total  1500 ml


 


Balance  1500 ml


 


  


 


Intake Oral  1500 ml


 


# Voids  3








Laboratory Tests


6/18/18 07:10: 


White Blood Count 8.2, Red Blood Count 5.13, Hemoglobin 14.9, Hematocrit 43.5, 

Mean Corpuscular Volume 85, Mean Corpuscular Hemoglobin 29.0, Mean Corpuscular 

Hemoglobin Concent 34.2, Red Cell Distribution Width 11.9, Platelet Count 178, 

Mean Platelet Volume 8.3, Neutrophils (%) (Auto) 56.7, Lymphocytes (%) (Auto) 

32.1, Monocytes (%) (Auto) 8.2, Eosinophils (%) (Auto) 2.1, Basophils (%) (Auto

) 1.0, Sodium Level 139, Potassium Level 3.9, Chloride Level 104, Carbon 

Dioxide Level 26, Anion Gap 9, Blood Urea Nitrogen 17, Creatinine 0.9, Estimat 

Glomerular Filtration Rate > 60, Glucose Level 131H, Calcium Level 8.4L


Height (Feet):  5


Height (Inches):  11.00


Weight (Pounds):  230


Objective


General Appearance:  no apparent distress, alert


EENT:  normal ENT inspection, TMs normal


Neck:  supple, limited range of motion, muscle spasm


Cardiovascular:  normal rate, regular rhythm


Respiratory/Chest:  decreased breath sounds


Abdomen:  non tender, soft


Extremities:  non-tender


Edema:  no edema noted Arm (L), no edema noted Arm (R), no edema noted Leg (L), 

no edema noted Leg (R), no edema noted Pedal (L), no edema noted Pedal (R), no 

edema noted Generalized


Neurologic:  alert, oriented x 3


Skin:  warm/dry











Bill Sanz Jun 18, 2018 07:54

## 2018-06-18 NOTE — PROGRESS NOTE
DATE:  06/17/2018



SUBJECTIVE:  This is a 37-year-old male patient, who still has racing

thoughts, pressured speech, mood lability.  He has no logical plan for his

own self-care.  He has got feelings of helplessness, hopelessness, low

energy, _____00:15 significantly altered mental status.  That is why, his

attending has requested daily psychiatric consultation.



MENTAL STATUS EXAMINATION:  A 37-year-old male.  Appearance is disheveled.

Attitude, irritable and agitated.  Affect guarded and restricted.

Intellect poor.  Mood depressed and anxious.  Motor activity, psychomotor

agitation.  Attention span is poor.  Orientation x2.  Speech is pressured.

Thought process, disorganized and illogical.  Thought content, auditory

hallucinations and paranoid delusions.  Insight and judgement is poor.



DIAGNOSIS:  Schizoaffective, bipolar type.



PLAN:  Treat him with Seroquel 300 mg nightly, Trileptal 150 mg twice day,

_____00:47 100 mg twice day, Geodon 20 mg, _____00:50 daily.  Also

provided 18 to 20 minutes of supportive psychotherapy.  _____00:55 to help

him understand and have _____01:00 symptoms to help control his mood

lability and agitation.  Again, 18 to 20 minutes of psychotherapy

provided.  Chart reviewed and discussed with staff.  Seen and assessed in

his room.









  ______________________________________________

  Gulshan Armstrong M.D.





DR:  Shweta

D:  06/17/2018 10:20

T:  06/17/2018 20:54

JOB#:  [id number]

CC:

## 2018-06-19 NOTE — DISCHARGE SUMMARY
Discharge Summary


Discharge Summary


_


DATE OF ADMISSION: 06/13/2018





DATE OF DISCHARGE: 06/18/2018





REASON FOR ADMISSION: 


37 years old male with past medical history significant for diabetes, 

hypertension, CVA ,COPD , presented to emergency room with complaint of severe  

bilateral feet  pain and blisters.  


Patient recently became homeless .


He had difficulty walking due to pain. 


He stated  that he   had multiple falls.  He stated  that he  felt unsteady and 

disoriented.  


He denied  chest pain or shortness of breath.  


He denied abdominal pain. 


Vital signs were stable.  


Laboratory workup revealed no leukocytosis, stable hemoglobin and hematocrit, 

glucose 138 


Patient admitted for further management with diagnosis of  blister bilateral 

feet, stable bilateral lower extremity pain ,diabetes ,COPD, hypertension, 

history of CVA.


 


CONSULTANTS:


pulmonary Dr. Steward


ID specialist Vahid


psychiatrist Dr. Armstrong


podiatrist  


Pain specialist Dr. Franco


 


[facility] [place] [facility]: 


Patient admitted to Med Surg floor.  


Podiatry evaluation was requested.  


Wound care provided as per podiatrist recommendation.  


No surgical intervention necessary at this time.  


Pain management consult was requested.  


Pain management provided as per pain specialist recommendation  .  


X-ray of L-spine revealed no evidence of acute bony injury.  


Pain was addressed and controlled .


Fall precautions  were maintained.


Patient was working  with physical and occupational therapists.  


Pulmonologist closely followed.


Supplemental oxygen and  pulmonary toilet provided as needed to keep pulse 

oximetry above 92%.  


No evidence of COPD exacerbation.  


Chest x-ray was negative for acute cardiopulmonary pathology. 


Blood  sugar was managed with sliding scale  of insulin , remained stable.  

Hemoglobin A1c 6.2, at goal.


DVT prophylaxis provided.





Infectious disease specialist closely followed.  


Per infectious disease specialist , no leukocytosis , no fevers.  


Infectious disease doctor recommended to keep patient off antibiotic and 

continue with  wound care and pain management





Psychiatrist closely followed.  


Psychiatrist diagnosed patient with schizoaffective bipolar type disorder.  


Psychiatrist   optimized psychiatric medication regimen.  


Supportive cognitive behavioral therapy provided.  


Patient clinically improved.  


Place was found  in  recuperative care.


Patient was stable for discharge to recuperative care via taxi.





FINAL DIAGNOSES: 


Blisters bilateral feet 


Peripheral neuropathy


Schizoaffective bipolar type


COPD


Diabetes mellitus


Hypertension


History of CVA


DDD C-spine


DDD L-spine


Arthritis L-spine


Homelessness 








DISCHARGE MEDICATIONS:


See Medication Reconciliation list.





DISCHARGE INSTRUCTIONS:


Patient was discharged to recuperative care via taxi.  Taxi voucher provided .  

Follow up with primary care provider in one week.





I have been assigned to dictate discharge summary for this account. I was not 

involved in the patient's management.











Shilpa Quezada NP Jun 19, 2018 10:12

## 2018-06-23 ENCOUNTER — HOSPITAL ENCOUNTER (EMERGENCY)
Dept: HOSPITAL 72 - EMR | Age: 38
Discharge: HOME | End: 2018-06-23
Payer: MEDICARE

## 2018-06-23 VITALS — HEIGHT: 71 IN | BODY MASS INDEX: 32.2 KG/M2 | WEIGHT: 230 LBS

## 2018-06-23 VITALS — SYSTOLIC BLOOD PRESSURE: 119 MMHG | DIASTOLIC BLOOD PRESSURE: 73 MMHG

## 2018-06-23 VITALS — DIASTOLIC BLOOD PRESSURE: 73 MMHG | SYSTOLIC BLOOD PRESSURE: 119 MMHG

## 2018-06-23 DIAGNOSIS — J44.9: ICD-10-CM

## 2018-06-23 DIAGNOSIS — Z86.73: ICD-10-CM

## 2018-06-23 DIAGNOSIS — I10: ICD-10-CM

## 2018-06-23 DIAGNOSIS — E11.65: Primary | ICD-10-CM

## 2018-06-23 LAB
ALBUMIN SERPL-MCNC: 3.5 G/DL (ref 3.4–5)
ALBUMIN/GLOB SERPL: 1.1 {RATIO} (ref 1–2.7)
ALP SERPL-CCNC: 66 U/L (ref 46–116)
ALT SERPL-CCNC: 26 U/L (ref 12–78)
ANION GAP SERPL CALC-SCNC: 11 MMOL/L (ref 5–15)
APPEARANCE UR: CLEAR
APTT PPP: (no result) S
AST SERPL-CCNC: 12 U/L (ref 15–37)
BILIRUB SERPL-MCNC: 0.2 MG/DL (ref 0.2–1)
BUN SERPL-MCNC: 9 MG/DL (ref 7–18)
CALCIUM SERPL-MCNC: 9.3 MG/DL (ref 8.5–10.1)
CHLORIDE SERPL-SCNC: 103 MMOL/L (ref 98–107)
CO2 SERPL-SCNC: 24 MMOL/L (ref 21–32)
CREAT SERPL-MCNC: 1.1 MG/DL (ref 0.55–1.3)
GLOBULIN SER-MCNC: 3.3 G/DL
GLUCOSE UR STRIP-MCNC: NEGATIVE MG/DL
KETONES UR QL STRIP: NEGATIVE
LEUKOCYTE ESTERASE UR QL STRIP: NEGATIVE
NITRITE UR QL STRIP: NEGATIVE
PH UR STRIP: 7 [PH] (ref 4.5–8)
POTASSIUM SERPL-SCNC: 3.5 MMOL/L (ref 3.5–5.1)
PROT UR QL STRIP: NEGATIVE
SODIUM SERPL-SCNC: 138 MMOL/L (ref 136–145)
SP GR UR STRIP: 1.01 (ref 1–1.03)
UROBILINOGEN UR-MCNC: NORMAL MG/DL (ref 0–1)

## 2018-06-23 PROCEDURE — 81003 URINALYSIS AUTO W/O SCOPE: CPT

## 2018-06-23 PROCEDURE — 99283 EMERGENCY DEPT VISIT LOW MDM: CPT

## 2018-06-23 PROCEDURE — 80053 COMPREHEN METABOLIC PANEL: CPT

## 2018-06-23 PROCEDURE — 36415 COLL VENOUS BLD VENIPUNCTURE: CPT

## 2018-06-23 PROCEDURE — 96374 THER/PROPH/DIAG INJ IV PUSH: CPT

## 2018-06-23 PROCEDURE — 96375 TX/PRO/DX INJ NEW DRUG ADDON: CPT

## 2018-06-23 PROCEDURE — 83690 ASSAY OF LIPASE: CPT

## 2018-06-23 NOTE — EMERGENCY ROOM REPORT
History of Present Illness


General


Chief Complaint:  General Complaint


Source:  Patient





Present Illness


HPI


Patient presents with complaints of elevated glucose levels


Patient reports that he has had diarrhea recently


His glucose was running around 300


Patient denies any chest pain or shortness of breath he has some mild 

epigastric discomfort





Denies any dysuria frequency denies any fevers or chills reports that he is 

taking his usual insulin dosage





Denies any neck pain or photophobia denies any fevers


Allergies:  


Coded Allergies:  


     No Known Allergies (Unverified , 4/14/15)





Patient History


Past Medical History:  see triage record


Pertinent Family History:  none


Reviewed Nursing Documentation:  PMH: Agreed; PSxH: Agreed





Nursing Documentation-PMH


Hx Cardiac Problems:  Yes


Hx Hypertension:  Yes


Hx COPD:  Yes


Hx Diabetes:  Yes


Hx Cancer:  No


Hx Gastrointestinal Problems:  Yes


Hx Neurological Problems:  Yes - BACK & NECK SURGERY


Hx Cerebrovascular Accident:  Yes - February 2018


Hx Peripheral Neuropathy:  Yes


Hx Head Trauma:  Yes


Hx Dizziness:  Yes


Hx Weakness:  Yes





Review of Systems


All Other Systems:  negative except mentioned in HPI





Physical Exam





Vital Signs








  Date Time  Temp Pulse Resp B/P (MAP) Pulse Ox O2 Delivery O2 Flow Rate FiO2


 


6/23/18 18:00 98.2 101 17 119/73 96 Room Air  





 98.2       








Sp02 EP Interpretation:  reviewed, normal


General Appearance:  well appearing, no apparent distress


Head:  normocephalic, other - Mild abrasion to the right top scalp appears to 

be healing secondarily


Eyes:  bilateral eye PERRL, bilateral eye EOMI


ENT:  hearing grossly normal, normal pharynx, TMs + canals normal, uvula midline


Neck:  full range of motion, supple, no meningismus, no bony tend


Respiratory:  lungs clear, normal breath sounds, no rhonchi, no respiratory 

distress, no retraction, no accessory muscle use


Cardiovascular #1:  normal peripheral pulses, regular rate, rhythm, no edema, 

no gallop, no JVD, no murmur


Gastrointestinal:  normal bowel sounds, non tender, soft, no mass, no 

organomegaly, non-distended, no guarding, no hernia, no pulsatile mass, no 

rebound


Genitourinary:  no CVA tenderness


Musculoskeletal:  normal inspection


Neurologic:  oriented x3, responsive, CNs III-XII nml as tested, motor strength/

tone normal, sensory intact


Psychiatric:  mood/affect normal


Skin:  normal color, no rash, warm/dry, palpation normal


Lymphatic:  normal inspection, no adenopathy





Medical Decision Making


Diagnostic Impression:  


 Primary Impression:  


 Hyperglycemia


ER Course


Given the patient's history and presentation


Initially IV hydration and extensive blood work was initiated





Patient's Accu-Chek however was appropriate


Therefore chemistry was sent





Patient remains clinically well


Appears well-hydrated


Chemistry exam also does not reveal any acidosis


And patient stable for close outpatient follow-up





Labs








Test


  6/23/18


18:10 6/23/18


18:25


 


Urine Color Pale yellow  


 


Urine Appearance Clear  


 


Urine pH 7 (4.5-8.0)  


 


Urine Specific Gravity


  1.010


(1.005-1.035) 


 


 


Urine Protein


  Negative


(NEGATIVE) 


 


 


Urine Glucose (UA)


  Negative


(NEGATIVE) 


 


 


Urine Ketones


  Negative


(NEGATIVE) 


 


 


Urine Occult Blood


  Negative


(NEGATIVE) 


 


 


Urine Nitrite


  Negative


(NEGATIVE) 


 


 


Urine Bilirubin


  Negative


(NEGATIVE) 


 


 


Urine Urobilinogen


  Normal MG/DL


(0.0-1.0) 


 


 


Urine Leukocyte Esterase


  Negative


(NEGATIVE) 


 


 


Sodium Level


  


  138 MMOL/L


(136-145)


 


Potassium Level


  


  3.5 MMOL/L


(3.5-5.1)


 


Chloride Level


  


  103 MMOL/L


()


 


Carbon Dioxide Level


  


  24 MMOL/L


(21-32)


 


Anion Gap


  


  11 mmol/L


(5-15)


 


Blood Urea Nitrogen  9 mg/dL (7-18) 


 


Creatinine


  


  1.1 MG/DL


(0.55-1.30)


 


Estimat Glomerular Filtration


Rate 


  > 60 mL/min


(>60)


 


Glucose Level


  


  183 MG/DL


()


 


Calcium Level


  


  9.3 MG/DL


(8.5-10.1)


 


Total Bilirubin


  


  0.2 MG/DL


(0.2-1.0)


 


Aspartate Amino Transf


(AST/SGOT) 


  12 U/L (15-37) 


 


 


Alanine Aminotransferase


(ALT/SGPT) 


  26 U/L (12-78) 


 


 


Alkaline Phosphatase


  


  66 U/L


()


 


Total Protein


  


  6.8 G/DL


(6.4-8.2)


 


Albumin


  


  3.5 G/DL


(3.4-5.0)


 


Globulin  3.3 g/dL 


 


Albumin/Globulin Ratio  1.1 (1.0-2.7) 


 


Lipase


  


  183 U/L


()











Last Vital Signs








  Date Time  Temp Pulse Resp B/P (MAP) Pulse Ox O2 Delivery O2 Flow Rate FiO2


 


6/23/18 18:00 98.2 101 17 119/73 96 Room Air  





 98.2       








Status:  improved


Disposition:  ASSISTED LIVING


Condition:  Improved





Additional Instructions:  


Patient is provided with the discharge instructions notified to follow up with 

primary doctor in the next 2-3 days otherwise return to the er with any 

worsening symptoms.


Please note that this report is being documented using DRAGON technology.  This 

can lead to erroneous entry secondary to incorrect interpretation by the 

dictating instrument.











Aubrey Morrison DO Jun 23, 2018 18:20

## 2018-10-04 ENCOUNTER — HOSPITAL ENCOUNTER (INPATIENT)
Dept: HOSPITAL 72 - EMR | Age: 38
LOS: 4 days | Discharge: NURSING, REHAB OR LONG-TERM CARE | DRG: 65 | End: 2018-10-08
Payer: MEDICARE

## 2018-10-04 VITALS — BODY MASS INDEX: 35.84 KG/M2 | HEIGHT: 71 IN | WEIGHT: 256 LBS

## 2018-10-04 VITALS — DIASTOLIC BLOOD PRESSURE: 78 MMHG | SYSTOLIC BLOOD PRESSURE: 138 MMHG

## 2018-10-04 VITALS — DIASTOLIC BLOOD PRESSURE: 64 MMHG | SYSTOLIC BLOOD PRESSURE: 106 MMHG

## 2018-10-04 DIAGNOSIS — G81.94: ICD-10-CM

## 2018-10-04 DIAGNOSIS — F31.81: ICD-10-CM

## 2018-10-04 DIAGNOSIS — E46: ICD-10-CM

## 2018-10-04 DIAGNOSIS — G93.40: ICD-10-CM

## 2018-10-04 DIAGNOSIS — F10.21: ICD-10-CM

## 2018-10-04 DIAGNOSIS — N39.0: ICD-10-CM

## 2018-10-04 DIAGNOSIS — Z91.19: ICD-10-CM

## 2018-10-04 DIAGNOSIS — J44.9: ICD-10-CM

## 2018-10-04 DIAGNOSIS — G40.919: ICD-10-CM

## 2018-10-04 DIAGNOSIS — E11.9: ICD-10-CM

## 2018-10-04 DIAGNOSIS — F17.200: ICD-10-CM

## 2018-10-04 DIAGNOSIS — K52.9: ICD-10-CM

## 2018-10-04 DIAGNOSIS — G40.909: ICD-10-CM

## 2018-10-04 DIAGNOSIS — Z88.8: ICD-10-CM

## 2018-10-04 DIAGNOSIS — Z22.322: ICD-10-CM

## 2018-10-04 DIAGNOSIS — G62.9: ICD-10-CM

## 2018-10-04 DIAGNOSIS — Z91.14: ICD-10-CM

## 2018-10-04 DIAGNOSIS — I10: ICD-10-CM

## 2018-10-04 DIAGNOSIS — F41.9: ICD-10-CM

## 2018-10-04 DIAGNOSIS — I63.9: Primary | ICD-10-CM

## 2018-10-04 LAB
ADD MANUAL DIFF: NO
ALBUMIN SERPL-MCNC: 3.7 G/DL (ref 3.4–5)
ALBUMIN/GLOB SERPL: 1.3 {RATIO} (ref 1–2.7)
ALP SERPL-CCNC: 70 U/L (ref 46–116)
ALT SERPL-CCNC: 49 U/L (ref 12–78)
ANION GAP SERPL CALC-SCNC: 12 MMOL/L (ref 5–15)
APPEARANCE UR: CLEAR
APTT BLD: 28 SEC (ref 23–33)
APTT PPP: YELLOW S
AST SERPL-CCNC: 22 U/L (ref 15–37)
BASOPHILS NFR BLD AUTO: 1.3 % (ref 0–2)
BILIRUB SERPL-MCNC: 0.6 MG/DL (ref 0.2–1)
BUN SERPL-MCNC: 11 MG/DL (ref 7–18)
CALCIUM SERPL-MCNC: 8.6 MG/DL (ref 8.5–10.1)
CHLORIDE SERPL-SCNC: 105 MMOL/L (ref 98–107)
CHOLEST SERPL-MCNC: 119 MG/DL (ref ?–200)
CO2 SERPL-SCNC: 23 MMOL/L (ref 21–32)
CREAT SERPL-MCNC: 0.9 MG/DL (ref 0.55–1.3)
EOSINOPHIL NFR BLD AUTO: 1.9 % (ref 0–3)
ERYTHROCYTE [DISTWIDTH] IN BLOOD BY AUTOMATED COUNT: 12.2 % (ref 11.6–14.8)
GLOBULIN SER-MCNC: 2.8 G/DL
GLUCOSE UR STRIP-MCNC: NEGATIVE MG/DL
HCT VFR BLD CALC: 41 % (ref 42–52)
HDLC SERPL-MCNC: 29 MG/DL (ref 40–60)
HGB BLD-MCNC: 14.5 G/DL (ref 14.2–18)
INR PPP: 0.9 (ref 0.9–1.1)
KETONES UR QL STRIP: (no result)
LEUKOCYTE ESTERASE UR QL STRIP: (no result)
LYMPHOCYTES NFR BLD AUTO: 31.1 % (ref 20–45)
MCV RBC AUTO: 86 FL (ref 80–99)
MONOCYTES NFR BLD AUTO: 7.7 % (ref 1–10)
NEUTROPHILS NFR BLD AUTO: 58.1 % (ref 45–75)
NITRITE UR QL STRIP: NEGATIVE
PH UR STRIP: 6.5 [PH] (ref 4.5–8)
PLATELET # BLD: 205 K/UL (ref 150–450)
POTASSIUM SERPL-SCNC: 3.8 MMOL/L (ref 3.5–5.1)
PROT UR QL STRIP: NEGATIVE
RBC # BLD AUTO: 4.77 M/UL (ref 4.7–6.1)
SODIUM SERPL-SCNC: 140 MMOL/L (ref 136–145)
SP GR UR STRIP: 1.01 (ref 1–1.03)
TRIGL SERPL-MCNC: 174 MG/DL (ref 30–150)
UROBILINOGEN UR-MCNC: 4 MG/DL (ref 0–1)
WBC # BLD AUTO: 9.5 K/UL (ref 4.8–10.8)

## 2018-10-04 PROCEDURE — 93970 EXTREMITY STUDY: CPT

## 2018-10-04 PROCEDURE — 90732 PPSV23 VACC 2 YRS+ SUBQ/IM: CPT

## 2018-10-04 PROCEDURE — 70553 MRI BRAIN STEM W/O & W/DYE: CPT

## 2018-10-04 PROCEDURE — 94664 DEMO&/EVAL PT USE INHALER: CPT

## 2018-10-04 PROCEDURE — 82607 VITAMIN B-12: CPT

## 2018-10-04 PROCEDURE — 81003 URINALYSIS AUTO W/O SCOPE: CPT

## 2018-10-04 PROCEDURE — 80053 COMPREHEN METABOLIC PANEL: CPT

## 2018-10-04 PROCEDURE — 83036 HEMOGLOBIN GLYCOSYLATED A1C: CPT

## 2018-10-04 PROCEDURE — 87081 CULTURE SCREEN ONLY: CPT

## 2018-10-04 PROCEDURE — 84443 ASSAY THYROID STIM HORMONE: CPT

## 2018-10-04 PROCEDURE — 85730 THROMBOPLASTIN TIME PARTIAL: CPT

## 2018-10-04 PROCEDURE — 71045 X-RAY EXAM CHEST 1 VIEW: CPT

## 2018-10-04 PROCEDURE — 85610 PROTHROMBIN TIME: CPT

## 2018-10-04 PROCEDURE — 80048 BASIC METABOLIC PNL TOTAL CA: CPT

## 2018-10-04 PROCEDURE — 82306 VITAMIN D 25 HYDROXY: CPT

## 2018-10-04 PROCEDURE — 93880 EXTRACRANIAL BILAT STUDY: CPT

## 2018-10-04 PROCEDURE — 85651 RBC SED RATE NONAUTOMATED: CPT

## 2018-10-04 PROCEDURE — 36415 COLL VENOUS BLD VENIPUNCTURE: CPT

## 2018-10-04 PROCEDURE — 80061 LIPID PANEL: CPT

## 2018-10-04 PROCEDURE — 93306 TTE W/DOPPLER COMPLETE: CPT

## 2018-10-04 PROCEDURE — 85025 COMPLETE CBC W/AUTO DIFF WBC: CPT

## 2018-10-04 PROCEDURE — 70450 CT HEAD/BRAIN W/O DYE: CPT

## 2018-10-04 PROCEDURE — 86592 SYPHILIS TEST NON-TREP QUAL: CPT

## 2018-10-04 PROCEDURE — 82746 ASSAY OF FOLIC ACID SERUM: CPT

## 2018-10-04 PROCEDURE — 80164 ASSAY DIPROPYLACETIC ACD TOT: CPT

## 2018-10-04 PROCEDURE — 82962 GLUCOSE BLOOD TEST: CPT

## 2018-10-04 PROCEDURE — 95819 EEG AWAKE AND ASLEEP: CPT

## 2018-10-04 PROCEDURE — 93005 ELECTROCARDIOGRAM TRACING: CPT

## 2018-10-04 PROCEDURE — 84484 ASSAY OF TROPONIN QUANT: CPT

## 2018-10-04 PROCEDURE — 82140 ASSAY OF AMMONIA: CPT

## 2018-10-04 PROCEDURE — 93971 EXTREMITY STUDY: CPT

## 2018-10-04 NOTE — EMERGENCY ROOM REPORT
History of Present Illness


General


Chief Complaint:  To Be Triaged


Source:  Patient





Present Illness


HPI


37-year-old male with a history of seizure disorder on Depakote, hypertension, 

diabetes, tobacco use presents with left-sided weakness and mild dysarthria for 

one week.  He reports he is not sure the date of his started, but does report 

it was definitely before the weekend, and today is Thursday.  He reports he 

waited until his doctor's appointment today, and so his doctor Samir Cabrera.  

Patient is also on quetiapine and escitalopram.


Allergies:  


Coded Allergies:  


     No Known Allergies (Unverified , 4/14/15)





Patient History


Past Medical History:  see triage record


Social History:  Reports: smoking


Reviewed Nursing Documentation:  PMH: Agreed; PSxH: Agreed





Nursing Documentation-PMH


Hx Cardiac Problems:  Yes


Hx Hypertension:  Yes


Hx COPD:  Yes


Hx Diabetes:  Yes


Hx Cancer:  No


Hx Gastrointestinal Problems:  Yes


Hx Neurological Problems:  Yes - BACK & NECK SURGERY


Hx Cerebrovascular Accident:  Yes


Hx Peripheral Neuropathy:  Yes


Hx Head Trauma:  Yes


Hx Dizziness:  Yes


Hx Weakness:  Yes





Review of Systems


All Other Systems:  negative except mentioned in HPI





Physical Exam


Sp02 EP Interpretation:  reviewed, normal


General Appearance:  no apparent distress, alert, non-toxic


Head:  normocephalic


Eyes:  bilateral eye normal inspection, bilateral eye PERRL, bilateral eye EOMI


ENT:  normal ENT inspection, hearing grossly normal, normal pharynx, no 

angioedema, normal voice, moist mucus membranes


Neck:  normal inspection, full range of motion, supple, supple/symm/no masses


Respiratory:  chest non-tender, lungs clear, normal breath sounds, chest 

symmetrical, palpation of chest normal


Cardiovascular #1:  normal peripheral pulses, regular rate, rhythm, no JVD, no 

murmur, no rub


Cardiovascular #2:  2+ radial (R), 2+ radial (L)


Gastrointestinal:  normal inspection, non tender, soft, no mass, no guarding, 

no rebound


Rectal:  deferred


Genitourinary:  normal inspection, no CVA tenderness


Musculoskeletal:  back normal, gait/station normal, normal range of motion, non-

tender, no calf tenderness


Neurologic:  alert, oriented x3, responsive, CNs III-XII nml as tested - L face 

with decreased sensation, motor strength/tone normal - decreased strength LUE 

and LLE, sensory intact - diminished to sharp sensation in LUE and LLE, speech 

normal, other - antalgic gait


Psychiatric:  judgement/insight normal, memory normal, mood/affect normal


Skin:  normal color, no rash, warm/dry, normal turgor


Lymphatic:  no adenopathy





Medical Decision Making


Diagnostic Impression:  


 Primary Impression:  


 CVA (cerebral vascular accident)


ER Course


Patient with L sided deficits, but symptoms for ~1 week, last known normal at 

least 5 days ago therefore patient is not a tPA or neurointerventional 

candidate.  Sent by Dr. Samir Cabrera, will admit for possible CVA workup.  Given 

aspirin, admitted to Samir Cabrera.


EKG Diagnostic Results


EKG Time:  18:42


EP Interpretation:  no st-t changes, no twi


Rate:  normal


Rhythm:  NSR


ST Segments:  no acute changes


ASA given to the pt in ED:  Yes





Rhythm Strip Diag. Results


Rhythm Strip Time:  18:57


EP Interpretation:  yes


Rate:  95


Rhythm:  NSR, no PVC's, no ectopy





Chest X-Ray Diagnostic Results


Chest X-Ray Diagnostic Results :  


   Chest X-Ray Ordered:  Yes


   # of Views/Limited/Complete:  1 View


   Indication:  Other - cva


   EP Interpretation:  Yes


   Interpretation:  no consolidation, no effusion, no pneumothorax, no acute 

cardiopulmonary disease


   Impression:  No acute disease


   Electronically Signed by:  Sb Alexander MD





CT/MRI/US Diagnostic Results


CT/MRI/US Diagnostic Results :  


   Imaging Test Ordered:  ct head


   Impression


Per Dr. Antwan Mobley, no acute hemorrhage, mass effect or edema, no large 

cortical infarct seen.  Status post suboccipital craniectomy


Disposition:  ADMITTED AS INPATIENT


Admit Decision Time:  18:58


Condition:  Stable











SB ALEXANDER M.D Oct 4, 2018 18:47

## 2018-10-05 VITALS — SYSTOLIC BLOOD PRESSURE: 120 MMHG | DIASTOLIC BLOOD PRESSURE: 67 MMHG

## 2018-10-05 VITALS — SYSTOLIC BLOOD PRESSURE: 110 MMHG | DIASTOLIC BLOOD PRESSURE: 64 MMHG

## 2018-10-05 VITALS — SYSTOLIC BLOOD PRESSURE: 101 MMHG | DIASTOLIC BLOOD PRESSURE: 62 MMHG

## 2018-10-05 VITALS — SYSTOLIC BLOOD PRESSURE: 98 MMHG | DIASTOLIC BLOOD PRESSURE: 59 MMHG

## 2018-10-05 VITALS — DIASTOLIC BLOOD PRESSURE: 72 MMHG | SYSTOLIC BLOOD PRESSURE: 115 MMHG

## 2018-10-05 VITALS — DIASTOLIC BLOOD PRESSURE: 72 MMHG | SYSTOLIC BLOOD PRESSURE: 120 MMHG

## 2018-10-05 LAB
ADD MANUAL DIFF: NO
ALBUMIN SERPL-MCNC: 3.2 G/DL (ref 3.4–5)
ALBUMIN/GLOB SERPL: 1.1 {RATIO} (ref 1–2.7)
ALP SERPL-CCNC: 61 U/L (ref 46–116)
ALT SERPL-CCNC: 42 U/L (ref 12–78)
AMMONIA PLAS-SCNC: 46 UMOL/L (ref 11–32)
ANION GAP SERPL CALC-SCNC: 5 MMOL/L (ref 5–15)
APTT BLD: 21 SEC (ref 23–33)
AST SERPL-CCNC: 19 U/L (ref 15–37)
BASOPHILS NFR BLD AUTO: 1.1 % (ref 0–2)
BILIRUB SERPL-MCNC: 0.8 MG/DL (ref 0.2–1)
BUN SERPL-MCNC: 10 MG/DL (ref 7–18)
CALCIUM SERPL-MCNC: 8.3 MG/DL (ref 8.5–10.1)
CHLORIDE SERPL-SCNC: 107 MMOL/L (ref 98–107)
CO2 SERPL-SCNC: 28 MMOL/L (ref 21–32)
CREAT SERPL-MCNC: 0.9 MG/DL (ref 0.55–1.3)
EOSINOPHIL NFR BLD AUTO: 4.7 % (ref 0–3)
ERYTHROCYTE [DISTWIDTH] IN BLOOD BY AUTOMATED COUNT: 11.6 % (ref 11.6–14.8)
GLOBULIN SER-MCNC: 2.9 G/DL
HCT VFR BLD CALC: 40.2 % (ref 42–52)
HGB BLD-MCNC: 14.4 G/DL (ref 14.2–18)
INR PPP: 0.9 (ref 0.9–1.1)
LYMPHOCYTES NFR BLD AUTO: 16.7 % (ref 20–45)
MCV RBC AUTO: 97 FL (ref 80–99)
MONOCYTES NFR BLD AUTO: 9.9 % (ref 1–10)
NEUTROPHILS NFR BLD AUTO: 67.6 % (ref 45–75)
PLATELET # BLD: 206 K/UL (ref 150–450)
POTASSIUM SERPL-SCNC: 3.4 MMOL/L (ref 3.5–5.1)
RBC # BLD AUTO: 4.16 M/UL (ref 4.7–6.1)
SODIUM SERPL-SCNC: 140 MMOL/L (ref 136–145)
WBC # BLD AUTO: 5.3 K/UL (ref 4.8–10.8)

## 2018-10-05 RX ADMIN — METOPROLOL SUCCINATE SCH MG: 25 TABLET, EXTENDED RELEASE ORAL at 21:27

## 2018-10-05 RX ADMIN — HEPARIN SODIUM SCH UNITS: 5000 INJECTION INTRAVENOUS; SUBCUTANEOUS at 21:34

## 2018-10-05 RX ADMIN — MORPHINE SULFATE PRN MG: 2 INJECTION, SOLUTION INTRAMUSCULAR; INTRAVENOUS at 09:01

## 2018-10-05 RX ADMIN — MORPHINE SULFATE PRN MG: 2 INJECTION, SOLUTION INTRAMUSCULAR; INTRAVENOUS at 14:22

## 2018-10-05 RX ADMIN — QUETIAPINE SCH MG: 200 TABLET, FILM COATED ORAL at 21:26

## 2018-10-05 RX ADMIN — INSULIN ASPART SCH UNITS: 100 INJECTION, SOLUTION INTRAVENOUS; SUBCUTANEOUS at 17:12

## 2018-10-05 RX ADMIN — MORPHINE SULFATE PRN MG: 2 INJECTION, SOLUTION INTRAMUSCULAR; INTRAVENOUS at 05:11

## 2018-10-05 RX ADMIN — MORPHINE SULFATE PRN MG: 2 INJECTION, SOLUTION INTRAMUSCULAR; INTRAVENOUS at 00:10

## 2018-10-05 RX ADMIN — MORPHINE SULFATE PRN MG: 2 INJECTION, SOLUTION INTRAMUSCULAR; INTRAVENOUS at 22:35

## 2018-10-05 RX ADMIN — HEPARIN SODIUM SCH UNITS: 5000 INJECTION INTRAVENOUS; SUBCUTANEOUS at 00:07

## 2018-10-05 RX ADMIN — INSULIN ASPART SCH UNITS: 100 INJECTION, SOLUTION INTRAVENOUS; SUBCUTANEOUS at 21:00

## 2018-10-05 RX ADMIN — MORPHINE SULFATE PRN MG: 2 INJECTION, SOLUTION INTRAMUSCULAR; INTRAVENOUS at 18:30

## 2018-10-05 RX ADMIN — DIVALPROEX SODIUM SCH MG: 500 TABLET, DELAYED RELEASE ORAL at 21:26

## 2018-10-05 RX ADMIN — HEPARIN SODIUM SCH UNITS: 5000 INJECTION INTRAVENOUS; SUBCUTANEOUS at 09:08

## 2018-10-05 NOTE — CONSULTATION
Consult Note


Consult Note


NEUROLOGY CONSULTATION:





Full note dictated #[id number]





36 y/o, RH, CM with long H/O Obesity, BAD, HTN, 


DM, COPD, tobacco abuse, seizures, strokes - the 


symptoms of which he cannot remember.





He was hospitalized on 10/4/18 for speech problems, 


increased left sided weakness, and "seizures" because 


he had run out of his Depakote.





ON EXAM:


Problems with orientation, memory, HCF, Language.


Give way weakness on left side.


Globally absent DTRs.


Unable to walk for more than a few steps as the left leg collapses.





IMPRESSION:


1. BAD, HTN, DM, Smoking, Strokes, Seizures.


2. Seizure disorder of ? type.


3. Unfortunately embellishing amount of left sided weakness.





REC:


Continue Depakote for Sz prophylaxis.


Check trough VA level


MRI of brain 


EEG





Mayi Woody M.D., M.S.P.H.











MAYI WOODY Oct 5, 2018 16:41

## 2018-10-05 NOTE — CONSULTATION
DATE OF CONSULTATION:  10/05/2018



CONSULTING PHYSICIAN:  Gulshan Armstrong M.D.



HISTORY OF PRESENT ILLNESS:  This is a 37-year-old male patient with

cerebrovascular disease.  He continues to have some depression, confusion,

mood lability.  He has got no logical plan for his own self-care.  He has

got feelings of helplessness, hopelessness, low energy, poor appetite, and

loss of interest in activity.  That is why, he does require daily

psychiatric consultation.  He has got some mood lability, confusion, and

disorganized thought process secondary to stress of his medical illness,

but this patient came into the hospital secondary to cerebrovascular

accident.  He does have confusion, disorganized thought process, and mood

lability.  The patient states he does have some mood lability, because his

mood lability has worsened secondary to stress of his medical illness, his

attending physician has requested daily psychiatric consultation.



MEDICAL PROBLEMS:  He has a history of hypertension, COPD, obesity, status

post cerebrovascular accident.



ALLERGIES:  He has allergies to Haldol.



PSYCHOTROPIC MEDICATIONS:  On admission, Seroquel 200 mg at bedtime,

Depakote at a dose of 500 mg twice a day, and Lexapro 10 mg daily.



SUBSTANCE ABUSE HISTORY:  No recent ETOH, drug, and alcohol use recently.



FAMILY PSYCHIATRIC HISTORY:  Denies.



PAIN ASSESSMENT:  0/10 pain.



DEVELOPMENTAL PROBLEMS:  Denies any known developmental problems.



SOCIAL HISTORY:  Financially supported by [employer] and Medicare.



MENTAL STATUS EXAMINATION:  This is a 37-year-old male.  His appearance is

disheveled.  Attitude, irritable and agitated.  Affect, guarded and

restricted.  Intellect poor.  Mood depressed and anxious.  Motor activity,

psychomotor agitation.  Attention span is poor.  Orientation x2.  Speech

is low volume and slurred.  Thought process, disorganized and logical.

Thought content, slight auditory hallucinations, paranoid delusions, and

racing thoughts.  Insight and judgment is poor.  Short-term memory, 3/3

were recalled after 5 minutes delay with good short-term memory.

Long-term memory is intact based on the knowledge of long-term events in

his life such as high school that he went to.



DIAGNOSES:

1. Schizoaffective, bipolar type.

2. Medical includes seizure disorder, lower extremity weakness, status

post CVA, psychosocial stressors, financial.



PLAN:  For now, I am going to continue this patient on Depakote 500 mg

twice a day to stabilize his mood, Seroquel 200 mg at bedtime for

psychosis, Lexapro 10 mg daily, and Ativan as needed, which should be a

dose of 0.5 mg every four hours IV as needed for anxiety and he will

continue to be followed by Psychiatry throughout hospital course.  Chart

reviewed.  Discussed staff.  Seen and assessed at bedside.  _____.  Also

provided 20 minutes of cognitive behavior therapy.  My session today with

the patient again 20 minutes of cognitive behavior therapy to help the

patient identify his automatic negative thoughts and help him to convert

the automatic negative thoughts to more positive thoughts to reduce

depression and anxiety.  A 20 minutes of cognitive behavioral therapy.









  ______________________________________________

  Gulshan Armstrong M.D.





DR:  MATEUSZ

D:  10/05/2018 04:56

T:  10/05/2018 13:25

JOB#:  [id number]

CC:

## 2018-10-05 NOTE — CONSULTATION
DATE OF CONSULTATION:  10/05/2018



NEUROLOGY CONSULTATION



CONSULTING PHYSICIAN:  Jamari Woody M.D.



REQUESTING PHYSICIAN:  Samir Cabrera D.O.



HISTORY:  

Mr. Shadi Zapien is a 37-year-old, right-handed, , 

gentleman, who has a long history of obesity, bipolar affective 

disorder, hypertension, diabetes mellitus, chronic obstructive 

pulmonary disease, tobacco abuse, seizures that are not well 

described, and strokes that are not well described, who was

hospitalized on 10/04/2018 for speech problems, increased 

left-sided weakness, and seizures, because he had run out 

of Depakote.



This consultation was requested to evaluate the patient for 

a possible new stroke, and in addition, to control his seizures.  



Since he has been in the hospital, he has improved.  He has 

had no seizures and his strength is getting better.  



When asked about his seizures, he could not describe them, 

but his girlfriend tells us that when he has one of his events, 

he apparently becomes unresponsive for a brief period of time 

and behaves in an unusual manner again the exact details of 

which she could not describe.  



With regards to his strokes, he cannot remember what exactly 

happened when he had them.



PAST MEDICAL HISTORY:  Significant for obesity, bipolar 

affective disorder, hypertension, diabetes mellitus, chronic 

obstructive pulmonary disease, tobacco abuse, seizures,

and strokes.



FAMILY HISTORY:  Significant for high blood pressure and 

diabetes mellitus in numerous family members.



PERSONAL HISTORY:  

Home: He lives in a group home.  His fiancee stays in the 

group home next to him.  

Work: He used to work as a manager for [employer].  

He is now disabled.

Habits:  He smokes approximately 1 pack of cigarettes per day 

and has been doing so since he was in his early teens.  

He denies the use of alcohol or illicit drugs.



PRESENT MEDICATIONS: aspirin 81 mg daily, Lexapro 10 mg 

daily, Depakote 500 mg q.12 hours, heparin for DVT prophylaxis, 

metoprolol, MiraLAX, Seroquel 200 mg at bedtime, temazepam, 

morphine sulfate p.r.n., insulin, Phenergan With Codeine p.r.n.,

Zofran p.r.n., nitroglycerin p.r.n., Tylenol p.r.n., Mylanta p.r.n., 

clonidine p.r.n., DuoNeb inhaler p.r.n., and lorazepam p.r.n.



PHYSICAL EXAMINATION:

GENERAL:  He is a well-developed, well-nourished, obese, 

 gentleman, lying in bed, in no acute distress.

VITAL SIGNS:  Pulse 79/ minute, blood pressure 110/64 mmHg,

respirations 20/minute, and temperature 97.9 degrees Fahrenheit.

HEAD:  Normocephalic and atraumatic.  

EENT: Examination benign.

NECK:  No neck rigidity was observed.



NEUROLOGICAL EXAMINATION:

MENTAL STATUS EXAMINATION:  

He was awake and alert.  

He was oriented to person, place, and time except for the exact date.  

He was able to recall 3/3 words immediately, but could only remember 

2/3 words in 1 minute and 3 minutes.  

He was able to remember Presidents Trump and Obama, but could 

not remember Presidents prior to that.  

His mathematical skills were impaired except when he used his 

phone as a calculator.  

His visuospatial function was also impaired.  

SPEECH: He had a mild dysarthria.  

LANGUAGE: He had anomia for low-frequency words.

CRANIAL NERVE EXAMINATION:

II:  The visual fields were intact on confrontation testing.

III, IV & VI:  The external ocular movements were full and the 

pupils 3 mm in diameter, equal, round, regular, and reactive to

light.

V:  He had normal facial sensations, and the temporales,

masseters, and pterygoids functioned normally.

VII:  He had normal facial expressions and no facial

asymmetry.

VIII: He was able to hear well bilaterally and had no nystagmus.

IX:  The palate moved symmetrically on phonation.

X:  He had no hoarseness of voice.

XI:  The sternocleidomastoids and trapezii functioned normally.

XII:  The tongue was in the midline without any fasciculations 

or atrophy.

MOTOR SYSTEM:  Tone was normal in all four extremities.  

Examination of muscle mass revealed no focal wasting.  

Examination of power revealed G 5/5 power on the right side.  

On the left side, power was exceedingly difficult to test because 

he exhibited significant give-way weakness.

SENSORY EXAMINATION:  He had intact sensations to pinprick, 

light touch, and graphesthesia.

COORDINATION:  He performed well on finger-to-nose and 

heel-to-shin testing on the right side.  On the left side, these 

were performed in a very erratic manner.

REFLEXES:  0 at the biceps, triceps, brachioradialis, knees 

and ankles.  The plantar responses were flexor bilaterally.

STANCE:  He stood up with support.  After he had been standing 

for a few seconds, he exhibited unusual movements of his left 

leg, which he attributed to weakness.  

GAIT: He was able to take a few steps, but then exhibited 

some left leg collapse as a result of which he was made to 

back up, which he did quite well and sit in bed.



DIAGNOSTIC IMPRESSION:

1. Mr. Shadi Zapien is a 37-year-old, right-handed, , 

gentleman, with a long history of obesity, bipolar affective disorder,

hypertension, diabetes mellitus, chronic obstructive pulmonary 

disease, tobacco abuse, seizures of an unknown type, and strokes 

of an unknown type, who was hospitalized on 10/04/2018 for 

speech problems, increased left-sided weakness, and questionable 

seizures when he had run out of Depakote.

2. On neurological examination at this time, he does demonstrate

problems with orientation, recent and remote memory, higher 

cognitive function, and language.  He also exhibits significant 

give-way weakness on the left side making it impossible to 

determine the amount of strength on that side.  He has globally 

absent deep tendon reflexes and when he is made to stand 

and walk, he exhibits left leg collapse.  However when he is

made to back up, he does that quite well.

3. He did have a CT scan of the brain performed on 10/04/2018 

and it revealed findings consistent with suboccipital craniectomy, 

but no other intracranial pathology was discovered.

4. Laboratory data obtained thus far have revealed a relatively normal

CBC, chemistry panel with an elevated blood glucose at 135 when 

he came in.  Relatively normal lipid panel.  His valproic acid level 

was 53 - it is unclear if this was random, trough, or peak level.

5. The patient's history, neurological examination, laboratory 

data, and imaging studies are most compatible with underlying 

bipolar affective disorder, hypertension, diabetes mellitus, smoking, 

strokes, and seizures. It is unclear if the patient's increased 

left-sided weakness is due to embellishment or due to true 

weakness.  It is also unclear as to what type of seizure disorder 

he has.



RECOMMENDATIONS:

1. Agree with management thus far.

2. Would continue Depakote 500 mg q.12 h. for seizure prophylaxis.

3. We will check a trough valproic acid level.

4. An MRI scan of the brain without and with gadolinium will be 

ordered to evaluate the patient for intracranial pathology.

5. An EEG will be ordered to evaluate the patient for the type of

seizure disorder.

6. The patient should be mobilized rapidly with the help of physical 

and occupational therapy.



Thank you for entrusting me with the care of Mr. Zapien.  

I shall follow him with you.





_________________________

Jamari Woody M.D., M.S.P.H.



DR:  RACHAEL

D:  10/05/2018 16:40

T:  10/05/2018 19:07

JOB#:  [id number]
[id number]

## 2018-10-05 NOTE — CONSULTATION
History of Present Illness


General


Date patient seen:  Oct 5, 2018


Chief Complaint:  Stroke Symptoms





Present Illness


HPI


37-year-old male with a history of seizure disorder on Depakote ( not taking), 

hypertension,  tobacco use presents with left-sided weakness and mild 

dysarthria for one week.  He reports he is not sure the date of his started.  

He reports he waited until his doctor's appointment, who sent him to [facility] 

. Pt was suppose to take Depakote, which he couldn't buy. His girlfriend 

stated that the attacks look like seizure activity.


Allergies:  


Coded Allergies:  


     HALOPERIDOL (Verified  Allergy, Unknown, 10/5/18)





Medication History


Scheduled


Aspirin* (Aspir 81*), 81 MG ORAL DAILY, (Reported)


Divalproex Sodium* (Depakote*), 500 MG PO Q12HR, (Reported)


Escitalopram Oxalate* (Lexapro*), 10 MG ORAL DAILY, (Reported)


Metoprolol Succinate* (Metoprolol Succinate*), 25 MG ORAL Q12HR, (Reported)


Quetiapine Fumarate* (Seroquel*), 200 MG ORAL BEDTIME, (Reported)





Discontinued Medications


Albuterol Sulfate* (Albuterol Sulfate Mdi*), 2 PUFF INH Q4H PRN for cough/

wheezing


   Discontinued Reason: Pt stopped taking med


Insulin Aspart (Novolog Flexpen), 30 SQ TID, (Reported)


   Discontinued Reason: Pt stopped taking med


Insulin Detemir (Levemir), 30 SUBQ BID, (Reported)


   Discontinued Reason: Pt stopped taking med


Lisinopril (Lisinopril*), 10 MG ORAL DAILY, (Reported)


   Discontinued Reason: Pt stopped taking med


Metformin Hcl* (Metformin Hcl*), 500 MG ORAL TIDAC, (Reported)


   Discontinued Reason: Pt stopped taking med


Olanzapine* (Zyprexa*), 2.5 MG ORAL DAILY, (Reported)


   Discontinued Reason: Pt stopped taking med


Prednisone* (Prednisone*), 60 MG ORAL DAILY


   Discontinued Reason: Pt stopped taking med


Pregabalin* (Lyrica*), 75 MG ORAL THREE TIMES A DAY, (Reported)


   Discontinued Reason: Pt stopped taking med





Patient History


Healthcare decision maker





Resuscitation status


Full Code


Advanced Directive on File


No





Past Medical/Surgical History


Past Medical/Surgical History:  


(1) COPD (chronic obstructive pulmonary disease)


(2) HTN (hypertension)


(3) Psychosomatic disorder


(4) Anxiety


(5) Diabetes mellitus


(6) Morbid obesity with BMI of 40.0-44.9, adult


(7) Opioid dependence


(8) Peripheral neuropathy





Review of Systems


All Other Systems:  negative except mentioned in HPI





Physical Exam


General Appearance:  WD/WN, no apparent distress


Lines, tubes and drains:  peripheral


HEENT:  normocephalic, atraumatic


Neck:  non-tender, normal alignment


Respiratory/Chest:  chest wall non-tender, lungs clear


Cardiovascular/Chest:  normal peripheral pulses


Abdomen:  normal bowel sounds


Extremities:  normal range of motion





Last 24 Hour Vital Signs








  Date Time  Temp Pulse Resp B/P (MAP) Pulse Ox O2 Delivery O2 Flow Rate FiO2


 


10/5/18 09:00  76  101/62    


 


10/5/18 08:00 98.0 76 22 101/62 (75) 97   





 98.0       


 


10/5/18 04:00 97.3 74 23 98/59 (72) 97   





 97.3       


 


10/5/18 04:00  74      


 


10/5/18 00:30  74      


 


10/5/18 00:03  92 20   Room Air  


 


10/5/18 00:00 97.5 80 24 115/72 (86) 97   





 97.5       


 


10/4/18 23:30      Room Air  


 


10/4/18 23:25 98.7 88 20 106/64 99 Room Air  





 98.7       


 


10/4/18 22:00 98.7 88 20 106/64 99 Room Air  





 98.7       


 


10/4/18 18:50 98.8 95 23 138/78 99 Room Air  





 98.8       


 


10/4/18 18:44 98.8 103 23 138/78 99 Room Air  





 98.8       

















Intake and Output  


 


 10/4/18 10/5/18





 19:00 07:00


 


Intake Total  400 ml


 


Output Total  150 ml


 


Balance  250 ml


 


  


 


Intake Oral  100 ml


 


IV Total  300 ml


 


Output Urine Total  150 ml


 


# Voids  2











Laboratory Tests








Test


  10/4/18


18:40 10/4/18


19:35 10/5/18


05:04 10/5/18


06:40


 


White Blood Count


  9.5 K/UL


(4.8-10.8) 


  


  5.3 K/UL


(4.8-10.8)


 


Red Blood Count


  4.77 M/UL


(4.70-6.10) 


  


  4.16 M/UL


(4.70-6.10)  L


 


Hemoglobin


  14.5 G/DL


(14.2-18.0) 


  


  14.4 G/DL


(14.2-18.0)


 


Hematocrit


  41.0 %


(42.0-52.0)  L 


  


  40.2 %


(42.0-52.0)  L


 


Mean Corpuscular Volume


  86 FL (80-99)  


  


  


  97 FL (80-99)


#


 


Mean Corpuscular Hemoglobin


  30.3 PG


(27.0-31.0) 


  


  34.6 PG


(27.0-31.0)  H


 


Mean Corpuscular Hemoglobin


Concent 35.3 G/DL


(32.0-36.0) 


  


  35.8 G/DL


(32.0-36.0)


 


Red Cell Distribution Width


  12.2 %


(11.6-14.8) 


  


  11.6 %


(11.6-14.8)


 


Platelet Count


  205 K/UL


(150-450) 


  


  206 K/UL


(150-450)


 


Mean Platelet Volume


  9.5 FL


(6.5-10.1) 


  


  9.4 FL


(6.5-10.1)


 


Neutrophils (%) (Auto)


  58.1 %


(45.0-75.0) 


  


  67.6 %


(45.0-75.0)


 


Lymphocytes (%) (Auto)


  31.1 %


(20.0-45.0) 


  


  16.7 %


(20.0-45.0)  L


 


Monocytes (%) (Auto)


  7.7 %


(1.0-10.0) 


  


  9.9 %


(1.0-10.0)


 


Eosinophils (%) (Auto)


  1.9 %


(0.0-3.0) 


  


  4.7 %


(0.0-3.0)  H


 


Basophils (%) (Auto)


  1.3 %


(0.0-2.0) 


  


  1.1 %


(0.0-2.0)


 


Prothrombin Time


  9.7 SEC


(9.30-11.50) 


  9.9 SEC


(9.30-11.50) 


 


 


Prothromb Time International


Ratio 0.9 (0.9-1.1)  


  


  0.9 (0.9-1.1)  


  


 


 


Activated Partial


Thromboplast Time 28 SEC (23-33)


  


  21 SEC (23-33)


L 


 


 


Sodium Level


  140 MMOL/L


(136-145) 


  140 MMOL/L


(136-145) 


 


 


Potassium Level


  3.8 MMOL/L


(3.5-5.1) 


  3.4 MMOL/L


(3.5-5.1)  L 


 


 


Chloride Level


  105 MMOL/L


() 


  107 MMOL/L


() 


 


 


Carbon Dioxide Level


  23 MMOL/L


(21-32) 


  28 MMOL/L


(21-32) 


 


 


Anion Gap


  12 mmol/L


(5-15) 


  5 mmol/L


(5-15) 


 


 


Blood Urea Nitrogen


  11 mg/dL


(7-18) 


  10 mg/dL


(7-18) 


 


 


Creatinine


  0.9 MG/DL


(0.55-1.30) 


  0.9 MG/DL


(0.55-1.30) 


 


 


Estimat Glomerular Filtration


Rate > 60 mL/min


(>60) 


  > 60 mL/min


(>60) 


 


 


Glucose Level


  135 MG/DL


()  H 


  113 MG/DL


()  H 


 


 


Calcium Level


  8.6 MG/DL


(8.5-10.1) 


  8.3 MG/DL


(8.5-10.1)  L 


 


 


Total Bilirubin


  0.6 MG/DL


(0.2-1.0) 


  0.8 MG/DL


(0.2-1.0) 


 


 


Aspartate Amino Transf


(AST/SGOT) 22 U/L (15-37)


  


  19 U/L (15-37)


  


 


 


Alanine Aminotransferase


(ALT/SGPT) 49 U/L (12-78)


  


  42 U/L (12-78)


  


 


 


Alkaline Phosphatase


  70 U/L


() 


  61 U/L


() 


 


 


Troponin I


  0.004 ng/mL


(0.000-0.056) 


  


  


 


 


Total Protein


  6.5 G/DL


(6.4-8.2) 


  6.1 G/DL


(6.4-8.2)  L 


 


 


Albumin


  3.7 G/DL


(3.4-5.0) 


  3.2 G/DL


(3.4-5.0)  L 


 


 


Globulin 2.8 g/dL    2.9 g/dL   


 


Albumin/Globulin Ratio 1.3 (1.0-2.7)    1.1 (1.0-2.7)   


 


Triglycerides Level


  174 MG/DL


()  H 


  


  


 


 


Cholesterol Level


  119 MG/DL (<


200) 


  


  


 


 


LDL Cholesterol


  64 mg/dL


(<100) 


  


  


 


 


HDL Cholesterol


  29 MG/DL


(40-60)  L 


  


  


 


 


Cholesterol/HDL Ratio 4.1 (3.3-4.4)     


 


Valproic Acid (Depakene) Level


  53 MCG/ML


() 


  


  


 


 


Urine Color  Yellow    


 


Urine Appearance  Clear    


 


Urine pH  6.5 (4.5-8.0)    


 


Urine Specific Gravity


  


  1.015


(1.005-1.035) 


  


 


 


Urine Protein


  


  Negative


(NEGATIVE) 


  


 


 


Urine Glucose (UA)


  


  Negative


(NEGATIVE) 


  


 


 


Urine Ketones


  


  1+ (NEGATIVE)


H 


  


 


 


Urine Blood


  


  Negative


(NEGATIVE) 


  


 


 


Urine Nitrite


  


  Negative


(NEGATIVE) 


  


 


 


Urine Bilirubin


  


  Negative


(NEGATIVE) 


  


 


 


Urine Urobilinogen


  


  4 MG/DL


(0.0-1.0)  H 


  


 


 


Urine Leukocyte Esterase


  


  1+ (NEGATIVE)


H 


  


 


 


Urine RBC


  


  0-2 /HPF (0 -


0)  H 


  


 


 


Urine WBC


  


  0-2 /HPF (0 -


0) 


  


 


 


Urine Squamous Epithelial


Cells 


  None /LPF


(NONE/OCC) 


  


 


 


Urine Amorphous Sediment


  


  Few /LPF


(NONE)  H 


  


 


 


Urine Bacteria


  


  Few /HPF


(NONE) 


  


 


 


Ammonia


  


  


  46 umol/L


(11-32)  H 


 


 


Thyroid Stimulating Hormone


(TSH) 


  


  2.267 uiU/mL


(0.358-3.740) 


 








Height (Feet):  5


Height (Inches):  11.00


Weight (Pounds):  256


Medications





Current Medications








 Medications


  (Trade)  Dose


 Ordered  Sig/Henry


 Route


 PRN Reason  Start Time


 Stop Time Status Last Admin


Dose Admin


 


 Acetaminophen


  (Tylenol)  650 mg  Q4H  PRN


 ORAL


 fever  10/4/18 21:00


 11/3/18 20:59   


 


 


 Al Hydroxide/Mg


 Hydroxide


  (Mylanta II)  30 ml  Q6H  PRN


 ORAL


 dyspepsia  10/4/18 21:00


 11/3/18 20:59   


 


 


 Albuterol/


 Ipratropium


  (Albuterol/


 Ipratropium)  3 ml  Q4H  PRN


 HHN


 Shortness of Breath  10/4/18 21:00


 10/9/18 20:59   


 


 


 Aspirin


  (Ecotrin)  81 mg  DAILY


 ORAL


   10/5/18 09:00


 11/4/18 08:59  10/5/18 08:59


 


 


 Clonidine HCl


  (Catapres Tab)  0.1 mg  Q4H  PRN


 ORAL


 For High Blood Pressure  10/4/18 21:00


 11/3/18 20:59   


 


 


 Dextrose


  (Dextrose 50%)  25 ml  Q30M  PRN


 IV


 Hypoglycemia  10/4/18 21:00


 11/3/18 20:59   


 


 


 Dextrose


  (Dextrose 50%)  50 ml  Q30M  PRN


 IV


 Hypoglycemia  10/4/18 21:00


 11/3/18 20:59   


 


 


 Divalproex Sodium


  (Depakote)  500 mg  EVERY 12  HOURS


 ORAL


   10/5/18 10:45


 11/4/18 10:44 UNV  


 


 


 Divalproex Sodium


  (Depakote)  500 mg  Q12HR


 ORAL


   10/5/18 09:00


 11/3/18 20:59  10/5/18 08:59


 


 


 Escitalopram


 Oxalate


  (Lexapro)  10 mg  DAILY


 ORAL


   10/5/18 09:00


 11/4/18 08:59  10/5/18 08:59


 


 


 Heparin Sodium


  (Porcine)


  (Heparin 5000


 units/ml)  5,000 units  EVERY 12  HOURS


 SUBQ


   10/4/18 21:00


 11/3/18 20:59  10/5/18 09:08


 


 


 Lorazepam


  (Ativan 2mg/ml


 1ml)  0.5 mg  Q4H  PRN


 IV


 For Anxiety  10/4/18 21:00


 10/11/18 20:59   


 


 


 Metoprolol


 Succinate


  (Toprol XL)  25 mg  Q12HR


 ORAL


   10/5/18 09:00


 11/4/18 08:59  10/5/18 09:00


 


 


 Morphine Sulfate


  (Morphine


 Sulfate)  1 mg  Q4H  PRN


 IVP


 For Pain 7-10  10/4/18 21:00


 10/11/18 20:59  10/5/18 09:01


 


 


 Nitroglycerin


  (Ntg)  0.4 mg  Q5M X 3 DOSES PRN


 SL


 Prn Chest Pain  10/4/18 21:00


 11/3/18 20:59   


 


 


 Ondansetron HCl


  (Zofran)  4 mg  Q6H  PRN


 IVP


 Nausea & Vomiting  10/4/18 21:00


 11/3/18 20:59  10/5/18 00:07


 


 


 Polyethylene


 Glycol


  (Miralax)  17 gm  HSPRN  PRN


 ORAL


 Constipation  10/4/18 21:00


 11/3/18 20:59   


 


 


 Promethazine HCl/


 Codeine


  (Phenergan with


 Codeine)  5 ml  Q4H  PRN


 ORAL


 For Cough  10/4/18 21:00


 11/3/18 20:59   


 


 


 Quetiapine


 Fumarate


  (SEROquel)  200 mg  BEDTIME


 ORAL


   10/4/18 21:00


 11/3/18 20:59  10/5/18 00:07


 


 


 Temazepam


  (Restoril)  15 mg  HSPRN  PRN


 ORAL


 Insomnia  10/4/18 21:00


 10/11/18 20:59   


 











Assessment/Plan


Problem List:  


(1) Uncontrolled seizures


ICD Codes:  R56.9 - Unspecified convulsions


SNOMED:  90040515


(2) Noncompliance


ICD Codes:  Z91.19 - Patient's noncompliance with other medical treatment and 

regimen


SNOMED:  2772346


(3) COPD (chronic obstructive pulmonary disease)


ICD Codes:  J44.9 - Chronic obstructive pulmonary disease, unspecified


SNOMED:  45015320


(4) HTN (hypertension)


ICD Codes:  I10 - Essential (primary) hypertension


SNOMED:  56558504


(5) Psychosomatic disorder


ICD Codes:  F45.9 - Somatoform disorder, unspecified


SNOMED:  39533527


(6) Anxiety


ICD Codes:  F41.9 - Anxiety disorder, unspecified; Z68.41 - Body mass index (BMI

) 40.0-44.9, adult


SNOMED:  31519141


Assessment/Plan


restart Depakote


pt/ot


neuro evaluation


monitor BP


check electrolytes











Lissette Steward MD Oct 5, 2018 10:51

## 2018-10-05 NOTE — DIAGNOSTIC IMAGING REPORT
Indication: Chest pain

 

Technique: One view of the chest

 

Comparison: 8/29/2018

 

Findings: Lungs and pleural spaces are clear. Heart size is normal. No significant

change

 

Impression: No acute process

## 2018-10-05 NOTE — DIAGNOSTIC IMAGING REPORT
Indication: Seizure disorder, left-sided weakness and mild dysarthria for one week

 

Technique: Continuous helical CT scanning of the head was performed without

intravenous contrast material. Axial and coronal 5 mm sections were generated.

Radiation dose was minimized using automated exposure control

 

Dose:

Total Dose Length Product - DLP 1414.51 mGycm.

Volume CT Dose Index - CTDIvol(s) 70.38 mGy.

 

Comparison: none

 

Findings: The ventricular system is normal in size and configuration. There is no

shift of midline structures. No abnormal extra-axial fluid collections are noted.

There is no evidence of intracerebral bleeding. No other abnormal high or low density

areas are noted within the brain.  Normal gray-white differentiation. Visualized

orbits and sinuses are unremarkable. There is evidence of prior suboccipital

craniectomy..

 

Impression: Evidence of prior suboccipital craniectomy

 

Negative for acute intracranial bleed or mass effect

 

This agrees with the preliminary interpretation provided overnight by Dr. Mobley

 

 

 

 

The CT scanner at [facility] is accredited by the American College of

Radiology and the scans are performed using protocols designed to limit radiation

exposure to as low as reasonably achievable to attain images of sufficient resolution

adequate for diagnostic evaluation.

## 2018-10-06 VITALS — DIASTOLIC BLOOD PRESSURE: 85 MMHG | SYSTOLIC BLOOD PRESSURE: 109 MMHG

## 2018-10-06 VITALS — SYSTOLIC BLOOD PRESSURE: 123 MMHG | DIASTOLIC BLOOD PRESSURE: 70 MMHG

## 2018-10-06 VITALS — DIASTOLIC BLOOD PRESSURE: 64 MMHG | SYSTOLIC BLOOD PRESSURE: 112 MMHG

## 2018-10-06 VITALS — SYSTOLIC BLOOD PRESSURE: 111 MMHG | DIASTOLIC BLOOD PRESSURE: 70 MMHG

## 2018-10-06 VITALS — DIASTOLIC BLOOD PRESSURE: 63 MMHG | SYSTOLIC BLOOD PRESSURE: 99 MMHG

## 2018-10-06 VITALS — DIASTOLIC BLOOD PRESSURE: 69 MMHG | SYSTOLIC BLOOD PRESSURE: 112 MMHG

## 2018-10-06 LAB
ADD MANUAL DIFF: NO
ANION GAP SERPL CALC-SCNC: 11 MMOL/L (ref 5–15)
BASOPHILS NFR BLD AUTO: 0.8 % (ref 0–2)
BUN SERPL-MCNC: 14 MG/DL (ref 7–18)
CALCIUM SERPL-MCNC: 8.8 MG/DL (ref 8.5–10.1)
CHLORIDE SERPL-SCNC: 106 MMOL/L (ref 98–107)
CO2 SERPL-SCNC: 25 MMOL/L (ref 21–32)
CREAT SERPL-MCNC: 1 MG/DL (ref 0.55–1.3)
EOSINOPHIL NFR BLD AUTO: 2.4 % (ref 0–3)
ERYTHROCYTE [DISTWIDTH] IN BLOOD BY AUTOMATED COUNT: 12.4 % (ref 11.6–14.8)
HCT VFR BLD CALC: 41.2 % (ref 42–52)
HGB BLD-MCNC: 14.5 G/DL (ref 14.2–18)
LYMPHOCYTES NFR BLD AUTO: 31 % (ref 20–45)
MCV RBC AUTO: 86 FL (ref 80–99)
MONOCYTES NFR BLD AUTO: 7 % (ref 1–10)
NEUTROPHILS NFR BLD AUTO: 58.7 % (ref 45–75)
PLATELET # BLD: 166 K/UL (ref 150–450)
POTASSIUM SERPL-SCNC: 4.2 MMOL/L (ref 3.5–5.1)
RBC # BLD AUTO: 4.8 M/UL (ref 4.7–6.1)
SODIUM SERPL-SCNC: 142 MMOL/L (ref 136–145)
WBC # BLD AUTO: 7.4 K/UL (ref 4.8–10.8)

## 2018-10-06 RX ADMIN — HEPARIN SODIUM SCH UNITS: 5000 INJECTION INTRAVENOUS; SUBCUTANEOUS at 21:00

## 2018-10-06 RX ADMIN — MORPHINE SULFATE PRN MG: 2 INJECTION, SOLUTION INTRAMUSCULAR; INTRAVENOUS at 09:25

## 2018-10-06 RX ADMIN — MORPHINE SULFATE PRN MG: 2 INJECTION, SOLUTION INTRAMUSCULAR; INTRAVENOUS at 05:21

## 2018-10-06 RX ADMIN — HEPARIN SODIUM SCH UNITS: 5000 INJECTION INTRAVENOUS; SUBCUTANEOUS at 08:47

## 2018-10-06 RX ADMIN — INSULIN ASPART SCH UNITS: 100 INJECTION, SOLUTION INTRAVENOUS; SUBCUTANEOUS at 21:43

## 2018-10-06 RX ADMIN — QUETIAPINE SCH MG: 200 TABLET, FILM COATED ORAL at 21:41

## 2018-10-06 RX ADMIN — INSULIN ASPART SCH UNITS: 100 INJECTION, SOLUTION INTRAVENOUS; SUBCUTANEOUS at 16:30

## 2018-10-06 RX ADMIN — DIVALPROEX SODIUM SCH MG: 500 TABLET, DELAYED RELEASE ORAL at 21:42

## 2018-10-06 RX ADMIN — METOPROLOL SUCCINATE SCH MG: 25 TABLET, EXTENDED RELEASE ORAL at 08:47

## 2018-10-06 RX ADMIN — DIVALPROEX SODIUM SCH MG: 500 TABLET, DELAYED RELEASE ORAL at 08:41

## 2018-10-06 RX ADMIN — INSULIN ASPART SCH UNITS: 100 INJECTION, SOLUTION INTRAVENOUS; SUBCUTANEOUS at 11:30

## 2018-10-06 RX ADMIN — ASPIRIN SCH MG: 81 TABLET, DELAYED RELEASE ORAL at 08:41

## 2018-10-06 RX ADMIN — MORPHINE SULFATE PRN MG: 2 INJECTION, SOLUTION INTRAMUSCULAR; INTRAVENOUS at 21:41

## 2018-10-06 RX ADMIN — METOPROLOL SUCCINATE SCH MG: 25 TABLET, EXTENDED RELEASE ORAL at 21:41

## 2018-10-06 RX ADMIN — INSULIN ASPART SCH UNITS: 100 INJECTION, SOLUTION INTRAVENOUS; SUBCUTANEOUS at 06:26

## 2018-10-06 RX ADMIN — MORPHINE SULFATE PRN MG: 2 INJECTION, SOLUTION INTRAMUSCULAR; INTRAVENOUS at 17:31

## 2018-10-06 RX ADMIN — MORPHINE SULFATE PRN MG: 2 INJECTION, SOLUTION INTRAMUSCULAR; INTRAVENOUS at 13:23

## 2018-10-06 NOTE — PROGRESS NOTE
DATE:  10/06/2018



NOTE: "VERY POOR AUDIO QUALITY/INCOMPREHENSIBLE"



SUBJECTIVE:  This is a 37-year-old male patient with _____ cerebrovascular

accident.  The patient is confused and disorganized with mood lability.

He has got no logical plan for his own self-care.  He has got feelings of

helplessness, hopelessness, low energy, poor appetite, and loss of

interest in activity.  The patient is _____ confused, disorganized, and

mood labile.



MENTAL STATUS EXAMINATION:  This is a 37-year-old male.  Appearance is

disheveled.  Attitude, irritable and agitated.  Affect, guarded and

restricted.  Intellect poor.  Mood depressed and anxious.  Motor activity,

psychomotor agitation.  Attention span is poor.  Orientation x2.  Speech

is pressured.  Thought process, disorganized and illogical.  Thought

content, auditory hallucinations and paranoid delusions.  Insight and

judgment is poor.



DIAGNOSIS:  Bipolar 2.



PLAN:  Treat him with Seroquel _____ mg nightly, Depakote 500 mg q.12

hours, Lexapro 10 mg a day, Ativan 0.5 mg every four hours p.r.n. anxiety

and agitation.  Provided 20 minutes of cognitive behavioral therapy to

help him identify automatic negative thoughts and help him to convert the

automatic negative thoughts to more positive thoughts to reduce depression

and suicidality.  Seen and assessed at bedside.  _____ 20 minutes of

cognitive behavioral therapy provided.  Chart reviewed.  Discussed with

staff.  Seen and assessed in his room.









  ______________________________________________

  Gulshan Armstrong M.D.





DR:  MATEUSZ

D:  10/06/2018 07:07

T:  10/06/2018 20:30

JOB#:  [id number]

CC:

## 2018-10-06 NOTE — PULMONOLOGY PROGRESS NOTE
Assessment/Plan


Problems:  


(1) Uncontrolled seizures


(2) Noncompliance


(3) COPD (chronic obstructive pulmonary disease)


(4) HTN (hypertension)


(5) Psychosomatic disorder


(6) Anxiety


Assessment/Plan


no more seizures


adjust meds


respiratory treatment


titrate fio2 to sat of 92%


monitor BP


f/u psych recommendations.





Subjective


ROS Limited/Unobtainable:  No


Constitutional:  Reports: no symptoms


HEENT:  Repors: no symptoms


Respiratory:  Reports: no symptoms


Allergies:  


Coded Allergies:  


     HALOPERIDOL (Verified  Allergy, Unknown, 10/5/18)





Objective





Last 24 Hour Vital Signs








  Date Time  Temp Pulse Resp B/P (MAP) Pulse Ox O2 Delivery O2 Flow Rate FiO2


 


10/6/18 09:00      Room Air  


 


10/6/18 08:47  82  112/64    


 


10/6/18 08:00 98.3 82 20 112/64 (80) 94   





 98.3       


 


10/6/18 05:12  82 20   Room Air  21


 


10/6/18 04:00 98.8 72 21 99/63 (75) 93   





 98.8       


 


10/6/18 00:00 97.7 76 18 109/85 (93) 95   





 97.7       


 


10/5/18 21:27  85  120/67    


 


10/5/18 21:00      Room Air  


 


10/5/18 20:00 98.2 85 17 120/67 (84) 95   





 98.2       


 


10/5/18 16:00 98.3 85 20 120/72 (88) 97   





 98.3       

















Intake and Output  


 


 10/5/18 10/6/18





 19:00 07:00


 


Intake Total 460 ml 1000 ml


 


Output Total 800 ml 700 ml


 


Balance -340 ml 300 ml


 


  


 


Intake Oral 460 ml 


 


Other  1000 ml


 


Output Urine Total 800 ml 700 ml








General Appearance:  WD/WN


HEENT:  normocephalic, atraumatic


Respiratory/Chest:  chest wall non-tender, lungs clear


Cardiovascular:  normal peripheral pulses, regular rhythm


Abdomen:  soft, non tender, no organomegaly


Genitourinary:  normal external genitalia


Extremities:  no clubbing


Skin:  no lesions





Microbiology








 Date/Time


Source Procedure


Growth Status


 


 


 10/4/18 21:20


Nasal Nares MRSA Culture - Final


Staphylococcus Aureus - Mrsa Complete








Laboratory Tests


10/5/18 19:00: 


Vitamin D 25-Hydroxy [Pending], 25-Hydroxy Vitamin D2 [Pending], 25-Hydroxy 

Vitamin D3 [Pending], Rapid Plasma Reagin Non reactive


10/6/18 09:05: 


White Blood Count 7.4, Red Blood Count 4.80, Hemoglobin 14.5, Hematocrit 41.2L, 

Mean Corpuscular Volume 86#, Mean Corpuscular Hemoglobin 30.2, Mean Corpuscular 

Hemoglobin Concent 35.2, Red Cell Distribution Width 12.4, Platelet Count 166, 

Mean Platelet Volume 7.8, Neutrophils (%) (Auto) 58.7, Lymphocytes (%) (Auto) 

31.0, Monocytes (%) (Auto) 7.0, Eosinophils (%) (Auto) 2.4, Basophils (%) (Auto

) 0.8, Sodium Level 142, Potassium Level 4.2, Chloride Level 106, Carbon 

Dioxide Level 25, Anion Gap 11, Blood Urea Nitrogen 14, Creatinine 1.0, Estimat 

Glomerular Filtration Rate > 60, Glucose Level 163H, Calcium Level 8.8, 

Valproic Acid (Depakene) Level 65





Current Medications








 Medications


  (Trade)  Dose


 Ordered  Sig/Henry


 Route


 PRN Reason  Start Time


 Stop Time Status Last Admin


Dose Admin


 


 Acetaminophen


  (Tylenol)  650 mg  Q4H  PRN


 ORAL


 fever  10/5/18 15:30


 11/3/18 15:29   


 


 


 Al Hydroxide/Mg


 Hydroxide


  (Mylanta II)  30 ml  Q6H  PRN


 ORAL


 dyspepsia  10/5/18 15:30


 11/3/18 15:29   


 


 


 Albuterol/


 Ipratropium


  (Albuterol/


 Ipratropium)  3 ml  Q4H  PRN


 HHN


 Shortness of Breath  10/5/18 15:30


 10/9/18 15:29   


 


 


 Aspirin


  (Ecotrin)  81 mg  DAILY


 ORAL


   10/6/18 09:00


 11/4/18 08:59  10/6/18 08:41


 


 


 Clonidine HCl


  (Catapres Tab)  0.1 mg  Q4H  PRN


 ORAL


 For High Blood Pressure  10/5/18 15:30


 11/3/18 15:29   


 


 


 Dextrose


  (Dextrose 50%)  25 ml  Q30M  PRN


 IV


 Hypoglycemia  10/5/18 15:30


 11/3/18 15:29   


 


 


 Dextrose


  (Dextrose 50%)  50 ml  Q30M  PRN


 IV


 Hypoglycemia  10/5/18 15:30


 11/3/18 15:29   


 


 


 Divalproex Sodium


  (Depakote)  500 mg  Q12HR


 ORAL


   10/5/18 21:00


 11/3/18 20:59  10/6/18 08:41


 


 


 Escitalopram


 Oxalate


  (Lexapro)  10 mg  DAILY


 ORAL


   10/6/18 09:00


 11/4/18 08:59  10/6/18 08:41


 


 


 Gadobutrol


  (Gadavist)  7.5 mmol  NOW  PRN


 IV


 Radiology Procedure  10/5/18 16:16


 10/6/18 16:15   


 


 


 Heparin Sodium


  (Porcine)


  (Heparin 5000


 units/ml)  5,000 units  EVERY 12  HOURS


 SUBQ


   10/5/18 21:00


 11/3/18 20:59  10/6/18 08:47


 


 


 Insulin Aspart


  (NovoLOG)    BEFORE MEALS AND  HS


 SUBQ


   10/5/18 16:30


 11/4/18 11:29   


 


 


 Lorazepam


  (Ativan 2mg/ml


 1ml)  0.5 mg  Q4H  PRN


 IV


 For Anxiety  10/5/18 15:30


 10/11/18 15:29   


 


 


 Metoprolol


 Succinate


  (Toprol XL)  25 mg  Q12HR


 ORAL


   10/5/18 21:00


 11/4/18 08:59  10/5/18 21:27


 


 


 Morphine Sulfate


  (Morphine


 Sulfate)  1 mg  Q4H  PRN


 IVP


 For Pain 7-10  10/5/18 18:30


 10/11/18 18:29  10/6/18 09:25


 


 


 Nicotine


  (Nicoderm)  1 patch  Q24H


 TDERMAL


   10/5/18 17:30


 11/4/18 17:29  10/5/18 18:29


 


 


 Nitroglycerin


  (Ntg)  0.4 mg  Q5M X 3 DOSES PRN


 SL


 Prn Chest Pain  10/5/18 15:45


 11/3/18 20:59   


 


 


 Ondansetron HCl


  (Zofran)  4 mg  Q4H  PRN


 IVP


 Nausea & Vomiting  10/5/18 21:00


 11/4/18 20:59  10/5/18 21:26


 


 


 Polyethylene


 Glycol


  (Miralax)  17 gm  HSPRN  PRN


 ORAL


 Constipation  10/5/18 21:00


 11/3/18 20:59   


 


 


 Promethazine HCl/


 Codeine


  (Phenergan with


 Codeine)  5 ml  Q4H  PRN


 ORAL


 For Cough  10/5/18 16:00


 11/3/18 15:59   


 


 


 Quetiapine


 Fumarate


  (SEROquel)  200 mg  BEDTIME


 ORAL


   10/5/18 21:00


 11/3/18 20:59  10/5/18 21:26


 


 


 Temazepam


  (Restoril)  15 mg  HSPRN  PRN


 ORAL


 Insomnia  10/5/18 21:00


 10/11/18 20:59   


 

















Lissette Steward MD Oct 6, 2018 12:02

## 2018-10-06 NOTE — NEUROLOGY PROGRESS NOTE
Interim History


Mr. Zapien feels better.


He has had no further seizures.


The MRI of the brain did not reveal any acute pathology.


An old left sub-occipital craniectomy and left cerebellar encephalomalacia was 

seen.


The mind is clearer today.


He still feels weak on the left and continues to embellish the amount of 

weakness.





Review of Systems


Neuro Review of Systems


Benign.





Objective


Physical Exam





Last Vital Signs








  Date Time  Temp Pulse Resp B/P (MAP) Pulse Ox O2 Delivery O2 Flow Rate FiO2


 


10/6/18 09:00      Room Air  


 


10/6/18 08:47  82  112/64    


 


10/6/18 08:00 98.3  20  94   





 98.3       


 


10/6/18 05:12        21











Laboratory Tests








Test


  10/5/18


19:00 10/6/18


09:05


 


Vitamin D 25-Hydroxy Pending   


 


25-Hydroxy Vitamin D2 Pending   


 


25-Hydroxy Vitamin D3 Pending   


 


Rapid Plasma Reagin


  Non reactive


(Non Reactive) 


 


 


White Blood Count


  


  7.4 K/UL


(4.8-10.8)


 


Red Blood Count


  


  4.80 M/UL


(4.70-6.10)


 


Hemoglobin


  


  14.5 G/DL


(14.2-18.0)


 


Hematocrit


  


  41.2 %


(42.0-52.0)  L


 


Mean Corpuscular Volume


  


  86 FL (80-99)


#


 


Mean Corpuscular Hemoglobin


  


  30.2 PG


(27.0-31.0)


 


Mean Corpuscular Hemoglobin


Concent 


  35.2 G/DL


(32.0-36.0)


 


Red Cell Distribution Width


  


  12.4 %


(11.6-14.8)


 


Platelet Count


  


  166 K/UL


(150-450)


 


Mean Platelet Volume


  


  7.8 FL


(6.5-10.1)


 


Neutrophils (%) (Auto)


  


  58.7 %


(45.0-75.0)


 


Lymphocytes (%) (Auto)


  


  31.0 %


(20.0-45.0)


 


Monocytes (%) (Auto)


  


  7.0 %


(1.0-10.0)


 


Eosinophils (%) (Auto)


  


  2.4 %


(0.0-3.0)


 


Basophils (%) (Auto)


  


  0.8 %


(0.0-2.0)


 


Sodium Level


  


  142 MMOL/L


(136-145)


 


Potassium Level


  


  4.2 MMOL/L


(3.5-5.1)


 


Chloride Level


  


  106 MMOL/L


()


 


Carbon Dioxide Level


  


  25 MMOL/L


(21-32)


 


Anion Gap


  


  11 mmol/L


(5-15)


 


Blood Urea Nitrogen


  


  14 mg/dL


(7-18)


 


Creatinine


  


  1.0 MG/DL


(0.55-1.30)


 


Estimat Glomerular Filtration


Rate 


  > 60 mL/min


(>60)


 


Glucose Level


  


  163 MG/DL


()  H


 


Calcium Level


  


  8.8 MG/DL


(8.5-10.1)


 


Valproic Acid (Depakene) Level


  


  65 MCG/ML


()











Neurologic Exam


Objective


PHYSICAL EXAMINATION:


GENERAL:  He is a well-developed, well-nourished, obese,  gentleman, 

lying in bed, in no acute distress.


HEAD:  Normocephalic and atraumatic.  


EENT: Examination benign.


NECK:  No neck rigidity was observed.





NEUROLOGICAL EXAMINATION:


MENTAL STATUS EXAMINATION:  


He was awake and alert.  


He was oriented to person, place, and time except for the exact date.  


He was able to recall 3/3 words immediately, but could only remember 2/3 words 

in 1 minute and 3 minutes.  


He was able to remember Presidents Trump and Obama, but could not remember 

Presidents prior to that.  


His mathematical skills were impaired except when he used his phone as a 

calculator.  


His visuospatial function was also impaired.  


SPEECH: He had a mild dysarthria.  


LANGUAGE: He had anomia for low-frequency words.


CRANIAL NERVE EXAMINATION:


II:  The visual fields were intact on confrontation testing.


III, IV & VI:  The external ocular movements were full and the pupils 3 mm in 

diameter, equal, round, regular, and reactive to light.


V:  He had normal facial sensations, and the temporales, masseters, and 

pterygoids functioned normally.


VII:  He had normal facial expressions and no facial asymmetry.


VIII: He was able to hear well bilaterally and had no nystagmus.


IX:  The palate moved symmetrically on phonation.


X:  He had no hoarseness of voice.


XI:  The sternocleidomastoids and trapezii functioned normally.


XII:  The tongue was in the midline without any fasciculations or atrophy.


MOTOR SYSTEM:  Tone was normal in all four extremities. Examination of muscle 

mass revealed no focal wasting. Examination of power revealed G 5/5 power on 

the right side. On the left side, power was exceedingly difficult to test 

because he exhibited significant give-way weakness.


SENSORY EXAMINATION:  He had intact sensations to pinprick, light touch, and 

graphesthesia.


COORDINATION:  He performed well on finger-to-nose and heel-to-shin testing on 

the right side.  On the left side, these were performed in a very erratic 

manner.


REFLEXES:  0 at the biceps, triceps, brachioradialis, knees and ankles.  The 

plantar responses were flexor bilaterally.


STANCE:  He stood up with support.  After he had been standing for a few seconds

, he exhibited unusual movements of his left leg, which he attributed to 

weakness.  


GAIT: He was able to take a few steps, but then exhibited some left leg 

collapse as a result of which he was made to back up, which he did quite well 

and sit in bed.





Impression/Recommendations


Diagnostic Impression


1. Mr. Shadi Zapien is a 37-year-old, right-handed, , gentleman, 

with a long history of obesity, bipolar affective disorder, hypertension, 

diabetes mellitus, chronic obstructive pulmonary disease, tobacco abuse, 

seizures of an unknown type, and strokes of an unknown type, who was 

hospitalized on 10/04/2018 for speech problems, increased left-sided weakness, 

and questionable seizures when he had run out of [facility].


2. He feels better. He has had no further seizures. The MRI of the brain did 

not reveal any acute pathology. An old left sub-occipital craniectomy and left 

cerebellar encephalomalacia was seen. The mind is clearer today. He still feels 

weak on the left and continues to embellish the amount of weakness.


3. On neurological examination at this time, he does demonstrate problems with 

orientation, recent and remote memory, higher cognitive function, and language.

  He also exhibits significant  give-way weakness on the left side making it 

impossible to determine the amount of strength on that side.  He has globally 

absent deep tendon reflexes and when he is made to stand and walk, he exhibits 

left leg collapse.  However when he is made to back up, he does that quite well.


4. He did have a CT scan of the brain performed on 10/04/2018 and it revealed 

findings consistent with suboccipital craniectomy, but no other intracranial 

pathology was discovered.


5. The MRI of the brain done on 10/5/18 did not reveal any acute pathology. An 

old left sub-occipital craniectomy and left cerebellar encephalomalacia was 

seen. 


6. Laboratory data obtained thus far have revealed a relatively normal CBC, 

chemistry panel with an elevated blood glucose at 135 when he came in.  

Relatively normal lipid panel.  His valproic acid level was 53 - it is unclear 

if this was random, trough, or peak level.


7. A repeat trough valproic acid level was ordered for 10/6/18 but 

unfortunately his level was drawn after he was dosed. The level was 65.


8. The patient's history, neurological examination, laboratory data, and 

imaging studies are most compatible with underlying bipolar affective disorder, 

hypertension, diabetes mellitus, smoking, strokes, and seizures. It is unclear 

if the patient's increased left-sided weakness is due to embellishment or due 

to true weakness.  It is also unclear as to what type of seizure disorder he 

has.


Recommendations


1. Continue present management.


2. Continue Depakote 500 mg q.12 h. for seizure prophylaxis.


3. We will re-order trough valproic acid level.


4. Await EEG to evaluate the patient for the type of seizure disorder.


5. Mobilized rapidly.








_________________________


Mayi Woody M.D., M.S.P.PJ.











MAYI WOODY Oct 6, 2018 14:50

## 2018-10-06 NOTE — GENERAL PROGRESS NOTE
Assessment/Plan


Problem List:  


(1) The administrative codes within the IMO content you are accessing may have 

 as of 10/01/2018.  Please contact your IT Dept/Help Desk and request 

the latest Regulatory release be installed. IT Dept/Help Desk- Please refer to 

our FAQ page ([email or web address] or contact O 

Customer Support at [email or web address]


(2) Malnutrition


ICD Codes:  E46 - Unspecified protein-calorie malnutrition


SNOMED:  10408317


(3) CVA (cerebral vascular accident)


ICD Codes:  I63.9 - Cerebral infarction, unspecified


SNOMED:  500524340


(4) COPD exacerbation


ICD Codes:  J44.1 - Chronic obstructive pulmonary disease with (acute) 

exacerbation


SNOMED:  180128297, 633099872


(5) HTN (hypertension)


ICD Codes:  I10 - Essential (primary) hypertension


SNOMED:  02170510


(6) Anxiety


ICD Codes:  F41.9 - Anxiety disorder, unspecified; Z68.41 - Body mass index (BMI

) 40.0-44.9, adult


SNOMED:  37877784


(7) Diabetes mellitus


ICD Codes:  E11.9 - Diabetes mellitus


SNOMED:  06370755


Status:  unchanged


Assessment/Plan


ot pt diet abx cbc bmp am dc plan snf





Subjective


Constitutional:  Reports: weakness


Allergies:  


Coded Allergies:  


     HALOPERIDOL (Verified  Allergy, Unknown, 10/5/18)


All Systems:  reviewed and negative except above


Subjective


calm in bed





Objective





Last 24 Hour Vital Signs








  Date Time  Temp Pulse Resp B/P (MAP) Pulse Ox O2 Delivery O2 Flow Rate FiO2


 


10/6/18 05:12  82 20   Room Air  21


 


10/6/18 04:00 98.8 72 21 99/63 (75) 93   





 98.8       


 


10/6/18 00:00 97.7 76 18 109/85 (93) 95   





 97.7       


 


10/5/18 21:27  85  120/67    


 


10/5/18 21:00      Room Air  


 


10/5/18 20:00 98.2 85 17 120/67 (84) 95   





 98.2       


 


10/5/18 16:00 98.3 85 20 120/72 (88) 97   





 98.3       


 


10/5/18 12:00 97.9 79 20 110/64 (79) 96   





 97.9       


 


10/5/18 12:00  82      


 


10/5/18 09:00      Room Air  


 


10/5/18 09:00  76  101/62    

















Intake and Output  


 


 10/5/18 10/6/18





 19:00 07:00


 


Intake Total 460 ml 1000 ml


 


Output Total 800 ml 700 ml


 


Balance -340 ml 300 ml


 


  


 


Intake Oral 460 ml 


 


Other  1000 ml


 


Output Urine Total 800 ml 700 ml








Laboratory Tests


10/5/18 19:00: 


Vitamin D 25-Hydroxy [Pending], 25-Hydroxy Vitamin D2 [Pending], 25-Hydroxy 

Vitamin D3 [Pending], Rapid Plasma Reagin Non reactive


Height (Feet):  5


Height (Inches):  11.00


Weight (Pounds):  256


General Appearance:  alert


EENT:  normal ENT inspection


Neck:  normal alignment


Cardiovascular:  normal peripheral pulses, normal rate, regular rhythm


Respiratory/Chest:  chest wall non-tender, lungs clear, normal breath sounds


Abdomen:  normal bowel sounds, non tender, soft


Extremities:  normal inspection


Edema:  no edema noted Arm (L), no edema noted Arm (R), no edema noted Leg (L), 

no edema noted Leg (R), no edema noted Pedal (L), no edema noted Pedal (R), no 

edema noted Generalized


Neurologic:  responsive, motor weakness


Skin:  normal pigmentation, warm/dry











Samir Cabrera DO Oct 6, 2018 08:28

## 2018-10-07 VITALS — DIASTOLIC BLOOD PRESSURE: 97 MMHG | SYSTOLIC BLOOD PRESSURE: 115 MMHG

## 2018-10-07 VITALS — SYSTOLIC BLOOD PRESSURE: 105 MMHG | DIASTOLIC BLOOD PRESSURE: 71 MMHG

## 2018-10-07 VITALS — DIASTOLIC BLOOD PRESSURE: 68 MMHG | SYSTOLIC BLOOD PRESSURE: 125 MMHG

## 2018-10-07 VITALS — DIASTOLIC BLOOD PRESSURE: 84 MMHG | SYSTOLIC BLOOD PRESSURE: 126 MMHG

## 2018-10-07 VITALS — SYSTOLIC BLOOD PRESSURE: 103 MMHG | DIASTOLIC BLOOD PRESSURE: 66 MMHG

## 2018-10-07 VITALS — DIASTOLIC BLOOD PRESSURE: 64 MMHG | SYSTOLIC BLOOD PRESSURE: 110 MMHG

## 2018-10-07 LAB
ADD MANUAL DIFF: NO
ANION GAP SERPL CALC-SCNC: 9 MMOL/L (ref 5–15)
BASOPHILS NFR BLD AUTO: 0.7 % (ref 0–2)
BUN SERPL-MCNC: 12 MG/DL (ref 7–18)
CALCIUM SERPL-MCNC: 8.4 MG/DL (ref 8.5–10.1)
CHLORIDE SERPL-SCNC: 104 MMOL/L (ref 98–107)
CO2 SERPL-SCNC: 25 MMOL/L (ref 21–32)
CREAT SERPL-MCNC: 0.8 MG/DL (ref 0.55–1.3)
EOSINOPHIL NFR BLD AUTO: 2.1 % (ref 0–3)
ERYTHROCYTE [DISTWIDTH] IN BLOOD BY AUTOMATED COUNT: 12 % (ref 11.6–14.8)
HCT VFR BLD CALC: 40.2 % (ref 42–52)
HGB BLD-MCNC: 14.5 G/DL (ref 14.2–18)
LYMPHOCYTES NFR BLD AUTO: 26 % (ref 20–45)
MCV RBC AUTO: 84 FL (ref 80–99)
MONOCYTES NFR BLD AUTO: 8.3 % (ref 1–10)
NEUTROPHILS NFR BLD AUTO: 62.9 % (ref 45–75)
PLATELET # BLD: 172 K/UL (ref 150–450)
POTASSIUM SERPL-SCNC: 4 MMOL/L (ref 3.5–5.1)
RBC # BLD AUTO: 4.77 M/UL (ref 4.7–6.1)
SODIUM SERPL-SCNC: 138 MMOL/L (ref 136–145)
WBC # BLD AUTO: 8.8 K/UL (ref 4.8–10.8)

## 2018-10-07 RX ADMIN — MORPHINE SULFATE PRN MG: 2 INJECTION, SOLUTION INTRAMUSCULAR; INTRAVENOUS at 15:50

## 2018-10-07 RX ADMIN — INSULIN ASPART SCH UNITS: 100 INJECTION, SOLUTION INTRAVENOUS; SUBCUTANEOUS at 06:30

## 2018-10-07 RX ADMIN — METOPROLOL SUCCINATE SCH MG: 25 TABLET, EXTENDED RELEASE ORAL at 21:33

## 2018-10-07 RX ADMIN — HEPARIN SODIUM SCH UNITS: 5000 INJECTION INTRAVENOUS; SUBCUTANEOUS at 21:00

## 2018-10-07 RX ADMIN — METOPROLOL SUCCINATE SCH MG: 25 TABLET, EXTENDED RELEASE ORAL at 08:02

## 2018-10-07 RX ADMIN — DIVALPROEX SODIUM SCH MG: 500 TABLET, DELAYED RELEASE ORAL at 21:33

## 2018-10-07 RX ADMIN — HEPARIN SODIUM SCH UNITS: 5000 INJECTION INTRAVENOUS; SUBCUTANEOUS at 08:03

## 2018-10-07 RX ADMIN — INSULIN ASPART SCH UNITS: 100 INJECTION, SOLUTION INTRAVENOUS; SUBCUTANEOUS at 11:59

## 2018-10-07 RX ADMIN — MORPHINE SULFATE PRN MG: 2 INJECTION, SOLUTION INTRAMUSCULAR; INTRAVENOUS at 11:51

## 2018-10-07 RX ADMIN — DIVALPROEX SODIUM SCH MG: 500 TABLET, DELAYED RELEASE ORAL at 08:02

## 2018-10-07 RX ADMIN — QUETIAPINE SCH MG: 200 TABLET, FILM COATED ORAL at 21:33

## 2018-10-07 RX ADMIN — INSULIN ASPART SCH UNITS: 100 INJECTION, SOLUTION INTRAVENOUS; SUBCUTANEOUS at 21:00

## 2018-10-07 RX ADMIN — INSULIN ASPART SCH UNITS: 100 INJECTION, SOLUTION INTRAVENOUS; SUBCUTANEOUS at 17:35

## 2018-10-07 RX ADMIN — MORPHINE SULFATE PRN MG: 2 INJECTION, SOLUTION INTRAMUSCULAR; INTRAVENOUS at 08:02

## 2018-10-07 RX ADMIN — ASPIRIN SCH MG: 81 TABLET, DELAYED RELEASE ORAL at 08:02

## 2018-10-07 RX ADMIN — MORPHINE SULFATE PRN MG: 2 INJECTION, SOLUTION INTRAMUSCULAR; INTRAVENOUS at 03:30

## 2018-10-07 RX ADMIN — MORPHINE SULFATE PRN MG: 2 INJECTION, SOLUTION INTRAMUSCULAR; INTRAVENOUS at 20:03

## 2018-10-07 NOTE — GENERAL PROGRESS NOTE
Assessment/Plan


Problem List:  


(1) The administrative codes within the IMO content you are accessing may have 

 as of 10/01/2018.  Please contact your IT Dept/Help Desk and request 

the latest Regulatory release be installed. IT Dept/Help Desk- Please refer to 

our FAQ page ([email or web address] or contact O 

Customer Support at [email or web address]


(2) Malnutrition


ICD Codes:  E46 - Unspecified protein-calorie malnutrition


SNOMED:  83423703


(3) CVA (cerebral vascular accident)


ICD Codes:  I63.9 - Cerebral infarction, unspecified


SNOMED:  343473420


(4) COPD exacerbation


ICD Codes:  J44.1 - Chronic obstructive pulmonary disease with (acute) 

exacerbation


SNOMED:  997063390, 151895415


(5) HTN (hypertension)


ICD Codes:  I10 - Essential (primary) hypertension


SNOMED:  76094281


(6) Anxiety


ICD Codes:  F41.9 - Anxiety disorder, unspecified; Z68.41 - Body mass index (BMI

) 40.0-44.9, adult


SNOMED:  81742391


(7) Diabetes mellitus


ICD Codes:  E11.9 - Diabetes mellitus


SNOMED:  38813156


Status:  stable, progressing


Assessment/Plan


ot pt diet abx  dc plan snf





Subjective


Constitutional:  Reports: weakness


Allergies:  


Coded Allergies:  


     HALOPERIDOL (Verified  Allergy, Unknown, 10/5/18)


All Systems:  reviewed and negative except above


Subjective


calm in bed





Objective





Last 24 Hour Vital Signs








  Date Time  Temp Pulse Resp B/P (MAP) Pulse Ox O2 Delivery O2 Flow Rate FiO2


 


10/7/18 08:02  82  125/68    


 


10/7/18 04:00 98.6 90 18 115/97 (103) 99   





 98.6       


 


10/7/18 03:30 98.0       


 


10/7/18 00:00 98.0 83 18 103/66 (78) 95   





 98.0       


 


10/6/18 21:41  83  123/70    


 


10/6/18 21:00      Room Air  


 


10/6/18 20:05  83 20   Room Air  21


 


10/6/18 20:00 98.6 88 18 123/70 (87) 99   





 98.6       


 


10/6/18 16:16  81 20   Room Air  21


 


10/6/18 16:00 98.4 84 20 112/69 (83) 94   





 98.4       


 


10/6/18 12:00 99.0 83 18 111/70 (84) 94   





 99.0       


 


10/6/18 09:00      Room Air  


 


10/6/18 08:47  82  112/64    

















Intake and Output  


 


 10/6/18 10/7/18





 19:00 07:00


 


Intake Total 700 ml 1000 ml


 


Output Total  1600 ml


 


Balance 700 ml -600 ml


 


  


 


Intake Oral 700 ml 1000 ml


 


Output Urine Total  1600 ml


 


# Voids 5 3








Laboratory Tests


10/6/18 09:05: 


White Blood Count 7.4, Red Blood Count 4.80, Hemoglobin 14.5, Hematocrit 41.2L, 

Mean Corpuscular Volume 86#, Mean Corpuscular Hemoglobin 30.2, Mean Corpuscular 

Hemoglobin Concent 35.2, Red Cell Distribution Width 12.4, Platelet Count 166, 

Mean Platelet Volume 7.8, Neutrophils (%) (Auto) 58.7, Lymphocytes (%) (Auto) 

31.0, Monocytes (%) (Auto) 7.0, Eosinophils (%) (Auto) 2.4, Basophils (%) (Auto

) 0.8, Sodium Level 142, Potassium Level 4.2, Chloride Level 106, Carbon 

Dioxide Level 25, Anion Gap 11, Blood Urea Nitrogen 14, Creatinine 1.0, Estimat 

Glomerular Filtration Rate > 60, Glucose Level 163H, Calcium Level 8.8, 

Valproic Acid (Depakene) Level 65


10/6/18 19:35: Valproic Acid (Depakene) Level 54


10/7/18 07:10: 


White Blood Count 8.8, Red Blood Count 4.77, Hemoglobin 14.5, Hematocrit 40.2L, 

Mean Corpuscular Volume 84, Mean Corpuscular Hemoglobin 30.4, Mean Corpuscular 

Hemoglobin Concent 36.1H, Red Cell Distribution Width 12.0, Platelet Count 172, 

Mean Platelet Volume 7.5, Neutrophils (%) (Auto) 62.9, Lymphocytes (%) (Auto) 

26.0, Monocytes (%) (Auto) 8.3, Eosinophils (%) (Auto) 2.1, Basophils (%) (Auto

) 0.7, Sodium Level 138, Potassium Level 4.0, Chloride Level 104, Carbon 

Dioxide Level 25, Anion Gap 9, Blood Urea Nitrogen 12, Creatinine 0.8, Estimat 

Glomerular Filtration Rate > 60, Glucose Level 97, Calcium Level 8.4L


Height (Feet):  5


Height (Inches):  11.00


Weight (Pounds):  256


General Appearance:  alert


EENT:  normal ENT inspection


Neck:  normal alignment


Cardiovascular:  normal peripheral pulses, normal rate, regular rhythm


Respiratory/Chest:  chest wall non-tender, lungs clear, normal breath sounds


Abdomen:  normal bowel sounds, non tender, soft


Extremities:  normal inspection


Edema:  no edema noted Arm (L), no edema noted Arm (R), no edema noted Leg (L), 

no edema noted Leg (R), no edema noted Pedal (L), no edema noted Pedal (R), no 

edema noted Generalized


Neurologic:  responsive, motor weakness


Skin:  normal pigmentation, warm/dry











Samir Cabrera DO Oct 7, 2018 08:37

## 2018-10-07 NOTE — NEUROLOGY PROGRESS NOTE
Interim History


Mr. Zapien feels better generally.


He has had no further seizures.


The mind is clear.


He still feels weak on the left and continues to embellish the amount of 

weakness.


His left wrist is swollen from an infiltrated IV and more painful.


He denies any new neurologic symptoms.





Review of Systems


Neuro Review of Systems


Benign.





Objective


Physical Exam





Last Vital Signs








  Date Time  Temp Pulse Resp B/P (MAP) Pulse Ox O2 Delivery O2 Flow Rate FiO2


 


10/7/18 12:00 99.1 78 20 103/71 (82) 98   





 99.1       


 


10/7/18 09:00      Room Air  


 


10/7/18 08:15        21











Laboratory Tests








Test


  10/6/18


19:35 10/7/18


07:10


 


Valproic Acid (Depakene) Level


  54 MCG/ML


() 


 


 


White Blood Count


  


  8.8 K/UL


(4.8-10.8)


 


Red Blood Count


  


  4.77 M/UL


(4.70-6.10)


 


Hemoglobin


  


  14.5 G/DL


(14.2-18.0)


 


Hematocrit


  


  40.2 %


(42.0-52.0)  L


 


Mean Corpuscular Volume  84 FL (80-99)  


 


Mean Corpuscular Hemoglobin


  


  30.4 PG


(27.0-31.0)


 


Mean Corpuscular Hemoglobin


Concent 


  36.1 G/DL


(32.0-36.0)  H


 


Red Cell Distribution Width


  


  12.0 %


(11.6-14.8)


 


Platelet Count


  


  172 K/UL


(150-450)


 


Mean Platelet Volume


  


  7.5 FL


(6.5-10.1)


 


Neutrophils (%) (Auto)


  


  62.9 %


(45.0-75.0)


 


Lymphocytes (%) (Auto)


  


  26.0 %


(20.0-45.0)


 


Monocytes (%) (Auto)


  


  8.3 %


(1.0-10.0)


 


Eosinophils (%) (Auto)


  


  2.1 %


(0.0-3.0)


 


Basophils (%) (Auto)


  


  0.7 %


(0.0-2.0)


 


Sodium Level


  


  138 MMOL/L


(136-145)


 


Potassium Level


  


  4.0 MMOL/L


(3.5-5.1)


 


Chloride Level


  


  104 MMOL/L


()


 


Carbon Dioxide Level


  


  25 MMOL/L


(21-32)


 


Anion Gap


  


  9 mmol/L


(5-15)


 


Blood Urea Nitrogen


  


  12 mg/dL


(7-18)


 


Creatinine


  


  0.8 MG/DL


(0.55-1.30)


 


Estimat Glomerular Filtration


Rate 


  > 60 mL/min


(>60)


 


Glucose Level


  


  97 MG/DL


()


 


Calcium Level


  


  8.4 MG/DL


(8.5-10.1)  L











Neurologic Exam


Objective


PHYSICAL EXAMINATION:


GENERAL:  He is a well-developed, well-nourished, obese,  gentleman, 

lying in bed, in no acute distress.


HEAD:  Normocephalic and atraumatic.  


EENT: Examination benign.


NECK:  No neck rigidity was observed.





NEUROLOGICAL EXAMINATION:


MENTAL STATUS EXAMINATION:  


He was awake and alert.  


He was oriented to person, place, and time except for the exact date.  


He was able to recall 3/3 words immediately, but could only remember 2/3 words 

in 1 minute and 3 minutes.  


He was able to remember Presidents Trump and Obama, but could not remember 

Presidents prior to that.  


His mathematical skills were impaired except when he used his phone as a 

calculator.  


His visuospatial function was also impaired.  


SPEECH: He had a mild dysarthria.  


LANGUAGE: He had anomia for low-frequency words.


CRANIAL NERVE EXAMINATION:


II:  The visual fields were intact on confrontation testing.


III, IV & VI:  The external ocular movements were full and the pupils 3 mm in 

diameter, equal, round, regular, and reactive to light.


V:  He had normal facial sensations, and the temporales, masseters, and 

pterygoids functioned normally.


VII:  He had normal facial expressions and no facial asymmetry.


VIII: He was able to hear well bilaterally and had no nystagmus.


IX:  The palate moved symmetrically on phonation.


X:  He had no hoarseness of voice.


XI:  The sternocleidomastoids and trapezii functioned normally.


XII:  The tongue was in the midline without any fasciculations or atrophy.


MOTOR SYSTEM:  Tone was normal in all four extremities. Examination of muscle 

mass revealed no focal wasting. Examination of power revealed G 5/5 power on 

the right side. On the left side, power was exceedingly difficult to test 

because he exhibited significant give-way weakness. In addition the left wrist 

was swollen and he guarded against movement in the left wrist.


SENSORY EXAMINATION:  He had intact sensations to pinprick, light touch, and 

graphesthesia.


COORDINATION:  He performed well on finger-to-nose and heel-to-shin testing on 

the right side.  On the left side, these were performed in a very erratic 

manner.


REFLEXES:  0 at the biceps, triceps, brachioradialis, knees and ankles.  The 

plantar responses were flexor bilaterally.


STANCE:  He stood up with support.  After he had been standing for a few seconds

, he exhibited unusual movements of his left leg, which he attributed to 

weakness.  


GAIT: He was able to take a few steps, but then exhibited some left leg 

collapse as a result of which he was made to back up, which he did quite well 

and sit in bed.





Impression/Recommendations


Diagnostic Impression


1. Mr. Shadi Zapien is a 37-year-old, right-handed, , gentleman, 

with a long history of obesity, bipolar affective disorder, hypertension, 

diabetes mellitus, chronic obstructive pulmonary disease, tobacco abuse, 

seizures of an unknown type, and strokes of an unknown type, who was 

hospitalized on 10/04/2018 for speech problems, increased left-sided weakness, 

and questionable seizures [facility]


2. He feels better. He has had no further seizures. The mind is clear. He still 

feels weak on the left and continues to embellish the amount of weakness. His 

left wrist is swollen from an infiltrated IV and more painful. He denies any 

new neurologic symptoms.


3. On neurological examination at this time, he does demonstrate problems with 

orientation, recent and remote memory, higher cognitive function, and language.

  He also exhibits significant  give-way weakness on the left side making it 

impossible to determine the amount of strength on that side.  He has globally 

absent deep tendon reflexes and when he is made to stand and walk, he exhibits 

left leg collapse.  However when he is made to back up, he does that quite well.


4. He did have a CT scan of the brain performed on 10/04/2018 and it revealed 

findings consistent with suboccipital craniectomy, but no other intracranial 

pathology was discovered.


5. The MRI of the brain done on 10/5/18 did not reveal any acute pathology. An 

old left sub-occipital craniectomy and left cerebellar encephalomalacia was 

seen. 


6. Laboratory data obtained thus far have revealed a relatively normal CBC, 

chemistry panel with an elevated blood glucose at 135 when he came in.  

Relatively normal lipid panel.  His valproic acid level was 53 - it is unclear 

if this was random, trough, or peak level.


7. His trough valproic acid level on 10/6/18 was 54.


8. The patient's history, neurological examination, laboratory data, and 

imaging studies are most compatible with underlying bipolar affective disorder, 

hypertension, diabetes mellitus, smoking, strokes, and seizures. It is unclear 

if the patient's increased left-sided weakness is due to embellishment or due 

to true weakness.  It is also unclear as to what type of seizure disorder he 

has.


Recommendations


1. Continue present management.


2. Continue Depakote 500 mg q.12 h. for seizure prophylaxis.


3. Await EEG to evaluate the patient for the type of seizure disorder.


4. Mobilize rapidly.








_________________________


Mayi Woody M.D., M.S.P.H.











MAYI WOODY Oct 7, 2018 14:00

## 2018-10-07 NOTE — INFECTIOUS DISEASES PROG NOTE
Assessment/Plan


Assessment/Plan


ASSESSMENT:  The patient is a 37-year-old male with:





Gastroenteritis, resolved.


Afebrile.


 Normal WBC.





Seizure disorder.


Hypertension.


CVA.


History of obesity.


History of back surgery.


Diabetes.


Anxiety.





PLAN:








- monitor the patient off of antibiotics.


-Monitor screening cultures.


-Monitor CBC.


-Monitor BMP.


-If the patient's symptoms worsen, he may develop diarrhea, may send


stool for culture and C. difficile.





Subjective


Constitutional:  Denies: no symptoms, fever, chills, fatigue, anorexia, 

drenching sweats, other


Allergies:  


Coded Allergies:  


     HALOPERIDOL (Verified  Allergy, Unknown, 10/5/18)





Objective


Vital Signs





Last 24 Hour Vital Signs








  Date Time  Temp Pulse Resp B/P (MAP) Pulse Ox O2 Delivery O2 Flow Rate FiO2


 


10/7/18 21:33  90  126/84    


 


10/7/18 20:00 99.0 90 18 126/84 (98) 94   





 99.0       


 


10/7/18 19:55  78 18   Room Air  21


 


10/7/18 16:00 98.1 82 18 110/64 (79) 95   





 98.1       


 


10/7/18 12:00 99.1 87 20 105/61 (76) 98   





 99.1       


 


10/7/18 09:00      Room Air  


 


10/7/18 08:15  80 20   Room Air  21


 


10/7/18 08:02  82  125/68    


 


10/7/18 08:00 98.8 82 20 125/68 (87) 97   





 98.8       


 


10/7/18 04:00 98.6 90 18 115/97 (103) 99   





 98.6       


 


10/7/18 03:30 98.0       


 


10/7/18 00:00 98.0 83 18 103/66 (78) 95   





 98.0       








Height (Feet):  5


Height (Inches):  11.00


Weight (Pounds):  256


HEENT:  atraumatic


Respiratory/Chest:  no respiratory distress


Cardiovascular:  regular rhythm


Abdomen:  no organomegaly





Laboratory Tests








Test


  10/7/18


07:10


 


White Blood Count


  8.8 K/UL


(4.8-10.8)


 


Red Blood Count


  4.77 M/UL


(4.70-6.10)


 


Hemoglobin


  14.5 G/DL


(14.2-18.0)


 


Hematocrit


  40.2 %


(42.0-52.0)  L


 


Mean Corpuscular Volume 84 FL (80-99)  


 


Mean Corpuscular Hemoglobin


  30.4 PG


(27.0-31.0)


 


Mean Corpuscular Hemoglobin


Concent 36.1 G/DL


(32.0-36.0)  H


 


Red Cell Distribution Width


  12.0 %


(11.6-14.8)


 


Platelet Count


  172 K/UL


(150-450)


 


Mean Platelet Volume


  7.5 FL


(6.5-10.1)


 


Neutrophils (%) (Auto)


  62.9 %


(45.0-75.0)


 


Lymphocytes (%) (Auto)


  26.0 %


(20.0-45.0)


 


Monocytes (%) (Auto)


  8.3 %


(1.0-10.0)


 


Eosinophils (%) (Auto)


  2.1 %


(0.0-3.0)


 


Basophils (%) (Auto)


  0.7 %


(0.0-2.0)


 


Sodium Level


  138 MMOL/L


(136-145)


 


Potassium Level


  4.0 MMOL/L


(3.5-5.1)


 


Chloride Level


  104 MMOL/L


()


 


Carbon Dioxide Level


  25 MMOL/L


(21-32)


 


Anion Gap


  9 mmol/L


(5-15)


 


Blood Urea Nitrogen


  12 mg/dL


(7-18)


 


Creatinine


  0.8 MG/DL


(0.55-1.30)


 


Estimat Glomerular Filtration


Rate > 60 mL/min


(>60)


 


Glucose Level


  97 MG/DL


()


 


Calcium Level


  8.4 MG/DL


(8.5-10.1)  L











Current Medications








 Medications


  (Trade)  Dose


 Ordered  Sig/Henry


 Route


 PRN Reason  Start Time


 Stop Time Status Last Admin


Dose Admin


 


 Acetaminophen


  (Tylenol)  650 mg  Q4H  PRN


 ORAL


 fever  10/5/18 15:30


 11/3/18 15:29   


 


 


 Al Hydroxide/Mg


 Hydroxide


  (Mylanta II)  30 ml  Q6H  PRN


 ORAL


 dyspepsia  10/5/18 15:30


 11/3/18 15:29   


 


 


 Albuterol/


 Ipratropium


  (Albuterol/


 Ipratropium)  3 ml  Q4H  PRN


 HHN


 Shortness of Breath  10/5/18 15:30


 10/9/18 15:29   


 


 


 Aspirin


  (Ecotrin)  81 mg  DAILY


 ORAL


   10/6/18 09:00


 11/4/18 08:59  10/7/18 08:02


 


 


 Clonidine HCl


  (Catapres Tab)  0.1 mg  Q4H  PRN


 ORAL


 For High Blood Pressure  10/5/18 15:30


 11/3/18 15:29   


 


 


 Dextrose


  (Dextrose 50%)  25 ml  Q30M  PRN


 IV


 Hypoglycemia  10/5/18 15:30


 11/3/18 15:29   


 


 


 Dextrose


  (Dextrose 50%)  50 ml  Q30M  PRN


 IV


 Hypoglycemia  10/5/18 15:30


 11/3/18 15:29   


 


 


 Divalproex Sodium


  (Depakote)  500 mg  Q12HR


 ORAL


   10/5/18 21:00


 11/3/18 20:59  10/7/18 21:33


 


 


 Escitalopram


 Oxalate


  (Lexapro)  10 mg  DAILY


 ORAL


   10/6/18 09:00


 11/4/18 08:59  10/7/18 08:02


 


 


 Heparin Sodium


  (Porcine)


  (Heparin 5000


 units/ml)  5,000 units  EVERY 12  HOURS


 SUBQ


   10/5/18 21:00


 11/3/18 20:59  10/7/18 08:03


 


 


 Insulin Aspart


  (NovoLOG)    BEFORE MEALS AND  HS


 SUBQ


   10/5/18 16:30


 11/4/18 11:29  10/7/18 17:35


 


 


 Lorazepam


  (Ativan 2mg/ml


 1ml)  0.5 mg  Q4H  PRN


 IV


 For Anxiety  10/5/18 15:30


 10/11/18 15:29   


 


 


 Metoprolol


 Succinate


  (Toprol XL)  25 mg  Q12HR


 ORAL


   10/5/18 21:00


 11/4/18 08:59  10/7/18 21:33


 


 


 Morphine Sulfate


  (Morphine


 Sulfate)  2 mg  Q4H  PRN


 IVP


 For Pain  10/7/18 10:00


 10/14/18 09:59  10/7/18 20:03


 


 


 Nicotine


  (Nicoderm)  1 patch  Q24H


 TDERMAL


   10/5/18 17:30


 11/4/18 17:29  10/7/18 17:32


 


 


 Nitroglycerin


  (Ntg)  0.4 mg  Q5M X 3 DOSES PRN


 SL


 Prn Chest Pain  10/5/18 15:45


 11/3/18 20:59   


 


 


 Ondansetron HCl


  (Zofran)  4 mg  Q4H  PRN


 IVP


 Nausea & Vomiting  10/5/18 21:00


 11/4/18 20:59  10/7/18 13:09


 


 


 Polyethylene


 Glycol


  (Miralax)  17 gm  HSPRN  PRN


 ORAL


 Constipation  10/5/18 21:00


 11/3/18 20:59   


 


 


 Promethazine HCl/


 Codeine


  (Phenergan with


 Codeine)  5 ml  Q4H  PRN


 ORAL


 For Cough  10/5/18 16:00


 11/3/18 15:59   


 


 


 Quetiapine


 Fumarate


  (SEROquel)  200 mg  BEDTIME


 ORAL


   10/5/18 21:00


 11/3/18 20:59  10/7/18 21:33


 


 


 Temazepam


  (Restoril)  15 mg  HSPRN  PRN


 ORAL


 Insomnia  10/5/18 21:00


 10/11/18 20:59   


 

















Endy Redding MD Oct 7, 2018 22:10

## 2018-10-07 NOTE — PROGRESS NOTE
DATE:  10/07/2018



SUBJECTIVE:  This is a 37-year-old male patient with status post

cerebrovascular accident.  He is very confused, disorganized _____, mood

lability, and agitation secondary to stress of his medical illness.  That

is why, his attending has requested daily psychiatric consultation.



MENTAL STATUS EXAMINATION:  This is a 37-year-old male.  Appearance is

disheveled.  Attitude, irritable and agitated.  Affect, guarded and

restricted.  Intellect poor.  Mood depressed and anxious.  Motor activity,

psychomotor agitation.  Attention span is poor.  Orientation x2.  Speech

is pressured.  Thought process, disorganized and illogical.  Thought

content, auditory hallucinations and paranoid delusions.  Insight and

judgment is poor.



DIAGNOSIS:  Bipolar 2.



PLAN:  Continue to treat this patient with Depakote to stabilize his mood

and Lexapro for his depression and anxiety.  Provided 20 minutes of

cognitive behavioral therapy to help the patient identify his automatic

negative thoughts and help him to convert the automatic negative thoughts

to more positive thoughts to reduce depression, anxiety, and suicidality.

A 20 minutes of cognitive behavioral therapy provided.  Chart reviewed.

Discussed with staff.  Seen and assessed in his bedside.









  ______________________________________________

  Gulshan Armstrong M.D. DR:  MATEUSZ

D:  10/07/2018 09:56

T:  10/08/2018 02:47

JOB#:  [id number]

CC:

## 2018-10-08 VITALS — SYSTOLIC BLOOD PRESSURE: 137 MMHG | DIASTOLIC BLOOD PRESSURE: 95 MMHG

## 2018-10-08 VITALS — SYSTOLIC BLOOD PRESSURE: 106 MMHG | DIASTOLIC BLOOD PRESSURE: 68 MMHG

## 2018-10-08 VITALS — DIASTOLIC BLOOD PRESSURE: 81 MMHG | SYSTOLIC BLOOD PRESSURE: 116 MMHG

## 2018-10-08 VITALS — SYSTOLIC BLOOD PRESSURE: 110 MMHG | DIASTOLIC BLOOD PRESSURE: 61 MMHG

## 2018-10-08 VITALS — SYSTOLIC BLOOD PRESSURE: 110 MMHG | DIASTOLIC BLOOD PRESSURE: 63 MMHG

## 2018-10-08 RX ADMIN — INSULIN ASPART SCH UNITS: 100 INJECTION, SOLUTION INTRAVENOUS; SUBCUTANEOUS at 06:30

## 2018-10-08 RX ADMIN — HEPARIN SODIUM SCH UNITS: 5000 INJECTION INTRAVENOUS; SUBCUTANEOUS at 09:00

## 2018-10-08 RX ADMIN — ASPIRIN SCH MG: 81 TABLET, DELAYED RELEASE ORAL at 09:10

## 2018-10-08 RX ADMIN — MORPHINE SULFATE PRN MG: 2 INJECTION, SOLUTION INTRAMUSCULAR; INTRAVENOUS at 14:13

## 2018-10-08 RX ADMIN — MORPHINE SULFATE PRN MG: 2 INJECTION, SOLUTION INTRAMUSCULAR; INTRAVENOUS at 02:05

## 2018-10-08 RX ADMIN — METOPROLOL SUCCINATE SCH MG: 25 TABLET, EXTENDED RELEASE ORAL at 09:10

## 2018-10-08 RX ADMIN — MORPHINE SULFATE PRN MG: 2 INJECTION, SOLUTION INTRAMUSCULAR; INTRAVENOUS at 06:04

## 2018-10-08 RX ADMIN — DIVALPROEX SODIUM SCH MG: 500 TABLET, DELAYED RELEASE ORAL at 09:11

## 2018-10-08 RX ADMIN — INSULIN ASPART SCH UNITS: 100 INJECTION, SOLUTION INTRAVENOUS; SUBCUTANEOUS at 11:27

## 2018-10-08 RX ADMIN — MORPHINE SULFATE PRN MG: 2 INJECTION, SOLUTION INTRAMUSCULAR; INTRAVENOUS at 10:11

## 2018-10-08 RX ADMIN — MORPHINE SULFATE PRN MG: 2 INJECTION, SOLUTION INTRAMUSCULAR; INTRAVENOUS at 18:18

## 2018-10-08 RX ADMIN — INSULIN ASPART SCH UNITS: 100 INJECTION, SOLUTION INTRAVENOUS; SUBCUTANEOUS at 16:30

## 2018-10-08 RX ADMIN — HEPARIN SODIUM SCH UNITS: 5000 INJECTION INTRAVENOUS; SUBCUTANEOUS at 09:12

## 2018-10-08 NOTE — DIAGNOSTIC IMAGING REPORT
Indication: Pain left wrist pain

 

Findings: 3  views of the left wrist were obtained. 

 

No acute fractures, malalignment, erosions or periostitis are identified. Soft

tissues are unremarkable.

 

Impression: No acute findings.

## 2018-10-08 NOTE — INFECTIOUS DISEASES PROG NOTE
Assessment/Plan


Assessment/Plan


ASSESSMENT:  The patient is a 37-year-old male with:





Gastroenteritis, resolved.


Afebrile.


 Normal WBC.


    -RPR neg





Seizure disorder.


Hypertension.


CVA.


History of obesity.


History of back surgery.


Diabetes.


Anxiety.





MRSA colonized





PLAN:








- monitor the patient off of antibiotics.


-Monitor screening cultures.


-Monitor CBC.


-Monitor BMP.


-If the patient's symptoms worsen, he may develop diarrhea, may send


stool for culture and C. difficile.





Subjective


Allergies:  


Coded Allergies:  


     HALOPERIDOL (Verified  Allergy, Unknown, 10/5/18)


Subjective


afebrile


no leukocytosis





Objective


Vital Signs





Last 24 Hour Vital Signs








  Date Time  Temp Pulse Resp B/P (MAP) Pulse Ox O2 Delivery O2 Flow Rate FiO2


 


10/8/18 12:00 98.2 79 18 110/61 (77) 95   





 98.2       


 


10/8/18 10:41 97.4       


 


10/8/18 10:11 97.4       


 


10/8/18 09:10  86  110/63    


 


10/8/18 09:00      Room Air  


 


10/8/18 08:00 97.4 86 18 110/63 (79) 94   





 97.4       


 


10/8/18 04:00 98.7 86 16 116/81 (93) 96   





 98.7       


 


10/7/18 21:33  90  126/84    


 


10/7/18 21:00      Room Air  


 


10/7/18 20:00 99.0 90 18 126/84 (98) 94   





 99.0       


 


10/7/18 19:55  78 18   Room Air  21


 


10/7/18 16:00 98.1 82 18 110/64 (79) 95   





 98.1       








Height (Feet):  5


Height (Inches):  11.00


Weight (Pounds):  256


Objective


General Appearance:  alert


EENT:  normal ENT inspection


Neck:  normal alignment


Cardiovascular:  normal peripheral pulses, normal rate, regular rhythm


Respiratory/Chest:  chest wall non-tender, lungs clear, normal breath sounds


Abdomen:  normal bowel sounds, non tender, soft


Extremities:  normal inspection


Edema:  no edema noted Arm (L), no edema noted Arm (R), no edema noted Leg (L), 

no edema noted Leg (R), no edema noted Pedal (L), no edema noted Pedal (R), no 

edema noted Generalized


Neurologic:  motor weakness


Skin:  normal pigmentation, warm/dry





Current Medications








 Medications


  (Trade)  Dose


 Ordered  Sig/Henry


 Route


 PRN Reason  Start Time


 Stop Time Status Last Admin


Dose Admin


 


 Acetaminophen


  (Tylenol)  650 mg  Q4H  PRN


 ORAL


 fever  10/5/18 15:30


 11/3/18 15:29   


 


 


 Al Hydroxide/Mg


 Hydroxide


  (Mylanta II)  30 ml  Q6H  PRN


 ORAL


 dyspepsia  10/5/18 15:30


 11/3/18 15:29   


 


 


 Albuterol/


 Ipratropium


  (Albuterol/


 Ipratropium)  3 ml  Q4H  PRN


 HHN


 Shortness of Breath  10/5/18 15:30


 10/9/18 15:29   


 


 


 Aspirin


  (Ecotrin)  81 mg  DAILY


 ORAL


   10/6/18 09:00


 11/4/18 08:59  10/8/18 09:10


 


 


 Clonidine HCl


  (Catapres Tab)  0.1 mg  Q4H  PRN


 ORAL


 For High Blood Pressure  10/5/18 15:30


 11/3/18 15:29   


 


 


 Dextrose


  (Dextrose 50%)  25 ml  Q30M  PRN


 IV


 Hypoglycemia  10/5/18 15:30


 11/3/18 15:29   


 


 


 Dextrose


  (Dextrose 50%)  50 ml  Q30M  PRN


 IV


 Hypoglycemia  10/5/18 15:30


 11/3/18 15:29   


 


 


 Divalproex Sodium


  (Depakote)  500 mg  Q12HR


 ORAL


   10/5/18 21:00


 11/3/18 20:59  10/8/18 09:11


 


 


 Escitalopram


 Oxalate


  (Lexapro)  10 mg  DAILY


 ORAL


   10/6/18 09:00


 11/4/18 08:59  10/8/18 09:09


 


 


 Heparin Sodium


  (Porcine)


  (Heparin 5000


 units/ml)  5,000 units  EVERY 12  HOURS


 SUBQ


   10/5/18 21:00


 11/3/18 20:59  10/7/18 08:03


 


 


 Insulin Aspart


  (NovoLOG)    BEFORE MEALS AND  HS


 SUBQ


   10/5/18 16:30


 11/4/18 11:29  10/7/18 17:35


 


 


 Lorazepam


  (Ativan 2mg/ml


 1ml)  0.5 mg  Q4H  PRN


 IV


 For Anxiety  10/5/18 15:30


 10/11/18 15:29   


 


 


 Metoprolol


 Succinate


  (Toprol XL)  25 mg  Q12HR


 ORAL


   10/5/18 21:00


 11/4/18 08:59  10/8/18 09:10


 


 


 Morphine Sulfate


  (Morphine


 Sulfate)  2 mg  Q4H  PRN


 IVP


 For Pain  10/7/18 10:00


 10/14/18 09:59  10/8/18 10:11


 


 


 Nicotine


  (Nicoderm)  1 patch  Q24H


 TDERMAL


   10/5/18 17:30


 11/4/18 17:29  10/7/18 17:32


 


 


 Nitroglycerin


  (Ntg)  0.4 mg  Q5M X 3 DOSES PRN


 SL


 Prn Chest Pain  10/5/18 15:45


 11/3/18 20:59   


 


 


 Ondansetron HCl


  (Zofran)  4 mg  Q4H  PRN


 IVP


 Nausea & Vomiting  10/5/18 21:00


 11/4/18 20:59  10/8/18 12:02


 


 


 Polyethylene


 Glycol


  (Miralax)  17 gm  HSPRN  PRN


 ORAL


 Constipation  10/5/18 21:00


 11/3/18 20:59   


 


 


 Promethazine HCl/


 Codeine


  (Phenergan with


 Codeine)  5 ml  Q4H  PRN


 ORAL


 For Cough  10/5/18 16:00


 11/3/18 15:59   


 


 


 Quetiapine


 Fumarate


  (SEROquel)  200 mg  BEDTIME


 ORAL


   10/5/18 21:00


 11/3/18 20:59  10/7/18 21:33


 


 


 Temazepam


  (Restoril)  15 mg  HSPRN  PRN


 ORAL


 Insomnia  10/5/18 21:00


 10/11/18 20:59   


 

















Shagufat Arias M.D. Oct 8, 2018 12:58

## 2018-10-08 NOTE — GENERAL PROGRESS NOTE
Assessment/Plan


Problem List:  


(1) The administrative codes within the [employer] content you are accessing may have 

 as of 10/01/2018.  Please contact your IT Dept/Help Desk and request 

the latest Regulatory release be installed. IT Dept/Help Desk- Please refer to 

our FAQ page ([email or web address] or contact [employer] 

Customer Support at [email or web address]


(2) Malnutrition


ICD Codes:  E46 - Unspecified protein-calorie malnutrition


SNOMED:  39545157


(3) CVA (cerebral vascular accident)


ICD Codes:  I63.9 - Cerebral infarction, unspecified


SNOMED:  703015399


(4) COPD exacerbation


ICD Codes:  J44.1 - Chronic obstructive pulmonary disease with (acute) 

exacerbation


SNOMED:  987201031, 306455639


(5) HTN (hypertension)


ICD Codes:  I10 - Essential (primary) hypertension


SNOMED:  75699672


(6) Anxiety


ICD Codes:  F41.9 - Anxiety disorder, unspecified; Z68.41 - Body mass index (BMI

) 40.0-44.9, adult


SNOMED:  20330923


(7) Diabetes mellitus


ICD Codes:  E11.9 - Diabetes mellitus


SNOMED:  00703106


Status:  unchanged


Assessment/Plan


ot pt diet abx   cbc bmp am dc plan snf





Subjective


Constitutional:  Reports: weakness


Allergies:  


Coded Allergies:  


     HALOPERIDOL (Verified  Allergy, Unknown, 10/5/18)


All Systems:  reviewed and negative except above


Subjective


calm in bed





Objective





Last 24 Hour Vital Signs








  Date Time  Temp Pulse Resp B/P (MAP) Pulse Ox O2 Delivery O2 Flow Rate FiO2


 


10/8/18 10:41 97.4       


 


10/8/18 10:11 97.4       


 


10/8/18 09:10  86  110/63    


 


10/8/18 09:00      Room Air  


 


10/8/18 08:00 97.4 86 18 110/63 (79) 94   





 97.4       


 


10/8/18 04:00 98.7 86 16 116/81 (93) 96   





 98.7       


 


10/7/18 21:33  90  126/84    


 


10/7/18 21:00      Room Air  


 


10/7/18 20:00 99.0 90 18 126/84 (98) 94   





 99.0       


 


10/7/18 19:55  78 18   Room Air  21


 


10/7/18 16:00 98.1 82 18 110/64 (79) 95   





 98.1       


 


10/7/18 12:00 99.1 87 20 105/61 (76) 98   





 99.1       

















Intake and Output  


 


 10/7/18 10/8/18





 19:00 07:00


 


Intake Total 1000 ml 1500 ml


 


Output Total  1000 ml


 


Balance 1000 ml 500 ml


 


  


 


Intake Oral 1000 ml 1500 ml


 


Output Urine Total  1000 ml


 


# Voids 5 3








Height (Feet):  5


Height (Inches):  11.00


Weight (Pounds):  256


General Appearance:  alert


EENT:  normal ENT inspection


Neck:  normal alignment


Cardiovascular:  normal peripheral pulses, normal rate, regular rhythm


Respiratory/Chest:  chest wall non-tender, lungs clear, normal breath sounds


Abdomen:  normal bowel sounds, non tender, soft


Extremities:  normal inspection


Edema:  no edema noted Arm (L), no edema noted Arm (R), no edema noted Leg (L), 

no edema noted Leg (R), no edema noted Pedal (L), no edema noted Pedal (R), no 

edema noted Generalized


Neurologic:  motor weakness


Skin:  normal pigmentation, warm/dry











Samir Cabrera DO Oct 8, 2018 11:43

## 2018-10-08 NOTE — CARDIOLOGY REPORT
--------------- APPROVED REPORT --------------





EKG Measurement

Heart [id number]

NE 144P53

[id number]

[id number]

[id number]





Normal sinus rhythm

Normal ECG

## 2018-10-08 NOTE — HISTORY AND PHYSICAL REPORT
DATE OF ADMISSION:  10/04/2018



APPROXIMATE TIME:  1 p.m.



CONSULTANTS:

1. Lissette Steward M.D.

2. Jamari Woody M.D.

3. Gulshan Armstrong M.D.



CHIEF COMPLAINT:  CVA, left-sided increased weakness, anxiety, and

bipolar.



BRIEF HISTORY:  The patient is a 37-year-old male, who lives at a board and

care, [place] [facility] [place] history of recent CVA with

worsening left-sided weakness, diagnosed with the above and also anxiety

and bipolar, admitted to the telemetry for further care.  Currently, calm

in bed, _____left-sided weakness.  No complaint otherwise.



PAST MEDICAL HISTORY:  Includes seizure, diabetes, hypertension,

cerebrovascular accident with left-sided weakness, and chronic obstructive

pulmonary disease.



PAST SURGICAL HISTORY:  Neck and back surgery.



MEDICATIONS:  Include _____01:35, aspirin, metoprolol, quetiapine,

albuterol, morphine, Zofran, lorazepam, , dextrose, and promethazine.



ALLERGIES:  Haldol.



SOCIAL HISTORY:  Positive smoke.  No alcohol.  No intravenous drug abuse.



FAMILY HISTORY:  Noncontributory.



REVIEW OF SYSTEMS:  No chest pain.  No shortness of breath.  No nausea,

vomiting, or diarrhea.



PHYSICAL EXAMINATION:

GENERAL:  Calm in bed, oriented x3, in no acute distress.

VITAL SIGNS:  Temperature 97, pulse 89, respirations 20, and blood pressure

110/64.

CARDIOVASCULAR:  No murmurs.

LUNGS:  Distant and clear.

ABDOMEN:  Bowel sounds positive.  Nontender.  Nondistended.

EXTREMITIES:  Showed no cyanosis, clubbing, or edema.

EXTREMITIES:  No cyanosis, clubbing, or edema.

NEUROLOGIC:  Weakness x4 especially in the left side.



LABORATORY DATA:  Labs at this time show CBC is normal.  BMP shows a

potassium of 3.4 and glucose 113.  Albumin 3.2.  Otherwise, BMP is normal.

INR is 0.9, PTT 21.  Urine toxicology, valproic is 53.  Urinalysis show

1+ leukocyte esterase.



ASSESSMENT:

1. Cerebrovascular accident with left-sided weakness.

2. Anxiety.

3. Malnutrition.

4. Diabetes.

5. Hypertension.

6. Seizures.

7. Chronic obstructive pulmonary disease.

8. Bipolar.

9. Urinary tract infection.



PLAN:

1. Continue previous medications.

2. OT, PT, and dietary evaluation.

3. CBC and BMP in the morning.

4. Blood pressure, blood sugar, and pain control.

5. Antibiotics per Infectious Disease.

6. Dr. Steward, Dr. Woody, Dr. Armstrong, and Dr. Redding to consult.









  ______________________________________________

  Samir Cabrera D.O.





DR:  JACINTO

D:  10/05/2018 13:35

T:  10/05/2018 18:58

JOB#:  [id number]

CC:
Patient cannot safely care for himself at present. Thus, he requires admission to rule out organicity given patient's multiple derangements. Psychiatry will continue to follow.

## 2018-10-08 NOTE — CARDIOLOGY REPORT
--------------- APPROVED REPORT --------------





EXAM: Two-dimensional and M-mode echocardiogram with Doppler and color 

Doppler.



INDICATION

LV function



M-Mode DIMENSIONS 

IVSd1.3 (0.7-1.1cm)Left Atrium (MM)3.0 (1.6-4.0cm)

LVDd3.9 (3.5-5.6cm)Aortic Root3.4 (2.0-3.7cm)

PWd1.3 (0.7-1.1cm)Aortic Cusp Exc.1.7 (1.5-2.0cm)



LVDs2.8 (2.5-4.0cm)

PWs1.9 cm





Normal left ventricular chamber size, systolic function and wall motion.

Left ventricular ejection fraction estimated to be  60 %.

Borderline mild left ventricular hypertrophy.

No evidence of pericardial effusion. 

All other cardiac chamber sizes are within normal limits. 

Mild focal aortic valve sclerosis with adequate cusp excursion.

Mildly thickened mitral valve leaflets with normal excursion.

Mitral annulus and aortic root calcification.

Pulmonic valve not well visualized.

Normal tricuspid valve structure. 

IVC dilated at 2.3 cm with physiologic collapse suggestive of mildly increased RA 

pressure.



A  color flow and spectral Doppler study was performed and revealed:

Trace mitral regurgitation.

Mitral inflow indicates normal left ventricular diastolic function. 

Trace tricuspid regurgitation.

Tricuspid  systolic velocities suggests peak right ventricular systolic pressure of  17 

mmHg.

## 2018-10-08 NOTE — PROGRESS NOTE
DATE:  10/08/2018



SUBJECTIVE:  This is a 37-year-old male patient, status post CVA,

psychiatric followup.  The patient still endorses some depression,

anxiety, confusion, but he denies any suicidal or homicidal thoughts.

Because of his altered mental status, his attending has requested daily

psychiatric consultation.



MENTAL STATUS EXAMINATION:  This is a 37-year-old male.  Appearance is

disheveled.  Attitude, irritable and agitated.  Affect, guarded and

restricted.  Intellect poor.  Mood depressed and anxious.  Motor activity,

psychomotor agitation.  Attention span is poor.  Orientation x2.  Speech

is low volume and slurred.  Thought process, disorganized and illogical.

Thought content, auditory hallucinations and paranoid delusions.  Insight

and judgment is poor



DIAGNOSIS:  Bipolar 2.



PLAN:  Treat him with Seroquel 200 mg at bedtime, Ativan 0.5 mg IV q.4

hours p.r.n. anxiety and agitation, Lexapro 10 mg daily, and Depakote 500

twice a day.  Provided him with 20 minutes of cognitive behavioral therapy

to identify his automatic negative thoughts and help him to convert his

negative thoughts to more positive thoughts to reduce depression, anxiety,

and suicidality.  A 20 minutes of cognitive behavioral therapy.  Chart

reviewed.  Discussed with staff.  Seen and assessed in his room.









  ______________________________________________

  Gulshan Armstrong M.D.





DR:  MATEUSZ

D:  10/08/2018 04:20

T:  10/09/2018 02:15

JOB#:  [id number]

CC:

## 2018-10-09 NOTE — PROGRESS NOTE
DATE:  10/07/2018



PSYCHOTHERAPY CONSULTATION PROGRESS NOTE



TREATING ATTENDING PHYSICIAN:  Samir Cabrera D.O.



SUBJECTIVE:  The patient is a 37-year-old male patient who is from a board

and care facility _____.  The patient was admitted to the hospital for a

cerebrovascular accident and this patient has a long-term history of

mental health including a diagnosis of schizoaffective disorder, bipolar

type.  Upon admission, the patient has been confused and moods have been

fluctuating, he is labile, anxious, and for these reasons, he was referred

for psychotherapeutic services.  This clinician assessed the patient.  He

states that he has been feeling anxious because he is very reluctant to go

to rehab facility in order to rehabilitate him from his last stroke.  He

states that he will miss his wife and is having a difficult time with

this.  He states that he has been in and out of the hospital several

times.  Tends to get frustrated and irritated.  He states that he does not

need his psychotropic medication because _____.  He is very cooperative,

very confused; however, able to articulate _____.  The patient has been

participating in his treatment.  This clinician assessed this patient.

The patient denies suicidal or homicidal thoughts of ideation.  Denies any

auditory or visual hallucinations at this time.  He is cooperative.



PAST MEDICAL HISTORY:  Includes a history of COPD, CVA, obesity, and

hypertension.



ALLERGIES:  _____ Haldol.



SUBSTANCE ABUSE HISTORY:  The patient does have a history of substance use

including alcohol and marijuana use; however, he states that he does not

use any significant drugs this time or within the last year.



SOCIAL HISTORY:  The patient is a 37-year-old patient who states he is

.  He states he lives in a board and care facility.  Financially

sustained through [employer].



MENTAL STATUS EXAMINATION:  The patient is alert and oriented to person and

place.  Mood is anxious.  Affect blunted.  Thought process is

disorganized.  The patient has poor attention and concentration.  Poor

insight, judgment, and impulse control.



This clinician assessed this patient, assessed the patient's mental

status.  Provided him with supportive psychotherapy, encouraging the

patient and provided with reality orientation.  Explained coping skills

such as his anxiety.  Continue behavioral management.



DIAGNOSIS:  Axis I:  Schizoaffective disorder, bipolar type.



Continue behavioral management.  This clinician has reviewed the

patient's chart and discussed the treatment with the treatment team.









  ______________________________________________

  Mynor Mayen PsyD.





DR:  AMARI

D:  10/08/2018 14:58

T:  10/09/2018 04:05

JOB#:  [id number]

CC:

## 2018-10-09 NOTE — ELECTROENCEPHALOGRAM
DATE OF PROCEDURE:  10/07/2018



REQUESTING PHYSICIAN:  Samir Cabrera D.O.



READING PHYSICIAN:  Jamari Woody M.D.



PROCEDURE PERFORMED:  EEG.



HISTORY:  This EEG was performed on a 37-year-old gentleman 

with a history of multiple medical problems who reports to us 

that he has had seizures in the past.  The purpose of this EEG 

was to better delineate the type of seizure disorder.



TECHNICAL NOTE:  This EEG was performed on [employer]

[employer] [facility] with electrodes placed on the scalp according 

to the International 10-20 system.  Scalp-to-scalp and scalp-to-ear 

montages were used.  The EEG was technically satisfactory and 

was performed in the awake, drowsy, and sleep states.



OBSERVATIONS:  

In the best awake state, the background activity consisted

of 7-7.5 Hz, posterior rhythmic theta activity.  



Drowsiness was characterized by slowing of the background 

in the 4-5 Hz theta range with some intermixed delta frequencies.  



Stage II sleep was characterized by further slowing of the 

background in the delta and theta range and the presence of 

vertex waves.  



No definite focal abnormalities or epileptiform discharges were seen.



IMPRESSION:  

This is an abnormal EEG characterized by slowing of the background 

in the 7-7.5 Hz theta range in the best awake state.



COMMENT:  

This study is consistent with an encephalopathy of a mild-to-moderate 

degree.  

Please note that no interictal discharges were seen during this EEG.





________________________

Jamari Woody M.D., M.S.P.H.





DR:  GENEVIEVE

D:  10/08/2018 20:25

T:  10/09/2018 02:29

JOB#:  [id number]
[facility]

## 2018-10-10 NOTE — DIAGNOSTIC IMAGING REPORT
--------------- APPROVED REPORT --------------





CPT Code: 25875



Vascular Symptoms

CVA/TIA: 





CAROTID (BILATERAL) - Imaging reveals no significant plaque within the right and left 

extracranial carotid arteries. The Doppler spectral flow analysis is within normal limits 

throughout the extracranial carotid arteries bilaterally.  VERTEBRAL- The vertebral 

arteries are within normal limits.

## 2018-10-10 NOTE — DIAGNOSTIC IMAGING REPORT
--------------- APPROVED REPORT --------------





CPT Code: 98108



Present Symptoms

Left 

Comments: LEFT ARM PAIN.





LEFT UPPER EXTREMITY: Venous imaging reveals patency of the internal jugular, subclavian, 

axillary and brachial veins. The cephalic and basilic veins are also patent.  Doppler 

indicates normal spontaneous flow within these venous segments.

## 2018-10-10 NOTE — PROGRESS NOTE
DATE:  10/08/2018



"NOTE:  POOR AUDIO QUALITY"



PSYCHOTHERAPY CONSULTATION PROGRESS NOTE



TREATING ATTENDING PHYSICIAN:  Samir Cabrera D.O.



SUBJECTIVE:  This is a 37-year-old _____ attack.  The patient has been very

depressed and hopeless.  Disorganized in his thought process and anxious.

The patient states that he has _____ treatment _____ at this time.



MENTAL STATUS EXAMINATION:  The patient is alert and oriented to person,

place, and time, and he relates that his mood is anxious.  Affect is

blunted.  Thought process _____.  The patient is cooperative.



DIAGNOSIS:  _____.



PLAN:  _____.  Provide the patient with supportive psychotherapy and coping

skills.  Encouraging the patient to participate in treatment milieu.

_____.  The patient states that _____.  Discussed the treatment with

treatment team _____.









  ______________________________________________

  Mynor Mayen PsyD.





DR:  EARNEST

D:  10/09/2018 15:09

T:  10/10/2018 00:43

JOB#:  [id number]

CC:

## 2018-10-10 NOTE — DIAGNOSTIC IMAGING REPORT
--------------- APPROVED REPORT --------------





CPT Code: 70559



Present Symptoms

Lower Extremity Pain:  Bilateral 





BILATERAL: Imaging reveals a patent deep venous system bilaterally. There is no evidence 

of thrombus within the femoral, popliteal or tibial segments. The greater saphenous veins 

are also within normal limits. Doppler indicates normal spontaneous flow within these 

segments.

## 2018-10-11 NOTE — DISCHARGE SUMMARY
Discharge Summary


Discharge Summary


_


DATE OF ADMISSION: 10/04/2018





DATE OF DISCHARGE: 10/08/2018





REASON FOR ADMISSION: 


37 years old male with past medical history of seizure disorder, diabetes 

mellitus, hypertension, tobacco use, presented with left-sided weakness and 

mild dysarthria for one week.  


He reported that he was not sure when it started, but symptoms were present for

  about one week.  


He saw   his doctor  earlier that day, who recommended him to  go to emergency 

room for further evaluation.


Upon physical examination patient showed t,o left-sided deficit.


Laboratory workup revealed no leukocytosis stable hemoglobin and hematocrit.  


Stable coagulation profile.  


Stable renal parameters and electrolytes.  


Troponin negative.


CT of the head revealed evidence of prior suboccipital craniectomy.  Negative 

for acute intracranial bleeding or mass effect.


Chest x-ray revealed no acute cardiopulmonary pathology.


Since symptoms  were present  for about for one week ,  patient was not a 

candidate for TPA .


Aspirin was given in ED.


Patient was admitted for further management with diagnosis of CVA.





CONSULTANTS:


neurologist Dr. Nevarez


pulmonary Dr. Steward


ID specialist Dr. Redding


psychiatrist Dr. Armstrong


 


[facility] COURSE: 


Patient admitted.  


Neurology evaluation was requested.  


Carotid duplex was essentially negative.  


Venous duplex bilateral lower extremity revealed no evidence of acute DVT.  


Echocardiogram revealed preserved ejection fraction of 60% and no wall motion 

abnormality.  


Right ventricular systolic pressure of 17.  


Patient was continued on antiplatelet therapy.


Lipid panel revealed elevated triglycerides , and  stable   LDL and total 

cholesterol.


Patient was educated on ow-cholesterol   low-fat diabetic diet.


Blood pressure was managed with  beta blocker and remained stable.


Blood sugar was managed with sliding scale of insulin as needed . Hemoglobin 

A1c at goal -6.5.  


Neurologist seen and evaluated patient.  


Seizure precautions maintained.  Depakote continued.   


EEG subsequently was done and revealed encephalopathy of mild-to-moderate 

decrease.  No interictal discharges were seen during this EEG.


According to neurologist  patient's history, neurological examination, 

laboratory data, and imaging studies were most compatible with underlying  

bipolar affective disorder, hypertension, diabetes mellitus, smoking, stroke, 

and seizure disorder. 


Per neurologist, it was unclear if the patient's increased left-sided weakness 

was due to embellishment or due to true weakness. 


It was also unclear as to what type of seizure disorder he had.


Supplemental oxygen was on board as needed to keep pulse oximetry above 92%.  


Pulmonary toilet provided as needed.  


Patient started on nicotine patch.  


Patient counseled   on smoking cessation.  


Patient complained of pain and numbness in the left arm.


X-ray of the left wrist revealed no evidence of acute findings,  and venous 

duplex of left upper extremity revealed no evidence of acute DVT.  


Psychiatrist closely followed and  diagnosed patient  with bipolar disorder 

type II .


Psychiatric medication regimen was optimized .


Activity was gradually increased. 


Patient was   working with speech therapist  on dysarthria.


Patient was stable for discharge   to [facility] for 

further rehabilitation





FINAL DIAGNOSES: 


Cerebrovascular accident with left-sided weakness


Seizure disorder


Encephalopathy


Hypertension


Diabetes mellitus


Tobacco use


COPD


Bipolar disorder type II


Anxiety 





DISCHARGE MEDICATIONS:


See Medication Reconciliation list.





DISCHARGE INSTRUCTIONS:


Patient was discharged to the skilled nursing facility. Follow up with medical 

doctor at the facility.





I have been assigned to dictate discharge summary for this account. I was not 

involved in the patient's management.











Shilpa Quezada NP Oct 11, 2018 12:11

## 2018-10-30 ENCOUNTER — HOSPITAL ENCOUNTER (INPATIENT)
Dept: HOSPITAL 72 - EMR | Age: 38
LOS: 3 days | Discharge: ANOTHER HOSPITAL | DRG: 74 | End: 2018-11-02
Payer: MEDICARE

## 2018-10-30 VITALS — SYSTOLIC BLOOD PRESSURE: 104 MMHG | DIASTOLIC BLOOD PRESSURE: 60 MMHG

## 2018-10-30 VITALS — BODY MASS INDEX: 36.9 KG/M2 | WEIGHT: 263.56 LBS | HEIGHT: 71 IN

## 2018-10-30 VITALS — SYSTOLIC BLOOD PRESSURE: 101 MMHG | DIASTOLIC BLOOD PRESSURE: 61 MMHG

## 2018-10-30 DIAGNOSIS — Z91.19: ICD-10-CM

## 2018-10-30 DIAGNOSIS — M46.86: ICD-10-CM

## 2018-10-30 DIAGNOSIS — F11.20: ICD-10-CM

## 2018-10-30 DIAGNOSIS — E66.01: ICD-10-CM

## 2018-10-30 DIAGNOSIS — Z88.8: ICD-10-CM

## 2018-10-30 DIAGNOSIS — J44.1: ICD-10-CM

## 2018-10-30 DIAGNOSIS — E46: ICD-10-CM

## 2018-10-30 DIAGNOSIS — Q07.02: ICD-10-CM

## 2018-10-30 DIAGNOSIS — G40.909: ICD-10-CM

## 2018-10-30 DIAGNOSIS — I10: ICD-10-CM

## 2018-10-30 DIAGNOSIS — Z79.4: ICD-10-CM

## 2018-10-30 DIAGNOSIS — E10.43: Primary | ICD-10-CM

## 2018-10-30 DIAGNOSIS — F41.9: ICD-10-CM

## 2018-10-30 DIAGNOSIS — Z79.82: ICD-10-CM

## 2018-10-30 DIAGNOSIS — F25.0: ICD-10-CM

## 2018-10-30 DIAGNOSIS — F45.9: ICD-10-CM

## 2018-10-30 DIAGNOSIS — M50.30: ICD-10-CM

## 2018-10-30 DIAGNOSIS — Z86.73: ICD-10-CM

## 2018-10-30 LAB
ADD MANUAL DIFF: NO
ALBUMIN SERPL-MCNC: 3.2 G/DL (ref 3.4–5)
ALBUMIN/GLOB SERPL: 1 {RATIO} (ref 1–2.7)
ALP SERPL-CCNC: 60 U/L (ref 46–116)
ALT SERPL-CCNC: 24 U/L (ref 12–78)
ANION GAP SERPL CALC-SCNC: 6 MMOL/L (ref 5–15)
APPEARANCE UR: CLEAR
APTT PPP: (no result) S
AST SERPL-CCNC: 13 U/L (ref 15–37)
BASOPHILS NFR BLD AUTO: 1.5 % (ref 0–2)
BILIRUB SERPL-MCNC: 0.3 MG/DL (ref 0.2–1)
BUN SERPL-MCNC: 15 MG/DL (ref 7–18)
CALCIUM SERPL-MCNC: 9.2 MG/DL (ref 8.5–10.1)
CHLORIDE SERPL-SCNC: 100 MMOL/L (ref 98–107)
CO2 SERPL-SCNC: 30 MMOL/L (ref 21–32)
CREAT SERPL-MCNC: 0.9 MG/DL (ref 0.55–1.3)
EOSINOPHIL NFR BLD AUTO: 2.4 % (ref 0–3)
ERYTHROCYTE [DISTWIDTH] IN BLOOD BY AUTOMATED COUNT: 11.7 % (ref 11.6–14.8)
GLOBULIN SER-MCNC: 3.3 G/DL
GLUCOSE UR STRIP-MCNC: NEGATIVE MG/DL
HCT VFR BLD CALC: 39 % (ref 42–52)
HGB BLD-MCNC: 13.9 G/DL (ref 14.2–18)
KETONES UR QL STRIP: NEGATIVE
LEUKOCYTE ESTERASE UR QL STRIP: NEGATIVE
LYMPHOCYTES NFR BLD AUTO: 33.3 % (ref 20–45)
MCV RBC AUTO: 84 FL (ref 80–99)
MONOCYTES NFR BLD AUTO: 8.8 % (ref 1–10)
NEUTROPHILS NFR BLD AUTO: 54 % (ref 45–75)
NITRITE UR QL STRIP: NEGATIVE
PH UR STRIP: 7 [PH] (ref 4.5–8)
PLATELET # BLD: 198 K/UL (ref 150–450)
POTASSIUM SERPL-SCNC: 4 MMOL/L (ref 3.5–5.1)
PROT UR QL STRIP: NEGATIVE
RBC # BLD AUTO: 4.65 M/UL (ref 4.7–6.1)
SODIUM SERPL-SCNC: 136 MMOL/L (ref 136–145)
SP GR UR STRIP: 1 (ref 1–1.03)
UROBILINOGEN UR-MCNC: NORMAL MG/DL (ref 0–1)
WBC # BLD AUTO: 9 K/UL (ref 4.8–10.8)

## 2018-10-30 PROCEDURE — 83735 ASSAY OF MAGNESIUM: CPT

## 2018-10-30 PROCEDURE — 36415 COLL VENOUS BLD VENIPUNCTURE: CPT

## 2018-10-30 PROCEDURE — 83036 HEMOGLOBIN GLYCOSYLATED A1C: CPT

## 2018-10-30 PROCEDURE — 82009 KETONE BODYS QUAL: CPT

## 2018-10-30 PROCEDURE — 97803 MED NUTRITION INDIV SUBSEQ: CPT

## 2018-10-30 PROCEDURE — 93970 EXTREMITY STUDY: CPT

## 2018-10-30 PROCEDURE — 84443 ASSAY THYROID STIM HORMONE: CPT

## 2018-10-30 PROCEDURE — 94664 DEMO&/EVAL PT USE INHALER: CPT

## 2018-10-30 PROCEDURE — 80048 BASIC METABOLIC PNL TOTAL CA: CPT

## 2018-10-30 PROCEDURE — 83690 ASSAY OF LIPASE: CPT

## 2018-10-30 PROCEDURE — 80061 LIPID PANEL: CPT

## 2018-10-30 PROCEDURE — 82962 GLUCOSE BLOOD TEST: CPT

## 2018-10-30 PROCEDURE — 85651 RBC SED RATE NONAUTOMATED: CPT

## 2018-10-30 PROCEDURE — 93005 ELECTROCARDIOGRAM TRACING: CPT

## 2018-10-30 PROCEDURE — 80053 COMPREHEN METABOLIC PANEL: CPT

## 2018-10-30 PROCEDURE — 96374 THER/PROPH/DIAG INJ IV PUSH: CPT

## 2018-10-30 PROCEDURE — 84100 ASSAY OF PHOSPHORUS: CPT

## 2018-10-30 PROCEDURE — 94640 AIRWAY INHALATION TREATMENT: CPT

## 2018-10-30 PROCEDURE — 81003 URINALYSIS AUTO W/O SCOPE: CPT

## 2018-10-30 PROCEDURE — 87081 CULTURE SCREEN ONLY: CPT

## 2018-10-30 PROCEDURE — 80307 DRUG TEST PRSMV CHEM ANLYZR: CPT

## 2018-10-30 PROCEDURE — 85025 COMPLETE CBC W/AUTO DIFF WBC: CPT

## 2018-10-30 PROCEDURE — 99285 EMERGENCY DEPT VISIT HI MDM: CPT

## 2018-10-30 RX ADMIN — QUETIAPINE SCH MG: 200 TABLET, FILM COATED ORAL at 22:32

## 2018-10-30 NOTE — EMERGENCY ROOM REPORT
History of Present Illness


General


Chief Complaint:  General Complaint


Source:  Patient, Medical Record, EMS





Present Illness


HPI


Patient is a 37-year-old male type I diabetic who presented after increased 

generalized weakness and inability to eat.  The patient reports having had a 

diminished appetite.  He denies any fever.  He reports having subjective 

chills.  He denies any vomiting or diarrhea.  The patient prior history of CVA 

as well as chronic low back pain.


Allergies:  


Coded Allergies:  


     HALOPERIDOL (Verified  Allergy, Unknown, 10/5/18)





Patient History


Past Medical History:  see triage record, DM


Reviewed Nursing Documentation:  PMH: Agreed; PSxH: Agreed





Nursing Documentation-PM


Past Medical History:  No History, Except For


Hx Hypertension:  Yes


Hx COPD:  Yes


Hx Diabetes:  Yes - DM2


Hx Cancer:  No


Hx Gastrointestinal Problems:  Yes


Hx Neurological Problems:  Yes


Hx Cerebrovascular Accident:  Yes - early Oct 2018


Hx Seizures:  Yes


Hx Peripheral Neuropathy:  Yes


Hx Head Trauma:  Yes


Hx Dizziness:  Yes


Hx Headaches:  Yes


Hx Weakness:  Yes - left arm, left leg





Review of Systems


All Other Systems:  negative except mentioned in HPI





Physical Exam





Vital Signs








  Date Time  Temp Pulse Resp B/P (MAP) Pulse Ox O2 Delivery O2 Flow Rate FiO2


 


10/30/18 16:59 98.6 92 20 111/67 92 Room Air  








Sp02 EP Interpretation:  reviewed, normal


General Appearance:  normal inspection, well appearing, no apparent distress, 

alert, obese


Head:  atraumatic


ENT:  normal ENT inspection, hearing grossly normal, normal voice


Neck:  normal inspection, full range of motion, supple, no bony tend


Respiratory:  normal inspection, lungs clear, normal breath sounds, no 

respiratory distress, no retraction, no wheezing


Cardiovascular #1:  regular rate, rhythm, no edema


Gastrointestinal:  normal inspection, normal bowel sounds, non tender, soft, no 

guarding, no hernia


Genitourinary:  no CVA tenderness


Musculoskeletal:  normal inspection, back normal, normal range of motion


Neurologic:  normal inspection, alert, oriented x3, responsive, CNs III-XII nml 

as tested, speech normal


Psychiatric:  normal inspection, judgement/insight normal, mood/affect normal


Skin:  normal inspection, normal color, no rash





Medical Decision Making


Diagnostic Impression:  


 Primary Impression:  


 Diabetes mellitus


 Additional Impression:  


 Abdominal pain of unknown etiology


ER Course


Patient presented for generalized weakness.  Differential diagnosis included 

was not limited to anemia, urinary tract infection, electrolyte abnormality, 

hypothyroidism, myocardial infarction, myasthenia gravis, dehydration, among 

others.  Because of complexity of patient's case laboratory testing and imaging 

studies were ordered  EKG ind by me showed normal sinus rhythm with a rate of 

73 without acute ST or T wave changes.Laboratory testing was notable for 

hyperglycemia.  Patient did not appear to be in diabetic ketoacidosis.  Dr. Samir Cabrera was contacted for inpatient management.





Labs








Test


  10/30/18


17:30


 


White Blood Count


  9.0 K/UL


(4.8-10.8)


 


Red Blood Count


  4.65 M/UL


(4.70-6.10)


 


Hemoglobin


  13.9 G/DL


(14.2-18.0)


 


Hematocrit


  39.0 %


(42.0-52.0)


 


Mean Corpuscular Volume 84 FL (80-99) 


 


Mean Corpuscular Hemoglobin


  29.9 PG


(27.0-31.0)


 


Mean Corpuscular Hemoglobin


Concent 35.7 G/DL


(32.0-36.0)


 


Red Cell Distribution Width


  11.7 %


(11.6-14.8)


 


Platelet Count


  198 K/UL


(150-450)


 


Mean Platelet Volume


  7.7 FL


(6.5-10.1)


 


Neutrophils (%) (Auto)


  54.0 %


(45.0-75.0)


 


Lymphocytes (%) (Auto)


  33.3 %


(20.0-45.0)


 


Monocytes (%) (Auto)


  8.8 %


(1.0-10.0)


 


Eosinophils (%) (Auto)


  2.4 %


(0.0-3.0)


 


Basophils (%) (Auto)


  1.5 %


(0.0-2.0)


 


Urine Color Pale yellow 


 


Urine Appearance Clear 


 


Urine pH 7 (4.5-8.0) 


 


Urine Specific Gravity


  1.005


(1.005-1.035)


 


Urine Protein


  Negative


(NEGATIVE)


 


Urine Glucose (UA)


  Negative


(NEGATIVE)


 


Urine Ketones


  Negative


(NEGATIVE)


 


Urine Blood


  Negative


(NEGATIVE)


 


Urine Nitrite


  Negative


(NEGATIVE)


 


Urine Bilirubin


  Negative


(NEGATIVE)


 


Urine Urobilinogen


  Normal MG/DL


(0.0-1.0)


 


Urine Leukocyte Esterase


  Negative


(NEGATIVE)


 


Sodium Level


  136 MMOL/L


(136-145)


 


Potassium Level


  4.0 MMOL/L


(3.5-5.1)


 


Chloride Level


  100 MMOL/L


()


 


Carbon Dioxide Level


  30 MMOL/L


(21-32)


 


Anion Gap


  6 mmol/L


(5-15)


 


Blood Urea Nitrogen


  15 mg/dL


(7-18)


 


Creatinine


  0.9 MG/DL


(0.55-1.30)


 


Estimat Glomerular Filtration


Rate > 60 mL/min


(>60)


 


Glucose Level


  240 MG/DL


()


 


Calcium Level


  9.2 MG/DL


(8.5-10.1)


 


Magnesium Level


  1.7 MG/DL


(1.8-2.4)


 


Total Bilirubin


  0.3 MG/DL


(0.2-1.0)


 


Aspartate Amino Transf


(AST/SGOT) 13 U/L (15-37) 


 


 


Alanine Aminotransferase


(ALT/SGPT) 24 U/L (12-78) 


 


 


Alkaline Phosphatase


  60 U/L


()


 


Total Protein


  6.5 G/DL


(6.4-8.2)


 


Albumin


  3.2 G/DL


(3.4-5.0)


 


Globulin 3.3 g/dL 


 


Albumin/Globulin Ratio 1.0 (1.0-2.7) 


 


Acetone Level


  Negative


(NEGATIVE)








EKG Diagnostic Results


Rate:  normal


Rhythm:  NSR


ST Segments:  no acute changes





Rhythm Strip Diag. Results


EP Interpretation:  yes


Rhythm:  NSR, no PVC's, no ectopy





Last Vital Signs








  Date Time  Temp Pulse Resp B/P (MAP) Pulse Ox O2 Delivery O2 Flow Rate FiO2


 


10/30/18 18:25 98.6       


 


10/30/18 17:20   20 101/61 95 Room Air  


 


10/30/18 17:20  95      








Status:  unchanged


Disposition:  ADMITTED AS INPATIENT


Condition:  Stable


Referrals:  


Samir Cabrera DO (PCP)











Garry Sullivan MD Oct 30, 2018 19:33

## 2018-10-31 VITALS — DIASTOLIC BLOOD PRESSURE: 63 MMHG | SYSTOLIC BLOOD PRESSURE: 110 MMHG

## 2018-10-31 VITALS — SYSTOLIC BLOOD PRESSURE: 105 MMHG | DIASTOLIC BLOOD PRESSURE: 65 MMHG

## 2018-10-31 VITALS — SYSTOLIC BLOOD PRESSURE: 122 MMHG | DIASTOLIC BLOOD PRESSURE: 67 MMHG

## 2018-10-31 VITALS — DIASTOLIC BLOOD PRESSURE: 64 MMHG | SYSTOLIC BLOOD PRESSURE: 128 MMHG

## 2018-10-31 VITALS — DIASTOLIC BLOOD PRESSURE: 52 MMHG | SYSTOLIC BLOOD PRESSURE: 106 MMHG

## 2018-10-31 VITALS — DIASTOLIC BLOOD PRESSURE: 81 MMHG | SYSTOLIC BLOOD PRESSURE: 113 MMHG

## 2018-10-31 LAB
ADD MANUAL DIFF: NO
ALBUMIN SERPL-MCNC: 3.1 G/DL (ref 3.4–5)
ALBUMIN/GLOB SERPL: 0.9 {RATIO} (ref 1–2.7)
ALP SERPL-CCNC: 59 U/L (ref 46–116)
ALT SERPL-CCNC: 24 U/L (ref 12–78)
ANION GAP SERPL CALC-SCNC: 8 MMOL/L (ref 5–15)
AST SERPL-CCNC: 12 U/L (ref 15–37)
BASOPHILS NFR BLD AUTO: 1 % (ref 0–2)
BILIRUB SERPL-MCNC: 0.3 MG/DL (ref 0.2–1)
BUN SERPL-MCNC: 17 MG/DL (ref 7–18)
CALCIUM SERPL-MCNC: 8.5 MG/DL (ref 8.5–10.1)
CHLORIDE SERPL-SCNC: 102 MMOL/L (ref 98–107)
CHOLEST SERPL-MCNC: 102 MG/DL (ref ?–200)
CO2 SERPL-SCNC: 26 MMOL/L (ref 21–32)
CREAT SERPL-MCNC: 0.9 MG/DL (ref 0.55–1.3)
EOSINOPHIL NFR BLD AUTO: 2.4 % (ref 0–3)
ERYTHROCYTE [DISTWIDTH] IN BLOOD BY AUTOMATED COUNT: 11.7 % (ref 11.6–14.8)
GLOBULIN SER-MCNC: 3.3 G/DL
HCT VFR BLD CALC: 40.1 % (ref 42–52)
HDLC SERPL-MCNC: 29 MG/DL (ref 40–60)
HGB BLD-MCNC: 14.3 G/DL (ref 14.2–18)
LYMPHOCYTES NFR BLD AUTO: 31.2 % (ref 20–45)
MCV RBC AUTO: 84 FL (ref 80–99)
MONOCYTES NFR BLD AUTO: 9.5 % (ref 1–10)
NEUTROPHILS NFR BLD AUTO: 56 % (ref 45–75)
PLATELET # BLD: 179 K/UL (ref 150–450)
POTASSIUM SERPL-SCNC: 4.2 MMOL/L (ref 3.5–5.1)
RBC # BLD AUTO: 4.78 M/UL (ref 4.7–6.1)
SODIUM SERPL-SCNC: 136 MMOL/L (ref 136–145)
TRIGL SERPL-MCNC: 139 MG/DL (ref 30–150)
WBC # BLD AUTO: 7.6 K/UL (ref 4.8–10.8)

## 2018-10-31 RX ADMIN — MORPHINE SULFATE PRN MG: 2 INJECTION, SOLUTION INTRAMUSCULAR; INTRAVENOUS at 12:36

## 2018-10-31 RX ADMIN — QUETIAPINE SCH MG: 200 TABLET, FILM COATED ORAL at 21:01

## 2018-10-31 RX ADMIN — HEPARIN SODIUM SCH UNITS: 5000 INJECTION INTRAVENOUS; SUBCUTANEOUS at 08:42

## 2018-10-31 RX ADMIN — MORPHINE SULFATE PRN MG: 2 INJECTION, SOLUTION INTRAMUSCULAR; INTRAVENOUS at 21:00

## 2018-10-31 RX ADMIN — ASPIRIN SCH MG: 81 TABLET, DELAYED RELEASE ORAL at 08:29

## 2018-10-31 RX ADMIN — DIVALPROEX SODIUM SCH MG: 500 TABLET, DELAYED RELEASE ORAL at 08:29

## 2018-10-31 RX ADMIN — MORPHINE SULFATE PRN MG: 2 INJECTION, SOLUTION INTRAMUSCULAR; INTRAVENOUS at 03:40

## 2018-10-31 RX ADMIN — METOPROLOL SUCCINATE SCH MG: 25 TABLET, EXTENDED RELEASE ORAL at 08:34

## 2018-10-31 RX ADMIN — HEPARIN SODIUM SCH UNITS: 5000 INJECTION INTRAVENOUS; SUBCUTANEOUS at 08:36

## 2018-10-31 RX ADMIN — INSULIN ASPART SCH UNITS: 100 INJECTION, SOLUTION INTRAVENOUS; SUBCUTANEOUS at 06:25

## 2018-10-31 RX ADMIN — INSULIN ASPART SCH UNITS: 100 INJECTION, SOLUTION INTRAVENOUS; SUBCUTANEOUS at 12:03

## 2018-10-31 RX ADMIN — HEPARIN SODIUM SCH UNITS: 5000 INJECTION INTRAVENOUS; SUBCUTANEOUS at 21:00

## 2018-10-31 RX ADMIN — INSULIN ASPART SCH UNITS: 100 INJECTION, SOLUTION INTRAVENOUS; SUBCUTANEOUS at 16:58

## 2018-10-31 RX ADMIN — MORPHINE SULFATE PRN MG: 2 INJECTION, SOLUTION INTRAMUSCULAR; INTRAVENOUS at 08:34

## 2018-10-31 RX ADMIN — MORPHINE SULFATE PRN MG: 2 INJECTION, SOLUTION INTRAMUSCULAR; INTRAVENOUS at 16:51

## 2018-10-31 RX ADMIN — INSULIN ASPART SCH UNITS: 100 INJECTION, SOLUTION INTRAVENOUS; SUBCUTANEOUS at 21:04

## 2018-10-31 RX ADMIN — QUETIAPINE SCH MG: 200 TABLET, FILM COATED ORAL at 22:08

## 2018-10-31 NOTE — CONSULTATION
History of Present Illness


General


Date patient seen:  Oct 31, 2018


Chief Complaint:  General Complaint


Referring physician:  ROSE MARIE IRVING


Reason for Consultation:  VOMITING





Present Illness


HPI


37-year-old male with hx of type I diabetic, seizure disorder, psychiatric 

history presented ot ER  after increased generalized weakness and inability to 

eat.  The patient reports having had a diminished appetite.  He denies any 

fever.  He reports having subjective chills.  He denies any vomiting or 

diarrhea.  pt is admitted for further work up.


Allergies:  


Coded Allergies:  


     HALOPERIDOL (Verified  Allergy, Unknown, 10/5/18)





Medication History


Scheduled


Aspirin* (Aspir 81*), 81 MG ORAL DAILY, (Reported)


Divalproex Sodium (Depakote), 500 MG PO EVERY MORNING , (Reported)


Divalproex Sodium* (Depakote*), 500 MG PO BEDTIME, (Reported)


Escitalopram Oxalate* (Lexapro*), 10 MG ORAL DAILY, (Reported)


Heparin Sod (Porcine) (Heparin Sodium*), 5,000 UNITS SUBQ EVERY 12 HOURS, (

Reported)


Metoprolol Succinate* (Metoprolol Succinate*), 25 MG ORAL DAILY , (Reported)


Quetiapine Fumarate* (Seroquel*), 200 MG ORAL BEDTIME, (Reported)





Scheduled PRN


Acetaminophen* (Acetaminophen 325MG Tablet*), 650 MG ORAL Q4H PRN for fever, (

Reported)


Clonidine Hcl* (Catapres*), 0.1 MG ORAL Q4HR PRN for For High Blood Pressure, (

Reported)


Hydrocodone Bit/Acetaminophen * (Norco *), 1 TAB ORAL Q4H PRN for 

For Pain, (Reported)


Ipratropium/Albuterol Sulfate (DuoNeb 0.5-3(2.5)mg/3ml), 3 ML HHN Q4HR PRN for 

Shortness of Breath, (Reported)


Lorazepam* (Lorazepam*), 1 MG ORAL Q4HR PRN for For Anxiety, (Reported)


Nitroglycerin (Nitrostat), 0.4 MG SL q5min x3 doses PRN for Prn Chest Pain, (

Reported)


Ondansetron* (Zofran*), 4 MG ORAL Q4HR PRN for Nausea & Vomiting, (Reported)


Polyethylene Glycol 3350* (Polyethylene Glycol 3350*), 17 GM ORAL BEDTIME PRN 

for Constipation, (Reported)


Temazepam* (Restoril*), 15 MG ORAL BEDTIME PRN for Insomnia, (Reported)





Miscellaneous Medications


Insulin Aspart (Novolog Flexpen), SUBQ, (Reported)





Discontinued Medications


Al Hydroxide/mg Hydroxide (Mag-Al Plus Suspension), 30 ML PO Q6HR PRN for UPSET 

STOMACH, (Reported)


   Discontinued Reason: Pt stopped taking med


Lorazepam* (Lorazepam*), 0.5 MG IV Q4H PRN for For Anxiety, (Reported)


   Discontinued Reason: Prescription changed


Morphine Sulfate* (Morphine Sulfate*), 2 MG IV Q4HR, (Reported)


   Discontinued Reason: Therapy completed


Nicotine 14MG Patch* (Nicoderm Cq 14MG*), 1 EACH TD DAILY, (Reported)


   Discontinued Reason: Therapy completed





Patient History


Healthcare decision maker





Resuscitation status


Full Code


Advanced Directive on File


No





Past Medical/Surgical History


Past Medical/Surgical History:  


(1) Gastroparesis due to DM


(2) Noncompliance


(3) CVA (cerebral vascular accident)


(4) COPD (chronic obstructive pulmonary disease)


(5) Psychosomatic disorder


(6) Anxiety


(7) Diabetes mellitus





Review of Systems


Constitutional:  Reports: malaise, weakness





Physical Exam


General Appearance:  WD/WN, alert


Lines, tubes and drains:  peripheral


HEENT:  normocephalic, atraumatic


Neck:  non-tender, normal alignment


Respiratory/Chest:  chest wall non-tender, lungs clear


Breasts:  no masses


Cardiovascular/Chest:  normal peripheral pulses


Abdomen:  normal bowel sounds, non tender


Genitourinary/Rectal:  normal rectal exam


Extremities:  normal range of motion





Last 24 Hour Vital Signs








  Date Time  Temp Pulse Resp B/P (MAP) Pulse Ox O2 Delivery O2 Flow Rate FiO2


 


10/31/18 08:34  87  128/67    


 


10/31/18 08:00 97.0 87 18 128/64 (85) 97   


 


10/31/18 07:43  89 18   Room Air  


 


10/31/18 04:00 98.2 87 20 106/52 (70) 99   


 


10/31/18 00:00 98.6 85 19 105/65 (78) 95   


 


10/30/18 21:38      Room Air  


 


10/30/18 20:50 97.7 79 19 104/60 (75) 94   


 


10/30/18 20:50 97.8 78 19 102/61 94 Room Air  


 


10/30/18 18:25 98.6       


 


10/30/18 17:20 98.6  20 101/61 95 Room Air  


 


10/30/18 17:20  95 20   Room Air  


 


10/30/18 16:59 98.6 92 20 111/67 92 Room Air  

















Intake and Output  


 


 10/30/18 10/31/18





 19:00 07:00


 


Intake Total 0 ml 1020 ml


 


Output Total 800 ml 1100 ml


 


Balance -800 ml -80 ml


 


  


 


Intake Oral 0 ml 450 ml


 


IV Total  420 ml


 


Tube Feeding  150 ml


 


Output Urine Total 800 ml 1100 ml











Laboratory Tests








Test


  10/30/18


17:30


 


White Blood Count


  9.0 K/UL


(4.8-10.8)


 


Red Blood Count


  4.65 M/UL


(4.70-6.10)  L


 


Hemoglobin


  13.9 G/DL


(14.2-18.0)  L


 


Hematocrit


  39.0 %


(42.0-52.0)  L


 


Mean Corpuscular Volume 84 FL (80-99)  


 


Mean Corpuscular Hemoglobin


  29.9 PG


(27.0-31.0)


 


Mean Corpuscular Hemoglobin


Concent 35.7 G/DL


(32.0-36.0)


 


Red Cell Distribution Width


  11.7 %


(11.6-14.8)


 


Platelet Count


  198 K/UL


(150-450)


 


Mean Platelet Volume


  7.7 FL


(6.5-10.1)


 


Neutrophils (%) (Auto)


  54.0 %


(45.0-75.0)


 


Lymphocytes (%) (Auto)


  33.3 %


(20.0-45.0)


 


Monocytes (%) (Auto)


  8.8 %


(1.0-10.0)


 


Eosinophils (%) (Auto)


  2.4 %


(0.0-3.0)


 


Basophils (%) (Auto)


  1.5 %


(0.0-2.0)


 


Urine Color Pale yellow  


 


Urine Appearance Clear  


 


Urine pH 7 (4.5-8.0)  


 


Urine Specific Gravity


  1.005


(1.005-1.035)


 


Urine Protein


  Negative


(NEGATIVE)


 


Urine Glucose (UA)


  Negative


(NEGATIVE)


 


Urine Ketones


  Negative


(NEGATIVE)


 


Urine Blood


  Negative


(NEGATIVE)


 


Urine Nitrite


  Negative


(NEGATIVE)


 


Urine Bilirubin


  Negative


(NEGATIVE)


 


Urine Urobilinogen


  Normal MG/DL


(0.0-1.0)


 


Urine Leukocyte Esterase


  Negative


(NEGATIVE)


 


Sodium Level


  136 MMOL/L


(136-145)


 


Potassium Level


  4.0 MMOL/L


(3.5-5.1)


 


Chloride Level


  100 MMOL/L


()


 


Carbon Dioxide Level


  30 MMOL/L


(21-32)


 


Anion Gap


  6 mmol/L


(5-15)


 


Blood Urea Nitrogen


  15 mg/dL


(7-18)


 


Creatinine


  0.9 MG/DL


(0.55-1.30)


 


Estimat Glomerular Filtration


Rate > 60 mL/min


(>60)


 


Glucose Level


  240 MG/DL


()  H


 


Calcium Level


  9.2 MG/DL


(8.5-10.1)


 


Magnesium Level


  1.7 MG/DL


(1.8-2.4)  L


 


Total Bilirubin


  0.3 MG/DL


(0.2-1.0)


 


Aspartate Amino Transf


(AST/SGOT) 13 U/L (15-37)


L


 


Alanine Aminotransferase


(ALT/SGPT) 24 U/L (12-78)


 


 


Alkaline Phosphatase


  60 U/L


()


 


Total Protein


  6.5 G/DL


(6.4-8.2)


 


Albumin


  3.2 G/DL


(3.4-5.0)  L


 


Globulin 3.3 g/dL  


 


Albumin/Globulin Ratio 1.0 (1.0-2.7)  


 


Acetone Level


  Negative


(NEGATIVE)








Height (Feet):  5


Height (Inches):  11.00


Weight (Pounds):  263


Medications





Current Medications








 Medications


  (Trade)  Dose


 Ordered  Sig/Henry


 Route


 PRN Reason  Start Time


 Stop Time Status Last Admin


Dose Admin


 


 Acetaminophen


  (Tylenol)  650 mg  Q4H  PRN


 ORAL


 fever  10/30/18 22:15


 11/29/18 22:14   


 


 


 Acetaminophen/


 Hydrocodone Bitart


  (Norco 10/325)  1 tab  Q4H  PRN


 ORAL


 Pain 4-6  10/31/18 00:45


 11/7/18 00:44   


 


 


 Al Hydroxide/Mg


 Hydroxide


  (Mylanta II)  30 ml  Q6H  PRN


 ORAL


 dyspepsia  10/30/18 22:15


 11/29/18 22:14   


 


 


 Albuterol/


 Ipratropium


  (Albuterol/


 Ipratropium)  3 ml  Q4HR  PRN


 HHN


 Shortness of Breath  10/30/18 23:00


 11/4/18 22:59   


 


 


 Aspirin


  (Ecotrin)  81 mg  DAILY


 ORAL


   10/31/18 09:00


 11/30/18 08:59  10/31/18 08:29


 


 


 Clonidine HCl


  (Catapres Tab)  0.1 mg  Q4H  PRN


 ORAL


 For High Blood Pressure  10/30/18 22:15


 11/29/18 22:14   


 


 


 Dextrose


  (Dextrose 50%)    STAT  PRN


 IV


 Hypoglycemia  10/30/18 22:15


 11/29/18 22:14   


 


 


 Dextrose


  (Dextrose 50%)    STAT  PRN


 IV


 Hypoglycemia  10/30/18 22:15


 11/29/18 22:14   


 


 


 Divalproex Sodium


  (Depakote)  500 mg  DAILY


 ORAL


   10/31/18 09:00


 11/30/18 08:59  10/31/18 08:29


 


 


 Escitalopram


 Oxalate


  (Lexapro)  10 mg  DAILY


 ORAL


   10/31/18 09:00


 11/30/18 08:59  10/31/18 08:29


 


 


 Heparin Sodium


  (Porcine)


  (Heparin 5000


 units/ml)  5,000 units  EVERY 12  HOURS


 SUBQ


   10/31/18 09:00


 11/30/18 08:59   


 


 


 Insulin Aspart


  (NovoLOG)    BEFORE MEALS AND  HS


 SUBQ


   10/31/18 06:30


 11/30/18 06:29  10/31/18 06:25


 


 


 Lorazepam


  (Ativan 2mg/ml


 1ml)  0.5 mg  Q4H  PRN


 IV


 For Anxiety  10/30/18 22:15


 11/6/18 22:14   


 


 


 Magnesium Sulfate  100 ml @ 


 100 mls/hr  Q1H


 IVPB


   10/31/18 11:30


 10/31/18 13:29   


 


 


 Metoprolol


 Succinate


  (Toprol XL)  25 mg  DAILY


 ORAL


   10/31/18 09:00


 11/30/18 08:59  10/31/18 08:34


 


 


 Morphine Sulfate


  (Morphine


 Sulfate)  1 mg  Q4H  PRN


 IVP


 Pain 7-10  10/31/18 00:30


 11/7/18 00:29  10/31/18 08:34


 


 


 Ondansetron HCl


  (Zofran)  4 mg  Q6H  PRN


 IVP


 Nausea & Vomiting  10/30/18 22:15


 11/29/18 22:14   


 


 


 Polyethylene


 Glycol


  (Miralax)  17 gm  BEDTIME  PRN


 ORAL


 Constipation  10/30/18 23:00


 11/29/18 22:59   


 


 


 Quetiapine


 Fumarate


  (SEROquel)  200 mg  BEDTIME


 ORAL


   10/30/18 22:30


 11/29/18 22:29  10/30/18 22:32


 


 


 Sodium Chloride  1,000 ml @ 


 60 mls/hr  [id number]


 IV


   10/30/18 23:45


 11/29/18 23:44  10/30/18 23:55


 


 


 Temazepam


  (Restoril)  15 mg  BEDTIME  PRN


 ORAL


 Insomnia  10/31/18 06:34


 11/7/18 06:33   


 


 


 Zolpidem Tartrate


  (Ambien)  5 mg  HSPRN  PRN


 ORAL


 Insomnia  10/30/18 22:15


 11/6/18 22:14   


 











Assessment/Plan


Problem List:  


(1) COPD (chronic obstructive pulmonary disease)


ICD Codes:  J44.9 - Chronic obstructive pulmonary disease, unspecified


SNOMED:  16467961


(2) Psychosomatic disorder


ICD Codes:  F45.9 - Somatoform disorder, unspecified


SNOMED:  59766849


(3) Diabetes mellitus


ICD Codes:  E11.9 - Diabetes mellitus


SNOMED:  12901534


(4) Peripheral neuropathy


ICD Codes:  G62.9 - Peripheral neuropathy


SNOMED:  04136918


(5) Morbid obesity with BMI of 40.0-44.9, adult


ICD Codes:  E66.01 - Morbid (severe) obesity due to excess calories; Z68.41 - 

Body mass index (BMI) 40.0-44.9, adult


SNOMED:  346991625


(6) Gastroparesis due to DM


ICD Codes:  E11.43 - Type 2 diabetes mellitus with diabetic autonomic (poly)

neuropathy; K31.84 - Gastroparesis


SNOMED:  11920254, 818852315


(7) Noncompliance


ICD Codes:  Z91.19 - Patient's noncompliance with other medical treatment and 

regimen


SNOMED:  9473409


Assessment/Plan


respiratory treatment


symptomatic treatment


GI evaluation


sliding scale


dvt prophylaxis/











Lissette Steward MD Oct 31, 2018 11:20

## 2018-10-31 NOTE — HISTORY AND PHYSICAL REPORT
DATE OF ADMISSION:  10/30/2018

TIME SEEN:  On 10/31/2018 at 1 p.m.



CONSULTANTS:

1. Lissette Steward M.D.

2. Behnoush Zarrini, M.D.

3. Camron Ramírez M.D.

4. Gulshan Armstrong M.D.



CHIEF COMPLAINT:  Nausea and vomiting for 4 days, failure to thrive,

weakness.



BRIEF HISTORY:  This is a 37-year-old male from [facility], who presents with the above-mentioned diagnoses, seen in the ER,

admitted to medical floor for further treatment.  Currently, slightly

weak, slight nausea and vomiting, no diarrhea.



PAST MEDICAL HISTORY:  Includes hypertension, COPD, diabetes, and CVA.



PAST SURGICAL HISTORY:  Back and neck.



ALLERGIES:  Haldol.



MEDICATIONS:  Include magnesium, aspirin, divalproex, escitalopram,

metoprolol, heparin, temazepam, insulin, hydrocodone, morphine, albuterol,

quetiapine.



SOCIAL HISTORY:  Positive smoking.  No alcohol.  No intravenous drug

abuse.



FAMILY HISTORY:  Noncontributory.



PHYSICAL EXAMINATION:

GENERAL:  _____ calm in bed, oriented x3, slight distress.

VITAL SIGNS:  Temperature is 97 degrees, pulse 85, respirations 18, blood

pressure 113/81.

CARDIOVASCULAR:  No murmur.

LUNGS:  Distant and clear.

ABDOMEN:  Bowel sounds positive.  Nontender, nondistended.

EXTREMITIES:  Show no cyanosis or edema.

NEUROLOGIC:  The patient moves all extremities, slightly weak.



LABORATORY AND DIAGNOSTIC DATA:  Labs at this time show CBC is normal.

Glucose is 234, otherwise BMP is normal.  Albumin 3.1.  Urinalysis is

negative.  Urine-tox _____ acetone is negative.



ASSESSMENT:  Nausea, vomiting, failure to thrive, weakness, back pain,

hypertension, diabetes, CVA, COPD.



PLAN:  OT, PT, dietary evaluation.  Pain control.  CBC and BMP in the

morning.  Zofran p.r.n.  Blood pressure and blood sugar control.  We will

continue to follow this patient.









  ______________________________________________

  Samir Cabrera D.O. DR:  Ana

D:  10/31/2018 13:29

T:  10/31/2018 20:40

JOB#:  [id number]

CC:

## 2018-10-31 NOTE — GI INITIAL CONSULT NOTE
History of Present Illness


General


Date patient seen:  Oct 31, 2018


Time patient seen:  10:51


Reason for Hospitalization:  General Complaint


Referring physician:  ROSE MARIE IRVING


Reason for Consultation:  VOMITING





Present Illness


HPI


Patient is a 37-year-old male type I diabetic who presented after increased 

generalized weakness and inability to eat.  The patient reports having had a 

diminished appetite.  He denies any fever.  He reports having subjective 

chills.  He denies any vomiting or diarrhea.  The patient prior history of CVA 

as well as chronic low back pain.


GI consulted for persistent N/V resulting in decrease PO intake.  Pt seen, 

awake A&Ox4 NAD with no active s/sx of N/V/D.  At this time, patient states he 

was unable to eat anything because it caused him to vomit.  He denied any 

hematemesis or coffee grounds.  The patient denies any recent use of tobacco, 

IVDA or ETOH use.  He has a history of diabetes, but is unsure if it is being 

controlled or not.  The patient had a colonoscopy performed back in 2015, noted 

with an anal fissure and hemorrhoids.   Labs reviewed show low magnesium, 

otherwise unremarkable.


Home Meds


Reported Medications


Divalproex Sodium (Depakote) 500 Mg Tablet.dr, 500 MG PO EVERY MORNING  for 

SEIZURE DISORDER, TAB


   10/30/18


Lorazepam* (LORAZEPAM*) 1 Mg Tablet, 1 MG ORAL Q4HR PRN for For Anxiety, TAB


   10/30/18


Hydrocodone Bit/Acetaminophen * (NORCO *) 1 Each Tablet, 1 TAB ORAL 

Q4H PRN for For Pain, TAB 0 Refills


   PRN PAIN


   10/30/18


Temazepam* (RESTORIL*) 15 Mg Capsule, 15 MG ORAL BEDTIME PRN for Insomnia, CAP


   10/8/18


Polyethylene Glycol 3350* (POLYETHYLENE GLYCOL 3350*) 17 Gm Powd.pack, 17 GM 

ORAL BEDTIME PRN for Constipation, PACKET


   10/8/18


Ondansetron* (ZOFRAN*) 4 Mg Tablet, 4 MG ORAL Q4HR PRN for Nausea & Vomiting, 

TAB


   10/8/18


Nitroglycerin (Nitrostat) 0.4 Mg Tab.subl, 0.4 MG SL q5min x3 doses PRN for Prn 

Chest Pain, TAB


   10/8/18


Insulin Aspart (Novolog Flexpen) 100 Unit/1 Ml Insuln.pen, SUBQ


   before meals and HS


   10/8/18


Heparin Sod (Porcine) (HEPARIN SODIUM*) 5 000/1 Ml Vial, 5000 UNITS SUBQ EVERY 

12 HOURS, VIAL


   10/8/18


Ipratropium/Albuterol Sulfate (DuoNeb 0.5-3(2.5)mg/3ml) 3 Ml Ampul.neb, 3 ML 

HHN Q4HR PRN for Shortness of Breath, EA


   10/8/18


Clonidine Hcl* (CATAPRES*) 0.1 Mg Tablet, 0.1 MG ORAL Q4HR PRN for For High 

Blood Pressure, TAB


   10/8/18


Acetaminophen* (ACETAMINOPHEN 325MG TABLET*) 325 Mg Tablet, 650 MG ORAL Q4H PRN 

for fever, TAB


   10/8/18


Aspirin* (ASPIR 81*) 81 Mg Tablet.dr, 81 MG ORAL DAILY, TAB


   10/4/18


Escitalopram Oxalate* (LEXAPRO*) 10 Mg Tablet, 10 MG ORAL DAILY, TAB


   10/4/18


Metoprolol Succinate* (METOPROLOL SUCCINATE*) 25 Mg Tab.er.24h, 25 MG ORAL 

DAILY 


   10/4/18


Quetiapine Fumarate* (SEROQUEL*) 200 Mg Tablet, 200 MG ORAL BEDTIME, TAB


   6/23/18


Divalproex Sodium* (DEPAKOTE*) 250 Mg Tablet.dr, 500 MG PO BEDTIME for seizure 

disorder, TAB


   10/3/17


Discontinued Reported Medications


Nicotine 14MG Patch* (NICODERM CQ 14MG*) 1 Each Patch.td24, 1 EACH TD DAILY, EA


   10/8/18


Morphine Sulfate* (MORPHINE SULFATE*) 2 Mg/1 Ml Cartridge, 2 MG IV Q4HR for pain

, EA


   10/8/18


Lorazepam* (LORAZEPAM*) 2 Mg/1 Ml Vial, 0.5 MG IV Q4H PRN for For Anxiety, VIAL


   10/8/18


Al Hydroxide/mg Hydroxide (Mag-Al Plus Suspension) 30 Ml Oral.susp, 30 ML PO 

Q6HR PRN for UPSET STOMACH, ML


   10/8/18


Med list reviewed/reconciled:  Yes


Allergies:  


Coded Allergies:  


     HALOPERIDOL (Verified  Allergy, Unknown, 10/5/18)





Patient History


History Provided By:  Patient, Medical Record


PMH Narrative


Past Medical History:  see triage record, DM


Reviewed Nursing Documentation:  PMH: Agreed; PSxH: Agreed





Nursing Documentation-PMH


Past Medical History:  No History, Except For


Hx Hypertension:  Yes


Hx COPD:  Yes


Hx Diabetes:  Yes - DM2


Hx Cancer:  No


Hx Gastrointestinal Problems:  Yes


Hx Neurological Problems:  Yes


Hx Cerebrovascular Accident:  Yes - early Oct 2018


Hx Seizures:  Yes


Hx Peripheral Neuropathy:  Yes


Hx Head Trauma:  Yes


Hx Dizziness:  Yes


Hx Headaches:  Yes


Hx Weakness:  Yes - left arm, left leg


Social History:  Denies: smoking, alcohol use, drug use, other





Review of Systems


All Other Systems:  negative except mentioned in HPI





Physical Exam





Vital Signs








  Date Time  Temp Pulse Resp B/P (MAP) Pulse Ox O2 Delivery O2 Flow Rate FiO2


 


10/30/18 16:59 98.6 92 20 111/67 92 Room Air  








Sp02 EP Interpretation:  reviewed, normal


Labs





Laboratory Tests








Test


  10/30/18


17:30


 


White Blood Count


  9.0 K/UL


(4.8-10.8)


 


Red Blood Count


  4.65 M/UL


(4.70-6.10)  L


 


Hemoglobin


  13.9 G/DL


(14.2-18.0)  L


 


Hematocrit


  39.0 %


(42.0-52.0)  L


 


Mean Corpuscular Volume 84 FL (80-99)  


 


Mean Corpuscular Hemoglobin


  29.9 PG


(27.0-31.0)


 


Mean Corpuscular Hemoglobin


Concent 35.7 G/DL


(32.0-36.0)


 


Red Cell Distribution Width


  11.7 %


(11.6-14.8)


 


Platelet Count


  198 K/UL


(150-450)


 


Mean Platelet Volume


  7.7 FL


(6.5-10.1)


 


Neutrophils (%) (Auto)


  54.0 %


(45.0-75.0)


 


Lymphocytes (%) (Auto)


  33.3 %


(20.0-45.0)


 


Monocytes (%) (Auto)


  8.8 %


(1.0-10.0)


 


Eosinophils (%) (Auto)


  2.4 %


(0.0-3.0)


 


Basophils (%) (Auto)


  1.5 %


(0.0-2.0)


 


Urine Color Pale yellow  


 


Urine Appearance Clear  


 


Urine pH 7 (4.5-8.0)  


 


Urine Specific Gravity


  1.005


(1.005-1.035)


 


Urine Protein


  Negative


(NEGATIVE)


 


Urine Glucose (UA)


  Negative


(NEGATIVE)


 


Urine Ketones


  Negative


(NEGATIVE)


 


Urine Blood


  Negative


(NEGATIVE)


 


Urine Nitrite


  Negative


(NEGATIVE)


 


Urine Bilirubin


  Negative


(NEGATIVE)


 


Urine Urobilinogen


  Normal MG/DL


(0.0-1.0)


 


Urine Leukocyte Esterase


  Negative


(NEGATIVE)


 


Sodium Level


  136 MMOL/L


(136-145)


 


Potassium Level


  4.0 MMOL/L


(3.5-5.1)


 


Chloride Level


  100 MMOL/L


()


 


Carbon Dioxide Level


  30 MMOL/L


(21-32)


 


Anion Gap


  6 mmol/L


(5-15)


 


Blood Urea Nitrogen


  15 mg/dL


(7-18)


 


Creatinine


  0.9 MG/DL


(0.55-1.30)


 


Estimat Glomerular Filtration


Rate > 60 mL/min


(>60)


 


Glucose Level


  240 MG/DL


()  H


 


Calcium Level


  9.2 MG/DL


(8.5-10.1)


 


Magnesium Level


  1.7 MG/DL


(1.8-2.4)  L


 


Total Bilirubin


  0.3 MG/DL


(0.2-1.0)


 


Aspartate Amino Transf


(AST/SGOT) 13 U/L (15-37)


L


 


Alanine Aminotransferase


(ALT/SGPT) 24 U/L (12-78)


 


 


Alkaline Phosphatase


  60 U/L


()


 


Total Protein


  6.5 G/DL


(6.4-8.2)


 


Albumin


  3.2 G/DL


(3.4-5.0)  L


 


Globulin 3.3 g/dL  


 


Albumin/Globulin Ratio 1.0 (1.0-2.7)  


 


Acetone Level


  Negative


(NEGATIVE)








General Appearance:  well appearing, no apparent distress, alert


Head:  normocephalic


EENT:  PERRL/EOMI, normal ENT inspection


Neck:  supple


Respiratory:  normal breath sounds, no respiratory distress


Cardiovascular:  normal rate


Gastrointestinal:  normal inspection, non tender, soft, normal bowel sounds, non

-distended


Rectal:  deferred


Genitourinary:  deferred


Musculoskeletal:  normal inspection, back normal


Neurologic:  normal inspection, alert, oriented x3, responsive


Psychiatric:  normal inspection, judgement/insight normal, memory normal


Skin:  normal inspection, normal color, no rash, warm/dry, palpation normal, 

well hydrated


Lymphatic:  normal inspection, no adenopathy


Current Medications





Current Medications








 Medications


  (Trade)  Dose


 Ordered  Sig/Henry


 Route


 PRN Reason  Start Time


 Stop Time Status Last Admin


Dose Admin


 


 Acetaminophen


  (Tylenol)  650 mg  Q4H  PRN


 ORAL


 fever  10/30/18 22:15


 11/29/18 22:14   


 


 


 Acetaminophen/


 Hydrocodone Bitart


  (Norco 10/325)  1 tab  Q4H  PRN


 ORAL


 Pain 4-6  10/31/18 00:45


 11/7/18 00:44   


 


 


 Al Hydroxide/Mg


 Hydroxide


  (Mylanta II)  30 ml  Q6H  PRN


 ORAL


 dyspepsia  10/30/18 22:15


 11/29/18 22:14   


 


 


 Albuterol/


 Ipratropium


  (Albuterol/


 Ipratropium)  3 ml  Q4HR  PRN


 HHN


 Shortness of Breath  10/30/18 23:00


 11/4/18 22:59   


 


 


 Aspirin


  (Ecotrin)  81 mg  DAILY


 ORAL


   10/31/18 09:00


 11/30/18 08:59  10/31/18 08:29


 


 


 Clonidine HCl


  (Catapres Tab)  0.1 mg  Q4H  PRN


 ORAL


 For High Blood Pressure  10/30/18 22:15


 11/29/18 22:14   


 


 


 Dextrose


  (Dextrose 50%)    STAT  PRN


 IV


 Hypoglycemia  10/30/18 22:15


 11/29/18 22:14   


 


 


 Dextrose


  (Dextrose 50%)    STAT  PRN


 IV


 Hypoglycemia  10/30/18 22:15


 11/29/18 22:14   


 


 


 Divalproex Sodium


  (Depakote)  500 mg  DAILY


 ORAL


   10/31/18 09:00


 11/30/18 08:59  10/31/18 08:29


 


 


 Escitalopram


 Oxalate


  (Lexapro)  10 mg  DAILY


 ORAL


   10/31/18 09:00


 11/30/18 08:59  10/31/18 08:29


 


 


 Heparin Sodium


  (Porcine)


  (Heparin 5000


 units/ml)  5,000 units  EVERY 12  HOURS


 SUBQ


   10/31/18 09:00


 11/30/18 08:59   


 


 


 Insulin Aspart


  (NovoLOG)    BEFORE MEALS AND  HS


 SUBQ


   10/31/18 06:30


 11/30/18 06:29  10/31/18 06:25


 


 


 Lorazepam


  (Ativan 2mg/ml


 1ml)  0.5 mg  Q4H  PRN


 IV


 For Anxiety  10/30/18 22:15


 11/6/18 22:14   


 


 


 Metoprolol


 Succinate


  (Toprol XL)  25 mg  DAILY


 ORAL


   10/31/18 09:00


 11/30/18 08:59  10/31/18 08:34


 


 


 Morphine Sulfate


  (Morphine


 Sulfate)  1 mg  Q4H  PRN


 IVP


 Pain 7-10  10/31/18 00:30


 11/7/18 00:29  10/31/18 08:34


 


 


 Ondansetron HCl


  (Zofran)  4 mg  Q6H  PRN


 IVP


 Nausea & Vomiting  10/30/18 22:15


 11/29/18 22:14   


 


 


 Polyethylene


 Glycol


  (Miralax)  17 gm  BEDTIME  PRN


 ORAL


 Constipation  10/30/18 23:00


 11/29/18 22:59   


 


 


 Quetiapine


 Fumarate


  (SEROquel)  200 mg  BEDTIME


 ORAL


   10/30/18 22:30


 11/29/18 22:29  10/30/18 22:32


 


 


 Sodium Chloride  1,000 ml @ 


 60 mls/hr  [id number]


 IV


   10/30/18 23:45


 11/29/18 23:44  10/30/18 23:55


 


 


 Temazepam


  (Restoril)  15 mg  BEDTIME  PRN


 ORAL


 Insomnia  10/31/18 06:34


 11/7/18 06:33   


 


 


 Zolpidem Tartrate


  (Ambien)  5 mg  HSPRN  PRN


 ORAL


 Insomnia  10/30/18 22:15


 11/6/18 22:14   


 











GI: Plan


Problems:  


(1) Gastroparesis due to DM


(2) Malnutrition


(3) General weakness


(4) Dehydration


(5) Intractable abdominal pain


(6) Gastroenteritis


(7) Opioid dependence


Plan


symptomatic treatment, will consider endoscopy if vomiting not controlled by 

medical management


ADA diet


zofran prn, reglan for persistent vomiting


PO/IV hydration + electrolyte correction


ppi


fu labs, HgA1C, lipase, utox





Discussed with Dr. Ramírez.


Thank you for this patient referral, we will follow.





The patient was seen and examined at bedside and all new and available data was 

reviewed in the patients chart. I agree with the above findings, impression 

and plan.  (Patient seen earlier today. Signature stamp does not reflect 

patient encounter time.). - MD Sharon Hanks,[facility] Oct 31, 2018 10:59

## 2018-10-31 NOTE — DIAGNOSTIC IMAGING REPORT
--------------- APPROVED REPORT --------------





CPT Code: 95498



Present Symptoms

Comments: Pain





BILATERAL: Imaging reveals a patent deep venous system bilaterally. There is no evidence 

of thrombus within the femoral, popliteal or tibial segments. The greater saphenous veins 

are also within normal limits. Doppler indicates normal spontaneous flow within these 

segments.

## 2018-11-01 VITALS — DIASTOLIC BLOOD PRESSURE: 74 MMHG | SYSTOLIC BLOOD PRESSURE: 120 MMHG

## 2018-11-01 VITALS — DIASTOLIC BLOOD PRESSURE: 59 MMHG | SYSTOLIC BLOOD PRESSURE: 109 MMHG

## 2018-11-01 VITALS — SYSTOLIC BLOOD PRESSURE: 107 MMHG | DIASTOLIC BLOOD PRESSURE: 62 MMHG

## 2018-11-01 VITALS — SYSTOLIC BLOOD PRESSURE: 109 MMHG | DIASTOLIC BLOOD PRESSURE: 66 MMHG

## 2018-11-01 VITALS — SYSTOLIC BLOOD PRESSURE: 155 MMHG | DIASTOLIC BLOOD PRESSURE: 67 MMHG

## 2018-11-01 LAB
ADD MANUAL DIFF: NO
ALBUMIN SERPL-MCNC: 3 G/DL (ref 3.4–5)
ALBUMIN/GLOB SERPL: 0.9 {RATIO} (ref 1–2.7)
ALP SERPL-CCNC: 60 U/L (ref 46–116)
ALT SERPL-CCNC: 24 U/L (ref 12–78)
ANION GAP SERPL CALC-SCNC: 6 MMOL/L (ref 5–15)
AST SERPL-CCNC: 13 U/L (ref 15–37)
BASOPHILS NFR BLD AUTO: 0.7 % (ref 0–2)
BILIRUB SERPL-MCNC: 0.4 MG/DL (ref 0.2–1)
BUN SERPL-MCNC: 18 MG/DL (ref 7–18)
CALCIUM SERPL-MCNC: 8.6 MG/DL (ref 8.5–10.1)
CHLORIDE SERPL-SCNC: 101 MMOL/L (ref 98–107)
CO2 SERPL-SCNC: 27 MMOL/L (ref 21–32)
CREAT SERPL-MCNC: 0.9 MG/DL (ref 0.55–1.3)
EOSINOPHIL NFR BLD AUTO: 2.1 % (ref 0–3)
ERYTHROCYTE [DISTWIDTH] IN BLOOD BY AUTOMATED COUNT: 11.8 % (ref 11.6–14.8)
GLOBULIN SER-MCNC: 3.3 G/DL
HCT VFR BLD CALC: 40.8 % (ref 42–52)
HGB BLD-MCNC: 14.2 G/DL (ref 14.2–18)
LYMPHOCYTES NFR BLD AUTO: 29.2 % (ref 20–45)
MCV RBC AUTO: 84 FL (ref 80–99)
MONOCYTES NFR BLD AUTO: 7.2 % (ref 1–10)
NEUTROPHILS NFR BLD AUTO: 60.8 % (ref 45–75)
PLATELET # BLD: 158 K/UL (ref 150–450)
POTASSIUM SERPL-SCNC: 3.7 MMOL/L (ref 3.5–5.1)
RBC # BLD AUTO: 4.88 M/UL (ref 4.7–6.1)
SODIUM SERPL-SCNC: 134 MMOL/L (ref 136–145)
WBC # BLD AUTO: 8.8 K/UL (ref 4.8–10.8)

## 2018-11-01 RX ADMIN — MORPHINE SULFATE PRN MG: 2 INJECTION, SOLUTION INTRAMUSCULAR; INTRAVENOUS at 18:03

## 2018-11-01 RX ADMIN — MORPHINE SULFATE PRN MG: 2 INJECTION, SOLUTION INTRAMUSCULAR; INTRAVENOUS at 22:08

## 2018-11-01 RX ADMIN — INSULIN ASPART SCH UNITS: 100 INJECTION, SOLUTION INTRAVENOUS; SUBCUTANEOUS at 11:30

## 2018-11-01 RX ADMIN — HEPARIN SODIUM SCH UNITS: 5000 INJECTION INTRAVENOUS; SUBCUTANEOUS at 08:42

## 2018-11-01 RX ADMIN — HEPARIN SODIUM SCH UNITS: 5000 INJECTION INTRAVENOUS; SUBCUTANEOUS at 21:29

## 2018-11-01 RX ADMIN — INSULIN ASPART SCH UNITS: 100 INJECTION, SOLUTION INTRAVENOUS; SUBCUTANEOUS at 16:25

## 2018-11-01 RX ADMIN — METOPROLOL SUCCINATE SCH MG: 25 TABLET, EXTENDED RELEASE ORAL at 08:40

## 2018-11-01 RX ADMIN — HYDROCODONE BITARTRATE AND ACETAMINOPHEN PRN TAB: 10; 325 TABLET ORAL at 08:41

## 2018-11-01 RX ADMIN — INSULIN ASPART SCH UNITS: 100 INJECTION, SOLUTION INTRAVENOUS; SUBCUTANEOUS at 21:29

## 2018-11-01 RX ADMIN — QUETIAPINE SCH MG: 200 TABLET, FILM COATED ORAL at 21:27

## 2018-11-01 RX ADMIN — MORPHINE SULFATE PRN MG: 2 INJECTION, SOLUTION INTRAMUSCULAR; INTRAVENOUS at 05:58

## 2018-11-01 RX ADMIN — MORPHINE SULFATE PRN MG: 2 INJECTION, SOLUTION INTRAMUSCULAR; INTRAVENOUS at 10:11

## 2018-11-01 RX ADMIN — INSULIN ASPART SCH UNITS: 100 INJECTION, SOLUTION INTRAVENOUS; SUBCUTANEOUS at 12:22

## 2018-11-01 RX ADMIN — ASPIRIN SCH MG: 81 TABLET, DELAYED RELEASE ORAL at 08:40

## 2018-11-01 RX ADMIN — INSULIN ASPART SCH UNITS: 100 INJECTION, SOLUTION INTRAVENOUS; SUBCUTANEOUS at 06:00

## 2018-11-01 RX ADMIN — DIVALPROEX SODIUM SCH MG: 500 TABLET, DELAYED RELEASE ORAL at 08:40

## 2018-11-01 RX ADMIN — MORPHINE SULFATE PRN MG: 2 INJECTION, SOLUTION INTRAMUSCULAR; INTRAVENOUS at 01:57

## 2018-11-01 RX ADMIN — MORPHINE SULFATE PRN MG: 2 INJECTION, SOLUTION INTRAMUSCULAR; INTRAVENOUS at 14:05

## 2018-11-01 NOTE — GI PROGRESS NOTE
Assessment/Plan


Problems:  


(1) Dehydration


ICD Codes:  E86.0 - Dehydration


SNOMED:  35749987


(2) Intractable abdominal pain


ICD Codes:  R10.9 - Unspecified abdominal pain


SNOMED:  54158663


(3) Gastroenteritis


ICD Codes:  K52.9 - Gastroenteritis


SNOMED:  64524945


(4) Gastroparesis due to DM


ICD Codes:  E11.43 - Type 2 diabetes mellitus with diabetic autonomic (poly)

neuropathy; K31.84 - Gastroparesis


SNOMED:  89815517, 076266731


(5) Diabetes mellitus


ICD Codes:  E11.9 - Diabetes mellitus


SNOMED:  47782391


Status:  stable


Status Narrative


Discussed with Dr. Ramírez.


Assessment/Plan


utox negative


lipase negative





symptomatic treatment, will consider endoscopy if vomiting not controlled by 

medical management


ADA diet


DM management


zofran prn, reglan for persistent vomiting


PO/IV hydration + electrolyte correction


ppi


fu labs





The patient was seen and examined at bedside and all new and available data was 

reviewed in the patients chart. I agree with the above findings, impression 

and plan.  (Patient seen earlier today. Signature stamp does not reflect 

patient encounter time.). - Camron Ramírez MD





Subjective


Gastrointestinal/Abdominal:  Reports: nausea





Objective





Last 24 Hour Vital Signs








  Date Time  Temp Pulse Resp B/P (MAP) Pulse Ox O2 Delivery O2 Flow Rate FiO2


 


11/1/18 08:40  82  107/62    


 


11/1/18 08:05      Room Air  


 


11/1/18 08:00 97.7 82 20 107/62 (77) 94   


 


11/1/18 07:26  86 18   Room Air  


 


11/1/18 04:00  76 20 109/59 (76) 95   


 


10/31/18 21:00      Room Air  


 


10/31/18 20:08  82 16   Room Air  


 


10/31/18 20:00 98.0 86 20 122/67 (85) 96   


 


10/31/18 16:00 97.0 83 18 110/63 (79) 95   


 


10/31/18 12:00 97.9 85 18 113/81 (92) 95   

















Intake and Output  


 


 10/31/18 11/1/18





 19:00 07:00


 


Intake Total 1036 ml 720 ml


 


Output Total 725 ml 1200 ml


 


Balance 311 ml -480 ml


 


  


 


Intake Oral 236 ml 


 


IV Total 800 ml 720 ml


 


Output Urine Total 725 ml 1200 ml


 


# Voids 1 3











Laboratory Tests








Test


  10/31/18


11:35 10/31/18


19:02 11/1/18


09:25


 


White Blood Count


  7.6 K/UL


(4.8-10.8) 


  8.8 K/UL


(4.8-10.8)


 


Red Blood Count


  4.78 M/UL


(4.70-6.10) 


  4.88 M/UL


(4.70-6.10)


 


Hemoglobin


  14.3 G/DL


(14.2-18.0) 


  14.2 G/DL


(14.2-18.0)


 


Hematocrit


  40.1 %


(42.0-52.0)  L 


  40.8 %


(42.0-52.0)  L


 


Mean Corpuscular Volume 84 FL (80-99)    84 FL (80-99)  


 


Mean Corpuscular Hemoglobin


  29.8 PG


(27.0-31.0) 


  29.1 PG


(27.0-31.0)


 


Mean Corpuscular Hemoglobin


Concent 35.6 G/DL


(32.0-36.0) 


  34.7 G/DL


(32.0-36.0)


 


Red Cell Distribution Width


  11.7 %


(11.6-14.8) 


  11.8 %


(11.6-14.8)


 


Platelet Count


  179 K/UL


(150-450) 


  158 K/UL


(150-450)


 


Mean Platelet Volume


  8.0 FL


(6.5-10.1) 


  7.5 FL


(6.5-10.1)


 


Neutrophils (%) (Auto)


  56.0 %


(45.0-75.0) 


  60.8 %


(45.0-75.0)


 


Lymphocytes (%) (Auto)


  31.2 %


(20.0-45.0) 


  29.2 %


(20.0-45.0)


 


Monocytes (%) (Auto)


  9.5 %


(1.0-10.0) 


  7.2 %


(1.0-10.0)


 


Eosinophils (%) (Auto)


  2.4 %


(0.0-3.0) 


  2.1 %


(0.0-3.0)


 


Basophils (%) (Auto)


  1.0 %


(0.0-2.0) 


  0.7 %


(0.0-2.0)


 


Sodium Level


  136 MMOL/L


(136-145) 


  134 MMOL/L


(136-145)  L


 


Potassium Level


  4.2 MMOL/L


(3.5-5.1) 


  3.7 MMOL/L


(3.5-5.1)


 


Chloride Level


  102 MMOL/L


() 


  101 MMOL/L


()


 


Carbon Dioxide Level


  26 MMOL/L


(21-32) 


  27 MMOL/L


(21-32)


 


Anion Gap


  8 mmol/L


(5-15) 


  6 mmol/L


(5-15)


 


Blood Urea Nitrogen


  17 mg/dL


(7-18) 


  18 mg/dL


(7-18)


 


Creatinine


  0.9 MG/DL


(0.55-1.30) 


  0.9 MG/DL


(0.55-1.30)


 


Estimat Glomerular Filtration


Rate > 60 mL/min


(>60) 


  > 60 mL/min


(>60)


 


Glucose Level


  234 MG/DL


()  H 


  289 MG/DL


()  H


 


Calcium Level


  8.5 MG/DL


(8.5-10.1) 


  8.6 MG/DL


(8.5-10.1)


 


Total Bilirubin


  0.3 MG/DL


(0.2-1.0) 


  0.4 MG/DL


(0.2-1.0)


 


Aspartate Amino Transf


(AST/SGOT) 12 U/L (15-37)


L 


  13 U/L (15-37)


L


 


Alanine Aminotransferase


(ALT/SGPT) 24 U/L (12-78)


  


  24 U/L (12-78)


 


 


Alkaline Phosphatase


  59 U/L


() 


  60 U/L


()


 


Total Protein


  6.4 G/DL


(6.4-8.2) 


  6.3 G/DL


(6.4-8.2)  L


 


Albumin


  3.1 G/DL


(3.4-5.0)  L 


  3.0 G/DL


(3.4-5.0)  L


 


Globulin 3.3 g/dL    3.3 g/dL  


 


Albumin/Globulin Ratio


  0.9 (1.0-2.7)


L 


  0.9 (1.0-2.7)


L


 


Triglycerides Level


  139 MG/DL


() 


  


 


 


Cholesterol Level


  102 MG/DL (<


200) 


  


 


 


LDL Cholesterol


  55 mg/dL


(<100) 


  


 


 


HDL Cholesterol


  29 MG/DL


(40-60)  L 


  


 


 


Cholesterol/HDL Ratio 3.5 (3.3-4.4)    


 


Thyroid Stimulating Hormone


(TSH) 1.416 uiU/mL


(0.358-3.740) 


  


 


 


Urine Opiates Screen


  


  Negative


(NEGATIVE) 


 


 


Urine Barbiturates Screen


  


  Negative


(NEGATIVE) 


 


 


Phencyclidine (PCP) Screen


  


  Negative


(NEGATIVE) 


 


 


Urine Amphetamines Screen


  


  Negative


(NEGATIVE) 


 


 


Urine Benzodiazepines Screen


  


  Negative


(NEGATIVE) 


 


 


Urine Cocaine Screen


  


  Negative


(NEGATIVE) 


 


 


Urine Marijuana (THC) Screen


  


  Negative


(NEGATIVE) 


 


 


Erythrocyte Sedimentation Rate


  


  


  5 MM/HR (0-15)


 


 


Hemoglobin A1c


  


  


  7.3 %


(4.3-6.0)  H


 


Phosphorus Level


  


  


  2.7 MG/DL


(2.5-4.9)


 


Magnesium Level


  


  


  1.8 MG/DL


(1.8-2.4)


 


Lipase


  


  


  155 U/L


()








Height (Feet):  5


Height (Inches):  11.00


Weight (Pounds):  263


General Appearance:  WD/WN, no apparent distress, alert


Cardiovascular:  normal rate


Respiratory/Chest:  normal breath sounds, no respiratory distress


Abdominal Exam:  normal bowel sounds, non tender, soft


Extremities:  normal range of motion, non-tender











Willie Herrera NP Nov 1, 2018 11:24

## 2018-11-01 NOTE — PULMONOLOGY PROGRESS NOTE
Assessment/Plan


Problems:  


(1) COPD (chronic obstructive pulmonary disease)


(2) Psychosomatic disorder


(3) Diabetes mellitus


(4) Peripheral neuropathy


(5) Morbid obesity with BMI of 40.0-44.9, adult


(6) Gastroparesis due to DM


(7) Noncompliance


Assessment/Plan


improving


more awake


sliding scale


f/u psych recommendations


neuro f/u





Subjective


ROS Limited/Unobtainable:  Yes


Constitutional:  Reports: no symptoms


HEENT:  Repors: no symptoms


Respiratory:  Reports: no symptoms


Allergies:  


Coded Allergies:  


     HALOPERIDOL (Verified  Allergy, Unknown, 10/5/18)





Objective





Last 24 Hour Vital Signs








  Date Time  Temp Pulse Resp B/P (MAP) Pulse Ox O2 Delivery O2 Flow Rate FiO2


 


11/1/18 08:40  82  107/62    


 


11/1/18 08:05      Room Air  


 


11/1/18 08:00 97.7 82 20 107/62 (77) 94   


 


11/1/18 07:26  86 18   Room Air  


 


11/1/18 04:00  76 20 109/59 (76) 95   


 


10/31/18 21:00      Room Air  


 


10/31/18 20:08  82 16   Room Air  


 


10/31/18 20:00 98.0 86 20 122/67 (85) 96   


 


10/31/18 16:00 97.0 83 18 110/63 (79) 95   

















Intake and Output  


 


 10/31/18 11/1/18





 19:00 07:00


 


Intake Total 1036 ml 720 ml


 


Output Total 725 ml 1200 ml


 


Balance 311 ml -480 ml


 


  


 


Intake Oral 236 ml 


 


IV Total 800 ml 720 ml


 


Output Urine Total 725 ml 1200 ml


 


# Voids 1 3








General Appearance:  WD/WN


HEENT:  normocephalic, anicteric


Respiratory/Chest:  chest wall non-tender, lungs clear


Cardiovascular:  normal peripheral pulses, normal rate


Abdomen:  normal bowel sounds, soft, non tender


Neurologic/Psychiatric:  CNs II-XII grossly normal


Lymphatic:  no neck adenopathy





Microbiology








 Date/Time


Source Procedure


Growth Status


 


 


 10/30/18 18:37


Nasal Nares MRSA Culture - Final


Staphylococcus Aureus - Mrsa Complete








Laboratory Tests


10/31/18 19:02: 


Urine Opiates Screen Negative, Urine Barbiturates Screen Negative, 

Phencyclidine (PCP) Screen Negative, Urine Amphetamines Screen Negative, Urine 

Benzodiazepines Screen Negative, Urine Cocaine Screen Negative, Urine Marijuana 

(THC) Screen Negative


11/1/18 09:25: 


White Blood Count 8.8, Red Blood Count 4.88, Hemoglobin 14.2, Hematocrit 40.8L, 

Mean Corpuscular Volume 84, Mean Corpuscular Hemoglobin 29.1, Mean Corpuscular 

Hemoglobin Concent 34.7, Red Cell Distribution Width 11.8, Platelet Count 158, 

Mean Platelet Volume 7.5, Neutrophils (%) (Auto) 60.8, Lymphocytes (%) (Auto) 

29.2, Monocytes (%) (Auto) 7.2, Eosinophils (%) (Auto) 2.1, Basophils (%) (Auto

) 0.7, Erythrocyte Sedimentation Rate 5, Sodium Level 134L, Potassium Level 3.7

, Chloride Level 101, Carbon Dioxide Level 27, Anion Gap 6, Blood Urea Nitrogen 

18, Creatinine 0.9, Estimat Glomerular Filtration Rate > 60, Glucose Level 289H

, Hemoglobin A1c 7.3H, Calcium Level 8.6, Phosphorus Level 2.7, Magnesium Level 

1.8, Total Bilirubin 0.4, Aspartate Amino Transf (AST/SGOT) 13L, Alanine 

Aminotransferase (ALT/SGPT) 24, Alkaline Phosphatase 60, Total Protein 6.3L, 

Albumin 3.0L, Globulin 3.3, Albumin/Globulin Ratio 0.9L, Lipase 155





Current Medications








 Medications


  (Trade)  Dose


 Ordered  Sig/Henry


 Route


 PRN Reason  Start Time


 Stop Time Status Last Admin


Dose Admin


 


 Acetaminophen


  (Tylenol)  650 mg  Q4H  PRN


 ORAL


 fever  10/30/18 22:15


 11/29/18 22:14   


 


 


 Acetaminophen/


 Hydrocodone Bitart


  (Norco 10/325)  1 tab  Q4H  PRN


 ORAL


 Pain 4-6  10/31/18 00:45


 11/7/18 00:44  11/1/18 08:41


 


 


 Al Hydroxide/Mg


 Hydroxide


  (Mylanta II)  30 ml  Q6H  PRN


 ORAL


 dyspepsia  10/30/18 22:15


 11/29/18 22:14   


 


 


 Albuterol/


 Ipratropium


  (Albuterol/


 Ipratropium)  3 ml  Q4HR  PRN


 HHN


 Shortness of Breath  10/30/18 23:00


 11/4/18 22:59   


 


 


 Aspirin


  (Ecotrin)  81 mg  DAILY


 ORAL


   10/31/18 09:00


 11/30/18 08:59  11/1/18 08:40


 


 


 Clonidine HCl


  (Catapres Tab)  0.1 mg  Q4H  PRN


 ORAL


 For High Blood Pressure  10/30/18 22:15


 11/29/18 22:14   


 


 


 Dextrose


  (Dextrose 50%)    STAT  PRN


 IV


 Hypoglycemia  10/30/18 22:15


 11/29/18 22:14   


 


 


 Dextrose


  (Dextrose 50%)    STAT  PRN


 IV


 Hypoglycemia  10/30/18 22:15


 11/29/18 22:14   


 


 


 Divalproex Sodium


  (Depakote)  500 mg  DAILY


 ORAL


   10/31/18 09:00


 11/30/18 08:59  11/1/18 08:40


 


 


 Escitalopram


 Oxalate


  (Lexapro)  10 mg  DAILY


 ORAL


   10/31/18 09:00


 11/30/18 08:59  11/1/18 08:40


 


 


 Heparin Sodium


  (Porcine)


  (Heparin 5000


 units/ml)  5,000 units  EVERY 12  HOURS


 SUBQ


   10/31/18 09:00


 11/30/18 08:59   


 


 


 Insulin Aspart


  (NovoLOG)    BEFORE MEALS AND  HS


 SUBQ


   10/31/18 06:30


 11/30/18 06:29  11/1/18 12:22


 


 


 Lorazepam


  (Ativan 2mg/ml


 1ml)  0.5 mg  Q4H  PRN


 IV


 For Anxiety  10/30/18 22:15


 11/6/18 22:14   


 


 


 Metoprolol


 Succinate


  (Toprol XL)  25 mg  DAILY


 ORAL


   10/31/18 09:00


 11/30/18 08:59  11/1/18 08:40


 


 


 Morphine Sulfate


  (Morphine


 Sulfate)  1 mg  Q4H  PRN


 IVP


 Pain 7-10  10/31/18 00:30


 11/7/18 00:29  11/1/18 10:11


 


 


 Ondansetron HCl


  (Zofran)  4 mg  Q6H  PRN


 IVP


 Nausea & Vomiting  10/30/18 22:15


 11/29/18 22:14   


 


 


 Polyethylene


 Glycol


  (Miralax)  17 gm  BEDTIME  PRN


 ORAL


 Constipation  10/30/18 23:00


 11/29/18 22:59   


 


 


 Quetiapine


 Fumarate


  (SEROquel)  200 mg  BEDTIME


 ORAL


   10/30/18 22:30


 11/29/18 22:29  10/31/18 22:08


 


 


 Sodium Chloride  1,000 ml @ 


 60 mls/hr  [id number]


 IV


   10/30/18 23:45


 11/29/18 23:44  11/1/18 10:09


 


 


 Temazepam


  (Restoril)  15 mg  BEDTIME  PRN


 ORAL


 Insomnia  10/31/18 06:34


 11/7/18 06:33   


 


 


 Zolpidem Tartrate


  (Ambien)  5 mg  HSPRN  PRN


 ORAL


 Insomnia  10/30/18 22:15


 11/6/18 22:14   


 

















Lissette Steward MD Nov 1, 2018 12:29

## 2018-11-01 NOTE — CONSULTATION
DATE OF CONSULTATION:  10/31/2018

PSYCHOTHERAPY CONSULTATION PROGRESS NOTE



CONSULTING PHYSICIAN:  Mynor Mayen PsyD.



TREATING ATTENDING PHYSICIAN:  Samir Cabrera D.O.



HISTORY OF PRESENT ILLNESS:  This patient is a 37-year-old male patient.

The patient is brought into the hospital for weakness.  He does have a

history of schizoaffective disorder, bipolar type.  This patient has

_____, and for these reasons, he was referred for psychotherapeutic

services.  Since _____, the patient states that he is not doing well.  He

states that _____ very weak.  He is _____ sleep.  His mood has been very

down and _____ that he has been very helpless, hopeless, does not _____

and is very tearful with this.  He denies any suicidal or homicidal

thoughts of ideation.  Denies any auditory or visual hallucinations at

this time.  He _____.  The patient is still confused, cooperative _____.

Denies _____.  The patient at this time _____.



PAST MEDICAL HISTORY:  Includes a history of lower back pain and

CVA.



ALLERGIES:  The patient is allergic to haloperidol.



SUBSTANCE ABUSE HISTORY:  There is no history of alcohol use or illicit

substance use _____.



PSYCHIATRIC HISTORY:  The patient has a history of schizoaffective disorder

and has been treated with psychotropic medications in the past.



SOCIAL HISTORY:  The patient is a 37-year-old male patient _____

financially sustained through [employer].



MENTAL STATUS EXAMINATION:  The patient is alert and oriented to person and

place.  Mood is dysphoric.  Affect is blunted.  Thought process is

disorganized.  Thought content _____ poor attention and concentration.

Poor insight, judgment, and impulse control.



DIAGNOSES:

AXIS I  Schizoaffective disorder, bipolar type.

AXIS II  Deferred.

AXIS III  Per H and P.



PLAN:  This clinician assessed this patient, provided the patient with

supportive psychotherapy and coping skills such as _____.  This clinician

has reviewed the patient's chart and discussed the treatment with

treatment team.  _____









  ______________________________________________

  Mynor Mayen PsyD.





DR:  EARNEST

D:  10/31/2018 14:17

T:  11/01/2018 06:14

JOB#:  [id number]

CC:

## 2018-11-01 NOTE — GENERAL PROGRESS NOTE
Assessment/Plan


Problem List:  


(1) Nausea & vomiting


ICD Codes:  R11.2 - Nausea with vomiting, unspecified


SNOMED:  45241876


(2) Diabetes


ICD Codes:  E11.9 - Type 2 diabetes mellitus without complications


SNOMED:  27373754


(3) CVA (cerebral vascular accident)


ICD Codes:  I63.9 - Cerebral infarction, unspecified


SNOMED:  713822694


(4) Weak


ICD Codes:  R53.1 - Weakness


SNOMED:  20986613


(5) Back pain


ICD Codes:  M54.9 - Dorsalgia, unspecified


SNOMED:  070199848


(6) Diabetes mellitus


ICD Codes:  E11.9 - Diabetes mellitus


SNOMED:  86360787


(7) Anxiety


ICD Codes:  F41.9 - Anxiety disorder, unspecified; Z68.41 - Body mass index (BMI

) 40.0-44.9, adult


SNOMED:  06696408


(8) COPD exacerbation


ICD Codes:  J44.1 - Chronic obstructive pulmonary disease with (acute) 

exacerbation


SNOMED:  401721855, 083086074


Status:  unchanged


Assessment/Plan


ot pt diet gi f/u pain control cbc bmp am





Subjective


Constitutional:  Reports: weakness


Gastrointestinal/Abdominal:  Reports: nausea, vomiting


Allergies:  


Coded Allergies:  


     HALOPERIDOL (Verified  Allergy, Unknown, 10/5/18)


All Systems:  reviewed and negative except above


Subjective


weak in bed





Objective





Last 24 Hour Vital Signs








  Date Time  Temp Pulse Resp B/P (MAP) Pulse Ox O2 Delivery O2 Flow Rate FiO2


 


11/1/18 12:00 97.7 80 20 109/66 (80) 95   


 


11/1/18 08:40  82  107/62    


 


11/1/18 08:05      Room Air  


 


11/1/18 08:00 97.7 82 20 107/62 (77) 94   


 


11/1/18 07:26  86 18   Room Air  


 


11/1/18 04:00  76 20 109/59 (76) 95   


 


10/31/18 21:00      Room Air  


 


10/31/18 20:08  82 16   Room Air  


 


10/31/18 20:00 98.0 86 20 122/67 (85) 96   


 


10/31/18 16:00 97.0 83 18 110/63 (79) 95   

















Intake and Output  


 


 10/31/18 11/1/18





 19:00 07:00


 


Intake Total 1036 ml 720 ml


 


Output Total 725 ml 1200 ml


 


Balance 311 ml -480 ml


 


  


 


Intake Oral 236 ml 


 


IV Total 800 ml 720 ml


 


Output Urine Total 725 ml 1200 ml


 


# Voids 1 3








Laboratory Tests


10/31/18 19:02: 


Urine Opiates Screen Negative, Urine Barbiturates Screen Negative, 

Phencyclidine (PCP) Screen Negative, Urine Amphetamines Screen Negative, Urine 

Benzodiazepines Screen Negative, Urine Cocaine Screen Negative, Urine Marijuana 

(THC) Screen Negative


11/1/18 09:25: 


White Blood Count 8.8, Red Blood Count 4.88, Hemoglobin 14.2, Hematocrit 40.8L, 

Mean Corpuscular Volume 84, Mean Corpuscular Hemoglobin 29.1, Mean Corpuscular 

Hemoglobin Concent 34.7, Red Cell Distribution Width 11.8, Platelet Count 158, 

Mean Platelet Volume 7.5, Neutrophils (%) (Auto) 60.8, Lymphocytes (%) (Auto) 

29.2, Monocytes (%) (Auto) 7.2, Eosinophils (%) (Auto) 2.1, Basophils (%) (Auto

) 0.7, Erythrocyte Sedimentation Rate 5, Sodium Level 134L, Potassium Level 3.7

, Chloride Level 101, Carbon Dioxide Level 27, Anion Gap 6, Blood Urea Nitrogen 

18, Creatinine 0.9, Estimat Glomerular Filtration Rate > 60, Glucose Level 289H

, Hemoglobin A1c 7.3H, Calcium Level 8.6, Phosphorus Level 2.7, Magnesium Level 

1.8, Total Bilirubin 0.4, Aspartate Amino Transf (AST/SGOT) 13L, Alanine 

Aminotransferase (ALT/SGPT) 24, Alkaline Phosphatase 60, Total Protein 6.3L, 

Albumin 3.0L, Globulin 3.3, Albumin/Globulin Ratio 0.9L, Lipase 155


Height (Feet):  5


Height (Inches):  11.00


Weight (Pounds):  263


General Appearance:  lethargic


EENT:  normal ENT inspection


Neck:  normal alignment


Cardiovascular:  normal peripheral pulses, normal rate, regular rhythm


Respiratory/Chest:  chest wall non-tender, lungs clear, normal breath sounds


Abdomen:  normal bowel sounds, non tender, soft


Extremities:  normal inspection


Edema:  no edema noted Arm (L), no edema noted Arm (R), no edema noted Leg (L), 

no edema noted Leg (R), no edema noted Pedal (L), no edema noted Pedal (R), no 

edema noted Generalized


Neurologic:  responsive, motor weakness


Skin:  normal pigmentation, warm/dry











Samir Cabrera DO Nov 1, 2018 14:04

## 2018-11-01 NOTE — GENERAL PROGRESS NOTE
Assessment/Plan


Assessment/Plan


(1) Chiari malformation type II


(2) Degenerative disc disease, cervical


(3) Lumbar degenerative disc disease


(4) Arthritis, lumbar spine


(5) Peripheral neuropathy


(6) Hydrocephalus


(7) Diabetes mellitus


Pt will be continued on Morphine and Norco.


Pt was d/w Dr. Franco and he concurred.





Subjective


Date patient seen:  Nov 1, 2018


Time patient seen:  07:00 - am


Allergies:  


Coded Allergies:  


     HALOPERIDOL (Verified  Allergy, Unknown, 10/5/18)


Subjective


Constitutional:  Reports: weakness, Denies: chills, diaphoresis, fever, malaise

, no symptoms, other


HEENT:  Denies: blurred vision, double vision, ear discharge, ear pain, eye pain

, mouth pain, mouth swelling, no symptoms, nose congestion, nose pain, other, 

tearing, throat pain, throat swelling


Cardiovascular:  Denies: chest pain, edema, irregular heart rate, 

lightheadedness, no symptoms, other, palpitations, syncope


Respiratory:  Denies: SOB at rest, SOB with excertion, cough, no symptoms, 

orthopnea, other, shortness of breath, sputum, stridor, wheezing


Gastrointestinal/Abdominal:  Denies: abdomen distended, abdominal pain, black 

stools, blood in stool, constipated, diarrhea, difficulty swallowing, nausea, 

no symptoms, other, poor appetite, poor fluid intake, rectal bleeding, tarry 

stools, vomiting


Genitourinary:  Denies: burning, discharge, flank pain, frequency, hematuria, 

incontinence, no symptoms, other, pain, urgency


Neurologic/Psychiatric:  Reports: headache, numbness, tingling, weakness, Denies

: anxiety, depressed, emotional problems, no symptoms, other, paresthesia, pre-

existing deficit, seizure, tremors


Endocrine:  Denies: excessive sweating, flushing, increased hunger, increased 

thirst, increased urine, intolerance to cold, intolerance to heat, no symptoms, 

other, unexplained weight gain, unexplained weight loss


Hematologic/Lymphatic:  Denies: anemia, easy bleeding, easy bruising, no 

symptoms, other


 


Subjective


Patient is a known patient and has been admitted under the care of Dr. Cabrera. C/

o pain, 


Started on Morphine 1mg IV Q4H PRN severe pain and Norco 10/325mg PO 1 tab Q4H 

PRN moderate pain. 


Pain is tolerated on the medications.





Objective





Last 24 Hour Vital Signs








  Date Time  Temp Pulse Resp B/P (MAP) Pulse Ox O2 Delivery O2 Flow Rate FiO2


 


11/1/18 08:40  82  107/62    


 


11/1/18 08:05      Room Air  


 


11/1/18 08:00 97.7 82 20 107/62 (77) 94   


 


11/1/18 04:00  76 20 109/59 (76) 95   


 


10/31/18 21:00      Room Air  


 


10/31/18 20:08  82 16   Room Air  


 


10/31/18 20:00 98.0 86 20 122/67 (85) 96   


 


10/31/18 16:00 97.0 83 18 110/63 (79) 95   


 


10/31/18 12:00 97.9 85 18 113/81 (92) 95   


 


10/31/18 09:00      Room Air  

















Intake and Output  


 


 10/31/18 11/1/18





 19:00 07:00


 


Intake Total 1036 ml 720 ml


 


Output Total 725 ml 1200 ml


 


Balance 311 ml -480 ml


 


  


 


Intake Oral 236 ml 


 


IV Total 800 ml 720 ml


 


Output Urine Total 725 ml 1200 ml


 


# Voids 1 3








Laboratory Tests


10/31/18 11:35: 


White Blood Count 7.6, Red Blood Count 4.78, Hemoglobin 14.3, Hematocrit 40.1L, 

Mean Corpuscular Volume 84, Mean Corpuscular Hemoglobin 29.8, Mean Corpuscular 

Hemoglobin Concent 35.6, Red Cell Distribution Width 11.7, Platelet Count 179, 

Mean Platelet Volume 8.0, Neutrophils (%) (Auto) 56.0, Lymphocytes (%) (Auto) 

31.2, Monocytes (%) (Auto) 9.5, Eosinophils (%) (Auto) 2.4, Basophils (%) (Auto

) 1.0, Sodium Level 136, Potassium Level 4.2, Chloride Level 102, Carbon 

Dioxide Level 26, Anion Gap 8, Blood Urea Nitrogen 17, Creatinine 0.9, Estimat 

Glomerular Filtration Rate > 60, Glucose Level 234H, Calcium Level 8.5, Total 

Bilirubin 0.3, Aspartate Amino Transf (AST/SGOT) 12L, Alanine Aminotransferase (

ALT/SGPT) 24, Alkaline Phosphatase 59, Total Protein 6.4, Albumin 3.1L, 

Globulin 3.3, Albumin/Globulin Ratio 0.9L, Triglycerides Level 139, Cholesterol 

Level 102, LDL Cholesterol 55, HDL Cholesterol 29L, Cholesterol/HDL Ratio 3.5, 

Thyroid Stimulating Hormone (TSH) 1.416


10/31/18 19:02: 


Urine Opiates Screen Negative, Urine Barbiturates Screen Negative, 

Phencyclidine (PCP) Screen Negative, Urine Amphetamines Screen Negative, Urine 

Benzodiazepines Screen Negative, Urine Cocaine Screen Negative, Urine Marijuana 

(THC) Screen Negative


Height (Feet):  5


Height (Inches):  11.00


Weight (Pounds):  263











Bill Sanz Nov 1, 2018 08:48

## 2018-11-02 VITALS — SYSTOLIC BLOOD PRESSURE: 109 MMHG | DIASTOLIC BLOOD PRESSURE: 80 MMHG

## 2018-11-02 VITALS — DIASTOLIC BLOOD PRESSURE: 59 MMHG | SYSTOLIC BLOOD PRESSURE: 106 MMHG

## 2018-11-02 VITALS — DIASTOLIC BLOOD PRESSURE: 61 MMHG | SYSTOLIC BLOOD PRESSURE: 100 MMHG

## 2018-11-02 VITALS — SYSTOLIC BLOOD PRESSURE: 125 MMHG | DIASTOLIC BLOOD PRESSURE: 84 MMHG

## 2018-11-02 LAB
ADD MANUAL DIFF: NO
ANION GAP SERPL CALC-SCNC: 9 MMOL/L (ref 5–15)
BASOPHILS NFR BLD AUTO: 0.7 % (ref 0–2)
BUN SERPL-MCNC: 15 MG/DL (ref 7–18)
CALCIUM SERPL-MCNC: 8.6 MG/DL (ref 8.5–10.1)
CHLORIDE SERPL-SCNC: 101 MMOL/L (ref 98–107)
CO2 SERPL-SCNC: 24 MMOL/L (ref 21–32)
CREAT SERPL-MCNC: 0.9 MG/DL (ref 0.55–1.3)
EOSINOPHIL NFR BLD AUTO: 2 % (ref 0–3)
ERYTHROCYTE [DISTWIDTH] IN BLOOD BY AUTOMATED COUNT: 12 % (ref 11.6–14.8)
HCT VFR BLD CALC: 40.3 % (ref 42–52)
HGB BLD-MCNC: 14.2 G/DL (ref 14.2–18)
LYMPHOCYTES NFR BLD AUTO: 27.5 % (ref 20–45)
MCV RBC AUTO: 84 FL (ref 80–99)
MONOCYTES NFR BLD AUTO: 8.3 % (ref 1–10)
NEUTROPHILS NFR BLD AUTO: 61.5 % (ref 45–75)
PLATELET # BLD: 175 K/UL (ref 150–450)
POTASSIUM SERPL-SCNC: 3.9 MMOL/L (ref 3.5–5.1)
RBC # BLD AUTO: 4.78 M/UL (ref 4.7–6.1)
SODIUM SERPL-SCNC: 134 MMOL/L (ref 136–145)
WBC # BLD AUTO: 8.1 K/UL (ref 4.8–10.8)

## 2018-11-02 RX ADMIN — HEPARIN SODIUM SCH UNITS: 5000 INJECTION INTRAVENOUS; SUBCUTANEOUS at 08:56

## 2018-11-02 RX ADMIN — HYDROCODONE BITARTRATE AND ACETAMINOPHEN PRN TAB: 10; 325 TABLET ORAL at 12:50

## 2018-11-02 RX ADMIN — HYDROCODONE BITARTRATE AND ACETAMINOPHEN PRN TAB: 10; 325 TABLET ORAL at 08:52

## 2018-11-02 RX ADMIN — MORPHINE SULFATE PRN MG: 2 INJECTION, SOLUTION INTRAMUSCULAR; INTRAVENOUS at 10:26

## 2018-11-02 RX ADMIN — DIVALPROEX SODIUM SCH MG: 500 TABLET, DELAYED RELEASE ORAL at 08:57

## 2018-11-02 RX ADMIN — MORPHINE SULFATE PRN MG: 2 INJECTION, SOLUTION INTRAMUSCULAR; INTRAVENOUS at 06:10

## 2018-11-02 RX ADMIN — METOPROLOL SUCCINATE SCH MG: 25 TABLET, EXTENDED RELEASE ORAL at 08:51

## 2018-11-02 RX ADMIN — INSULIN ASPART SCH UNITS: 100 INJECTION, SOLUTION INTRAVENOUS; SUBCUTANEOUS at 11:33

## 2018-11-02 RX ADMIN — ASPIRIN SCH MG: 81 TABLET, DELAYED RELEASE ORAL at 08:50

## 2018-11-02 RX ADMIN — MORPHINE SULFATE PRN MG: 2 INJECTION, SOLUTION INTRAMUSCULAR; INTRAVENOUS at 15:03

## 2018-11-02 RX ADMIN — MORPHINE SULFATE PRN MG: 2 INJECTION, SOLUTION INTRAMUSCULAR; INTRAVENOUS at 02:09

## 2018-11-02 RX ADMIN — INSULIN ASPART SCH UNITS: 100 INJECTION, SOLUTION INTRAVENOUS; SUBCUTANEOUS at 06:14

## 2018-11-02 NOTE — GENERAL PROGRESS NOTE
Assessment/Plan


Problem List:  


(1) Nausea & vomiting


ICD Codes:  R11.2 - Nausea with vomiting, unspecified


SNOMED:  52504102


(2) Diabetes


ICD Codes:  E11.9 - Type 2 diabetes mellitus without complications


SNOMED:  29541300


(3) CVA (cerebral vascular accident)


ICD Codes:  I63.9 - Cerebral infarction, unspecified


SNOMED:  966155034


(4) Weak


ICD Codes:  R53.1 - Weakness


SNOMED:  11831709


(5) Back pain


ICD Codes:  M54.9 - Dorsalgia, unspecified


SNOMED:  058865816


(6) Diabetes mellitus


ICD Codes:  E11.9 - Diabetes mellitus


SNOMED:  16227041


(7) Anxiety


ICD Codes:  F41.9 - Anxiety disorder, unspecified; Z68.41 - Body mass index (BMI

) 40.0-44.9, adult


SNOMED:  75161642


(8) COPD exacerbation


ICD Codes:  J44.1 - Chronic obstructive pulmonary disease with (acute) 

exacerbation


SNOMED:  834668110, 096258426


Assessment/Plan


ot pt diet gi f/u pain control dc to snf





Subjective


Constitutional:  Reports: weakness


Allergies:  


Coded Allergies:  


     HALOPERIDOL (Verified  Allergy, Unknown, 10/5/18)


All Systems:  reviewed and negative except above


Subjective


feeling better in bed





Objective





Last 24 Hour Vital Signs








  Date Time  Temp Pulse Resp B/P (MAP) Pulse Ox O2 Delivery O2 Flow Rate FiO2


 


11/2/18 09:20      Room Air  


 


11/2/18 08:51  73  100/61    


 


11/2/18 08:51  89 18  98 Room Air  21


 


11/2/18 08:42        36


 


11/2/18 08:42  89 20  98 Room Air  21


 


11/2/18 08:11 97.6 73 20 100/61 (74) 98   


 


11/2/18 07:38  88 18   Room Air  21 11/2/18 04:00 97.9 75 20 106/59 (75) 98   


 


11/2/18 00:00 99.0 81 20 109/80 (90) 95   


 


11/1/18 21:00      Room Air  


 


11/1/18 20:00 98.8 85 19 110/67 (81) 97   


 


11/1/18 18:43  91 20   Room Air  21


 


11/1/18 16:00 97.5 90 20 120/74 (89) 95   

















Intake and Output  


 


 11/1/18 11/2/18





 19:00 07:00


 


Intake Total 1500 ml 530 ml


 


Output Total 2100 ml 


 


Balance -600 ml 530 ml


 


  


 


Intake Oral 660 ml 350 ml


 


IV Total 840 ml 180 ml


 


Output Urine Total 2100 ml 


 


# Voids  2








Laboratory Tests


11/2/18 06:00: 


White Blood Count 8.1, Red Blood Count 4.78, Hemoglobin 14.2, Hematocrit 40.3L, 

Mean Corpuscular Volume 84, Mean Corpuscular Hemoglobin 29.8, Mean Corpuscular 

Hemoglobin Concent 35.3, Red Cell Distribution Width 12.0, Platelet Count 175, 

Mean Platelet Volume 7.5, Neutrophils (%) (Auto) 61.5, Lymphocytes (%) (Auto) 

27.5, Monocytes (%) (Auto) 8.3, Eosinophils (%) (Auto) 2.0, Basophils (%) (Auto

) 0.7, Sodium Level 134L, Potassium Level 3.9, Chloride Level 101, Carbon 

Dioxide Level 24, Anion Gap 9, Blood Urea Nitrogen 15, Creatinine 0.9, Estimat 

Glomerular Filtration Rate > 60, Glucose Level 282H, Calcium Level 8.6


Height (Feet):  5


Height (Inches):  11.00


Weight (Pounds):  263


General Appearance:  alert


EENT:  normal ENT inspection


Neck:  normal alignment


Cardiovascular:  normal peripheral pulses, normal rate, regular rhythm


Respiratory/Chest:  chest wall non-tender, lungs clear, normal breath sounds


Abdomen:  normal bowel sounds, non tender, soft


Extremities:  normal inspection


Edema:  no edema noted Arm (L), no edema noted Arm (R), no edema noted Leg (L), 

no edema noted Leg (R), no edema noted Pedal (L), no edema noted Pedal (R), no 

edema noted Generalized


Neurologic:  responsive, motor weakness


Skin:  normal pigmentation, warm/dry











Samir Cabrera DO Nov 2, 2018 13:16

## 2018-11-02 NOTE — PULMONOLOGY PROGRESS NOTE
Assessment/Plan


Problems:  


(1) COPD (chronic obstructive pulmonary disease)


(2) Psychosomatic disorder


(3) Diabetes mellitus


(4) Peripheral neuropathy


(5) Morbid obesity with BMI of 40.0-44.9, adult


(6) Gastroparesis due to DM


(7) Noncompliance


Assessment/Plan


eating better


improving


more awake


sliding scale


f/u psych recommendations


neuro f/u


dc planning





Subjective


ROS Limited/Unobtainable:  No


HEENT:  Repors: no symptoms


Respiratory:  Reports: no symptoms


Allergies:  


Coded Allergies:  


     HALOPERIDOL (Verified  Allergy, Unknown, 10/5/18)





Objective





Last 24 Hour Vital Signs








  Date Time  Temp Pulse Resp B/P (MAP) Pulse Ox O2 Delivery O2 Flow Rate FiO2


 


11/2/18 09:20      Room Air  


 


11/2/18 08:51  73  100/61    


 


11/2/18 08:51  89 18  98 Room Air  21


 


11/2/18 08:42        36


 


11/2/18 08:42  89 20  98 Room Air  21


 


11/2/18 08:11 97.6 73 20 100/61 (74) 98   


 


11/2/18 07:38  88 18   Room Air  21


 


11/2/18 04:00 97.9 75 20 106/59 (75) 98   


 


11/2/18 00:00 99.0 81 20 109/80 (90) 95   


 


11/1/18 21:00      Room Air  


 


11/1/18 20:00 98.8 85 19 110/67 (81) 97   


 


11/1/18 18:43  91 20   Room Air  21


 


11/1/18 16:00 97.5 90 20 120/74 (89) 95   

















Intake and Output  


 


 11/1/18 11/2/18





 19:00 07:00


 


Intake Total 1500 ml 530 ml


 


Output Total 2100 ml 


 


Balance -600 ml 530 ml


 


  


 


Intake Oral 660 ml 350 ml


 


IV Total 840 ml 180 ml


 


Output Urine Total 2100 ml 


 


# Voids  2








General Appearance:  WD/WN


HEENT:  normocephalic, atraumatic


Respiratory/Chest:  chest wall non-tender, lungs clear


Cardiovascular:  normal peripheral pulses, normal rate


Abdomen:  normal bowel sounds, soft, non tender


Genitourinary:  normal external genitalia


Skin:  no rash, no lesions





Microbiology








 Date/Time


Source Procedure


Growth Status


 


 


 10/30/18 18:37


Nasal Nares MRSA Culture - Final


Staphylococcus Aureus - Mrsa Complete








Laboratory Tests


11/2/18 06:00: 


White Blood Count 8.1, Red Blood Count 4.78, Hemoglobin 14.2, Hematocrit 40.3L, 

Mean Corpuscular Volume 84, Mean Corpuscular Hemoglobin 29.8, Mean Corpuscular 

Hemoglobin Concent 35.3, Red Cell Distribution Width 12.0, Platelet Count 175, 

Mean Platelet Volume 7.5, Neutrophils (%) (Auto) 61.5, Lymphocytes (%) (Auto) 

27.5, Monocytes (%) (Auto) 8.3, Eosinophils (%) (Auto) 2.0, Basophils (%) (Auto

) 0.7, Sodium Level 134L, Potassium Level 3.9, Chloride Level 101, Carbon 

Dioxide Level 24, Anion Gap 9, Blood Urea Nitrogen 15, Creatinine 0.9, Estimat 

Glomerular Filtration Rate > 60, Glucose Level 282H, Calcium Level 8.6





Current Medications








 Medications


  (Trade)  Dose


 Ordered  Sig/Henry


 Route


 PRN Reason  Start Time


 Stop Time Status Last Admin


Dose Admin


 


 Acetaminophen


  (Tylenol)  650 mg  Q4H  PRN


 ORAL


 fever  10/30/18 22:15


 11/29/18 22:14   


 


 


 Acetaminophen/


 Hydrocodone Bitart


  (Norco 10/325)  1 tab  Q4H  PRN


 ORAL


 Pain 4-6  10/31/18 00:45


 11/7/18 00:44  11/2/18 08:52


 


 


 Al Hydroxide/Mg


 Hydroxide


  (Mylanta II)  30 ml  Q6H  PRN


 ORAL


 dyspepsia  10/30/18 22:15


 11/29/18 22:14   


 


 


 Albuterol/


 Ipratropium


  (Albuterol/


 Ipratropium)  3 ml  Q4HR  PRN


 HHN


 Shortness of Breath  10/30/18 23:00


 11/4/18 22:59  11/2/18 08:49


 


 


 Aspirin


  (Ecotrin)  81 mg  DAILY


 ORAL


   10/31/18 09:00


 11/30/18 08:59  11/2/18 08:50


 


 


 Clonidine HCl


  (Catapres Tab)  0.1 mg  Q4H  PRN


 ORAL


 For High Blood Pressure  10/30/18 22:15


 11/29/18 22:14   


 


 


 Dextrose


  (Dextrose 50%)    STAT  PRN


 IV


 Hypoglycemia  10/30/18 22:15


 11/29/18 22:14   


 


 


 Dextrose


  (Dextrose 50%)    STAT  PRN


 IV


 Hypoglycemia  10/30/18 22:15


 11/29/18 22:14   


 


 


 Divalproex Sodium


  (Depakote)  500 mg  DAILY


 ORAL


   10/31/18 09:00


 11/30/18 08:59  11/2/18 08:57


 


 


 Escitalopram


 Oxalate


  (Lexapro)  10 mg  DAILY


 ORAL


   10/31/18 09:00


 11/30/18 08:59  11/2/18 08:50


 


 


 Heparin Sodium


  (Porcine)


  (Heparin 5000


 units/ml)  5,000 units  EVERY 12  HOURS


 SUBQ


   10/31/18 09:00


 11/30/18 08:59  11/2/18 08:56


 


 


 Insulin Aspart


  (NovoLOG)    BEFORE MEALS AND  HS


 SUBQ


   10/31/18 06:30


 11/30/18 06:29  11/2/18 11:33


 


 


 Lorazepam


  (Ativan 2mg/ml


 1ml)  0.5 mg  Q4H  PRN


 IV


 For Anxiety  10/30/18 22:15


 11/6/18 22:14   


 


 


 Metoprolol


 Succinate


  (Toprol XL)  25 mg  DAILY


 ORAL


   10/31/18 09:00


 11/30/18 08:59  11/2/18 08:51


 


 


 Morphine Sulfate


  (Morphine


 Sulfate)  1 mg  Q4H  PRN


 IVP


 Pain 7-10  10/31/18 00:30


 11/7/18 00:29  11/2/18 10:26


 


 


 Ondansetron HCl


  (Zofran)  4 mg  Q6H  PRN


 IVP


 Nausea & Vomiting  10/30/18 22:15


 11/29/18 22:14  11/2/18 10:25


 


 


 Polyethylene


 Glycol


  (Miralax)  17 gm  BEDTIME  PRN


 ORAL


 Constipation  10/30/18 23:00


 11/29/18 22:59   


 


 


 Quetiapine


 Fumarate


  (SEROquel)  200 mg  BEDTIME


 ORAL


   10/30/18 22:30


 11/29/18 22:29  11/1/18 21:27


 


 


 Sodium Chloride  1,000 ml @ 


 60 mls/hr  [id number]


 IV


   10/30/18 23:45


 11/29/18 23:44  11/2/18 06:15


 


 


 Temazepam


  (Restoril)  15 mg  BEDTIME  PRN


 ORAL


 Insomnia  10/31/18 06:34


 11/7/18 06:33   


 


 


 Zolpidem Tartrate


  (Ambien)  5 mg  HSPRN  PRN


 ORAL


 Insomnia  10/30/18 22:15


 11/6/18 22:14   


 

















Zarrabi,Mirali MD Nov 2, 2018 12:21

## 2018-11-02 NOTE — GENERAL PROGRESS NOTE
Assessment/Plan


Assessment/Plan


(1) Chiari malformation type II


(2) Degenerative disc disease, cervical


(3) Lumbar degenerative disc disease


(4) Arthritis, lumbar spine


(5) Peripheral neuropathy


(6) Hydrocephalus


(7) Diabetes mellitus


Pt will be continued on Morphine and Norco.


Pt was d/w Dr. Franco and he concurred.





Subjective


Date patient seen:  Nov 2, 2018


Time patient seen:  07:00 - am


Allergies:  


Coded Allergies:  


     HALOPERIDOL (Verified  Allergy, Unknown, 10/5/18)


Subjective


Constitutional:  Reports: weakness, Denies: chills, diaphoresis, fever, malaise

, no symptoms, other


HEENT:  Denies: blurred vision, double vision, ear discharge, ear pain, eye pain

, mouth pain, mouth swelling, no symptoms, nose congestion, nose pain, other, 

tearing, throat pain, throat swelling


Cardiovascular:  Denies: chest pain, edema, irregular heart rate, 

lightheadedness, no symptoms, other, palpitations, syncope


Respiratory:  Denies: SOB at rest, SOB with excertion, cough, no symptoms, 

orthopnea, other, shortness of breath, sputum, stridor, wheezing


Gastrointestinal/Abdominal:  Denies: abdomen distended, abdominal pain, black 

stools, blood in stool, constipated, diarrhea, difficulty swallowing, nausea, 

no symptoms, other, poor appetite, poor fluid intake, rectal bleeding, tarry 

stools, vomiting


Genitourinary:  Denies: burning, discharge, flank pain, frequency, hematuria, 

incontinence, no symptoms, other, pain, urgency


Neurologic/Psychiatric:  Reports: headache, numbness, tingling, weakness, Denies

: anxiety, depressed, emotional problems, no symptoms, other, paresthesia, pre-

existing deficit, seizure, tremors


Endocrine:  Denies: excessive sweating, flushing, increased hunger, increased 

thirst, increased urine, intolerance to cold, intolerance to heat, no symptoms, 

other, unexplained weight gain, unexplained weight loss


Hematologic/Lymphatic:  Denies: anemia, easy bleeding, easy bruising, no 

symptoms, other


 


Subjective


Patient laying in bed and reports pain has adam unchanged and reduced on the


morphine and norco. He has no new complaints.





Objective





Last 24 Hour Vital Signs








  Date Time  Temp Pulse Resp B/P (MAP) Pulse Ox O2 Delivery O2 Flow Rate FiO2


 


11/2/18 08:51  73  100/61    


 


11/2/18 08:51  89 18  98 Room Air  21


 


11/2/18 08:42        36


 


11/2/18 08:42  89 20  98 Room Air  21


 


11/2/18 08:11 97.6 73 20 100/61 (74) 98   


 


11/2/18 07:38  88 18   Room Air  21


 


11/2/18 04:00 97.9 75 20 106/59 (75) 98   


 


11/2/18 00:00 99.0 81 20 109/80 (90) 95   


 


11/1/18 21:00      Room Air  


 


11/1/18 20:00 98.8 85 19 110/67 (81) 97   


 


11/1/18 18:43  91 20   Room Air  21


 


11/1/18 16:00 97.5 90 20 120/74 (89) 95   


 


11/1/18 12:00 97.7 80 20 109/66 (80) 95   

















Intake and Output  


 


 11/1/18 11/2/18





 18:59 06:59


 


Intake Total 1440 ml 590 ml


 


Output Total 2100 ml 


 


Balance -660 ml 590 ml


 


  


 


Intake Oral 660 ml 350 ml


 


IV Total 780 ml 240 ml


 


Output Urine Total 2100 ml 


 


# Voids  2








Laboratory Tests


11/1/18 09:25: 


White Blood Count 8.8, Red Blood Count 4.88, Hemoglobin 14.2, Hematocrit 40.8L, 

Mean Corpuscular Volume 84, Mean Corpuscular Hemoglobin 29.1, Mean Corpuscular 

Hemoglobin Concent 34.7, Red Cell Distribution Width 11.8, Platelet Count 158, 

Mean Platelet Volume 7.5, Neutrophils (%) (Auto) 60.8, Lymphocytes (%) (Auto) 

29.2, Monocytes (%) (Auto) 7.2, Eosinophils (%) (Auto) 2.1, Basophils (%) (Auto

) 0.7, Erythrocyte Sedimentation Rate 5, Sodium Level 134L, Potassium Level 3.7

, Chloride Level 101, Carbon Dioxide Level 27, Anion Gap 6, Blood Urea Nitrogen 

18, Creatinine 0.9, Estimat Glomerular Filtration Rate > 60, Glucose Level 289H

, Hemoglobin A1c 7.3H, Calcium Level 8.6, Phosphorus Level 2.7, Magnesium Level 

1.8, Total Bilirubin 0.4, Aspartate Amino Transf (AST/SGOT) 13L, Alanine 

Aminotransferase (ALT/SGPT) 24, Alkaline Phosphatase 60, Total Protein 6.3L, 

Albumin 3.0L, Globulin 3.3, Albumin/Globulin Ratio 0.9L, Lipase 155


11/2/18 06:00: 


White Blood Count 8.1, Red Blood Count 4.78, Hemoglobin 14.2, Hematocrit 40.3L, 

Mean Corpuscular Volume 84, Mean Corpuscular Hemoglobin 29.8, Mean Corpuscular 

Hemoglobin Concent 35.3, Red Cell Distribution Width 12.0, Platelet Count 175, 

Mean Platelet Volume 7.5, Neutrophils (%) (Auto) 61.5, Lymphocytes (%) (Auto) 

27.5, Monocytes (%) (Auto) 8.3, Eosinophils (%) (Auto) 2.0, Basophils (%) (Auto

) 0.7, Sodium Level 134L, Potassium Level 3.9, Chloride Level 101, Carbon 

Dioxide Level 24, Anion Gap 9, Blood Urea Nitrogen 15, Creatinine 0.9, Estimat 

Glomerular Filtration Rate > 60, Glucose Level 282H, Calcium Level 8.6


Height (Feet):  5


Height (Inches):  11.00


Weight (Pounds):  263











Bill Sanz Nov 2, 2018 08:59

## 2018-11-02 NOTE — GI PROGRESS NOTE
Assessment/Plan


Problems:  


(1) Dehydration


ICD Codes:  E86.0 - Dehydration


SNOMED:  87031113


(2) Intractable abdominal pain


ICD Codes:  R10.9 - Unspecified abdominal pain


SNOMED:  27503402


(3) Gastroenteritis


ICD Codes:  K52.9 - Gastroenteritis


SNOMED:  88989179


(4) Gastroparesis due to DM


ICD Codes:  E11.43 - Type 2 diabetes mellitus with diabetic autonomic (poly)

neuropathy; K31.84 - Gastroparesis


SNOMED:  78785217, 714948001


(5) Diabetes mellitus


ICD Codes:  E11.9 - Diabetes mellitus


SNOMED:  83942274


Status:  stable


Status Narrative


Discussed with Dr. Ramírez.


Assessment/Plan


utox negative


lipase negative





okay for DC per GI standpoint


symptomatic treatment


ADA diet


DM management


zofran prn, reglan for persistent vomiting


PO/IV hydration + electrolyte correction


ppi


fu labs





The patient was seen and examined at bedside and all new and available data was 

reviewed in the patients chart. I agree with the above findings, impression 

and plan.  (Patient seen earlier today. Signature stamp does not reflect 

patient encounter time.). - Camron Ramírez MD





Subjective


Gastrointestinal/Abdominal:  Reports: no symptoms





Objective





Last 24 Hour Vital Signs








  Date Time  Temp Pulse Resp B/P (MAP) Pulse Ox O2 Delivery O2 Flow Rate FiO2


 


11/2/18 12:00 98.2 81 20 125/84 (98) 95   


 


11/2/18 09:20      Room Air  


 


11/2/18 08:51  73  100/61    


 


11/2/18 08:51  89 18  98 Room Air  21 11/2/18 08:42        36


 


11/2/18 08:42  89 20  98 Room Air  21


 


11/2/18 08:11 97.6 73 20 100/61 (74) 98   


 


11/2/18 07:38  88 18   Room Air  21 11/2/18 04:00 97.9 75 20 106/59 (75) 98   


 


11/2/18 00:00 99.0 81 20 109/80 (90) 95   


 


11/1/18 21:00      Room Air  


 


11/1/18 20:00 98.8 85 19 110/67 (81) 97   


 


11/1/18 18:43  91 20   Room Air  21


 


11/1/18 16:00 97.5 90 20 120/74 (89) 95   

















Intake and Output  


 


 11/1/18 11/2/18





 19:00 07:00


 


Intake Total 1500 ml 530 ml


 


Output Total 2100 ml 


 


Balance -600 ml 530 ml


 


  


 


Intake Oral 660 ml 350 ml


 


IV Total 840 ml 180 ml


 


Output Urine Total 2100 ml 


 


# Voids  2











Laboratory Tests








Test


  11/2/18


06:00


 


White Blood Count


  8.1 K/UL


(4.8-10.8)


 


Red Blood Count


  4.78 M/UL


(4.70-6.10)


 


Hemoglobin


  14.2 G/DL


(14.2-18.0)


 


Hematocrit


  40.3 %


(42.0-52.0)  L


 


Mean Corpuscular Volume 84 FL (80-99)  


 


Mean Corpuscular Hemoglobin


  29.8 PG


(27.0-31.0)


 


Mean Corpuscular Hemoglobin


Concent 35.3 G/DL


(32.0-36.0)


 


Red Cell Distribution Width


  12.0 %


(11.6-14.8)


 


Platelet Count


  175 K/UL


(150-450)


 


Mean Platelet Volume


  7.5 FL


(6.5-10.1)


 


Neutrophils (%) (Auto)


  61.5 %


(45.0-75.0)


 


Lymphocytes (%) (Auto)


  27.5 %


(20.0-45.0)


 


Monocytes (%) (Auto)


  8.3 %


(1.0-10.0)


 


Eosinophils (%) (Auto)


  2.0 %


(0.0-3.0)


 


Basophils (%) (Auto)


  0.7 %


(0.0-2.0)


 


Sodium Level


  134 MMOL/L


(136-145)  L


 


Potassium Level


  3.9 MMOL/L


(3.5-5.1)


 


Chloride Level


  101 MMOL/L


()


 


Carbon Dioxide Level


  24 MMOL/L


(21-32)


 


Anion Gap


  9 mmol/L


(5-15)


 


Blood Urea Nitrogen


  15 mg/dL


(7-18)


 


Creatinine


  0.9 MG/DL


(0.55-1.30)


 


Estimat Glomerular Filtration


Rate > 60 mL/min


(>60)


 


Glucose Level


  282 MG/DL


()  H


 


Calcium Level


  8.6 MG/DL


(8.5-10.1)








Height (Feet):  5


Height (Inches):  11.00


Weight (Pounds):  263


General Appearance:  WD/WN, no apparent distress, alert


Cardiovascular:  normal rate


Respiratory/Chest:  normal breath sounds, no respiratory distress


Abdominal Exam:  normal bowel sounds, non tender, soft


Extremities:  normal range of motion, non-tender











Willie Herrera NP Nov 2, 2018 13:46

## 2018-11-02 NOTE — CARDIOLOGY REPORT
--------------- APPROVED REPORT --------------





EKG Measurement

Heart [id number]

AK [id number]

[id number]

[id number]

[id number]





Normal sinus rhythm

Normal ECG

## 2018-11-05 NOTE — DISCHARGE SUMMARY
Discharge Summary


Discharge Summary


_


DATE OF ADMISSION: 10/30/2018





DATE OF DISCHARGE: 11/02/2018





REASON FOR ADMISSION: 


37 years old male with past medical history of Chiari malformation type II, CVA 

with left-sided weakness, COPD, diabetes mellitus type 1, degenerative disc 

disease lumbar and cervical spine, morbid obesity, seizure disorder, 

psychiatric history, presented to emergency department with generalized 

weakness and inability to eat.  


Patient reported diminished appetite.  


He denied fever , but reported subjective chills.  


No vomiting,  no diarrhea.  


Upon evaluation vital signs were stable.  


Laboratory workup revealed no leukocytosis, stable hemoglobin and hematocrit.  


Stable electrolytes and LFT.  


Urinalysis revealed no evidence of UTI.  


Elevated blood glucose . 


Patient admitted with diagnosis of gastroparesis likely due to diabetes mellitus

, diabetes mellitus type 1 with peripheral neuropathy,  COPD.  morbid obesity, 

dehydration. 


 


CONSULTANTS:


pulmonary Dr. Steward


GI specialist Dr. Ramírez


psychiatrist Dr. Armstrong


pain specialist Dr. Franco


 


HOSPITAL COURSE: 


Patient admitted to the floor and started on IV fluids.  


GI and pulmonology specialist closely followed.  


Supplemental oxygen provided as needed to keep pulse oximetry above 92%,  

pulmonary toilet was provided with bronchodilator on as needed basis. 


Venous duplex bilateral lower extremity revealed no evidence of acute DVT.  


Symptomatic treatment provided.  


Antiemetics provided as needed:  Zofran on as needed basis and Reglan for 

persistent vomiting. 


Patient was on diabetic diet.   


Oral fluids were encouraged. 


Electrolytes were closely monitored and corrected as needed.  


Patient was on PPI.  


Urine toxicology screen was negative.  


Lipase was negative.  


 


Pain specialist closely followed.  


Pain management was provided as per pain specialist recommendations.  


According to GI specialist , patient likely  had gastroenteritis and 

gastroparesis, contributing to abdominal pain. 


Blood sugar was managed with sliding scale of  insulin.  


Hemoglobin A1c 7.3,  near at goal.  


Psychiatrist  seen and evaluated patient .


Psychiatric medication regimen was optimized .


DVT prophylaxis provided. 


Bowel regimen instituted.  


Patient was working with   physical and occupational therapists.


seizure precautions were maintained.  


No evidence of seizure activity while in the hospital.  


Depakote was continued. 


Symptoms resolved , patient stabilized.


Patient was  ready  for discharge back to skilled nursing facility for 

continuation of care.





FINAL DIAGNOSES: 


Gastroenteritis


Gastroparesis secondary to diabetes mellitus


Diabetes mellitus with peripheral neuropathy


COPD 


Dehydration 


Morbid obesity


Chiari malformation type II


History of CVA


Seizure disorder


Cervical degenerative disc disease


Schizoaffective disorder bipolar type





DISCHARGE MEDICATIONS:


See Medication Reconciliation list.





DISCHARGE INSTRUCTIONS:


Patient was discharged to the skilled nursing facility. 


Follow up with medical doctor at the facility.





I have been assigned to dictate discharge summary for this account. I was not 

involved in the patient's management.











Shilpa Quezada NP Nov 5, 2018 14:31

## 2018-12-10 ENCOUNTER — HOSPITAL ENCOUNTER (INPATIENT)
Dept: HOSPITAL 72 - EMR | Age: 38
LOS: 4 days | Discharge: NURSING, REHAB OR LONG-TERM CARE | DRG: 638 | End: 2018-12-14
Payer: MEDICARE

## 2018-12-10 VITALS — DIASTOLIC BLOOD PRESSURE: 77 MMHG | SYSTOLIC BLOOD PRESSURE: 131 MMHG

## 2018-12-10 VITALS — DIASTOLIC BLOOD PRESSURE: 78 MMHG | SYSTOLIC BLOOD PRESSURE: 121 MMHG

## 2018-12-10 VITALS — SYSTOLIC BLOOD PRESSURE: 130 MMHG | DIASTOLIC BLOOD PRESSURE: 88 MMHG

## 2018-12-10 VITALS — DIASTOLIC BLOOD PRESSURE: 76 MMHG | SYSTOLIC BLOOD PRESSURE: 128 MMHG

## 2018-12-10 VITALS — BODY MASS INDEX: 39.13 KG/M2 | WEIGHT: 279.5 LBS | HEIGHT: 71 IN

## 2018-12-10 VITALS — DIASTOLIC BLOOD PRESSURE: 60 MMHG | SYSTOLIC BLOOD PRESSURE: 119 MMHG

## 2018-12-10 VITALS — DIASTOLIC BLOOD PRESSURE: 79 MMHG | SYSTOLIC BLOOD PRESSURE: 121 MMHG

## 2018-12-10 VITALS — SYSTOLIC BLOOD PRESSURE: 133 MMHG | DIASTOLIC BLOOD PRESSURE: 75 MMHG

## 2018-12-10 DIAGNOSIS — M50.30: ICD-10-CM

## 2018-12-10 DIAGNOSIS — Z91.19: ICD-10-CM

## 2018-12-10 DIAGNOSIS — Z88.8: ICD-10-CM

## 2018-12-10 DIAGNOSIS — M51.36: ICD-10-CM

## 2018-12-10 DIAGNOSIS — F32.9: ICD-10-CM

## 2018-12-10 DIAGNOSIS — J44.9: ICD-10-CM

## 2018-12-10 DIAGNOSIS — F25.0: ICD-10-CM

## 2018-12-10 DIAGNOSIS — G62.9: ICD-10-CM

## 2018-12-10 DIAGNOSIS — Q07.02: ICD-10-CM

## 2018-12-10 DIAGNOSIS — M46.96: ICD-10-CM

## 2018-12-10 DIAGNOSIS — E87.1: ICD-10-CM

## 2018-12-10 DIAGNOSIS — Z79.4: ICD-10-CM

## 2018-12-10 DIAGNOSIS — I10: ICD-10-CM

## 2018-12-10 DIAGNOSIS — Z86.73: ICD-10-CM

## 2018-12-10 DIAGNOSIS — F45.9: ICD-10-CM

## 2018-12-10 DIAGNOSIS — E86.0: ICD-10-CM

## 2018-12-10 DIAGNOSIS — E11.65: Primary | ICD-10-CM

## 2018-12-10 LAB
ADD MANUAL DIFF: NO
ALBUMIN SERPL-MCNC: 3.3 G/DL (ref 3.4–5)
ALBUMIN/GLOB SERPL: 1 {RATIO} (ref 1–2.7)
ALP SERPL-CCNC: 65 U/L (ref 46–116)
ALT SERPL-CCNC: 34 U/L (ref 12–78)
ANION GAP SERPL CALC-SCNC: 9 MMOL/L (ref 5–15)
APPEARANCE UR: CLEAR
APTT PPP: (no result) S
AST SERPL-CCNC: 11 U/L (ref 15–37)
BASOPHILS NFR BLD AUTO: 0.7 % (ref 0–2)
BILIRUB SERPL-MCNC: 0.2 MG/DL (ref 0.2–1)
BUN SERPL-MCNC: 18 MG/DL (ref 7–18)
CALCIUM SERPL-MCNC: 8.8 MG/DL (ref 8.5–10.1)
CHLORIDE SERPL-SCNC: 96 MMOL/L (ref 98–107)
CO2 SERPL-SCNC: 25 MMOL/L (ref 21–32)
CREAT SERPL-MCNC: 1.1 MG/DL (ref 0.55–1.3)
EOSINOPHIL NFR BLD AUTO: 1.7 % (ref 0–3)
ERYTHROCYTE [DISTWIDTH] IN BLOOD BY AUTOMATED COUNT: 11.4 % (ref 11.6–14.8)
GLOBULIN SER-MCNC: 3.4 G/DL
GLUCOSE UR STRIP-MCNC: (no result) MG/DL
HCT VFR BLD CALC: 41.8 % (ref 42–52)
HGB BLD-MCNC: 14.7 G/DL (ref 14.2–18)
KETONES UR QL STRIP: NEGATIVE
LEUKOCYTE ESTERASE UR QL STRIP: NEGATIVE
LYMPHOCYTES NFR BLD AUTO: 33.7 % (ref 20–45)
MCV RBC AUTO: 83 FL (ref 80–99)
MONOCYTES NFR BLD AUTO: 7.4 % (ref 1–10)
NEUTROPHILS NFR BLD AUTO: 56.4 % (ref 45–75)
NITRITE UR QL STRIP: NEGATIVE
PH UR STRIP: 6.5 [PH] (ref 4.5–8)
PLATELET # BLD: 169 K/UL (ref 150–450)
POTASSIUM SERPL-SCNC: 4.5 MMOL/L (ref 3.5–5.1)
PROT UR QL STRIP: NEGATIVE
RBC # BLD AUTO: 5.02 M/UL (ref 4.7–6.1)
SODIUM SERPL-SCNC: 130 MMOL/L (ref 136–145)
SP GR UR STRIP: 1.01 (ref 1–1.03)
UROBILINOGEN UR-MCNC: NORMAL MG/DL (ref 0–1)
WBC # BLD AUTO: 10.2 K/UL (ref 4.8–10.8)

## 2018-12-10 PROCEDURE — 84443 ASSAY THYROID STIM HORMONE: CPT

## 2018-12-10 PROCEDURE — 84100 ASSAY OF PHOSPHORUS: CPT

## 2018-12-10 PROCEDURE — 94664 DEMO&/EVAL PT USE INHALER: CPT

## 2018-12-10 PROCEDURE — 80048 BASIC METABOLIC PNL TOTAL CA: CPT

## 2018-12-10 PROCEDURE — 81003 URINALYSIS AUTO W/O SCOPE: CPT

## 2018-12-10 PROCEDURE — 82009 KETONE BODYS QUAL: CPT

## 2018-12-10 PROCEDURE — 83735 ASSAY OF MAGNESIUM: CPT

## 2018-12-10 PROCEDURE — 86140 C-REACTIVE PROTEIN: CPT

## 2018-12-10 PROCEDURE — 82962 GLUCOSE BLOOD TEST: CPT

## 2018-12-10 PROCEDURE — 96361 HYDRATE IV INFUSION ADD-ON: CPT

## 2018-12-10 PROCEDURE — 96374 THER/PROPH/DIAG INJ IV PUSH: CPT

## 2018-12-10 PROCEDURE — 36415 COLL VENOUS BLD VENIPUNCTURE: CPT

## 2018-12-10 PROCEDURE — 87081 CULTURE SCREEN ONLY: CPT

## 2018-12-10 PROCEDURE — 83036 HEMOGLOBIN GLYCOSYLATED A1C: CPT

## 2018-12-10 PROCEDURE — 85025 COMPLETE CBC W/AUTO DIFF WBC: CPT

## 2018-12-10 PROCEDURE — 99285 EMERGENCY DEPT VISIT HI MDM: CPT

## 2018-12-10 PROCEDURE — 82746 ASSAY OF FOLIC ACID SERUM: CPT

## 2018-12-10 PROCEDURE — 84550 ASSAY OF BLOOD/URIC ACID: CPT

## 2018-12-10 PROCEDURE — 82607 VITAMIN B-12: CPT

## 2018-12-10 PROCEDURE — 80061 LIPID PANEL: CPT

## 2018-12-10 PROCEDURE — 80053 COMPREHEN METABOLIC PANEL: CPT

## 2018-12-10 RX ADMIN — INSULIN ASPART SCH UNITS: 100 INJECTION, SOLUTION INTRAVENOUS; SUBCUTANEOUS at 12:30

## 2018-12-10 RX ADMIN — INSULIN ASPART SCH UNITS: 100 INJECTION, SOLUTION INTRAVENOUS; SUBCUTANEOUS at 16:34

## 2018-12-10 RX ADMIN — HEPARIN SODIUM SCH UNITS: 5000 INJECTION INTRAVENOUS; SUBCUTANEOUS at 09:00

## 2018-12-10 RX ADMIN — HEPARIN SODIUM SCH UNITS: 5000 INJECTION INTRAVENOUS; SUBCUTANEOUS at 20:39

## 2018-12-10 RX ADMIN — DIVALPROEX SODIUM SCH MG: 500 TABLET, DELAYED RELEASE ORAL at 20:35

## 2018-12-10 RX ADMIN — MORPHINE SULFATE PRN MG: 2 INJECTION, SOLUTION INTRAMUSCULAR; INTRAVENOUS at 18:02

## 2018-12-10 RX ADMIN — DIVALPROEX SODIUM SCH MG: 500 TABLET, DELAYED RELEASE ORAL at 09:38

## 2018-12-10 RX ADMIN — MORPHINE SULFATE PRN MG: 2 INJECTION, SOLUTION INTRAMUSCULAR; INTRAVENOUS at 09:41

## 2018-12-10 RX ADMIN — MORPHINE SULFATE PRN MG: 2 INJECTION, SOLUTION INTRAMUSCULAR; INTRAVENOUS at 13:50

## 2018-12-10 RX ADMIN — INSULIN ASPART SCH UNITS: 100 INJECTION, SOLUTION INTRAVENOUS; SUBCUTANEOUS at 20:36

## 2018-12-10 RX ADMIN — QUETIAPINE SCH MG: 200 TABLET, FILM COATED ORAL at 20:34

## 2018-12-10 RX ADMIN — MORPHINE SULFATE PRN MG: 2 INJECTION, SOLUTION INTRAMUSCULAR; INTRAVENOUS at 22:07

## 2018-12-10 NOTE — CONSULTATION
History of Present Illness


General


Date patient seen:  Dec 10, 2018


Chief Complaint:  Abnormal Labs





Present Illness


HPI


37 yo male  with hx of insulin-dependent diabetes, COPD, Chiari malformation, 

CVA, seizure disorder, schizoaffective disorder who presents from [facility] with hyperglycemia.  His Blood sugar was greater than 

500.  Blood sugar was in ER  400 after  receiving 14 units of insulin per 

nursing staff.  He has been sick for several days.  He's had nausea vomiting 

diarrhea.  He is admitted for further evaluation.


Allergies:  


Coded Allergies:  


     HALOPERIDOL (Verified  Allergy, Unknown, 10/5/18)





Medication History


Scheduled


Aspirin* (Aspir 81*), 81 MG ORAL DAILY, (Reported)


Divalproex Sodium (Depakote), 500 MG PO EVERY MORNING , (Reported)


Divalproex Sodium* (Depakote*), 500 MG PO BEDTIME, (Reported)


Escitalopram Oxalate* (Lexapro*), 10 MG ORAL DAILY, (Reported)


Heparin Sod (Porcine) (Heparin Sodium*), 5,000 UNITS SUBQ EVERY 12 HOURS, (

Reported)


Metoprolol Succinate* (Metoprolol Succinate*), 25 MG ORAL DAILY , (Reported)


Quetiapine Fumarate* (Seroquel*), 200 MG ORAL BEDTIME, (Reported)





Scheduled PRN


Acetaminophen* (Acetaminophen 325MG Tablet*), 650 MG ORAL Q4H PRN for fever, (

Reported)


Clonidine Hcl* (Catapres*), 0.1 MG ORAL Q4HR PRN for For High Blood Pressure, (

Reported)


Hydrocodone Bit/Acetaminophen * (Norco *), 1 TAB ORAL Q4H PRN for 

For Pain, (Reported)


Ipratropium/Albuterol Sulfate (DuoNeb 0.5-3(2.5)mg/3ml), 3 ML HHN Q4HR PRN for 

Shortness of Breath, (Reported)


Lorazepam* (Lorazepam*), 1 MG ORAL Q4HR PRN for For Anxiety, (Reported)


Nitroglycerin (Nitrostat), 0.4 MG SL q5min x3 doses PRN for Prn Chest Pain, (

Reported)


Ondansetron* (Zofran*), 4 MG ORAL Q4HR PRN for Nausea & Vomiting, (Reported)


Polyethylene Glycol 3350* (Polyethylene Glycol 3350*), 17 GM ORAL BEDTIME PRN 

for Constipation, (Reported)


Temazepam* (Restoril*), 15 MG ORAL BEDTIME PRN for Insomnia, (Reported)





Miscellaneous Medications


Insulin Aspart (Novolog Flexpen), SUBQ, (Reported)





Patient History


Healthcare decision maker





Resuscitation status


Full Code


Advanced Directive on File








Past Medical/Surgical History


Past Medical/Surgical History:  


(1) Hydrocephalus


(2) Chiari malformation type II


(3) Degenerative disc disease, cervical


(4) Peripheral neuropathy


(5) Diabetes mellitus


(6) COPD (chronic obstructive pulmonary disease)


(7) Psychosomatic disorder


(8) Noncompliance





Review of Systems


All Other Systems:  negative except mentioned in HPI





Physical Exam


General Appearance:  WD/WN


Lines, tubes and drains:  peripheral


HEENT:  normocephalic


Respiratory/Chest:  chest wall non-tender, lungs clear


Breasts:  no masses


Cardiovascular/Chest:  normal rate


Abdomen:  normal bowel sounds


Genitourinary/Rectal:  normal genital exam, normal rectal exam


Neurologic:  CNs II-XII grossly normal





Last 24 Hour Vital Signs








  Date Time  Temp Pulse Resp B/P (MAP) Pulse Ox O2 Delivery O2 Flow Rate FiO2


 


12/10/18 12:00 96.8 83 20 121/78 (92) 97   


 


12/10/18 10:11 98.6       


 


12/10/18 09:00      Room Air  


 


12/10/18 08:00 98.6 80 20 131/77 (95) 97   


 


12/10/18 06:20      Room Air  


 


12/10/18 06:17      Room Air  


 


12/10/18 05:10 98.0 89 18 119/60 (79) 95   


 


12/10/18 05:05 98.2 80 18 133/75 99 Room Air  


 


12/10/18 04:40 98.2 80 18 133/75 99 Room Air  


 


12/10/18 01:48 98.4 75 18 130/88 22 Room Air  


 


12/10/18 01:45 98.1 88 16 136/82 24 Room Air  

















Intake and Output  


 


 12/9/18 12/10/18





 19:00 07:00


 


Output Total  1100 ml


 


Balance  -1100 ml


 


  


 


Output Urine Total  1100 ml


 


# Voids  1











Laboratory Tests








Test


  12/10/18


02:09


 


White Blood Count


  10.2 K/UL


(4.8-10.8)


 


Red Blood Count


  5.02 M/UL


(4.70-6.10)


 


Hemoglobin


  14.7 G/DL


(14.2-18.0)


 


Hematocrit


  41.8 %


(42.0-52.0)  L


 


Mean Corpuscular Volume 83 FL (80-99)  


 


Mean Corpuscular Hemoglobin


  29.2 PG


(27.0-31.0)


 


Mean Corpuscular Hemoglobin


Concent 35.1 G/DL


(32.0-36.0)


 


Red Cell Distribution Width


  11.4 %


(11.6-14.8)  L


 


Platelet Count


  169 K/UL


(150-450)


 


Mean Platelet Volume


  9.0 FL


(6.5-10.1)


 


Neutrophils (%) (Auto)


  56.4 %


(45.0-75.0)


 


Lymphocytes (%) (Auto)


  33.7 %


(20.0-45.0)


 


Monocytes (%) (Auto)


  7.4 %


(1.0-10.0)


 


Eosinophils (%) (Auto)


  1.7 %


(0.0-3.0)


 


Basophils (%) (Auto)


  0.7 %


(0.0-2.0)


 


Urine Color Pale yellow  


 


Urine Appearance Clear  


 


Urine pH 6.5 (4.5-8.0)  


 


Urine Specific Gravity


  1.010


(1.005-1.035)


 


Urine Protein


  Negative


(NEGATIVE)


 


Urine Glucose (UA)


  4+ (NEGATIVE)


H


 


Urine Ketones


  Negative


(NEGATIVE)


 


Urine Blood


  Negative


(NEGATIVE)


 


Urine Nitrite


  Negative


(NEGATIVE)


 


Urine Bilirubin


  Negative


(NEGATIVE)


 


Urine Urobilinogen


  Normal MG/DL


(0.0-1.0)


 


Urine Leukocyte Esterase


  Negative


(NEGATIVE)


 


Sodium Level


  130 MMOL/L


(136-145)  L


 


Potassium Level


  4.5 MMOL/L


(3.5-5.1)


 


Chloride Level


  96 MMOL/L


()  L


 


Carbon Dioxide Level


  25 MMOL/L


(21-32)


 


Anion Gap


  9 mmol/L


(5-15)


 


Blood Urea Nitrogen


  18 mg/dL


(7-18)


 


Creatinine


  1.1 MG/DL


(0.55-1.30)


 


Estimat Glomerular Filtration


Rate > 60 mL/min


(>60)


 


Glucose Level


  527 MG/DL


()  *H


 


Calcium Level


  8.8 MG/DL


(8.5-10.1)


 


Magnesium Level


  1.4 MG/DL


(1.8-2.4)  L


 


Total Bilirubin


  0.2 MG/DL


(0.2-1.0)


 


Aspartate Amino Transf


(AST/SGOT) 11 U/L (15-37)


L


 


Alanine Aminotransferase


(ALT/SGPT) 34 U/L (12-78)


 


 


Alkaline Phosphatase


  65 U/L


()


 


Total Protein


  6.7 G/DL


(6.4-8.2)


 


Albumin


  3.3 G/DL


(3.4-5.0)  L


 


Globulin 3.4 g/dL  


 


Albumin/Globulin Ratio 1.0 (1.0-2.7)  


 


Acetone Level


  Negative


(NEGATIVE)








Height (Feet):  5


Height (Inches):  11.00


Weight (Pounds):  235


Medications





Current Medications








 Medications


  (Trade)  Dose


 Ordered  Sig/Henry


 Route


 PRN Reason  Start Time


 Stop Time Status Last Admin


Dose Admin


 


 Acetaminophen


  (Tylenol)  650 mg  Q4H  PRN


 ORAL


 fever  12/10/18 07:00


 1/9/19 06:59   


 


 


 Al Hydroxide/Mg


 Hydroxide


  (Mylanta II)  30 ml  Q6H  PRN


 ORAL


 dyspepsia  12/10/18 07:00


 1/9/19 06:59   


 


 


 Albuterol/


 Ipratropium


  (Albuterol/


 Ipratropium)  3 ml  Q4H  PRN


 HHN


 Shortness of Breath  12/10/18 07:00


 12/15/18 06:59   


 


 


 Alprazolam


  (Xanax)  0.5 mg  Q6H  PRN


 ORAL


 For Anxiety  12/10/18 05:15


 12/17/18 05:14   


 


 


 Clonidine HCl


  (Catapres Tab)  0.1 mg  Q4H  PRN


 ORAL


 SBP more than 160mmHg  12/10/18 07:00


 1/9/19 06:59   


 


 


 Dextrose


  (Dextrose 50%)  25 ml  Q30M  PRN


 IV


 Hypoglycemia  12/10/18 07:00


 1/9/19 06:59   


 


 


 Dextrose


  (Dextrose 50%)  50 ml  Q30M  PRN


 IV


 Hypoglycemia  12/10/18 07:00


 1/9/19 06:59   


 


 


 Divalproex Sodium


  (Depakote)  1,000 mg  EVERY 12  HOURS


 ORAL


   12/10/18 09:00


 1/9/19 08:59  12/10/18 09:38


 


 


 Escitalopram


 Oxalate


  (Lexapro)  10 mg  DAILY


 ORAL


   12/10/18 09:00


 1/9/19 08:59  12/10/18 09:38


 


 


 Heparin Sodium


  (Porcine)


  (Heparin 5000


 units/ml)  5,000 units  EVERY 12  HOURS


 SUBQ


   12/10/18 09:00


 1/9/19 08:59   


 


 


 Insulin Aspart


  (NovoLOG)    BEFORE MEALS AND  HS


 SUBQ


   12/10/18 11:30


 1/9/19 11:29  12/10/18 12:30


 


 


 Ketorolac


 Tromethamine


  (Toradol 30mg)  30 mg  Q6H  PRN


 IV


 moderate pain 4-6  12/10/18 07:00


 12/15/18 06:59   


 


 


 Morphine Sulfate


  (Morphine


 Sulfate)  2 mg  Q4H  PRN


 IVP


 severe pain 7-10  12/10/18 07:00


 12/17/18 06:59  12/10/18 09:41


 


 


 Nitroglycerin


  (Ntg)  0.4 mg  Q5M X 3 DOSES PRN


 SL


 Prn Chest Pain  12/10/18 07:00


 1/9/19 06:59   


 


 


 Ondansetron HCl


  (Zofran)  4 mg  Q6H  PRN


 IVP


 Nausea & Vomiting  12/10/18 07:00


 1/9/19 06:59   


 


 


 Polyethylene


 Glycol


  (Miralax)  17 gm  HSPRN  PRN


 ORAL


 Constipation  12/10/18 07:00


 1/9/19 06:59   


 


 


 Quetiapine


 Fumarate


  (SEROquel)  200 mg  QHS


 ORAL


   12/10/18 21:00


 1/9/19 20:59   


 


 


 Sodium Chloride  1,000 ml @ 


 100 mls/hr  Q10H


 IVLG


   12/10/18 07:00


 1/9/19 06:59  12/10/18 07:00


 


 


 Temazepam


  (Restoril)  15 mg  HSPRN  PRN


 ORAL


 Insomnia  12/10/18 21:00


 12/17/18 20:59   


 











Assessment/Plan


Problem List:  


(1) Hyperglycemia due to type 2 diabetes mellitus


ICD Codes:  E11.65 - Type 2 diabetes mellitus with hyperglycemia


SNOMED:  228076685610305


(2) COPD (chronic obstructive pulmonary disease)


ICD Codes:  J44.9 - Chronic obstructive pulmonary disease, unspecified


SNOMED:  72316417


(3) Peripheral neuropathy


ICD Codes:  G62.9 - Peripheral neuropathy


SNOMED:  39141420


(4) Lumbar degenerative disc disease


ICD Codes:  M51.36 - Lumbar degenerative disc disease


SNOMED:  49129430


(5) CVA (cerebral vascular accident)


ICD Codes:  I63.9 - Cerebral infarction, unspecified


SNOMED:  334400825


(6) Psychosomatic disorder


ICD Codes:  F45.9 - Somatoform disorder, unspecified


SNOMED:  43245238


(7) Noncompliance


ICD Codes:  Z91.19 - Patient's noncompliance with other medical treatment and 

regimen


SNOMED:  9571540


(8) Diabetes mellitus


ICD Codes:  E11.9 - Diabetes mellitus


SNOMED:  73101805


Assessment/Plan


sliding scale


diabetic diet


Endo evaluation


symptomatic treatment











Lissette Steward MD Dec 10, 2018 12:56

## 2018-12-10 NOTE — CONSULTATION
DATE OF CONSULTATION:  12/10/2018

HISTORY OF PRESENT ILLNESS:  This is a 38-year-old patient with

hypoglycemia.  The patient continues to have some confusion and

disorganized thought process.  _____ because he has hypoglycemia, but he

does have some mood lability, confusion, disorganized thought process.  He

has previous diagnosis of schizoaffective, bipolar type.  He is having

increased depression and mood lability worsened by stress of his medical

illness.  That is why his attending has requested daily psychiatric

consultation for this patient.  The patient feels depressed because his

blood sugar has been dropping and he has had really bad health problems.

He insists "I just feel like crap because my health is going down the

drain"  because of sleep complaint.



MEDICAL PROBLEMS:  He has hypoglycemia.  Please see Internal Medicine note

for other medical issues.



ALLERGIES:  No known drug allergies.



MEDICATIONS:  Psychotropic medications on admission, Seroquel 200 at

bedtime, Depakote 1000 mg at bedtime, Xanax 0.5 mg every 6 hours p.r.n.

anxiety and agitation.



SUBSTANCE ABUSE HISTORY:  Denies any recent drug and alcohol use.



PAIN ASSESSMENT:  _____ pain.



DEVELOPMENTAL PROBLEMS:  Denies.



SOCIAL HISTORY:  [place] [facility]

Financially supported by [employer] and Medicare.



PSYCHIATRIC HISTORY:  History of schizoaffective, bipolar type.  He has had

multiple psychiatric admissions.



STRENGTHS:  He is motivated to get better and has a place to live.



WEAKNESSES AND LIABILITIES:  He has poor finances.  No social support

network.



MENTAL STATUS EXAMINATION:  The patient is a 38-year-old male.  Appearance

is disheveled.  Attitude, irritable and agitated.  Affect, guarded and

restricted.  Intellect poor.  Mood depressed and anxious.  Motor activity,

psychomotor agitation.  Attention span is poor.  Orientation x2.  Speech

is pressured.  Thought process, disorganized and illogical.  Thought

content, auditory hallucinations and paranoid delusions.  Insight and

judgment is poor.



DIAGNOSES:

1. Schizoaffective, bipolar type.

2. Medical, _____.

3. Psychosocial stressors, financial.



PLAN:  Treat him with Seroquel 200 at bedtime, Depakote 1000 mg at bedtime,

Xanax 0.5 mg every 6 hours p.r.n. anxiety and agitation.  Also provide him

with 20 minutes of cognitive behavioral therapy to help him identify his

automatic negative thoughts and help to convert those negative thoughts to

more positive thinking to reduce depression and anxiety.  Chart reviewed

and discussed with staff. Seen and assessed in his room.









  ______________________________________________

  Gulshan Armstrong M.D.





DR:  Thor

D:  12/10/2018 05:33

T:  12/10/2018 17:04

JOB#:  [id number]

CC:

## 2018-12-10 NOTE — EMERGENCY ROOM REPORT
History of Present Illness


General


Chief Complaint:  Abnormal Labs





Present Illness


HPI


Mr. Zapien is a 39 yo male with hx of insulin-dependent diabetes, COPD, Chiari 

malformation, CVA, seizure disorder, schizoaffective disorder who presents from 

[facility] with hyperglycemia.  Blood sugar was 

greater than 500.  Blood sugar is now 400 receiving 14 units of insulin per 

nursing staff.  Personal physician recommended evaluation in the emergency room.





Mr. Zapien explains that he's been sick for several days.  He's had nausea 

vomiting.  He's had diarrhea.  He denies abdominal pain.


Allergies:  


Coded Allergies:  


     HALOPERIDOL (Verified  Allergy, Unknown, 10/5/18)





Patient History


Past Medical History:  see triage record, old chart reviewed


Past Surgical History:  unable to obtain, other - SNF records reviewed


Pertinent Family History:  other - not pertinent to today's presentation


Social History:  Denies: alcohol use, drug use


Reviewed Nursing Documentation:  PMH: Agreed; PSxH: Agreed





Nursing Documentation-PMH


Hx Hypertension:  Yes


Hx COPD:  Yes


Hx Diabetes:  Yes - DM2


Hx Cancer:  No


Hx Gastrointestinal Problems:  Yes


Hx Neurological Problems:  Yes


Hx Cerebrovascular Accident:  Yes - early Oct 2018


Hx Seizures:  Yes


Hx Peripheral Neuropathy:  Yes


Hx Head Trauma:  Yes


Hx Dizziness:  Yes


Hx Headaches:  Yes


Hx Weakness:  Yes - left arm, left leg





Review of Systems


Constitutional:  Reports: malaise; Denies: fever


Gastrointestinal:  Reports: diarrhea, nausea, vomiting


Musculoskeletal:  Reports: back pain


Neurological:  Reports: headache


All Other Systems:  negative except mentioned in HPI





Physical Exam





Vital Signs








  Date Time  Temp Pulse Resp B/P (MAP) Pulse Ox O2 Delivery O2 Flow Rate FiO2


 


12/10/18 01:45 98.1 88 16 136/82 24 Room Air  








Sp02 EP Interpretation:  reviewed, normal


General Appearance:  no apparent distress, alert, GCS 15, non-toxic, 

Chronically Ill


Head:  normocephalic, atraumatic


Eyes:  bilateral eye normal inspection


ENT:  hearing grossly normal, normal pharynx, no angioedema, normal voice, dry 

mucus membranes


Neck:  full range of motion, supple/symm/no masses


Respiratory:  chest non-tender, lungs clear, normal breath sounds, no rhonchi, 

no respiratory distress, no retraction, no accessory muscle use, speaking full 

sentences


Cardiovascular #1:  regular rate, rhythm, no edema, no gallop, no JVD, no murmur

, no rub


Gastrointestinal:  normal bowel sounds, non tender, soft, no mass, no 

organomegaly, no peritonitis, no bruit, non-distended, no guarding, no rebound


Rectal:  deferred


Genitourinary:  normal inspection, no CVA tenderness


Musculoskeletal:  normal range of motion, non-tender


Neurologic:  alert, oriented x3, responsive, motor strength/tone normal, 

sensory intact, speech normal


Psychiatric:  judgement/insight normal, memory normal, mood/affect normal


Skin:  normal color, no rash, warm/dry, well hydrated





Medical Decision Making


Diagnostic Impression:  


 Primary Impression:  


 Dehydration


ER Course


Mr. Zapien presents to ER from [facility] with hyperglycemia refractory to Insulin 

provided at SNF and here in ED.  Admitted for IV hydration and BG management.  

Dr. Cabrera notified and accepted admission.





Labs








Test


  12/10/18


02:09


 


White Blood Count


  10.2 K/UL


(4.8-10.8)


 


Red Blood Count


  5.02 M/UL


(4.70-6.10)


 


Hemoglobin


  14.7 G/DL


(14.2-18.0)


 


Hematocrit


  41.8 %


(42.0-52.0)


 


Mean Corpuscular Volume 83 FL (80-99) 


 


Mean Corpuscular Hemoglobin


  29.2 PG


(27.0-31.0)


 


Mean Corpuscular Hemoglobin


Concent 35.1 G/DL


(32.0-36.0)


 


Red Cell Distribution Width


  11.4 %


(11.6-14.8)


 


Platelet Count


  169 K/UL


(150-450)


 


Mean Platelet Volume


  9.0 FL


(6.5-10.1)


 


Neutrophils (%) (Auto)


  56.4 %


(45.0-75.0)


 


Lymphocytes (%) (Auto)


  33.7 %


(20.0-45.0)


 


Monocytes (%) (Auto)


  7.4 %


(1.0-10.0)


 


Eosinophils (%) (Auto)


  1.7 %


(0.0-3.0)


 


Basophils (%) (Auto)


  0.7 %


(0.0-2.0)


 


Urine Color Pale yellow 


 


Urine Appearance Clear 


 


Urine pH 6.5 (4.5-8.0) 


 


Urine Specific Gravity


  1.010


(1.005-1.035)


 


Urine Protein


  Negative


(NEGATIVE)


 


Urine Glucose (UA) 4+ (NEGATIVE) 


 


Urine Ketones


  Negative


(NEGATIVE)


 


Urine Blood


  Negative


(NEGATIVE)


 


Urine Nitrite


  Negative


(NEGATIVE)


 


Urine Bilirubin


  Negative


(NEGATIVE)


 


Urine Urobilinogen


  Normal MG/DL


(0.0-1.0)


 


Urine Leukocyte Esterase


  Negative


(NEGATIVE)


 


Sodium Level


  130 MMOL/L


(136-145)


 


Potassium Level


  4.5 MMOL/L


(3.5-5.1)


 


Chloride Level


  96 MMOL/L


()


 


Carbon Dioxide Level


  25 MMOL/L


(21-32)


 


Anion Gap


  9 mmol/L


(5-15)


 


Blood Urea Nitrogen


  18 mg/dL


(7-18)


 


Creatinine


  1.1 MG/DL


(0.55-1.30)


 


Estimat Glomerular Filtration


Rate > 60 mL/min


(>60)


 


Glucose Level


  527 MG/DL


()


 


Calcium Level


  8.8 MG/DL


(8.5-10.1)


 


Magnesium Level


  1.4 MG/DL


(1.8-2.4)


 


Total Bilirubin


  0.2 MG/DL


(0.2-1.0)


 


Aspartate Amino Transf


(AST/SGOT) 11 U/L (15-37) 


 


 


Alanine Aminotransferase


(ALT/SGPT) 34 U/L (12-78) 


 


 


Alkaline Phosphatase


  65 U/L


()


 


Total Protein


  6.7 G/DL


(6.4-8.2)


 


Albumin


  3.3 G/DL


(3.4-5.0)


 


Globulin 3.4 g/dL 


 


Albumin/Globulin Ratio 1.0 (1.0-2.7) 


 


Acetone Level


  Negative


(NEGATIVE)











Last Vital Signs








  Date Time  Temp Pulse Resp B/P (MAP) Pulse Ox O2 Delivery O2 Flow Rate FiO2


 


12/10/18 01:45 98.1 88 16 136/82 24 Room Air  








Disposition:  ADMITTED AS INPATIENT


Condition:  Stable











Kassidy Leslie MD Dec 10, 2018 01:54

## 2018-12-10 NOTE — HISTORY AND PHYSICAL REPORT
DATE OF ADMISSION:  12/10/2018

CONSULTANTS:

1. Lissette Steward M.D.

2. Gulshan Armstrong M.D.

3. Dusty Denis M.D.

4. Behnoush Zarrini, M.D.



CHIEF COMPLAINT:

1. Diabetes.

2. Hyperglycemia.

3. Low back pain.



BRIEF HISTORY:  This is a 38-year-old male from [facility], presented with the above-mentioned diagnosis, blood sugar up in

the 400.  The patient is admitted to medical floor for above-mentioned

diagnosis and treated.  Currently, calm in bed.  No complaint.  Slight

back pain.  No chest pain.  No shortness of breath.  No nausea, vomiting,

or diarrhea.



PAST MEDICAL HISTORY:  Diabetes, hypertension, low back pain, depression,

chronic obstructive pulmonary disease, CVA, and seizure.



PAST SURGICAL HISTORY:  Back and neck surgery.



MEDICATION:  Include Seroquel, Restoril, NovoLog, Depakote, Lexapro,

heparin, albuterol, and Tylenol.



ALLERGIES:  Haldol.



SOCIAL HISTORY:  Positive smoke.  No alcohol.  No intravenous drug abuse.



FAMILY HISTORY:  Noncontributory.



PHYSICAL EXAMINATION:

GENERAL:  Calm in bed, oriented x3, and in no acute distress.

VITAL SIGNS:  Temperature 96, pulse 83, respirations 20, and blood pressure

121/78.

CARDIOVASCULAR:  There is no murmur.

LUNGS:  Distant and clear.

ABDOMEN:  Bowel sounds positive.  Nontender.  Nondistended.

EXTREMITIES:  Show no cyanosis or edema.

NEUROLOGIC:  The patient moves all extremities, slightly weak.



LABORATORY AND DIAGNOSTIC DATA:  Labs, at this time, show CBC is normal.

BMP show sodium 130, chloride 96, and glucose 527.  Urine tox is negative.

Urinalysis, 4+ glucose.



ASSESSMENT:

1. Diabetes.

2. Hyperglycemia.

3. Hypertension.

4. Renal insufficiency.

5. Chronic obstructive pulmonary disease.

6. Cerebrovascular accident.

7. Seizure.

8. Low back pain.

9. Depression.



PLAN:

1. OT, PT, and dietary followup.

2. Blood sugar control.

3. Pain control.

4. Blood pressure control.

5. Resume home medications.

6. Seizure control.

7. We will add Dr. Tuttle for Nephrology consult.









  ______________________________________________

  Samir Cabrera D.O.





DR:  JACINTO

D:  12/10/2018 14:26

T:  12/10/2018 19:38

JOB#:  [id number]

CC:

## 2018-12-10 NOTE — CONSULTATION
Consult Note


Consult Note





asked to eval for hypoNatremia





Mr. Zapien is a 39 yo male with hx of insulin-dependent diabetes, COPD, Chiari 

malformation, CVA, seizure disorder, schizoaffective disorder who presents from 

[facility] with hyperglycemia.  Blood sugar was 

greater than 500.  Blood sugar is now 400 receiving 14 units of insulin per 

nursing staff.  Personal physician recommended evaluation in the emergency room.





Mr. Zapien explains that he's been sick for several days.  He's had nausea 

vomiting.  He's had diarrhea.  He denies abdominal pain.


Allergies:  





     HALOPERIDOL (Verified  Allergy, Unknown, 10/5/18)








Hx Hypertension:  Yes


Hx COPD:  Yes


Hx Diabetes:  Yes - DM2


Hx Gastrointestinal Problems:  Yes


Hx Neurological Problems:  Yes


Hx Cerebrovascular Accident:  Yes - early Oct 2018


Hx Seizures:  Yes


Hx Peripheral Neuropathy:  Yes


Hx Head Trauma:  Yes


Hx Dizziness:  Yes


Hx Headaches:  Yes


Hx Weakness:  Yes - left arm, left leg





examined


data reviewed


Assessment/Plan


Hyperglycemia due to type 2 diabetes mellitus


Hyponatremia due to high blood sugar


COPD (chronic obstructive pulmonary disease


Peripheral neuropathy


Lumbar degenerative disc disease


CVA (cerebral vascular accident)


Psychosomatic disorder


Noncompliance


Hydrocephalus








Hydrate-


BS control


monitor electroltes


per orders











Regis Tuttle MD Dec 10, 2018 14:57

## 2018-12-11 VITALS — DIASTOLIC BLOOD PRESSURE: 76 MMHG | SYSTOLIC BLOOD PRESSURE: 129 MMHG

## 2018-12-11 VITALS — SYSTOLIC BLOOD PRESSURE: 115 MMHG | DIASTOLIC BLOOD PRESSURE: 68 MMHG

## 2018-12-11 VITALS — DIASTOLIC BLOOD PRESSURE: 78 MMHG | SYSTOLIC BLOOD PRESSURE: 136 MMHG

## 2018-12-11 VITALS — SYSTOLIC BLOOD PRESSURE: 126 MMHG | DIASTOLIC BLOOD PRESSURE: 77 MMHG

## 2018-12-11 VITALS — DIASTOLIC BLOOD PRESSURE: 73 MMHG | SYSTOLIC BLOOD PRESSURE: 113 MMHG

## 2018-12-11 VITALS — DIASTOLIC BLOOD PRESSURE: 73 MMHG | SYSTOLIC BLOOD PRESSURE: 124 MMHG

## 2018-12-11 LAB
ADD MANUAL DIFF: NO
ALBUMIN SERPL-MCNC: 3 G/DL (ref 3.4–5)
ALBUMIN/GLOB SERPL: 0.9 {RATIO} (ref 1–2.7)
ALP SERPL-CCNC: 57 U/L (ref 46–116)
ALT SERPL-CCNC: 38 U/L (ref 12–78)
ANION GAP SERPL CALC-SCNC: 7 MMOL/L (ref 5–15)
AST SERPL-CCNC: 18 U/L (ref 15–37)
BASOPHILS NFR BLD AUTO: 0.9 % (ref 0–2)
BILIRUB SERPL-MCNC: 0.3 MG/DL (ref 0.2–1)
BUN SERPL-MCNC: 12 MG/DL (ref 7–18)
CALCIUM SERPL-MCNC: 8.3 MG/DL (ref 8.5–10.1)
CHLORIDE SERPL-SCNC: 101 MMOL/L (ref 98–107)
CHOLEST SERPL-MCNC: 100 MG/DL (ref ?–200)
CO2 SERPL-SCNC: 26 MMOL/L (ref 21–32)
CREAT SERPL-MCNC: 0.9 MG/DL (ref 0.55–1.3)
EOSINOPHIL NFR BLD AUTO: 2.1 % (ref 0–3)
ERYTHROCYTE [DISTWIDTH] IN BLOOD BY AUTOMATED COUNT: 12 % (ref 11.6–14.8)
GLOBULIN SER-MCNC: 3.2 G/DL
HCT VFR BLD CALC: 40.3 % (ref 42–52)
HDLC SERPL-MCNC: 28 MG/DL (ref 40–60)
HGB BLD-MCNC: 14.2 G/DL (ref 14.2–18)
LYMPHOCYTES NFR BLD AUTO: 37.7 % (ref 20–45)
MCV RBC AUTO: 84 FL (ref 80–99)
MONOCYTES NFR BLD AUTO: 7.9 % (ref 1–10)
NEUTROPHILS NFR BLD AUTO: 51.5 % (ref 45–75)
PHOSPHATE SERPL-MCNC: 2.5 MG/DL (ref 2.5–4.9)
PLATELET # BLD: 147 K/UL (ref 150–450)
POTASSIUM SERPL-SCNC: 3.8 MMOL/L (ref 3.5–5.1)
RBC # BLD AUTO: 4.79 M/UL (ref 4.7–6.1)
SODIUM SERPL-SCNC: 134 MMOL/L (ref 136–145)
TRIGL SERPL-MCNC: 335 MG/DL (ref 30–150)
WBC # BLD AUTO: 8.6 K/UL (ref 4.8–10.8)

## 2018-12-11 RX ADMIN — HEPARIN SODIUM SCH UNITS: 5000 INJECTION INTRAVENOUS; SUBCUTANEOUS at 08:37

## 2018-12-11 RX ADMIN — INSULIN DETEMIR SCH UNITS: 100 INJECTION, SOLUTION SUBCUTANEOUS at 10:39

## 2018-12-11 RX ADMIN — QUETIAPINE SCH MG: 200 TABLET, FILM COATED ORAL at 20:27

## 2018-12-11 RX ADMIN — INSULIN ASPART SCH UNITS: 100 INJECTION, SOLUTION INTRAVENOUS; SUBCUTANEOUS at 12:05

## 2018-12-11 RX ADMIN — HEPARIN SODIUM SCH UNITS: 5000 INJECTION INTRAVENOUS; SUBCUTANEOUS at 20:31

## 2018-12-11 RX ADMIN — DIVALPROEX SODIUM SCH MG: 500 TABLET, DELAYED RELEASE ORAL at 20:28

## 2018-12-11 RX ADMIN — MORPHINE SULFATE PRN MG: 4 INJECTION INTRAVENOUS at 14:30

## 2018-12-11 RX ADMIN — MORPHINE SULFATE PRN MG: 2 INJECTION, SOLUTION INTRAMUSCULAR; INTRAVENOUS at 18:40

## 2018-12-11 RX ADMIN — DIVALPROEX SODIUM SCH MG: 500 TABLET, DELAYED RELEASE ORAL at 08:35

## 2018-12-11 RX ADMIN — INSULIN ASPART SCH UNITS: 100 INJECTION, SOLUTION INTRAVENOUS; SUBCUTANEOUS at 06:33

## 2018-12-11 RX ADMIN — MORPHINE SULFATE PRN MG: 2 INJECTION, SOLUTION INTRAMUSCULAR; INTRAVENOUS at 06:33

## 2018-12-11 RX ADMIN — INSULIN ASPART SCH UNITS: 100 INJECTION, SOLUTION INTRAVENOUS; SUBCUTANEOUS at 17:09

## 2018-12-11 RX ADMIN — INSULIN ASPART SCH UNITS: 100 INJECTION, SOLUTION INTRAVENOUS; SUBCUTANEOUS at 20:31

## 2018-12-11 RX ADMIN — MORPHINE SULFATE PRN MG: 4 INJECTION INTRAVENOUS at 10:28

## 2018-12-11 RX ADMIN — MORPHINE SULFATE PRN MG: 2 INJECTION, SOLUTION INTRAMUSCULAR; INTRAVENOUS at 02:19

## 2018-12-11 RX ADMIN — INSULIN ASPART SCH UNITS: 100 INJECTION, SOLUTION INTRAVENOUS; SUBCUTANEOUS at 17:10

## 2018-12-11 RX ADMIN — INSULIN ASPART SCH UNITS: 100 INJECTION, SOLUTION INTRAVENOUS; SUBCUTANEOUS at 12:06

## 2018-12-11 NOTE — GENERAL PROGRESS NOTE
Assessment/Plan


Assessment/Plan


(1) Chiari malformation type II


(2) Degenerative disc disease, cervical


(3) Lumbar degenerative disc disease


(4) Arthritis, lumbar spine


(5) Peripheral neuropathy


(6) Hydrocephalus


(7) Diabetes mellitus


Pt will be reduced on the Morphine 1mg IV Q6H PRN breakthrough pain


and started on Norco 10/325mg PO 1 tab Q4H PRN severe pain.


Pt was d/w Dr. Franco and he concurred.





Subjective


Date patient seen:  Dec 11, 2018


Time patient seen:  04:45 - pm


Allergies:  


Coded Allergies:  


     HALOPERIDOL (Verified  Allergy, Unknown, 10/5/18)


Subjective


Constitutional:  Reports: weakness, Denies: chills, diaphoresis, fever, malaise

, no symptoms, other


HEENT:  Denies: blurred vision, double vision, ear discharge, ear pain, eye pain

, mouth pain, mouth swelling, no symptoms, nose congestion, nose pain, other, 

tearing, throat pain, throat swelling


Cardiovascular:  Denies: chest pain, edema, irregular heart rate, 

lightheadedness, no symptoms, other, palpitations, syncope


Respiratory:  Denies: SOB at rest, SOB with excertion, cough, no symptoms, 

orthopnea, other, shortness of breath, sputum, stridor, wheezing


Gastrointestinal/Abdominal:  Denies: abdomen distended, abdominal pain, black 

stools, blood in stool, constipated, diarrhea, difficulty swallowing, nausea, 

no symptoms, other, poor appetite, poor fluid intake, rectal bleeding, tarry 

stools, vomiting


Genitourinary:  Denies: burning, discharge, flank pain, frequency, hematuria, 

incontinence, no symptoms, other, pain, urgency


Neurologic/Psychiatric:  Reports: headache, numbness, tingling, weakness, Denies

: anxiety, depressed, emotional problems, no symptoms, other, paresthesia, pre-

existing deficit, seizure, tremors


Endocrine:  Denies: excessive sweating, flushing, increased hunger, increased 

thirst, increased urine, intolerance to cold, intolerance to heat, no symptoms, 

other, unexplained weight gain, unexplained weight loss


Hematologic/Lymphatic:  Denies: anemia, easy bleeding, easy bruising, no 

symptoms, other


 


Subjective


Patient is a known patient and has been admitted under the care of Dr. Cabrera. C/

o pain, 


Started on Morphine 4mg IV Q4H PRN severe pain and I d/w him about reducing the 


Morphine to 1mg IV Q6H PRN severe pain and starting the Norco 10/325mg PO 


1 tab Q4H PRN severe pain. Patient understands.





Objective





Last 24 Hour Vital Signs








  Date Time  Temp Pulse Resp B/P (MAP) Pulse Ox O2 Delivery O2 Flow Rate FiO2


 


12/11/18 16:00 98.1 94 18 136/78 (97) 96   


 


12/11/18 12:00 98.1 90 18 129/76 (93) 96   


 


12/11/18 09:00      Room Air  


 


12/11/18 08:00 97.7 90 20 115/68 (84) 96   


 


12/11/18 04:00 98.2 86 20 126/77 (93) 96   


 


12/11/18 00:00 98.4 84 20 113/73 (86) 96   


 


12/10/18 21:00      Room Air  


 


12/10/18 20:00 98.5 86 20 128/76 (93) 96   


 


12/10/18 18:32 96.3       

















Intake and Output  


 


 12/10/18 12/11/18





 19:00 07:00


 


Intake Total 2280 ml 1580 ml


 


Output Total 1800 ml 2425 ml


 


Balance 480 ml -845 ml


 


  


 


Intake Oral 1080 ml 480 ml


 


IV Total 1200 ml 1100 ml


 


Output Urine Total 1800 ml 2425 ml








Laboratory Tests


12/11/18 09:40: 


White Blood Count 8.6, Red Blood Count 4.79, Hemoglobin 14.2, Hematocrit 40.3L, 

Mean Corpuscular Volume 84, Mean Corpuscular Hemoglobin 29.7, Mean Corpuscular 

Hemoglobin Concent 35.3, Red Cell Distribution Width 12.0, Platelet Count 147L, 

Mean Platelet Volume 8.3, Neutrophils (%) (Auto) 51.5, Lymphocytes (%) (Auto) 

37.7, Monocytes (%) (Auto) 7.9, Eosinophils (%) (Auto) 2.1, Basophils (%) (Auto

) 0.9, Sodium Level 134L, Potassium Level 3.8, Chloride Level 101, Carbon 

Dioxide Level 26, Anion Gap 7, Blood Urea Nitrogen 12, Creatinine 0.9, Estimat 

Glomerular Filtration Rate > 60, Glucose Level 365#H, Hemoglobin A1c 9.9H, Uric 

Acid 7.1, Calcium Level 8.3L, Phosphorus Level 2.5, Magnesium Level 1.5L, Total 

Bilirubin 0.3, Aspartate Amino Transf (AST/SGOT) 18, Alanine Aminotransferase (

ALT/SGPT) 38, Alkaline Phosphatase 57, C-Reactive Protein, Quantitative 0.5, 

Total Protein 6.2L, Albumin 3.0L, Globulin 3.2, Albumin/Globulin Ratio 0.9L, 

Triglycerides Level 335H, Cholesterol Level 100, LDL Cholesterol 57, HDL 

Cholesterol 28L, Cholesterol/HDL Ratio 3.6, Vitamin B12 Level 638, Folate 16.8, 

Thyroid Stimulating Hormone (TSH) 3.081


Height (Feet):  5


Height (Inches):  11.00


Weight (Pounds):  235


General Appearance:  no apparent distress, alert


EENT:  PERRL/EOMI, normal ENT inspection


Neck:  normal alignment, supple


Cardiovascular:  normal rate, regular rhythm


Respiratory/Chest:  lungs clear, normal breath sounds


Abdomen:  non tender, soft


Extremities:  non-tender


Edema:  trace edema


Neurologic:  alert, responsive


Skin:  warm/dry











Bill Sanz Dec 11, 2018 17:10

## 2018-12-11 NOTE — PROGRESS NOTE
DATE:  12/11/2018

SUBJECTIVE:  The patient is a 38-year-old male patient with hypoglycemia.

He is confused and disorganized.  Mood labile.  He has got no logical plan

for his own self-care.  Still it looks like he is in lot of depression,

anxiety, mood lability.  That is why attending has requested daily

psychiatric consultation.  As far as his symptoms, the patient has an

increased mood lability worsened by his hypoglycemic confusion,

disorganized thought process.



MENTAL STATUS EXAMINATION:  The patient is a 38-year-old male.  Appearance

is disheveled.  Attitude, irritable and agitated.  Affect guarded and

restricted.  Intellect poor.  Mood depressed and anxious.  Motor activity,

psychomotor agitation.  Attention span is poor.  Orientation x2.  Speech

is pressured.  Thought process, disorganized and illogical.  Thought

content, auditory hallucinations and paranoid delusions.  Insight and

judgment is poor.



DIAGNOSIS:  Schizoaffective, bipolar type.



PLAN:  Treat this patient with a combination of Seroquel and Depakote.

Provided him with 20 minutes of supportive psychotherapy as well as

cognitive _____.  He is very confused and disorganized, mood labile,

racing thoughts, but continues Depakote and Seroquel along with 20 minutes

of cognitive behavior therapy to help him identify his automatic negative

thoughts and help him to convert his negative thoughts to more positive

thinking to reduce depression, anxiety, and suicidality.  Chart reviewed.

Discussed with staff.  Seen and assessed at bedside.









  ______________________________________________

  Gulshan Armstrong M.D.





DR:  Thor

D:  12/11/2018 08:12

T:  12/11/2018 17:46

JOB#:  [id number]

CC:

## 2018-12-11 NOTE — NEPHROLOGY PROGRESS NOTE
Assessment/Plan


Problem List:  


(1) Hyperglycemia due to type 2 diabetes mellitus


(2) Hyponatremia


(3) COPD exacerbation


Assessment


Hyperglycemia due to type 2 diabetes mellitus


Hyponatremia due to high blood sugar


COPD (chronic obstructive pulmonary disease


Peripheral neuropathy


Lumbar degenerative disc disease


CVA (cerebral vascular accident)


Psychosomatic disorder


Noncompliance


Hydrocephalus


Plan


Hydrate-


BS control


monitor electroltes


per orders





Objective


Objective





Last 24 Hour Vital Signs








  Date Time  Temp Pulse Resp B/P (MAP) Pulse Ox O2 Delivery O2 Flow Rate FiO2


 


12/11/18 12:00 98.1 90 18 129/76 (93) 96   


 


12/11/18 09:00      Room Air  


 


12/11/18 08:00 97.7 90 20 115/68 (84) 96   


 


12/11/18 04:00 98.2 86 20 126/77 (93) 96   


 


12/11/18 00:00 98.4 84 20 113/73 (86) 96   


 


12/10/18 21:00      Room Air  


 


12/10/18 20:00 98.5 86 20 128/76 (93) 96   


 


12/10/18 18:32 96.3       


 


12/10/18 16:00 96.3 97 17 121/79 (93) 96   

















Intake and Output  


 


 12/10/18 12/11/18





 19:00 07:00


 


Intake Total 2280 ml 1580 ml


 


Output Total 1800 ml 2425 ml


 


Balance 480 ml -845 ml


 


  


 


Intake Oral 1080 ml 480 ml


 


IV Total 1200 ml 1100 ml


 


Output Urine Total 1800 ml 2425 ml








Laboratory Tests


12/11/18 09:40: 


White Blood Count 8.6, Red Blood Count 4.79, Hemoglobin 14.2, Hematocrit 40.3L, 

Mean Corpuscular Volume 84, Mean Corpuscular Hemoglobin 29.7, Mean Corpuscular 

Hemoglobin Concent 35.3, Red Cell Distribution Width 12.0, Platelet Count 147L, 

Mean Platelet Volume 8.3, Neutrophils (%) (Auto) 51.5, Lymphocytes (%) (Auto) 

37.7, Monocytes (%) (Auto) 7.9, Eosinophils (%) (Auto) 2.1, Basophils (%) (Auto

) 0.9, Sodium Level 134L, Potassium Level 3.8, Chloride Level 101, Carbon 

Dioxide Level 26, Anion Gap 7, Blood Urea Nitrogen 12, Creatinine 0.9, Estimat 

Glomerular Filtration Rate > 60, Glucose Level 365#H, Hemoglobin A1c 9.9H, Uric 

Acid 7.1, Calcium Level 8.3L, Phosphorus Level 2.5, Magnesium Level 1.5L, Total 

Bilirubin 0.3, Aspartate Amino Transf (AST/SGOT) 18, Alanine Aminotransferase (

ALT/SGPT) 38, Alkaline Phosphatase 57, C-Reactive Protein, Quantitative 0.5, 

Total Protein 6.2L, Albumin 3.0L, Globulin 3.2, Albumin/Globulin Ratio 0.9L, 

Triglycerides Level 335H, Cholesterol Level 100, LDL Cholesterol 57, HDL 

Cholesterol 28L, Cholesterol/HDL Ratio 3.6, Vitamin B12 Level 638, Folate 16.8, 

Thyroid Stimulating Hormone (TSH) 3.081


Height (Feet):  5


Height (Inches):  11.00


Weight (Pounds):  235











Regis Tuttle MD Dec 11, 2018 13:53

## 2018-12-11 NOTE — PULMONOLOGY PROGRESS NOTE
Assessment/Plan


Problems:  


(1) Hyperglycemia due to type 2 diabetes mellitus


(2) COPD (chronic obstructive pulmonary disease)


(3) Peripheral neuropathy


(4) Lumbar degenerative disc disease


(5) CVA (cerebral vascular accident)


(6) Psychosomatic disorder


(7) Noncompliance


(8) Diabetes mellitus


Assessment/Plan


sliding scale


diabetic diet


respiratory treatment


sliding scale


dvt prophylaxis


pt/ot





Subjective


ROS Limited/Unobtainable:  No


Constitutional:  Reports: no symptoms


HEENT:  Repors: no symptoms


Respiratory:  Reports: no symptoms


Allergies:  


Coded Allergies:  


     HALOPERIDOL (Verified  Allergy, Unknown, 10/5/18)





Objective





Last 24 Hour Vital Signs








  Date Time  Temp Pulse Resp B/P (MAP) Pulse Ox O2 Delivery O2 Flow Rate FiO2


 


12/11/18 09:00      Room Air  


 


12/11/18 08:00 97.7 90 20 115/68 (84) 96   


 


12/11/18 04:00 98.2 86 20 126/77 (93) 96   


 


12/11/18 00:00 98.4 84 20 113/73 (86) 96   


 


12/10/18 21:00      Room Air  


 


12/10/18 20:00 98.5 86 20 128/76 (93) 96   


 


12/10/18 18:32 96.3       


 


12/10/18 16:00 96.3 97 17 121/79 (93) 96   


 


12/10/18 12:00 96.8 83 20 121/78 (92) 97   

















Intake and Output  


 


 12/10/18 12/11/18





 19:00 07:00


 


Intake Total 2280 ml 1580 ml


 


Output Total 1800 ml 2425 ml


 


Balance 480 ml -845 ml


 


  


 


Intake Oral 1080 ml 480 ml


 


IV Total 1200 ml 1100 ml


 


Output Urine Total 1800 ml 2425 ml








General Appearance:  WD/WN


HEENT:  normocephalic, atraumatic


Respiratory/Chest:  chest wall non-tender, lungs clear


Cardiovascular:  normal peripheral pulses, normal rate


Abdomen:  normal bowel sounds, soft, non tender


Genitourinary:  normal external genitalia


Extremities:  no clubbing


Neurologic/Psychiatric:  CNs II-XII grossly normal


Laboratory Tests


12/11/18 09:40: 


White Blood Count 8.6, Red Blood Count 4.79, Hemoglobin 14.2, Hematocrit 40.3L, 

Mean Corpuscular Volume 84, Mean Corpuscular Hemoglobin 29.7, Mean Corpuscular 

Hemoglobin Concent 35.3, Red Cell Distribution Width 12.0, Platelet Count 147L, 

Mean Platelet Volume 8.3, Neutrophils (%) (Auto) 51.5, Lymphocytes (%) (Auto) 

37.7, Monocytes (%) (Auto) 7.9, Eosinophils (%) (Auto) 2.1, Basophils (%) (Auto

) 0.9, Sodium Level 134L, Potassium Level 3.8, Chloride Level 101, Carbon 

Dioxide Level 26, Anion Gap 7, Blood Urea Nitrogen 12, Creatinine 0.9, Estimat 

Glomerular Filtration Rate > 60, Glucose Level 365#H, Hemoglobin A1c 9.9H, Uric 

Acid 7.1, Calcium Level 8.3L, Phosphorus Level 2.5, Magnesium Level 1.5L, Total 

Bilirubin 0.3, Aspartate Amino Transf (AST/SGOT) 18, Alanine Aminotransferase (

ALT/SGPT) 38, Alkaline Phosphatase 57, C-Reactive Protein, Quantitative 0.5, 

Total Protein 6.2L, Albumin 3.0L, Globulin 3.2, Albumin/Globulin Ratio 0.9L, 

Triglycerides Level 335H, Cholesterol Level 100, LDL Cholesterol 57, HDL 

Cholesterol 28L, Cholesterol/HDL Ratio 3.6, Vitamin B12 Level 638, Folate 16.8, 

Thyroid Stimulating Hormone (TSH) 3.081





Current Medications








 Medications


  (Trade)  Dose


 Ordered  Sig/Henry


 Route


 PRN Reason  Start Time


 Stop Time Status Last Admin


Dose Admin


 


 Acetaminophen


  (Tylenol)  650 mg  Q4H  PRN


 ORAL


 fever  12/10/18 07:00


 1/9/19 06:59   


 


 


 Albuterol/


 Ipratropium


  (Albuterol/


 Ipratropium)  3 ml  Q4H  PRN


 HHN


 Shortness of Breath  12/10/18 07:00


 12/15/18 06:59   


 


 


 Alprazolam


  (Xanax)  0.5 mg  Q6H  PRN


 ORAL


 For Anxiety  12/10/18 05:15


 12/17/18 05:14   


 


 


 Clonidine HCl


  (Catapres Tab)  0.1 mg  Q4H  PRN


 ORAL


 SBP more than 160mmHg  12/10/18 07:00


 1/9/19 06:59   


 


 


 Dextrose


  (Dextrose 50%)  25 ml  Q30M  PRN


 IV


 Hypoglycemia  12/11/18 09:45


 1/10/19 09:44   


 


 


 Dextrose


  (Dextrose 50%)  50 ml  Q30M  PRN


 IV


 Hypoglycemia  12/11/18 09:45


 1/10/19 09:44   


 


 


 Divalproex Sodium


  (Depakote)  1,000 mg  EVERY 12  HOURS


 ORAL


   12/10/18 09:00


 1/9/19 08:59  12/11/18 08:35


 


 


 Escitalopram


 Oxalate


  (Lexapro)  10 mg  DAILY


 ORAL


   12/10/18 09:00


 1/9/19 08:59  12/11/18 08:35


 


 


 Heparin Sodium


  (Porcine)


  (Heparin 5000


 units/ml)  5,000 units  EVERY 12  HOURS


 SUBQ


   12/10/18 09:00


 1/9/19 08:59   


 


 


 Insulin Aspart


  (NovoLOG)    BEFORE MEALS AND  HS


 SUBQ


   12/10/18 11:30


 1/9/19 11:29  12/11/18 06:33


 


 


 Insulin Aspart


  (NovoLOG)  8 units  NOVOTIAC


 SUBQ


   12/11/18 11:50


 1/10/19 11:49   


 


 


 Insulin Detemir


  (Levemir)  24 units  DAILY


 SUBQ


   12/11/18 11:00


 1/10/19 10:59  12/11/18 10:39


 


 


 Morphine Sulfate


  (Morphine


 Sulfate)  4 mg  Q4H  PRN


 IVP


 Severe Pain (Pain Scale 7-10)  12/11/18 08:30


 12/18/18 08:29  12/11/18 10:28


 


 


 Nitroglycerin


  (Ntg)  0.4 mg  Q5M X 3 DOSES PRN


 SL


 Prn Chest Pain  12/10/18 07:00


 1/9/19 06:59   


 


 


 Ondansetron HCl


  (Zofran)  4 mg  Q6H  PRN


 IVP


 Nausea & Vomiting  12/10/18 07:00


 1/9/19 06:59   


 


 


 Pantoprazole


  (Protonix)  40 mg  EVERY 12  HOURS


 ORAL


   12/10/18 21:00


 1/9/19 20:59  12/11/18 08:35


 


 


 Polyethylene


 Glycol


  (Miralax)  17 gm  HSPRN  PRN


 ORAL


 Constipation  12/10/18 07:00


 1/9/19 06:59   


 


 


 Quetiapine


 Fumarate


  (SEROquel)  200 mg  QHS


 ORAL


   12/10/18 21:00


 1/9/19 20:59  12/10/18 20:34


 


 


 Sodium Chloride  1,000 ml @ 


 100 mls/hr  Q10H


 IVLG


   12/10/18 07:00


 1/9/19 06:59  12/11/18 02:19


 


 


 Temazepam


  (Restoril)  15 mg  HSPRN  PRN


 ORAL


 Insomnia  12/10/18 21:00


 12/17/18 20:59   


 

















Lissette Steward MD Dec 11, 2018 12:01

## 2018-12-11 NOTE — GENERAL PROGRESS NOTE
Assessment/Plan


Problem List:  


(1) COPD (chronic obstructive pulmonary disease)


ICD Codes:  J44.9 - Chronic obstructive pulmonary disease, unspecified


SNOMED:  91840589


(2) Uncontrolled seizures


ICD Codes:  R56.9 - Unspecified convulsions


SNOMED:  40077313


(3) Noncompliance


ICD Codes:  Z91.19 - Patient's noncompliance with other medical treatment and 

regimen


SNOMED:  5372466


Assessment/Plan


add Levemir 24 untis qam 


add Novolog 8 units ac tid 


continue NISS ac / hs





Subjective


Allergies:  


Coded Allergies:  


     HALOPERIDOL (Verified  Allergy, Unknown, 10/5/18)


All Systems:  reviewed and negative except above


Subjective


Mr. Zapien is a 39 yo male with hx of insulin-dependent diabetes, COPD, Chiari 

malformation, CVA, seizure disorder, schizoaffective disorder who presents from 

[facility] with hyperglycemia.  Blood sugar was 

greater than 500.  Blood sugar is now 400 receiving 14 units of insulin per 

nursing staff





Objective





Last 24 Hour Vital Signs








  Date Time  Temp Pulse Resp B/P (MAP) Pulse Ox O2 Delivery O2 Flow Rate FiO2


 


12/11/18 08:00 97.7 90 20 115/68 (84) 96   


 


12/11/18 04:00 98.2 86 20 126/77 (93) 96   


 


12/11/18 00:00 98.4 84 20 113/73 (86) 96   


 


12/10/18 21:00      Room Air  


 


12/10/18 20:00 98.5 86 20 128/76 (93) 96   


 


12/10/18 18:32 96.3       


 


12/10/18 16:00 96.3 97 17 121/79 (93) 96   


 


12/10/18 12:00 96.8 83 20 121/78 (92) 97   

















Intake and Output  


 


 12/10/18 12/11/18





 19:00 07:00


 


Intake Total 2280 ml 1580 ml


 


Output Total 1800 ml 2425 ml


 


Balance 480 ml -845 ml


 


  


 


Intake Oral 1080 ml 480 ml


 


IV Total 1200 ml 1100 ml


 


Output Urine Total 1800 ml 2425 ml








Height (Feet):  5


Height (Inches):  11.00


Weight (Pounds):  235


General Appearance:  no apparent distress


Neck:  normal alignment


Cardiovascular:  normal rate


Respiratory/Chest:  lungs clear


Abdomen:  normal bowel sounds


Pelvis:  normal external exam


Objective





Current Medications








 Medications


  (Trade)  Dose


 Ordered  Sig/Henry


 Route


 PRN Reason  Start Time


 Stop Time Status Last Admin


Dose Admin


 


 Acetaminophen


  (Tylenol)  650 mg  Q4H  PRN


 ORAL


 fever  12/10/18 07:00


 1/9/19 06:59   


 


 


 Albuterol/


 Ipratropium


  (Albuterol/


 Ipratropium)  3 ml  Q4H  PRN


 HHN


 Shortness of Breath  12/10/18 07:00


 12/15/18 06:59   


 


 


 Alprazolam


  (Xanax)  0.5 mg  Q6H  PRN


 ORAL


 For Anxiety  12/10/18 05:15


 12/17/18 05:14   


 


 


 Clonidine HCl


  (Catapres Tab)  0.1 mg  Q4H  PRN


 ORAL


 SBP more than 160mmHg  12/10/18 07:00


 1/9/19 06:59   


 


 


 Dextrose


  (Dextrose 50%)  25 ml  Q30M  PRN


 IV


 Hypoglycemia  12/10/18 07:00


 1/9/19 06:59   


 


 


 Dextrose


  (Dextrose 50%)  50 ml  Q30M  PRN


 IV


 Hypoglycemia  12/10/18 07:00


 1/9/19 06:59   


 


 


 Divalproex Sodium


  (Depakote)  1,000 mg  EVERY 12  HOURS


 ORAL


   12/10/18 09:00


 1/9/19 08:59  12/11/18 08:35


 


 


 Escitalopram


 Oxalate


  (Lexapro)  10 mg  DAILY


 ORAL


   12/10/18 09:00


 1/9/19 08:59  12/11/18 08:35


 


 


 Heparin Sodium


  (Porcine)


  (Heparin 5000


 units/ml)  5,000 units  EVERY 12  HOURS


 SUBQ


   12/10/18 09:00


 1/9/19 08:59   


 


 


 Insulin Aspart


  (NovoLOG)    BEFORE MEALS AND  HS


 SUBQ


   12/10/18 11:30


 1/9/19 11:29  12/11/18 06:33


 


 


 Morphine Sulfate


  (Morphine


 Sulfate)  4 mg  Q4H  PRN


 IVP


 Severe Pain (Pain Scale 7-10)  12/11/18 08:30


 12/18/18 08:29   


 


 


 Nitroglycerin


  (Ntg)  0.4 mg  Q5M X 3 DOSES PRN


 SL


 Prn Chest Pain  12/10/18 07:00


 1/9/19 06:59   


 


 


 Ondansetron HCl


  (Zofran)  4 mg  Q6H  PRN


 IVP


 Nausea & Vomiting  12/10/18 07:00


 1/9/19 06:59   


 


 


 Pantoprazole


  (Protonix)  40 mg  EVERY 12  HOURS


 ORAL


   12/10/18 21:00


 1/9/19 20:59  12/11/18 08:35


 


 


 Polyethylene


 Glycol


  (Miralax)  17 gm  HSPRN  PRN


 ORAL


 Constipation  12/10/18 07:00


 1/9/19 06:59   


 


 


 Quetiapine


 Fumarate


  (SEROquel)  200 mg  QHS


 ORAL


   12/10/18 21:00


 1/9/19 20:59  12/10/18 20:34


 


 


 Sodium Chloride  1,000 ml @ 


 100 mls/hr  Q10H


 IVLG


   12/10/18 07:00


 1/9/19 06:59  12/11/18 02:19


 


 


 Temazepam


  (Restoril)  15 mg  HSPRN  PRN


 ORAL


 Insomnia  12/10/18 21:00


 12/17/18 20:59   


 














Item Value  Date Time


 


Bedside Blood Glucose 284 mg/dl H 12/11/18 0633


 


Bedside Blood Glucose 277 mg/dl H 12/10/18 2100


 


Bedside Blood Glucose 398 mg/dl H 12/10/18 1634


 


Bedside Blood Glucose 327 mg/dl H 12/10/18 1230


 


Bedside Blood Glucose 356 mg/dl H 12/10/18 0451

















Dusty Denis MD Dec 11, 2018 09:45

## 2018-12-11 NOTE — GENERAL PROGRESS NOTE
Assessment/Plan


Problem List:  


(1) Renal insufficiency


ICD Codes:  N28.9 - Disorder of kidney and ureter, unspecified


SNOMED:  698738167, 520326390


(2) Depressed


ICD Codes:  F32.9 - Major depressive disorder, single episode, unspecified


SNOMED:  94946221


(3) Seizure


ICD Codes:  R56.9 - Unspecified convulsions


SNOMED:  03850665


(4) Hyperglycemia due to type 2 diabetes mellitus


ICD Codes:  E11.65 - Type 2 diabetes mellitus with hyperglycemia


SNOMED:  094646952744894


(5) Diabetes mellitus


ICD Codes:  E11.9 - Diabetes mellitus


SNOMED:  81145270


(6) COPD (chronic obstructive pulmonary disease)


ICD Codes:  J44.9 - Chronic obstructive pulmonary disease, unspecified


SNOMED:  80570882


(7) CVA (cerebral vascular accident)


ICD Codes:  I63.9 - Cerebral infarction, unspecified


SNOMED:  709016861


Status:  unchanged


Assessment/Plan


ot pt diet bs bp pain control cbc bmp am





Subjective


Constitutional:  Reports: weakness


Allergies:  


Coded Allergies:  


     HALOPERIDOL (Verified  Allergy, Unknown, 10/5/18)


All Systems:  reviewed and negative except above


Subjective


sl back pain





Objective





Last 24 Hour Vital Signs








  Date Time  Temp Pulse Resp B/P (MAP) Pulse Ox O2 Delivery O2 Flow Rate FiO2


 


12/11/18 12:00 98.1 90 18 129/76 (93) 96   


 


12/11/18 09:00      Room Air  


 


12/11/18 08:00 97.7 90 20 115/68 (84) 96   


 


12/11/18 04:00 98.2 86 20 126/77 (93) 96   


 


12/11/18 00:00 98.4 84 20 113/73 (86) 96   


 


12/10/18 21:00      Room Air  


 


12/10/18 20:00 98.5 86 20 128/76 (93) 96   


 


12/10/18 18:32 96.3       


 


12/10/18 16:00 96.3 97 17 121/79 (93) 96   

















Intake and Output  


 


 12/10/18 12/11/18





 19:00 07:00


 


Intake Total 2280 ml 1580 ml


 


Output Total 1800 ml 2425 ml


 


Balance 480 ml -845 ml


 


  


 


Intake Oral 1080 ml 480 ml


 


IV Total 1200 ml 1100 ml


 


Output Urine Total 1800 ml 2425 ml








Laboratory Tests


12/11/18 09:40: 


White Blood Count 8.6, Red Blood Count 4.79, Hemoglobin 14.2, Hematocrit 40.3L, 

Mean Corpuscular Volume 84, Mean Corpuscular Hemoglobin 29.7, Mean Corpuscular 

Hemoglobin Concent 35.3, Red Cell Distribution Width 12.0, Platelet Count 147L, 

Mean Platelet Volume 8.3, Neutrophils (%) (Auto) 51.5, Lymphocytes (%) (Auto) 

37.7, Monocytes (%) (Auto) 7.9, Eosinophils (%) (Auto) 2.1, Basophils (%) (Auto

) 0.9, Sodium Level 134L, Potassium Level 3.8, Chloride Level 101, Carbon 

Dioxide Level 26, Anion Gap 7, Blood Urea Nitrogen 12, Creatinine 0.9, Estimat 

Glomerular Filtration Rate > 60, Glucose Level 365#H, Hemoglobin A1c 9.9H, Uric 

Acid 7.1, Calcium Level 8.3L, Phosphorus Level 2.5, Magnesium Level 1.5L, Total 

Bilirubin 0.3, Aspartate Amino Transf (AST/SGOT) 18, Alanine Aminotransferase (

ALT/SGPT) 38, Alkaline Phosphatase 57, C-Reactive Protein, Quantitative 0.5, 

Total Protein 6.2L, Albumin 3.0L, Globulin 3.2, Albumin/Globulin Ratio 0.9L, 

Triglycerides Level 335H, Cholesterol Level 100, LDL Cholesterol 57, HDL 

Cholesterol 28L, Cholesterol/HDL Ratio 3.6, Vitamin B12 Level 638, Folate 16.8, 

Thyroid Stimulating Hormone (TSH) 3.081


Height (Feet):  5


Height (Inches):  11.00


Weight (Pounds):  235


General Appearance:  alert


EENT:  normal ENT inspection


Neck:  normal alignment


Cardiovascular:  normal peripheral pulses, normal rate, regular rhythm


Respiratory/Chest:  chest wall non-tender, lungs clear, normal breath sounds


Abdomen:  normal bowel sounds, non tender, soft


Extremities:  normal inspection


Edema:  no edema noted Arm (L), no edema noted Arm (R), no edema noted Leg (L), 

no edema noted Leg (R), no edema noted Pedal (L), no edema noted Pedal (R), no 

edema noted Generalized


Neurologic:  responsive, motor weakness


Skin:  normal pigmentation, warm/dry











Samir Cabrera DO Dec 11, 2018 15:13

## 2018-12-12 VITALS — SYSTOLIC BLOOD PRESSURE: 131 MMHG | DIASTOLIC BLOOD PRESSURE: 77 MMHG

## 2018-12-12 VITALS — DIASTOLIC BLOOD PRESSURE: 68 MMHG | SYSTOLIC BLOOD PRESSURE: 118 MMHG

## 2018-12-12 VITALS — SYSTOLIC BLOOD PRESSURE: 135 MMHG | DIASTOLIC BLOOD PRESSURE: 79 MMHG

## 2018-12-12 VITALS — SYSTOLIC BLOOD PRESSURE: 130 MMHG | DIASTOLIC BLOOD PRESSURE: 75 MMHG

## 2018-12-12 VITALS — SYSTOLIC BLOOD PRESSURE: 144 MMHG | DIASTOLIC BLOOD PRESSURE: 88 MMHG

## 2018-12-12 LAB
ADD MANUAL DIFF: NO
ALBUMIN SERPL-MCNC: 3.1 G/DL (ref 3.4–5)
ALBUMIN/GLOB SERPL: 0.8 {RATIO} (ref 1–2.7)
ALP SERPL-CCNC: 58 U/L (ref 46–116)
ALT SERPL-CCNC: 36 U/L (ref 12–78)
ANION GAP SERPL CALC-SCNC: 9 MMOL/L (ref 5–15)
AST SERPL-CCNC: 19 U/L (ref 15–37)
BASOPHILS NFR BLD AUTO: 0.7 % (ref 0–2)
BILIRUB SERPL-MCNC: 0.3 MG/DL (ref 0.2–1)
BUN SERPL-MCNC: 14 MG/DL (ref 7–18)
CALCIUM SERPL-MCNC: 8.3 MG/DL (ref 8.5–10.1)
CHLORIDE SERPL-SCNC: 101 MMOL/L (ref 98–107)
CO2 SERPL-SCNC: 23 MMOL/L (ref 21–32)
CREAT SERPL-MCNC: 0.9 MG/DL (ref 0.55–1.3)
EOSINOPHIL NFR BLD AUTO: 1.7 % (ref 0–3)
ERYTHROCYTE [DISTWIDTH] IN BLOOD BY AUTOMATED COUNT: 11.8 % (ref 11.6–14.8)
GLOBULIN SER-MCNC: 3.7 G/DL
HCT VFR BLD CALC: 39.7 % (ref 42–52)
HGB BLD-MCNC: 14 G/DL (ref 14.2–18)
LYMPHOCYTES NFR BLD AUTO: 33 % (ref 20–45)
MCV RBC AUTO: 84 FL (ref 80–99)
MONOCYTES NFR BLD AUTO: 7.5 % (ref 1–10)
NEUTROPHILS NFR BLD AUTO: 57 % (ref 45–75)
PHOSPHATE SERPL-MCNC: 2.9 MG/DL (ref 2.5–4.9)
PLATELET # BLD: 149 K/UL (ref 150–450)
POTASSIUM SERPL-SCNC: 3.9 MMOL/L (ref 3.5–5.1)
RBC # BLD AUTO: 4.7 M/UL (ref 4.7–6.1)
SODIUM SERPL-SCNC: 133 MMOL/L (ref 136–145)
WBC # BLD AUTO: 8.1 K/UL (ref 4.8–10.8)

## 2018-12-12 RX ADMIN — INSULIN ASPART SCH UNITS: 100 INJECTION, SOLUTION INTRAVENOUS; SUBCUTANEOUS at 06:44

## 2018-12-12 RX ADMIN — INSULIN ASPART SCH UNITS: 100 INJECTION, SOLUTION INTRAVENOUS; SUBCUTANEOUS at 11:55

## 2018-12-12 RX ADMIN — INSULIN ASPART SCH UNITS: 100 INJECTION, SOLUTION INTRAVENOUS; SUBCUTANEOUS at 17:14

## 2018-12-12 RX ADMIN — HEPARIN SODIUM SCH UNITS: 5000 INJECTION INTRAVENOUS; SUBCUTANEOUS at 21:00

## 2018-12-12 RX ADMIN — MORPHINE SULFATE PRN MG: 2 INJECTION, SOLUTION INTRAMUSCULAR; INTRAVENOUS at 12:55

## 2018-12-12 RX ADMIN — MORPHINE SULFATE PRN MG: 2 INJECTION, SOLUTION INTRAMUSCULAR; INTRAVENOUS at 18:45

## 2018-12-12 RX ADMIN — HEPARIN SODIUM SCH UNITS: 5000 INJECTION INTRAVENOUS; SUBCUTANEOUS at 08:53

## 2018-12-12 RX ADMIN — INSULIN DETEMIR SCH UNITS: 100 INJECTION, SOLUTION SUBCUTANEOUS at 08:54

## 2018-12-12 RX ADMIN — HYDROCODONE BITARTRATE AND ACETAMINOPHEN PRN TAB: 10; 325 TABLET ORAL at 15:50

## 2018-12-12 RX ADMIN — INSULIN ASPART SCH UNITS: 100 INJECTION, SOLUTION INTRAVENOUS; SUBCUTANEOUS at 21:10

## 2018-12-12 RX ADMIN — INSULIN ASPART SCH UNITS: 100 INJECTION, SOLUTION INTRAVENOUS; SUBCUTANEOUS at 17:13

## 2018-12-12 RX ADMIN — DIVALPROEX SODIUM SCH MG: 500 TABLET, DELAYED RELEASE ORAL at 08:53

## 2018-12-12 RX ADMIN — QUETIAPINE SCH MG: 200 TABLET, FILM COATED ORAL at 21:04

## 2018-12-12 RX ADMIN — INSULIN ASPART SCH UNITS: 100 INJECTION, SOLUTION INTRAVENOUS; SUBCUTANEOUS at 12:13

## 2018-12-12 RX ADMIN — INSULIN ASPART SCH UNITS: 100 INJECTION, SOLUTION INTRAVENOUS; SUBCUTANEOUS at 06:43

## 2018-12-12 RX ADMIN — MORPHINE SULFATE PRN MG: 2 INJECTION, SOLUTION INTRAMUSCULAR; INTRAVENOUS at 06:45

## 2018-12-12 RX ADMIN — DIVALPROEX SODIUM SCH MG: 500 TABLET, DELAYED RELEASE ORAL at 21:05

## 2018-12-12 RX ADMIN — MORPHINE SULFATE PRN MG: 2 INJECTION, SOLUTION INTRAMUSCULAR; INTRAVENOUS at 00:43

## 2018-12-12 NOTE — GENERAL PROGRESS NOTE
Assessment/Plan


Problem List:  


(1) Renal insufficiency


ICD Codes:  N28.9 - Disorder of kidney and ureter, unspecified


SNOMED:  631690848, 933038029


(2) Depressed


ICD Codes:  F32.9 - Major depressive disorder, single episode, unspecified


SNOMED:  51209195


(3) Seizure


ICD Codes:  R56.9 - Unspecified convulsions


SNOMED:  16875371


(4) Hyperglycemia due to type 2 diabetes mellitus


ICD Codes:  E11.65 - Type 2 diabetes mellitus with hyperglycemia


SNOMED:  096213232226866


(5) Diabetes mellitus


ICD Codes:  E11.9 - Diabetes mellitus


SNOMED:  90171694


(6) COPD (chronic obstructive pulmonary disease)


ICD Codes:  J44.9 - Chronic obstructive pulmonary disease, unspecified


SNOMED:  61020727


(7) CVA (cerebral vascular accident)


ICD Codes:  I63.9 - Cerebral infarction, unspecified


SNOMED:  216766908


Status:  stable, progressing


Assessment/Plan


ot pt diet bs bp pain control cbc bmp am dc plan if clear





Subjective


Constitutional:  Reports: weakness


Allergies:  


Coded Allergies:  


     HALOPERIDOL (Verified  Allergy, Unknown, 10/5/18)


All Systems:  reviewed and negative except above


Subjective


sl back pain





Objective





Last 24 Hour Vital Signs








  Date Time  Temp Pulse Resp B/P (MAP) Pulse Ox O2 Delivery O2 Flow Rate FiO2


 


12/12/18 09:00      Room Air  


 


12/12/18 08:00 98.6 82 19 130/75 (93) 96   


 


12/12/18 04:00 98.2 79 19 131/77 (95) 96   


 


12/12/18 00:00 98.2 84 18 118/68 (85) 97   


 


12/11/18 21:00      Room Air  


 


12/11/18 20:20  92 20   Room Air  


 


12/11/18 20:00 98.0 86 20 124/73 (90) 96   


 


12/11/18 16:00 98.1 94 18 136/78 (97) 96   

















Intake and Output  


 


 12/11/18 12/12/18





 19:00 07:00


 


Intake Total 3300 ml 1980 ml


 


Output Total 4100 ml 


 


Balance -800 ml 1980 ml


 


  


 


Intake Oral 2200 ml 780 ml


 


IV Total 1100 ml 1200 ml


 


Output Urine Total 4100 ml 


 


# Voids  3








Laboratory Tests


12/12/18 08:35: 


White Blood Count 8.1, Red Blood Count 4.70, Hemoglobin 14.0L, Hematocrit 39.7L

, Mean Corpuscular Volume 84, Mean Corpuscular Hemoglobin 29.8, Mean 

Corpuscular Hemoglobin Concent 35.3, Red Cell Distribution Width 11.8, Platelet 

Count 149L, Mean Platelet Volume 8.8, Neutrophils (%) (Auto) 57.0, Lymphocytes (

%) (Auto) 33.0, Monocytes (%) (Auto) 7.5, Eosinophils (%) (Auto) 1.7, Basophils 

(%) (Auto) 0.7, Sodium Level 133L, Potassium Level 3.9, Chloride Level 101, 

Carbon Dioxide Level 23, Anion Gap 9, Blood Urea Nitrogen 14, Creatinine 0.9, 

Estimat Glomerular Filtration Rate > 60, Glucose Level 412H, Calcium Level 8.3L

, Phosphorus Level 2.9, Magnesium Level 1.5L, Total Bilirubin 0.3, Aspartate 

Amino Transf (AST/SGOT) 19, Alanine Aminotransferase (ALT/SGPT) 36, Alkaline 

Phosphatase 58, Total Protein 6.8, Albumin 3.1L, Globulin 3.7, Albumin/Globulin 

Ratio 0.8L


Height (Feet):  5


Height (Inches):  11.00


Weight (Pounds):  279


General Appearance:  alert


EENT:  normal ENT inspection


Neck:  normal alignment


Cardiovascular:  normal peripheral pulses, normal rate, regular rhythm


Respiratory/Chest:  chest wall non-tender, lungs clear, normal breath sounds


Abdomen:  normal bowel sounds, non tender, soft


Extremities:  normal inspection


Edema:  no edema noted Arm (L), no edema noted Arm (R), no edema noted Leg (L), 

no edema noted Leg (R), no edema noted Pedal (L), no edema noted Pedal (R), no 

edema noted Generalized


Neurologic:  responsive, motor weakness


Skin:  normal pigmentation, warm/dry











Samir Cabrera DO Dec 12, 2018 12:24

## 2018-12-12 NOTE — GENERAL PROGRESS NOTE
Assessment/Plan


Assessment/Plan


(1) Chiari malformation type II


(2) Degenerative disc disease, cervical


(3) Lumbar degenerative disc disease


(4) Arthritis, lumbar spine


(5) Peripheral neuropathy


(6) Hydrocephalus


(7) Diabetes mellitus


Pt will be continued on the Morphine and [place].


Pt was d/w Dr. Franco and he concurred.





Subjective


Date patient seen:  Dec 12, 2018


Time patient seen:  05:45 - pm


Allergies:  


Coded Allergies:  


     HALOPERIDOL (Verified  Allergy, Unknown, 10/5/18)


Subjective


Constitutional:  Reports: weakness, Denies: chills, diaphoresis, fever, malaise

, no symptoms, other


HEENT:  Denies: blurred vision, double vision, ear discharge, ear pain, eye pain

, mouth pain, mouth swelling, no symptoms, nose congestion, nose pain, other, 

tearing, throat pain, throat swelling


Cardiovascular:  Denies: chest pain, edema, irregular heart rate, 

lightheadedness, no symptoms, other, palpitations, syncope


Respiratory:  Denies: SOB at rest, SOB with excertion, cough, no symptoms, 

orthopnea, other, shortness of breath, sputum, stridor, wheezing


Gastrointestinal/Abdominal:  Denies: abdomen distended, abdominal pain, black 

stools, blood in stool, constipated, diarrhea, difficulty swallowing, nausea, 

no symptoms, other, poor appetite, poor fluid intake, rectal bleeding, tarry 

stools, vomiting


Genitourinary:  Denies: burning, discharge, flank pain, frequency, hematuria, 

incontinence, no symptoms, other, pain, urgency


Neurologic/Psychiatric:  Reports: headache, numbness, tingling, weakness, Denies

: anxiety, depressed, emotional problems, no symptoms, other, paresthesia, pre-

existing deficit, seizure, tremors


Endocrine:  Denies: excessive sweating, flushing, increased hunger, increased 

thirst, increased urine, intolerance to cold, intolerance to heat, no symptoms, 

other, unexplained weight gain, unexplained weight loss


Hematologic/Lymphatic:  Denies: anemia, easy bleeding, easy bruising, no 

symptoms, other


 


Subjective


Patient is comfortable in bed no signs of pain or distress. Pain has been 

stable on the Morphine and Norco.





Objective





Last 24 Hour Vital Signs








  Date Time  Temp Pulse Resp B/P (MAP) Pulse Ox O2 Delivery O2 Flow Rate FiO2


 


12/12/18 16:00 98.9 77 22 131/77 (95) 96   


 


12/12/18 12:00 98.6 85 20 144/88 (106) 96   


 


12/12/18 09:41  88 20   Room Air  


 


12/12/18 09:00      Room Air  


 


12/12/18 08:00 98.6 82 19 130/75 (93) 96   


 


12/12/18 04:00 98.2 79 19 131/77 (95) 96   


 


12/12/18 00:00 98.2 84 18 118/68 (85) 97   


 


12/11/18 21:00      Room Air  


 


12/11/18 20:20  92 20   Room Air  


 


12/11/18 20:00 98.0 86 20 124/73 (90) 96   

















Intake and Output  


 


 12/11/18 12/12/18





 19:00 07:00


 


Intake Total 3300 ml 2080 ml


 


Output Total 4100 ml 


 


Balance -800 ml 2080 ml


 


  


 


Intake Oral 2200 ml 780 ml


 


IV Total 1100 ml 1300 ml


 


Output Urine Total 4100 ml 


 


# Voids  3








Laboratory Tests


12/12/18 08:35: 


White Blood Count 8.1, Red Blood Count 4.70, Hemoglobin 14.0L, Hematocrit 39.7L

, Mean Corpuscular Volume 84, Mean Corpuscular Hemoglobin 29.8, Mean 

Corpuscular Hemoglobin Concent 35.3, Red Cell Distribution Width 11.8, Platelet 

Count 149L, Mean Platelet Volume 8.8, Neutrophils (%) (Auto) 57.0, Lymphocytes (

%) (Auto) 33.0, Monocytes (%) (Auto) 7.5, Eosinophils (%) (Auto) 1.7, Basophils 

(%) (Auto) 0.7, Sodium Level 133L, Potassium Level 3.9, Chloride Level 101, 

Carbon Dioxide Level 23, Anion Gap 9, Blood Urea Nitrogen 14, Creatinine 0.9, 

Estimat Glomerular Filtration Rate > 60, Glucose Level 412H, Calcium Level 8.3L

, Phosphorus Level 2.9, Magnesium Level 1.5L, Total Bilirubin 0.3, Aspartate 

Amino Transf (AST/SGOT) 19, Alanine Aminotransferase (ALT/SGPT) 36, Alkaline 

Phosphatase 58, Total Protein 6.8, Albumin 3.1L, Globulin 3.7, Albumin/Globulin 

Ratio 0.8L


Height (Feet):  5


Height (Inches):  11.00


Weight (Pounds):  279











Zedner,Bill N. PA Dec 12, 2018 18:24

## 2018-12-12 NOTE — CONSULTATION
DATE OF CONSULTATION:  12/10/2018

NOTE:  POOR AUDIO



PSYCHOTHERAPY CONSULTATION PROGRESS NOTE



CONSULTING PHYSICIAN:  Mynor Mayen PsyD.



TREATING ATTENDING PHYSICIAN:  Samir Cabrera D.O.



HISTORY OF PRESENT ILLNESS:  The patient is a 38-year-old male patient from

[facility].  The patient presents to the hospital for

hyperglycemia.  He has had labile mood.  He has been disoriented _____ for

this reason, he was referred for psychotherapeutic services.  This

clinician assessed this patient.  The patient states that he has been

feeling depressed.  He states that he is depressed.  _____ in the

hospital.  The patient has been very restless, helpless, hopeless, and

anxious.  This clinician assessed this patient.  The patient denies

suicidal or homicidal thoughts of ideation.  The patient denies any

auditory or visual hallucinations.  He states he continues to remain

anxious _____ his current medical condition.



PAST MEDICAL HISTORY:  Includes a history of diabetes, hypertension, lower

back pain, COPD, CVA, and seizures.



ALLERGIES:  The patient is allergic to Haldol.



SUBSTANCE ABUSE HISTORY:  The patient denies history of alcohol use or

illicit substance use.  The patient has history of smoking cigarettes.



PSYCHIATRIC HISTORY:  The patient has a history of schizoaffective disorder

_____.



SOCIAL HISTORY:  The patient is a 38-year-old male, lives in  [facility].  Financially sustained through [facility].



MENTAL STATUS EXAMINATION:  The patient is alert and oriented to person and

place.  Mood is anxious.  Affect labile. Thought process, disorganized.

Poor attention and concentration.  The patient _____ poor insight,

judgment, and impulse control.



DIAGNOSES:

1. Schizoaffective disorder, bipolar type.

2. History of diabetes, hypertension, low back pain, depression, COPD,

CVA, and seizures.

3. Psychosocial stressors, moderate.



PLAN:  This clinician assessed this patient and assessed the patient's

mental status.  Provide the patient with _____ supportive psychotherapy

_____ cognitive behavioral therapy _____ positive coping skills _____

continue with behavioral management.  This clinician has reviewed the

patient's chart and discussed the treatment with treatment team.  _____

minutes.









  ______________________________________________

  Mynor Mayen PsyD.





DR:  Alphonso

D:  12/11/2018 15:17

T:  12/12/2018 02:07

JOB#:  [id number]

CC:

## 2018-12-12 NOTE — GENERAL PROGRESS NOTE
Assessment/Plan


Problem List:  


(1) COPD (chronic obstructive pulmonary disease)


ICD Codes:  J44.9 - Chronic obstructive pulmonary disease, unspecified


SNOMED:  54776777


(2) Uncontrolled seizures


ICD Codes:  R56.9 - Unspecified convulsions


SNOMED:  24495121


(3) Noncompliance


ICD Codes:  Z91.19 - Patient's noncompliance with other medical treatment and 

regimen


SNOMED:  2721078


Assessment/Plan


increase Levemir to 32 units daily 


increase Novolog to 12 units ac tid 


continue NISS ac / hs





Subjective


Allergies:  


Coded Allergies:  


     HALOPERIDOL (Verified  Allergy, Unknown, 10/5/18)


All Systems:  reviewed and negative except above


Subjective


events noted





Objective





Last 24 Hour Vital Signs








  Date Time  Temp Pulse Resp B/P (MAP) Pulse Ox O2 Delivery O2 Flow Rate FiO2


 


12/12/18 09:00      Room Air  


 


12/12/18 08:00 98.6 82 19 130/75 (93) 96   


 


12/12/18 04:00 98.2 79 19 131/77 (95) 96   


 


12/12/18 00:00 98.2 84 18 118/68 (85) 97   


 


12/11/18 21:00      Room Air  


 


12/11/18 20:20  92 20   Room Air  


 


12/11/18 20:00 98.0 86 20 124/73 (90) 96   


 


12/11/18 16:00 98.1 94 18 136/78 (97) 96   


 


12/11/18 12:00 98.1 90 18 129/76 (93) 96   

















Intake and Output  


 


 12/11/18 12/12/18





 19:00 07:00


 


Intake Total 3300 ml 1980 ml


 


Output Total 4100 ml 


 


Balance -800 ml 1980 ml


 


  


 


Intake Oral 2200 ml 780 ml


 


IV Total 1100 ml 1200 ml


 


Output Urine Total 4100 ml 


 


# Voids  3








Laboratory Tests


12/12/18 08:35: 


White Blood Count 8.1, Red Blood Count 4.70, Hemoglobin 14.0L, Hematocrit 39.7L

, Mean Corpuscular Volume 84, Mean Corpuscular Hemoglobin 29.8, Mean 

Corpuscular Hemoglobin Concent 35.3, Red Cell Distribution Width 11.8, Platelet 

Count 149L, Mean Platelet Volume 8.8, Neutrophils (%) (Auto) 57.0, Lymphocytes (

%) (Auto) 33.0, Monocytes (%) (Auto) 7.5, Eosinophils (%) (Auto) 1.7, Basophils 

(%) (Auto) 0.7, Sodium Level 133L, Potassium Level 3.9, Chloride Level 101, 

Carbon Dioxide Level 23, Anion Gap 9, Blood Urea Nitrogen 14, Creatinine 0.9, 

Estimat Glomerular Filtration Rate > 60, Glucose Level 412H, Calcium Level 8.3L

, Phosphorus Level 2.9, Magnesium Level 1.5L, Total Bilirubin 0.3, Aspartate 

Amino Transf (AST/SGOT) 19, Alanine Aminotransferase (ALT/SGPT) 36, Alkaline 

Phosphatase 58, Total Protein 6.8, Albumin 3.1L, Globulin 3.7, Albumin/Globulin 

Ratio 0.8L


Height (Feet):  5


Height (Inches):  11.00


Weight (Pounds):  279


General Appearance:  no apparent distress


Neck:  normal alignment


Cardiovascular:  normal rate


Respiratory/Chest:  normal breath sounds


Abdomen:  normal bowel sounds


Objective





Current Medications








 Medications


  (Trade)  Dose


 Ordered  Sig/Henry


 Route


 PRN Reason  Start Time


 Stop Time Status Last Admin


Dose Admin


 


 Acetaminophen


  (Tylenol)  650 mg  Q4H  PRN


 ORAL


 fever  12/10/18 07:00


 1/9/19 06:59   


 


 


 Acetaminophen/


 Hydrocodone Bitart


  (Norco 10/325)  1 tab  Q4H  PRN


 ORAL


 Severe Pain (Pain Scale 7-10)  12/11/18 17:15


 12/18/18 17:14   


 


 


 Albuterol/


 Ipratropium


  (Albuterol/


 Ipratropium)  3 ml  Q4H  PRN


 HHN


 Shortness of Breath  12/10/18 07:00


 12/15/18 06:59   


 


 


 Alprazolam


  (Xanax)  0.5 mg  Q6H  PRN


 ORAL


 For Anxiety  12/10/18 05:15


 12/17/18 05:14   


 


 


 Clonidine HCl


  (Catapres Tab)  0.1 mg  Q4H  PRN


 ORAL


 SBP more than 160mmHg  12/10/18 07:00


 1/9/19 06:59   


 


 


 Dextrose


  (Dextrose 50%)  25 ml  Q30M  PRN


 IV


 Hypoglycemia  12/11/18 09:45


 1/10/19 09:44   


 


 


 Dextrose


  (Dextrose 50%)  50 ml  Q30M  PRN


 IV


 Hypoglycemia  12/11/18 09:45


 1/10/19 09:44   


 


 


 Divalproex Sodium


  (Depakote)  1,000 mg  EVERY 12  HOURS


 ORAL


   12/10/18 09:00


 1/9/19 08:59  12/12/18 08:53


 


 


 Escitalopram


 Oxalate


  (Lexapro)  10 mg  DAILY


 ORAL


   12/10/18 09:00


 1/9/19 08:59  12/12/18 08:53


 


 


 Heparin Sodium


  (Porcine)


  (Heparin 5000


 units/ml)  5,000 units  EVERY 12  HOURS


 SUBQ


   12/10/18 09:00


 1/9/19 08:59   


 


 


 Insulin Aspart


  (NovoLOG)    BEFORE MEALS AND  HS


 SUBQ


   12/10/18 11:30


 1/9/19 11:29  12/12/18 06:43


 


 


 Insulin Aspart


  (NovoLOG)  8 units  NOVOTIAC


 SUBQ


   12/11/18 11:50


 1/10/19 11:49  12/12/18 06:44


 


 


 Insulin Detemir


  (Levemir)  24 units  DAILY


 SUBQ


   12/11/18 11:00


 1/10/19 10:59  12/12/18 08:54


 


 


 Morphine Sulfate


  (Morphine


 Sulfate)  1 mg  Q6H  PRN


 IVP


 breakthrough severe pain   12/11/18 17:30


 12/18/18 17:29  12/12/18 06:45


 


 


 Nitroglycerin


  (Ntg)  0.4 mg  Q5M X 3 DOSES PRN


 SL


 Prn Chest Pain  12/10/18 07:00


 1/9/19 06:59   


 


 


 Ondansetron HCl


  (Zofran)  4 mg  Q6H  PRN


 IVP


 Nausea & Vomiting  12/10/18 07:00


 1/9/19 06:59   


 


 


 Pantoprazole


  (Protonix)  40 mg  EVERY 12  HOURS


 ORAL


   12/10/18 21:00


 1/9/19 20:59  12/12/18 08:53


 


 


 Polyethylene


 Glycol


  (Miralax)  17 gm  HSPRN  PRN


 ORAL


 Constipation  12/10/18 07:00


 1/9/19 06:59   


 


 


 Quetiapine


 Fumarate


  (SEROquel)  200 mg  QHS


 ORAL


   12/10/18 21:00


 1/9/19 20:59  12/11/18 20:27


 


 


 Sodium Chloride  1,000 ml @ 


 100 mls/hr  Q10H


 IVLG


   12/10/18 07:00


 1/9/19 06:59  12/12/18 08:53


 


 


 Temazepam


  (Restoril)  15 mg  HSPRN  PRN


 ORAL


 Insomnia  12/10/18 21:00


 12/17/18 20:59   


 














Item Value  Date Time


 


Bedside Blood Glucose 298 mg/dl H 12/12/18 1130


 


Bedside Blood Glucose 381 mg/dl H 12/12/18 0854


 


Bedside Blood Glucose 253 mg/dl H 12/12/18 0644


 


Bedside Blood Glucose 308 mg/dl H 12/11/18 2100


 


Bedside Blood Glucose 344 mg/dl H 12/11/18 1710


 


Bedside Blood Glucose 299 mg/dl H 12/11/18 1206


 


Bedside Blood Glucose 353 mg/dl H 12/11/18 1039

















Dusty Denis MD Dec 12, 2018 11:49

## 2018-12-12 NOTE — PULMONOLOGY PROGRESS NOTE
Assessment/Plan


Problems:  


(1) Hyperglycemia due to type 2 diabetes mellitus


(2) COPD (chronic obstructive pulmonary disease)


(3) Peripheral neuropathy


(4) Lumbar degenerative disc disease


(5) CVA (cerebral vascular accident)


(6) Psychosomatic disorder


(7) Noncompliance


(8) Diabetes mellitus


Assessment/Plan


pain better controlled


sliding scale


diabetic diet


respiratory treatment


sliding scale


dvt prophylaxis


pt/ot





Subjective


ROS Limited/Unobtainable:  No


Constitutional:  Reports: no symptoms


HEENT:  Repors: no symptoms


Respiratory:  Reports: no symptoms


Allergies:  


Coded Allergies:  


     HALOPERIDOL (Verified  Allergy, Unknown, 10/5/18)





Objective





Last 24 Hour Vital Signs








  Date Time  Temp Pulse Resp B/P (MAP) Pulse Ox O2 Delivery O2 Flow Rate FiO2


 


12/12/18 09:00      Room Air  


 


12/12/18 08:00 98.6 82 19 130/75 (93) 96   


 


12/12/18 04:00 98.2 79 19 131/77 (95) 96   


 


12/12/18 00:00 98.2 84 18 118/68 (85) 97   


 


12/11/18 21:00      Room Air  


 


12/11/18 20:20  92 20   Room Air  


 


12/11/18 20:00 98.0 86 20 124/73 (90) 96   


 


12/11/18 16:00 98.1 94 18 136/78 (97) 96   

















Intake and Output  


 


 12/11/18 12/12/18





 19:00 07:00


 


Intake Total 3300 ml 1980 ml


 


Output Total 4100 ml 


 


Balance -800 ml 1980 ml


 


  


 


Intake Oral 2200 ml 780 ml


 


IV Total 1100 ml 1200 ml


 


Output Urine Total 4100 ml 


 


# Voids  3








Objective


General Appearance:  WD/WN


HEENT:  normocephalic


Respiratory/Chest:  chest wall non-tender, lungs clear, normal breath sounds


Cardiovascular:  normal peripheral pulses, normal rate


Abdomen:  normal bowel sounds, soft, non tender


Genitourinary:  normal external genitalia


Extremities:  no clubbing


Skin:  no rash





Microbiology








 Date/Time


Source Procedure


Growth Status


 


 


 12/10/18 04:30


Nose MRSA Culture - Final


Staphylococcus Aureus - Mrsa Complete








Laboratory Tests


12/12/18 08:35: 


White Blood Count 8.1, Red Blood Count 4.70, Hemoglobin 14.0L, Hematocrit 39.7L

, Mean Corpuscular Volume 84, Mean Corpuscular Hemoglobin 29.8, Mean 

Corpuscular Hemoglobin Concent 35.3, Red Cell Distribution Width 11.8, Platelet 

Count 149L, Mean Platelet Volume 8.8, Neutrophils (%) (Auto) 57.0, Lymphocytes (

%) (Auto) 33.0, Monocytes (%) (Auto) 7.5, Eosinophils (%) (Auto) 1.7, Basophils 

(%) (Auto) 0.7, Sodium Level 133L, Potassium Level 3.9, Chloride Level 101, 

Carbon Dioxide Level 23, Anion Gap 9, Blood Urea Nitrogen 14, Creatinine 0.9, 

Estimat Glomerular Filtration Rate > 60, Glucose Level 412H, Calcium Level 8.3L

, Phosphorus Level 2.9, Magnesium Level 1.5L, Total Bilirubin 0.3, Aspartate 

Amino Transf (AST/SGOT) 19, Alanine Aminotransferase (ALT/SGPT) 36, Alkaline 

Phosphatase 58, Total Protein 6.8, Albumin 3.1L, Globulin 3.7, Albumin/Globulin 

Ratio 0.8L





Current Medications








 Medications


  (Trade)  Dose


 Ordered  Sig/Henry


 Route


 PRN Reason  Start Time


 Stop Time Status Last Admin


Dose Admin


 


 Acetaminophen


  (Tylenol)  650 mg  Q4H  PRN


 ORAL


 fever  12/10/18 07:00


 1/9/19 06:59   


 


 


 Acetaminophen/


 Hydrocodone Bitart


  (Norco 10/325)  1 tab  Q4H  PRN


 ORAL


 Severe Pain (Pain Scale 7-10)  12/11/18 17:15


 12/18/18 17:14   


 


 


 Albuterol/


 Ipratropium


  (Albuterol/


 Ipratropium)  3 ml  Q4H  PRN


 HHN


 Shortness of Breath  12/10/18 07:00


 12/15/18 06:59   


 


 


 Alprazolam


  (Xanax)  0.5 mg  Q6H  PRN


 ORAL


 For Anxiety  12/10/18 05:15


 12/17/18 05:14   


 


 


 Clonidine HCl


  (Catapres Tab)  0.1 mg  Q4H  PRN


 ORAL


 SBP more than 160mmHg  12/10/18 07:00


 1/9/19 06:59   


 


 


 Dextrose


  (Dextrose 50%)  25 ml  Q30M  PRN


 IV


 Hypoglycemia  12/11/18 09:45


 1/10/19 09:44   


 


 


 Dextrose


  (Dextrose 50%)  50 ml  Q30M  PRN


 IV


 Hypoglycemia  12/11/18 09:45


 1/10/19 09:44   


 


 


 Divalproex Sodium


  (Depakote)  1,000 mg  EVERY 12  HOURS


 ORAL


   12/10/18 09:00


 1/9/19 08:59  12/12/18 08:53


 


 


 Escitalopram


 Oxalate


  (Lexapro)  10 mg  DAILY


 ORAL


   12/10/18 09:00


 1/9/19 08:59  12/12/18 08:53


 


 


 Heparin Sodium


  (Porcine)


  (Heparin 5000


 units/ml)  5,000 units  EVERY 12  HOURS


 SUBQ


   12/10/18 09:00


 1/9/19 08:59   


 


 


 Insulin Aspart


  (NovoLOG)    BEFORE MEALS AND  HS


 SUBQ


   12/10/18 11:30


 1/9/19 11:29  12/12/18 11:55


 


 


 Insulin Aspart


  (NovoLOG)  12 units  NOVOTIAC


 SUBQ


   12/12/18 11:50


 1/10/19 11:49  12/12/18 12:13


 


 


 Insulin Detemir


  (Levemir)  32 units  DAILY


 SUBQ


   12/13/18 09:00


 1/10/19 10:59   


 


 


 Morphine Sulfate


  (Morphine


 Sulfate)  1 mg  Q6H  PRN


 IVP


 breakthrough severe pain   12/11/18 17:30


 12/18/18 17:29  12/12/18 12:55


 


 


 Nitroglycerin


  (Ntg)  0.4 mg  Q5M X 3 DOSES PRN


 SL


 Prn Chest Pain  12/10/18 07:00


 1/9/19 06:59   


 


 


 Ondansetron HCl


  (Zofran)  4 mg  Q6H  PRN


 IVP


 Nausea & Vomiting  12/10/18 07:00


 1/9/19 06:59   


 


 


 Pantoprazole


  (Protonix)  40 mg  EVERY 12  HOURS


 ORAL


   12/10/18 21:00


 1/9/19 20:59  12/12/18 08:53


 


 


 Polyethylene


 Glycol


  (Miralax)  17 gm  HSPRN  PRN


 ORAL


 Constipation  12/10/18 07:00


 1/9/19 06:59   


 


 


 Quetiapine


 Fumarate


  (SEROquel)  200 mg  QHS


 ORAL


   12/10/18 21:00


 1/9/19 20:59  12/11/18 20:27


 


 


 Sodium Chloride  1,000 ml @ 


 100 mls/hr  Q10H


 IVLG


   12/10/18 07:00


 1/9/19 06:59  12/12/18 08:53


 


 


 Temazepam


  (Restoril)  15 mg  HSPRN  PRN


 ORAL


 Insomnia  12/10/18 21:00


 12/17/18 20:59   


 

















Lissette Steward MD Dec 12, 2018 13:11

## 2018-12-12 NOTE — NEPHROLOGY PROGRESS NOTE
Assessment/Plan


Problem List:  


(1) Hyperglycemia due to type 2 diabetes mellitus


(2) Hyponatremia


(3) COPD exacerbation


Assessment


Hyperglycemia due to type 2 diabetes mellitus


Hyponatremia due to high blood sugar


COPD (chronic obstructive pulmonary disease


Peripheral neuropathy


Lumbar degenerative disc disease


CVA (cerebral vascular accident)


Psychosomatic disorder


Noncompliance


Hydrocephalus


Plan


Hydrate-


BS control


monitor electroltes


per orders





Subjective


ROS Limited/Unobtainable:  No


Constitutional:  Reports: malaise





Objective


Objective





Last 24 Hour Vital Signs








  Date Time  Temp Pulse Resp B/P (MAP) Pulse Ox O2 Delivery O2 Flow Rate FiO2


 


12/12/18 12:00 98.6 85 20 144/88 (106) 96   


 


12/12/18 09:00      Room Air  


 


12/12/18 08:00 98.6 82 19 130/75 (93) 96   


 


12/12/18 04:00 98.2 79 19 131/77 (95) 96   


 


12/12/18 00:00 98.2 84 18 118/68 (85) 97   


 


12/11/18 21:00      Room Air  


 


12/11/18 20:20  92 20   Room Air  


 


12/11/18 20:00 98.0 86 20 124/73 (90) 96   


 


12/11/18 16:00 98.1 94 18 136/78 (97) 96   

















Intake and Output  


 


 12/11/18 12/12/18





 19:00 07:00


 


Intake Total 3300 ml 1980 ml


 


Output Total 4100 ml 


 


Balance -800 ml 1980 ml


 


  


 


Intake Oral 2200 ml 780 ml


 


IV Total 1100 ml 1200 ml


 


Output Urine Total 4100 ml 


 


# Voids  3








Laboratory Tests


12/12/18 08:35: 


White Blood Count 8.1, Red Blood Count 4.70, Hemoglobin 14.0L, Hematocrit 39.7L

, Mean Corpuscular Volume 84, Mean Corpuscular Hemoglobin 29.8, Mean 

Corpuscular Hemoglobin Concent 35.3, Red Cell Distribution Width 11.8, Platelet 

Count 149L, Mean Platelet Volume 8.8, Neutrophils (%) (Auto) 57.0, Lymphocytes (

%) (Auto) 33.0, Monocytes (%) (Auto) 7.5, Eosinophils (%) (Auto) 1.7, Basophils 

(%) (Auto) 0.7, Sodium Level 133L, Potassium Level 3.9, Chloride Level 101, 

Carbon Dioxide Level 23, Anion Gap 9, Blood Urea Nitrogen 14, Creatinine 0.9, 

Estimat Glomerular Filtration Rate > 60, Glucose Level 412H, Calcium Level 8.3L

, Phosphorus Level 2.9, Magnesium Level 1.5L, Total Bilirubin 0.3, Aspartate 

Amino Transf (AST/SGOT) 19, Alanine Aminotransferase (ALT/SGPT) 36, Alkaline 

Phosphatase 58, Total Protein 6.8, Albumin 3.1L, Globulin 3.7, Albumin/Globulin 

Ratio 0.8L


Height (Feet):  5


Height (Inches):  11.00


Weight (Pounds):  279


General Appearance:  no apparent distress


Objective


no change











Regsi Tuttle MD Dec 12, 2018 14:00

## 2018-12-13 VITALS — SYSTOLIC BLOOD PRESSURE: 129 MMHG | DIASTOLIC BLOOD PRESSURE: 72 MMHG

## 2018-12-13 VITALS — DIASTOLIC BLOOD PRESSURE: 70 MMHG | SYSTOLIC BLOOD PRESSURE: 124 MMHG

## 2018-12-13 VITALS — DIASTOLIC BLOOD PRESSURE: 76 MMHG | SYSTOLIC BLOOD PRESSURE: 125 MMHG

## 2018-12-13 VITALS — DIASTOLIC BLOOD PRESSURE: 75 MMHG | SYSTOLIC BLOOD PRESSURE: 120 MMHG

## 2018-12-13 VITALS — SYSTOLIC BLOOD PRESSURE: 129 MMHG | DIASTOLIC BLOOD PRESSURE: 77 MMHG

## 2018-12-13 VITALS — DIASTOLIC BLOOD PRESSURE: 76 MMHG | SYSTOLIC BLOOD PRESSURE: 119 MMHG

## 2018-12-13 LAB
ADD MANUAL DIFF: NO
ANION GAP SERPL CALC-SCNC: 7 MMOL/L (ref 5–15)
BASOPHILS NFR BLD AUTO: 0.7 % (ref 0–2)
BUN SERPL-MCNC: 14 MG/DL (ref 7–18)
CALCIUM SERPL-MCNC: 8.4 MG/DL (ref 8.5–10.1)
CHLORIDE SERPL-SCNC: 98 MMOL/L (ref 98–107)
CO2 SERPL-SCNC: 28 MMOL/L (ref 21–32)
CREAT SERPL-MCNC: 0.9 MG/DL (ref 0.55–1.3)
EOSINOPHIL NFR BLD AUTO: 2.2 % (ref 0–3)
ERYTHROCYTE [DISTWIDTH] IN BLOOD BY AUTOMATED COUNT: 11.6 % (ref 11.6–14.8)
HCT VFR BLD CALC: 39.3 % (ref 42–52)
HGB BLD-MCNC: 13.9 G/DL (ref 14.2–18)
LYMPHOCYTES NFR BLD AUTO: 37 % (ref 20–45)
MCV RBC AUTO: 84 FL (ref 80–99)
MONOCYTES NFR BLD AUTO: 6.6 % (ref 1–10)
NEUTROPHILS NFR BLD AUTO: 53.5 % (ref 45–75)
PLATELET # BLD: 139 K/UL (ref 150–450)
POTASSIUM SERPL-SCNC: 4 MMOL/L (ref 3.5–5.1)
RBC # BLD AUTO: 4.69 M/UL (ref 4.7–6.1)
SODIUM SERPL-SCNC: 132 MMOL/L (ref 136–145)
WBC # BLD AUTO: 7 K/UL (ref 4.8–10.8)

## 2018-12-13 RX ADMIN — INSULIN ASPART SCH UNITS: 100 INJECTION, SOLUTION INTRAVENOUS; SUBCUTANEOUS at 11:54

## 2018-12-13 RX ADMIN — DIVALPROEX SODIUM SCH MG: 500 TABLET, DELAYED RELEASE ORAL at 21:32

## 2018-12-13 RX ADMIN — HEPARIN SODIUM SCH UNITS: 5000 INJECTION INTRAVENOUS; SUBCUTANEOUS at 09:00

## 2018-12-13 RX ADMIN — INSULIN DETEMIR SCH UNITS: 100 INJECTION, SOLUTION SUBCUTANEOUS at 09:13

## 2018-12-13 RX ADMIN — HYDROCODONE BITARTRATE AND ACETAMINOPHEN PRN TAB: 10; 325 TABLET ORAL at 09:28

## 2018-12-13 RX ADMIN — INSULIN ASPART SCH UNITS: 100 INJECTION, SOLUTION INTRAVENOUS; SUBCUTANEOUS at 16:45

## 2018-12-13 RX ADMIN — INSULIN ASPART SCH UNITS: 100 INJECTION, SOLUTION INTRAVENOUS; SUBCUTANEOUS at 16:44

## 2018-12-13 RX ADMIN — QUETIAPINE SCH MG: 200 TABLET, FILM COATED ORAL at 21:31

## 2018-12-13 RX ADMIN — INSULIN ASPART SCH UNITS: 100 INJECTION, SOLUTION INTRAVENOUS; SUBCUTANEOUS at 06:40

## 2018-12-13 RX ADMIN — DIVALPROEX SODIUM SCH MG: 500 TABLET, DELAYED RELEASE ORAL at 09:10

## 2018-12-13 RX ADMIN — INSULIN ASPART SCH UNITS: 100 INJECTION, SOLUTION INTRAVENOUS; SUBCUTANEOUS at 21:36

## 2018-12-13 RX ADMIN — HEPARIN SODIUM SCH UNITS: 5000 INJECTION INTRAVENOUS; SUBCUTANEOUS at 21:00

## 2018-12-13 RX ADMIN — MORPHINE SULFATE PRN MG: 2 INJECTION, SOLUTION INTRAMUSCULAR; INTRAVENOUS at 19:21

## 2018-12-13 RX ADMIN — INSULIN ASPART SCH UNITS: 100 INJECTION, SOLUTION INTRAVENOUS; SUBCUTANEOUS at 11:55

## 2018-12-13 RX ADMIN — HYDROCODONE BITARTRATE AND ACETAMINOPHEN PRN TAB: 10; 325 TABLET ORAL at 17:19

## 2018-12-13 RX ADMIN — MORPHINE SULFATE PRN MG: 2 INJECTION, SOLUTION INTRAMUSCULAR; INTRAVENOUS at 00:42

## 2018-12-13 RX ADMIN — MORPHINE SULFATE PRN MG: 2 INJECTION, SOLUTION INTRAMUSCULAR; INTRAVENOUS at 06:41

## 2018-12-13 RX ADMIN — MORPHINE SULFATE PRN MG: 2 INJECTION, SOLUTION INTRAMUSCULAR; INTRAVENOUS at 13:07

## 2018-12-13 NOTE — GENERAL PROGRESS NOTE
Assessment/Plan


Assessment/Plan


(1) Chiari malformation type II


(2) Degenerative disc disease, cervical


(3) Lumbar degenerative disc disease


(4) Arthritis, lumbar spine


(5) Peripheral neuropathy


(6) Hydrocephalus


(7) Diabetes mellitus


Pt will be continued on the Morphine and Stanford.


Pt was d/w Dr. Franco and he concurred.





Subjective


Date patient seen:  Dec 13, 2018


Time patient seen:  07:00 - am


Allergies:  


Coded Allergies:  


     HALOPERIDOL (Verified  Allergy, Unknown, 10/5/18)


Subjective


Constitutional:  Reports: weakness, Denies: chills, diaphoresis, fever, malaise

, no symptoms, other


HEENT:  Denies: blurred vision, double vision, ear discharge, ear pain, eye pain

, mouth pain, mouth swelling, no symptoms, nose congestion, nose pain, other, 

tearing, throat pain, throat swelling


Cardiovascular:  Denies: chest pain, edema, irregular heart rate, 

lightheadedness, no symptoms, other, palpitations, syncope


Respiratory:  Denies: SOB at rest, SOB with excertion, cough, no symptoms, 

orthopnea, other, shortness of breath, sputum, stridor, wheezing


Gastrointestinal/Abdominal:  Denies: abdomen distended, abdominal pain, black 

stools, blood in stool, constipated, diarrhea, difficulty swallowing, nausea, 

no symptoms, other, poor appetite, poor fluid intake, rectal bleeding, tarry 

stools, vomiting


Genitourinary:  Denies: burning, discharge, flank pain, frequency, hematuria, 

incontinence, no symptoms, other, pain, urgency


Neurologic/Psychiatric:  Reports: headache, numbness, tingling, weakness, Denies

: anxiety, depressed, emotional problems, no symptoms, other, paresthesia, pre-

existing deficit, seizure, tremors


Endocrine:  Denies: excessive sweating, flushing, increased hunger, increased 

thirst, increased urine, intolerance to cold, intolerance to heat, no symptoms, 

other, unexplained weight gain, unexplained weight loss


Hematologic/Lymphatic:  Denies: anemia, easy bleeding, easy bruising, no 

symptoms, other


 


Subjective


Patient is in bed doing well tolerating the pain on the Norco and Morphine. Has 

no new complaints at this time.





Objective





Last 24 Hour Vital Signs








  Date Time  Temp Pulse Resp B/P (MAP) Pulse Ox O2 Delivery O2 Flow Rate FiO2


 


12/13/18 08:00 97.8 90 20 125/76 (92) 95   


 


12/13/18 04:00 98.0 81 18 120/75 (90) 96   


 


12/13/18 00:00 98.3 75 19 129/72 (91) 96   


 


12/12/18 21:00      Room Air  


 


12/12/18 20:15  72 20   Room Air  21


 


12/12/18 20:00 98.9 80 19 135/79 (97) 98   


 


12/12/18 16:00 98.9 77 22 131/77 (95) 96   


 


12/12/18 12:00 98.6 85 20 144/88 (106) 96   


 


12/12/18 09:41  88 20   Room Air  


 


12/12/18 09:00      Room Air  

















Intake and Output  


 


 12/12/18 12/13/18





 19:00 07:00


 


Intake Total 2480 ml 1600 ml


 


Balance 2480 ml 1600 ml


 


  


 


Intake Oral 1480 ml 600 ml


 


IV Total 1000 ml 1000 ml


 


# Voids 7 5








Height (Feet):  5


Height (Inches):  11.00


Weight (Pounds):  279











Bill Sanz Dec 13, 2018 08:53

## 2018-12-13 NOTE — GENERAL PROGRESS NOTE
Assessment/Plan


Problem List:  


(1) COPD (chronic obstructive pulmonary disease)


ICD Codes:  J44.9 - Chronic obstructive pulmonary disease, unspecified


SNOMED:  93205680


(2) Uncontrolled seizures


ICD Codes:  R56.9 - Unspecified convulsions


SNOMED:  69412021


(3) Noncompliance


ICD Codes:  Z91.19 - Patient's noncompliance with other medical treatment and 

regimen


SNOMED:  0740228


Assessment/Plan


increase Levemir to 40 units daily 


increase Novolog to 15 units ac tid 


continue NISS ac / hs





Subjective


Allergies:  


Coded Allergies:  


     HALOPERIDOL (Verified  Allergy, Unknown, 10/5/18)


All Systems:  reviewed and negative except above


Subjective


events noted - glucose values still elevated





Objective





Last 24 Hour Vital Signs








  Date Time  Temp Pulse Resp B/P (MAP) Pulse Ox O2 Delivery O2 Flow Rate FiO2


 


12/13/18 04:00 98.0 81 18 120/75 (90) 96   


 


12/13/18 00:00 98.3 75 19 129/72 (91) 96   


 


12/12/18 21:00      Room Air  


 


12/12/18 20:15  72 20   Room Air  21


 


12/12/18 20:00 98.9 80 19 135/79 (97) 98   


 


12/12/18 16:00 98.9 77 22 131/77 (95) 96   


 


12/12/18 12:00 98.6 85 20 144/88 (106) 96   


 


12/12/18 09:41  88 20   Room Air  


 


12/12/18 09:00      Room Air  


 


12/12/18 08:00 98.6 82 19 130/75 (93) 96   

















Intake and Output  


 


 12/12/18 12/13/18





 18:59 06:59


 


Intake Total 2580 ml 1600 ml


 


Balance 2580 ml 1600 ml


 


  


 


Intake Oral 1480 ml 600 ml


 


IV Total 1100 ml 1000 ml


 


# Voids 7 5








Laboratory Tests


12/12/18 08:35: 


White Blood Count 8.1, Red Blood Count 4.70, Hemoglobin 14.0L, Hematocrit 39.7L

, Mean Corpuscular Volume 84, Mean Corpuscular Hemoglobin 29.8, Mean 

Corpuscular Hemoglobin Concent 35.3, Red Cell Distribution Width 11.8, Platelet 

Count 149L, Mean Platelet Volume 8.8, Neutrophils (%) (Auto) 57.0, Lymphocytes (

%) (Auto) 33.0, Monocytes (%) (Auto) 7.5, Eosinophils (%) (Auto) 1.7, Basophils 

(%) (Auto) 0.7, Sodium Level 133L, Potassium Level 3.9, Chloride Level 101, 

Carbon Dioxide Level 23, Anion Gap 9, Blood Urea Nitrogen 14, Creatinine 0.9, 

Estimat Glomerular Filtration Rate > 60, Glucose Level 412H, Calcium Level 8.3L

, Phosphorus Level 2.9, Magnesium Level 1.5L, Total Bilirubin 0.3, Aspartate 

Amino Transf (AST/SGOT) 19, Alanine Aminotransferase (ALT/SGPT) 36, Alkaline 

Phosphatase 58, Total Protein 6.8, Albumin 3.1L, Globulin 3.7, Albumin/Globulin 

Ratio 0.8L


Height (Feet):  5


Height (Inches):  11.00


Weight (Pounds):  279


General Appearance:  no apparent distress


Neck:  normal alignment


Cardiovascular:  normal rate


Respiratory/Chest:  lungs clear


Abdomen:  normal bowel sounds


Objective





Current Medications








 Medications


  (Trade)  Dose


 Ordered  Sig/Henry


 Route


 PRN Reason  Start Time


 Stop Time Status Last Admin


Dose Admin


 


 Acetaminophen


  (Tylenol)  650 mg  Q4H  PRN


 ORAL


 fever  12/10/18 07:00


 1/9/19 06:59   


 


 


 Acetaminophen/


 Hydrocodone Bitart


  (Norco 10/325)  1 tab  Q4H  PRN


 ORAL


 Severe Pain (Pain Scale 7-10)  12/11/18 17:15


 12/18/18 17:14  12/12/18 15:50


 


 


 Albuterol/


 Ipratropium


  (Albuterol/


 Ipratropium)  3 ml  Q4H  PRN


 HHN


 Shortness of Breath  12/10/18 07:00


 12/15/18 06:59   


 


 


 Alprazolam


  (Xanax)  0.5 mg  Q6H  PRN


 ORAL


 For Anxiety  12/10/18 05:15


 12/17/18 05:14   


 


 


 Clonidine HCl


  (Catapres Tab)  0.1 mg  Q4H  PRN


 ORAL


 SBP more than 160mmHg  12/10/18 07:00


 1/9/19 06:59   


 


 


 Dextrose


  (Dextrose 50%)  25 ml  Q30M  PRN


 IV


 Hypoglycemia  12/11/18 09:45


 1/10/19 09:44   


 


 


 Dextrose


  (Dextrose 50%)  50 ml  Q30M  PRN


 IV


 Hypoglycemia  12/11/18 09:45


 1/10/19 09:44   


 


 


 Divalproex Sodium


  (Depakote)  1,000 mg  EVERY 12  HOURS


 ORAL


   12/10/18 09:00


 1/9/19 08:59  12/12/18 21:05


 


 


 Escitalopram


 Oxalate


  (Lexapro)  10 mg  DAILY


 ORAL


   12/10/18 09:00


 1/9/19 08:59  12/12/18 08:53


 


 


 Heparin Sodium


  (Porcine)


  (Heparin 5000


 units/ml)  5,000 units  EVERY 12  HOURS


 SUBQ


   12/10/18 09:00


 1/9/19 08:59   


 


 


 Insulin Aspart


  (NovoLOG)    BEFORE MEALS AND  HS


 SUBQ


   12/10/18 11:30


 1/9/19 11:29  12/13/18 06:40


 


 


 Insulin Aspart


  (NovoLOG)  12 units  NOVOTIAC


 SUBQ


   12/12/18 11:50


 1/10/19 11:49  12/13/18 06:40


 


 


 Insulin Detemir


  (Levemir)  32 units  DAILY


 SUBQ


   12/13/18 09:00


 1/10/19 10:59   


 


 


 Morphine Sulfate


  (Morphine


 Sulfate)  1 mg  Q6H  PRN


 IVP


 breakthrough severe pain   12/11/18 17:30


 12/18/18 17:29  12/13/18 06:41


 


 


 Nitroglycerin


  (Ntg)  0.4 mg  Q5M X 3 DOSES PRN


 SL


 Prn Chest Pain  12/10/18 07:00


 1/9/19 06:59   


 


 


 Ondansetron HCl


  (Zofran)  4 mg  Q6H  PRN


 IVP


 Nausea & Vomiting  12/10/18 07:00


 1/9/19 06:59   


 


 


 Pantoprazole


  (Protonix)  40 mg  EVERY 12  HOURS


 ORAL


   12/10/18 21:00


 1/9/19 20:59  12/12/18 21:04


 


 


 Polyethylene


 Glycol


  (Miralax)  17 gm  HSPRN  PRN


 ORAL


 Constipation  12/10/18 07:00


 1/9/19 06:59   


 


 


 Quetiapine


 Fumarate


  (SEROquel)  200 mg  QHS


 ORAL


   12/10/18 21:00


 1/9/19 20:59  12/12/18 21:04


 


 


 Sodium Chloride  1,000 ml @ 


 100 mls/hr  Q10H


 IVLG


   12/10/18 07:00


 1/9/19 06:59  12/12/18 18:53


 


 


 Temazepam


  (Restoril)  15 mg  HSPRN  PRN


 ORAL


 Insomnia  12/10/18 21:00


 12/17/18 20:59   


 














Item Value  Date Time


 


Bedside Blood Glucose 261 mg/dl H 12/13/18 0640


 


Bedside Blood Glucose 326 mg/dl H 12/12/18 2110


 


Bedside Blood Glucose 229 mg/dl H 12/12/18 1714


 


Bedside Blood Glucose 298 mg/dl H 12/12/18 1213


 


Bedside Blood Glucose 381 mg/dl H 12/12/18 0854


 


Bedside Blood Glucose 253 mg/dl H 12/12/18 0644

















Dusty Denis MD Dec 13, 2018 07:11

## 2018-12-13 NOTE — GENERAL PROGRESS NOTE
Assessment/Plan


Problem List:  


(1) Renal insufficiency


ICD Codes:  N28.9 - Disorder of kidney and ureter, unspecified


SNOMED:  195443266, 093356072


(2) Depressed


ICD Codes:  F32.9 - Major depressive disorder, single episode, unspecified


SNOMED:  89129219


(3) Seizure


ICD Codes:  R56.9 - Unspecified convulsions


SNOMED:  99188210


(4) Hyperglycemia due to type 2 diabetes mellitus


ICD Codes:  E11.65 - Type 2 diabetes mellitus with hyperglycemia


SNOMED:  119193933962090


(5) Diabetes mellitus


ICD Codes:  E11.9 - Diabetes mellitus


SNOMED:  65491159


(6) COPD (chronic obstructive pulmonary disease)


ICD Codes:  J44.9 - Chronic obstructive pulmonary disease, unspecified


SNOMED:  94259563


(7) CVA (cerebral vascular accident)


ICD Codes:  I63.9 - Cerebral infarction, unspecified


SNOMED:  207548669


Status:  stable, progressing


Assessment/Plan


ot pt diet bs bp pain control cbc bmp am dc plan if clear





Subjective


Constitutional:  Reports: weakness


Allergies:  


Coded Allergies:  


     HALOPERIDOL (Verified  Allergy, Unknown, 10/5/18)


All Systems:  reviewed and negative except above


Subjective


sl back pain





Objective





Last 24 Hour Vital Signs








  Date Time  Temp Pulse Resp B/P (MAP) Pulse Ox O2 Delivery O2 Flow Rate FiO2


 


12/13/18 13:25  78 20   Room Air  21


 


12/13/18 12:00 98.9 89 20 119/76 (90) 94   


 


12/13/18 09:58 97.8       


 


12/13/18 08:00 97.8 90 20 125/76 (92) 95   


 


12/13/18 04:00 98.0 81 18 120/75 (90) 96   


 


12/13/18 00:00 98.3 75 19 129/72 (91) 96   


 


12/12/18 21:00      Room Air  


 


12/12/18 20:15  72 20   Room Air  21


 


12/12/18 20:00 98.9 80 19 135/79 (97) 98   


 


12/12/18 16:00 98.9 77 22 131/77 (95) 96   

















Intake and Output  


 


 12/12/18 12/13/18





 19:00 07:00


 


Intake Total 2480 ml 1600 ml


 


Balance 2480 ml 1600 ml


 


  


 


Intake Oral 1480 ml 600 ml


 


IV Total 1000 ml 1000 ml


 


# Voids 7 5








Laboratory Tests


12/13/18 09:35: 


White Blood Count 7.0, Red Blood Count 4.69L, Hemoglobin 13.9L, Hematocrit 39.3L

, Mean Corpuscular Volume 84, Mean Corpuscular Hemoglobin 29.7, Mean 

Corpuscular Hemoglobin Concent 35.5, Red Cell Distribution Width 11.6, Platelet 

Count 139L, Mean Platelet Volume 8.6, Neutrophils (%) (Auto) 53.5, Lymphocytes (

%) (Auto) 37.0, Monocytes (%) (Auto) 6.6, Eosinophils (%) (Auto) 2.2, Basophils 

(%) (Auto) 0.7, Sodium Level 132L, Potassium Level 4.0, Chloride Level 98, 

Carbon Dioxide Level 28, Anion Gap 7, Blood Urea Nitrogen 14, Creatinine 0.9, 

Estimat Glomerular Filtration Rate > 60, Glucose Level 403H, Calcium Level 8.4L


Height (Feet):  5


Height (Inches):  11.00


Weight (Pounds):  279


General Appearance:  alert


EENT:  normal ENT inspection


Neck:  normal alignment


Cardiovascular:  normal peripheral pulses, normal rate, regular rhythm


Respiratory/Chest:  chest wall non-tender, lungs clear, normal breath sounds


Abdomen:  normal bowel sounds, non tender, soft


Extremities:  normal inspection


Edema:  no edema noted Arm (L), no edema noted Arm (R), no edema noted Leg (L), 

no edema noted Leg (R), no edema noted Pedal (L), no edema noted Pedal (R), no 

edema noted Generalized


Neurologic:  responsive, motor weakness


Skin:  normal pigmentation, warm/dry











Samir Cabrera DO Dec 13, 2018 13:29

## 2018-12-13 NOTE — PULMONOLOGY PROGRESS NOTE
Assessment/Plan


Problems:  


(1) Hyperglycemia due to type 2 diabetes mellitus


(2) COPD (chronic obstructive pulmonary disease)


(3) Peripheral neuropathy


(4) Lumbar degenerative disc disease


(5) CVA (cerebral vascular accident)


(6) Psychosomatic disorder


(7) Noncompliance


(8) Diabetes mellitus


Assessment/Plan


Bs was 400 this mroning


pain better controlled


sliding scale


diabetic diet


respiratory treatment


sliding scale


dvt prophylaxis


pt/ot





Subjective


ROS Limited/Unobtainable:  No


Constitutional:  Reports: no symptoms


HEENT:  Repors: no symptoms


Respiratory:  Reports: no symptoms, other


Allergies:  


Coded Allergies:  


     HALOPERIDOL (Verified  Allergy, Unknown, 10/5/18)





Objective





Last 24 Hour Vital Signs








  Date Time  Temp Pulse Resp B/P (MAP) Pulse Ox O2 Delivery O2 Flow Rate FiO2


 


12/13/18 12:00 98.9 89 20 119/76 (90) 94   


 


12/13/18 09:58 97.8       


 


12/13/18 08:00 97.8 90 20 125/76 (92) 95   


 


12/13/18 04:00 98.0 81 18 120/75 (90) 96   


 


12/13/18 00:00 98.3 75 19 129/72 (91) 96   


 


12/12/18 21:00      Room Air  


 


12/12/18 20:15  72 20   Room Air  21


 


12/12/18 20:00 98.9 80 19 135/79 (97) 98   


 


12/12/18 16:00 98.9 77 22 131/77 (95) 96   

















Intake and Output  


 


 12/12/18 12/13/18





 19:00 07:00


 


Intake Total 2480 ml 1600 ml


 


Balance 2480 ml 1600 ml


 


  


 


Intake Oral 1480 ml 600 ml


 


IV Total 1000 ml 1000 ml


 


# Voids 7 5








Objective


General Appearance:  WD/WN


HEENT:  normocephalic


Respiratory/Chest:  chest wall non-tender, lungs clear, normal breath sounds


Cardiovascular:  normal peripheral pulses, normal rate


Abdomen:  normal bowel sounds, soft, non tender


Genitourinary:  normal external genitalia


Extremities:  no clubbing


Skin:  no rash


Laboratory Tests


12/13/18 09:35: 


White Blood Count 7.0, Red Blood Count 4.69L, Hemoglobin 13.9L, Hematocrit 39.3L

, Mean Corpuscular Volume 84, Mean Corpuscular Hemoglobin 29.7, Mean 

Corpuscular Hemoglobin Concent 35.5, Red Cell Distribution Width 11.6, Platelet 

Count 139L, Mean Platelet Volume 8.6, Neutrophils (%) (Auto) 53.5, Lymphocytes (

%) (Auto) 37.0, Monocytes (%) (Auto) 6.6, Eosinophils (%) (Auto) 2.2, Basophils 

(%) (Auto) 0.7, Sodium Level 132L, Potassium Level 4.0, Chloride Level 98, 

Carbon Dioxide Level 28, Anion Gap 7, Blood Urea Nitrogen 14, Creatinine 0.9, 

Estimat Glomerular Filtration Rate > 60, Glucose Level 403H, Calcium Level 8.4L





Current Medications








 Medications


  (Trade)  Dose


 Ordered  Sig/Henry


 Route


 PRN Reason  Start Time


 Stop Time Status Last Admin


Dose Admin


 


 Acetaminophen


  (Tylenol)  650 mg  Q4H  PRN


 ORAL


 fever  12/10/18 07:00


 1/9/19 06:59   


 


 


 Acetaminophen/


 Hydrocodone Bitart


  (Norco 10/325)  1 tab  Q4H  PRN


 ORAL


 Severe Pain (Pain Scale 7-10)  12/11/18 17:15


 12/18/18 17:14  12/13/18 09:28


 


 


 Albuterol/


 Ipratropium


  (Albuterol/


 Ipratropium)  3 ml  Q4H  PRN


 HHN


 Shortness of Breath  12/10/18 07:00


 12/15/18 06:59   


 


 


 Alprazolam


  (Xanax)  0.5 mg  Q6H  PRN


 ORAL


 For Anxiety  12/10/18 05:15


 12/17/18 05:14   


 


 


 Clonidine HCl


  (Catapres Tab)  0.1 mg  Q4H  PRN


 ORAL


 SBP more than 160mmHg  12/10/18 07:00


 1/9/19 06:59   


 


 


 Dextrose


  (Dextrose 50%)  25 ml  Q30M  PRN


 IV


 Hypoglycemia  12/11/18 09:45


 1/10/19 09:44   


 


 


 Dextrose


  (Dextrose 50%)  50 ml  Q30M  PRN


 IV


 Hypoglycemia  12/11/18 09:45


 1/10/19 09:44   


 


 


 Divalproex Sodium


  (Depakote)  1,000 mg  EVERY 12  HOURS


 ORAL


   12/10/18 09:00


 1/9/19 08:59  12/13/18 09:10


 


 


 Escitalopram


 Oxalate


  (Lexapro)  10 mg  DAILY


 ORAL


   12/10/18 09:00


 1/9/19 08:59  12/13/18 09:10


 


 


 Heparin Sodium


  (Porcine)


  (Heparin 5000


 units/ml)  5,000 units  EVERY 12  HOURS


 SUBQ


   12/10/18 09:00


 1/9/19 08:59   


 


 


 Insulin Aspart


  (NovoLOG)    BEFORE MEALS AND  HS


 SUBQ


   12/10/18 11:30


 1/9/19 11:29  12/13/18 11:55


 


 


 Insulin Aspart


  (NovoLOG)  15 units  NOVOTIAC


 SUBQ


   12/13/18 11:50


 1/10/19 11:49  12/13/18 11:54


 


 


 Insulin Detemir


  (Levemir)  40 units  DAILY


 SUBQ


   12/13/18 09:00


 1/10/19 10:59  12/13/18 09:13


 


 


 Morphine Sulfate


  (Morphine


 Sulfate)  1 mg  Q6H  PRN


 IVP


 breakthrough severe pain   12/11/18 17:30


 12/18/18 17:29  12/13/18 06:41


 


 


 Nitroglycerin


  (Ntg)  0.4 mg  Q5M X 3 DOSES PRN


 SL


 Prn Chest Pain  12/10/18 07:00


 1/9/19 06:59   


 


 


 Ondansetron HCl


  (Zofran)  4 mg  Q6H  PRN


 IVP


 Nausea & Vomiting  12/10/18 07:00


 1/9/19 06:59  12/13/18 11:19


 


 


 Pantoprazole


  (Protonix)  40 mg  EVERY 12  HOURS


 ORAL


   12/10/18 21:00


 1/9/19 20:59  12/13/18 09:11


 


 


 Polyethylene


 Glycol


  (Miralax)  17 gm  HSPRN  PRN


 ORAL


 Constipation  12/10/18 07:00


 1/9/19 06:59   


 


 


 Quetiapine


 Fumarate


  (SEROquel)  200 mg  QHS


 ORAL


   12/10/18 21:00


 1/9/19 20:59  12/12/18 21:04


 


 


 Sodium Chloride  1,000 ml @ 


 100 mls/hr  Q10H


 IVLG


   12/10/18 07:00


 1/9/19 06:59  12/12/18 18:53


 


 


 Temazepam


  (Restoril)  15 mg  HSPRN  PRN


 ORAL


 Insomnia  12/10/18 21:00


 12/17/18 20:59   


 

















Lissette Steward MD Dec 13, 2018 13:02

## 2018-12-13 NOTE — NEPHROLOGY PROGRESS NOTE
Assessment/Plan


Problem List:  


(1) Hyperglycemia due to type 2 diabetes mellitus


(2) Hyponatremia


(3) COPD exacerbation


Assessment


Hyperglycemia due to type 2 diabetes mellitus


Hyponatremia due to high blood sugar


COPD (chronic obstructive pulmonary disease


Peripheral neuropathy


Lumbar degenerative disc disease


CVA (cerebral vascular accident)


Psychosomatic disorder


Noncompliance


Hydrocephalus


Plan


Hydrate-


BS control


monitor electroltes


per orders





Subjective


ROS Limited/Unobtainable:  No





Objective


Objective





Last 24 Hour Vital Signs








  Date Time  Temp Pulse Resp B/P (MAP) Pulse Ox O2 Delivery O2 Flow Rate FiO2


 


12/13/18 12:00 98.9 89 20 119/76 (90) 94   


 


12/13/18 09:58 97.8       


 


12/13/18 08:00 97.8 90 20 125/76 (92) 95   


 


12/13/18 04:00 98.0 81 18 120/75 (90) 96   


 


12/13/18 00:00 98.3 75 19 129/72 (91) 96   


 


12/12/18 21:00      Room Air  


 


12/12/18 20:15  72 20   Room Air  21


 


12/12/18 20:00 98.9 80 19 135/79 (97) 98   


 


12/12/18 16:00 98.9 77 22 131/77 (95) 96   

















Intake and Output  


 


 12/12/18 12/13/18





 19:00 07:00


 


Intake Total 2480 ml 1600 ml


 


Balance 2480 ml 1600 ml


 


  


 


Intake Oral 1480 ml 600 ml


 


IV Total 1000 ml 1000 ml


 


# Voids 7 5








Laboratory Tests


12/13/18 09:35: 


White Blood Count 7.0, Red Blood Count 4.69L, Hemoglobin 13.9L, Hematocrit 39.3L

, Mean Corpuscular Volume 84, Mean Corpuscular Hemoglobin 29.7, Mean 

Corpuscular Hemoglobin Concent 35.5, Red Cell Distribution Width 11.6, Platelet 

Count 139L, Mean Platelet Volume 8.6, Neutrophils (%) (Auto) 53.5, Lymphocytes (

%) (Auto) 37.0, Monocytes (%) (Auto) 6.6, Eosinophils (%) (Auto) 2.2, Basophils 

(%) (Auto) 0.7, Sodium Level 132L, Potassium Level 4.0, Chloride Level 98, 

Carbon Dioxide Level 28, Anion Gap 7, Blood Urea Nitrogen 14, Creatinine 0.9, 

Estimat Glomerular Filtration Rate > 60, Glucose Level 403H, Calcium Level 8.4L


Height (Feet):  5


Height (Inches):  11.00


Weight (Pounds):  279


General Appearance:  no apparent distress


Objective


no change











Regis Tuttle MD Dec 13, 2018 13:13

## 2018-12-14 VITALS — SYSTOLIC BLOOD PRESSURE: 122 MMHG | DIASTOLIC BLOOD PRESSURE: 71 MMHG

## 2018-12-14 VITALS — DIASTOLIC BLOOD PRESSURE: 72 MMHG | SYSTOLIC BLOOD PRESSURE: 118 MMHG

## 2018-12-14 VITALS — DIASTOLIC BLOOD PRESSURE: 72 MMHG | SYSTOLIC BLOOD PRESSURE: 142 MMHG

## 2018-12-14 VITALS — SYSTOLIC BLOOD PRESSURE: 122 MMHG | DIASTOLIC BLOOD PRESSURE: 75 MMHG

## 2018-12-14 LAB
ADD MANUAL DIFF: NO
ANION GAP SERPL CALC-SCNC: 9 MMOL/L (ref 5–15)
BASOPHILS NFR BLD AUTO: 1 % (ref 0–2)
BUN SERPL-MCNC: 14 MG/DL (ref 7–18)
CALCIUM SERPL-MCNC: 8.5 MG/DL (ref 8.5–10.1)
CHLORIDE SERPL-SCNC: 103 MMOL/L (ref 98–107)
CO2 SERPL-SCNC: 26 MMOL/L (ref 21–32)
CREAT SERPL-MCNC: 0.9 MG/DL (ref 0.55–1.3)
EOSINOPHIL NFR BLD AUTO: 2 % (ref 0–3)
ERYTHROCYTE [DISTWIDTH] IN BLOOD BY AUTOMATED COUNT: 11.8 % (ref 11.6–14.8)
HCT VFR BLD CALC: 39.3 % (ref 42–52)
HGB BLD-MCNC: 14.1 G/DL (ref 14.2–18)
LYMPHOCYTES NFR BLD AUTO: 31.2 % (ref 20–45)
MCV RBC AUTO: 84 FL (ref 80–99)
MONOCYTES NFR BLD AUTO: 9.7 % (ref 1–10)
NEUTROPHILS NFR BLD AUTO: 56.1 % (ref 45–75)
PLATELET # BLD: 152 K/UL (ref 150–450)
POTASSIUM SERPL-SCNC: 3.8 MMOL/L (ref 3.5–5.1)
RBC # BLD AUTO: 4.66 M/UL (ref 4.7–6.1)
SODIUM SERPL-SCNC: 137 MMOL/L (ref 136–145)
WBC # BLD AUTO: 8.2 K/UL (ref 4.8–10.8)

## 2018-12-14 RX ADMIN — INSULIN ASPART SCH UNITS: 100 INJECTION, SOLUTION INTRAVENOUS; SUBCUTANEOUS at 06:28

## 2018-12-14 RX ADMIN — INSULIN ASPART SCH UNITS: 100 INJECTION, SOLUTION INTRAVENOUS; SUBCUTANEOUS at 11:45

## 2018-12-14 RX ADMIN — HYDROCODONE BITARTRATE AND ACETAMINOPHEN PRN TAB: 10; 325 TABLET ORAL at 14:14

## 2018-12-14 RX ADMIN — MORPHINE SULFATE PRN MG: 2 INJECTION, SOLUTION INTRAMUSCULAR; INTRAVENOUS at 09:27

## 2018-12-14 RX ADMIN — MORPHINE SULFATE PRN MG: 2 INJECTION, SOLUTION INTRAMUSCULAR; INTRAVENOUS at 02:18

## 2018-12-14 RX ADMIN — INSULIN DETEMIR SCH UNITS: 100 INJECTION, SOLUTION SUBCUTANEOUS at 08:50

## 2018-12-14 RX ADMIN — HEPARIN SODIUM SCH UNITS: 5000 INJECTION INTRAVENOUS; SUBCUTANEOUS at 08:52

## 2018-12-14 RX ADMIN — DIVALPROEX SODIUM SCH MG: 500 TABLET, DELAYED RELEASE ORAL at 08:44

## 2018-12-14 RX ADMIN — MORPHINE SULFATE PRN MG: 2 INJECTION, SOLUTION INTRAMUSCULAR; INTRAVENOUS at 08:44

## 2018-12-14 NOTE — GENERAL PROGRESS NOTE
Assessment/Plan


Problem List:  


(1) COPD (chronic obstructive pulmonary disease)


ICD Codes:  J44.9 - Chronic obstructive pulmonary disease, unspecified


SNOMED:  47366073


(2) Uncontrolled seizures


ICD Codes:  R56.9 - Unspecified convulsions


SNOMED:  27583297


(3) Noncompliance


ICD Codes:  Z91.19 - Patient's noncompliance with other medical treatment and 

regimen


SNOMED:  2510529


Assessment/Plan


continue Levemir 40 units daily 


continue Novolog 15 units ac tid 


continue NISS ac / hs





Subjective


Allergies:  


Coded Allergies:  


     HALOPERIDOL (Verified  Allergy, Unknown, 10/5/18)


All Systems:  reviewed and negative except above


Subjective


events noted - glucose values improving





Objective





Last 24 Hour Vital Signs








  Date Time  Temp Pulse Resp B/P (MAP) Pulse Ox O2 Delivery O2 Flow Rate FiO2


 


12/14/18 06:57  80 16   Room Air  21


 


12/14/18 04:00 97.9 79 18 122/75 (91) 97   


 


12/14/18 00:00 97.0 81 20 118/72 (87) 96   


 


12/13/18 21:00      Room Air  


 


12/13/18 20:32  92 20   Room Air  21


 


12/13/18 20:00 97.7 93 19 124/70 (88) 95   


 


12/13/18 17:49 97.8       


 


12/13/18 16:00 97.8 98 19 129/77 (94) 94   


 


12/13/18 13:37 98.9       


 


12/13/18 13:25  78 20   Room Air  21


 


12/13/18 12:00 98.9 89 20 119/76 (90) 94   


 


12/13/18 09:00      Room Air  


 


12/13/18 08:00 97.8 90 20 125/76 (92) 95   

















Intake and Output  


 


 12/13/18 12/14/18





 18:59 06:59


 


Intake Total 2000 ml 1700 ml


 


Balance 2000 ml 1700 ml


 


  


 


Intake Oral 800 ml 500 ml


 


IV Total 1200 ml 1200 ml


 


# Voids 3 5








Laboratory Tests


12/13/18 09:35: 


White Blood Count 7.0, Red Blood Count 4.69L, Hemoglobin 13.9L, Hematocrit 39.3L

, Mean Corpuscular Volume 84, Mean Corpuscular Hemoglobin 29.7, Mean 

Corpuscular Hemoglobin Concent 35.5, Red Cell Distribution Width 11.6, Platelet 

Count 139L, Mean Platelet Volume 8.6, Neutrophils (%) (Auto) 53.5, Lymphocytes (

%) (Auto) 37.0, Monocytes (%) (Auto) 6.6, Eosinophils (%) (Auto) 2.2, Basophils 

(%) (Auto) 0.7, Sodium Level 132L, Potassium Level 4.0, Chloride Level 98, 

Carbon Dioxide Level 28, Anion Gap 7, Blood Urea Nitrogen 14, Creatinine 0.9, 

Estimat Glomerular Filtration Rate > 60, Glucose Level 403H, Calcium Level 8.4L


Height (Feet):  5


Height (Inches):  11.00


Weight (Pounds):  279


General Appearance:  no apparent distress


Neck:  normal alignment


Cardiovascular:  normal rate


Respiratory/Chest:  lungs clear


Abdomen:  normal bowel sounds


Objective





Current Medications








 Medications


  (Trade)  Dose


 Ordered  Sig/Henry


 Route


 PRN Reason  Start Time


 Stop Time Status Last Admin


Dose Admin


 


 Acetaminophen


  (Tylenol)  650 mg  Q4H  PRN


 ORAL


 fever  12/10/18 07:00


 1/9/19 06:59   


 


 


 Acetaminophen/


 Hydrocodone Bitart


  (Norco 10/325)  1 tab  Q4H  PRN


 ORAL


 Severe Pain (Pain Scale 7-10)  12/11/18 17:15


 12/18/18 17:14  12/13/18 17:19


 


 


 Albuterol/


 Ipratropium


  (Albuterol/


 Ipratropium)  3 ml  Q4H  PRN


 HHN


 Shortness of Breath  12/10/18 07:00


 12/15/18 06:59   


 


 


 Alprazolam


  (Xanax)  0.5 mg  Q6H  PRN


 ORAL


 For Anxiety  12/10/18 05:15


 12/17/18 05:14   


 


 


 Clonidine HCl


  (Catapres Tab)  0.1 mg  Q4H  PRN


 ORAL


 SBP more than 160mmHg  12/10/18 07:00


 1/9/19 06:59   


 


 


 Dextrose


  (Dextrose 50%)  25 ml  Q30M  PRN


 IV


 Hypoglycemia  12/11/18 09:45


 1/10/19 09:44   


 


 


 Dextrose


  (Dextrose 50%)  50 ml  Q30M  PRN


 IV


 Hypoglycemia  12/11/18 09:45


 1/10/19 09:44   


 


 


 Divalproex Sodium


  (Depakote)  1,000 mg  EVERY 12  HOURS


 ORAL


   12/10/18 09:00


 1/9/19 08:59  12/13/18 21:32


 


 


 Escitalopram


 Oxalate


  (Lexapro)  10 mg  DAILY


 ORAL


   12/10/18 09:00


 1/9/19 08:59  12/13/18 09:10


 


 


 Heparin Sodium


  (Porcine)


  (Heparin 5000


 units/ml)  5,000 units  EVERY 12  HOURS


 SUBQ


   12/10/18 09:00


 1/9/19 08:59   


 


 


 Insulin Aspart


  (NovoLOG)    BEFORE MEALS AND  HS


 SUBQ


   12/10/18 11:30


 1/9/19 11:29  12/14/18 06:28


 


 


 Insulin Aspart


  (NovoLOG)  15 units  NOVOTIAC


 SUBQ


   12/13/18 11:50


 1/10/19 11:49  12/14/18 06:28


 


 


 Insulin Detemir


  (Levemir)  40 units  DAILY


 SUBQ


   12/13/18 09:00


 1/10/19 10:59  12/13/18 09:13


 


 


 Morphine Sulfate


  (Morphine


 Sulfate)  1 mg  Q6H  PRN


 IVP


 breakthrough severe pain   12/11/18 17:30


 12/18/18 17:29  12/14/18 02:18


 


 


 Nitroglycerin


  (Ntg)  0.4 mg  Q5M X 3 DOSES PRN


 SL


 Prn Chest Pain  12/10/18 07:00


 1/9/19 06:59   


 


 


 Ondansetron HCl


  (Zofran)  4 mg  Q6H  PRN


 IVP


 Nausea & Vomiting  12/10/18 07:00


 1/9/19 06:59  12/13/18 18:09


 


 


 Pantoprazole


  (Protonix)  40 mg  EVERY 12  HOURS


 ORAL


   12/10/18 21:00


 1/9/19 20:59  12/13/18 21:32


 


 


 Polyethylene


 Glycol


  (Miralax)  17 gm  HSPRN  PRN


 ORAL


 Constipation  12/10/18 07:00


 1/9/19 06:59   


 


 


 Quetiapine


 Fumarate


  (SEROquel)  200 mg  QHS


 ORAL


   12/10/18 21:00


 1/9/19 20:59  12/13/18 21:31


 


 


 Sodium Chloride  1,000 ml @ 


 100 mls/hr  Q10H


 IVLG


   12/10/18 07:00


 1/9/19 06:59  12/14/18 01:20


 


 


 Temazepam


  (Restoril)  15 mg  HSPRN  PRN


 ORAL


 Insomnia  12/10/18 21:00


 12/17/18 20:59   


 














Item Value  Date Time


 


Bedside Blood Glucose 296 mg/dl H 12/13/18 2136


 


Bedside Blood Glucose 170 mg/dl H 12/13/18 1645


 


Bedside Blood Glucose 290 mg/dl H 12/13/18 1155


 


Bedside Blood Glucose 261 mg/dl H 12/13/18 0913


 


Bedside Blood Glucose 261 mg/dl H 12/13/18 0640

















Dusty Denis MD Dec 14, 2018 07:27

## 2018-12-14 NOTE — GENERAL PROGRESS NOTE
Assessment/Plan


Problem List:  


(1) Renal insufficiency


ICD Codes:  N28.9 - Disorder of kidney and ureter, unspecified


SNOMED:  825138796, 424184385


(2) Depressed


ICD Codes:  F32.9 - Major depressive disorder, single episode, unspecified


SNOMED:  05362579


(3) Seizure


ICD Codes:  R56.9 - Unspecified convulsions


SNOMED:  57040059


(4) Hyperglycemia due to type 2 diabetes mellitus


ICD Codes:  E11.65 - Type 2 diabetes mellitus with hyperglycemia


SNOMED:  875177409338546


(5) Diabetes mellitus


ICD Codes:  E11.9 - Diabetes mellitus


SNOMED:  86321531


(6) COPD (chronic obstructive pulmonary disease)


ICD Codes:  J44.9 - Chronic obstructive pulmonary disease, unspecified


SNOMED:  23758653


(7) CVA (cerebral vascular accident)


ICD Codes:  I63.9 - Cerebral infarction, unspecified


SNOMED:  875658748


Assessment/Plan


ot pt diet bs bp pain control cbc bmp am dc if clear





Subjective


Constitutional:  Reports: weakness


Allergies:  


Coded Allergies:  


     HALOPERIDOL (Verified  Allergy, Unknown, 10/5/18)


All Systems:  reviewed and negative except above


Subjective


sl back pain





Objective





Last 24 Hour Vital Signs








  Date Time  Temp Pulse Resp B/P (MAP) Pulse Ox O2 Delivery O2 Flow Rate FiO2


 


12/14/18 09:57 98.5       


 


12/14/18 09:00      Room Air  


 


12/14/18 08:00 98.5 91 20 122/71 (88) 94   


 


12/14/18 06:57  80 16   Room Air  21 12/14/18 04:00 97.9 79 18 122/75 (91) 97   


 


12/14/18 00:00 97.0 81 20 118/72 (87) 96   


 


12/13/18 21:00      Room Air  


 


12/13/18 20:32  92 20   Room Air  21


 


12/13/18 20:00 97.7 93 19 124/70 (88) 95   


 


12/13/18 17:49 97.8       


 


12/13/18 16:00 97.8 98 19 129/77 (94) 94   


 


12/13/18 13:25  78 20   Room Air  21


 


12/13/18 12:00 98.9 89 20 119/76 (90) 94   

















Intake and Output  


 


 12/13/18 12/14/18





 19:00 07:00


 


Intake Total 2000 ml 1600 ml


 


Balance 2000 ml 1600 ml


 


  


 


Intake Oral 800 ml 500 ml


 


IV Total 1200 ml 1100 ml


 


# Voids 3 5








Laboratory Tests


12/14/18 10:35: 


White Blood Count 8.2, Red Blood Count 4.66L, Hemoglobin 14.1L, Hematocrit 39.3L

, Mean Corpuscular Volume 84, Mean Corpuscular Hemoglobin 30.2, Mean 

Corpuscular Hemoglobin Concent 35.8, Red Cell Distribution Width 11.8, Platelet 

Count 152, Mean Platelet Volume 8.4, Neutrophils (%) (Auto) 56.1, Lymphocytes (%

) (Auto) 31.2, Monocytes (%) (Auto) 9.7, Eosinophils (%) (Auto) 2.0, Basophils (

%) (Auto) 1.0, Sodium Level 137, Potassium Level 3.8, Chloride Level 103, 

Carbon Dioxide Level 26, Anion Gap 9, Blood Urea Nitrogen 14, Creatinine 0.9, 

Estimat Glomerular Filtration Rate > 60, Glucose Level 218#H, Calcium Level 8.5


Height (Feet):  5


Height (Inches):  11.00


Weight (Pounds):  279


General Appearance:  alert


EENT:  normal ENT inspection


Neck:  normal alignment


Cardiovascular:  normal peripheral pulses, normal rate, regular rhythm


Respiratory/Chest:  chest wall non-tender, lungs clear, normal breath sounds


Abdomen:  normal bowel sounds, non tender, soft


Extremities:  normal inspection


Edema:  no edema noted Arm (L), no edema noted Arm (R), no edema noted Leg (L), 

no edema noted Leg (R), no edema noted Pedal (L), no edema noted Pedal (R), no 

edema noted Generalized


Neurologic:  responsive, motor weakness


Skin:  normal pigmentation, warm/dry











Samir Cabrera DO Dec 14, 2018 11:21

## 2018-12-14 NOTE — PROGRESS NOTE
DATE:  12/13/2018

SUBJECTIVE:  This is a 38-year-old male patient.  The patient is diagnosed

with schizoaffective bipolar type.



This patient is still very confused, disorganized, hypoglycemic, _____.

That is why the attending has requested daily psychiatric consultation to

prevent any further decline in his cognition.



MENTAL STATUS EXAMINATION:  This is a 38-year-old male.  Appearance is

disheveled.  Attitude, irritable and agitated.  Affect, guarded and

restricted.  Intellect poor.  Mood, depressed and anxious.  Motor

activity, psychomotor agitation.  Attention span is poor.  Orientation x2.

Speech is pressured.  Thought process, disorganized and illogical.

Thought content, auditory hallucinations and paranoid delusions.  Insight

and judgment are poor.



DIAGNOSIS:  Schizoaffective, bipolar type.



PLAN:  Treat him with Seroquel 200 mg nightly, Depakote 500 mg twice a day,

and Celexa 20 mg daily.  Also, 20 minutes of cognitive behavioral therapy

provided to help the patient identify his automatic negative thoughts and

help him to convert his automatic negative thoughts to more positive

thoughts to reduce depression, anxiety, and mood lability.  20 minutes of

cognitive behavioral therapy also provided to have a more adaptive

behavior pattern and _____.









  ______________________________________________

  Gulshan Armstrong M.D.





DR:  MYRA

D:  12/14/2018 09:39

T:  12/14/2018 20:57

JOB#:  [id number]

CC:

## 2018-12-14 NOTE — PROGRESS NOTE
DATE:  12/14/2018

SUBJECTIVE:  This 38-year-old male patient is seen this morning with

hypoglycemia.  He also has increased mood lability and diagnosed with

bipolar II, rule out schizoaffective disorder.  So, his attending has

continued to request daily psychiatric consultation to help manage the

patient's hypoglycemia and mood lability, which is worsened secondary to

the stress of his medical illness.



MENTAL STATUS EXAMINATION:  The patient is a 38-year-old male.  Appearance

is disheveled.  Attitude, irritable and agitated.  Affect guarded and

restricted.  Intellect poor.  Mood depressed and anxious.  Motor activity,

psychomotor agitation.  Attention span is poor.  Orientation x2.  Speech

is low volume and slurred.  Thought process, disorganized and illogical.

Insight and judgment are poor.



DIAGNOSIS:  Schizoaffective, bipolar type.



PLAN:  Treat him with Seroquel 200 mg nightly, Depakote 500 mg p.o. q.12

h., and Celexa 10 mg daily.  Also, provided him 20 minutes of cognitive

behavior therapy to help him identify automatic negative thoughts and help

him to convert his negative thoughts to more positive thinking to reduce

depression, anxiety, and suicidality.  Chart reviewed and discussed with

staff.  Seen and assessed at bedside.









  ______________________________________________

  Gulshan Armstrong M.D.





DR:  MAGED

D:  12/14/2018 09:39

T:  12/14/2018 21:12

JOB#:  [id number]

CC:

## 2018-12-14 NOTE — GENERAL PROGRESS NOTE
Assessment/Plan


Assessment/Plan


(1) Chiari malformation type II


(2) Degenerative disc disease, cervical


(3) Lumbar degenerative disc disease


(4) Arthritis, lumbar spine


(5) Peripheral neuropathy


(6) Hydrocephalus


(7) Diabetes mellitus


Pt will be continued on the Morphine and Norco.


We will start Robaxin 500mg PO 1 tab Q8H PRN muscle spasm.


Pt was d/w Dr. Franco and he concurred.





Subjective


Date patient seen:  Dec 14, 2018


Time patient seen:  07:00 - am


Allergies:  


Coded Allergies:  


     HALOPERIDOL (Verified  Allergy, Unknown, 10/5/18)


Subjective


Constitutional:  Reports: weakness, Denies: chills, diaphoresis, fever, malaise

, no symptoms, other


HEENT:  Denies: blurred vision, double vision, ear discharge, ear pain, eye pain

, mouth pain, mouth swelling, no symptoms, nose congestion, nose pain, other, 

tearing, throat pain, throat swelling


Cardiovascular:  Denies: chest pain, edema, irregular heart rate, 

lightheadedness, no symptoms, other, palpitations, syncope


Respiratory:  Denies: SOB at rest, SOB with excertion, cough, no symptoms, 

orthopnea, other, shortness of breath, sputum, stridor, wheezing


Gastrointestinal/Abdominal:  Denies: abdomen distended, abdominal pain, black 

stools, blood in stool, constipated, diarrhea, difficulty swallowing, nausea, 

no symptoms, other, poor appetite, poor fluid intake, rectal bleeding, tarry 

stools, vomiting


Genitourinary:  Denies: burning, discharge, flank pain, frequency, hematuria, 

incontinence, no symptoms, other, pain, urgency


Neurologic/Psychiatric:  Reports: headache, numbness, tingling, weakness, Denies

: anxiety, depressed, emotional problems, no symptoms, other, paresthesia, pre-

existing deficit, seizure, tremors


Endocrine:  Denies: excessive sweating, flushing, increased hunger, increased 

thirst, increased urine, intolerance to cold, intolerance to heat, no symptoms, 

other, unexplained weight gain, unexplained weight loss


Hematologic/Lymphatic:  Denies: anemia, easy bleeding, easy bruising, no 

symptoms, other


 


Subjective


Patient is walking and standing. He reports that the pain has been tolerated on 

the Rebuck and Morphine.


Is c/o spasms in his back.





Objective





Last 24 Hour Vital Signs








  Date Time  Temp Pulse Resp B/P (MAP) Pulse Ox O2 Delivery O2 Flow Rate FiO2


 


12/14/18 06:57  80 16   Room Air  21


 


12/14/18 04:00 97.9 79 18 122/75 (91) 97   


 


12/14/18 00:00 97.0 81 20 118/72 (87) 96   


 


12/13/18 21:00      Room Air  


 


12/13/18 20:32  92 20   Room Air  21


 


12/13/18 20:00 97.7 93 19 124/70 (88) 95   


 


12/13/18 17:49 97.8       


 


12/13/18 16:00 97.8 98 19 129/77 (94) 94   


 


12/13/18 13:37 98.9       


 


12/13/18 13:25  78 20   Room Air  21


 


12/13/18 12:00 98.9 89 20 119/76 (90) 94   

















Intake and Output  


 


 12/13/18 12/14/18





 18:59 06:59


 


Intake Total 2000 ml 1700 ml


 


Balance 2000 ml 1700 ml


 


  


 


Intake Oral 800 ml 500 ml


 


IV Total 1200 ml 1200 ml


 


# Voids 3 5








Laboratory Tests


12/13/18 09:35: 


White Blood Count 7.0, Red Blood Count 4.69L, Hemoglobin 13.9L, Hematocrit 39.3L

, Mean Corpuscular Volume 84, Mean Corpuscular Hemoglobin 29.7, Mean 

Corpuscular Hemoglobin Concent 35.5, Red Cell Distribution Width 11.6, Platelet 

Count 139L, Mean Platelet Volume 8.6, Neutrophils (%) (Auto) 53.5, Lymphocytes (

%) (Auto) 37.0, Monocytes (%) (Auto) 6.6, Eosinophils (%) (Auto) 2.2, Basophils 

(%) (Auto) 0.7, Sodium Level 132L, Potassium Level 4.0, Chloride Level 98, 

Carbon Dioxide Level 28, Anion Gap 7, Blood Urea Nitrogen 14, Creatinine 0.9, 

Estimat Glomerular Filtration Rate > 60, Glucose Level 403H, Calcium Level 8.4L


Height (Feet):  5


Height (Inches):  11.00


Weight (Pounds):  279











Bill Sanz Dec 14, 2018 09:02

## 2018-12-14 NOTE — NEPHROLOGY PROGRESS NOTE
Assessment/Plan


Problem List:  


(1) Hyperglycemia due to type 2 diabetes mellitus


(2) Hyponatremia


(3) COPD exacerbation


Assessment


Hyperglycemia due to type 2 diabetes mellitus


Hyponatremia due to high blood sugar


COPD (chronic obstructive pulmonary disease


Peripheral neuropathy


Lumbar degenerative disc disease


CVA (cerebral vascular accident)


Psychosomatic disorder


Noncompliance


Hydrocephalus


Plan


Hydrate-


BS control


monitor electroltes


per orders


DC planning





Subjective


ROS Limited/Unobtainable:  No





Objective


Objective





Last 24 Hour Vital Signs








  Date Time  Temp Pulse Resp B/P (MAP) Pulse Ox O2 Delivery O2 Flow Rate FiO2


 


12/14/18 12:00 98.1 89 19 142/72 (95) 95   


 


12/14/18 09:57 98.5       


 


12/14/18 09:00      Room Air  


 


12/14/18 08:00 98.5 91 20 122/71 (88) 94   


 


12/14/18 06:57  80 16   Room Air  21


 


12/14/18 04:00 97.9 79 18 122/75 (91) 97   


 


12/14/18 00:00 97.0 81 20 118/72 (87) 96   


 


12/13/18 21:00      Room Air  


 


12/13/18 20:32  92 20   Room Air  21


 


12/13/18 20:00 97.7 93 19 124/70 (88) 95   


 


12/13/18 17:49 97.8       


 


12/13/18 16:00 97.8 98 19 129/77 (94) 94   

















Intake and Output  


 


 12/13/18 12/14/18





 19:00 07:00


 


Intake Total 2000 ml 1600 ml


 


Balance 2000 ml 1600 ml


 


  


 


Intake Oral 800 ml 500 ml


 


IV Total 1200 ml 1100 ml


 


# Voids 3 5








Laboratory Tests


12/14/18 10:35: 


White Blood Count 8.2, Red Blood Count 4.66L, Hemoglobin 14.1L, Hematocrit 39.3L

, Mean Corpuscular Volume 84, Mean Corpuscular Hemoglobin 30.2, Mean 

Corpuscular Hemoglobin Concent 35.8, Red Cell Distribution Width 11.8, Platelet 

Count 152, Mean Platelet Volume 8.4, Neutrophils (%) (Auto) 56.1, Lymphocytes (%

) (Auto) 31.2, Monocytes (%) (Auto) 9.7, Eosinophils (%) (Auto) 2.0, Basophils (

%) (Auto) 1.0, Sodium Level 137, Potassium Level 3.8, Chloride Level 103, 

Carbon Dioxide Level 26, Anion Gap 9, Blood Urea Nitrogen 14, Creatinine 0.9, 

Estimat Glomerular Filtration Rate > 60, Glucose Level 218#H, Calcium Level 8.5


Height (Feet):  5


Height (Inches):  11.00


Weight (Pounds):  279


General Appearance:  no apparent distress


Objective


no change











Regis Tuttle MD Dec 14, 2018 14:50

## 2018-12-15 NOTE — PROGRESS NOTE
DATE:  12/13/2018

NOTE:  POOR AUDIO



PSYCHOTHERAPY CONSULTATION PROGRESS NOTE



TREATING ATTENDING PHYSICIAN:  Samir Cabrera D.O.



SUBJECTIVE:  The patient is a 38-year-old male patient who has a diagnosis

of schizoaffective disorder, bipolar type.  This patient is feeling very

helpless due to her medical condition and states that he does not feel

well.  _____ continues to _____ complaining of anxiety and _____ and

agitation.  This clinician assessed this patient.



MENTAL STATUS EXAMINATION:  The patient is alert and oriented to person,

place, and time.  His mood is irritable.  Affect is anxious.  Thought

process, organized.  Thought content, linear.  Fair attention and

concentration.  Fair insight, judgment, and impulse control.



DIAGNOSIS:  Schizoaffective disorder, bipolar type.



PLAN:  The patient is provided with supportive psychotherapy.  The patient

_____.  Today, the patient's thoughts _____ anxiety and the patient is

emotionally distressed due to his medical condition.  Provided the patient

with cognitive behavioral therapy _____ ability and working on identifying

his positive coping skills to address the patient's restlessness and

anxiety.  _____ the patient's negative and catastrophic thoughts _____ get

better and providing him with more positive _____ coping skills and

relaxation exercises.  Continue with behavioral management.  This

clinician has reviewed the patient's chart and discussed the treatment

with treatment team.









  ______________________________________________

  Mynor Mayen PsyD.





DR:  YAMILETH

D:  12/14/2018 12:40

T:  12/15/2018 01:58

JOB#:  [id number]

CC:

## 2018-12-17 NOTE — CONSULTATION
DATE OF CONSULTATION:  10/05/2018



NOTE: "POOR AUDIO QUALITY"



INFECTIOUS DISEASES CONSULTATION



CONSULTING PHYSICIAN:  Endy Redding M.D.



REFERRING PHYSICIAN:  Samir Cabrera D.O.



REASON FOR CONSULTATION:  Evaluation of the patient for gastroenteritis.



HISTORY OF PRESENT ILLNESS:  The patient is a 37-year-old male with

multiple medical problems, who was transferred to this medical center with

slurred speech.  The patient has multiple medical problems, who was living

in the [facility] and because there was slurred speech and

letharginess, he was transferred to this medical center.  The patient has

history of abdominal pain in the upper abdomen associated with diarrhea.

The patient did not have any nausea or vomiting.  Diarrhea has resolved.

Infectious Disease consultation has been requested for further evaluation

of the patient's antibiotic management.



PAST MEDICAL HISTORY:

1. Seizure disorder.

2. Hypertension.

3. CVA.

4. History of obesity.

5. History of back surgery.

6. Diabetes.

7. Anxiety.



ALLERGIES:  To haloperidol.



FAMILY HISTORY:  Not contributing.



REVIEW OF SYSTEMS:  A 10-point review was done, except what was mentioned

above has been negative.



MEDICATIONS:  Currently off of antibiotics.



PHYSICAL EXAMINATION:

VITAL SIGNS:  Temperature 97.3, pulse 66, respiratory rate 18, blood

pressure 110/64.

HEENT:  No pale conjuctivae.  No icterus.

NECK:  No lymphadenopathy.

CHEST:  Clear.

HEART:  S1 and S2.

ABDOMEN:  Soft, obese, nontender.

EXTREMITIES:  No cyanosis at this time.

NEUROLOGIC:  The patient has some slurred speech.



LABORATORY AND DIAGNOSTIC DATA:  WBC 5.3, hemoglobin 14, platelets 206,000.

UA unremarkable.  BUN 10, creatinine 0.8.  ALT, AST, and alkaline

phosphatase unremarkable.  Ammonia 46.  Chest x-ray _____.  Head CT is

possible craniotomy.



ASSESSMENT:  The patient is a 37-year-old male with:



1. Gastroenteritis, resolved.

2. Afebrile.

3. Normal WBC.



PLAN:

1. We will monitor the patient off of antibiotics.

2. Monitor screening cultures.

3. Monitor CBC.

4. Monitor BMP.

5. If the patient's symptoms worsen, he may develop diarrhea, may send

stool for culture and C. difficile.



Thank you, Dr. Samir Cabrera, for allowing me to see this patient.  I will

follow the patient with you during this hospitalization.









  ______________________________________________

  Endy Redding M.D.





DR:  Catina

D:  10/05/2018 14:56

T:  10/05/2018 18:41

JOB#:  [id number]

CC: negative...

## 2019-02-03 ENCOUNTER — HOSPITAL ENCOUNTER (INPATIENT)
Dept: HOSPITAL 54 - ER | Age: 39
LOS: 1 days | Discharge: LEFT BEFORE DISCHARGE | DRG: 313 | End: 2019-02-04
Attending: NURSE PRACTITIONER | Admitting: NURSE PRACTITIONER
Payer: MEDICARE

## 2019-02-03 VITALS — DIASTOLIC BLOOD PRESSURE: 73 MMHG | SYSTOLIC BLOOD PRESSURE: 126 MMHG

## 2019-02-03 VITALS — BODY MASS INDEX: 31.98 KG/M2 | HEIGHT: 78 IN | WEIGHT: 276.38 LBS

## 2019-02-03 DIAGNOSIS — F43.10: ICD-10-CM

## 2019-02-03 DIAGNOSIS — I12.9: ICD-10-CM

## 2019-02-03 DIAGNOSIS — F11.20: ICD-10-CM

## 2019-02-03 DIAGNOSIS — F41.9: ICD-10-CM

## 2019-02-03 DIAGNOSIS — E11.65: ICD-10-CM

## 2019-02-03 DIAGNOSIS — F20.9: ICD-10-CM

## 2019-02-03 DIAGNOSIS — Z86.73: ICD-10-CM

## 2019-02-03 DIAGNOSIS — I25.10: ICD-10-CM

## 2019-02-03 DIAGNOSIS — R07.89: Primary | ICD-10-CM

## 2019-02-03 DIAGNOSIS — E66.9: ICD-10-CM

## 2019-02-03 DIAGNOSIS — F17.210: ICD-10-CM

## 2019-02-03 DIAGNOSIS — M13.0: ICD-10-CM

## 2019-02-03 DIAGNOSIS — E78.5: ICD-10-CM

## 2019-02-03 DIAGNOSIS — E11.22: ICD-10-CM

## 2019-02-03 DIAGNOSIS — N18.9: ICD-10-CM

## 2019-02-03 DIAGNOSIS — F32.9: ICD-10-CM

## 2019-02-03 LAB
ALBUMIN SERPL BCP-MCNC: 3.5 G/DL (ref 3.4–5)
ALP SERPL-CCNC: 64 U/L (ref 46–116)
ALT SERPL W P-5'-P-CCNC: 47 U/L (ref 12–78)
AST SERPL W P-5'-P-CCNC: 18 U/L (ref 15–37)
BASOPHILS # BLD AUTO: 0.1 /CMM (ref 0–0.2)
BASOPHILS NFR BLD AUTO: 0.5 % (ref 0–2)
BILIRUB DIRECT SERPL-MCNC: 0.1 MG/DL (ref 0–0.2)
BILIRUB SERPL-MCNC: 0.3 MG/DL (ref 0.2–1)
BUN SERPL-MCNC: 15 MG/DL (ref 7–18)
CALCIUM SERPL-MCNC: 9.5 MG/DL (ref 8.5–10.1)
CHLORIDE SERPL-SCNC: 101 MMOL/L (ref 98–107)
CO2 SERPL-SCNC: 26 MMOL/L (ref 21–32)
CREAT SERPL-MCNC: 0.9 MG/DL (ref 0.6–1.3)
EOSINOPHIL NFR BLD AUTO: 2 % (ref 0–6)
GLUCOSE SERPL-MCNC: 265 MG/DL (ref 74–106)
HCT VFR BLD AUTO: 41 % (ref 39–51)
HGB BLD-MCNC: 14.2 G/DL (ref 13.5–17.5)
LYMPHOCYTES NFR BLD AUTO: 2.6 /CMM (ref 0.8–4.8)
LYMPHOCYTES NFR BLD AUTO: 24.1 % (ref 20–44)
MCHC RBC AUTO-ENTMCNC: 35 G/DL (ref 31–36)
MCV RBC AUTO: 87 FL (ref 80–96)
MONOCYTES NFR BLD AUTO: 1 /CMM (ref 0.1–1.3)
MONOCYTES NFR BLD AUTO: 9.7 % (ref 2–12)
NEUTROPHILS # BLD AUTO: 6.8 /CMM (ref 1.8–8.9)
NEUTROPHILS NFR BLD AUTO: 63.7 % (ref 43–81)
PLATELET # BLD AUTO: 183 /CMM (ref 150–450)
POTASSIUM SERPL-SCNC: 4.2 MMOL/L (ref 3.5–5.1)
PROT SERPL-MCNC: 6.6 G/DL (ref 6.4–8.2)
RBC # BLD AUTO: 4.66 MIL/UL (ref 4.5–6)
SODIUM SERPL-SCNC: 136 MMOL/L (ref 136–145)
WBC NRBC COR # BLD AUTO: 10.7 K/UL (ref 4.3–11)

## 2019-02-03 PROCEDURE — G0378 HOSPITAL OBSERVATION PER HR: HCPCS

## 2019-02-03 NOTE — NUR
PT BIBRA FROM [facility], FOR CP SHARP GIVEN NITRO AND ASA PTA. PT AAXO4, 
RESPIARTIONS EVEN AND UNLABORED, NO SOB, PT ON MONITOR, NAD NOTED, VSS, PENDING 
ER PROVIDER ARTEMIO

## 2019-02-03 NOTE — NUR
PAGE DR. KRISTINE TOUSSAINT FOR PAIN MEDICATION, PER BREANA, PT IS A DRUG SEEKER, STAT ECG NORMAL NO 
INDICATION OF HEART PROBLEMS, PAST CAT SCAN REVEALED NO INDICATION OF PAST STOKE OR NEW 
STROKE. DO NOT GIVE OPIATES.

## 2019-02-03 NOTE — NUR
RN TELE ADMITTING NOTES 

RECEIVED PT FROM ER [facility]IA [facility], AWAKE ALERT ORIENTEDX4, COMPLAINING OF CHEST PAIN 10/10 
RADIATING TO THE BACK AND LEFT ARM. BREATHING EVEN AND UNLABORED, VITALS WNL. IV ACCESS ON 
THE L UA #22G. TELE MONITOR IN PLACE, SR IN THE 70S. SKIN INTACT, SMALL SCAB ON THE RIGHT 
SIDE OF THE HEAD. BED IN LOWEST LOCKED POSITION, CALL LIGHT WITHIN REACH AT ALL TIMES, WILL 
CONTINUE TO MONITOR

## 2019-02-04 VITALS — DIASTOLIC BLOOD PRESSURE: 86 MMHG | SYSTOLIC BLOOD PRESSURE: 134 MMHG

## 2019-02-04 VITALS — DIASTOLIC BLOOD PRESSURE: 70 MMHG | SYSTOLIC BLOOD PRESSURE: 113 MMHG

## 2019-02-04 LAB
ALBUMIN SERPL BCP-MCNC: 3.2 G/DL (ref 3.4–5)
ALP SERPL-CCNC: 55 U/L (ref 46–116)
ALT SERPL W P-5'-P-CCNC: 47 U/L (ref 12–78)
AST SERPL W P-5'-P-CCNC: 13 U/L (ref 15–37)
BASOPHILS # BLD AUTO: 0 /CMM (ref 0–0.2)
BASOPHILS NFR BLD AUTO: 0.4 % (ref 0–2)
BILIRUB SERPL-MCNC: 0.3 MG/DL (ref 0.2–1)
BUN SERPL-MCNC: 17 MG/DL (ref 7–18)
CALCIUM SERPL-MCNC: 8.3 MG/DL (ref 8.5–10.1)
CHLORIDE SERPL-SCNC: 102 MMOL/L (ref 98–107)
CHOLEST SERPL-MCNC: 69 MG/DL (ref ?–200)
CO2 SERPL-SCNC: 24 MMOL/L (ref 21–32)
CREAT SERPL-MCNC: 0.8 MG/DL (ref 0.6–1.3)
EOSINOPHIL NFR BLD AUTO: 2.2 % (ref 0–6)
GLUCOSE SERPL-MCNC: 226 MG/DL (ref 74–106)
HCT VFR BLD AUTO: 40 % (ref 39–51)
HDLC SERPL-MCNC: 26 MG/DL (ref 40–60)
HGB BLD-MCNC: 13.8 G/DL (ref 13.5–17.5)
LDLC SERPL DIRECT ASSAY-MCNC: 36 MG/DL (ref 0–99)
LYMPHOCYTES NFR BLD AUTO: 25.6 % (ref 20–44)
LYMPHOCYTES NFR BLD AUTO: 3.1 /CMM (ref 0.8–4.8)
MAGNESIUM SERPL-MCNC: 1.5 MG/DL (ref 1.8–2.4)
MCHC RBC AUTO-ENTMCNC: 34 G/DL (ref 31–36)
MCV RBC AUTO: 88 FL (ref 80–96)
MONOCYTES NFR BLD AUTO: 1 /CMM (ref 0.1–1.3)
MONOCYTES NFR BLD AUTO: 8.5 % (ref 2–12)
NEUTROPHILS # BLD AUTO: 7.6 /CMM (ref 1.8–8.9)
NEUTROPHILS NFR BLD AUTO: 63.3 % (ref 43–81)
PHOSPHATE SERPL-MCNC: 3.6 MG/DL (ref 2.5–4.9)
PLATELET # BLD AUTO: 174 /CMM (ref 150–450)
POTASSIUM SERPL-SCNC: 3.9 MMOL/L (ref 3.5–5.1)
PROT SERPL-MCNC: 6 G/DL (ref 6.4–8.2)
RBC # BLD AUTO: 4.58 MIL/UL (ref 4.5–6)
SODIUM SERPL-SCNC: 136 MMOL/L (ref 136–145)
TRIGL SERPL-MCNC: 137 MG/DL (ref 30–150)
TSH SERPL DL<=0.005 MIU/L-ACNC: 3.47 UIU/ML (ref 0.36–3.74)
WBC NRBC COR # BLD AUTO: 12 K/UL (ref 4.3–11)

## 2019-02-04 RX ADMIN — MAGNESIUM SULFATE IN DEXTROSE SCH MLS/HR: 10 INJECTION, SOLUTION INTRAVENOUS at 10:40

## 2019-02-04 RX ADMIN — INSULIN HUMAN PRN UNIT: 100 INJECTION, SOLUTION PARENTERAL at 06:48

## 2019-02-04 RX ADMIN — Medication SCH EACH: at 12:00

## 2019-02-04 RX ADMIN — Medication SCH EACH: at 06:44

## 2019-02-04 RX ADMIN — INSULIN HUMAN PRN UNIT: 100 INJECTION, SOLUTION PARENTERAL at 00:19

## 2019-02-04 RX ADMIN — MAGNESIUM SULFATE IN DEXTROSE SCH MLS/HR: 10 INJECTION, SOLUTION INTRAVENOUS at 10:31

## 2019-02-04 RX ADMIN — Medication SCH EACH: at 00:02

## 2019-02-04 NOTE — NUR
MS RN NOTES-- TISHA ORIENTING SUPERVISOR ATTEMPTED TO INSERT PERIPHERAL IV FOR CTCA AS PT 
STATED HE AGREES TO DO THE TEST.

## 2019-02-04 NOTE — NUR
MS RN NOTES-- [place]IT[place], NURSE FROM [place] CAME TO ATTEMPT TO INSERT IV FOR CTCA. PT REFUSED. PT 
STATED HE WANTS TO GO AMA. EXPLAINED THE RISKS ABOUT GOING AMA, PT STILL INSISTED TO LEAVE. 
NOTIFIED CHARGE NURSE PAMELA.

## 2019-02-04 NOTE — NUR
MS RN AMA NOTE



PT LEFT AMA. PT IS A/O X4, AFEBRILE. RESPIRATIONS ARE EVEN AND UNLABORED, NOT IN ANY ACUTE 
DISTRESS NOTED. PT WAS ADAMANT ABOUT LEAVING AMA. PT IS AMBULATORY WITH NO DIFFICULTIES. 
EXPLAINED THE RISKS OF GOING AMA, PT STILL STATED "I WANT TO LEAVE." EXIT CARE DONE WITH 
VERBAL AND WRITTEN AGREEMENT. PT SIGNED AMA AND HOMELESS WAIVER FORM. ALL BELONGINGS WERE 
ACCOUNTED FOR. IV SITE REMOVED, APPLIED PRESSURE AND TOLERATED WELL. ID BAND REMOVED.

## 2019-02-04 NOTE — NUR
RN TELE CLOSING NOTE 

PT IN BED, SLEEPING, EASILY AROUSED TO TOUCH OR NAME CALL. BREATHING EVEN AND UNLABORED ON 
ROOM AIR, COMPLAINTS OF CHEST PAIN 8/10. IV ACCESS ON THE L UA #22G SL PATENT AND FLUSHING. 
TELE MONITOR IN PLACE, REMAINS SINUS RHYTHM IN THE 70S. BED IN LOWEST LOCKED POSITION, CALL 
LIGHT WITHIN REACH AT ALL TIMES, WILL ENDORSE TO DAY NURSE FOR JOSEFINA.

## 2019-02-04 NOTE — NUR
MS RN NOTES-- PT REFUSED BLOOD SUGAR CHECK. EXPLAINED THE IMPORTANCE OF CHECKING BLOOD 
SUGAR, PT STRONGLY REFUSED AND STATED "GET ME THE HELL OUT OF HERE." NO S/SX OF 
HYPO/HYERPGLYCEMIA ATTHIS TIME. WILL CONTINUE TO MONITOR CLOSELY.

## 2019-02-04 NOTE — NUR
TELE RN OPENING NOTES



RECEIVED PT LAYING IN BED WITH HOB ELEVATED. PT IS A/O X4, AFEBRILE. RESPIRATIONS ARE EVEN 
AND UNLABORED, NOT IN ANY ACUTE DISTRESS NOTED. PT CONTINUES TO C/O LEFT-SIDED CHEST PAIN, 
PT IS ABLE TO GET UP AND OUT OF BED, AMBULATORY AND DENIES ANY SHORTNESS OF BREATH. OFFERED 
PT NITROGLYCERIN, PT STATED HE WANTS PAIN MEDICATIONS, "NO NITRO." PT HAS AN IV TO [facility], 
INTACT, NO INFILTRATION NOTED. DRESSING KEPT CLEAN AND DRY. SAFETY MEASURES ARE IN PLACE. 
INSTRUCTED PT TO USE CALL LIGHT WHEN ASSISTANCE IS NEEDED, CALL LIGHT IS LEFT WITHIN REACH. 
WILL MONITOR CLOSELY FOR CONTINUITY OF CARE.

## 2019-02-05 ENCOUNTER — HOSPITAL ENCOUNTER (INPATIENT)
Dept: HOSPITAL 72 - EMR | Age: 39
LOS: 6 days | Discharge: NURSING, REHAB OR LONG-TERM CARE | DRG: 639 | End: 2019-02-11
Payer: MEDICARE

## 2019-02-05 VITALS — HEIGHT: 71 IN | BODY MASS INDEX: 37.38 KG/M2 | WEIGHT: 267 LBS

## 2019-02-05 VITALS — SYSTOLIC BLOOD PRESSURE: 121 MMHG | DIASTOLIC BLOOD PRESSURE: 73 MMHG

## 2019-02-05 VITALS — SYSTOLIC BLOOD PRESSURE: 99 MMHG | DIASTOLIC BLOOD PRESSURE: 74 MMHG

## 2019-02-05 DIAGNOSIS — Q07.00: ICD-10-CM

## 2019-02-05 DIAGNOSIS — G40.909: ICD-10-CM

## 2019-02-05 DIAGNOSIS — F17.200: ICD-10-CM

## 2019-02-05 DIAGNOSIS — M76.31: ICD-10-CM

## 2019-02-05 DIAGNOSIS — G62.9: ICD-10-CM

## 2019-02-05 DIAGNOSIS — Z79.4: ICD-10-CM

## 2019-02-05 DIAGNOSIS — Z79.82: ICD-10-CM

## 2019-02-05 DIAGNOSIS — Z91.19: ICD-10-CM
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
## 2019-02-05 DIAGNOSIS — Z88.8: ICD-10-CM

## 2019-02-05 DIAGNOSIS — Z59.0: ICD-10-CM

## 2019-02-05 DIAGNOSIS — M70.51: ICD-10-CM

## 2019-02-05 DIAGNOSIS — J44.9: ICD-10-CM

## 2019-02-05 DIAGNOSIS — R42: ICD-10-CM

## 2019-02-05 DIAGNOSIS — Z86.73: ICD-10-CM

## 2019-02-05 DIAGNOSIS — R29.6: ICD-10-CM

## 2019-02-05 DIAGNOSIS — E10.65: Primary | ICD-10-CM

## 2019-02-05 DIAGNOSIS — K31.84: ICD-10-CM

## 2019-02-05 DIAGNOSIS — F25.0: ICD-10-CM

## 2019-02-05 DIAGNOSIS — E86.0: ICD-10-CM

## 2019-02-05 DIAGNOSIS — S80.01XA: ICD-10-CM

## 2019-02-05 DIAGNOSIS — E10.43: ICD-10-CM

## 2019-02-05 DIAGNOSIS — M51.36: ICD-10-CM

## 2019-02-05 LAB
ADD MANUAL DIFF: NO
ALBUMIN SERPL-MCNC: 3.4 G/DL (ref 3.4–5)
ALBUMIN/GLOB SERPL: 1.1 {RATIO} (ref 1–2.7)
ALP SERPL-CCNC: 63 U/L (ref 46–116)
ALT SERPL-CCNC: 46 U/L (ref 12–78)
ANION GAP SERPL CALC-SCNC: 8 MMOL/L (ref 5–15)
APPEARANCE UR: CLEAR
APTT PPP: YELLOW S
AST SERPL-CCNC: 40 U/L (ref 15–37)
BASOPHILS NFR BLD AUTO: 1.5 % (ref 0–2)
BILIRUB SERPL-MCNC: 0.4 MG/DL (ref 0.2–1)
BUN SERPL-MCNC: 14 MG/DL (ref 7–18)
CALCIUM SERPL-MCNC: 8.2 MG/DL (ref 8.5–10.1)
CHLORIDE SERPL-SCNC: 104 MMOL/L (ref 98–107)
CO2 SERPL-SCNC: 24 MMOL/L (ref 21–32)
CREAT SERPL-MCNC: 0.7 MG/DL (ref 0.55–1.3)
EOSINOPHIL NFR BLD AUTO: 0.6 % (ref 0–3)
ERYTHROCYTE [DISTWIDTH] IN BLOOD BY AUTOMATED COUNT: 12.6 % (ref 11.6–14.8)
GLOBULIN SER-MCNC: 3 G/DL
GLUCOSE UR STRIP-MCNC: (no result) MG/DL
HCT VFR BLD CALC: 42.2 % (ref 42–52)
HGB BLD-MCNC: 14.6 G/DL (ref 14.2–18)
KETONES UR QL STRIP: NEGATIVE
LEUKOCYTE ESTERASE UR QL STRIP: (no result)
LYMPHOCYTES NFR BLD AUTO: 24.9 % (ref 20–45)
MCV RBC AUTO: 87 FL (ref 80–99)
MONOCYTES NFR BLD AUTO: 6.6 % (ref 1–10)
NEUTROPHILS NFR BLD AUTO: 66.3 % (ref 45–75)
NITRITE UR QL STRIP: NEGATIVE
PH UR STRIP: 7 [PH] (ref 4.5–8)
PLATELET # BLD: 125 K/UL (ref 150–450)
POTASSIUM SERPL-SCNC: 4.1 MMOL/L (ref 3.5–5.1)
PROT UR QL STRIP: (no result)
RBC # BLD AUTO: 4.83 M/UL (ref 4.7–6.1)
SODIUM SERPL-SCNC: 136 MMOL/L (ref 136–145)
SP GR UR STRIP: 1.01 (ref 1–1.03)
UROBILINOGEN UR-MCNC: 4 MG/DL (ref 0–1)
WBC # BLD AUTO: 12.4 K/UL (ref 4.8–10.8)

## 2019-02-05 PROCEDURE — 82009 KETONE BODYS QUAL: CPT

## 2019-02-05 PROCEDURE — 96374 THER/PROPH/DIAG INJ IV PUSH: CPT

## 2019-02-05 PROCEDURE — 83735 ASSAY OF MAGNESIUM: CPT

## 2019-02-05 PROCEDURE — 87205 SMEAR GRAM STAIN: CPT

## 2019-02-05 PROCEDURE — 87070 CULTURE OTHR SPECIMN AEROBIC: CPT

## 2019-02-05 PROCEDURE — 81001 URINALYSIS AUTO W/SCOPE: CPT

## 2019-02-05 PROCEDURE — 94640 AIRWAY INHALATION TREATMENT: CPT

## 2019-02-05 PROCEDURE — 83036 HEMOGLOBIN GLYCOSYLATED A1C: CPT

## 2019-02-05 PROCEDURE — 82803 BLOOD GASES ANY COMBINATION: CPT

## 2019-02-05 PROCEDURE — 36600 WITHDRAWAL OF ARTERIAL BLOOD: CPT

## 2019-02-05 PROCEDURE — 96361 HYDRATE IV INFUSION ADD-ON: CPT

## 2019-02-05 PROCEDURE — 84100 ASSAY OF PHOSPHORUS: CPT

## 2019-02-05 PROCEDURE — 80061 LIPID PANEL: CPT

## 2019-02-05 PROCEDURE — 93005 ELECTROCARDIOGRAM TRACING: CPT

## 2019-02-05 PROCEDURE — 87081 CULTURE SCREEN ONLY: CPT

## 2019-02-05 PROCEDURE — 71045 X-RAY EXAM CHEST 1 VIEW: CPT

## 2019-02-05 PROCEDURE — 36415 COLL VENOUS BLD VENIPUNCTURE: CPT

## 2019-02-05 PROCEDURE — 94664 DEMO&/EVAL PT USE INHALER: CPT

## 2019-02-05 PROCEDURE — 82962 GLUCOSE BLOOD TEST: CPT

## 2019-02-05 PROCEDURE — 80048 BASIC METABOLIC PNL TOTAL CA: CPT

## 2019-02-05 PROCEDURE — 80053 COMPREHEN METABOLIC PANEL: CPT

## 2019-02-05 PROCEDURE — 85025 COMPLETE CBC W/AUTO DIFF WBC: CPT

## 2019-02-05 PROCEDURE — 93971 EXTREMITY STUDY: CPT

## 2019-02-05 PROCEDURE — 99285 EMERGENCY DEPT VISIT HI MDM: CPT

## 2019-02-05 PROCEDURE — 84443 ASSAY THYROID STIM HORMONE: CPT

## 2019-02-05 RX ADMIN — MORPHINE SULFATE PRN MG: 2 INJECTION, SOLUTION INTRAMUSCULAR; INTRAVENOUS at 20:35

## 2019-02-05 RX ADMIN — DIVALPROEX SODIUM SCH MG: 500 TABLET, DELAYED RELEASE ORAL at 20:34

## 2019-02-05 RX ADMIN — HEPARIN SODIUM SCH UNITS: 5000 INJECTION INTRAVENOUS; SUBCUTANEOUS at 20:28

## 2019-02-05 RX ADMIN — INSULIN ASPART SCH UNITS: 100 INJECTION, SOLUTION INTRAVENOUS; SUBCUTANEOUS at 20:54

## 2019-02-05 SDOH — ECONOMIC STABILITY - HOUSING INSECURITY: HOMELESSNESS: Z59.0

## 2019-02-05 NOTE — NUR
ED Nurse Note:

Received report from Cristofer, report already given to tele prior to shift. 
patient went up to floor with RNa nd ER tech

## 2019-02-05 NOTE — NUR
NURSE NOTES:

NEW ADMISSION. Pt arrived to floor at 1930. Admitting Dr Cabrera/ Dr Steward. Admitting Dx: 
Uncontrolled Diabetes and dehydration. Pt ambulated on to the bed. cardiac monitor was 
placed and VS were taken. orders were entered by DR Steward. Orders were acknowledged. 
belongings were reviewed with ER nurse. IV site is c/d/i and running NS @100cc/hr per 
admitting order. Pt is on accu checks AC+HS. HS sugar 332- pt received 10 units of novolog 
and 40 units of levemir. Pt is on room air with no sign of sob or resp distress. Bed in 
lowest locked position, Will continue with plan of care.

## 2019-02-05 NOTE — NUR
ED Nurse Note:



Pt came into the ER w/ complaints of generalized weakness x 3 days. According 
to the pt, pt has been vomiting the food he ate x 6 times over the last 3 days. 
Pt is comlpaining of generalized body pain. Rating the pain an 8/10. Pt is pt 
of Dr. Samir Cabrera. A + O x4. Ambulatory. Skin warm to touch.

## 2019-02-05 NOTE — EMERGENCY ROOM REPORT
History of Present Illness


General


Chief Complaint:  General Complaint


Source:  Patient





Present Illness


HPI


Patient is a 38-year-old male presented after increased generalized weakness 

and increased falling.  Patient had prior history of diabetes.  Patient had 

reportedly had increased blood sugars.  He is insulin-dependent type 1 he 

denies any recent fevers.  He had prior history of chronic back pain.  Patient 

had previous CVA as well as history of chronic pain.


Allergies:  


Coded Allergies:  


     HALOPERIDOL (Verified  Allergy, Unknown, 2/5/19)





Patient History


Past Medical History:  see triage record, DM


Reviewed Nursing Documentation:  PMH: Agreed; PSxH: Agreed





Nursing Documentation-PMH


Past Medical History:  No History, Except For


Hx Hypertension:  Yes


Hx COPD:  Yes


Hx Diabetes:  Yes


Hx Cancer:  No


Hx Gastrointestinal Problems:  Yes


Hx Neurological Problems:  Yes


Hx Cerebrovascular Accident:  Yes - Oct 2018


Hx Seizures:  Yes


Hx Peripheral Neuropathy:  Yes


Hx Head Trauma:  Yes


Hx Dizziness:  Yes


Hx Headaches:  Yes


Hx Weakness:  Yes - left arm, left leg





Review of Systems


All Other Systems:  negative except mentioned in HPI





Physical Exam





Vital Signs








  Date Time  Temp Pulse Resp B/P (MAP) Pulse Ox O2 Delivery O2 Flow Rate FiO2


 


2/5/19 17:00 98.8 108 16 151/85 95 Room Air  


 


2/5/19 17:14        97








Sp02 EP Interpretation:  reviewed, normal


General Appearance:  normal inspection, well appearing, no apparent distress, 

alert, obese, Chronically Ill


Head:  atraumatic


ENT:  normal ENT inspection, hearing grossly normal, normal voice


Neck:  normal inspection, full range of motion, supple, no bony tend


Respiratory:  normal inspection, lungs clear, normal breath sounds, no 

respiratory distress, no retraction, no wheezing


Cardiovascular #1:  regular rate, rhythm, no edema


Gastrointestinal:  normal inspection, normal bowel sounds, non tender, soft, no 

guarding, no hernia


Genitourinary:  no CVA tenderness


Musculoskeletal:  normal inspection, back normal, normal range of motion


Neurologic:  normal inspection, alert, oriented x3, responsive, CNs III-XII nml 

as tested, speech normal


Psychiatric:  normal inspection, judgement/insight normal, mood/affect normal


Skin:  normal inspection, normal color, no rash





Medical Decision Making


Diagnostic Impression:  


 Primary Impression:  


 Abdominal pain of unknown etiology


 Additional Impression:  


 Uncontrolled diabetes mellitus


ER Course


Patient presented for generalized weakness.  Differential diagnosis included 

was not limited to anemia, urinary tract infection, electrolyte abnormality, 

hypothyroidism, myocardial infarction, myasthenia gravis, dehydration, among 

others.  Because of complexity of patient's case laboratory testing and imaging 

studies were ordered.  EKG interpreted by me showed sinus tachycardia with a 

rate of 101 without acute ST or T wave changes.  Patient started on IV fluids.  

He was given IV insulin.  Dr. Samir Cabrera was contacted for inpatient 

management due to primary care physician.





Labs








Test


  2/5/19


17:45 2/5/19


18:35


 


White Blood Count


  12.4 K/UL


(4.8-10.8) 


 


 


Red Blood Count


  4.83 M/UL


(4.70-6.10) 


 


 


Hemoglobin


  14.6 G/DL


(14.2-18.0) 


 


 


Hematocrit


  42.2 %


(42.0-52.0) 


 


 


Mean Corpuscular Volume 87 FL (80-99)  


 


Mean Corpuscular Hemoglobin


  30.3 PG


(27.0-31.0) 


 


 


Mean Corpuscular Hemoglobin


Concent 34.7 G/DL


(32.0-36.0) 


 


 


Red Cell Distribution Width


  12.6 %


(11.6-14.8) 


 


 


Platelet Count


  125 K/UL


(150-450) 


 


 


Mean Platelet Volume


  10.3 FL


(6.5-10.1) 


 


 


Neutrophils (%) (Auto)


  66.3 %


(45.0-75.0) 


 


 


Lymphocytes (%) (Auto)


  24.9 %


(20.0-45.0) 


 


 


Monocytes (%) (Auto)


  6.6 %


(1.0-10.0) 


 


 


Eosinophils (%) (Auto)


  0.6 %


(0.0-3.0) 


 


 


Basophils (%) (Auto)


  1.5 %


(0.0-2.0) 


 


 


Urine Color Yellow  


 


Urine Appearance Clear  


 


Urine pH 7 (4.5-8.0)  


 


Urine Specific Gravity


  1.015


(1.005-1.035) 


 


 


Urine Protein 2+ (NEGATIVE)  


 


Urine Glucose (UA) 2+ (NEGATIVE)  


 


Urine Ketones


  Negative


(NEGATIVE) 


 


 


Urine Blood


  Negative


(NEGATIVE) 


 


 


Urine Nitrite


  Negative


(NEGATIVE) 


 


 


Urine Bilirubin


  Negative


(NEGATIVE) 


 


 


Urine Urobilinogen


  4 MG/DL


(0.0-1.0) 


 


 


Urine Leukocyte Esterase 1+ (NEGATIVE)  


 


Urine RBC


  0-2 /HPF (0 -


0) 


 


 


Urine WBC


  2-4 /HPF (0 -


0) 


 


 


Urine Squamous Epithelial


Cells None /LPF


(NONE/OCC) 


 


 


Urine Bacteria


  Few /HPF


(NONE) 


 


 


Sodium Level


  


  136 MMOL/L


(136-145)


 


Potassium Level


  


  4.1 MMOL/L


(3.5-5.1)


 


Chloride Level


  


  104 MMOL/L


()


 


Carbon Dioxide Level


  


  24 MMOL/L


(21-32)


 


Anion Gap


  


  8 mmol/L


(5-15)


 


Blood Urea Nitrogen


  


  14 mg/dL


(7-18)


 


Creatinine


  


  0.7 MG/DL


(0.55-1.30)


 


Estimat Glomerular Filtration


Rate 


  > 60 mL/min


(>60)


 


Glucose Level


  


  281 MG/DL


()


 


Calcium Level


  


  8.2 MG/DL


(8.5-10.1)


 


Total Bilirubin


  


  0.4 MG/DL


(0.2-1.0)


 


Aspartate Amino Transf


(AST/SGOT) 


  40 U/L (15-37) 


 


 


Alanine Aminotransferase


(ALT/SGPT) 


  46 U/L (12-78) 


 


 


Alkaline Phosphatase


  


  63 U/L


()


 


Total Protein


  


  6.4 G/DL


(6.4-8.2)


 


Albumin


  


  3.4 G/DL


(3.4-5.0)


 


Globulin  3.0 g/dL 


 


Albumin/Globulin Ratio  1.1 (1.0-2.7) 


 


Acetone Level


  


  Negative


(NEGATIVE)








EKG Diagnostic Results


Rate:  tachycardiac


Rhythm:  NSR


ST Segments:  no acute changes





Last Vital Signs








  Date Time  Temp Pulse Resp B/P (MAP) Pulse Ox O2 Delivery O2 Flow Rate FiO2


 


2/5/19 21:05 98.7       


 


2/5/19 19:46  106 26 99/74 97 Room Air  97








Status:  unchanged


Disposition:  ADMITTED AS INPATIENT


Condition:  Serious


Referrals:  


Samir Cabrera DO (PCP)











Garry Sullivan MD Feb 5, 2019 22:12

## 2019-02-06 VITALS — DIASTOLIC BLOOD PRESSURE: 74 MMHG | SYSTOLIC BLOOD PRESSURE: 121 MMHG

## 2019-02-06 VITALS — DIASTOLIC BLOOD PRESSURE: 85 MMHG | SYSTOLIC BLOOD PRESSURE: 138 MMHG

## 2019-02-06 VITALS — SYSTOLIC BLOOD PRESSURE: 141 MMHG | DIASTOLIC BLOOD PRESSURE: 87 MMHG

## 2019-02-06 VITALS — SYSTOLIC BLOOD PRESSURE: 118 MMHG | DIASTOLIC BLOOD PRESSURE: 75 MMHG

## 2019-02-06 VITALS — DIASTOLIC BLOOD PRESSURE: 70 MMHG | SYSTOLIC BLOOD PRESSURE: 112 MMHG

## 2019-02-06 VITALS — SYSTOLIC BLOOD PRESSURE: 118 MMHG | DIASTOLIC BLOOD PRESSURE: 78 MMHG

## 2019-02-06 LAB
ADD MANUAL DIFF: NO
ALBUMIN SERPL-MCNC: 3.2 G/DL (ref 3.4–5)
ALBUMIN/GLOB SERPL: 1.2 {RATIO} (ref 1–2.7)
ALP SERPL-CCNC: 60 U/L (ref 46–116)
ALT SERPL-CCNC: 47 U/L (ref 12–78)
ANION GAP SERPL CALC-SCNC: 9 MMOL/L (ref 5–15)
AST SERPL-CCNC: 20 U/L (ref 15–37)
BASOPHILS NFR BLD AUTO: 0.7 % (ref 0–2)
BILIRUB SERPL-MCNC: 0.5 MG/DL (ref 0.2–1)
BUN SERPL-MCNC: 10 MG/DL (ref 7–18)
CALCIUM SERPL-MCNC: 8.1 MG/DL (ref 8.5–10.1)
CHLORIDE SERPL-SCNC: 108 MMOL/L (ref 98–107)
CHOLEST SERPL-MCNC: 82 MG/DL (ref ?–200)
CO2 SERPL-SCNC: 25 MMOL/L (ref 21–32)
CREAT SERPL-MCNC: 0.7 MG/DL (ref 0.55–1.3)
EOSINOPHIL NFR BLD AUTO: 3 % (ref 0–3)
ERYTHROCYTE [DISTWIDTH] IN BLOOD BY AUTOMATED COUNT: 12.5 % (ref 11.6–14.8)
GLOBULIN SER-MCNC: 2.7 G/DL
HCT VFR BLD CALC: 38.3 % (ref 42–52)
HDLC SERPL-MCNC: 25 MG/DL (ref 40–60)
HGB BLD-MCNC: 13.6 G/DL (ref 14.2–18)
LYMPHOCYTES NFR BLD AUTO: 37.7 % (ref 20–45)
MCV RBC AUTO: 85 FL (ref 80–99)
MONOCYTES NFR BLD AUTO: 7.5 % (ref 1–10)
NEUTROPHILS NFR BLD AUTO: 51.2 % (ref 45–75)
PHOSPHATE SERPL-MCNC: 2.9 MG/DL (ref 2.5–4.9)
PLATELET # BLD: 179 K/UL (ref 150–450)
POTASSIUM SERPL-SCNC: 3.6 MMOL/L (ref 3.5–5.1)
RBC # BLD AUTO: 4.49 M/UL (ref 4.7–6.1)
SODIUM SERPL-SCNC: 142 MMOL/L (ref 136–145)
TRIGL SERPL-MCNC: 87 MG/DL (ref 30–150)
WBC # BLD AUTO: 9.3 K/UL (ref 4.8–10.8)

## 2019-02-06 RX ADMIN — INSULIN ASPART SCH UNITS: 100 INJECTION, SOLUTION INTRAVENOUS; SUBCUTANEOUS at 17:45

## 2019-02-06 RX ADMIN — DIVALPROEX SODIUM SCH MG: 500 TABLET, DELAYED RELEASE ORAL at 23:00

## 2019-02-06 RX ADMIN — MORPHINE SULFATE PRN MG: 2 INJECTION, SOLUTION INTRAMUSCULAR; INTRAVENOUS at 14:41

## 2019-02-06 RX ADMIN — MORPHINE SULFATE PRN MG: 2 INJECTION, SOLUTION INTRAMUSCULAR; INTRAVENOUS at 18:56

## 2019-02-06 RX ADMIN — DIVALPROEX SODIUM SCH MG: 500 TABLET, DELAYED RELEASE ORAL at 08:26

## 2019-02-06 RX ADMIN — QUETIAPINE SCH MG: 200 TABLET, FILM COATED ORAL at 22:59

## 2019-02-06 RX ADMIN — INSULIN ASPART SCH UNITS: 100 INJECTION, SOLUTION INTRAVENOUS; SUBCUTANEOUS at 21:00

## 2019-02-06 RX ADMIN — INSULIN ASPART SCH UNITS: 100 INJECTION, SOLUTION INTRAVENOUS; SUBCUTANEOUS at 06:33

## 2019-02-06 RX ADMIN — INSULIN ASPART SCH UNITS: 100 INJECTION, SOLUTION INTRAVENOUS; SUBCUTANEOUS at 11:53

## 2019-02-06 RX ADMIN — HEPARIN SODIUM SCH UNITS: 5000 INJECTION INTRAVENOUS; SUBCUTANEOUS at 08:32

## 2019-02-06 RX ADMIN — INSULIN ASPART SCH UNITS: 100 INJECTION, SOLUTION INTRAVENOUS; SUBCUTANEOUS at 06:32

## 2019-02-06 RX ADMIN — MORPHINE SULFATE PRN MG: 2 INJECTION, SOLUTION INTRAMUSCULAR; INTRAVENOUS at 05:50

## 2019-02-06 RX ADMIN — INSULIN ASPART SCH UNITS: 100 INJECTION, SOLUTION INTRAVENOUS; SUBCUTANEOUS at 11:51

## 2019-02-06 RX ADMIN — MORPHINE SULFATE PRN MG: 2 INJECTION, SOLUTION INTRAMUSCULAR; INTRAVENOUS at 22:59

## 2019-02-06 RX ADMIN — HEPARIN SODIUM SCH UNITS: 5000 INJECTION INTRAVENOUS; SUBCUTANEOUS at 21:00

## 2019-02-06 RX ADMIN — MORPHINE SULFATE PRN MG: 2 INJECTION, SOLUTION INTRAMUSCULAR; INTRAVENOUS at 09:59

## 2019-02-06 NOTE — DIAGNOSTIC IMAGING REPORT
Indication: Dyspnea

 

Comparison:  10/4/2018

 

A single view chest radiograph was obtained.

 

Findings:

 

Cardiomediastinal appearance is within normal limits for age. The lungs are clear.

Pulmonary vascularity is appropriate. The diaphragmatic contour is smooth and

costophrenic angles are sharp. No pleural effusions are identified. The bones are

unremarkable.

 

Impression: No acute findings

## 2019-02-06 NOTE — NUR
NURSE NOTES:

Pt received from NACHO Arias alert and oriented x4 with no complaints or s/s of pain, SOB, 
or n/v. No acute distress. Bed in lowest position, call light and belongings within reach. 
IV site asymptomatic and patent.

## 2019-02-06 NOTE — NUR
NURSE NOTE:



Pt refused heparin stating he had "bleeding earlier." No s/sx of bleeding, bruising observed 
upon assessment. Pt educated on purpose of heparin for anticoagulation, DVT prophylaxis.

## 2019-02-06 NOTE — NUR
TRANSFER TO FLOOR:

Patient transferred to [facility]-, per Dr. Steward.   Report given to NACHO Fierro.  Belongings and 
medications given to NACHO Fierro. Family and or S/O informed of transfer.

## 2019-02-06 NOTE — CARDIOLOGY REPORT
--------------- APPROVED REPORT --------------





EKG Measurement

Heart [id number]

AR [id number]

[id number]

[id number]

[id number]





Sinus tachycardia

Otherwise normal ECG

## 2019-02-06 NOTE — CONSULTATION
History of Present Illness


General


Date patient seen:  Feb 6, 2019


Chief Complaint:  General Complaint





Present Illness


HPI


38-year-old male with hx of COPD, DM, HTN, CVA, psychosis, presented to ER with 

cc of  generalized weakness and recurrent  falling.  His BS was critically 

elevated in ER therefore he is admitted to telemetry for further work up.


Allergies:  


Coded Allergies:  


     HALOPERIDOL (Verified  Allergy, Unknown, 2/5/19)





Medication History


Scheduled


Aspirin* (Aspirin*), 81 MG ORAL DAILY, (Reported)


Divalproex Sodium* (Depakote*), 500 MG PO Q12HR, (Reported)


Escitalopram Oxalate* (Lexapro*), 10 MG ORAL DAILY, (Reported)


Insulin Aspart (Novolog), 15 UNIT SQ BEFORE MEALS, (Reported)


Insulin Glargine (Lantus), 30 UNITS SUBQ BEDTIME, (Reported)


Quetiapine Fumarate* (Seroquel*), 200 MG ORAL BEDTIME, (Reported)





Scheduled PRN


Alprazolam* (Xanax*), 0.5 MG PO Q6HR PRN for For Anxiety, (Reported)


Clonidine Hcl* (Catapres*), 0.1 MG ORAL Q4HR PRN for For High Blood Pressure, (

Reported)


Insulin Aspart (Novolog), 0 SQ BEFORE MEALS PRN for Sliding Scale, (Reported)


Ipratropium/Albuterol Sulfate (DuoNeb 0.5-3(2.5)mg/3ml), 3 ML HHN Q4HR PRN for 

Shortness of Breath, (Reported)





Discontinued Medications


Acetaminophen* (Acetaminophen 325MG Tablet*), 650 MG ORAL Q4H PRN for fever, (

Reported)


   Discontinued Reason: Pt stopped taking med


Divalproex Sodium (Divalproex Sodium), 1,000 MG PO Q12HR, (Reported)


   Discontinued Reason: Medication dose changed


Hydrocodone Bit/Acetaminophen * (Norco *), 1 TAB ORAL Q4H PRN for 

For Pain, (Reported)


   Discontinued Reason: Pt stopped taking med


Methocarbamol (Methocarbamol), 500 MG PO Q8HR PRN for Severe Breakthru Pain (>7)

, (Reported)


   Discontinued Reason: Pt stopped taking med


Nitroglycerin (Nitrostat), 0.4 MG SL q5min x3 doses PRN for Prn Chest Pain, (

Reported)


   Discontinued Reason: Pt stopped taking med


Ondansetron* (Zofran*), 4 MG ORAL Q4HR PRN for Nausea & Vomiting, (Reported)


   Discontinued Reason: Pt stopped taking med


Pantoprazole (Pantoprazole), 40 MG ORAL EVERY 12 HOURS, (Reported)


   Discontinued Reason: Pt stopped taking med


Polyethylene Glycol 3350* (Polyethylene Glycol 3350*), 17 GM ORAL BEDTIME PRN 

for Constipation, (Reported)


   Discontinued Reason: Pt stopped taking med


Temazepam* (Restoril*), 15 MG ORAL BEDTIME PRN for Insomnia, (Reported)


   Discontinued Reason: Pt stopped taking med





Patient History


Healthcare decision maker





Resuscitation status


Full Code


Advanced Directive on File


No





Past Medical/Surgical History


Past Medical/Surgical History:  


(1) Lumbar degenerative disc disease


(2) Peripheral neuropathy


(3) Diabetes mellitus


(4) COPD (chronic obstructive pulmonary disease)


(5) CVA (cerebral vascular accident)


(6) Noncompliance


(7) Gastroparesis due to DM





Review of Systems


All Other Systems:  negative except mentioned in HPI





Physical Exam


General Appearance:  WD/WN


Lines, tubes and drains:  peripheral


HEENT:  normocephalic, atraumatic


Neck:  non-tender, normal alignment


Respiratory/Chest:  chest wall non-tender, lungs clear


Breasts:  no masses


Cardiovascular/Chest:  normal peripheral pulses, normal rate


Abdomen:  normal bowel sounds, non tender


Genitourinary/Rectal:  normal genital exam, normal rectal exam


Skin Exam:  normal pigmentation


Neurologic:  CNs II-XII grossly normal





Last 24 Hour Vital Signs








  Date Time  Temp Pulse Resp B/P (MAP) Pulse Ox O2 Delivery O2 Flow Rate FiO2


 


2/6/19 08:00 98.2 79 20 112/70 (84) 96   


 


2/6/19 08:00  73      


 


2/6/19 06:20 97.6       


 


2/6/19 04:00  81      


 


2/6/19 04:00 97.6 80 17 118/75 (89) 99   


 


2/6/19 00:00 98.4 79 18 121/74 (90) 98   


 


2/6/19 00:00  84      


 


2/5/19 22:24      Room Air  


 


2/5/19 20:00 98.6 80 19 121/73 (89) 98   


 


2/5/19 19:46 98.7 106 26 99/74 97 Room Air  97


 


2/5/19 17:14  106 26     97


 


2/5/19 17:14 98.7 106 26 99/74 97 Room Air  


 


2/5/19 17:00 98.8 108 16 151/85 95 Room Air  

















Intake and Output  


 


 2/5/19 2/6/19





 19:00 07:00


 


Intake Total  1800 ml


 


Output Total  1000 ml


 


Balance  800 ml


 


  


 


Intake Oral  800 ml


 


IV Total  1000 ml


 


Output Urine Total  1000 ml


 


# Voids  1











Laboratory Tests








Test


  2/5/19


17:45 2/5/19


18:35 2/6/19


08:25 2/6/19


10:30


 


White Blood Count


  12.4 K/UL


(4.8-10.8)  H 


  9.3 K/UL


(4.8-10.8) 


 


 


Red Blood Count


  4.83 M/UL


(4.70-6.10) 


  4.49 M/UL


(4.70-6.10)  L 


 


 


Hemoglobin


  14.6 G/DL


(14.2-18.0) 


  13.6 G/DL


(14.2-18.0)  L 


 


 


Hematocrit


  42.2 %


(42.0-52.0) 


  38.3 %


(42.0-52.0)  L 


 


 


Mean Corpuscular Volume 87 FL (80-99)    85 FL (80-99)   


 


Mean Corpuscular Hemoglobin


  30.3 PG


(27.0-31.0) 


  30.2 PG


(27.0-31.0) 


 


 


Mean Corpuscular Hemoglobin


Concent 34.7 G/DL


(32.0-36.0) 


  35.4 G/DL


(32.0-36.0) 


 


 


Red Cell Distribution Width


  12.6 %


(11.6-14.8) 


  12.5 %


(11.6-14.8) 


 


 


Platelet Count


  125 K/UL


(150-450)  L 


  179 K/UL


(150-450) 


 


 


Mean Platelet Volume


  10.3 FL


(6.5-10.1)  H 


  8.5 FL


(6.5-10.1) 


 


 


Neutrophils (%) (Auto)


  66.3 %


(45.0-75.0) 


  51.2 %


(45.0-75.0) 


 


 


Lymphocytes (%) (Auto)


  24.9 %


(20.0-45.0) 


  37.7 %


(20.0-45.0) 


 


 


Monocytes (%) (Auto)


  6.6 %


(1.0-10.0) 


  7.5 %


(1.0-10.0) 


 


 


Eosinophils (%) (Auto)


  0.6 %


(0.0-3.0) 


  3.0 %


(0.0-3.0) 


 


 


Basophils (%) (Auto)


  1.5 %


(0.0-2.0) 


  0.7 %


(0.0-2.0) 


 


 


Urine Color Yellow     


 


Urine Appearance Clear     


 


Urine pH 7 (4.5-8.0)     


 


Urine Specific Gravity


  1.015


(1.005-1.035) 


  


  


 


 


Urine Protein


  2+ (NEGATIVE)


H 


  


  


 


 


Urine Glucose (UA)


  2+ (NEGATIVE)


H 


  


  


 


 


Urine Ketones


  Negative


(NEGATIVE) 


  


  


 


 


Urine Blood


  Negative


(NEGATIVE) 


  


  


 


 


Urine Nitrite


  Negative


(NEGATIVE) 


  


  


 


 


Urine Bilirubin


  Negative


(NEGATIVE) 


  


  


 


 


Urine Urobilinogen


  4 MG/DL


(0.0-1.0)  H 


  


  


 


 


Urine Leukocyte Esterase


  1+ (NEGATIVE)


H 


  


  


 


 


Urine RBC


  0-2 /HPF (0 -


0)  H 


  


  


 


 


Urine WBC


  2-4 /HPF (0 -


0) 


  


  


 


 


Urine Squamous Epithelial


Cells None /LPF


(NONE/OCC) 


  


  


 


 


Urine Bacteria


  Few /HPF


(NONE) 


  


  


 


 


Sodium Level


  


  136 MMOL/L


(136-145) 142 MMOL/L


(136-145) 


 


 


Potassium Level


  


  4.1 MMOL/L


(3.5-5.1) 3.6 MMOL/L


(3.5-5.1) 


 


 


Chloride Level


  


  104 MMOL/L


() 108 MMOL/L


()  H 


 


 


Carbon Dioxide Level


  


  24 MMOL/L


(21-32) 25 MMOL/L


(21-32) 


 


 


Anion Gap


  


  8 mmol/L


(5-15) 9 mmol/L


(5-15) 


 


 


Blood Urea Nitrogen


  


  14 mg/dL


(7-18) 10 mg/dL


(7-18) 


 


 


Creatinine


  


  0.7 MG/DL


(0.55-1.30) 0.7 MG/DL


(0.55-1.30) 


 


 


Estimat Glomerular Filtration


Rate 


  > 60 mL/min


(>60) > 60 mL/min


(>60) 


 


 


Glucose Level


  


  281 MG/DL


()  H 67 MG/DL


()  #L 


 


 


Calcium Level


  


  8.2 MG/DL


(8.5-10.1)  L 8.1 MG/DL


(8.5-10.1)  L 


 


 


Total Bilirubin


  


  0.4 MG/DL


(0.2-1.0) 0.5 MG/DL


(0.2-1.0) 


 


 


Aspartate Amino Transf


(AST/SGOT) 


  40 U/L (15-37)


H 20 U/L (15-37)


  


 


 


Alanine Aminotransferase


(ALT/SGPT) 


  46 U/L (12-78)


  47 U/L (12-78)


  


 


 


Alkaline Phosphatase


  


  63 U/L


() 60 U/L


() 


 


 


Total Protein


  


  6.4 G/DL


(6.4-8.2) 5.9 G/DL


(6.4-8.2)  L 


 


 


Albumin


  


  3.4 G/DL


(3.4-5.0) 3.2 G/DL


(3.4-5.0)  L 


 


 


Globulin  3.0 g/dL   2.7 g/dL   


 


Albumin/Globulin Ratio  1.1 (1.0-2.7)   1.2 (1.0-2.7)   


 


Acetone Level


  


  Negative


(NEGATIVE) 


  


 


 


Hemoglobin A1c   Pending   


 


Phosphorus Level


  


  


  2.9 MG/DL


(2.5-4.9) 


 


 


Magnesium Level


  


  


  1.9 MG/DL


(1.8-2.4) 


 


 


Triglycerides Level


  


  


  87 MG/DL


() 


 


 


Cholesterol Level


  


  


  82 MG/DL (<


200) 


 


 


LDL Cholesterol


  


  


  49 mg/dL


(<100) 


 


 


HDL Cholesterol


  


  


  25 MG/DL


(40-60)  L 


 


 


Cholesterol/HDL Ratio   3.3 (3.3-4.4)   


 


Thyroid Stimulating Hormone


(TSH) 


  


  2.581 uiU/mL


(0.358-3.740) 


 


 


Arterial Blood pH


  


  


  


  7.414


(7.350-7.450)


 


Arterial Blood Partial


Pressure CO2 


  


  


  32.7 mmHg


(35.0-45.0)  L


 


Arterial Blood Partial


Pressure O2 


  


  


  88.9 mmHg


(75.0-100.0)


 


Arterial Blood HCO3


  


  


  


  20.4 mmol/L


(22.0-26.0)  L


 


Arterial Blood Oxygen


Saturation 


  


  


  96.6 %


()


 


Arterial Blood Base Excess    -3.2 (-2-2)  L


 


Bereket Test    Positive  








Height (Feet):  5


Height (Inches):  11.00


Weight (Pounds):  267


Medications





Current Medications








 Medications


  (Trade)  Dose


 Ordered  Sig/Henry


 Route


 PRN Reason  Start Time


 Stop Time Status Last Admin


Dose Admin


 


 Acetaminophen


  (Tylenol)  650 mg  Q4H  PRN


 ORAL


 fever (temp>100.5F)  2/5/19 19:30


 3/7/19 19:29   


 


 


 Al Hydroxide/Mg


 Hydroxide


  (Mylanta II)  30 ml  Q6H  PRN


 ORAL


 dyspepsia  2/5/19 19:30


 3/7/19 19:29   


 


 


 Albuterol/


 Ipratropium


  (Albuterol/


 Ipratropium)  3 ml  Q4H  PRN


 HHN


 Shortness of Breath  2/5/19 19:30


 2/10/19 19:29   


 


 


 Alprazolam


  (Xanax)  1 mg  Q6H  PRN


 ORAL


 For Anxiety  2/6/19 06:00


 2/13/19 05:59   


 


 


 Aspirin


  (ASA)  81 mg  DAILY


 ORAL


   2/6/19 09:00


 3/8/19 08:59  2/6/19 08:26


 


 


 Clonidine HCl


  (Catapres Tab)  0.1 mg  Q4H  PRN


 ORAL


 sbp more than 160  2/5/19 19:30


 3/7/19 19:29   


 


 


 Dextrose


  (Dextrose 50%)  25 ml  Q30M  PRN


 IV


 Hypoglycemia  2/5/19 21:45


 3/7/19 21:44   


 


 


 Dextrose


  (Dextrose 50%)  50 ml  Q30M  PRN


 IV


 Hypoglycemia  2/5/19 21:45


 3/7/19 21:44   


 


 


 Divalproex Sodium


  (Depakote)  500 mg  Q12HR


 ORAL


   2/5/19 21:00


 3/7/19 20:59  2/6/19 08:26


 


 


 Escitalopram


 Oxalate


  (Lexapro)  10 mg  DAILY


 ORAL


   2/6/19 09:00


 3/8/19 08:59  2/6/19 08:26


 


 


 Heparin Sodium


  (Porcine)


  (Heparin 5000


 units/ml)  5,000 units  EVERY 12  HOURS


 SUBQ


   2/5/19 21:00


 3/7/19 20:59   


 


 


 Insulin Aspart


  (NovoLOG)    BEFORE MEALS AND  HS


 SUBQ


   2/5/19 21:00


 3/7/19 20:59  2/6/19 06:33


 


 


 Insulin Aspart


  (NovoLOG)  15 units  NOVOTIAC


 SUBQ


   2/6/19 06:30


 3/8/19 06:29  2/6/19 06:32


 


 


 Insulin Detemir


  (Levemir)  40 units  BEDTIME


 SUBQ


   2/5/19 22:00


 3/7/19 21:59  2/5/19 23:02


 


 


 Morphine Sulfate


  (Morphine


 Sulfate)  2 mg  Q4H  PRN


 IVP


 severe pain 7-10  2/5/19 19:30


 2/12/19 19:29  2/6/19 09:59


 


 


 Nitroglycerin


  (Ntg)  0.4 mg  Q5M X 3 DOSES PRN


 SL


 Prn Chest Pain  2/5/19 19:30


 3/7/19 19:29   


 


 


 Ondansetron HCl


  (Zofran)  4 mg  Q6H  PRN


 IVP


 Nausea & Vomiting  2/5/19 19:30


 3/7/19 19:29  2/6/19 08:26


 


 


 Polyethylene


 Glycol


  (Miralax)  17 gm  HSPRN  PRN


 ORAL


 Constipation  2/5/19 19:30


 3/7/19 19:29   


 


 


 Quetiapine


 Fumarate


  (SEROquel)  200 mg  BEDTIME


 ORAL


   2/5/19 21:00


 3/7/19 20:59  2/5/19 20:26


 


 


 Sodium Chloride  1,000 ml @ 


 100 mls/hr  Q10H


 IVLG


   2/5/19 19:30


 3/7/19 19:29  2/6/19 05:50


 


 


 Temazepam


  (Restoril)  15 mg  HSPRN  PRN


 ORAL


 Insomnia  2/5/19 19:30


 2/12/19 19:29   


 











Assessment/Plan


Problem List:  


(1) Recurrent falls


ICD Codes:  R29.6 - Repeated falls


SNOMED:  371423559


(2) Uncontrolled diabetes mellitus


ICD Codes:  E11.65 - Type 2 diabetes mellitus with hyperglycemia


SNOMED:  39554714, 185982526


(3) CVA (cerebral vascular accident)


ICD Codes:  I63.9 - Cerebral infarction, unspecified


SNOMED:  691997423


(4) COPD (chronic obstructive pulmonary disease)


ICD Codes:  J44.9 - Chronic obstructive pulmonary disease, unspecified


SNOMED:  09909622


Assessment/Plan


sliding scale


diabetic diet


diabetic education


respiratory treatment


symptomatic treatment


check electrolytes


check HemA1C











Lissette Steward MD Feb 6, 2019 11:31

## 2019-02-06 NOTE — NUR
NURSE NOTES:

Patient transferred to floor via bed at 1545. Received report from Osbaldo GRIFFITH. Patient stable on 
arrival, no s/s acute distress noted. IV intact and asymptomatic, running IVF per order. 
Belongings checked and verified at bedside. Skin intact. Fall and seizure precautions 
maintained, bed rails padded. Side rails upx3, bed low and locked, call light in reach. Will 
continue to monitor.

## 2019-02-06 NOTE — NUR
Pt received in stable condition. Pt sitting in semi-fowlers with padded side rails, upx3. 
Bed locked, in lowest position. Call light is within reach as well as personal belongings 
including his phone. Pt in no acute distress. Pt was educated to call us using the call 
light for assistance.

-------------------------------------------------------------------------------

Addendum: 02/07/19 at 0130 by Amber Galeas RN

-------------------------------------------------------------------------------

Fall precautions instituted and maintained. Skin intact. Iv site, patent, intact with IVF 
running NS at 100 ml./h.

## 2019-02-06 NOTE — HISTORY AND PHYSICAL REPORT
DATE OF ADMISSION:  02/05/2019

TIME SEEN:  4 p.m.



CONSULTANTS:

1. Dusty Denis M.D.

2. Lissette Steward M.D.

3. Gulshan Armstrong M.D.



CHIEF COMPLAINT:  Diabetes, hyperglycemia, leukocytosis, nausea and

vomiting, psych history.



BRIEF HISTORY:  This is a 38-year-old male from [facility].

Apparently went ______ was arrested overnight, facility refused to take

him back.  The patient was sent to [facility], diagnosed with diabetes,

hyperglycemia, leukocytosis, admitted to medical floor for further

treatment.  Currently, calm, in bed.  No complaint.  No chest pain.  No

shortness of breath.  No nausea, vomiting, or diarrhea.



PAST MEDICAL HISTORY:  Includes COPD, psych history, CVA, and seizure.



PAST SURGICAL HISTORY:  Neck.



MEDICATIONS:  Include aspirin, Lexapro, Depakote, heparin, Levemir,

MiraLAX, NovoLog, Mylanta, Xanax, morphine.



ALLERGIES:  Haldol.



SOCIAL HISTORY:  Positive smoking.  No alcohol.  No intravenous drug

abuse.



FAMILY HISTORY:  Noncontributory.



PHYSICAL EXAMINATION:

GENERAL:  Calm in bed, oriented x3, no acute distress.

VITAL SIGNS:  Temperature is 98 degrees, pulse 98, respiratory rate 19,

blood pressure 141/87.

CARDIOVASCULAR:  No murmur.

LUNGS:  Distant and clear.

ABDOMEN:  Bowel sound positive.  Nontender.  Nondistended.

EXTREMITIES:  No cyanosis or edema.

NEUROLOGIC:  The patient moves all extremities, slightly weak.



LABORATORY DATA:  Show initial white count 12.4, now 9.3, hemoglobin and

hematocrit 12.6/30, otherwise CBC is normal.  BMP show chloride is 108,

glucose 67, initially was 281.  Albumin 3.2.



ASSESSMENT:

1. Diabetes.

2. Hyperglycemia.

3. Leukocytosis which has resolved.

4. Nausea and vomiting.

5. Malnutrition.

6. Psych history.

7. COPD.

8. CVA.

9. Seizure.



PLAN:

1. Resume home medications.

2. Blood sugar control.

3. O2 and pulmonary treatment as needed.

4. Seizure control.

5. Dietary followup.

6. CBC and BMP in the morning.

7. Discharge plan.

8. We will continue to follow the patient.









  ______________________________________________

  Samir Cabrera D.O.





DR:  Mynor

D:  02/06/2019 16:23

T:  02/06/2019 17:55

JOB#:  [id number]

CC:

## 2019-02-06 NOTE — NUR
CASE MANAGEMENT: REVIEW



38/M PRESENTED TO ED FROM STREETS



CC: VOMITING . 



SI: UNCONTROLLED DM . DEHYDRATION 

T 98.8  RR 26 BP 99/74 SAT 95% ROOM AIR

WBC 12.4 GLUCOSE 281 ALT 40 







IS: NS IVF BOLUS X1

NOVOLIN R 8UNITS IV X1







***INTERQUAL CRITERIA MET: PATIENT ADMITTED TO MED/SURG UNIT 02/05/2019***

DCP: PATIENT REPORTS BEING HOMELESS

## 2019-02-06 NOTE — CONSULTATION
DATE OF CONSULTATION:  02/06/2019

INITIAL PSYCHIATRIC EVALUATION:



CONSULTING PHYSICIAN:  Gulshan Armstrong M.D.



HISTORY OF PRESENT ILLNESS:  This is a 38-year-old male patient with

uncontrolled dizziness.  He has uncontrolled dizziness and mood lability.

He has got no logical plan for his own self-care, but this patient came in

because he had overlying diagnosis of schizoaffective, bipolar type.  So,

his attending has requested daily psychiatric consultation because this

patient has mood lability, worsened by stress of his medical illness.  I

saw and assessed the patient at bedside.  He says, "I'm very anxious _____

my Xanax.  I don't know what to do."



MEDICAL PROBLEMS:  He has diabetes, dizziness, neuropathy.



ALLERGIES:  To Haldol.



PSYCHOTROPIC MEDICATIONS ON ADMISSION:  Seroquel 200 mg at bedtime,

Depakote 500 twice a day.



SUBSTANCE USE HISTORY:  Denies any recent drug or alcohol use.



FAMILY PSYCHIATRIC HISTORY:  Denies.



PAIN ASSESSMENT:  3/10 pain.



DEVELOPMENTAL PROBLEMS:  None.



SOCIAL HISTORY:  He is homeless.  Financially supported by [employer] and

Medicare.



PSYCHIATRIC HISTORY:  Schizoaffective, bipolar type.  Multiple psychiatric

admissions.



STRENGTHS:  He is motivated to get better.  He is healthy.



WEAKNESSES:  He is impulsive and no support system.



MENTAL STATUS EXAMINATION:  This is a 38-year-old male.  Appearance is

disheveled.  Attitude, irritable and agitated.  Affect, guarded and

restricted.  Intellect poor.  Mood, depressed.  Motor activity,

psychomotor agitation.  Attention span is poor.  Orientation x2.  Speech

is pressured.  Thought process, disorganized and logical.  Thought

content, auditory hallucinations and paranoid delusions.  Insight and

judgment is poor.



DIAGNOSES:

1. Schizoaffective, bipolar type.

2. Medical, dizziness.

3. Psychosocial stressors, financial.



PLAN:  I am going to continue this patient on Depakote 500 twice a day to

stabilize his mood and Seroquel 200 mg at bedtime for insomnia and mood

stabilization.  I will also provide him with 20 minutes of cognitive

behavioral therapy to help him identify automatic negative thoughts and

help him convert those negative thoughts to more positive thoughts to

reduce depression, anxiety, and suicidality.  Chart reviewed.  Discussed

with staff.  Seen and assessed in his room.  20 minutes of cognitive

behavioral therapy provided.









  ______________________________________________

  Gulshan Armstrong M.D.





DR:  YASMANY

D:  02/06/2019 06:11

T:  02/06/2019 17:54

JOB#:  [id number]

CC:

## 2019-02-06 NOTE — NUR
Social Service Note



SW met with patient to assess for homelessness.  Patient is alert, oriented and verbally 
responsive.  SW very familiar with this patient from multiple previous admissions.  Patient 
is chronically homeless.  Patient was moving between [place] and [place].  Patient 
receives income however doesn't like paying monthly rent to a board and care or independent 
living.  Patient has been placed in a [facility] program and was unwilling to work 
with staff in program for housing.  Patient returned to the street.   Patient has been 
placed in multiple facilities and then walks away with no alternative housing options.  
Patient has frequent multiple hospitals and psychiatric facilities.  Patient denies any 
psychiatric crisis at this time.  Patient most recently was placed at [facility] 12/14 and 
walked away from facility.  Patient states he is unable to reside with his mother Jeannine Paredes 
[phone] or sister Bri Romero [phone].  Patient didn't want to discuss 
placement further with SW at this time.  Patient states he will discuss with Dr. Cabrera and 
Dr. Armstrong.  SW notified Dr. Cabrera and Dr. Armstrong.  Patient not interested in completing 
application for bridge/interim housing program from [facility].  Will continue to monitor.

## 2019-02-07 VITALS — DIASTOLIC BLOOD PRESSURE: 61 MMHG | SYSTOLIC BLOOD PRESSURE: 110 MMHG

## 2019-02-07 VITALS — SYSTOLIC BLOOD PRESSURE: 121 MMHG | DIASTOLIC BLOOD PRESSURE: 76 MMHG

## 2019-02-07 VITALS — DIASTOLIC BLOOD PRESSURE: 72 MMHG | SYSTOLIC BLOOD PRESSURE: 121 MMHG

## 2019-02-07 VITALS — DIASTOLIC BLOOD PRESSURE: 57 MMHG | SYSTOLIC BLOOD PRESSURE: 121 MMHG

## 2019-02-07 VITALS — SYSTOLIC BLOOD PRESSURE: 111 MMHG | DIASTOLIC BLOOD PRESSURE: 68 MMHG

## 2019-02-07 VITALS — DIASTOLIC BLOOD PRESSURE: 74 MMHG | SYSTOLIC BLOOD PRESSURE: 117 MMHG

## 2019-02-07 LAB
ADD MANUAL DIFF: NO
ANION GAP SERPL CALC-SCNC: 8 MMOL/L (ref 5–15)
BASOPHILS NFR BLD AUTO: 0.5 % (ref 0–2)
BUN SERPL-MCNC: 11 MG/DL (ref 7–18)
CALCIUM SERPL-MCNC: 8.8 MG/DL (ref 8.5–10.1)
CHLORIDE SERPL-SCNC: 105 MMOL/L (ref 98–107)
CO2 SERPL-SCNC: 26 MMOL/L (ref 21–32)
CREAT SERPL-MCNC: 0.9 MG/DL (ref 0.55–1.3)
EOSINOPHIL NFR BLD AUTO: 2.7 % (ref 0–3)
ERYTHROCYTE [DISTWIDTH] IN BLOOD BY AUTOMATED COUNT: 12.6 % (ref 11.6–14.8)
HCT VFR BLD CALC: 38.1 % (ref 42–52)
HGB BLD-MCNC: 13.6 G/DL (ref 14.2–18)
LYMPHOCYTES NFR BLD AUTO: 32.4 % (ref 20–45)
MCV RBC AUTO: 86 FL (ref 80–99)
MONOCYTES NFR BLD AUTO: 7.2 % (ref 1–10)
NEUTROPHILS NFR BLD AUTO: 57.2 % (ref 45–75)
PLATELET # BLD: 173 K/UL (ref 150–450)
POTASSIUM SERPL-SCNC: 3.5 MMOL/L (ref 3.5–5.1)
RBC # BLD AUTO: 4.42 M/UL (ref 4.7–6.1)
SODIUM SERPL-SCNC: 139 MMOL/L (ref 136–145)
WBC # BLD AUTO: 8.4 K/UL (ref 4.8–10.8)

## 2019-02-07 RX ADMIN — INSULIN ASPART SCH UNITS: 100 INJECTION, SOLUTION INTRAVENOUS; SUBCUTANEOUS at 20:32

## 2019-02-07 RX ADMIN — INSULIN ASPART SCH UNITS: 100 INJECTION, SOLUTION INTRAVENOUS; SUBCUTANEOUS at 11:30

## 2019-02-07 RX ADMIN — IPRATROPIUM BROMIDE AND ALBUTEROL SULFATE SCH ML: .5; 3 SOLUTION RESPIRATORY (INHALATION) at 13:00

## 2019-02-07 RX ADMIN — HEPARIN SODIUM SCH UNITS: 5000 INJECTION INTRAVENOUS; SUBCUTANEOUS at 08:40

## 2019-02-07 RX ADMIN — INSULIN ASPART SCH UNITS: 100 INJECTION, SOLUTION INTRAVENOUS; SUBCUTANEOUS at 16:58

## 2019-02-07 RX ADMIN — MORPHINE SULFATE PRN MG: 2 INJECTION, SOLUTION INTRAMUSCULAR; INTRAVENOUS at 06:56

## 2019-02-07 RX ADMIN — HEPARIN SODIUM SCH UNITS: 5000 INJECTION INTRAVENOUS; SUBCUTANEOUS at 21:00

## 2019-02-07 RX ADMIN — QUETIAPINE SCH MG: 200 TABLET, FILM COATED ORAL at 20:40

## 2019-02-07 RX ADMIN — IPRATROPIUM BROMIDE AND ALBUTEROL SULFATE SCH ML: .5; 3 SOLUTION RESPIRATORY (INHALATION) at 20:31

## 2019-02-07 RX ADMIN — MORPHINE SULFATE PRN MG: 2 INJECTION, SOLUTION INTRAMUSCULAR; INTRAVENOUS at 11:41

## 2019-02-07 RX ADMIN — DIVALPROEX SODIUM SCH MG: 500 TABLET, DELAYED RELEASE ORAL at 20:40

## 2019-02-07 RX ADMIN — MORPHINE SULFATE PRN MG: 2 INJECTION, SOLUTION INTRAMUSCULAR; INTRAVENOUS at 16:05

## 2019-02-07 RX ADMIN — MORPHINE SULFATE PRN MG: 2 INJECTION, SOLUTION INTRAMUSCULAR; INTRAVENOUS at 02:56

## 2019-02-07 RX ADMIN — ASPIRIN 81 MG SCH MG: 81 TABLET ORAL at 08:39

## 2019-02-07 RX ADMIN — MORPHINE SULFATE PRN MG: 2 INJECTION, SOLUTION INTRAMUSCULAR; INTRAVENOUS at 07:06

## 2019-02-07 RX ADMIN — INSULIN DETEMIR SCH UNITS: 100 INJECTION, SOLUTION SUBCUTANEOUS at 22:27

## 2019-02-07 RX ADMIN — DIVALPROEX SODIUM SCH MG: 500 TABLET, DELAYED RELEASE ORAL at 08:39

## 2019-02-07 RX ADMIN — INSULIN ASPART SCH UNITS: 100 INJECTION, SOLUTION INTRAVENOUS; SUBCUTANEOUS at 11:44

## 2019-02-07 RX ADMIN — MORPHINE SULFATE PRN MG: 2 INJECTION, SOLUTION INTRAMUSCULAR; INTRAVENOUS at 20:06

## 2019-02-07 RX ADMIN — INSULIN ASPART SCH UNITS: 100 INJECTION, SOLUTION INTRAVENOUS; SUBCUTANEOUS at 06:30

## 2019-02-07 NOTE — PULMONOLOGY PROGRESS NOTE
Assessment/Plan


Problems:  


(1) Recurrent falls


(2) Uncontrolled diabetes mellitus


(3) CVA (cerebral vascular accident)


(4) COPD (chronic obstructive pulmonary disease)


Assessment/Plan


pt , OT


check sputum


add abx for bronchitis


check electrolytes





Subjective


ROS Limited/Unobtainable:  No


Constitutional:  Reports: no symptoms


Allergies:  


Coded Allergies:  


     HALOPERIDOL (Verified  Allergy, Unknown, 2/5/19)





Objective





Last 24 Hour Vital Signs








  Date Time  Temp Pulse Resp B/P (MAP) Pulse Ox O2 Delivery O2 Flow Rate FiO2


 


2/7/19 09:00      Room Air  


 


2/7/19 08:00 98.4 79 19 110/61 (77) 95   


 


2/7/19 04:00 98.2 79 22 121/57 (78) 95   


 


2/7/19 00:00 98.5 80 20 111/68 (82) 100   


 


2/6/19 21:00      Room Air  


 


2/6/19 20:00 98.7 81 20 118/78 (91) 98   


 


2/6/19 19:26 98.3       


 


2/6/19 16:00 98.3 98 19 141/87 (105) 96   

















Intake and Output  


 


 2/6/19 2/7/19





 19:00 07:00


 


Intake Total 1134 ml 2700 ml


 


Output Total 2200 ml 3700 ml


 


Balance -1066 ml -1000 ml


 


  


 


Intake Oral 834 ml 


 


IV Total 300 ml 1100 ml


 


Other  1600 ml


 


Output Urine Total 2200 ml 3700 ml


 


# Voids 3 1


 


# Bowel Movements 1 








General Appearance:  WD/WN


HEENT:  normocephalic


Respiratory/Chest:  chest wall non-tender, lungs clear


Cardiovascular:  normal peripheral pulses, normal rate


Abdomen:  normal bowel sounds, soft, non tender


Extremities:  no cyanosis


Skin:  no rash





Microbiology








 Date/Time


Source Procedure


Growth Status


 


 


 2/5/19 18:40


Rectum VRE Culture


Pending Resulted


 


 2/5/19 18:40


Rectum - Preliminary Resulted








Laboratory Tests


2/7/19 08:35: 


White Blood Count 8.4, Red Blood Count 4.42L, Hemoglobin 13.6L, Hematocrit 38.1L

, Mean Corpuscular Volume 86, Mean Corpuscular Hemoglobin 30.7, Mean 

Corpuscular Hemoglobin Concent 35.6, Red Cell Distribution Width 12.6, Platelet 

Count 173, Mean Platelet Volume 8.9, Neutrophils (%) (Auto) 57.2, Lymphocytes (%

) (Auto) 32.4, Monocytes (%) (Auto) 7.2, Eosinophils (%) (Auto) 2.7, Basophils (

%) (Auto) 0.5, Sodium Level 139, Potassium Level 3.5, Chloride Level 105, 

Carbon Dioxide Level 26, Anion Gap 8, Blood Urea Nitrogen 11, Creatinine 0.9, 

Estimat Glomerular Filtration Rate > 60, Glucose Level 159H, Calcium Level 8.8





Current Medications








 Medications


  (Trade)  Dose


 Ordered  Sig/Henry


 Route


 PRN Reason  Start Time


 Stop Time Status Last Admin


Dose Admin


 


 Acetaminophen


  (Tylenol)  650 mg  Q4H  PRN


 ORAL


 fever (temp>100.5F)  2/6/19 16:30


 3/7/19 16:29   


 


 


 Al Hydroxide/Mg


 Hydroxide


  (Mylanta II)  30 ml  Q6H  PRN


 ORAL


 dyspepsia  2/6/19 16:30


 3/7/19 16:29   


 


 


 Albuterol/


 Ipratropium


  (Albuterol/


 Ipratropium)  3 ml  Q4H  PRN


 HHN


 Shortness of Breath  2/6/19 16:30


 2/10/19 16:29   


 


 


 Alprazolam


  (Xanax)  1 mg  Q6H  PRN


 ORAL


 For Anxiety  2/6/19 16:30


 2/13/19 16:29   


 


 


 Aspirin


  (ASA)  81 mg  DAILY


 ORAL


   2/7/19 09:00


 3/8/19 08:59  2/7/19 08:39


 


 


 Clonidine HCl


  (Catapres Tab)  0.1 mg  Q4H  PRN


 ORAL


 sbp more than 160  2/6/19 16:30


 3/7/19 16:29   


 


 


 Dextrose


  (Dextrose 50%)  25 ml  Q30M  PRN


 IV


 Hypoglycemia  2/6/19 16:15


 3/7/19 21:44   


 


 


 Dextrose


  (Dextrose 50%)  50 ml  Q30M  PRN


 IV


 Hypoglycemia  2/6/19 16:15


 3/7/19 21:44   


 


 


 Divalproex Sodium


  (Depakote)  500 mg  Q12HR


 ORAL


   2/6/19 21:00


 3/7/19 20:59  2/7/19 08:39


 


 


 Escitalopram


 Oxalate


  (Lexapro)  10 mg  DAILY


 ORAL


   2/7/19 09:00


 3/8/19 08:59  2/7/19 08:39


 


 


 Heparin Sodium


  (Porcine)


  (Heparin 5000


 units/ml)  5,000 units  EVERY 12  HOURS


 SUBQ


   2/6/19 21:00


 3/7/19 20:59   


 


 


 Insulin Aspart


  (NovoLOG)    BEFORE MEALS AND  HS


 SUBQ


   2/6/19 17:30


 3/7/19 17:29  2/6/19 17:45


 


 


 Insulin Aspart


  (NovoLOG)  10 units  NOVOTIAC


 SUBQ


   2/7/19 11:50


 3/8/19 06:29   


 


 


 Insulin Detemir


  (Levemir)  30 units  BEDTIME


 SUBQ


   2/7/19 21:00


 3/7/19 21:59   


 


 


 Morphine Sulfate


  (Morphine


 Sulfate)  2 mg  Q4H  PRN


 IVP


 Severe Pain (Pain Scale 7-10)  2/6/19 16:30


 2/12/19 16:29  2/7/19 11:41


 


 


 Nitroglycerin


  (Ntg)  0.4 mg  Q5M X 3 DOSES PRN


 SL


 Prn Chest Pain  2/6/19 16:30


 3/7/19 16:29   


 


 


 Ondansetron HCl


  (Zofran)  4 mg  Q6H  PRN


 IVP


 Nausea & Vomiting  2/6/19 16:30


 3/7/19 16:29  2/7/19 08:43


 


 


 Polyethylene


 Glycol


  (Miralax)  17 gm  HSPRN  PRN


 ORAL


 Constipation  2/6/19 21:00


 3/7/19 20:59   


 


 


 Quetiapine


 Fumarate


  (SEROquel)  200 mg  BEDTIME


 ORAL


   2/6/19 21:00


 3/7/19 20:59  2/6/19 22:59


 


 


 Sodium Chloride  1,000 ml @ 


 100 mls/hr  Q10H


 IVLG


   2/6/19 16:15


 3/7/19 19:29  2/7/19 08:46


 


 


 Temazepam


  (Restoril)  15 mg  HSPRN  PRN


 ORAL


 Insomnia  2/6/19 21:00


 2/12/19 20:59   


 

















Lissette Steward MD Feb 7, 2019 12:11

## 2019-02-07 NOTE — NUR
HAND-OFF: 

Report given to NACHO Woody.

-------------------------------------------------------------------------------

Addendum: 02/07/19 at 1937 by Noelle Montero RN

-------------------------------------------------------------------------------

HAND-OFF: 

Report given to NACHO Lowe.

## 2019-02-07 NOTE — NUR
HANDOFF- NURSE



Pt endorsed to NACHO Drake. Pt was resting after receiving last pain med dose. Bed is 
locked in lowest op

-------------------------------------------------------------------------------

Addendum: 02/07/19 at 0720 by Amber Galeas RN

-------------------------------------------------------------------------------

Bed locked, in lowest position, side rails upx3, fall precautions instituted. Seizure 
precautions and padded side rails.

## 2019-02-07 NOTE — NUR
NURSE NOTES:

Received patient AOx4, in bed, able to verbalize needs, c/o headache, will give PRN pain med 
when due, patient verbalized understanding. IV site L FA running NS at 100ml/hr. Urinal 
emptied. Bed on lowest position, 2 side rails up, seizure precautions, call light and 
belongings within reach. Will monitor blood sugar closely.

## 2019-02-07 NOTE — NUR
NURSE NOTES:

Received report from NACHO James. Pt is in the bed, eating breakfast, AAO. On the RA. No s.s 
of respiratory distress or discomfort noted. Bed is in the lowest position. Call light is 
within the reach. Will continue to monitor

## 2019-02-07 NOTE — PROGRESS NOTE
DATE:  02/07/2019

SUBJECTIVE:  This is a 38-year-old male patient with uncontrolled

dizziness.  He is confused.  He is disorganized.  He has got mood

lability.  He has got no logical plan for his own self-care, but this

patient does have some mood lability, confusion, and disorganized thought

process that is why he does require acute inpatient treatment.  He has

feelings of helplessness, hopelessness, low energy, poor appetite, _____

attending physician has requested daily psychiatric consultation.



MENTAL STATUS EXAMINATION:  This is a 38-year-old male. Appearance is

disheveled.  Attitude, irritable and agitated.  Affect, guarded and

restricted.  Intellect poor.  Mood depressed and anxious.  Motor activity,

psychomotor agitation.  Attention span is poor.  Orientation x2.  Speech

is slightly pressured.  Thought process, disorganized and illogical.

Thought content, auditory hallucinations and paranoid delusions.  Insight

and judgment is poor.



DIAGNOSIS:  Schizoaffective, bipolar type.



PLAN:  Plan for this patient is to treat him with a psychotropic medication

regimen consisting of Lexapro 10 mg daily, Depakote 500 mg q.12 h.,

Seroquel at a dose of 200 mg at bedtime, and Xanax 1 mg every six hours

p.r.n. anxiety and agitation.  Provided him with 20 minutes of cognitive

behavioral therapy to help him identify his automatic negative thoughts

and help him convert those negative thoughts to more positive thoughts

_____ reduce depression, anxiety, and suicidality. Chart reviewed.

Discussed with staff.  A 20 minutes of cognitive behavioral therapy

provided.









  ______________________________________________

  Gulshan Armstrong M.D.





DR:  Thor

D:  02/07/2019 12:19

T:  02/07/2019 19:40

JOB#:  [id number]

CC:

## 2019-02-07 NOTE — GENERAL PROGRESS NOTE
Assessment/Plan


Problem List:  


(1) COPD (chronic obstructive pulmonary disease)


ICD Codes:  J44.9 - Chronic obstructive pulmonary disease, unspecified


SNOMED:  75807832


(2) Diabetes mellitus


ICD Codes:  E11.9 - Diabetes mellitus


SNOMED:  41344312


(3) Peripheral neuropathy


ICD Codes:  G62.9 - Peripheral neuropathy


SNOMED:  97307146


Assessment/Plan


reduce Levemir to 30 units qhs 


reduce Novolog to 10 units ac tid + SSI





Subjective


Allergies:  


Coded Allergies:  


     HALOPERIDOL (Verified  Allergy, Unknown, 2/5/19)


All Systems:  reviewed and negative except above


Subjective


events noted





Objective





Last 24 Hour Vital Signs








  Date Time  Temp Pulse Resp B/P (MAP) Pulse Ox O2 Delivery O2 Flow Rate FiO2


 


2/7/19 04:00 98.2 79 22 121/57 (78) 95   


 


2/7/19 00:00 98.5 80 20 111/68 (82) 100   


 


2/6/19 21:00      Room Air  


 


2/6/19 20:00 98.7 81 20 118/78 (91) 98   


 


2/6/19 19:26 98.3       


 


2/6/19 16:00 98.3 98 19 141/87 (105) 96   


 


2/6/19 12:00  90      


 


2/6/19 12:00 98.8 96 19 138/85 (102) 95   


 


2/6/19 09:00      Room Air  


 


2/6/19 08:00 98.2 79 20 112/70 (84) 96   


 


2/6/19 08:00  73      

















Intake and Output  


 


 2/6/19 2/7/19





 19:00 07:00


 


Intake Total 1134 ml 2700 ml


 


Output Total 2200 ml 3700 ml


 


Balance -1066 ml -1000 ml


 


  


 


Intake Oral 834 ml 


 


IV Total 300 ml 1100 ml


 


Other  1600 ml


 


Output Urine Total 2200 ml 3700 ml


 


# Voids 3 1


 


# Bowel Movements 1 








Laboratory Tests


2/6/19 08:25: 


White Blood Count 9.3, Red Blood Count 4.49L, Hemoglobin 13.6L, Hematocrit 38.3L

, Mean Corpuscular Volume 85, Mean Corpuscular Hemoglobin 30.2, Mean 

Corpuscular Hemoglobin Concent 35.4, Red Cell Distribution Width 12.5, Platelet 

Count 179, Mean Platelet Volume 8.5, Neutrophils (%) (Auto) 51.2, Lymphocytes (%

) (Auto) 37.7, Monocytes (%) (Auto) 7.5, Eosinophils (%) (Auto) 3.0, Basophils (

%) (Auto) 0.7, Sodium Level 142, Potassium Level 3.6, Chloride Level 108H, 

Carbon Dioxide Level 25, Anion Gap 9, Blood Urea Nitrogen 10, Creatinine 0.7, 

Estimat Glomerular Filtration Rate > 60, Glucose Level 67#L, Hemoglobin A1c 8.3H

, Calcium Level 8.1L, Phosphorus Level 2.9, Magnesium Level 1.9, Total 

Bilirubin 0.5, Aspartate Amino Transf (AST/SGOT) 20, Alanine Aminotransferase (

ALT/SGPT) 47, Alkaline Phosphatase 60, Total Protein 5.9L, Albumin 3.2L, 

Globulin 2.7, Albumin/Globulin Ratio 1.2, Triglycerides Level 87, Cholesterol 

Level 82, LDL Cholesterol 49, HDL Cholesterol 25L, Cholesterol/HDL Ratio 3.3, 

Thyroid Stimulating Hormone (TSH) 2.581


2/6/19 10:30: 


Arterial Blood pH 7.414, Arterial Blood Partial Pressure CO2 32.7L, Arterial 

Blood Partial Pressure O2 88.9, Arterial Blood HCO3 20.4L, Arterial Blood 

Oxygen Saturation 96.6, Arterial Blood Base Excess -3.2L, Bereket Test Positive


Height (Feet):  5


Height (Inches):  11.00


Weight (Pounds):  267


General Appearance:  no apparent distress


Neck:  normal alignment


Cardiovascular:  normal rate


Respiratory/Chest:  normal breath sounds


Abdomen:  normal bowel sounds


Objective





Current Medications








 Medications


  (Trade)  Dose


 Ordered  Sig/Henry


 Route


 PRN Reason  Start Time


 Stop Time Status Last Admin


Dose Admin


 


 Acetaminophen


  (Tylenol)  650 mg  Q4H  PRN


 ORAL


 fever (temp>100.5F)  2/6/19 16:30


 3/7/19 16:29   


 


 


 Al Hydroxide/Mg


 Hydroxide


  (Mylanta II)  30 ml  Q6H  PRN


 ORAL


 dyspepsia  2/6/19 16:30


 3/7/19 16:29   


 


 


 Albuterol/


 Ipratropium


  (Albuterol/


 Ipratropium)  3 ml  Q4H  PRN


 HHN


 Shortness of Breath  2/6/19 16:30


 2/10/19 16:29   


 


 


 Alprazolam


  (Xanax)  1 mg  Q6H  PRN


 ORAL


 For Anxiety  2/6/19 16:30


 2/13/19 16:29   


 


 


 Aspirin


  (ASA)  81 mg  DAILY


 ORAL


   2/7/19 09:00


 3/8/19 08:59   


 


 


 Clonidine HCl


  (Catapres Tab)  0.1 mg  Q4H  PRN


 ORAL


 sbp more than 160  2/6/19 16:30


 3/7/19 16:29   


 


 


 Dextrose


  (Dextrose 50%)  25 ml  Q30M  PRN


 IV


 Hypoglycemia  2/6/19 16:15


 3/7/19 21:44   


 


 


 Dextrose


  (Dextrose 50%)  50 ml  Q30M  PRN


 IV


 Hypoglycemia  2/6/19 16:15


 3/7/19 21:44   


 


 


 Divalproex Sodium


  (Depakote)  500 mg  Q12HR


 ORAL


   2/6/19 21:00


 3/7/19 20:59  2/6/19 23:00


 


 


 Escitalopram


 Oxalate


  (Lexapro)  10 mg  DAILY


 ORAL


   2/7/19 09:00


 3/8/19 08:59   


 


 


 Heparin Sodium


  (Porcine)


  (Heparin 5000


 units/ml)  5,000 units  EVERY 12  HOURS


 SUBQ


   2/6/19 21:00


 3/7/19 20:59   


 


 


 Insulin Aspart


  (NovoLOG)    BEFORE MEALS AND  HS


 SUBQ


   2/6/19 17:30


 3/7/19 17:29  2/6/19 17:45


 


 


 Insulin Aspart


  (NovoLOG)  15 units  NOVOTIAC


 SUBQ


   2/6/19 16:50


 3/8/19 06:29  2/6/19 17:45


 


 


 Insulin Detemir


  (Levemir)  40 units  BEDTIME


 SUBQ


   2/6/19 21:00


 3/7/19 21:59  2/6/19 22:04


 


 


 Morphine Sulfate


  (Morphine


 Sulfate)  2 mg  Q4H  PRN


 IVP


 Severe Pain (Pain Scale 7-10)  2/6/19 16:30


 2/12/19 16:29  2/7/19 02:56


 


 


 Nitroglycerin


  (Ntg)  0.4 mg  Q5M X 3 DOSES PRN


 SL


 Prn Chest Pain  2/6/19 16:30


 3/7/19 16:29   


 


 


 Ondansetron HCl


  (Zofran)  4 mg  Q6H  PRN


 IVP


 Nausea & Vomiting  2/6/19 16:30


 3/7/19 16:29  2/6/19 22:59


 


 


 Polyethylene


 Glycol


  (Miralax)  17 gm  HSPRN  PRN


 ORAL


 Constipation  2/6/19 21:00


 3/7/19 20:59   


 


 


 Quetiapine


 Fumarate


  (SEROquel)  200 mg  BEDTIME


 ORAL


   2/6/19 21:00


 3/7/19 20:59  2/6/19 22:59


 


 


 Sodium Chloride  1,000 ml @ 


 100 mls/hr  Q10H


 IVLG


   2/6/19 16:15


 3/7/19 19:29  2/6/19 23:02


 


 


 Temazepam


  (Restoril)  15 mg  HSPRN  PRN


 ORAL


 Insomnia  2/6/19 21:00


 2/12/19 20:59   


 














Item Value  Date Time


 


Bedside Blood Glucose 104 mg/dl 2/7/19 0630


 


Bedside Blood Glucose 104 mg/dl 2/6/19 2217


 


Bedside Blood Glucose 120 mg/dl 2/6/19 1745


 


Bedside Blood Glucose 127 mg/dl H 2/6/19 1153


 


Bedside Blood Glucose 115 mg/dl 2/6/19 0633


 


Bedside Blood Glucose 332 mg/dl H 2/5/19 2302

















Dusty Denis MD Feb 7, 2019 06:53

## 2019-02-07 NOTE — GENERAL PROGRESS NOTE
Assessment/Plan


Problem List:  


(1) COPD (chronic obstructive pulmonary disease)


ICD Codes:  J44.9 - Chronic obstructive pulmonary disease, unspecified


SNOMED:  47574552


(2) Diabetes mellitus


ICD Codes:  E11.9 - Diabetes mellitus


SNOMED:  08746607


(3) CVA (cerebral vascular accident)


ICD Codes:  I63.9 - Cerebral infarction, unspecified


SNOMED:  857889927


(4) Uncontrolled seizures


ICD Codes:  R56.9 - Unspecified convulsions


SNOMED:  23693515


Status:  unchanged


Assessment/Plan


pt diet bs control cbc bmp am dc plan snf





Subjective


Constitutional:  Reports: weakness


Allergies:  


Coded Allergies:  


     HALOPERIDOL (Verified  Allergy, Unknown, 2/5/19)


All Systems:  reviewed and negative except above


Subjective


tired in bed





Objective





Last 24 Hour Vital Signs








  Date Time  Temp Pulse Resp B/P (MAP) Pulse Ox O2 Delivery O2 Flow Rate FiO2


 


2/7/19 09:00      Room Air  


 


2/7/19 08:00 98.4 79 19 110/61 (77) 95   


 


2/7/19 04:00 98.2 79 22 121/57 (78) 95   


 


2/7/19 00:00 98.5 80 20 111/68 (82) 100   


 


2/6/19 21:00      Room Air  


 


2/6/19 20:00 98.7 81 20 118/78 (91) 98   


 


2/6/19 19:26 98.3       


 


2/6/19 16:00 98.3 98 19 141/87 (105) 96   

















Intake and Output  


 


 2/6/19 2/7/19





 19:00 07:00


 


Intake Total 1134 ml 2700 ml


 


Output Total 2200 ml 3700 ml


 


Balance -1066 ml -1000 ml


 


  


 


Intake Oral 834 ml 


 


IV Total 300 ml 1100 ml


 


Other  1600 ml


 


Output Urine Total 2200 ml 3700 ml


 


# Voids 3 1


 


# Bowel Movements 1 








Laboratory Tests


2/7/19 08:35: 


White Blood Count 8.4, Red Blood Count 4.42L, Hemoglobin 13.6L, Hematocrit 38.1L

, Mean Corpuscular Volume 86, Mean Corpuscular Hemoglobin 30.7, Mean 

Corpuscular Hemoglobin Concent 35.6, Red Cell Distribution Width 12.6, Platelet 

Count 173, Mean Platelet Volume 8.9, Neutrophils (%) (Auto) 57.2, Lymphocytes (%

) (Auto) 32.4, Monocytes (%) (Auto) 7.2, Eosinophils (%) (Auto) 2.7, Basophils (

%) (Auto) 0.5, Sodium Level 139, Potassium Level 3.5, Chloride Level 105, 

Carbon Dioxide Level 26, Anion Gap 8, Blood Urea Nitrogen 11, Creatinine 0.9, 

Estimat Glomerular Filtration Rate > 60, Glucose Level 159H, Calcium Level 8.8


Height (Feet):  5


Height (Inches):  11.00


Weight (Pounds):  267


General Appearance:  lethargic


EENT:  normal ENT inspection


Neck:  normal alignment


Cardiovascular:  normal peripheral pulses, normal rate, regular rhythm


Respiratory/Chest:  chest wall non-tender, lungs clear, normal breath sounds


Abdomen:  normal bowel sounds, non tender, soft


Extremities:  normal inspection


Edema:  no edema noted Arm (L), no edema noted Arm (R), no edema noted Leg (L), 

no edema noted Leg (R), no edema noted Pedal (L), no edema noted Pedal (R), no 

edema noted Generalized


Neurologic:  responsive, motor weakness


Skin:  normal pigmentation, warm/dry











Samir Cabrera DO Feb 7, 2019 12:28

## 2019-02-07 NOTE — NUR
[facility] PT NOTE



CONSULT MAGY ZAMORA, PATIENT AT BASELINE LEVEL OF FUNCTION. NO SKILLED NEEDS. DC 
PT ORDER. 



DAMON DING PT DPT

-------------------------------------------------------------------------------

Addendum: 02/07/19 at 1012 by DAMON DING PT

-------------------------------------------------------------------------------

Amended: Links added.

## 2019-02-08 VITALS — SYSTOLIC BLOOD PRESSURE: 109 MMHG | DIASTOLIC BLOOD PRESSURE: 51 MMHG

## 2019-02-08 VITALS — SYSTOLIC BLOOD PRESSURE: 111 MMHG | DIASTOLIC BLOOD PRESSURE: 53 MMHG

## 2019-02-08 VITALS — DIASTOLIC BLOOD PRESSURE: 75 MMHG | SYSTOLIC BLOOD PRESSURE: 132 MMHG

## 2019-02-08 VITALS — SYSTOLIC BLOOD PRESSURE: 135 MMHG | DIASTOLIC BLOOD PRESSURE: 71 MMHG

## 2019-02-08 VITALS — SYSTOLIC BLOOD PRESSURE: 118 MMHG | DIASTOLIC BLOOD PRESSURE: 68 MMHG

## 2019-02-08 VITALS — SYSTOLIC BLOOD PRESSURE: 124 MMHG | DIASTOLIC BLOOD PRESSURE: 60 MMHG

## 2019-02-08 LAB
ADD MANUAL DIFF: NO
ANION GAP SERPL CALC-SCNC: 8 MMOL/L (ref 5–15)
BASOPHILS NFR BLD AUTO: 0.6 % (ref 0–2)
BUN SERPL-MCNC: 14 MG/DL (ref 7–18)
CALCIUM SERPL-MCNC: 8.8 MG/DL (ref 8.5–10.1)
CHLORIDE SERPL-SCNC: 103 MMOL/L (ref 98–107)
CO2 SERPL-SCNC: 27 MMOL/L (ref 21–32)
CREAT SERPL-MCNC: 0.8 MG/DL (ref 0.55–1.3)
EOSINOPHIL NFR BLD AUTO: 1 % (ref 0–3)
ERYTHROCYTE [DISTWIDTH] IN BLOOD BY AUTOMATED COUNT: 12.4 % (ref 11.6–14.8)
HCT VFR BLD CALC: 38.3 % (ref 42–52)
HGB BLD-MCNC: 13.5 G/DL (ref 14.2–18)
LYMPHOCYTES NFR BLD AUTO: 20.7 % (ref 20–45)
MCV RBC AUTO: 87 FL (ref 80–99)
MONOCYTES NFR BLD AUTO: 8.6 % (ref 1–10)
NEUTROPHILS NFR BLD AUTO: 69.2 % (ref 45–75)
PLATELET # BLD: 179 K/UL (ref 150–450)
POTASSIUM SERPL-SCNC: 3.7 MMOL/L (ref 3.5–5.1)
RBC # BLD AUTO: 4.43 M/UL (ref 4.7–6.1)
SODIUM SERPL-SCNC: 137 MMOL/L (ref 136–145)
WBC # BLD AUTO: 9.6 K/UL (ref 4.8–10.8)

## 2019-02-08 RX ADMIN — INSULIN ASPART SCH UNITS: 100 INJECTION, SOLUTION INTRAVENOUS; SUBCUTANEOUS at 21:12

## 2019-02-08 RX ADMIN — MORPHINE SULFATE PRN MG: 2 INJECTION, SOLUTION INTRAMUSCULAR; INTRAVENOUS at 16:56

## 2019-02-08 RX ADMIN — MORPHINE SULFATE PRN MG: 2 INJECTION, SOLUTION INTRAMUSCULAR; INTRAVENOUS at 13:02

## 2019-02-08 RX ADMIN — INSULIN ASPART SCH UNITS: 100 INJECTION, SOLUTION INTRAVENOUS; SUBCUTANEOUS at 16:55

## 2019-02-08 RX ADMIN — HEPARIN SODIUM SCH UNITS: 5000 INJECTION INTRAVENOUS; SUBCUTANEOUS at 21:00

## 2019-02-08 RX ADMIN — INSULIN DETEMIR SCH UNITS: 100 INJECTION, SOLUTION SUBCUTANEOUS at 21:12

## 2019-02-08 RX ADMIN — MORPHINE SULFATE PRN MG: 2 INJECTION, SOLUTION INTRAMUSCULAR; INTRAVENOUS at 09:05

## 2019-02-08 RX ADMIN — HEPARIN SODIUM SCH UNITS: 5000 INJECTION INTRAVENOUS; SUBCUTANEOUS at 21:02

## 2019-02-08 RX ADMIN — IPRATROPIUM BROMIDE AND ALBUTEROL SULFATE SCH ML: .5; 3 SOLUTION RESPIRATORY (INHALATION) at 12:28

## 2019-02-08 RX ADMIN — QUETIAPINE SCH MG: 200 TABLET, FILM COATED ORAL at 21:00

## 2019-02-08 RX ADMIN — IPRATROPIUM BROMIDE AND ALBUTEROL SULFATE SCH ML: .5; 3 SOLUTION RESPIRATORY (INHALATION) at 07:24

## 2019-02-08 RX ADMIN — IPRATROPIUM BROMIDE AND ALBUTEROL SULFATE SCH ML: .5; 3 SOLUTION RESPIRATORY (INHALATION) at 19:01

## 2019-02-08 RX ADMIN — MORPHINE SULFATE PRN MG: 2 INJECTION, SOLUTION INTRAMUSCULAR; INTRAVENOUS at 05:03

## 2019-02-08 RX ADMIN — HEPARIN SODIUM SCH UNITS: 5000 INJECTION INTRAVENOUS; SUBCUTANEOUS at 09:00

## 2019-02-08 RX ADMIN — IPRATROPIUM BROMIDE AND ALBUTEROL SULFATE SCH ML: .5; 3 SOLUTION RESPIRATORY (INHALATION) at 01:07

## 2019-02-08 RX ADMIN — DIVALPROEX SODIUM SCH MG: 500 TABLET, DELAYED RELEASE ORAL at 09:01

## 2019-02-08 RX ADMIN — INSULIN ASPART SCH UNITS: 100 INJECTION, SOLUTION INTRAVENOUS; SUBCUTANEOUS at 12:04

## 2019-02-08 RX ADMIN — DIVALPROEX SODIUM SCH MG: 500 TABLET, DELAYED RELEASE ORAL at 21:00

## 2019-02-08 RX ADMIN — MORPHINE SULFATE PRN MG: 2 INJECTION, SOLUTION INTRAMUSCULAR; INTRAVENOUS at 21:20

## 2019-02-08 RX ADMIN — INSULIN ASPART SCH UNITS: 100 INJECTION, SOLUTION INTRAVENOUS; SUBCUTANEOUS at 12:05

## 2019-02-08 RX ADMIN — INSULIN ASPART SCH UNITS: 100 INJECTION, SOLUTION INTRAVENOUS; SUBCUTANEOUS at 16:54

## 2019-02-08 RX ADMIN — IPRATROPIUM BROMIDE AND ALBUTEROL SULFATE SCH ML: .5; 3 SOLUTION RESPIRATORY (INHALATION) at 23:37

## 2019-02-08 RX ADMIN — MORPHINE SULFATE PRN MG: 2 INJECTION, SOLUTION INTRAMUSCULAR; INTRAVENOUS at 01:11

## 2019-02-08 RX ADMIN — INSULIN ASPART SCH UNITS: 100 INJECTION, SOLUTION INTRAVENOUS; SUBCUTANEOUS at 06:43

## 2019-02-08 RX ADMIN — INSULIN ASPART SCH UNITS: 100 INJECTION, SOLUTION INTRAVENOUS; SUBCUTANEOUS at 06:44

## 2019-02-08 RX ADMIN — ASPIRIN 81 MG SCH MG: 81 TABLET ORAL at 09:01

## 2019-02-08 NOTE — PULMONOLOGY PROGRESS NOTE
Assessment/Plan


Problems:  


(1) Recurrent falls


(2) Uncontrolled diabetes mellitus


(3) CVA (cerebral vascular accident)


(4) COPD (chronic obstructive pulmonary disease)


Assessment/Plan


pt , OT


all reviewed


check sputum


add abx for bronchitis


check electrolytes


dvt prophylaxis





Subjective


ROS Limited/Unobtainable:  No


Constitutional:  Reports: no symptoms


HEENT:  Repors: no symptoms


Allergies:  


Coded Allergies:  


     HALOPERIDOL (Verified  Allergy, Unknown, 2/5/19)





Objective





Last 24 Hour Vital Signs








  Date Time  Temp Pulse Resp B/P (MAP) Pulse Ox O2 Delivery O2 Flow Rate FiO2


 


2/8/19 12:40  102 18  97 Room Air  21


 


2/8/19 12:28  89 20  95 Room Air  21


 


2/8/19 12:00 98.6 87 20 118/68 (85) 95   


 


2/8/19 09:00      Room Air  


 


2/8/19 08:00 98.2 100 20 124/60 (81) 95   


 


2/8/19 07:35  101 18  95 Room Air  21


 


2/8/19 07:24  106 18  93 Room Air  21


 


2/8/19 04:00 98.1 65 18 111/53 (72) 100   


 


2/8/19 01:17  77 18  99 Room Air  21


 


2/8/19 01:07  73 18  97 Room Air  21


 


2/8/19 00:00 98.9 70 18 109/51 (70) 95   


 


2/7/19 21:00      Room Air  


 


2/7/19 20:40  75 18  100 Room Air  21 2/7/19 20:36 97.6       


 


2/7/19 20:33  78 18  98 Room Air  21


 


2/7/19 20:33  78 18   Room Air  21


 


2/7/19 20:00 99.4 90 20 117/74 (88) 96   


 


2/7/19 16:00 97.6 84 20 121/76 (91) 96   

















Intake and Output  


 


 2/7/19 2/8/19





 19:00 07:00


 


Intake Total 2400 ml 1750 ml


 


Output Total 4000 ml 1600 ml


 


Balance -1600 ml 150 ml


 


  


 


Intake Oral 1200 ml 650 ml


 


IV Total 1200 ml 1100 ml


 


Output Urine Total 4000 ml 1600 ml


 


# Voids 6 











Microbiology








 Date/Time


Source Procedure


Growth Status


 


 


 2/5/19 18:40


Nasal Nares MRSA Culture - Final


Staphylococcus Aureus - Mrsa Complete





 2/5/19 18:40


Rectum VRE Culture - Final


NO VANCOMYCIN RESISTANT ENTEROCOCCUS ... Complete


 


 2/5/19 18:40


Rectum - Final


NO CARBAPENEM-RESISTANT ENTEROBACTERI... Complete








Laboratory Tests


2/8/19 12:10: 


White Blood Count 9.6, Red Blood Count 4.43L, Hemoglobin 13.5L, Hematocrit 38.3L

, Mean Corpuscular Volume 87, Mean Corpuscular Hemoglobin 30.5, Mean 

Corpuscular Hemoglobin Concent 35.2, Red Cell Distribution Width 12.4, Platelet 

Count 179, Mean Platelet Volume 8.2, Neutrophils (%) (Auto) 69.2, Lymphocytes (%

) (Auto) 20.7, Monocytes (%) (Auto) 8.6, Eosinophils (%) (Auto) 1.0, Basophils (

%) (Auto) 0.6, Sodium Level 137, Potassium Level 3.7, Chloride Level 103, 

Carbon Dioxide Level 27, Anion Gap 8, Blood Urea Nitrogen 14, Creatinine 0.8, 

Estimat Glomerular Filtration Rate > 60, Glucose Level 200H, Calcium Level 8.8





Current Medications








 Medications


  (Trade)  Dose


 Ordered  Sig/Henry


 Route


 PRN Reason  Start Time


 Stop Time Status Last Admin


Dose Admin


 


 Acetaminophen


  (Tylenol)  650 mg  Q4H  PRN


 ORAL


 fever (temp>100.5F)  2/6/19 16:30


 3/7/19 16:29   


 


 


 Al Hydroxide/Mg


 Hydroxide


  (Mylanta II)  30 ml  Q6H  PRN


 ORAL


 dyspepsia  2/6/19 16:30


 3/7/19 16:29   


 


 


 Albuterol/


 Ipratropium


  (Albuterol/


 Ipratropium)  3 ml  Q4H  PRN


 HHN


 Shortness of Breath  2/6/19 16:30


 2/10/19 16:29   


 


 


 Albuterol/


 Ipratropium


  (Albuterol/


 Ipratropium)  3 ml  Q6HRT


 HHN


   2/7/19 13:00


 2/12/19 12:59  2/8/19 12:28


 


 


 Alprazolam


  (Xanax)  1 mg  Q6H  PRN


 ORAL


 For Anxiety  2/6/19 16:30


 2/13/19 16:29   


 


 


 Aspirin


  (ASA)  81 mg  DAILY


 ORAL


   2/7/19 09:00


 3/8/19 08:59  2/8/19 09:01


 


 


 Clonidine HCl


  (Catapres Tab)  0.1 mg  Q4H  PRN


 ORAL


 sbp more than 160  2/6/19 16:30


 3/7/19 16:29   


 


 


 Dextrose


  (Dextrose 50%)  25 ml  Q30M  PRN


 IV


 Hypoglycemia  2/6/19 16:15


 3/7/19 21:44   


 


 


 Dextrose


  (Dextrose 50%)  50 ml  Q30M  PRN


 IV


 Hypoglycemia  2/6/19 16:15


 3/7/19 21:44   


 


 


 Divalproex Sodium


  (Depakote)  500 mg  Q12HR


 ORAL


   2/6/19 21:00


 3/7/19 20:59  2/8/19 09:01


 


 


 Escitalopram


 Oxalate


  (Lexapro)  10 mg  DAILY


 ORAL


   2/7/19 09:00


 3/8/19 08:59  2/8/19 09:01


 


 


 Furosemide


  (Lasix)  20 mg  ONCE


 ORAL


   2/8/19 13:43


 2/8/19 14:43   


 


 


 Heparin Sodium


  (Porcine)


  (Heparin 5000


 units/ml)  5,000 units  EVERY 12  HOURS


 SUBQ


   2/6/19 21:00


 3/7/19 20:59   


 


 


 Insulin Aspart


  (NovoLOG)    BEFORE MEALS AND  HS


 SUBQ


   2/6/19 17:30


 3/7/19 17:29  2/8/19 12:05


 


 


 Insulin Aspart


  (NovoLOG)  10 units  NOVOTIAC


 SUBQ


   2/7/19 11:50


 3/8/19 06:29  2/8/19 12:04


 


 


 Insulin Detemir


  (Levemir)  30 units  BEDTIME


 SUBQ


   2/7/19 21:00


 3/7/19 21:59  2/7/19 22:27


 


 


 Morphine Sulfate


  (Morphine


 Sulfate)  2 mg  Q4H  PRN


 IVP


 Severe Pain (Pain Scale 7-10)  2/6/19 16:30


 2/12/19 16:29  2/8/19 13:02


 


 


 Nitroglycerin


  (Ntg)  0.4 mg  Q5M X 3 DOSES PRN


 SL


 Prn Chest Pain  2/6/19 16:30


 3/7/19 16:29   


 


 


 Ondansetron HCl


  (Zofran)  4 mg  Q6H  PRN


 IVP


 Nausea & Vomiting  2/6/19 16:30


 3/7/19 16:29  2/8/19 10:10


 


 


 Polyethylene


 Glycol


  (Miralax)  17 gm  HSPRN  PRN


 ORAL


 Constipation  2/6/19 21:00


 3/7/19 20:59   


 


 


 Promethazine HCl/


 Codeine


  (Phenergan with


 Codeine)  5 ml  Q4H  PRN


 ORAL


 For Cough  2/7/19 12:15


 3/9/19 12:14   


 


 


 Quetiapine


 Fumarate


  (SEROquel)  200 mg  BEDTIME


 ORAL


   2/6/19 21:00


 3/7/19 20:59  2/7/19 20:40


 


 


 Sodium Chloride  1,000 ml @ 


 100 mls/hr  Q10H


 IVLG


   2/6/19 16:15


 3/7/19 19:29  2/8/19 09:01


 


 


 Temazepam


  (Restoril)  15 mg  HSPRN  PRN


 ORAL


 Insomnia  2/6/19 21:00


 2/12/19 20:59   


 

















Lissette Steward MD Feb 8, 2019 13:45

## 2019-02-08 NOTE — NUR
*-* DISCHARGE PLANNING *-*





PATIENT HAS BEEN REFERRED TO:



[facility]

P:[phone] 

F:[phone]

EFAX: [phone]

## 2019-02-08 NOTE — NUR
NURSE NOTES:

Received report from NACHO Lowe. Pt is in the bed. On the RA. No s/s of respiratory 
distress or discomfort noted. Bed is in the lowest position. Call light is within the reach. 
Will continue to monitor

## 2019-02-08 NOTE — CONSULTATION
DATE OF CONSULTATION:  02/07/2019

NOTE:  UNCLEAR AUDIO



PSYCHOTHERAPY CONSULTATION PROGRESS NOTE



CONSULTING PHYSICIAN:  Mynor Mayen PsyD



TREATING ATTENDING PHYSICIAN:  Samir Cabrera D.O.



HISTORY OF PRESENT ILLNESS:  The patient is a 38-year-old male patient from

[place], [place].  The patient has extensive history of mental

illness.  He was brought into the hospital for treatment from his nursing

facility with hyperglycemia and mild leukocytosis.  The patient has been

feeling helpless, _____ he is not able to return back to his nursing

facility.  He has a long-term history of schizoaffective disorder, bipolar

type, and the patient has felt helpless and anxious and for these reasons,

he is also referred for psychotherapeutic services.  The patient _____ he

does feel helpless and hopeless.  He states that he does not know if he is

going to be able to go back to his nursing facility and states he does not

feel well.  He feels sick.  He states that he has diabetes and everything

he states has been going wrong according to his self report.  The patient

feels very helpless at this time, however, denies suicidal or homicidal

thoughts of ideation.  Denies auditory or visual hallucinations.  He

states that he does have anxiety and difficulty sleeping.



PAST MEDICAL HISTORY:  Includes a history of COPD, CVA, and frequent

allergies.



ALLERGIES:  The patient is allergic to Haldol.



SUBSTANCE ABUSE HISTORY:  The patient does have a history of smoking

cigarettes.  Denies history of alcohol use or illicit substance use.



PAST PSYCHIATRIC HISTORY:  The patient has history of schizoaffective

disorder, bipolar type and has been treated with psychotropic medications

in the past.



SOCIAL HISTORY:  The patient is a 38-year-old male patient.  He is

currently at skilled nursing facility, however, apparently _____ refusing

to take him back.  Financially sustained _____.



MENTAL STATUS EXAMINATION:  The patient is alert and oriented to person,

place, and time.  Mood is irritable.  Affect is congruent.  Thought

process is negative and catastrophic.  There is poor attention and

concentration.  Poor insight, judgment, and impulse control.



Reality orientation, which is focused on cognitive function level _____,

person, place, time, and situation.  Provide the patient with cognitive

behavioral interventions, focused on the insight that this is assisted

living and problems with mobility and assisting with feeling anxiety and

restlessness.  Provided with positive mood status, positive self talk,

positive coping skills.  Encouraging the patient to continue with _____.

The patient's affect is blunted.  Psychoeducation on mental health

symptoms and continues to _____.



DIAGNOSES:

1. Schizoaffective disorder, bipolar type.

2. _____ medication compliance, _____ thoughts with coping skills.

_____ psychotherapy for assistance at this time.









  ______________________________________________

  Mynor Mayen PsyD.





DR:  BETINA

D:  02/08/2019 15:41

T:  02/08/2019 23:25

JOB#:  [id number]

CC:

## 2019-02-08 NOTE — NUR
NURSE NOTES:

Received patient in bed, awake, alert, oriented, no acute distress noted, will continue to 
monitor. Call light is within reach, bed is in low position and locked, alarm is on.

## 2019-02-08 NOTE — CONSULTATION
History of Present Illness


General


Date patient seen:  Feb 8, 2019


Chief Complaint:  General Complaint


Reason for Consultation:  Leukocytosis





Present Illness


HPI








Mr. Zapien is a 39 yo male from a nursing home who was arrested and then his 

nursing home refused to take him back. He presented to the ED with 

hyperglycemia and mild leukocytosis. He reported no complaints and deneis, N/V/D

, F/C abdominal pain and bleeding. He was aferbile in the ED. Significant 

positive finding included WBCs of 12 and Glu 281. He was started on 

levofloxacin and his leukocytosis has resolved. He reports that his last 

seizure was on monday. 





ID consulted for leukocytosis





PMHx/PSHx


COPD


Psych history


CVA


Seizure


DM1


Neck surgery





SocHx


Active smoker


No E/D





FamHx


Not contributory


Allergies:  


Coded Allergies:  


     HALOPERIDOL (Verified  Allergy, Unknown, 2/5/19)





Medication History


Scheduled


Aspirin* (Aspirin*), 81 MG ORAL DAILY, (Reported)


Divalproex Sodium* (Depakote*), 500 MG PO Q12HR, (Reported)


Escitalopram Oxalate* (Lexapro*), 10 MG ORAL DAILY, (Reported)


Insulin Aspart (Novolog), 15 UNIT SQ BEFORE MEALS, (Reported)


Insulin Glargine (Lantus), 30 UNITS SUBQ BEDTIME, (Reported)


Quetiapine Fumarate* (Seroquel*), 200 MG ORAL BEDTIME, (Reported)





Scheduled PRN


Alprazolam* (Xanax*), 0.5 MG PO Q6HR PRN for For Anxiety, (Reported)


Clonidine Hcl* (Catapres*), 0.1 MG ORAL Q4HR PRN for For High Blood Pressure, (

Reported)


Insulin Aspart (Novolog), 0 SQ BEFORE MEALS PRN for Sliding Scale, (Reported)


Ipratropium/Albuterol Sulfate (DuoNeb 0.5-3(2.5)mg/3ml), 3 ML HHN Q4HR PRN for 

Shortness of Breath, (Reported)





Discontinued Medications


Acetaminophen* (Acetaminophen 325MG Tablet*), 650 MG ORAL Q4H PRN for fever, (

Reported)


   Discontinued Reason: Pt stopped taking med


Divalproex Sodium (Divalproex Sodium), 1,000 MG PO Q12HR, (Reported)


   Discontinued Reason: Medication dose changed


Hydrocodone Bit/Acetaminophen * (Norco *), 1 TAB ORAL Q4H PRN for 

For Pain, (Reported)


   Discontinued Reason: Pt stopped taking med


Methocarbamol (Methocarbamol), 500 MG PO Q8HR PRN for Severe Breakthru Pain (>7)

, (Reported)


   Discontinued Reason: Pt stopped taking med


Nitroglycerin (Nitrostat), 0.4 MG SL q5min x3 doses PRN for Prn Chest Pain, (

Reported)


   Discontinued Reason: Pt stopped taking med


Ondansetron* (Zofran*), 4 MG ORAL Q4HR PRN for Nausea & Vomiting, (Reported)


   Discontinued Reason: Pt stopped taking med


Pantoprazole (Pantoprazole), 40 MG ORAL EVERY 12 HOURS, (Reported)


   Discontinued Reason: Pt stopped taking med


Polyethylene Glycol 3350* (Polyethylene Glycol 3350*), 17 GM ORAL BEDTIME PRN 

for Constipation, (Reported)


   Discontinued Reason: Pt stopped taking med


Temazepam* (Restoril*), 15 MG ORAL BEDTIME PRN for Insomnia, (Reported)


   Discontinued Reason: Pt stopped taking med





Patient History


Healthcare decision maker





Resuscitation status


Full Code


Advanced Directive on File


No





Review of Systems


ROS Narrative


12 point ROS negative except as note in the HPI.





Physical Exam





Last 24 Hour Vital Signs








  Date Time  Temp Pulse Resp B/P (MAP) Pulse Ox O2 Delivery O2 Flow Rate FiO2


 


2/8/19 08:00 98.2 100 20 124/60 (81) 95   


 


2/8/19 07:35  101 18  95 Room Air  21 2/8/19 07:24  106 18  93 Room Air  21 2/8/19 04:00 98.1 65 18 111/53 (72) 100   


 


2/8/19 01:17  77 18  99 Room Air  21 2/8/19 01:07  73 18  97 Room Air  21 2/8/19 00:00 98.9 70 18 109/51 (70) 95   


 


2/7/19 21:00      Room Air  


 


2/7/19 20:40  75 18  100 Room Air  21 2/7/19 20:36 97.6       


 


2/7/19 20:33  78 18  98 Room Air  21


 


2/7/19 20:33  78 18   Room Air  21


 


2/7/19 20:00 99.4 90 20 117/74 (88) 96   


 


2/7/19 16:00 97.6 84 20 121/76 (91) 96   


 


2/7/19 12:00 98.2 77 21 121/72 (88) 97   


 


2/7/19 11:48  75 20     21

















Intake and Output  


 


 2/7/19 2/8/19





 19:00 07:00


 


Intake Total 2400 ml 1750 ml


 


Output Total 4000 ml 1600 ml


 


Balance -1600 ml 150 ml


 


  


 


Intake Oral 1200 ml 650 ml


 


IV Total 1200 ml 1100 ml


 


Output Urine Total 4000 ml 1600 ml


 


# Voids 6 








Height (Feet):  5


Height (Inches):  11.00


Weight (Pounds):  267


Medications





Current Medications








 Medications


  (Trade)  Dose


 Ordered  Sig/Henry


 Route


 PRN Reason  Start Time


 Stop Time Status Last Admin


Dose Admin


 


 Acetaminophen


  (Tylenol)  650 mg  Q4H  PRN


 ORAL


 fever (temp>100.5F)  2/6/19 16:30


 3/7/19 16:29   


 


 


 Al Hydroxide/Mg


 Hydroxide


  (Mylanta II)  30 ml  Q6H  PRN


 ORAL


 dyspepsia  2/6/19 16:30


 3/7/19 16:29   


 


 


 Albuterol/


 Ipratropium


  (Albuterol/


 Ipratropium)  3 ml  Q4H  PRN


 HHN


 Shortness of Breath  2/6/19 16:30


 2/10/19 16:29   


 


 


 Albuterol/


 Ipratropium


  (Albuterol/


 Ipratropium)  3 ml  Q6HRT


 HHN


   2/7/19 13:00


 2/12/19 12:59  2/8/19 07:24


 


 


 Alprazolam


  (Xanax)  1 mg  Q6H  PRN


 ORAL


 For Anxiety  2/6/19 16:30


 2/13/19 16:29   


 


 


 Aspirin


  (ASA)  81 mg  DAILY


 ORAL


   2/7/19 09:00


 3/8/19 08:59  2/8/19 09:01


 


 


 Clonidine HCl


  (Catapres Tab)  0.1 mg  Q4H  PRN


 ORAL


 sbp more than 160  2/6/19 16:30


 3/7/19 16:29   


 


 


 Dextrose


  (Dextrose 50%)  25 ml  Q30M  PRN


 IV


 Hypoglycemia  2/6/19 16:15


 3/7/19 21:44   


 


 


 Dextrose


  (Dextrose 50%)  50 ml  Q30M  PRN


 IV


 Hypoglycemia  2/6/19 16:15


 3/7/19 21:44   


 


 


 Divalproex Sodium


  (Depakote)  500 mg  Q12HR


 ORAL


   2/6/19 21:00


 3/7/19 20:59  2/8/19 09:01


 


 


 Escitalopram


 Oxalate


  (Lexapro)  10 mg  DAILY


 ORAL


   2/7/19 09:00


 3/8/19 08:59  2/8/19 09:01


 


 


 Heparin Sodium


  (Porcine)


  (Heparin 5000


 units/ml)  5,000 units  EVERY 12  HOURS


 SUBQ


   2/6/19 21:00


 3/7/19 20:59   


 


 


 Insulin Aspart


  (NovoLOG)    BEFORE MEALS AND  HS


 SUBQ


   2/6/19 17:30


 3/7/19 17:29  2/8/19 06:44


 


 


 Insulin Aspart


  (NovoLOG)  10 units  NOVOTIAC


 SUBQ


   2/7/19 11:50


 3/8/19 06:29  2/8/19 06:43


 


 


 Insulin Detemir


  (Levemir)  30 units  BEDTIME


 SUBQ


   2/7/19 21:00


 3/7/19 21:59  2/7/19 22:27


 


 


 Levofloxacin  100 ml @ 


 100 mls/hr  Q24H


 IVPB


   2/7/19 14:00


 2/14/19 13:59  2/7/19 14:01


 


 


 Morphine Sulfate


  (Morphine


 Sulfate)  2 mg  Q4H  PRN


 IVP


 Severe Pain (Pain Scale 7-10)  2/6/19 16:30


 2/12/19 16:29  2/8/19 09:05


 


 


 Nitroglycerin


  (Ntg)  0.4 mg  Q5M X 3 DOSES PRN


 SL


 Prn Chest Pain  2/6/19 16:30


 3/7/19 16:29   


 


 


 Ondansetron HCl


  (Zofran)  4 mg  Q6H  PRN


 IVP


 Nausea & Vomiting  2/6/19 16:30


 3/7/19 16:29  2/7/19 20:41


 


 


 Polyethylene


 Glycol


  (Miralax)  17 gm  HSPRN  PRN


 ORAL


 Constipation  2/6/19 21:00


 3/7/19 20:59   


 


 


 Promethazine HCl/


 Codeine


  (Phenergan with


 Codeine)  5 ml  Q4H  PRN


 ORAL


 For Cough  2/7/19 12:15


 3/9/19 12:14   


 


 


 Quetiapine


 Fumarate


  (SEROquel)  200 mg  BEDTIME


 ORAL


   2/6/19 21:00


 3/7/19 20:59  2/7/19 20:40


 


 


 Sodium Chloride  1,000 ml @ 


 100 mls/hr  Q10H


 IVLG


   2/6/19 16:15


 3/7/19 19:29  2/8/19 09:01


 


 


 Temazepam


  (Restoril)  15 mg  HSPRN  PRN


 ORAL


 Insomnia  2/6/19 21:00


 2/12/19 20:59   


 








Objective Narrative


Gen:  NAD, well appearing, alert


HEENT: NCAT, MMM, EOMI, PERRL, No Oral lesion, no scleral icterus 


NECK:  full range of motion, supple, no meningismus, No LAD, No JVD


LUNGS:  CTAB, No W/C, No Accessory muscle use


CARDS: RRR, S1, S2, No M/R/G, 


ABD: Soft, NT, ND, No R/G, + BS, No HSM, No Masses


: Deferred


Ext: C/C/E, Pulses 2+ B/L (DP, Rad): 


NEURO: A/O x 4, Strength and Sensation Grossly intact


PSYCH:  Mood/affect normal


SKIN:  Warm/dry, No rashes





Assessment/Plan


Assessment/Plan


39 yo male from a nursing home who was arrested and then his nursing home 

refused to take him back. 





Leukocytosis


    Likely reactive (Seizures, Trauma, Increased Glu)


    No clear sign of infection


     Afebrile


    Neg UA 


    CXR neg





COPD


   Saturation well on RA


Psych history


CVA


Seizure


DM1


Neck surgery





PLAN


- D/C Levofloxacin #2


    -  Monitor off abx


- Monitor CBC and temps





Thank you for this consult. We will continue to follow the patient during this 

hospitalization.











Andres Elizabeth MD Feb 8, 2019 10:21

## 2019-02-08 NOTE — GENERAL PROGRESS NOTE
Assessment/Plan


Problem List:  


(1) COPD (chronic obstructive pulmonary disease)


ICD Codes:  J44.9 - Chronic obstructive pulmonary disease, unspecified


SNOMED:  43143748


(2) Diabetes mellitus


ICD Codes:  E11.9 - Diabetes mellitus


SNOMED:  68092887


(3) CVA (cerebral vascular accident)


ICD Codes:  I63.9 - Cerebral infarction, unspecified


SNOMED:  031095933


(4) Uncontrolled seizures


ICD Codes:  R56.9 - Unspecified convulsions


SNOMED:  30580615


Status:  stable, progressing


Assessment/Plan


pt diet bs control cbc bmp am dc to snf if clear





Subjective


Constitutional:  Reports: weakness


Allergies:  


Coded Allergies:  


     HALOPERIDOL (Verified  Allergy, Unknown, 2/5/19)


All Systems:  reviewed and negative except above


Subjective


calm in bed sleepy





Objective





Last 24 Hour Vital Signs








  Date Time  Temp Pulse Resp B/P (MAP) Pulse Ox O2 Delivery O2 Flow Rate FiO2


 


2/8/19 08:00 98.2 100 20 124/60 (81) 95   


 


2/8/19 07:35  101 18  95 Room Air  21


 


2/8/19 07:24  106 18  93 Room Air  21


 


2/8/19 04:00 98.1 65 18 111/53 (72) 100   


 


2/8/19 01:17  77 18  99 Room Air  21


 


2/8/19 01:07  73 18  97 Room Air  21


 


2/8/19 00:00 98.9 70 18 109/51 (70) 95   


 


2/7/19 21:00      Room Air  


 


2/7/19 20:40  75 18  100 Room Air  21


 


2/7/19 20:36 97.6       


 


2/7/19 20:33  78 18  98 Room Air  21


 


2/7/19 20:33  78 18   Room Air  21


 


2/7/19 20:00 99.4 90 20 117/74 (88) 96   


 


2/7/19 16:00 97.6 84 20 121/76 (91) 96   


 


2/7/19 12:00 98.2 77 21 121/72 (88) 97   


 


2/7/19 11:48  75 20     21

















Intake and Output  


 


 2/7/19 2/8/19





 19:00 07:00


 


Intake Total 2400 ml 1750 ml


 


Output Total 4000 ml 1600 ml


 


Balance -1600 ml 150 ml


 


  


 


Intake Oral 1200 ml 650 ml


 


IV Total 1200 ml 1100 ml


 


Output Urine Total 4000 ml 1600 ml


 


# Voids 6 








Height (Feet):  5


Height (Inches):  11.00


Weight (Pounds):  267


General Appearance:  lethargic


EENT:  normal ENT inspection


Neck:  normal alignment


Cardiovascular:  normal peripheral pulses, normal rate, regular rhythm


Respiratory/Chest:  chest wall non-tender, lungs clear, normal breath sounds


Abdomen:  normal bowel sounds, non tender, soft


Extremities:  normal inspection


Edema:  no edema noted Arm (L), no edema noted Arm (R), no edema noted Leg (L), 

no edema noted Leg (R), no edema noted Pedal (L), no edema noted Pedal (R), no 

edema noted Generalized


Neurologic:  motor weakness


Skin:  normal pigmentation, warm/dry











Samir Cabrera DO Feb 8, 2019 09:04

## 2019-02-09 VITALS — DIASTOLIC BLOOD PRESSURE: 63 MMHG | SYSTOLIC BLOOD PRESSURE: 116 MMHG

## 2019-02-09 VITALS — SYSTOLIC BLOOD PRESSURE: 119 MMHG | DIASTOLIC BLOOD PRESSURE: 69 MMHG

## 2019-02-09 VITALS — SYSTOLIC BLOOD PRESSURE: 125 MMHG | DIASTOLIC BLOOD PRESSURE: 83 MMHG

## 2019-02-09 VITALS — SYSTOLIC BLOOD PRESSURE: 126 MMHG | DIASTOLIC BLOOD PRESSURE: 73 MMHG

## 2019-02-09 VITALS — SYSTOLIC BLOOD PRESSURE: 123 MMHG | DIASTOLIC BLOOD PRESSURE: 71 MMHG

## 2019-02-09 VITALS — SYSTOLIC BLOOD PRESSURE: 122 MMHG | DIASTOLIC BLOOD PRESSURE: 71 MMHG

## 2019-02-09 RX ADMIN — INSULIN ASPART SCH UNITS: 100 INJECTION, SOLUTION INTRAVENOUS; SUBCUTANEOUS at 06:09

## 2019-02-09 RX ADMIN — INSULIN ASPART SCH UNITS: 100 INJECTION, SOLUTION INTRAVENOUS; SUBCUTANEOUS at 12:11

## 2019-02-09 RX ADMIN — HEPARIN SODIUM SCH UNITS: 5000 INJECTION INTRAVENOUS; SUBCUTANEOUS at 21:00

## 2019-02-09 RX ADMIN — MORPHINE SULFATE PRN MG: 2 INJECTION, SOLUTION INTRAMUSCULAR; INTRAVENOUS at 09:43

## 2019-02-09 RX ADMIN — INSULIN ASPART SCH UNITS: 100 INJECTION, SOLUTION INTRAVENOUS; SUBCUTANEOUS at 12:10

## 2019-02-09 RX ADMIN — INSULIN ASPART SCH UNITS: 100 INJECTION, SOLUTION INTRAVENOUS; SUBCUTANEOUS at 20:34

## 2019-02-09 RX ADMIN — INSULIN DETEMIR SCH UNITS: 100 INJECTION, SOLUTION SUBCUTANEOUS at 20:35

## 2019-02-09 RX ADMIN — DIVALPROEX SODIUM SCH MG: 500 TABLET, DELAYED RELEASE ORAL at 09:42

## 2019-02-09 RX ADMIN — MORPHINE SULFATE PRN MG: 2 INJECTION, SOLUTION INTRAMUSCULAR; INTRAVENOUS at 01:25

## 2019-02-09 RX ADMIN — MORPHINE SULFATE PRN MG: 2 INJECTION, SOLUTION INTRAMUSCULAR; INTRAVENOUS at 14:03

## 2019-02-09 RX ADMIN — INSULIN ASPART SCH UNITS: 100 INJECTION, SOLUTION INTRAVENOUS; SUBCUTANEOUS at 17:02

## 2019-02-09 RX ADMIN — IPRATROPIUM BROMIDE AND ALBUTEROL SULFATE SCH ML: .5; 3 SOLUTION RESPIRATORY (INHALATION) at 07:56

## 2019-02-09 RX ADMIN — ASPIRIN 81 MG SCH MG: 81 TABLET ORAL at 09:41

## 2019-02-09 RX ADMIN — DIVALPROEX SODIUM SCH MG: 500 TABLET, DELAYED RELEASE ORAL at 22:20

## 2019-02-09 RX ADMIN — HEPARIN SODIUM SCH UNITS: 5000 INJECTION INTRAVENOUS; SUBCUTANEOUS at 09:43

## 2019-02-09 RX ADMIN — IPRATROPIUM BROMIDE AND ALBUTEROL SULFATE SCH ML: .5; 3 SOLUTION RESPIRATORY (INHALATION) at 19:50

## 2019-02-09 RX ADMIN — QUETIAPINE SCH MG: 200 TABLET, FILM COATED ORAL at 22:20

## 2019-02-09 RX ADMIN — MORPHINE SULFATE PRN MG: 2 INJECTION, SOLUTION INTRAMUSCULAR; INTRAVENOUS at 18:14

## 2019-02-09 RX ADMIN — MORPHINE SULFATE PRN MG: 2 INJECTION, SOLUTION INTRAMUSCULAR; INTRAVENOUS at 22:20

## 2019-02-09 RX ADMIN — IPRATROPIUM BROMIDE AND ALBUTEROL SULFATE SCH ML: .5; 3 SOLUTION RESPIRATORY (INHALATION) at 13:10

## 2019-02-09 NOTE — PULMONOLOGY PROGRESS NOTE
Assessment/Plan


Problems:  


(1) Recurrent falls


(2) Uncontrolled diabetes mellitus


(3) CVA (cerebral vascular accident)


(4) COPD (chronic obstructive pulmonary disease)


Assessment/Plan


might need ortho to evaluate the right effusion


all reviewed


check sputum


add abx for bronchitis


check electrolytes


dvt prophylaxis





Subjective


Constitutional:  Reports: no symptoms


Respiratory:  Reports: no symptoms


Allergies:  


Coded Allergies:  


     HALOPERIDOL (Verified  Allergy, Unknown, 2/5/19)





Objective





Last 24 Hour Vital Signs








  Date Time  Temp Pulse Resp B/P (MAP) Pulse Ox O2 Delivery O2 Flow Rate FiO2


 


2/9/19 13:11  92 18  96 Room Air  21


 


2/9/19 09:00      Room Air  


 


2/9/19 08:02  90 18  99 Room Air  21


 


2/9/19 08:00 98.0 95 18 122/71 (88) 95   


 


2/9/19 07:56  88 18  97 Room Air  21


 


2/9/19 05:26  93 18  99 Room Air  21


 


2/9/19 05:15  90 18  96 Room Air  21


 


2/9/19 04:28 98.7 87 18 125/83 (97)    


 


2/9/19 00:00 98.1 72 19 123/71 (88)    


 


2/8/19 23:45  88 18  100 Room Air  21


 


2/8/19 23:38  87 18  97 Room Air  21


 


2/8/19 21:54      Room Air  


 


2/8/19 20:00 98.3 109 19 135/71 (92)    


 


2/8/19 19:08  96 20  99 Room Air  21


 


2/8/19 19:01  90 18  97 Room Air  21


 


2/8/19 16:00 98.8 95 20 132/75 (94) 96   

















Intake and Output  


 


 2/8/19 2/9/19





 19:00 07:00


 


Intake Total 2690 ml 700 ml


 


Output Total 4050 ml 1800 ml


 


Balance -1360 ml -1100 ml


 


  


 


Intake Oral 1490 ml 


 


IV Total 1200 ml 700 ml


 


Output Urine Total 4050 ml 1800 ml








Objective


General Appearance:  WD/WN


HEENT:  normocephalic


Respiratory/Chest:  chest wall non-tender, lungs clear


Cardiovascular:  normal peripheral pulses, normal rate


Abdomen:  normal bowel sounds, soft, non tender


Extremities:  no cyanosis, right knee effusion/swelling


Skin:  no rash





Microbiology








 Date/Time


Source Procedure


Growth Status


 


 


 2/7/19 23:00


Sputum Gram Stain - Final Resulted


 


 2/7/19 23:00


Sputum Sputum Culture - Preliminary


NORMAL UPPER RESPIRATORY RAMYA AT 23 ... Resulted











Current Medications








 Medications


  (Trade)  Dose


 Ordered  Sig/Henry


 Route


 PRN Reason  Start Time


 Stop Time Status Last Admin


Dose Admin


 


 Acetaminophen


  (Tylenol)  650 mg  Q4H  PRN


 ORAL


 fever (temp>100.5F)  2/6/19 16:30


 3/7/19 16:29   


 


 


 Al Hydroxide/Mg


 Hydroxide


  (Mylanta II)  30 ml  Q6H  PRN


 ORAL


 dyspepsia  2/6/19 16:30


 3/7/19 16:29   


 


 


 Albuterol/


 Ipratropium


  (Albuterol/


 Ipratropium)  3 ml  Q4H  PRN


 HHN


 Shortness of Breath  2/6/19 16:30


 2/10/19 16:29  2/9/19 05:15


 


 


 Albuterol/


 Ipratropium


  (Albuterol/


 Ipratropium)  3 ml  Q6HRT


 HHN


   2/7/19 13:00


 2/12/19 12:59  2/9/19 13:10


 


 


 Alprazolam


  (Xanax)  1 mg  Q6H  PRN


 ORAL


 For Anxiety  2/6/19 16:30


 2/13/19 16:29   


 


 


 Aspirin


  (ASA)  81 mg  DAILY


 ORAL


   2/7/19 09:00


 3/8/19 08:59  2/9/19 09:41


 


 


 Clonidine HCl


  (Catapres Tab)  0.1 mg  Q4H  PRN


 ORAL


 sbp more than 160  2/6/19 16:30


 3/7/19 16:29   


 


 


 Dextrose


  (Dextrose 50%)  25 ml  Q30M  PRN


 IV


 Hypoglycemia  2/6/19 16:15


 3/7/19 21:44   


 


 


 Dextrose


  (Dextrose 50%)  50 ml  Q30M  PRN


 IV


 Hypoglycemia  2/6/19 16:15


 3/7/19 21:44   


 


 


 Divalproex Sodium


  (Depakote)  500 mg  Q12HR


 ORAL


   2/6/19 21:00


 3/7/19 20:59  2/9/19 09:42


 


 


 Escitalopram


 Oxalate


  (Lexapro)  10 mg  DAILY


 ORAL


   2/7/19 09:00


 3/8/19 08:59  2/9/19 09:42


 


 


 Heparin Sodium


  (Porcine)


  (Heparin 5000


 units/ml)  5,000 units  EVERY 12  HOURS


 SUBQ


   2/6/19 21:00


 3/7/19 20:59  2/9/19 09:43


 


 


 Insulin Aspart


  (NovoLOG)    BEFORE MEALS AND  HS


 SUBQ


   2/6/19 17:30


 3/7/19 17:29  2/9/19 12:10


 


 


 Insulin Aspart


  (NovoLOG)  10 units  NOVOTIAC


 SUBQ


   2/7/19 11:50


 3/8/19 06:29  2/9/19 12:11


 


 


 Insulin Detemir


  (Levemir)  30 units  BEDTIME


 SUBQ


   2/7/19 21:00


 3/7/19 21:59  2/8/19 21:12


 


 


 Morphine Sulfate


  (Morphine


 Sulfate)  2 mg  Q4H  PRN


 IVP


 Severe Pain (Pain Scale 7-10)  2/6/19 16:30


 2/12/19 16:29  2/9/19 09:43


 


 


 Nicotine


  (Nicoderm)  1 patch  Q24H


 TDERMAL


   2/8/19 20:00


 3/10/19 19:59  2/8/19 20:06


 


 


 Nitroglycerin


  (Ntg)  0.4 mg  Q5M X 3 DOSES PRN


 SL


 Prn Chest Pain  2/6/19 16:30


 3/7/19 16:29   


 


 


 Ondansetron HCl


  (Zofran)  4 mg  Q6H  PRN


 IVP


 Nausea & Vomiting  2/6/19 16:30


 3/7/19 16:29  2/9/19 13:06


 


 


 Polyethylene


 Glycol


  (Miralax)  17 gm  HSPRN  PRN


 ORAL


 Constipation  2/6/19 21:00


 3/7/19 20:59   


 


 


 Promethazine HCl/


 Codeine


  (Phenergan with


 Codeine)  5 ml  Q4H  PRN


 ORAL


 For Cough  2/7/19 12:15


 3/9/19 12:14   


 


 


 Quetiapine


 Fumarate


  (SEROquel)  200 mg  BEDTIME


 ORAL


   2/6/19 21:00


 3/7/19 20:59  2/8/19 21:00


 


 


 Sodium Chloride  1,000 ml @ 


 100 mls/hr  Q10H


 IVLG


   2/6/19 16:15


 3/7/19 19:29  2/9/19 03:14


 


 


 Temazepam


  (Restoril)  15 mg  HSPRN  PRN


 ORAL


 Insomnia  2/6/19 21:00


 2/12/19 20:59   


 

















Lissette Steward MD Feb 9, 2019 13:16

## 2019-02-09 NOTE — GENERAL PROGRESS NOTE
Assessment/Plan


Problem List:  


(1) Diabetes mellitus


ICD Codes:  E11.9 - Diabetes mellitus


SNOMED:  38328657


(2) Chiari malformation type II


ICD Codes:  Q07.00 - Chiari malformation type II


SNOMED:  747181569


(3) Lumbar degenerative disc disease


ICD Codes:  M51.36 - Lumbar degenerative disc disease


SNOMED:  01118105


(4) Peripheral neuropathy


ICD Codes:  G62.9 - Peripheral neuropathy


SNOMED:  67537889


(5) COPD (chronic obstructive pulmonary disease)


ICD Codes:  J44.9 - Chronic obstructive pulmonary disease, unspecified


SNOMED:  33280785


(6) CVA (cerebral vascular accident)


ICD Codes:  I63.9 - Cerebral infarction, unspecified


SNOMED:  184274728


(7) Noncompliance


ICD Codes:  Z91.19 - Patient's noncompliance with other medical treatment and 

regimen


SNOMED:  3026547


(8) Uncontrolled diabetes mellitus


ICD Codes:  E11.65 - Type 2 diabetes mellitus with hyperglycemia


SNOMED:  37024086, 886068567


(9) Gastroparesis due to DM


ICD Codes:  E11.43 - Type 2 diabetes mellitus with diabetic autonomic (poly)

neuropathy; K31.84 - Gastroparesis


SNOMED:  49829110, 341744041


Status:  progressing


Assessment/Plan


afebrile


uncontrolled diabetes


cva


copd


no wheezing


reviewed chart and labs





Subjective


ROS Limited/Unobtainable:  Yes


Allergies:  


Coded Allergies:  


     HALOPERIDOL (Verified  Allergy, Unknown, 2/5/19)





Objective





Last 24 Hour Vital Signs








  Date Time  Temp Pulse Resp B/P (MAP) Pulse Ox O2 Delivery O2 Flow Rate FiO2


 


2/9/19 16:00 97.7 102 18 119/69 (86) 95   


 


2/9/19 13:18  91 18  97 Room Air  21 2/9/19 13:11  92 18  96 Room Air  21 2/9/19 12:00 99.5 88 18 126/73 (90) 95   


 


2/9/19 09:00      Room Air  


 


2/9/19 08:02  90 18  99 Room Air  21 2/9/19 08:00 98.0 95 18 122/71 (88) 95   


 


2/9/19 07:56  88 18  97 Room Air  21 2/9/19 05:26  93 18  99 Room Air  21 2/9/19 05:15  90 18  96 Room Air  21 2/9/19 04:28 98.7 87 18 125/83 (97)    


 


2/9/19 00:00 98.1 72 19 123/71 (88)    


 


2/8/19 23:45  88 18  100 Room Air  21


 


2/8/19 23:38  87 18  97 Room Air  21


 


2/8/19 21:54      Room Air  


 


2/8/19 20:00 98.3 109 19 135/71 (92)    


 


2/8/19 19:08  96 20  99 Room Air  21


 


2/8/19 19:01  90 18  97 Room Air  21

















Intake and Output  


 


 2/8/19 2/9/19





 19:00 07:00


 


Intake Total 2690 ml 700 ml


 


Output Total 4050 ml 1800 ml


 


Balance -1360 ml -1100 ml


 


  


 


Intake Oral 1490 ml 


 


IV Total 1200 ml 700 ml


 


Output Urine Total 4050 ml 1800 ml








Height (Feet):  5


Height (Inches):  11.00


Weight (Pounds):  267


Neck:  supple


Cardiovascular:  normal rate


Respiratory/Chest:  lungs clear


Abdomen:  soft











Aubrey Gaytan MD Feb 9, 2019 18:15

## 2019-02-09 NOTE — PROGRESS NOTE
DATE:  02/09/2019

SUBJECTIVE:  This is a 38-year-old male patient.  He continued to have some

confusion because he has ______ uncontrolled diabetes and dehydration and

he also has altered mental status, confusion, and mood lability.



MENTAL STATUS EXAMINATION:  This is a 38-year-old male.  Appearance is

disheveled.  Attitude, irritable and agitated.  Affect, guarded and

restricted.  Intellect poor.  Mood depressed and anxious.  Motor activity,

psychomotor agitation.  Attention span is poor.  Orientation x2.  Speech

is pressured.  Thought process, disorganized and illogical.  Thought

content, auditory hallucinations and paranoid delusions.  Insight and

judgment is poor.



DIAGNOSIS:  Schizoaffective, bipolar type.



PLAN:  Treat him with a medication regimen with Seroquel 200 mg nightly,

Depakote 500 mg q.12 hours.  Provided him with 20 minutes of reality-based

supportive psychotherapy and encouraged him to interact appropriately with

staff and other patients.  A 20 minutes of cognitive behavioral therapy

provided.  Provided him with 20 minutes of cognitive behavioral therapy to

help him identify his automatic negative thoughts and help him convert

those negative thoughts to more positive thoughts to reduce depression,

anxiety, and suicidality.  Chart reviewed.  Discussed with staff.  Seen

and assessed in his room.









  ______________________________________________

  Gulshan Armstrong M.D.





DR:  MATEUSZ

D:  02/09/2019 11:05

T:  02/10/2019 00:58

JOB#:  [id number]

CC:

## 2019-02-09 NOTE — NUR
CASE MANAGEMENT: REVIEW





SI: UNCONTROLLED DM . DEHYDRATION 

T 98.0 HR 95 RR 18 /71 SAT 95% ROOM AIR







IS: ASA PO QD

DEPAKOTE PO Q12HR

NOVOLOG SQ ACHS

NS IVF @100ML/HR







***MED/SURG STATUS***

DCP: PATIENT REPORTS BEING HOMELESS

## 2019-02-09 NOTE — NUR
NURSE NOTES:

Received patient on bed, awake. IV site intact and patent. Bed in low and locked position. 
Patient denies shortness of breath. Room board updated, will continue to monitor.

## 2019-02-09 NOTE — NUR
NURSE NOTES:

Received report from NACHO Campos. Patient A&Ox4. On room room air, no signs of distress or 
labored breathing. IV intact, patent, and infusing IV fluids. Bed in lowest position with 
call light in reach. Will continue with plan of care.

## 2019-02-09 NOTE — GENERAL PROGRESS NOTE
Assessment/Plan


Problem List:  


(1) COPD (chronic obstructive pulmonary disease)


ICD Codes:  J44.9 - Chronic obstructive pulmonary disease, unspecified


SNOMED:  35117849


(2) Diabetes mellitus


ICD Codes:  E11.9 - Diabetes mellitus


SNOMED:  05224598


(3) Peripheral neuropathy


ICD Codes:  G62.9 - Peripheral neuropathy


SNOMED:  00177090


Assessment/Plan


continue Levemir 30 units qhs 


continue Novolog 10 units ac tid 


continue NSSI ac / hs





Subjective


Allergies:  


Coded Allergies:  


     HALOPERIDOL (Verified  Allergy, Unknown, 2/5/19)


All Systems:  reviewed and negative except above


Subjective


events noted 


he is feeling better 


having breakfast 


glucose values improved 











Item Value  Date Time


 


Bedside Blood Glucose 156 mg/dl H 2/9/19 0610


 


Bedside Blood Glucose 189 mg/dl H 2/8/19 2155


 


Bedside Blood Glucose 223 mg/dl H 2/8/19 1655


 


Bedside Blood Glucose 201 mg/dl H 2/8/19 1205











Objective





Last 24 Hour Vital Signs








  Date Time  Temp Pulse Resp B/P (MAP) Pulse Ox O2 Delivery O2 Flow Rate FiO2


 


2/9/19 05:26  93 18  99 Room Air  21


 


2/9/19 05:15  90 18  96 Room Air  21 2/9/19 04:28 98.7 87 18 125/83 (97)    


 


2/9/19 00:00 98.1 72 19 123/71 (88)    


 


2/8/19 23:45  88 18  100 Room Air  21 2/8/19 23:38  87 18  97 Room Air  21


 


2/8/19 21:54      Room Air  


 


2/8/19 20:00 98.3 109 19 135/71 (92)    


 


2/8/19 19:08  96 20  99 Room Air  21


 


2/8/19 19:01  90 18  97 Room Air  21


 


2/8/19 16:00 98.8 95 20 132/75 (94) 96   


 


2/8/19 12:40  102 18  97 Room Air  21 2/8/19 12:28  89 20  95 Room Air  21


 


2/8/19 12:00 98.6 87 20 118/68 (85) 95   


 


2/8/19 09:00      Room Air  


 


2/8/19 08:00 98.2 100 20 124/60 (81) 95   

















Intake and Output  


 


 2/8/19 2/9/19





 18:59 06:59


 


Intake Total 2690 ml 800 ml


 


Output Total 4050 ml 1800 ml


 


Balance -1360 ml -1000 ml


 


  


 


Intake Oral 1490 ml 


 


IV Total 1200 ml 800 ml


 


Output Urine Total 4050 ml 1800 ml








Laboratory Tests


2/8/19 12:10: 


White Blood Count 9.6, Red Blood Count 4.43L, Hemoglobin 13.5L, Hematocrit 38.3L

, Mean Corpuscular Volume 87, Mean Corpuscular Hemoglobin 30.5, Mean 

Corpuscular Hemoglobin Concent 35.2, Red Cell Distribution Width 12.4, Platelet 

Count 179, Mean Platelet Volume 8.2, Neutrophils (%) (Auto) 69.2, Lymphocytes (%

) (Auto) 20.7, Monocytes (%) (Auto) 8.6, Eosinophils (%) (Auto) 1.0, Basophils (

%) (Auto) 0.6, Sodium Level 137, Potassium Level 3.7, Chloride Level 103, 

Carbon Dioxide Level 27, Anion Gap 8, Blood Urea Nitrogen 14, Creatinine 0.8, 

Estimat Glomerular Filtration Rate > 60, Glucose Level 200H, Calcium Level 8.8


Height (Feet):  5


Height (Inches):  11.00


Weight (Pounds):  267


General Appearance:  no apparent distress


Neck:  normal alignment


Cardiovascular:  normal rate


Respiratory/Chest:  lungs clear


Abdomen:  normal bowel sounds


Pelvis:  normal external exam


Objective





Current Medications








 Medications


  (Trade)  Dose


 Ordered  Sig/Henry


 Route


 PRN Reason  Start Time


 Stop Time Status Last Admin


Dose Admin


 


 Acetaminophen


  (Tylenol)  650 mg  Q4H  PRN


 ORAL


 fever (temp>100.5F)  2/6/19 16:30


 3/7/19 16:29   


 


 


 Al Hydroxide/Mg


 Hydroxide


  (Mylanta II)  30 ml  Q6H  PRN


 ORAL


 dyspepsia  2/6/19 16:30


 3/7/19 16:29   


 


 


 Albuterol/


 Ipratropium


  (Albuterol/


 Ipratropium)  3 ml  Q4H  PRN


 HHN


 Shortness of Breath  2/6/19 16:30


 2/10/19 16:29  2/9/19 05:15


 


 


 Albuterol/


 Ipratropium


  (Albuterol/


 Ipratropium)  3 ml  Q6HRT


 HHN


   2/7/19 13:00


 2/12/19 12:59  2/8/19 23:37


 


 


 Alprazolam


  (Xanax)  1 mg  Q6H  PRN


 ORAL


 For Anxiety  2/6/19 16:30


 2/13/19 16:29   


 


 


 Aspirin


  (ASA)  81 mg  DAILY


 ORAL


   2/7/19 09:00


 3/8/19 08:59  2/8/19 09:01


 


 


 Clonidine HCl


  (Catapres Tab)  0.1 mg  Q4H  PRN


 ORAL


 sbp more than 160  2/6/19 16:30


 3/7/19 16:29   


 


 


 Dextrose


  (Dextrose 50%)  25 ml  Q30M  PRN


 IV


 Hypoglycemia  2/6/19 16:15


 3/7/19 21:44   


 


 


 Dextrose


  (Dextrose 50%)  50 ml  Q30M  PRN


 IV


 Hypoglycemia  2/6/19 16:15


 3/7/19 21:44   


 


 


 Divalproex Sodium


  (Depakote)  500 mg  Q12HR


 ORAL


   2/6/19 21:00


 3/7/19 20:59  2/8/19 21:00


 


 


 Escitalopram


 Oxalate


  (Lexapro)  10 mg  DAILY


 ORAL


   2/7/19 09:00


 3/8/19 08:59  2/8/19 09:01


 


 


 Heparin Sodium


  (Porcine)


  (Heparin 5000


 units/ml)  5,000 units  EVERY 12  HOURS


 SUBQ


   2/6/19 21:00


 3/7/19 20:59   


 


 


 Insulin Aspart


  (NovoLOG)    BEFORE MEALS AND  HS


 SUBQ


   2/6/19 17:30


 3/7/19 17:29  2/9/19 06:09


 


 


 Insulin Aspart


  (NovoLOG)  10 units  NOVOTIAC


 SUBQ


   2/7/19 11:50


 3/8/19 06:29  2/9/19 06:09


 


 


 Insulin Detemir


  (Levemir)  30 units  BEDTIME


 SUBQ


   2/7/19 21:00


 3/7/19 21:59  2/8/19 21:12


 


 


 Morphine Sulfate


  (Morphine


 Sulfate)  2 mg  Q4H  PRN


 IVP


 Severe Pain (Pain Scale 7-10)  2/6/19 16:30


 2/12/19 16:29  2/9/19 01:25


 


 


 Nicotine


  (Nicoderm)  1 patch  Q24H


 TDERMAL


   2/8/19 20:00


 3/10/19 19:59  2/8/19 20:06


 


 


 Nitroglycerin


  (Ntg)  0.4 mg  Q5M X 3 DOSES PRN


 SL


 Prn Chest Pain  2/6/19 16:30


 3/7/19 16:29   


 


 


 Ondansetron HCl


  (Zofran)  4 mg  Q6H  PRN


 IVP


 Nausea & Vomiting  2/6/19 16:30


 3/7/19 16:29  2/8/19 10:10


 


 


 Polyethylene


 Glycol


  (Miralax)  17 gm  HSPRN  PRN


 ORAL


 Constipation  2/6/19 21:00


 3/7/19 20:59   


 


 


 Promethazine HCl/


 Codeine


  (Phenergan with


 Codeine)  5 ml  Q4H  PRN


 ORAL


 For Cough  2/7/19 12:15


 3/9/19 12:14   


 


 


 Quetiapine


 Fumarate


  (SEROquel)  200 mg  BEDTIME


 ORAL


   2/6/19 21:00


 3/7/19 20:59  2/8/19 21:00


 


 


 Sodium Chloride  1,000 ml @ 


 100 mls/hr  Q10H


 IVLG


   2/6/19 16:15


 3/7/19 19:29  2/9/19 03:14


 


 


 Temazepam


  (Restoril)  15 mg  HSPRN  PRN


 ORAL


 Insomnia  2/6/19 21:00


 2/12/19 20:59   


 

















Dusty Denis MD Feb 9, 2019 07:43

## 2019-02-09 NOTE — NUR
NURSE NOTES:

Received pt from NACHO SANTOS. Pt is alert and orient x4. No SOB or acute respiratory distress 
noted. pt has intact iv access LFA 22g SL. pt is eating breakfast by him self. all needs 
attended, bed is locked and is in the lowest position. call light within easy reach. will 
continue to monitor.

## 2019-02-10 VITALS — SYSTOLIC BLOOD PRESSURE: 127 MMHG | DIASTOLIC BLOOD PRESSURE: 78 MMHG

## 2019-02-10 VITALS — SYSTOLIC BLOOD PRESSURE: 123 MMHG | DIASTOLIC BLOOD PRESSURE: 71 MMHG

## 2019-02-10 VITALS — DIASTOLIC BLOOD PRESSURE: 77 MMHG | SYSTOLIC BLOOD PRESSURE: 136 MMHG

## 2019-02-10 VITALS — DIASTOLIC BLOOD PRESSURE: 74 MMHG | SYSTOLIC BLOOD PRESSURE: 126 MMHG

## 2019-02-10 VITALS — DIASTOLIC BLOOD PRESSURE: 70 MMHG | SYSTOLIC BLOOD PRESSURE: 120 MMHG

## 2019-02-10 VITALS — SYSTOLIC BLOOD PRESSURE: 114 MMHG | DIASTOLIC BLOOD PRESSURE: 73 MMHG

## 2019-02-10 RX ADMIN — ASPIRIN 81 MG SCH MG: 81 TABLET ORAL at 08:48

## 2019-02-10 RX ADMIN — INSULIN ASPART SCH UNITS: 100 INJECTION, SOLUTION INTRAVENOUS; SUBCUTANEOUS at 06:30

## 2019-02-10 RX ADMIN — INSULIN ASPART SCH UNITS: 100 INJECTION, SOLUTION INTRAVENOUS; SUBCUTANEOUS at 12:07

## 2019-02-10 RX ADMIN — MORPHINE SULFATE PRN MG: 2 INJECTION, SOLUTION INTRAMUSCULAR; INTRAVENOUS at 16:52

## 2019-02-10 RX ADMIN — INSULIN ASPART SCH UNITS: 100 INJECTION, SOLUTION INTRAVENOUS; SUBCUTANEOUS at 16:58

## 2019-02-10 RX ADMIN — INSULIN ASPART SCH UNITS: 100 INJECTION, SOLUTION INTRAVENOUS; SUBCUTANEOUS at 12:08

## 2019-02-10 RX ADMIN — MORPHINE SULFATE PRN MG: 2 INJECTION, SOLUTION INTRAMUSCULAR; INTRAVENOUS at 08:47

## 2019-02-10 RX ADMIN — QUETIAPINE SCH MG: 200 TABLET, FILM COATED ORAL at 21:17

## 2019-02-10 RX ADMIN — DIVALPROEX SODIUM SCH MG: 500 TABLET, DELAYED RELEASE ORAL at 21:17

## 2019-02-10 RX ADMIN — MORPHINE SULFATE PRN MG: 2 INJECTION, SOLUTION INTRAMUSCULAR; INTRAVENOUS at 21:24

## 2019-02-10 RX ADMIN — DIVALPROEX SODIUM SCH MG: 500 TABLET, DELAYED RELEASE ORAL at 08:48

## 2019-02-10 RX ADMIN — IPRATROPIUM BROMIDE AND ALBUTEROL SULFATE SCH ML: .5; 3 SOLUTION RESPIRATORY (INHALATION) at 22:03

## 2019-02-10 RX ADMIN — MORPHINE SULFATE PRN MG: 2 INJECTION, SOLUTION INTRAMUSCULAR; INTRAVENOUS at 12:49

## 2019-02-10 RX ADMIN — IPRATROPIUM BROMIDE AND ALBUTEROL SULFATE SCH ML: .5; 3 SOLUTION RESPIRATORY (INHALATION) at 13:06

## 2019-02-10 RX ADMIN — HEPARIN SODIUM SCH UNITS: 5000 INJECTION INTRAVENOUS; SUBCUTANEOUS at 09:00

## 2019-02-10 RX ADMIN — INSULIN DETEMIR SCH UNITS: 100 INJECTION, SOLUTION SUBCUTANEOUS at 21:24

## 2019-02-10 RX ADMIN — HEPARIN SODIUM SCH UNITS: 5000 INJECTION INTRAVENOUS; SUBCUTANEOUS at 21:00

## 2019-02-10 RX ADMIN — IPRATROPIUM BROMIDE AND ALBUTEROL SULFATE SCH ML: .5; 3 SOLUTION RESPIRATORY (INHALATION) at 07:24

## 2019-02-10 RX ADMIN — ALPRAZOLAM PRN MG: 0.5 TABLET ORAL at 15:42

## 2019-02-10 RX ADMIN — INSULIN ASPART SCH UNITS: 100 INJECTION, SOLUTION INTRAVENOUS; SUBCUTANEOUS at 06:31

## 2019-02-10 RX ADMIN — IPRATROPIUM BROMIDE AND ALBUTEROL SULFATE SCH ML: .5; 3 SOLUTION RESPIRATORY (INHALATION) at 01:47

## 2019-02-10 RX ADMIN — MORPHINE SULFATE PRN MG: 2 INJECTION, SOLUTION INTRAMUSCULAR; INTRAVENOUS at 04:45

## 2019-02-10 RX ADMIN — INSULIN ASPART SCH UNITS: 100 INJECTION, SOLUTION INTRAVENOUS; SUBCUTANEOUS at 21:23

## 2019-02-10 NOTE — INFECTIOUS DISEASES PROG NOTE
Assessment/Plan


Assessment/Plan





37 yo male from a nursing home who was arrested and then his nursing home 

refused to take him back. 





Leukocytosis


    Likely reactive (Seizures, Trauma, Increased Glu)


    No clear sign of infection


     Afebrile


    Neg UA 


    CXR neg





COPD


   Saturation well on RA


Psych history


CVA


Seizure


DM1


Neck surgery





PLAN


 -  Monitor off abx


     - 2/8/19 SP Levofloxacin #2





- Monitor CBC and temps





Thank you for this consult. We will continue to follow the patient during this 

hospitalization.





Subjective


Allergies:  


Coded Allergies:  


     HALOPERIDOL (Verified  Allergy, Unknown, 2/5/19)


Subjective


Afebrile


No leukocytosis





Objective


Vital Signs





Last 24 Hour Vital Signs








  Date Time  Temp Pulse Resp B/P (MAP) Pulse Ox O2 Delivery O2 Flow Rate FiO2


 


2/10/19 04:00 98.8 69 17 114/73 (87) 96   


 


2/10/19 01:58  98 18  98 Room Air  21


 


2/10/19 01:47  83 18  94 Room Air  21


 


2/10/19 00:00 98.2 95 17 120/70 (87) 94   


 


2/9/19 21:00      Room Air  


 


2/9/19 20:02  95 18  99 Room Air  21


 


2/9/19 20:00 98.1 109 18 116/63 (80) 92   


 


2/9/19 19:51  84 18  95 Room Air  21


 


2/9/19 16:00 97.7 102 18 119/69 (86) 95   


 


2/9/19 13:18  91 18  97 Room Air  21


 


2/9/19 13:11  92 18  96 Room Air  21


 


2/9/19 12:00 99.5 88 18 126/73 (90) 95   


 


2/9/19 09:00      Room Air  








Height (Feet):  5


Height (Inches):  11.00


Weight (Pounds):  267


Objective


Gen:  NAD, well appearing, alert


HEENT: NCAT, MMM, EOMI


LUNGS:  CTAB, No W


CARDS: RRR, S1, S2, No M


ABD: Soft, NT, ND,  + BS


Ext: C/C/E, Pulses 2+ B/L (DP, Rad): 


NEURO: A/O x 4, Strength and Sensation Grossly intact





Microbiology








 Date/Time


Source Procedure


Growth Status


 


 


 2/7/19 23:00


Sputum Gram Stain - Final Complete


 


 2/7/19 23:00


Sputum Sputum Culture - Final


NORMAL UPPER RESPIRATORY RAMYA AT 47 ... Complete











Current Medications








 Medications


  (Trade)  Dose


 Ordered  Sig/Henry


 Route


 PRN Reason  Start Time


 Stop Time Status Last Admin


Dose Admin


 


 Acetaminophen


  (Tylenol)  650 mg  Q4H  PRN


 ORAL


 fever (temp>100.5F)  2/6/19 16:30


 3/7/19 16:29   


 


 


 Al Hydroxide/Mg


 Hydroxide


  (Mylanta II)  30 ml  Q6H  PRN


 ORAL


 dyspepsia  2/6/19 16:30


 3/7/19 16:29   


 


 


 Albuterol/


 Ipratropium


  (Albuterol/


 Ipratropium)  3 ml  Q4H  PRN


 HHN


 Shortness of Breath  2/6/19 16:30


 2/10/19 16:29  2/9/19 05:15


 


 


 Albuterol/


 Ipratropium


  (Albuterol/


 Ipratropium)  3 ml  Q6HRT


 HHN


   2/7/19 13:00


 2/12/19 12:59  2/10/19 07:24


 


 


 Alprazolam


  (Xanax)  1 mg  Q6H  PRN


 ORAL


 For Anxiety  2/6/19 16:30


 2/13/19 16:29   


 


 


 Aspirin


  (ASA)  81 mg  DAILY


 ORAL


   2/7/19 09:00


 3/8/19 08:59  2/9/19 09:41


 


 


 Clonidine HCl


  (Catapres Tab)  0.1 mg  Q4H  PRN


 ORAL


 sbp more than 160  2/6/19 16:30


 3/7/19 16:29   


 


 


 Dextrose


  (Dextrose 50%)  25 ml  Q30M  PRN


 IV


 Hypoglycemia  2/6/19 16:15


 3/7/19 21:44   


 


 


 Dextrose


  (Dextrose 50%)  50 ml  Q30M  PRN


 IV


 Hypoglycemia  2/6/19 16:15


 3/7/19 21:44   


 


 


 Divalproex Sodium


  (Depakote)  500 mg  Q12HR


 ORAL


   2/6/19 21:00


 3/7/19 20:59  2/9/19 22:20


 


 


 Escitalopram


 Oxalate


  (Lexapro)  10 mg  DAILY


 ORAL


   2/7/19 09:00


 3/8/19 08:59  2/9/19 09:42


 


 


 Heparin Sodium


  (Porcine)


  (Heparin 5000


 units/ml)  5,000 units  EVERY 12  HOURS


 SUBQ


   2/6/19 21:00


 3/7/19 20:59  2/9/19 09:43


 


 


 Insulin Aspart


  (NovoLOG)    BEFORE MEALS AND  HS


 SUBQ


   2/6/19 17:30


 3/7/19 17:29  2/10/19 06:31


 


 


 Insulin Aspart


  (NovoLOG)  10 units  NOVOTIAC


 SUBQ


   2/7/19 11:50


 3/8/19 06:29  2/10/19 06:30


 


 


 Insulin Detemir


  (Levemir)  30 units  BEDTIME


 SUBQ


   2/7/19 21:00


 3/7/19 21:59  2/9/19 20:35


 


 


 Morphine Sulfate


  (Morphine


 Sulfate)  2 mg  Q4H  PRN


 IVP


 Severe Pain (Pain Scale 7-10)  2/6/19 16:30


 2/12/19 16:29  2/10/19 04:45


 


 


 Nicotine


  (Nicoderm)  1 patch  Q24H


 TDERMAL


   2/8/19 20:00


 3/10/19 19:59  2/9/19 20:36


 


 


 Nitroglycerin


  (Ntg)  0.4 mg  Q5M X 3 DOSES PRN


 SL


 Prn Chest Pain  2/6/19 16:30


 3/7/19 16:29   


 


 


 Ondansetron HCl


  (Zofran)  4 mg  Q6H  PRN


 IVP


 Nausea & Vomiting  2/6/19 16:30


 3/7/19 16:29  2/9/19 13:06


 


 


 Polyethylene


 Glycol


  (Miralax)  17 gm  HSPRN  PRN


 ORAL


 Constipation  2/6/19 21:00


 3/7/19 20:59   


 


 


 Promethazine HCl/


 Codeine


  (Phenergan with


 Codeine)  5 ml  Q4H  PRN


 ORAL


 For Cough  2/7/19 12:15


 3/9/19 12:14   


 


 


 Quetiapine


 Fumarate


  (SEROquel)  200 mg  BEDTIME


 ORAL


   2/6/19 21:00


 3/7/19 20:59  2/9/19 22:20


 


 


 Sodium Chloride  1,000 ml @ 


 100 mls/hr  Q10H


 IVLG


   2/6/19 16:15


 3/7/19 19:29  2/10/19 00:22


 


 


 Temazepam


  (Restoril)  15 mg  HSPRN  PRN


 ORAL


 Insomnia  2/6/19 21:00


 2/12/19 20:59   


 

















Andres Elizabeth MD Feb 10, 2019 08:29

## 2019-02-10 NOTE — NUR
NURSE NOTES:

Received report from Franny Gutierrez RN and NACHO Clark. Patient A&Ox4 on room air. On room air, no 
signs of distress or labored breathing. IV intact, patent, and infusing IV fluids. Complains 
of pain. Bed in lowest position with call light in reach. Will continue with plan care.

## 2019-02-10 NOTE — NUR
NURSE NOTES:

Patient ordered outside food to eat. Nurse explained risk of doing so, especially with 
uncontrolled diabetes and CCHO med diet. Despite education, patient became agitated and was 
rude to RN, verbalizing that he will do as he pleases. Charge nurse aware.

## 2019-02-10 NOTE — PROGRESS NOTE
DATE:  02/10/2019

SUBJECTIVE:  This is a 38-year-old male patient with dehydration and

diabetes.  He continues to have extreme mood lability, confusion, and

disorganized thought process.



MENTAL STATUS EXAMINATION:  This is a 38-year-old male.  Appearance is

disheveled.  Attitude, irritable and agitated.  Affect, guarded and

restricted.  Intellect poor.  Mood depressed and anxious.  Motor activity,

psychomotor agitation.  Attention span is poor.  Orientation x2.  Speech

is pressured.  Thought process, disorganized and illogical.  Thought

content, auditory hallucinations and paranoid delusions.  Insight and

judgment is poor.



DIAGNOSIS:  Schizoaffective, bipolar type.



PLAN:  Treat him on dose of Seroquel 200 mg at bedtime and Depakote 500 mg

twice a day.  Provide him with 20 minutes of _____ cognitive behavioral

therapy to optimize the negative thoughts and help him to convert those

thought processes to more positive thinking to reduce depression, anxiety,

suicidality.  Chart reviewed.  Discussed with staff.  Seen and assessed in

his room.









  ______________________________________________

  Gulshan Armstrong M.D.





DR:  MAGED

D:  02/10/2019 10:20

T:  02/10/2019 18:07

JOB#:  [id number]

CC:

## 2019-02-10 NOTE — NUR
NURSE NOTES:

Patient refused Heparin injection scheduled at 9AM, explained benefit and risk, but patient 
still refused, No sign and symptom of SOB nor bleeding noted. Will continue to monitor.

## 2019-02-10 NOTE — GENERAL PROGRESS NOTE
Assessment/Plan


Problem List:  


(1) Diabetes mellitus


ICD Codes:  E11.9 - Diabetes mellitus


SNOMED:  99461989


(2) Chiari malformation type II


ICD Codes:  Q07.00 - Chiari malformation type II


SNOMED:  195477078


(3) Lumbar degenerative disc disease


ICD Codes:  M51.36 - Lumbar degenerative disc disease


SNOMED:  76879256


(4) Peripheral neuropathy


ICD Codes:  G62.9 - Peripheral neuropathy


SNOMED:  21881178


(5) COPD (chronic obstructive pulmonary disease)


ICD Codes:  J44.9 - Chronic obstructive pulmonary disease, unspecified


SNOMED:  14855895


(6) CVA (cerebral vascular accident)


ICD Codes:  I63.9 - Cerebral infarction, unspecified


SNOMED:  756672399


(7) Noncompliance


ICD Codes:  Z91.19 - Patient's noncompliance with other medical treatment and 

regimen


SNOMED:  1349285


(8) Uncontrolled diabetes mellitus


ICD Codes:  E11.65 - Type 2 diabetes mellitus with hyperglycemia


SNOMED:  84086240, 058078543


(9) Gastroparesis due to DM


ICD Codes:  E11.43 - Type 2 diabetes mellitus with diabetic autonomic (poly)

neuropathy; K31.84 - Gastroparesis


SNOMED:  43540121, 865837492


Status:  progressing


Assessment/Plan


sugar is improving


no acute events


vitals stable


uncontrolled diabetes


reviewed chart and labs





Subjective


ROS Limited/Unobtainable:  Yes


Allergies:  


Coded Allergies:  


     HALOPERIDOL (Verified  Allergy, Unknown, 2/5/19)





Objective





Last 24 Hour Vital Signs








  Date Time  Temp Pulse Resp B/P (MAP) Pulse Ox O2 Delivery O2 Flow Rate FiO2


 


2/10/19 16:00 98.3 95 20 127/78 (94) 97   


 


2/10/19 13:16  102 18  96 Room Air  21


 


2/10/19 13:06  92 18  96 Room Air  21


 


2/10/19 12:00 97.7 88 20 126/74 (91) 95   


 


2/10/19 09:00      Room Air  


 


2/10/19 08:00 99.0 99 21 123/71 (88) 95   


 


2/10/19 07:31  92 18  98 Room Air  21


 


2/10/19 07:24  80 18  95 Room Air  21


 


2/10/19 04:00 98.8 69 17 114/73 (87) 96   


 


2/10/19 01:58  98 18  98 Room Air  21


 


2/10/19 01:47  83 18  94 Room Air  21


 


2/10/19 00:00 98.2 95 17 120/70 (87) 94   

















Intake and Output  


 


 2/9/19 2/10/19





 19:00 07:00


 


Intake Total 1480 ml 100 ml


 


Output Total 3850 ml 2750 ml


 


Balance -2370 ml -2650 ml


 


  


 


Intake Oral 1080 ml 


 


IV Total 400 ml 100 ml


 


Output Urine Total 3850 ml 2750 ml








Height (Feet):  5


Height (Inches):  11.00


Weight (Pounds):  267


Neck:  supple


Cardiovascular:  normal rate


Respiratory/Chest:  lungs clear


Abdomen:  soft











Aubrey Gaytan MD Feb 10, 2019 21:19

## 2019-02-10 NOTE — GENERAL PROGRESS NOTE
Assessment/Plan


Problem List:  


(1) COPD (chronic obstructive pulmonary disease)


ICD Codes:  J44.9 - Chronic obstructive pulmonary disease, unspecified


SNOMED:  28181682


(2) Diabetes mellitus


ICD Codes:  E11.9 - Diabetes mellitus


SNOMED:  97845651


(3) Peripheral neuropathy


ICD Codes:  G62.9 - Peripheral neuropathy


SNOMED:  28619240


Assessment/Plan


continue Levemir 30 units qhs 


continue Novolog 10 units ac tid 


continue NSSI ac / hs 


stable for discharge from DM stand point





Subjective


Allergies:  


Coded Allergies:  


     HALOPERIDOL (Verified  Allergy, Unknown, 2/5/19)


All Systems:  reviewed and negative except above


Subjective


events noted 


glucose in better control 











Item Value  Date Time


 


Bedside Blood Glucose 123 mg/dl H 2/10/19 0631


 


Bedside Blood Glucose 185 mg/dl H 2/9/19 2035


 


Bedside Blood Glucose 170 mg/dl H 2/9/19 1702


 


Bedside Blood Glucose 178 mg/dl H 2/9/19 1211


 


Bedside Blood Glucose 156 mg/dl H 2/9/19 0610











Objective





Last 24 Hour Vital Signs








  Date Time  Temp Pulse Resp B/P (MAP) Pulse Ox O2 Delivery O2 Flow Rate FiO2


 


2/10/19 04:00 98.8 69 17 114/73 (87) 96   


 


2/10/19 01:58  98 18  98 Room Air  21


 


2/10/19 01:47  83 18  94 Room Air  21


 


2/10/19 00:00 98.2 95 17 120/70 (87) 94   


 


2/9/19 21:00      Room Air  


 


2/9/19 20:02  95 18  99 Room Air  21


 


2/9/19 20:00 98.1 109 18 116/63 (80) 92   


 


2/9/19 19:51  84 18  95 Room Air  21


 


2/9/19 16:00 97.7 102 18 119/69 (86) 95   


 


2/9/19 13:18  91 18  97 Room Air  21


 


2/9/19 13:11  92 18  96 Room Air  21


 


2/9/19 12:00 99.5 88 18 126/73 (90) 95   


 


2/9/19 09:00      Room Air  

















Intake and Output  


 


 2/9/19 2/10/19





 18:59 06:59


 


Intake Total 1480 ml 100 ml


 


Output Total 3850 ml 2750 ml


 


Balance -2370 ml -2650 ml


 


  


 


Intake Oral 1080 ml 


 


IV Total 400 ml 100 ml


 


Output Urine Total 3850 ml 2750 ml








Height (Feet):  5


Height (Inches):  11.00


Weight (Pounds):  267


General Appearance:  no apparent distress


Neck:  normal alignment


Cardiovascular:  normal rate


Respiratory/Chest:  lungs clear


Abdomen:  normal bowel sounds


Objective





Current Medications








 Medications


  (Trade)  Dose


 Ordered  Sig/Henry


 Route


 PRN Reason  Start Time


 Stop Time Status Last Admin


Dose Admin


 


 Acetaminophen


  (Tylenol)  650 mg  Q4H  PRN


 ORAL


 fever (temp>100.5F)  2/6/19 16:30


 3/7/19 16:29   


 


 


 Al Hydroxide/Mg


 Hydroxide


  (Mylanta II)  30 ml  Q6H  PRN


 ORAL


 dyspepsia  2/6/19 16:30


 3/7/19 16:29   


 


 


 Albuterol/


 Ipratropium


  (Albuterol/


 Ipratropium)  3 ml  Q4H  PRN


 HHN


 Shortness of Breath  2/6/19 16:30


 2/10/19 16:29  2/9/19 05:15


 


 


 Albuterol/


 Ipratropium


  (Albuterol/


 Ipratropium)  3 ml  Q6HRT


 HHN


   2/7/19 13:00


 2/12/19 12:59  2/10/19 07:24


 


 


 Alprazolam


  (Xanax)  1 mg  Q6H  PRN


 ORAL


 For Anxiety  2/6/19 16:30


 2/13/19 16:29   


 


 


 Aspirin


  (ASA)  81 mg  DAILY


 ORAL


   2/7/19 09:00


 3/8/19 08:59  2/9/19 09:41


 


 


 Clonidine HCl


  (Catapres Tab)  0.1 mg  Q4H  PRN


 ORAL


 sbp more than 160  2/6/19 16:30


 3/7/19 16:29   


 


 


 Dextrose


  (Dextrose 50%)  25 ml  Q30M  PRN


 IV


 Hypoglycemia  2/6/19 16:15


 3/7/19 21:44   


 


 


 Dextrose


  (Dextrose 50%)  50 ml  Q30M  PRN


 IV


 Hypoglycemia  2/6/19 16:15


 3/7/19 21:44   


 


 


 Divalproex Sodium


  (Depakote)  500 mg  Q12HR


 ORAL


   2/6/19 21:00


 3/7/19 20:59  2/9/19 22:20


 


 


 Escitalopram


 Oxalate


  (Lexapro)  10 mg  DAILY


 ORAL


   2/7/19 09:00


 3/8/19 08:59  2/9/19 09:42


 


 


 Heparin Sodium


  (Porcine)


  (Heparin 5000


 units/ml)  5,000 units  EVERY 12  HOURS


 SUBQ


   2/6/19 21:00


 3/7/19 20:59  2/9/19 09:43


 


 


 Insulin Aspart


  (NovoLOG)    BEFORE MEALS AND  HS


 SUBQ


   2/6/19 17:30


 3/7/19 17:29  2/10/19 06:31


 


 


 Insulin Aspart


  (NovoLOG)  10 units  NOVOTIAC


 SUBQ


   2/7/19 11:50


 3/8/19 06:29  2/10/19 06:30


 


 


 Insulin Detemir


  (Levemir)  30 units  BEDTIME


 SUBQ


   2/7/19 21:00


 3/7/19 21:59  2/9/19 20:35


 


 


 Morphine Sulfate


  (Morphine


 Sulfate)  2 mg  Q4H  PRN


 IVP


 Severe Pain (Pain Scale 7-10)  2/6/19 16:30


 2/12/19 16:29  2/10/19 04:45


 


 


 Nicotine


  (Nicoderm)  1 patch  Q24H


 TDERMAL


   2/8/19 20:00


 3/10/19 19:59  2/9/19 20:36


 


 


 Nitroglycerin


  (Ntg)  0.4 mg  Q5M X 3 DOSES PRN


 SL


 Prn Chest Pain  2/6/19 16:30


 3/7/19 16:29   


 


 


 Ondansetron HCl


  (Zofran)  4 mg  Q6H  PRN


 IVP


 Nausea & Vomiting  2/6/19 16:30


 3/7/19 16:29  2/9/19 13:06


 


 


 Polyethylene


 Glycol


  (Miralax)  17 gm  HSPRN  PRN


 ORAL


 Constipation  2/6/19 21:00


 3/7/19 20:59   


 


 


 Promethazine HCl/


 Codeine


  (Phenergan with


 Codeine)  5 ml  Q4H  PRN


 ORAL


 For Cough  2/7/19 12:15


 3/9/19 12:14   


 


 


 Quetiapine


 Fumarate


  (SEROquel)  200 mg  BEDTIME


 ORAL


   2/6/19 21:00


 3/7/19 20:59  2/9/19 22:20


 


 


 Sodium Chloride  1,000 ml @ 


 100 mls/hr  Q10H


 IVLG


   2/6/19 16:15


 3/7/19 19:29  2/10/19 00:22


 


 


 Temazepam


  (Restoril)  15 mg  HSPRN  PRN


 ORAL


 Insomnia  2/6/19 21:00


 2/12/19 20:59   


 

















Dusty Denis MD Feb 10, 2019 08:03

## 2019-02-10 NOTE — NUR
NURSE NOTES:

Received patient in bed, awake,  oriented, able to make needs known, On room room air, no 
signs of distress or labored breathing, IV intact and patent with infusing IV fluids. Bed in 
lowest position and locked, Call light and needs with in reach. Will continue to monitor.

## 2019-02-10 NOTE — PULMONOLOGY PROGRESS NOTE
Assessment/Plan


Problems:  


(1) Recurrent falls


(2) Uncontrolled diabetes mellitus


(3) CVA (cerebral vascular accident)


(4) COPD (chronic obstructive pulmonary disease)


Assessment/Plan


might need ortho to evaluate the right effusion


all reviewed


check sputum


add abx for bronchitis


check electrolytes


dvt prophylaxis





Subjective


Allergies:  


Coded Allergies:  


     HALOPERIDOL (Verified  Allergy, Unknown, 2/5/19)





Objective





Last 24 Hour Vital Signs








  Date Time  Temp Pulse Resp B/P (MAP) Pulse Ox O2 Delivery O2 Flow Rate FiO2


 


2/10/19 16:00 98.3 95 20 127/78 (94) 97   


 


2/10/19 13:16  102 18  96 Room Air  21


 


2/10/19 13:06  92 18  96 Room Air  21


 


2/10/19 12:00 97.7 88 20 126/74 (91) 95   


 


2/10/19 09:00      Room Air  


 


2/10/19 08:00 99.0 99 21 123/71 (88) 95   


 


2/10/19 07:31  92 18  98 Room Air  21


 


2/10/19 07:24  80 18  95 Room Air  21


 


2/10/19 04:00 98.8 69 17 114/73 (87) 96   


 


2/10/19 01:58  98 18  98 Room Air  21


 


2/10/19 01:47  83 18  94 Room Air  21


 


2/10/19 00:00 98.2 95 17 120/70 (87) 94   


 


2/9/19 21:00      Room Air  

















Intake and Output  


 


 2/9/19 2/10/19





 19:00 07:00


 


Intake Total 1480 ml 100 ml


 


Output Total 3850 ml 2750 ml


 


Balance -2370 ml -2650 ml


 


  


 


Intake Oral 1080 ml 


 


IV Total 400 ml 100 ml


 


Output Urine Total 3850 ml 2750 ml








Objective


General Appearance:  WD/WN


HEENT:  normocephalic


Respiratory/Chest:  chest wall non-tender, lungs clear


Cardiovascular:  normal peripheral pulses, normal rate


Abdomen:  normal bowel sounds, soft, non tender


Extremities:  no cyanosis, right knee effusion/swelling


Skin:  no rash





Microbiology








 Date/Time


Source Procedure


Growth Status


 


 


 2/7/19 23:00


Sputum Gram Stain - Final Complete


 


 2/7/19 23:00


Sputum Sputum Culture - Final


NORMAL UPPER RESPIRATORY RAMYA AT 47 ... Complete











Current Medications








 Medications


  (Trade)  Dose


 Ordered  Sig/Henry


 Route


 PRN Reason  Start Time


 Stop Time Status Last Admin


Dose Admin


 


 Acetaminophen


  (Tylenol)  650 mg  Q4H  PRN


 ORAL


 fever (temp>100.5F)  2/6/19 16:30


 3/7/19 16:29   


 


 


 Al Hydroxide/Mg


 Hydroxide


  (Mylanta II)  30 ml  Q6H  PRN


 ORAL


 dyspepsia  2/6/19 16:30


 3/7/19 16:29   


 


 


 Albuterol/


 Ipratropium


  (Albuterol/


 Ipratropium)  3 ml  Q6HRT


 HHN


   2/7/19 13:00


 2/12/19 12:59  2/10/19 13:06


 


 


 Alprazolam


  (Xanax)  1 mg  Q6H  PRN


 ORAL


 For Anxiety  2/6/19 16:30


 2/13/19 16:29  2/10/19 15:42


 


 


 Aspirin


  (ASA)  81 mg  DAILY


 ORAL


   2/7/19 09:00


 3/8/19 08:59  2/10/19 08:48


 


 


 Clonidine HCl


  (Catapres Tab)  0.1 mg  Q4H  PRN


 ORAL


 sbp more than 160  2/6/19 16:30


 3/7/19 16:29   


 


 


 Dextrose


  (Dextrose 50%)  25 ml  Q30M  PRN


 IV


 Hypoglycemia  2/6/19 16:15


 3/7/19 21:44   


 


 


 Dextrose


  (Dextrose 50%)  50 ml  Q30M  PRN


 IV


 Hypoglycemia  2/6/19 16:15


 3/7/19 21:44   


 


 


 Divalproex Sodium


  (Depakote)  500 mg  Q12HR


 ORAL


   2/6/19 21:00


 3/7/19 20:59  2/10/19 08:48


 


 


 Escitalopram


 Oxalate


  (Lexapro)  10 mg  DAILY


 ORAL


   2/7/19 09:00


 3/8/19 08:59  2/10/19 08:48


 


 


 Heparin Sodium


  (Porcine)


  (Heparin 5000


 units/ml)  5,000 units  EVERY 12  HOURS


 SUBQ


   2/6/19 21:00


 3/7/19 20:59  2/9/19 09:43


 


 


 Insulin Aspart


  (NovoLOG)    BEFORE MEALS AND  HS


 SUBQ


   2/6/19 17:30


 3/7/19 17:29  2/10/19 16:58


 


 


 Insulin Aspart


  (NovoLOG)  10 units  NOVOTIAC


 SUBQ


   2/7/19 11:50


 3/8/19 06:29  2/10/19 16:58


 


 


 Insulin Detemir


  (Levemir)  30 units  BEDTIME


 SUBQ


   2/7/19 21:00


 3/7/19 21:59  2/9/19 20:35


 


 


 Morphine Sulfate


  (Morphine


 Sulfate)  2 mg  Q4H  PRN


 IVP


 Severe Pain (Pain Scale 7-10)  2/6/19 16:30


 2/12/19 16:29  2/10/19 16:52


 


 


 Nicotine


  (Nicoderm)  1 patch  Q24H


 TDERMAL


   2/8/19 20:00


 3/10/19 19:59  2/9/19 20:36


 


 


 Nitroglycerin


  (Ntg)  0.4 mg  Q5M X 3 DOSES PRN


 SL


 Prn Chest Pain  2/6/19 16:30


 3/7/19 16:29   


 


 


 Ondansetron HCl


  (Zofran)  4 mg  Q6H  PRN


 IVP


 Nausea & Vomiting  2/6/19 16:30


 3/7/19 16:29  2/10/19 14:49


 


 


 Polyethylene


 Glycol


  (Miralax)  17 gm  HSPRN  PRN


 ORAL


 Constipation  2/6/19 21:00


 3/7/19 20:59   


 


 


 Promethazine HCl/


 Codeine


  (Phenergan with


 Codeine)  5 ml  Q4H  PRN


 ORAL


 For Cough  2/7/19 12:15


 3/9/19 12:14   


 


 


 Quetiapine


 Fumarate


  (SEROquel)  200 mg  BEDTIME


 ORAL


   2/6/19 21:00


 3/7/19 20:59  2/9/19 22:20


 


 


 Sodium Chloride  1,000 ml @ 


 100 mls/hr  Q10H


 IVLG


   2/6/19 16:15


 3/7/19 19:29  2/10/19 10:10


 


 


 Temazepam


  (Restoril)  15 mg  HSPRN  PRN


 ORAL


 Insomnia  2/6/19 21:00


 2/12/19 20:59   


 

















Lissette Steward MD Feb 10, 2019 20:20

## 2019-02-11 VITALS — SYSTOLIC BLOOD PRESSURE: 117 MMHG | DIASTOLIC BLOOD PRESSURE: 79 MMHG

## 2019-02-11 VITALS — SYSTOLIC BLOOD PRESSURE: 131 MMHG | DIASTOLIC BLOOD PRESSURE: 79 MMHG

## 2019-02-11 VITALS — DIASTOLIC BLOOD PRESSURE: 76 MMHG | SYSTOLIC BLOOD PRESSURE: 114 MMHG

## 2019-02-11 VITALS — SYSTOLIC BLOOD PRESSURE: 110 MMHG | DIASTOLIC BLOOD PRESSURE: 68 MMHG

## 2019-02-11 RX ADMIN — INSULIN ASPART SCH UNITS: 100 INJECTION, SOLUTION INTRAVENOUS; SUBCUTANEOUS at 06:45

## 2019-02-11 RX ADMIN — IPRATROPIUM BROMIDE AND ALBUTEROL SULFATE SCH ML: .5; 3 SOLUTION RESPIRATORY (INHALATION) at 01:24

## 2019-02-11 RX ADMIN — HEPARIN SODIUM SCH UNITS: 5000 INJECTION INTRAVENOUS; SUBCUTANEOUS at 09:00

## 2019-02-11 RX ADMIN — DIVALPROEX SODIUM SCH MG: 500 TABLET, DELAYED RELEASE ORAL at 08:10

## 2019-02-11 RX ADMIN — ASPIRIN 81 MG SCH MG: 81 TABLET ORAL at 08:10

## 2019-02-11 RX ADMIN — MORPHINE SULFATE PRN MG: 2 INJECTION, SOLUTION INTRAMUSCULAR; INTRAVENOUS at 03:56

## 2019-02-11 RX ADMIN — INSULIN ASPART SCH UNITS: 100 INJECTION, SOLUTION INTRAVENOUS; SUBCUTANEOUS at 11:54

## 2019-02-11 RX ADMIN — MORPHINE SULFATE PRN MG: 2 INJECTION, SOLUTION INTRAMUSCULAR; INTRAVENOUS at 12:15

## 2019-02-11 RX ADMIN — ALPRAZOLAM PRN MG: 0.5 TABLET ORAL at 09:08

## 2019-02-11 RX ADMIN — INSULIN ASPART SCH UNITS: 100 INJECTION, SOLUTION INTRAVENOUS; SUBCUTANEOUS at 11:53

## 2019-02-11 RX ADMIN — MORPHINE SULFATE PRN MG: 2 INJECTION, SOLUTION INTRAMUSCULAR; INTRAVENOUS at 08:10

## 2019-02-11 RX ADMIN — IPRATROPIUM BROMIDE AND ALBUTEROL SULFATE SCH ML: .5; 3 SOLUTION RESPIRATORY (INHALATION) at 07:00

## 2019-02-11 NOTE — NUR
NURSE NOTES:

Patient discharged to [facility] accompanied by [facility] ambulance personnel. Patient in 
stable condition, alert and oriented, responds appropriately. Breathing unlabored on room 
air. IV safely removed, IV site covered with gauze and tape. Report given to Michelle GRIFFITH at 
[facility]. Discharge packet given to ambulance personnel. Belongings reviewed and 
confirmed with patient at bedside.

## 2019-02-11 NOTE — INFECTIOUS DISEASES PROG NOTE
Assessment/Plan


Assessment/Plan


39 yo male from a nursing home who was arrested and then his nursing home 

refused to take him back. 





Leukocytosis, SP (off abx)


    Likely reactive (Seizures, Trauma, Increased Glu)


    No clear sign of infection


     Afebrile


    Neg UA 


    CXR neg





COPD


   Saturation well on RA


Psych history


CVA


Seizure


DM1


Neck surgery





PLAN


 -  Monitor off abx


     - 2/8/19 SP Levofloxacin #2





- Monitor CBC and temps





Thank you for this consult. We will continue to follow the patient during this 

hospitalization.





Subjective


Allergies:  


Coded Allergies:  


     HALOPERIDOL (Verified  Allergy, Unknown, 2/5/19)


Subjective


afebrile


no leuokcytosis


off abx





Objective


Vital Signs





Last 24 Hour Vital Signs








  Date Time  Temp Pulse Resp B/P (MAP) Pulse Ox O2 Delivery O2 Flow Rate FiO2


 


2/11/19 09:00      Room Air  


 


2/11/19 08:00 98.3 94 19 114/76 (89) 94   


 


2/11/19 07:57  111 16  93 Room Air  21


 


2/11/19 07:37  85 16  93 Room Air  21


 


2/11/19 04:00 98.2 95 19 117/79 (92) 92   


 


2/11/19 01:36  89 16  97 Room Air  21


 


2/11/19 01:24  95 16  93 Room Air  21


 


2/11/19 00:00 98.2 91 19 110/68 (82) 92   


 


2/10/19 22:10  105 20  92 Room Air  21


 


2/10/19 22:02  103 20  93 Room Air  21


 


2/10/19 22:02  103 20   Room Air  21


 


2/10/19 21:00      Room Air  


 


2/10/19 20:00 98.3 91 19 136/77 (96) 96   


 


2/10/19 16:00 98.3 95 20 127/78 (94) 97   


 


2/10/19 13:16  102 18  96 Room Air  21


 


2/10/19 13:06  92 18  96 Room Air  21








Height (Feet):  5


Height (Inches):  11.00


Weight (Pounds):  267


Objective


Gen:  NAD, well appearing, alert


HEENT: NCAT, MMM, EOMI


LUNGS:  CTAB, No W


CARDS: RRR, S1, S2, No M


ABD: Soft, NT, ND,  + BS


Ext: C/C/E, Pulses 2+ B/L (DP, Rad): 


NEURO: A/O x 4, Strength and Sensation Grossly intact





Current Medications








 Medications


  (Trade)  Dose


 Ordered  Sig/Henry


 Route


 PRN Reason  Start Time


 Stop Time Status Last Admin


Dose Admin


 


 Acetaminophen


  (Tylenol)  650 mg  Q4H  PRN


 ORAL


 fever (temp>100.5F)  2/6/19 16:30


 3/7/19 16:29   


 


 


 Al Hydroxide/Mg


 Hydroxide


  (Mylanta II)  30 ml  Q6H  PRN


 ORAL


 dyspepsia  2/6/19 16:30


 3/7/19 16:29   


 


 


 Albuterol/


 Ipratropium


  (Albuterol/


 Ipratropium)  3 ml  Q6HRT


 HHN


   2/7/19 13:00


 2/12/19 12:59  2/11/19 07:00


 


 


 Alprazolam


  (Xanax)  1 mg  Q6H  PRN


 ORAL


 For Anxiety  2/6/19 16:30


 2/13/19 16:29  2/11/19 09:08


 


 


 Aspirin


  (ASA)  81 mg  DAILY


 ORAL


   2/7/19 09:00


 3/8/19 08:59  2/11/19 08:10


 


 


 Clonidine HCl


  (Catapres Tab)  0.1 mg  Q4H  PRN


 ORAL


 sbp more than 160  2/6/19 16:30


 3/7/19 16:29   


 


 


 Dextrose


  (Dextrose 50%)  25 ml  Q30M  PRN


 IV


 Hypoglycemia  2/6/19 16:15


 3/7/19 21:44   


 


 


 Dextrose


  (Dextrose 50%)  50 ml  Q30M  PRN


 IV


 Hypoglycemia  2/6/19 16:15


 3/7/19 21:44   


 


 


 Divalproex Sodium


  (Depakote)  500 mg  Q12HR


 ORAL


   2/6/19 21:00


 3/7/19 20:59  2/11/19 08:10


 


 


 Escitalopram


 Oxalate


  (Lexapro)  10 mg  DAILY


 ORAL


   2/7/19 09:00


 3/8/19 08:59  2/11/19 08:10


 


 


 Heparin Sodium


  (Porcine)


  (Heparin 5000


 units/ml)  5,000 units  EVERY 12  HOURS


 SUBQ


   2/6/19 21:00


 3/7/19 20:59  2/9/19 09:43


 


 


 Insulin Aspart


  (NovoLOG)    BEFORE MEALS AND  HS


 SUBQ


   2/6/19 17:30


 3/7/19 17:29  2/11/19 11:53


 


 


 Insulin Aspart


  (NovoLOG)  10 units  NOVOTIAC


 SUBQ


   2/7/19 11:50


 3/8/19 06:29  2/11/19 11:54


 


 


 Insulin Detemir


  (Levemir)  38 units  BEDTIME


 SUBQ


   2/11/19 21:00


 3/7/19 21:59   


 


 


 Morphine Sulfate


  (Morphine


 Sulfate)  2 mg  Q4H  PRN


 IVP


 Severe Pain (Pain Scale 7-10)  2/6/19 16:30


 2/12/19 16:29  2/11/19 08:10


 


 


 Nicotine


  (Nicoderm)  1 patch  Q24H


 TDERMAL


   2/8/19 20:00


 3/10/19 19:59  2/10/19 21:18


 


 


 Nitroglycerin


  (Ntg)  0.4 mg  Q5M X 3 DOSES PRN


 SL


 Prn Chest Pain  2/6/19 16:30


 3/7/19 16:29   


 


 


 Ondansetron HCl


  (Zofran)  4 mg  Q6H  PRN


 IVP


 Nausea & Vomiting  2/6/19 16:30


 3/7/19 16:29  2/10/19 14:49


 


 


 Polyethylene


 Glycol


  (Miralax)  17 gm  HSPRN  PRN


 ORAL


 Constipation  2/6/19 21:00


 3/7/19 20:59   


 


 


 Promethazine HCl/


 Codeine


  (Phenergan with


 Codeine)  5 ml  Q4H  PRN


 ORAL


 For Cough  2/7/19 12:15


 3/9/19 12:14   


 


 


 Quetiapine


 Fumarate


  (SEROquel)  200 mg  BEDTIME


 ORAL


   2/6/19 21:00


 3/7/19 20:59  2/10/19 21:17


 


 


 Sodium Chloride  1,000 ml @ 


 100 mls/hr  Q10H


 IVLG


   2/6/19 16:15


 3/7/19 19:29  2/11/19 06:46


 


 


 Temazepam


  (Restoril)  15 mg  HSPRN  PRN


 ORAL


 Insomnia  2/6/19 21:00


 2/12/19 20:59   


 

















Shagufta Arias M.D. Feb 11, 2019 12:10

## 2019-02-11 NOTE — NUR
NURSE NOTES:

Patient received in stable condition, eating breakfast in bed. Alert and oriented, breathing 
unlabored on room air. IV site on left forearm patent and intact, fluids at 100cc/hr. 
Inquired about last BM, patient confirmed last BM was 5-6 days ago. Refuses medication for 
constipation. Call light within reach, will continue to monitor.

## 2019-02-12 NOTE — CONSULTATION
DATE OF CONSULTATION:  02/11/2019

CHIEF COMPLAINT:  Right knee pain.



HISTORY OF PRESENT ILLNESS:  The patient is a 38-year-old gentleman, who is

admitted for uncontrolled diabetes.  Reports approximately a week ago, he

kind of fell on his right knee, subsequently he had pain on the lateral

aspect of the knee, was admitted, orthopedic consultation obtained for

further care and recommendation regarding the knee.



PAST MEDICAL HISTORY:  Reviewed per intake chart.



PAST SURGICAL HISTORY:  Reviewed per intake chart.



MEDICATIONS:  Reviewed per intake chart.



PHYSICAL EXAMINATION:

GENERAL:  The patient is resting comfortably in exam bed.

VITAL SIGNS:  Afebrile.  Stable vital signs.  BMI was 40.

EXTREMITIES:  Right knee examination shows no knee effusion.  No

ecchymosis.  There was some pain along the patellar tendon line insertion

and tibial spine.  There was also some swelling along the lateral aspect

along the insertion of IT band and lateral tibial plateau.



DIAGNOSTIC DATA:  Imaging studies of the knee, posterior calf is soft.

Four views of the right knee reviewed and showed no obvious lateral tibial

plateau fracture _____ joint space.



ASSESSMENT:  Right knee contusion, possible parapatellar bursitis versus

traumatic iliotibial band tendinitis.



DISCUSSION:  At this point, _____ some degree of patellar tendinosis as

well as secondary patellar tendinitis _____ parapatellar bursitis.



Discussed at this point, recommend cryotherapy and NSAIDs.  They should

hopefully heal and can be weightbearing as tolerated.  No restrictions

_____ healing at this point _____.









  ______________________________________________

  Oral Elkins M.D.





DR:  CHARLEEN

D:  02/11/2019 07:37

T:  02/12/2019 03:36

JOB#:  [id number]

CC:

## 2019-02-12 NOTE — DISCHARGE SUMMARY
Discharge Summary


Discharge Summary


_


DATE OF ADMISSION: 2/5/2019





DATE OF DISCHARGE: 2/11/2019








DISCHARGED BY: Dr Cabrera





REASON FOR ADMISSION: 


38 years old male with past medical history of COPD, hypertension, diabetes 

mellitus type 1 insulin-dependent, history of CVA in October 2018, seizure 

disorder, chronic back pain, psychosis, presented with increased generalized 

weakness, status post mechanical fall.   


Patient also reported increased blood sugars.  


He denied any recent fevers or chills.


Upon evaluation vital signs revealed tachycardia . Pulse oximetry was stable on 

room air.  


Laboratory workup revealed leukocytosis WBC 12.4,  stable hemoglobin and  

hematocrit. 


Urinalysis revealed +2 protein, +2 glucose, mild pyuria and few bacteria.  


Electrolytes and renal parameters were stable.  


Glucose 281. 


Troponin negative.  EKG revealed sinus tachycardia , no acute ischemic changes.


Chest x-ray showed no acute cardiopulmonary pathology.  


Patient was admitted for further management





CONSULTANTS:


pulmonary Dr. Steward


ID specialist Dr. Redding


psychiatrist Dr. Armstrong


orthopedic surgeon Dr. Elkins


endocrinologist Dr. Denis





HOSPITAL COURSE: 


Patient was admitted to telemetry floor.  


Supplemental oxygen provided as needed  to keep pulse oximetry above 92%.  

Pulmonary toilet provided as needed. 


Chest x-ray was negative.  


Patient was counseled on smoking cessation.  Nicotine patch provided.


No evidence of COPD exacerbation.  





Endocrinologist followed for blood sugar management.  


Hemoglobin A1c -8.3. 


Anti-glycemic regimen was titrated as per endocrinologist.  


Blood sugar was managed with long-acting Levemir at nighttime , pre-meal short 

acting NovoLog before meals and sliding scale of insulin was implemented as 

needed.  


Blood sugar stabilized.


Patient was counseled on adherence with anti-glycemic  regimen at the nursing 

facility and compliance with diabetic diet.


 


Orthopedic surgeon seen and evaluated patient for complaints of right knee 

pain.   


Four views  X ray of the right knee were reviewed  by orthopedic surgeon, and 

showed no obvious lateral tibial plateau fracture . 


Per orthopedic surgeon , patient had  right knee contusion,  possibly patellar 

bursitis versus traumatic iliotibial band tendinitis.  


Physical examination revealed tenderness along the patellar tendon line 

insertion and tibial spine.  


Further plan of care was discussed with the patient.  


Orthopedic surgeon recommended cryotherapy and nonsteroidal anti-inflammatory 

medications for pain management.  


Weightbearing was allowed as tolerated , with no restriction.  


Patient was  able to ambulate  safely under nursing supervision.  Fall 

precaution maintained. 


Seizure precautions were maintained.  Depakote was continued.  No evidence of 

seizure activity while in the hospital.


Antiplatelet therapy with aspirin was continued.  Lipid panel was stable.  


Blood pressure was closely monitored.  Clonidine was on board as needed.  


Blood pressure remained stable.





Infectious disease followed for leukocytosis.  


Patient had no clear signs of infection.  


Sputum culture was negative.  


Chest x-ray revealed no acute cardiopulmonary pathology , urinalysis revealed 

no evidence of UTI.  


Infectious disease doctor recommended to monitor patient off antibiotics.  


Leukocytosis was likely reactive  and resolved.  Patient was kept off 

antibiotics.





Psychiatrist followed .  


Patient was diagnosed with schizoaffective disorder bipolar type.  


Psychiatric medication regimen was optimized as per psychiatrist.  


Cognitive behavioral therapy provided.  


Pain management was addressed , and pain was controlled.  


DVT prophylaxis provided.  


Bowel regimen instituted.  


Supportive care provided.  


Patient clinically stabilized and was ready for discharge to skilled nursing 

facility for continuation of care . 





FINAL DIAGNOSES: 


Diabetes mellitus out of control


COPD


History of CVA


Right knee contusion, possible parapatellar bursitis versus traumatic 

iliotibial band tendinitis - due to fall


Leukocytosis/resolved


Seizure disorder


s/p fall


Schizoaffective disorder bipolar type





DISCHARGE MEDICATIONS:


See Medication Reconciliation list.





DISCHARGE INSTRUCTIONS:


Patient was discharged to the skilled nursing facility. 


Follow up with medical doctor at the facility.








I have been assigned to dictate discharge summary for this account. 


I was not involved in the patient's management.











Shilpa Quezada NP Feb 12, 2019 08:51

## 2019-02-12 NOTE — PROGRESS NOTE
DATE:  02/11/2019

NOTE:  POOR AUDIO



SUBJECTIVE:  This is a 38-year-old male patient with uncontrolled diabetes.

He continued to be depressed, confused, mood labile.  Continued to

endorse _____ pressured speech _____ he denies suicidal ideations, but he

is still confused, _____ high level of anxiety and depression.



MENTAL STATUS EXAMINATION:  This is a 38-year-old male.  Appearance is

disheveled.  Attitude, irritable and agitated.  Affect, guarded and

restricted.  Intellect poor.  Mood depressed and anxious.  Motor activity,

psychomotor agitation.  Attention span is poor.  Orientation x2.  Speech

is low volume and slurred.  Thought process, disorganized and illogical.

Insight and judgment is poor.



DIAGNOSIS:  Schizoaffective, bipolar type.



PLAN:  I am going to continue him on Depakote 500 mg twice a day, Seroquel

200 mg at bedtime, _____.  Provided 20 minutes of cognitive behavioral

therapy to help him identify his automatic negative thoughts and help him

convert those negative thoughts to more positive thoughts to reduce

depression, anxiety, and suicidality.  _____.  A 20 minutes of cognitive

behavioral therapy provided.  Chart reviewed.  Discussed with staff.  Seen

and assessed in his room.









  ______________________________________________

  Gulshan Armstrong M.D.





DR:  MATEUSZ

D:  02/11/2019 08:42

T:  02/12/2019 03:35

JOB#:  [id number]

CC:

## 2019-02-21 NOTE — DIAGNOSTIC IMAGING REPORT
--------------- APPROVED REPORT --------------





CPT Code: 79137



Present Symptoms

Lower Extremity Pain:  Right 





RIGHT LEG: Venous imaging reveals a patent deep venous system. There is no evidence of 

thrombus within the femoral, popliteal or tibial segments. The greater saphenous vein is 

also within normal limits. Doppler indicates normal spontaneous flow within these 

segments.

## 2019-03-14 ENCOUNTER — HOSPITAL ENCOUNTER (INPATIENT)
Dept: HOSPITAL 72 - EMR | Age: 39
LOS: 7 days | Discharge: HOME | DRG: 206 | End: 2019-03-21
Payer: MEDICARE

## 2019-03-14 VITALS — SYSTOLIC BLOOD PRESSURE: 125 MMHG | DIASTOLIC BLOOD PRESSURE: 70 MMHG

## 2019-03-14 VITALS — BODY MASS INDEX: 36.96 KG/M2 | HEIGHT: 71 IN | WEIGHT: 264 LBS

## 2019-03-14 VITALS — DIASTOLIC BLOOD PRESSURE: 80 MMHG | SYSTOLIC BLOOD PRESSURE: 136 MMHG

## 2019-03-14 DIAGNOSIS — G40.909: ICD-10-CM

## 2019-03-14 DIAGNOSIS — J44.9: ICD-10-CM

## 2019-03-14 DIAGNOSIS — I69.954: ICD-10-CM

## 2019-03-14 DIAGNOSIS — E66.9: ICD-10-CM

## 2019-03-14 DIAGNOSIS — M94.0: Primary | ICD-10-CM

## 2019-03-14 DIAGNOSIS — K21.9: ICD-10-CM

## 2019-03-14 DIAGNOSIS — Z79.82: ICD-10-CM

## 2019-03-14 DIAGNOSIS — I25.10: ICD-10-CM

## 2019-03-14 DIAGNOSIS — E11.40: ICD-10-CM

## 2019-03-14 DIAGNOSIS — Z79.4: ICD-10-CM

## 2019-03-14 DIAGNOSIS — Z91.14: ICD-10-CM

## 2019-03-14 DIAGNOSIS — I10: ICD-10-CM

## 2019-03-14 DIAGNOSIS — F25.0: ICD-10-CM

## 2019-03-14 LAB
ADD MANUAL DIFF: NO
ALBUMIN SERPL-MCNC: 3.6 G/DL (ref 3.4–5)
ALBUMIN/GLOB SERPL: 1.2 {RATIO} (ref 1–2.7)
ALP SERPL-CCNC: 78 U/L (ref 46–116)
ALT SERPL-CCNC: 46 U/L (ref 12–78)
ANION GAP SERPL CALC-SCNC: 6 MMOL/L (ref 5–15)
APTT BLD: 25 SEC (ref 23–33)
AST SERPL-CCNC: 23 U/L (ref 15–37)
BASOPHILS NFR BLD AUTO: 1 % (ref 0–2)
BILIRUB SERPL-MCNC: 0.4 MG/DL (ref 0.2–1)
BUN SERPL-MCNC: 13 MG/DL (ref 7–18)
CALCIUM SERPL-MCNC: 9.1 MG/DL (ref 8.5–10.1)
CHLORIDE SERPL-SCNC: 104 MMOL/L (ref 98–107)
CK SERPL-CCNC: 312 U/L (ref 26–308)
CO2 SERPL-SCNC: 26 MMOL/L (ref 21–32)
CREAT SERPL-MCNC: 0.8 MG/DL (ref 0.55–1.3)
EOSINOPHIL NFR BLD AUTO: 1.2 % (ref 0–3)
ERYTHROCYTE [DISTWIDTH] IN BLOOD BY AUTOMATED COUNT: 12.6 % (ref 11.6–14.8)
GLOBULIN SER-MCNC: 3 G/DL
HCT VFR BLD CALC: 42.3 % (ref 42–52)
HGB BLD-MCNC: 14.5 G/DL (ref 14.2–18)
INR PPP: 0.9 (ref 0.9–1.1)
LYMPHOCYTES NFR BLD AUTO: 30.3 % (ref 20–45)
MCV RBC AUTO: 86 FL (ref 80–99)
MONOCYTES NFR BLD AUTO: 8.2 % (ref 1–10)
NEUTROPHILS NFR BLD AUTO: 59.3 % (ref 45–75)
PLATELET # BLD: 197 K/UL (ref 150–450)
POTASSIUM SERPL-SCNC: 4.2 MMOL/L (ref 3.5–5.1)
RBC # BLD AUTO: 4.91 M/UL (ref 4.7–6.1)
SODIUM SERPL-SCNC: 136 MMOL/L (ref 136–145)
WBC # BLD AUTO: 9 K/UL (ref 4.8–10.8)

## 2019-03-14 PROCEDURE — 94664 DEMO&/EVAL PT USE INHALER: CPT

## 2019-03-14 PROCEDURE — 80048 BASIC METABOLIC PNL TOTAL CA: CPT

## 2019-03-14 PROCEDURE — 85730 THROMBOPLASTIN TIME PARTIAL: CPT

## 2019-03-14 PROCEDURE — 71045 X-RAY EXAM CHEST 1 VIEW: CPT

## 2019-03-14 PROCEDURE — 99285 EMERGENCY DEPT VISIT HI MDM: CPT

## 2019-03-14 PROCEDURE — 96374 THER/PROPH/DIAG INJ IV PUSH: CPT

## 2019-03-14 PROCEDURE — 84484 ASSAY OF TROPONIN QUANT: CPT

## 2019-03-14 PROCEDURE — 93005 ELECTROCARDIOGRAM TRACING: CPT

## 2019-03-14 PROCEDURE — 82550 ASSAY OF CK (CPK): CPT

## 2019-03-14 PROCEDURE — 94640 AIRWAY INHALATION TREATMENT: CPT

## 2019-03-14 PROCEDURE — 82962 GLUCOSE BLOOD TEST: CPT

## 2019-03-14 PROCEDURE — 96375 TX/PRO/DX INJ NEW DRUG ADDON: CPT

## 2019-03-14 PROCEDURE — 85610 PROTHROMBIN TIME: CPT

## 2019-03-14 PROCEDURE — 95819 EEG AWAKE AND ASLEEP: CPT

## 2019-03-14 PROCEDURE — 80061 LIPID PANEL: CPT

## 2019-03-14 PROCEDURE — 83880 ASSAY OF NATRIURETIC PEPTIDE: CPT

## 2019-03-14 PROCEDURE — 85025 COMPLETE CBC W/AUTO DIFF WBC: CPT

## 2019-03-14 PROCEDURE — 36415 COLL VENOUS BLD VENIPUNCTURE: CPT

## 2019-03-14 PROCEDURE — 70450 CT HEAD/BRAIN W/O DYE: CPT

## 2019-03-14 PROCEDURE — 80053 COMPREHEN METABOLIC PANEL: CPT

## 2019-03-14 RX ADMIN — MORPHINE SULFATE PRN MG: 2 INJECTION, SOLUTION INTRAMUSCULAR; INTRAVENOUS at 20:52

## 2019-03-14 RX ADMIN — DIVALPROEX SODIUM SCH MG: 500 TABLET, DELAYED RELEASE ORAL at 20:51

## 2019-03-14 RX ADMIN — HEPARIN SODIUM SCH UNITS: 5000 INJECTION INTRAVENOUS; SUBCUTANEOUS at 20:54

## 2019-03-14 RX ADMIN — QUETIAPINE SCH MG: 200 TABLET, FILM COATED ORAL at 20:51

## 2019-03-14 RX ADMIN — INSULIN DETEMIR SCH UNITS: 100 INJECTION, SOLUTION SUBCUTANEOUS at 22:30

## 2019-03-14 NOTE — NUR
NURSE NOTES:

Received report from DOMINIC Berumen RN. Pt is resting in the bed w/o respiratory distress in 2L NC. 
Pt is able make needs known. IV site is asymptomatic. SR in the monitor. Pt c/o of noise 
from nesxt room. New room will be arranged later. Seizure precaution is done with suction, 
padded side rails up x2. Call light and side table are w/in reach. Bed is in the lowest 
position, bed alarm on and breaks are engaged. Will follow plans of care. 

-------------------------------------------------------------------------------

Addendum: 03/14/19 at 2031 by NYA CONWAY RN

-------------------------------------------------------------------------------

Pt was moved to  204-2 w/o incident.  is noted, will follow protocol and notify PCP.

## 2019-03-14 NOTE — NUR
ED Nurse Note: 

patient walked in by him self, complaining of chest pain. AAO x4, VSS at this 
time ,skin is dry intact. will continue to monitor.

## 2019-03-14 NOTE — DIAGNOSTIC IMAGING REPORT
Indication: Chest pain

 

Technique: One view of the chest

 

Comparison: 2/6/2019

 

Findings: Lungs and pleural spaces are clear. Heart size is normal  . No significant

interim change

 

Impression: No acute process

## 2019-03-14 NOTE — CARDIOLOGY PROGRESS NOTE
Assessment/Plan


Assessment/Plan


The patient is seen and examined, full consult note will be dictated.





Objective





Last 24 Hour Vital Signs








  Date Time  Temp Pulse Resp B/P (MAP) Pulse Ox O2 Delivery O2 Flow Rate FiO2


 


3/14/19 18:21 97.7 98 18  95   


 


3/14/19 17:53      Nasal Cannula 2.0 


 


3/14/19 17:35  94      


 


3/14/19 14:28  93 18  100 Room Air  21


 


3/14/19 14:25    117/67    


 


3/14/19 14:21  90 18   Room Air  21


 


3/14/19 14:17  90 18  96 Room Air  21


 


3/14/19 13:55  100 20   Room Air  


 


3/14/19 13:55 98.1 167 20 136/80 100 Room Air  


 


3/14/19 13:36 98.2 103 18 137/80 95 Room Air  











Laboratory Tests








Test


  3/14/19


14:30


 


White Blood Count


  9.0 K/UL


(4.8-10.8)


 


Red Blood Count


  4.91 M/UL


(4.70-6.10)


 


Hemoglobin


  14.5 G/DL


(14.2-18.0)


 


Hematocrit


  42.3 %


(42.0-52.0)


 


Mean Corpuscular Volume 86 FL (80-99)  


 


Mean Corpuscular Hemoglobin


  29.6 PG


(27.0-31.0)


 


Mean Corpuscular Hemoglobin


Concent 34.3 G/DL


(32.0-36.0)


 


Red Cell Distribution Width


  12.6 %


(11.6-14.8)


 


Platelet Count


  197 K/UL


(150-450)


 


Mean Platelet Volume


  8.5 FL


(6.5-10.1)


 


Neutrophils (%) (Auto)


  59.3 %


(45.0-75.0)


 


Lymphocytes (%) (Auto)


  30.3 %


(20.0-45.0)


 


Monocytes (%) (Auto)


  8.2 %


(1.0-10.0)


 


Eosinophils (%) (Auto)


  1.2 %


(0.0-3.0)


 


Basophils (%) (Auto)


  1.0 %


(0.0-2.0)


 


Prothrombin Time


  9.5 SEC


(9.30-11.50)


 


Prothromb Time International


Ratio 0.9 (0.9-1.1)  


 


 


Activated Partial


Thromboplast Time 25 SEC (23-33)


 


 


Sodium Level


  136 MMOL/L


(136-145)


 


Potassium Level


  4.2 MMOL/L


(3.5-5.1)


 


Chloride Level


  104 MMOL/L


()


 


Carbon Dioxide Level


  26 MMOL/L


(21-32)


 


Anion Gap


  6 mmol/L


(5-15)


 


Blood Urea Nitrogen


  13 mg/dL


(7-18)


 


Creatinine


  0.8 MG/DL


(0.55-1.30)


 


Estimat Glomerular Filtration


Rate > 60 mL/min


(>60)


 


Glucose Level


  255 MG/DL


()  H


 


Calcium Level


  9.1 MG/DL


(8.5-10.1)


 


Total Bilirubin


  0.4 MG/DL


(0.2-1.0)


 


Aspartate Amino Transf


(AST/SGOT) 23 U/L (15-37)


 


 


Alanine Aminotransferase


(ALT/SGPT) 46 U/L (12-78)


 


 


Alkaline Phosphatase


  78 U/L


()


 


Total Creatine Kinase


  312 U/L


()  H


 


Troponin I


  0.000 ng/mL


(0.000-0.056)


 


Pro-B-Type Natriuretic Peptide


  117 pg/mL


(0-125)


 


Total Protein


  6.6 G/DL


(6.4-8.2)


 


Albumin


  3.6 G/DL


(3.4-5.0)


 


Globulin 3.0 g/dL  


 


Albumin/Globulin Ratio 1.2 (1.0-2.7)  

















Oskar Donaldson MD Mar 14, 2019 20:16

## 2019-03-14 NOTE — EMERGENCY ROOM REPORT
History of Present Illness


General


Chief Complaint:  Chest Pain


Source:  Patient





Present Illness


HPI


Patient presents with left-sided chest pain.  He feels that sharp.  Rated 10/

10.  He took aspirin and it didn't help.  The patient has risk factors of 

hypertension,DM and smoking.





Patient states the pain is worse with exertion.  It's not positional.  He's had 

this pain before.  He had some cardiac procedure year ago he says.





Patient denies any fevers, chills, nausea, vomiting, diarrhea, calf pain or leg 

swelling.





H/O seizures - on Depakote





H/O CVA





H/O diabetes





H/O chronic leg and arm weakness





Forehead lesion


Allergies:  


Coded Allergies:  


     HALOPERIDOL (Verified  Allergy, Unknown, 2/5/19)





Patient History


Past Medical History:  see triage record, old chart reviewed


Past Surgical History:  other - back and neck surgeries


Social History:  Reports: smoking


Social History Narrative


at assisted living


Reviewed Nursing Documentation:  PMH: Agreed; PSxH: Agreed





Nursing Documentation-PMH


Past Medical History:  No History, Except For


Hx Hypertension:  Yes


Hx COPD:  Yes


Hx Diabetes:  Yes


Hx Cancer:  No


Hx Gastrointestinal Problems:  Yes


Hx Neurological Problems:  Yes


Hx Cerebrovascular Accident:  Yes - 2018


Hx Seizures:  Yes


Hx Peripheral Neuropathy:  Yes


Hx Head Trauma:  Yes


Hx Dizziness:  Yes


Hx Headaches:  Yes


Hx Weakness:  Yes - left arm, left leg





Review of Systems


All Other Systems:  negative except mentioned in HPI





Physical Exam





Vital Signs








  Date Time  Temp Pulse Resp B/P (MAP) Pulse Ox O2 Delivery O2 Flow Rate FiO2


 


3/14/19 13:36 98.2 103 18 137/80 95 Room Air  








Sp02 EP Interpretation:  reviewed, normal


General Appearance:  well appearing, no apparent distress, GCS 15


Head:  normocephalic, atraumatic


Eyes:  bilateral eye normal inspection, bilateral eye PERRL, bilateral eye EOMI


ENT:  moist mucus membranes


Neck:  supple


Respiratory:  chest non-tender, lungs clear, normal breath sounds


Cardiovascular #1:  regular rate, rhythm


Cardiovascular #2:  2+ radial (R)


Gastrointestinal:  normal inspection, normal bowel sounds, non tender, no mass, 

non-distended


Musculoskeletal:  back normal, normal range of motion


Neurologic:  alert, oriented x3, CNs III-XII nml as tested, DTRs symmetric, 

motor weakness - L arm and leg (minimal)


Psychiatric:  mood/affect normal


Skin:  warm/dry, other - lesion L anterior scalp





Medical Decision Making


Diagnostic Impression:  


 Primary Impression:  


 Chest pain


 Qualified Codes:  R07.9 - Chest pain, unspecified


 Additional Impression:  


 Hyperglycemia


ER Course


Patient presents with left-sided chest pain.  Differential includes acute 

coronary syndrome, acute myocardial infarction, muscle strain, costochondritis, 

anxiety, GERD amongst others.  The patient will be evaluated with EKG, chest x-

ray and labs.  The patient will be given aspirin, nitroglycerin paste and 

morphine with Zofran.





EKG without injury.  CXR clear.  Elevated glucose.  Troponin initially normal.





Patient required second dose of morphine.





Improved but needs repeat troponin determinations as high risk patient.





Admit tele Dr. Cabrera.





Laboratory Tests








Test


  3/14/19


14:30


 


White Blood Count


  9.0 K/UL


(4.8-10.8)


 


Red Blood Count


  4.91 M/UL


(4.70-6.10)


 


Hemoglobin


  14.5 G/DL


(14.2-18.0)


 


Hematocrit


  42.3 %


(42.0-52.0)


 


Mean Corpuscular Volume 86 FL (80-99)  


 


Mean Corpuscular Hemoglobin


  29.6 PG


(27.0-31.0)


 


Mean Corpuscular Hemoglobin


Concent 34.3 G/DL


(32.0-36.0)


 


Red Cell Distribution Width


  12.6 %


(11.6-14.8)


 


Platelet Count


  197 K/UL


(150-450)


 


Mean Platelet Volume


  8.5 FL


(6.5-10.1)


 


Neutrophils (%) (Auto)


  59.3 %


(45.0-75.0)


 


Lymphocytes (%) (Auto)


  30.3 %


(20.0-45.0)


 


Monocytes (%) (Auto)


  8.2 %


(1.0-10.0)


 


Eosinophils (%) (Auto)


  1.2 %


(0.0-3.0)


 


Basophils (%) (Auto)


  1.0 %


(0.0-2.0)


 


Prothrombin Time


  9.5 SEC


(9.30-11.50)


 


Prothrombin Time INR 0.9 (0.9-1.1)  


 


PTT


  25 SEC (23-33)


 


 


Sodium Level


  136 MMOL/L


(136-145)


 


Potassium Level


  4.2 MMOL/L


(3.5-5.1)


 


Chloride Level


  104 MMOL/L


()


 


Carbon Dioxide Level


  26 MMOL/L


(21-32)


 


Anion Gap


  6 mmol/L


(5-15)


 


Blood Urea Nitrogen


  13 mg/dL


(7-18)


 


Creatinine


  0.8 MG/DL


(0.55-1.30)


 


Estimate Glomerular


Filtration Rate > 60 mL/min


(>60)


 


Glucose Level


  255 MG/DL


()  H


 


Calcium Level


  9.1 MG/DL


(8.5-10.1)


 


Total Bilirubin


  0.4 MG/DL


(0.2-1.0)


 


Aspartate Amino Transferase


(AST) 23 U/L (15-37)


 


 


Alanine Aminotransferase (ALT)


  46 U/L (12-78)


 


 


Alkaline Phosphatase


  78 U/L


()


 


Total Creatine Kinase


  312 U/L


()  H


 


Troponin I


  0.000 ng/mL


(0.000-0.056)


 


Pro-B-Type Natriuretic Peptide


  117 pg/mL


(0-125)


 


Total Protein


  6.6 G/DL


(6.4-8.2)


 


Albumin


  3.6 G/DL


(3.4-5.0)


 


Globulin 3.0 g/dL  


 


Albumin/Globulin Ratio 1.2 (1.0-2.7)  








EKG Diagnostic Results


Rate:  normal


Rhythm:  NSR


ST Segments:  no acute changes





Rhythm Strip Diag. Results


EP Interpretation:  yes


Rhythm:  NSR, no PVC's, no ectopy





Chest X-Ray Diagnostic Results


Chest X-Ray Diagnostic Results :  


   Chest X-Ray Ordered:  Yes


   # of Views/Limited/Complete:  1 View


   Indication:  Chest Pain


   EP Interpretation:  Yes


   Interpretation:  no consolidation, no effusion, no pneumothorax


   Impression:  No acute disease


   Electronically Signed by:  Electronically signed by Andres Patel MD





Last Vital Signs








  Date Time  Temp Pulse Resp B/P (MAP) Pulse Ox O2 Delivery O2 Flow Rate FiO2


 


3/14/19 14:28  93 18  100 Room Air  21


 


3/14/19 14:25    117/67    


 


3/14/19 13:55 98.1       








Status:  improved


Disposition:  ADMITTED AS INPATIENT


Condition:  Serious











Andres Patel MD Mar 14, 2019 14:10

## 2019-03-14 NOTE — NUR
NURSE NOTES:

Patient transferred from ED, Received report from Ed nurse Rosita RN. Patient is awake alert 
and oriented x4, Vital sign are stable. No distress/SOP noted at this time. inventory check 
done, Heart monitor placed. Paged Dr. Cabrera and Awaiting for admission order.

## 2019-03-14 NOTE — NUR
HAND-OFF: 

Report given to NACHO Winchester. Patient is in stable condition. Endorsed plan of care.

## 2019-03-15 VITALS — DIASTOLIC BLOOD PRESSURE: 73 MMHG | SYSTOLIC BLOOD PRESSURE: 121 MMHG

## 2019-03-15 VITALS — DIASTOLIC BLOOD PRESSURE: 73 MMHG | SYSTOLIC BLOOD PRESSURE: 146 MMHG

## 2019-03-15 VITALS — DIASTOLIC BLOOD PRESSURE: 67 MMHG | SYSTOLIC BLOOD PRESSURE: 105 MMHG

## 2019-03-15 VITALS — SYSTOLIC BLOOD PRESSURE: 105 MMHG | DIASTOLIC BLOOD PRESSURE: 68 MMHG

## 2019-03-15 VITALS — SYSTOLIC BLOOD PRESSURE: 110 MMHG | DIASTOLIC BLOOD PRESSURE: 66 MMHG

## 2019-03-15 LAB
ADD MANUAL DIFF: NO
ALBUMIN SERPL-MCNC: 3.2 G/DL (ref 3.4–5)
ALBUMIN/GLOB SERPL: 1.1 {RATIO} (ref 1–2.7)
ALP SERPL-CCNC: 71 U/L (ref 46–116)
ALT SERPL-CCNC: 50 U/L (ref 12–78)
ANION GAP SERPL CALC-SCNC: 7 MMOL/L (ref 5–15)
AST SERPL-CCNC: 24 U/L (ref 15–37)
BASOPHILS NFR BLD AUTO: 0.9 % (ref 0–2)
BILIRUB SERPL-MCNC: 0.4 MG/DL (ref 0.2–1)
BUN SERPL-MCNC: 11 MG/DL (ref 7–18)
CALCIUM SERPL-MCNC: 9 MG/DL (ref 8.5–10.1)
CHLORIDE SERPL-SCNC: 101 MMOL/L (ref 98–107)
CHOLEST SERPL-MCNC: 120 MG/DL (ref ?–200)
CO2 SERPL-SCNC: 28 MMOL/L (ref 21–32)
CREAT SERPL-MCNC: 1 MG/DL (ref 0.55–1.3)
EOSINOPHIL NFR BLD AUTO: 1.9 % (ref 0–3)
ERYTHROCYTE [DISTWIDTH] IN BLOOD BY AUTOMATED COUNT: 12.4 % (ref 11.6–14.8)
GLOBULIN SER-MCNC: 3 G/DL
HCT VFR BLD CALC: 41.2 % (ref 42–52)
HDLC SERPL-MCNC: 25 MG/DL (ref 40–60)
HGB BLD-MCNC: 14.2 G/DL (ref 14.2–18)
LYMPHOCYTES NFR BLD AUTO: 30.7 % (ref 20–45)
MCV RBC AUTO: 87 FL (ref 80–99)
MONOCYTES NFR BLD AUTO: 7.3 % (ref 1–10)
NEUTROPHILS NFR BLD AUTO: 59.3 % (ref 45–75)
PLATELET # BLD: 166 K/UL (ref 150–450)
POTASSIUM SERPL-SCNC: 3.9 MMOL/L (ref 3.5–5.1)
RBC # BLD AUTO: 4.74 M/UL (ref 4.7–6.1)
SODIUM SERPL-SCNC: 136 MMOL/L (ref 136–145)
TRIGL SERPL-MCNC: 198 MG/DL (ref 30–150)
WBC # BLD AUTO: 7.9 K/UL (ref 4.8–10.8)

## 2019-03-15 RX ADMIN — MORPHINE SULFATE PRN MG: 2 INJECTION, SOLUTION INTRAMUSCULAR; INTRAVENOUS at 00:37

## 2019-03-15 RX ADMIN — HEPARIN SODIUM SCH UNITS: 5000 INJECTION INTRAVENOUS; SUBCUTANEOUS at 20:26

## 2019-03-15 RX ADMIN — MORPHINE SULFATE PRN MG: 2 INJECTION, SOLUTION INTRAMUSCULAR; INTRAVENOUS at 20:22

## 2019-03-15 RX ADMIN — INSULIN ASPART SCH UNITS: 100 INJECTION, SOLUTION INTRAVENOUS; SUBCUTANEOUS at 16:50

## 2019-03-15 RX ADMIN — INSULIN ASPART SCH UNITS: 100 INJECTION, SOLUTION INTRAVENOUS; SUBCUTANEOUS at 11:52

## 2019-03-15 RX ADMIN — MORPHINE SULFATE PRN MG: 2 INJECTION, SOLUTION INTRAMUSCULAR; INTRAVENOUS at 05:48

## 2019-03-15 RX ADMIN — INSULIN ASPART SCH UNITS: 100 INJECTION, SOLUTION INTRAVENOUS; SUBCUTANEOUS at 16:30

## 2019-03-15 RX ADMIN — MORPHINE SULFATE PRN MG: 2 INJECTION, SOLUTION INTRAMUSCULAR; INTRAVENOUS at 16:11

## 2019-03-15 RX ADMIN — DIVALPROEX SODIUM SCH MG: 500 TABLET, DELAYED RELEASE ORAL at 20:22

## 2019-03-15 RX ADMIN — INSULIN DETEMIR SCH UNITS: 100 INJECTION, SOLUTION SUBCUTANEOUS at 20:26

## 2019-03-15 RX ADMIN — INSULIN DETEMIR SCH UNITS: 100 INJECTION, SOLUTION SUBCUTANEOUS at 09:25

## 2019-03-15 RX ADMIN — DIVALPROEX SODIUM SCH MG: 500 TABLET, DELAYED RELEASE ORAL at 09:20

## 2019-03-15 RX ADMIN — INSULIN ASPART SCH UNITS: 100 INJECTION, SOLUTION INTRAVENOUS; SUBCUTANEOUS at 06:26

## 2019-03-15 RX ADMIN — HEPARIN SODIUM SCH UNITS: 5000 INJECTION INTRAVENOUS; SUBCUTANEOUS at 09:00

## 2019-03-15 RX ADMIN — QUETIAPINE SCH MG: 200 TABLET, FILM COATED ORAL at 21:37

## 2019-03-15 RX ADMIN — MORPHINE SULFATE PRN MG: 2 INJECTION, SOLUTION INTRAMUSCULAR; INTRAVENOUS at 10:57

## 2019-03-15 RX ADMIN — INSULIN ASPART SCH UNITS: 100 INJECTION, SOLUTION INTRAVENOUS; SUBCUTANEOUS at 11:53

## 2019-03-15 RX ADMIN — INSULIN ASPART SCH UNITS: 100 INJECTION, SOLUTION INTRAVENOUS; SUBCUTANEOUS at 20:26

## 2019-03-15 NOTE — NUR
NURSE NOTES:

Pending bed placement for patient transfer to med-surg. No bed available at this time. Will 
continue to monitor.

## 2019-03-15 NOTE — CONSULTATION
DATE OF CONSULTATION:  03/14/2019

CARDIOLOGY CONSULTATION



NOTE:  INCOMPLETE DICTATION



CONSULTING PHYSICIAN:  Oskar Donaldson M.D.



REFERRING PHYSICIAN:  Samir Cabrera D.O.



REASON FOR CONSULTATION:  Management of chest pain.



HISTORY OF PRESENT ILLNESS:  The patient is a very unfortunate 38-year-old

gentleman, who presented to the hospital with complaints of left-sided

chest pain described as sharp, affecting the left upper precordial area,

with intensity of 10/10, and worse with deep inspiration.  It did not

relieve with aspirin.  The patient decided to come to the hospital for

further evaluation and management of his condition.  Cardiology

consultation was made at the request of Dr. Cabrera for evaluation and

management of chest pain.  The patient has risk factors of coronary artery

disease includes a history of diabetes mellitus, history of hypertension,

history of tobacco use about one pack per day, and prior history of

cerebrovascular accident in 2018 with left hemiparesis.  Initial blood

pressure was 137/80 mmHg and pulse of 103.  A 12-lead electrocardiogram

revealed sinus rhythm with no acute ischemic features.  Initial laboratory

finding was significant for normal troponin I level at 0 as well as normal

proBNP at 117.



PAST MEDICAL HISTORY:  Hypertension, COPD, diabetes mellitus,

gastroesophageal reflux disease, CVA with left hemiparesis, history of

seizures, history of peripheral neuropathy, history of head trauma, and

history of headaches.



PAST SURGICAL HISTORY:  None.



ALLERGIES:  Haloperidol.



SOCIAL HISTORY:  He smokes about a pack a day.  Denies any alcohol or

illicit drug use.



REVIEW OF SYSTEMS:  HEENT:  Denies any headache, diplopia, or blurred

vision.    CONSTITUTIONAL:  Denies any fever, chills, night sweats, or

weight loss.  CARDIOVASCULAR:  Complains of chest pain in the left upper

precordial area.  This is described as sharp.  No associated shortness of

breath, PND, or orthopnea.  PULMONARY:  Denies any cough, hemoptysis, or

wheezing.  GASTROINTESTINAL:  Denies any nausea, vomiting, diarrhea,

constipation, abdominal pain, or GI bleed.  GENITOURINARY:  Denies any

hematuria, dysuria, or incontinence.  NEUROLOGICAL:  Denies any motor

dysfunction, sensory deficit, or altered speech at this time, but the

patient, however, had history of stroke with left hemiparesis.



MEDICATIONS:  List of medication in the outpatient setting includes Xanax

0.5 mg q.6 h. p.r.n. anxiety, aspirin 81 mg daily, clonidine 0.1 mg q.4 h.

p.r.n. hypertension, Depakote 500 mg q.12 h., Lexapro 10 mg daily, insulin

aspart 15 units subcutaneous before meals, insulin aspart subcutaneous

before meals p.r.n., insulin glargine, Lantus 30 units subcutaneous at

bedtime, DuoNeb 3 mL HHN q.4 h. p.r.n. shortness of breath, and Seroquel

200 mg nightly.



PHYSICAL EXAMINATION:

VITAL SIGNS:  Blood pressure was 137/80, pulse 103, respirations 18, and

temperature 98.2 degrees Fahrenheit.  O2 saturation 95% on room air.

GENERAL:  The patient is a very unfortunate 38-year-old gentleman, in no

apparent respiratory distress.  Alert and oriented x4.

HEENT:  Atraumatic and normocephalic.  Anicteric.  Pupils are equal, round,

and reactive to light and accommodation.  Extraocular muscles intact.

NECK:  JVP less than 5 cm.  No carotid bruit.  Carotid upstroke is 2+

bilaterally.

CARDIOVASCULAR:  Normal S1, S2.  Regular rate and rhythm.  No murmurs,

gallops, or rubs.

LUNGS:  Clear to auscultation bilaterally.

ABDOMEN:  Soft, nontender, and nondistended.  No hepatosplenomegaly.

Positive bowel sounds.

EXTREMITIES:  No evidence of edema, clubbing, or cyanosis.



LABORATORY FINDINGS:  WBC 9.3, hemoglobin 15.5, hematocrit 42.3% and

platelet count 197.  Sodium is 136, potassium 4.2, chloride 104,

bicarbonate 26, BUN 13, and creatinine 0.8.  Glucose is 255.  Calcium is

9.1.  Troponin I is 0.  ProBNP is 117.



IMAGING:  Chest x-ray reveals no acute cardiopulmonary disease.



ASSESSMENT AND PLAN:  The patient is a very unfortunate 38-year-old

gentleman, who is seen in Cardiology consultation.



1. Atypical/noncardiac chest pain.  The patient has had full cardiac

workup both in month of April 2018 as well as October 2018 including a

nonischemic nuclear stress test in April 2018 as well as 2-D

echocardiography later, which revealed normal LV systolic function with

LVEF of about 60% to 65%.  A 12-lead electrocardiogram this admission does

not show any ischemic features.  First troponin I level is negative.  I

would like to obtain another troponin I level to ultimately rule out

myocardial infarction.  Normal beta-natriuretic peptide also rules out

congestive heart failure.

2. Continue with modification of the risk factors including diabetes

mellitus, treatment of hypertension. __________.



DICTATION ENDS ABRUPTLY









  ______________________________________________

  Oskar Donaldson M.D.





DR:  NISHANT

D:  03/15/2019 00:12

T:  03/15/2019 03:20

JOB#:  [id number]

CC:

## 2019-03-15 NOTE — NUR
NURSE NOTES:

Report received from Waylon GRIFFITH. Pt is resting in bed in stable condition. Pt is awake, alert, 
and oriented x4. Pt is on 2L O2 via nasal cannula and breathing is even and unlabored. No 
acute distress noted. IV site is L FA #20g and is asymptomatic, patent, and intact. Seizure 
precautions noted to be in place - side rails padded and suction set up at bedside. Bed is 
in lowest position with brake engaged and side rails up x2. Call light and side table placed 
within reach. Will continue to monitor.

## 2019-03-15 NOTE — HISTORY AND PHYSICAL REPORT
DATE OF ADMISSION:  03/14/2019

CONSULTANTS:

1. Oskar Donaldson M.D.

2. Lissette Steward M.D.

3. Gulshan Armstrong M.D.

4. Dusty Denis M.D.



CHIEF COMPLAINT:  Chest pain, shortness of breath, and weakness.



BRIEF HISTORY:  This is a 38-year-old male, who lives at home, presented to

[facility] yesterday with substernal chest pain, sharp, no loss of

consciousness, slight dizziness, and no radiation intermittently.  The

patient diagnosed with the above and admitted to telemetry for further

care.  Currently, feeling better, slight chest pain, still no complaint

otherwise.



REVIEW OF SYSTEMS:  Slight chest pain.  Slight short of breath.  No nausea,

vomiting, or diarrhea.



PAST MEDICAL HISTORY:  Includes chronic obstructive pulmonary disease,

diabetes, CVA with left-sided weakness, and bipolar.



PAST SURGICAL HISTORY:  Back surgery and neck surgery.



MEDICATIONS:  Include NovoLog, aspirin, Lexapro, Levemir, Depakote,

Seroquel, heparin, Catapres, Xanax, albuterol, morphine, Ambien, and

Ativan.



ALLERGIES:  Haldol.



SOCIAL HISTORY:  Positive smoking.  No alcohol.  No intravenous drug

abuse.



FAMILY HISTORY:  Noncontributory.



PHYSICAL EXAMINATION:

GENERAL:  Calm in bed, oriented x3, and in no acute distress.

VITAL SIGNS:  Temperature is 98 degrees, pulse 87, respirations 18, and

blood pressure 121/73.

CARDIOVASCULAR:  No murmurs.

LUNGS:  Distant and clear.

ABDOMEN:  Bowel sounds positive.  Nontender.  Nondistended.

EXTREMITIES:  No cyanosis, clubbing, or edema.

NEUROLOGIC:  The patient moves all extremities, slightly weak.



LABORATORY AND DIAGNOSTIC DATA:  Labs, at this time, show CBC is normal.

BMP shows glucose 255 and albumin 3.2.  Troponin 0.00.  INR is 0.9 and PTT

is 25.



ASSESSMENT:

1. Chest pain.

2. Diabetes.

3. Hyperglycemia.

4. Chronic obstructive pulmonary disease.

5. Malnutrition.

6. CVA with left-sided weakness.

7. Bipolar.



PLAN:

1. PT and dietary evaluation.

2. Pain control.

3. CBC and BMP in the morning.

4. Troponin q. 8h. x3.

5. EKG in the a.m.

6. We will continue to follow this patient.

7. Resume home medications.









  ______________________________________________

  Samir Cabrera D.O.





DR:  JACINTO

D:  03/15/2019 13:32

T:  03/15/2019 20:12

JOB#:  [id number]

CC:

## 2019-03-15 NOTE — NUR
NURSE NOTES:

, Novolog 14 unit was given at 2100,rechecked . Reported to PCP and Dr. Steward 
placed new Insulin orders. Order carried out. Also, pain med was given. Currently, pt is 
sleeping w/o distress in 2L NC. Will continue to monitor.

## 2019-03-15 NOTE — NUR
HAND-OFF: 

Report given to Ruyb Tinsley, transfer order in to [facility] but not bed yet.

## 2019-03-15 NOTE — NUR
NURSE NOTES:

Pt in bed, in low position, call light at bedside, bed alarm on, pt has bathroom privileges, 
pt Ox4, calm and cooperative, has history of readmissions and likes to leave the unit to 
smoke, pt IV LT F 20 g patent and asymptomatic, pt complains of pain and level reported by 
pt is 5 out of 10, MS given earlier, no s/s of distress or sob noted.

## 2019-03-15 NOTE — GENERAL PROGRESS NOTE
Assessment/Plan


Problem List:  


(1) Chest pain


ICD Codes:  R07.9 - Chest pain, unspecified


SNOMED:  19199999


Qualifiers:  


   Qualified Codes:  R07.9 - Chest pain, unspecified


(2) Hyperglycemia


ICD Codes:  R73.9 - Hyperglycemia, unspecified


SNOMED:  93371523


(3) COPD (chronic obstructive pulmonary disease)


ICD Codes:  J44.9 - Chronic obstructive pulmonary disease, unspecified


SNOMED:  85605229


(4) Diabetes mellitus


ICD Codes:  E11.9 - Diabetes mellitus


SNOMED:  24929902


Assessment/Plan


continue Levemir 18 units bid 


add Novolog 7 units ac tid 


continue NISS ac / hs





Subjective


Allergies:  


Coded Allergies:  


     HALOPERIDOL (Verified  Allergy, Unknown, 2/5/19)


All Systems:  reviewed and negative except above


Subjective


presented with chest pain 


he is known to me 


hx of insulin dependent diabetes 











Item Value  Date Time


 


Bedside Blood Glucose 181 mg/dl H 3/15/19 0626


 


Bedside Blood Glucose 326 mg/dl H 3/14/19 2230











Objective





Last 24 Hour Vital Signs








  Date Time  Temp Pulse Resp B/P (MAP) Pulse Ox O2 Delivery O2 Flow Rate FiO2


 


3/15/19 00:44  74      


 


3/14/19 21:00      Nasal Cannula 2.0 


 


3/14/19 20:00  86 18   Room Air  21


 


3/14/19 19:01  93      


 


3/14/19 18:21 97.7 98 18  95   


 


3/14/19 17:53      Nasal Cannula 2.0 


 


3/14/19 17:35  94      


 


3/14/19 14:28  93 18  100 Room Air  21


 


3/14/19 14:25    117/67    


 


3/14/19 14:21  90 18   Room Air  21


 


3/14/19 14:17  90 18  96 Room Air  21


 


3/14/19 13:55  100 20   Room Air  


 


3/14/19 13:55 98.1 167 20 136/80 100 Room Air  


 


3/14/19 13:36 98.2 103 18 137/80 95 Room Air  

















Intake and Output  


 


 3/14/19 3/15/19





 19:00 07:00


 


Output Total 0 ml 


 


Balance 0 ml 


 


  


 


Output Urine Total 0 ml 


 


# Voids 1 








Laboratory Tests


3/14/19 14:30: 


White Blood Count 9.0, Red Blood Count 4.91, Hemoglobin 14.5, Hematocrit 42.3, 

Mean Corpuscular Volume 86, Mean Corpuscular Hemoglobin 29.6, Mean Corpuscular 

Hemoglobin Concent 34.3, Red Cell Distribution Width 12.6, Platelet Count 197, 

Mean Platelet Volume 8.5, Neutrophils (%) (Auto) 59.3, Lymphocytes (%) (Auto) 

30.3, Monocytes (%) (Auto) 8.2, Eosinophils (%) (Auto) 1.2, Basophils (%) (Auto

) 1.0, Prothrombin Time 9.5, Prothromb Time International Ratio 0.9, Activated 

Partial Thromboplast Time 25, Sodium Level 136, Potassium Level 4.2, Chloride 

Level 104, Carbon Dioxide Level 26, Anion Gap 6, Blood Urea Nitrogen 13, 

Creatinine 0.8, Estimat Glomerular Filtration Rate > 60, Glucose Level 255H, 

Calcium Level 9.1, Total Bilirubin 0.4, Aspartate Amino Transf (AST/SGOT) 23, 

Alanine Aminotransferase (ALT/SGPT) 46, Alkaline Phosphatase 78, Total Creatine 

Kinase 312H, Troponin I 0.000, Pro-B-Type Natriuretic Peptide 117, Total 

Protein 6.6, Albumin 3.6, Globulin 3.0, Albumin/Globulin Ratio 1.2


Height (Feet):  5


Height (Inches):  11.00


Weight (Pounds):  261


General Appearance:  no apparent distress


Neck:  normal alignment


Cardiovascular:  normal rate


Respiratory/Chest:  lungs clear


Abdomen:  normal bowel sounds


Objective





Current Medications








 Medications


  (Trade)  Dose


 Ordered  Sig/Henry


 Route


 PRN Reason  Start Time


 Stop Time Status Last Admin


Dose Admin


 


 Acetaminophen


  (Tylenol)  650 mg  Q4H  PRN


 ORAL


 fever  3/14/19 18:00


 4/13/19 17:59   


 


 


 Al Hydroxide/Mg


 Hydroxide


  (Mylanta II)  30 ml  Q6H  PRN


 ORAL


 dyspepsia  3/14/19 18:00


 4/13/19 17:59   


 


 


 Albuterol/


 Ipratropium


  (Albuterol/


 Ipratropium)  3 ml  Q4H  PRN


 HHN


 Shortness of Breath  3/14/19 18:00


 3/19/19 17:59   


 


 


 Alprazolam


  (Xanax)  0.5 mg  Q6H  PRN


 ORAL


 For Anxiety  3/14/19 18:00


 3/21/19 17:59   


 


 


 Aspirin


  (ASA)  81 mg  DAILY


 ORAL


   3/15/19 09:00


 4/14/19 08:59   


 


 


 Clonidine HCl


  (Catapres Tab)  0.1 mg  Q4H  PRN


 ORAL


 For High Blood Pressure  3/14/19 18:15


 4/13/19 18:14   


 


 


 Dextrose


  (Dextrose 50%)  25 ml  Q30M  PRN


 IV


 Hypoglycemia  3/14/19 18:00


 4/13/19 17:59   


 


 


 Dextrose


  (Dextrose 50%)  50 ml  Q30M  PRN


 IV


 Hypoglycemia  3/14/19 18:00


 4/13/19 17:59   


 


 


 Divalproex Sodium


  (Depakote)  500 mg  Q12HR


 ORAL


   3/14/19 21:00


 4/13/19 20:59  3/14/19 20:51


 


 


 Escitalopram


 Oxalate


  (Lexapro)  10 mg  DAILY


 ORAL


   3/15/19 09:00


 4/14/19 08:59   


 


 


 Heparin Sodium


  (Porcine)


  (Heparin 5000


 units/ml)  5,000 units  EVERY 12  HOURS


 SUBQ


   3/14/19 21:00


 4/13/19 20:59  3/14/19 20:54


 


 


 Insulin Aspart


  (NovoLOG)    BEFORE MEALS AND  HS


 SUBQ


   3/15/19 06:30


 4/13/19 20:59   


 


 


 Insulin Detemir


  (Levemir)  18 units  EVERY 12  HOURS


 SUBQ


   3/14/19 22:30


 4/13/19 22:29  3/14/19 22:30


 


 


 Lorazepam


  (Ativan 2mg/ml


 1ml)  0.5 mg  Q4H  PRN


 IV


 For Anxiety  3/14/19 18:00


 3/21/19 17:59   


 


 


 Morphine Sulfate


  (Morphine


 Sulfate)  1 mg  Q4H  PRN


 IVP


 For Pain  3/14/19 18:00


 3/21/19 17:59  3/15/19 05:48


 


 


 Ondansetron HCl


  (Zofran)  4 mg  Q6H  PRN


 IVP


 Nausea & Vomiting  3/14/19 18:00


 4/13/19 17:59  3/14/19 20:55


 


 


 Polyethylene


 Glycol


  (Miralax)  17 gm  HSPRN  PRN


 ORAL


 Constipation  3/14/19 17:59


 4/13/19 17:58   


 


 


 Quetiapine


 Fumarate


  (SEROquel)  200 mg  BEDTIME


 ORAL


   3/14/19 21:00


 4/13/19 20:59  3/14/19 20:51


 


 


 Zolpidem Tartrate


  (Ambien)  5 mg  HSPRN  PRN


 ORAL


 Insomnia  3/14/19 18:00


 3/21/19 17:59   


 

















Dusty Denis MD Mar 15, 2019 06:41

## 2019-03-15 NOTE — CONSULTATION
DATE OF CONSULTATION:  03/15/2019

NOTE:  Poor Audio Quality



CONSULTING PHYSICIAN:  Gulshan Armstrong M.D.



HISTORY OF PRESENT ILLNESS:  The patient is a 38-year-old male patient.  He

has acute coronary syndrome, diabetes, COPD.  The patient also has a

history of seizure disorder, _______, and came into the hospital and

complaining of _______  acute coronary syndrome, chest pain, and he has

been having extreme mood lability as a result has feelings of

hopelessness, helplessness, low energy, poor appetite, loss of interest in

activity, and extreme mood lability.  He also has underlying diagnosis of

schizoaffective, bipolar type.  That is why, a psychiatric consultation

requested for this patient.  I saw and assessed him at bedside.  The

patient continues to have some confusion, some disorganized thought

process, and mood lability.  He denies any current suicidal or homicidal

ideations.  His chief complaint is "I am depressed because of ________

medical problems."



PAST MEDICAL HISTORY:  He has a history of acute coronary syndrome, has a

history of diabetes, and he also has history of seizure disorder.



ALLERGIES:  He has allergies to Haldol.



PSYCHOTROPIC MEDICATIONS ON ADMISSION:  He is on Depakote 500 mg q. 12

hours and Seroquel 200 mg at bedtime.  He is also on Xanax 0.5 mg every 6

hours p.r.n. anxiety and agitation, Lexapro 10 mg daily.  On the

medication regimen ________.



SUBSTANCE ABUSE HISTORY:  He does have a history of polysubstance abuse

________ details.



FAMILY PSYCHIATRIC HISTORY:  Denies.  _______



DEVELOPMENTAL PROBLEMS:  Denies.



SOCIAL HISTORY:  The patient is currently living in ________ financially

supported by [facility] and Medicare.



PSYCHIATRIC HISTORY:  The patient has schizoaffective, bipolar type.  He

has had multiple psychiatric admissions.



STRENGTHS:  He is motivated to get better.  ________.



WEAKNESSES:  He is impulsive and minimal support system.



MENTAL STATUS EXAMINATION:  This is a 38-year-old male patient.  Appearance

is disheveled.  Attitude, irritable and agitated.  Affect, guarded and

restricted.  Intellect poor.  Mood, depressed and anxious.  Motor

activity, psychomotor agitation.  Attention span is poor.  Orientation x2.

Speech is pressured.  Thought process, disorganized and illogical.

Thought content, denies auditory hallucinations or paranoid delusions.

Insight and judgment is poor.



DIAGNOSIS:  Schizoaffective, bipolar type.



PLAN:  Plan for this patient is to treat him with Depakote 500 q.12 h.,

Seroquel 200 mg at bedtime, Xanax 0.5 mg every six hours as needed, and

Lexapro 10 mg daily.  Provided him with 20 minutes of reality-based

supportive psychotherapy.  I encouraged him to interact appropriately with

staff and other patients.  A 20 minutes of cognitive behavioral therapy to

help him identify automatic negative thoughts and help him convert those

negative thoughts to more positive thoughts to reduce depression, anxiety,

and suicidality and 20 minutes of cognitive behavioral therapy provided.

Chart reviewed.  Discussed with staff.  Seen and assessed in his room.  He

is continued to be followed by Psychiatry throughout the hospital course.

A 20 minutes of cognitive behavioral therapy provided.



I would like to thank Dr. Samir Cabrera for this interesting

consultation.









  ______________________________________________

  Gulshan Armstrong M.D. DR:  MATEUSZ

D:  03/15/2019 05:13

T:  03/15/2019 16:32

JOB#:  [id number]

CC:

## 2019-03-15 NOTE — CONSULTATION
History of Present Illness


General


Chief Complaint:  Chest Pain





Present Illness


Allergies:  


Coded Allergies:  


     HALOPERIDOL (Verified  Allergy, Unknown, 2/5/19)





Medication History


Scheduled


Aspirin* (Aspirin*), 81 MG ORAL DAILY, (Reported)


Divalproex Sodium* (Depakote*), 500 MG PO Q12HR, (Reported)


Escitalopram Oxalate* (Lexapro*), 10 MG ORAL DAILY, (Reported)


Insulin Aspart (Novolog), 15 UNIT SQ BEFORE MEALS, (Reported)


Insulin Glargine (Lantus), 30 UNITS SUBQ BEDTIME, (Reported)


Quetiapine Fumarate* (Seroquel*), 200 MG ORAL BEDTIME, (Reported)





Scheduled PRN


Alprazolam* (Xanax*), 0.5 MG PO Q6HR PRN for For Anxiety, (Reported)


Clonidine Hcl* (Catapres*), 0.1 MG ORAL Q4HR PRN for For High Blood Pressure, (

Reported)


Insulin Aspart (Novolog), 0 SQ BEFORE MEALS PRN for Sliding Scale, (Reported)


Ipratropium/Albuterol Sulfate (DuoNeb 0.5-3(2.5)mg/3ml), 3 ML HHN Q4HR PRN for 

Shortness of Breath, (Reported)





Patient History


Healthcare decision maker





Resuscitation status





Advanced Directive on File








Past Medical/Surgical History


Past Medical/Surgical History:  


(1) Chiari malformation type II


(2) Peripheral neuropathy


(3) Diabetes mellitus


(4) COPD (chronic obstructive pulmonary disease)


(5) CVA (cerebral vascular accident)





Review of Systems


All Other Systems:  negative except mentioned in HPI





Physical Exam


General Appearance:  WD/WN


Lines, tubes and drains:  peripheral


HEENT:  normocephalic, atraumatic


Neck:  non-tender, normal alignment


Respiratory/Chest:  chest wall non-tender, lungs clear


Breasts:  no masses


Cardiovascular/Chest:  normal rate, no JVD


Abdomen:  normal bowel sounds, non tender


Genitourinary/Rectal:  normal genital exam


Extremities:  normal range of motion


Skin Exam:  normal pigmentation





Last 24 Hour Vital Signs








  Date Time  Temp Pulse Resp B/P (MAP) Pulse Ox O2 Delivery O2 Flow Rate FiO2


 


3/15/19 12:00 98.0 87 18 121/73 (89) 95   


 


3/15/19 11:27 98.5       


 


3/15/19 08:27      Nasal Cannula 2.0 


 


3/15/19 08:00 98.5 87 20 110/66 (81) 94   


 


3/15/19 07:48  87      


 


3/15/19 04:00  74      


 


3/15/19 04:00 97.4 75 22 105/68 (80) 97   


 


3/15/19 00:44  74      


 


3/14/19 21:00      Nasal Cannula 2.0 


 


3/14/19 20:00  86 18   Room Air  21


 


3/14/19 20:00 98.7 91 22 125/70 (88) 100   


 


3/14/19 19:01  93      


 


3/14/19 18:21 97.7 98 18  95   


 


3/14/19 17:53      Nasal Cannula 2.0 


 


3/14/19 17:35  94      


 


3/14/19 14:28  93 18  100 Room Air  21


 


3/14/19 14:25    117/67    


 


3/14/19 14:21  90 18   Room Air  21


 


3/14/19 14:17  90 18  96 Room Air  21


 


3/14/19 13:55  100 20   Room Air  


 


3/14/19 13:55 98.1 167 20 136/80 100 Room Air  


 


3/14/19 13:36 98.2 103 18 137/80 95 Room Air  

















Intake and Output  


 


 3/14/19 3/15/19





 19:00 07:00


 


Output Total 0 ml 


 


Balance 0 ml 


 


  


 


Output Urine Total 0 ml 


 


# Voids 1 











Laboratory Tests








Test


  3/14/19


14:30 3/15/19


10:40


 


White Blood Count


  9.0 K/UL


(4.8-10.8) 7.9 K/UL


(4.8-10.8)


 


Red Blood Count


  4.91 M/UL


(4.70-6.10) 4.74 M/UL


(4.70-6.10)


 


Hemoglobin


  14.5 G/DL


(14.2-18.0) 14.2 G/DL


(14.2-18.0)


 


Hematocrit


  42.3 %


(42.0-52.0) 41.2 %


(42.0-52.0)  L


 


Mean Corpuscular Volume 86 FL (80-99)   87 FL (80-99)  


 


Mean Corpuscular Hemoglobin


  29.6 PG


(27.0-31.0) 30.0 PG


(27.0-31.0)


 


Mean Corpuscular Hemoglobin


Concent 34.3 G/DL


(32.0-36.0) 34.5 G/DL


(32.0-36.0)


 


Red Cell Distribution Width


  12.6 %


(11.6-14.8) 12.4 %


(11.6-14.8)


 


Platelet Count


  197 K/UL


(150-450) 166 K/UL


(150-450)


 


Mean Platelet Volume


  8.5 FL


(6.5-10.1) 8.6 FL


(6.5-10.1)


 


Neutrophils (%) (Auto)


  59.3 %


(45.0-75.0) 59.3 %


(45.0-75.0)


 


Lymphocytes (%) (Auto)


  30.3 %


(20.0-45.0) 30.7 %


(20.0-45.0)


 


Monocytes (%) (Auto)


  8.2 %


(1.0-10.0) 7.3 %


(1.0-10.0)


 


Eosinophils (%) (Auto)


  1.2 %


(0.0-3.0) 1.9 %


(0.0-3.0)


 


Basophils (%) (Auto)


  1.0 %


(0.0-2.0) 0.9 %


(0.0-2.0)


 


Prothrombin Time


  9.5 SEC


(9.30-11.50) 


 


 


Prothromb Time International


Ratio 0.9 (0.9-1.1)  


  


 


 


Activated Partial


Thromboplast Time 25 SEC (23-33)


  


 


 


Sodium Level


  136 MMOL/L


(136-145) 136 MMOL/L


(136-145)


 


Potassium Level


  4.2 MMOL/L


(3.5-5.1) 3.9 MMOL/L


(3.5-5.1)


 


Chloride Level


  104 MMOL/L


() 101 MMOL/L


()


 


Carbon Dioxide Level


  26 MMOL/L


(21-32) 28 MMOL/L


(21-32)


 


Anion Gap


  6 mmol/L


(5-15) 7 mmol/L


(5-15)


 


Blood Urea Nitrogen


  13 mg/dL


(7-18) 11 mg/dL


(7-18)


 


Creatinine


  0.8 MG/DL


(0.55-1.30) 1.0 MG/DL


(0.55-1.30)


 


Estimat Glomerular Filtration


Rate > 60 mL/min


(>60) > 60 mL/min


(>60)


 


Glucose Level


  255 MG/DL


()  H 255 MG/DL


()  H


 


Calcium Level


  9.1 MG/DL


(8.5-10.1) 9.0 MG/DL


(8.5-10.1)


 


Total Bilirubin


  0.4 MG/DL


(0.2-1.0) 0.4 MG/DL


(0.2-1.0)


 


Aspartate Amino Transf


(AST/SGOT) 23 U/L (15-37)


  24 U/L (15-37)


 


 


Alanine Aminotransferase


(ALT/SGPT) 46 U/L (12-78)


  50 U/L (12-78)


 


 


Alkaline Phosphatase


  78 U/L


() 71 U/L


()


 


Total Creatine Kinase


  312 U/L


()  H 


 


 


Troponin I


  0.000 ng/mL


(0.000-0.056) 


 


 


Pro-B-Type Natriuretic Peptide


  117 pg/mL


(0-125) 


 


 


Total Protein


  6.6 G/DL


(6.4-8.2) 6.2 G/DL


(6.4-8.2)  L


 


Albumin


  3.6 G/DL


(3.4-5.0) 3.2 G/DL


(3.4-5.0)  L


 


Globulin 3.0 g/dL   3.0 g/dL  


 


Albumin/Globulin Ratio 1.2 (1.0-2.7)   1.1 (1.0-2.7)  


 


Triglycerides Level


  


  198 MG/DL


()  H


 


Cholesterol Level


  


  120 MG/DL (<


200)


 


LDL Cholesterol


  


  72 mg/dL


(<100)


 


HDL Cholesterol


  


  25 MG/DL


(40-60)  L


 


Cholesterol/HDL Ratio


  


  4.8 (3.3-4.4)


H








Height (Feet):  5


Height (Inches):  11.00


Weight (Pounds):  261


Medications





Current Medications








 Medications


  (Trade)  Dose


 Ordered  Sig/Henry


 Route


 PRN Reason  Start Time


 Stop Time Status Last Admin


Dose Admin


 


 Acetaminophen


  (Tylenol)  650 mg  Q4H  PRN


 ORAL


 fever  3/14/19 18:00


 4/13/19 17:59   


 


 


 Al Hydroxide/Mg


 Hydroxide


  (Mylanta II)  30 ml  Q6H  PRN


 ORAL


 dyspepsia  3/14/19 18:00


 4/13/19 17:59   


 


 


 Albuterol/


 Ipratropium


  (Albuterol/


 Ipratropium)  3 ml  Q4H  PRN


 HHN


 Shortness of Breath  3/14/19 18:00


 3/19/19 17:59   


 


 


 Alprazolam


  (Xanax)  0.5 mg  Q6H  PRN


 ORAL


 For Anxiety  3/14/19 18:00


 3/21/19 17:59   


 


 


 Aspirin


  (ASA)  81 mg  DAILY


 ORAL


   3/15/19 09:00


 4/14/19 08:59  3/15/19 09:20


 


 


 Clonidine HCl


  (Catapres Tab)  0.1 mg  Q4H  PRN


 ORAL


 For High Blood Pressure  3/14/19 18:15


 4/13/19 18:14   


 


 


 Dextrose


  (Dextrose 50%)  25 ml  Q30M  PRN


 IV


 Hypoglycemia  3/14/19 18:00


 4/13/19 17:59   


 


 


 Dextrose


  (Dextrose 50%)  50 ml  Q30M  PRN


 IV


 Hypoglycemia  3/14/19 18:00


 4/13/19 17:59   


 


 


 Divalproex Sodium


  (Depakote)  500 mg  Q12HR


 ORAL


   3/14/19 21:00


 4/13/19 20:59  3/15/19 09:20


 


 


 Escitalopram


 Oxalate


  (Lexapro)  10 mg  DAILY


 ORAL


   3/15/19 09:00


 4/14/19 08:59  3/15/19 09:20


 


 


 Heparin Sodium


  (Porcine)


  (Heparin 5000


 units/ml)  5,000 units  EVERY 12  HOURS


 SUBQ


   3/14/19 21:00


 4/13/19 20:59  3/14/19 20:54


 


 


 Insulin Aspart


  (NovoLOG)    BEFORE MEALS AND  HS


 SUBQ


   3/15/19 06:30


 4/13/19 20:59  3/15/19 11:52


 


 


 Insulin Aspart


  (NovoLOG)  7 units  NOVOTIAC


 SUBQ


   3/15/19 11:50


 4/14/19 11:49  3/15/19 11:53


 


 


 Insulin Detemir


  (Levemir)  18 units  EVERY 12  HOURS


 SUBQ


   3/14/19 22:30


 4/13/19 22:29  3/15/19 09:25


 


 


 Lorazepam


  (Ativan 2mg/ml


 1ml)  0.5 mg  Q4H  PRN


 IV


 For Anxiety  3/14/19 18:00


 3/21/19 17:59   


 


 


 Morphine Sulfate


  (Morphine


 Sulfate)  1 mg  Q4H  PRN


 IVP


 For Pain  3/14/19 18:00


 3/21/19 17:59  3/15/19 10:57


 


 


 Ondansetron HCl


  (Zofran)  4 mg  Q6H  PRN


 IVP


 Nausea & Vomiting  3/14/19 18:00


 4/13/19 17:59  3/14/19 20:55


 


 


 Polyethylene


 Glycol


  (Miralax)  17 gm  HSPRN  PRN


 ORAL


 Constipation  3/14/19 17:59


 4/13/19 17:58   


 


 


 Quetiapine


 Fumarate


  (SEROquel)  200 mg  BEDTIME


 ORAL


   3/14/19 21:00


 4/13/19 20:59  3/14/19 20:51


 


 


 Zolpidem Tartrate


  (Ambien)  5 mg  HSPRN  PRN


 ORAL


 Insomnia  3/14/19 18:00


 3/21/19 17:59   


 











Assessment/Plan


Problem List:  


(1) Costochondritis


ICD Codes:  M94.0 - Chondrocostal junction syndrome [[place]]


SNOMED:  77300813


(2) COPD (chronic obstructive pulmonary disease)


ICD Codes:  J44.9 - Chronic obstructive pulmonary disease, unspecified


SNOMED:  91591640


(3) ACS (acute coronary syndrome)


ICD Codes:  I24.9 - Acute ischemic heart disease, unspecified


SNOMED:  593140621


(4) Diabetes mellitus


ICD Codes:  E11.9 - Diabetes mellitus


SNOMED:  09619916


(5) Noncompliance


ICD Codes:  Z91.19 - Patient's noncompliance with other medical treatment and 

regimen


SNOMED:  5767601


Assessment/Plan


respiratory treatment


titrate fio2 to sat of 92%


serial enzymes


cardiology evaluation


dvt prophylaxis


pain management.











Lissette Steward MD Mar 15, 2019 12:22

## 2019-03-15 NOTE — NUR
CASE MANAGEMENT:REVIEW



38 YR OLD MALE FROM HOME TO ER



CC: CHEST PAIN RADIATING TO LT ARM



SI: ACUTE CORONARY SYNDROME. HYPERGLYCEMIA

98.3   103   18  137/80  95% ON RA

GLUCOSE+[id number]



IS: DUONEB HHN 

ASA PO

NITRO 1"

IV MORPHINE

IV ZOFRAN 

IV PEPCID

CHEST XRAY

**: TO TELEMETRY

## 2019-03-16 VITALS — SYSTOLIC BLOOD PRESSURE: 103 MMHG | DIASTOLIC BLOOD PRESSURE: 53 MMHG

## 2019-03-16 VITALS — SYSTOLIC BLOOD PRESSURE: 128 MMHG | DIASTOLIC BLOOD PRESSURE: 82 MMHG

## 2019-03-16 VITALS — DIASTOLIC BLOOD PRESSURE: 58 MMHG | SYSTOLIC BLOOD PRESSURE: 105 MMHG

## 2019-03-16 VITALS — SYSTOLIC BLOOD PRESSURE: 101 MMHG | DIASTOLIC BLOOD PRESSURE: 57 MMHG

## 2019-03-16 VITALS — DIASTOLIC BLOOD PRESSURE: 67 MMHG | SYSTOLIC BLOOD PRESSURE: 115 MMHG

## 2019-03-16 VITALS — DIASTOLIC BLOOD PRESSURE: 64 MMHG | SYSTOLIC BLOOD PRESSURE: 118 MMHG

## 2019-03-16 VITALS — SYSTOLIC BLOOD PRESSURE: 116 MMHG | DIASTOLIC BLOOD PRESSURE: 63 MMHG

## 2019-03-16 VITALS — DIASTOLIC BLOOD PRESSURE: 63 MMHG | SYSTOLIC BLOOD PRESSURE: 118 MMHG

## 2019-03-16 VITALS — DIASTOLIC BLOOD PRESSURE: 56 MMHG | SYSTOLIC BLOOD PRESSURE: 96 MMHG

## 2019-03-16 VITALS — DIASTOLIC BLOOD PRESSURE: 76 MMHG | SYSTOLIC BLOOD PRESSURE: 120 MMHG

## 2019-03-16 VITALS — SYSTOLIC BLOOD PRESSURE: 89 MMHG | DIASTOLIC BLOOD PRESSURE: 56 MMHG

## 2019-03-16 VITALS — SYSTOLIC BLOOD PRESSURE: 106 MMHG | DIASTOLIC BLOOD PRESSURE: 65 MMHG

## 2019-03-16 VITALS — DIASTOLIC BLOOD PRESSURE: 69 MMHG | SYSTOLIC BLOOD PRESSURE: 138 MMHG

## 2019-03-16 VITALS — SYSTOLIC BLOOD PRESSURE: 111 MMHG | DIASTOLIC BLOOD PRESSURE: 70 MMHG

## 2019-03-16 VITALS — SYSTOLIC BLOOD PRESSURE: 116 MMHG | DIASTOLIC BLOOD PRESSURE: 75 MMHG

## 2019-03-16 LAB
ADD MANUAL DIFF: NO
ANION GAP SERPL CALC-SCNC: 6 MMOL/L (ref 5–15)
BASOPHILS NFR BLD AUTO: 1.1 % (ref 0–2)
BUN SERPL-MCNC: 17 MG/DL (ref 7–18)
CALCIUM SERPL-MCNC: 9.1 MG/DL (ref 8.5–10.1)
CHLORIDE SERPL-SCNC: 101 MMOL/L (ref 98–107)
CO2 SERPL-SCNC: 28 MMOL/L (ref 21–32)
CREAT SERPL-MCNC: 1 MG/DL (ref 0.55–1.3)
EOSINOPHIL NFR BLD AUTO: 2.4 % (ref 0–3)
ERYTHROCYTE [DISTWIDTH] IN BLOOD BY AUTOMATED COUNT: 12.3 % (ref 11.6–14.8)
HCT VFR BLD CALC: 42.2 % (ref 42–52)
HGB BLD-MCNC: 14.7 G/DL (ref 14.2–18)
LYMPHOCYTES NFR BLD AUTO: 37 % (ref 20–45)
MCV RBC AUTO: 87 FL (ref 80–99)
MONOCYTES NFR BLD AUTO: 8.9 % (ref 1–10)
NEUTROPHILS NFR BLD AUTO: 50.7 % (ref 45–75)
PLATELET # BLD: 164 K/UL (ref 150–450)
POTASSIUM SERPL-SCNC: 3.7 MMOL/L (ref 3.5–5.1)
RBC # BLD AUTO: 4.87 M/UL (ref 4.7–6.1)
SODIUM SERPL-SCNC: 135 MMOL/L (ref 136–145)
WBC # BLD AUTO: 7.3 K/UL (ref 4.8–10.8)

## 2019-03-16 RX ADMIN — DIVALPROEX SODIUM SCH MG: 500 TABLET, DELAYED RELEASE ORAL at 20:34

## 2019-03-16 RX ADMIN — INSULIN ASPART SCH UNITS: 100 INJECTION, SOLUTION INTRAVENOUS; SUBCUTANEOUS at 06:29

## 2019-03-16 RX ADMIN — MORPHINE SULFATE PRN MG: 2 INJECTION, SOLUTION INTRAMUSCULAR; INTRAVENOUS at 01:23

## 2019-03-16 RX ADMIN — HEPARIN SODIUM SCH UNITS: 5000 INJECTION INTRAVENOUS; SUBCUTANEOUS at 10:09

## 2019-03-16 RX ADMIN — INSULIN DETEMIR SCH UNITS: 100 INJECTION, SOLUTION SUBCUTANEOUS at 20:51

## 2019-03-16 RX ADMIN — MORPHINE SULFATE PRN MG: 2 INJECTION, SOLUTION INTRAMUSCULAR; INTRAVENOUS at 16:26

## 2019-03-16 RX ADMIN — LORAZEPAM PRN MG: 2 INJECTION, SOLUTION INTRAMUSCULAR; INTRAVENOUS at 21:46

## 2019-03-16 RX ADMIN — INSULIN ASPART SCH UNITS: 100 INJECTION, SOLUTION INTRAVENOUS; SUBCUTANEOUS at 16:25

## 2019-03-16 RX ADMIN — INSULIN ASPART SCH UNITS: 100 INJECTION, SOLUTION INTRAVENOUS; SUBCUTANEOUS at 20:52

## 2019-03-16 RX ADMIN — HEPARIN SODIUM SCH UNITS: 5000 INJECTION INTRAVENOUS; SUBCUTANEOUS at 10:03

## 2019-03-16 RX ADMIN — HEPARIN SODIUM SCH UNITS: 5000 INJECTION INTRAVENOUS; SUBCUTANEOUS at 20:33

## 2019-03-16 RX ADMIN — INSULIN ASPART SCH UNITS: 100 INJECTION, SOLUTION INTRAVENOUS; SUBCUTANEOUS at 16:24

## 2019-03-16 RX ADMIN — INSULIN ASPART SCH UNITS: 100 INJECTION, SOLUTION INTRAVENOUS; SUBCUTANEOUS at 06:28

## 2019-03-16 RX ADMIN — MORPHINE SULFATE PRN MG: 2 INJECTION, SOLUTION INTRAMUSCULAR; INTRAVENOUS at 20:42

## 2019-03-16 RX ADMIN — MORPHINE SULFATE PRN MG: 2 INJECTION, SOLUTION INTRAMUSCULAR; INTRAVENOUS at 06:25

## 2019-03-16 NOTE — GENERAL PROGRESS NOTE
Assessment/Plan


Problem List:  


(1) COPD (chronic obstructive pulmonary disease)


ICD Codes:  J44.9 - Chronic obstructive pulmonary disease, unspecified


SNOMED:  86598425


(2) Diabetes mellitus


ICD Codes:  E11.9 - Diabetes mellitus


SNOMED:  87390622


Assessment/Plan


continue Levemir 18 units bid 


continuu Novolog 7 units ac tid 


continue NISS ac / hs





Subjective


Allergies:  


Coded Allergies:  


     HALOPERIDOL (Verified  Allergy, Unknown, 2/5/19)


All Systems:  reviewed and negative except above


Subjective


events noted 











Item Value  Date Time


 


Bedside Blood Glucose 180 mg/dl H 3/16/19 1204


 


Bedside Blood Glucose 248 mg/dl H 3/16/19 1004


 


Bedside Blood Glucose 248 mg/dl H 3/16/19 0630


 


Bedside Blood Glucose 324 mg/dl H 3/15/19 2100


 


Bedside Blood Glucose 121 mg/dl H 3/15/19 1650











Objective





Last 24 Hour Vital Signs








  Date Time  Temp Pulse Resp B/P (MAP) Pulse Ox O2 Delivery O2 Flow Rate FiO2


 


3/16/19 11:54 97.9       


 


3/16/19 09:00      Nasal Cannula 2.0 


 


3/16/19 08:28  79 18   Room Air  21


 


3/16/19 08:00 97.9 93 20 89/56 (67)    


 


3/16/19 04:00 97.9 72 18 111/70 (84) 97   


 


3/16/19 00:00  70      


 


3/16/19 00:00 97.4 70 20 96/56 (69) 96   


 


3/15/19 21:00      Nasal Cannula 2.0 


 


3/15/19 20:11  83 18   Room Air  21


 


3/15/19 20:00  83      


 


3/15/19 20:00 98.5 82 20 105/67 (80) 96   


 


3/15/19 16:41 98.8       


 


3/15/19 16:17 98.8 83 20 146/73 (97) 95   


 


3/15/19 16:15  109      

















Intake and Output  


 


 3/15/19 3/16/19





 19:00 07:00


 


Intake Total 1060 ml 360 ml


 


Balance 1060 ml 360 ml


 


  


 


Intake Oral 1060 ml 360 ml


 


# Voids 1 1








Laboratory Tests


3/16/19 05:38: 


White Blood Count 7.3, Red Blood Count 4.87, Hemoglobin 14.7, Hematocrit 42.2, 

Mean Corpuscular Volume 87, Mean Corpuscular Hemoglobin 30.1, Mean Corpuscular 

Hemoglobin Concent 34.8, Red Cell Distribution Width 12.3, Platelet Count 164, 

Mean Platelet Volume 7.8, Neutrophils (%) (Auto) 50.7, Lymphocytes (%) (Auto) 

37.0, Monocytes (%) (Auto) 8.9, Eosinophils (%) (Auto) 2.4, Basophils (%) (Auto

) 1.1, Sodium Level 135L, Potassium Level 3.7, Chloride Level 101, Carbon 

Dioxide Level 28, Anion Gap 6, Blood Urea Nitrogen 17, Creatinine 1.0, Estimat 

Glomerular Filtration Rate > 60, Glucose Level 266H, Calcium Level 9.1


Height (Feet):  5


Height (Inches):  11.00


Weight (Pounds):  261


General Appearance:  no apparent distress


Neck:  normal alignment


Cardiovascular:  normal rate


Respiratory/Chest:  lungs clear


Abdomen:  normal bowel sounds


Objective





Current Medications








 Medications


  (Trade)  Dose


 Ordered  Sig/Henry


 Route


 PRN Reason  Start Time


 Stop Time Status Last Admin


Dose Admin


 


 Acetaminophen


  (Tylenol)  650 mg  Q4H  PRN


 ORAL


 fever  3/16/19 10:00


 4/13/19 17:59   


 


 


 Al Hydroxide/Mg


 Hydroxide


  (Mylanta II)  30 ml  Q6H  PRN


 ORAL


 dyspepsia  3/16/19 07:00


 4/13/19 06:59   


 


 


 Albuterol/


 Ipratropium


  (Albuterol/


 Ipratropium)  3 ml  Q4H  PRN


 HHN


 Shortness of Breath  3/16/19 07:00


 3/19/19 06:59   


 


 


 Alprazolam


  (Xanax)  0.5 mg  Q6H  PRN


 ORAL


 For Anxiety  3/16/19 07:00


 3/21/19 06:59   


 


 


 Aspirin


  (ASA)  81 mg  DAILY


 ORAL


   3/16/19 09:00


 4/14/19 08:59  3/16/19 10:01


 


 


 Clonidine HCl


  (Catapres Tab)  0.1 mg  Q4H  PRN


 ORAL


 For High Blood Pressure  3/16/19 07:00


 4/13/19 06:59   


 


 


 Dextrose


  (Dextrose 50%)  25 ml  Q30M  PRN


 IV


 Hypoglycemia  3/16/19 07:00


 4/13/19 17:59   


 


 


 Dextrose


  (Dextrose 50%)  50 ml  Q30M  PRN


 IV


 Hypoglycemia  3/16/19 07:00


 4/13/19 17:59   


 


 


 Divalproex Sodium


  (Depakote)  500 mg  Q12HR


 ORAL


   3/16/19 09:00


 4/13/19 20:59  3/16/19 10:02


 


 


 Escitalopram


 Oxalate


  (Lexapro)  10 mg  DAILY


 ORAL


   3/16/19 09:00


 4/14/19 08:59  3/16/19 10:01


 


 


 Heparin Sodium


  (Porcine)


  (Heparin 5000


 units/ml)  5,000 units  EVERY 12  HOURS


 SUBQ


   3/16/19 09:00


 4/13/19 20:59   


 


 


 Insulin Aspart


  (NovoLOG)    BEFORE MEALS AND  HS


 SUBQ


   3/16/19 11:30


 4/13/19 20:59  3/16/19 12:04


 


 


 Insulin Aspart


  (NovoLOG)  7 units  NOVOTIAC


 SUBQ


   3/16/19 11:50


 4/14/19 11:49  3/16/19 12:04


 


 


 Insulin Detemir


  (Levemir)  18 units  EVERY 12  HOURS


 SUBQ


   3/16/19 09:00


 4/13/19 22:29  3/16/19 10:04


 


 


 Lorazepam


  (Ativan 2mg/ml


 1ml)  0.5 mg  Q4H  PRN


 IV


 For Anxiety  3/16/19 07:00


 3/21/19 06:59   


 


 


 Morphine Sulfate


  (Morphine


 Sulfate)  1 mg  Q4H  PRN


 IVP


 For Pain  3/16/19 07:00


 3/21/19 06:59  3/16/19 11:24


 


 


 Ondansetron HCl


  (Zofran)  4 mg  Q6H  PRN


 IVP


 Nausea & Vomiting  3/16/19 07:00


 4/13/19 06:59   


 


 


 Polyethylene


 Glycol


  (Miralax)  17 gm  HSPRN  PRN


 ORAL


 Constipation  3/16/19 07:00


 4/13/19 06:59   


 


 


 Quetiapine


 Fumarate


  (SEROquel)  200 mg  BEDTIME


 ORAL


   3/16/19 21:00


 4/13/19 20:59   


 


 


 Zolpidem Tartrate


  (Ambien)  5 mg  HSPRN  PRN


 ORAL


 Insomnia  3/16/19 07:00


 3/21/19 06:59   


 

















Dusty Denis MD Mar 16, 2019 13:11

## 2019-03-16 NOTE — NUR
At 1300 patient walked to nurses station and notified this RN that he felt as though he was 
going to have a seizure, this RN escorted patient back to his room and had the patient lie 
down on the bed, 1308 patient then proceeded to have a seizure, charge nurse notified and 
then rapid response called as seizure was continuing, house supervisor notified and rapid 
response team arrived, , R: 24, O2: 100 with non rebreather at 10LPM, unable to obtain 
BP due to movemenDr. Rick made aware and ordered us to call Dr. Steward and Dr. Kenny, 
charge nurse obtained order for ativan, ativan administered per rapid response override 
rules under direction of nursing supervisor Masha, seizure activity stopped at 1322 and 
patient was able to state name and place, and rapid response team left, RN stayed with 
patient and a second seizure began at 1325, second rapid response called, team arrived 
ativan administered, seizure activity stopped at 1340 patient alert and oriented x 3 with 
minimal pupillary response, HR: 106 R: 20, O2:100% 10L nonrebreather, order obtained to 
admit patient to ICU, patient transferred to ICU by RN, report given to Regis GRIFFITH in ICU.

## 2019-03-16 NOTE — NUR
NURSE NOTES:

Report received from NACHO Rogel. Pt brought in earlier today as rapid response for seizure 
activity. Pt is A/Ox4 with no seizure activity experienced during stay in ICU. Cardiac 
monitor shows SR. Pt currently on 2L NC, O2 saturation at 95%. Tolerating well and showing 
no signs of cardiac or respiratory distress. Pt currently on a CCHO medicum diet. Uses 
urinal for excretion. Accuchecks ACHS. No skin issues noted. Pt has a LFA20g, patent and 
saline locked. Pt currently receiving EEG testing. Bed alarms placed, seizure precautions 
are set. Bed in lowest position, and call light within reach. Will continue to monitor and 
with patients plan of care.

## 2019-03-16 NOTE — NUR
NURSE NOTES:

Patient eating and resting comfortably. No seizure activity scene yet since ICU admission. 
Vitals remains stable.

## 2019-03-16 NOTE — NUR
CHARGE NURSE NOTES:

OBTAINED ORDER FROM DR. IRVING TO CONTACT DR. MOCTEZUMA AND ZACK FOR SEIZURE. DR. DIXON WITH 
ORDERS TO DO STAT EEG, STAT CAT SCAN OF HEAD, DEPAKOTE 500MG IVP X1. TRANSFER TO ICU. NOTED, 
CARRIED OUT. 



CALLED [facility], PER HIM WILL SEE PATIENT IN AN HOUR. ICU NURSE MADE AWARE.

## 2019-03-16 NOTE — DIAGNOSTIC IMAGING REPORT
EXAM:

  CT Head Without Intravenous Contrast

 

CLINICAL HISTORY:

  Seizure

 

TECHNIQUE:

  Axial computed tomography images of the head/brain without intravenous 

contrast.  CTDI is 70.53 mGy and DLP is 1425 mGy-cm.  One or more of the 

following dose reduction techniques were used: automated exposure control,

 adjustment of the mA and/or kV according to patient size, use of 

iterative reconstruction technique.

 

COMPARISON:

  Noncontrast head CT October 4, 2018

 

FINDINGS:

  Brain:  The cerebella tonsils remain low-lying but no crowding of the 

foramen magnum is apparent.  No evidence of an acute infarct.  No 

hemorrhage.  No significant white matter disease.

  Ventricles:  Unremarkable.  No ventriculomegaly.

  Bones/joints:  Calvarium demonstrates stable surgical changes from a 

suboccipital craniectomy.  No acute fracture.

  Soft tissues:  Unremarkable.

  Sinuses:  Unremarkable as visualized.  No acute sinusitis.

  Mastoid air cells:  Unremarkable as visualized.  No mastoid effusion.

  Orbits:  Orbits are intact.

 

IMPRESSION:     

  Stable postoperative changes from suboccipital craniectomy.  No acute 

intracranial hemorrhage, infarct or mass effect.

## 2019-03-16 NOTE — NUR
NURSE NOTES:

Patient transferred from 4E to ICU for seizure activity. Patient now is awake, alert and 
oriented a bit drowsy. L FA 20G, ST on the monitor at 104. 138/69, 96% SpO2 while on 2L NC. 
Side rails are padded. Neurologist at bedside assessing patient. NAd at this time not 
seizure activity at this time.

## 2019-03-16 NOTE — NUR
P.T Note: late entry 0845

P.T evaluation completed. Based on P.T evaluation,  pt is currently at baseline independent 
with functional mobilities and gait/locomotion therefore skilled P.T service is not 
warranted at this time. IN P.T service.  Thank you for this referral.

## 2019-03-16 NOTE — NUR
NURSE NOTES:

Glucose 191, coverage provided, patient awake and eating dinner at this time. No seizure 
activity since admission to ICU.

## 2019-03-16 NOTE — GENERAL PROGRESS NOTE
Assessment/Plan


Problem List:  


(1) ACS (acute coronary syndrome)


ICD Codes:  I24.9 - Acute ischemic heart disease, unspecified


SNOMED:  644022657


(2) Diabetes mellitus


ICD Codes:  E11.9 - Diabetes mellitus


SNOMED:  22233705


(3) COPD (chronic obstructive pulmonary disease)


ICD Codes:  J44.9 - Chronic obstructive pulmonary disease, unspecified


SNOMED:  61454248


(4) CVA (cerebral vascular accident)


ICD Codes:  I63.9 - Cerebral infarction, unspecified


SNOMED:  299973634


Status:  unchanged


Assessment/Plan


pt diet pain control cardio f/u bp bs control cbc bmp am dc plan





Subjective


Constitutional:  Reports: weakness


Allergies:  


Coded Allergies:  


     HALOPERIDOL (Verified  Allergy, Unknown, 2/5/19)


All Systems:  reviewed and negative except above


Subjective


sl sob sl cp





Objective





Last 24 Hour Vital Signs








  Date Time  Temp Pulse Resp B/P (MAP) Pulse Ox O2 Delivery O2 Flow Rate FiO2


 


3/16/19 04:00 97.9 72 18 111/70 (84) 97   


 


3/16/19 00:00  70      


 


3/16/19 00:00 97.4 70 20 96/56 (69) 96   


 


3/15/19 21:00      Nasal Cannula 2.0 


 


3/15/19 20:11  83 18   Room Air  21


 


3/15/19 20:00  83      


 


3/15/19 20:00 98.5 82 20 105/67 (80) 96   


 


3/15/19 16:41 98.8       


 


3/15/19 16:17 98.8 83 20 146/73 (97) 95   


 


3/15/19 16:15  109      


 


3/15/19 12:00  84      


 


3/15/19 12:00 98.0 87 18 121/73 (89) 95   

















Intake and Output  


 


 3/15/19 3/16/19





 19:00 07:00


 


Intake Total 1060 ml 360 ml


 


Balance 1060 ml 360 ml


 


  


 


Intake Oral 1060 ml 360 ml


 


# Voids 1 1








Laboratory Tests


3/15/19 10:40: 


White Blood Count 7.9, Red Blood Count 4.74, Hemoglobin 14.2, Hematocrit 41.2L, 

Mean Corpuscular Volume 87, Mean Corpuscular Hemoglobin 30.0, Mean Corpuscular 

Hemoglobin Concent 34.5, Red Cell Distribution Width 12.4, Platelet Count 166, 

Mean Platelet Volume 8.6, Neutrophils (%) (Auto) 59.3, Lymphocytes (%) (Auto) 

30.7, Monocytes (%) (Auto) 7.3, Eosinophils (%) (Auto) 1.9, Basophils (%) (Auto

) 0.9, Sodium Level 136, Potassium Level 3.9, Chloride Level 101, Carbon 

Dioxide Level 28, Anion Gap 7, Blood Urea Nitrogen 11, Creatinine 1.0, Estimat 

Glomerular Filtration Rate > 60, Glucose Level 255H, Calcium Level 9.0, Total 

Bilirubin 0.4, Aspartate Amino Transf (AST/SGOT) 24, Alanine Aminotransferase (

ALT/SGPT) 50, Alkaline Phosphatase 71, Total Protein 6.2L, Albumin 3.2L, 

Globulin 3.0, Albumin/Globulin Ratio 1.1, Triglycerides Level 198H, Cholesterol 

Level 120, LDL Cholesterol 72, HDL Cholesterol 25L, Cholesterol/HDL Ratio 4.8H


3/16/19 05:38: 


White Blood Count 7.3, Red Blood Count 4.87, Hemoglobin 14.7, Hematocrit 42.2, 

Mean Corpuscular Volume 87, Mean Corpuscular Hemoglobin 30.1, Mean Corpuscular 

Hemoglobin Concent 34.8, Red Cell Distribution Width 12.3, Platelet Count 164, 

Mean Platelet Volume 7.8, Neutrophils (%) (Auto) 50.7, Lymphocytes (%) (Auto) 

37.0, Monocytes (%) (Auto) 8.9, Eosinophils (%) (Auto) 2.4, Basophils (%) (Auto

) 1.1, Sodium Level 135L, Potassium Level 3.7, Chloride Level 101, Carbon 

Dioxide Level 28, Anion Gap 6, Blood Urea Nitrogen 17, Creatinine 1.0, Estimat 

Glomerular Filtration Rate > 60, Glucose Level 266H, Calcium Level 9.1


Height (Feet):  5


Height (Inches):  11.00


Weight (Pounds):  261


General Appearance:  lethargic


EENT:  normal ENT inspection


Neck:  normal alignment


Cardiovascular:  normal peripheral pulses, normal rate, regular rhythm


Respiratory/Chest:  chest wall non-tender, lungs clear, normal breath sounds


Abdomen:  normal bowel sounds, non tender, soft


Extremities:  normal inspection


Edema:  no edema noted Arm (L), no edema noted Arm (R), no edema noted Leg (L), 

no edema noted Leg (R), no edema noted Pedal (L), no edema noted Pedal (R), no 

edema noted Generalized


Neurologic:  responsive, motor weakness


Skin:  normal pigmentation, warm/dry











Samir Cabrera DO Mar 16, 2019 08:31

## 2019-03-16 NOTE — PROGRESS NOTE
DATE:  03/16/2019

SUBJECTIVE:  This is a 38-year-old male with acute coronary syndrome.

Still very irritable, confused, disorganized, and mood labile worsened by

stress of his medical illness.



MENTAL STATUS EXAMINATION:  This is a 38-year-old male.  Appearance is

disheveled.  Attitude, irritable and agitated.  Affect, guarded and

restricted.  Intellect poor.  Mood, depressed and anxious.  Motor

activity, psychomotor agitation.  Attention span is poor.  Orientation x2.

Speech is pressured.  Thought process, disorganized and illogical.

Insight and judgment is poor.



DIAGNOSIS:  Schizoaffective, bipolar type.



PLAN:  Treat this patient with Seroquel 200 mg p.o. nightly and treat this

patient with Depakote 500 mg q.12 hours and Lexapro 10 mg daily.  Provided

him with 20 minutes of cognitive behavioral therapy to help him identify

his automatic negative thoughts and help him convert those negative

thoughts to more positive thoughts to reduce depression, anxiety, and

suicidality.  Chart was reviewed.  Discussed with staff.  Seen and

assessed at bedside.  Twenty minutes of cognitive behavioral therapy

provided.









  ______________________________________________

  Gulshan Armstrong M.D.





DR:  Nguyễn

D:  03/16/2019 11:42

T:  03/16/2019 17:58

JOB#:  [id number]

CC:

## 2019-03-16 NOTE — NUR
NURSE NOTES:

Received patient from Kelsey RN, patient is resting in bed comfortable, no distress noted, 
bed is locked and in lowest position, call light within reach, will continue to monitor.

## 2019-03-16 NOTE — NUR
NURSE NOTES:

Pt experienced seizure activity @ 2142. Aura experienced before hand, pt notified nursing 
staff. 2mg ativan administered, pt placed on left side, and monitored. Teeth clenching 
noticed and body rocking back and fourth. Seizure lasted approximately 6 minutes on and off. 
Pt now resting comfortably. Shows no signs of distress. Will continue to monitor. Seroquel 
held due to seizure activity and sedation.

## 2019-03-16 NOTE — PULMONOLGY CRITICAL CARE NOTE
Critical Care - Asmt/Plan


Problems:  


(1) Uncontrolled seizures


(2) COPD (chronic obstructive pulmonary disease)


(3) Diabetes mellitus


Respiratory:  monitor respiratory rate, adjust FIO2, CXR


Cardiac:  continue to monitor HR/BP


Renal:  F/U I&O


Infectious Disease:  check cultures, continue antibiotics


Gastrointestinal:  hold feedings


Endocrine:  monitor blood sugar


Hematologic:  monitor H/H, transfuse if hgb<8.5


Neurologic:  PRN Ativan, PRN Morphine, keep patient comfortable


Affect:  PRN ativan


Prophylaxis:  Heparin


Time Spent (Minutes):  40


Notes Reviewed:  cardio, ID, neuro


Discussed with:  nurses, consultants





Critical Care - Objective





Last 24 Hour Vital Signs








  Date Time  Temp Pulse Resp B/P (MAP) Pulse Ox O2 Delivery O2 Flow Rate FiO2


 


3/16/19 14:05 98.4 111 19 128/82 (97) 100   


 


3/16/19 14:00 98.6 125 30 138/69 (92) 96   


 


3/16/19 12:00 98.1 89 19 116/63 (80)    





  89      


 


3/16/19 11:54 97.9       


 


3/16/19 09:00      Nasal Cannula 2.0 


 


3/16/19 08:28  79 18   Room Air  21


 


3/16/19 08:00 97.9 93 20 89/56 (67)    


 


3/16/19 04:00 97.9 72 18 111/70 (84) 97   


 


3/16/19 00:00  70      


 


3/16/19 00:00 97.4 70 20 96/56 (69) 96   


 


3/15/19 21:00      Nasal Cannula 2.0 


 


3/15/19 20:11  83 18   Room Air  21


 


3/15/19 20:00  83      


 


3/15/19 20:00 98.5 82 20 105/67 (80) 96   


 


3/15/19 16:41 98.8       


 


3/15/19 16:17 98.8 83 20 146/73 (97) 95   


 


3/15/19 16:15  109      








Status:  somnolent


Condition:  improving


HEENT:  atraumatic


Neck:  full ROM


Lungs:  clear


Heart:  HR/BP stable


Abdomen:  soft


Extremities:  no C/C/E


Decubiti:  stage


Accucheck:  180





Critical Care - Subjective


ROS Limited/Unobtainable:  Yes


Interval Events:


transferred to ICU for recurrent seizures.


FI02:  21


Sputum Amount:  None


I&O:











Intake and Output  


 


 3/15/19 3/16/19





 19:00 07:00


 


Intake Total 1060 ml 360 ml


 


Balance 1060 ml 360 ml


 


  


 


Intake Oral 1060 ml 360 ml


 


# Voids 1 1








CXR:


no changes


Labs:





Laboratory Tests








Test


  3/16/19


05:38


 


White Blood Count


  7.3 K/UL


(4.8-10.8)


 


Red Blood Count


  4.87 M/UL


(4.70-6.10)


 


Hemoglobin


  14.7 G/DL


(14.2-18.0)


 


Hematocrit


  42.2 %


(42.0-52.0)


 


Mean Corpuscular Volume 87 FL (80-99)  


 


Mean Corpuscular Hemoglobin


  30.1 PG


(27.0-31.0)


 


Mean Corpuscular Hemoglobin


Concent 34.8 G/DL


(32.0-36.0)


 


Red Cell Distribution Width


  12.3 %


(11.6-14.8)


 


Platelet Count


  164 K/UL


(150-450)


 


Mean Platelet Volume


  7.8 FL


(6.5-10.1)


 


Neutrophils (%) (Auto)


  50.7 %


(45.0-75.0)


 


Lymphocytes (%) (Auto)


  37.0 %


(20.0-45.0)


 


Monocytes (%) (Auto)


  8.9 %


(1.0-10.0)


 


Eosinophils (%) (Auto)


  2.4 %


(0.0-3.0)


 


Basophils (%) (Auto)


  1.1 %


(0.0-2.0)


 


Sodium Level


  135 MMOL/L


(136-145)  L


 


Potassium Level


  3.7 MMOL/L


(3.5-5.1)


 


Chloride Level


  101 MMOL/L


()


 


Carbon Dioxide Level


  28 MMOL/L


(21-32)


 


Anion Gap


  6 mmol/L


(5-15)


 


Blood Urea Nitrogen


  17 mg/dL


(7-18)


 


Creatinine


  1.0 MG/DL


(0.55-1.30)


 


Estimat Glomerular Filtration


Rate > 60 mL/min


(>60)


 


Glucose Level


  266 MG/DL


()  H


 


Calcium Level


  9.1 MG/DL


(8.5-10.1)

















Lissette Steward MD Mar 16, 2019 15:14

## 2019-03-16 NOTE — NUR
TRANSFER TO FLOOR:

Patient transferred to medical-surgical unit 417-1, per MD Steward. Report given to Laurie Leger RN. Cardiac monitor removed. Belongings transferred with patient and checked with 
receiving nurse and patient. Medications given to receiving nurse. Pt is in stable condition 
upon transfer. Pt is awake, alert, and oriented x4. Pt is on 2L O2 via nasal cannula and 
breathing is even and unlabored. No acute distress noted. IV site is asymptomatic, patent, 
and intact. Bed placed in lowest position with brake engaged and side rails up x2. Call 
light and side table placed within reach. Endorsed plan of care.

## 2019-03-16 NOTE — NUR
NURSE NOTES:

Patient transferred from Tele unit. A&OX4. IV site patent and intact. Belongings are 
checked. Bed in lowest position. Call light within reach. Will continue to monitor.

## 2019-03-17 VITALS — SYSTOLIC BLOOD PRESSURE: 123 MMHG | DIASTOLIC BLOOD PRESSURE: 76 MMHG

## 2019-03-17 VITALS — DIASTOLIC BLOOD PRESSURE: 73 MMHG | SYSTOLIC BLOOD PRESSURE: 115 MMHG

## 2019-03-17 VITALS — DIASTOLIC BLOOD PRESSURE: 76 MMHG | SYSTOLIC BLOOD PRESSURE: 113 MMHG

## 2019-03-17 VITALS — SYSTOLIC BLOOD PRESSURE: 124 MMHG | DIASTOLIC BLOOD PRESSURE: 52 MMHG

## 2019-03-17 VITALS — DIASTOLIC BLOOD PRESSURE: 79 MMHG | SYSTOLIC BLOOD PRESSURE: 118 MMHG

## 2019-03-17 VITALS — SYSTOLIC BLOOD PRESSURE: 111 MMHG | DIASTOLIC BLOOD PRESSURE: 64 MMHG

## 2019-03-17 VITALS — SYSTOLIC BLOOD PRESSURE: 102 MMHG | DIASTOLIC BLOOD PRESSURE: 56 MMHG

## 2019-03-17 VITALS — DIASTOLIC BLOOD PRESSURE: 65 MMHG | SYSTOLIC BLOOD PRESSURE: 121 MMHG

## 2019-03-17 VITALS — SYSTOLIC BLOOD PRESSURE: 104 MMHG | DIASTOLIC BLOOD PRESSURE: 72 MMHG

## 2019-03-17 VITALS — SYSTOLIC BLOOD PRESSURE: 106 MMHG | DIASTOLIC BLOOD PRESSURE: 74 MMHG

## 2019-03-17 VITALS — DIASTOLIC BLOOD PRESSURE: 84 MMHG | SYSTOLIC BLOOD PRESSURE: 119 MMHG

## 2019-03-17 VITALS — DIASTOLIC BLOOD PRESSURE: 81 MMHG | SYSTOLIC BLOOD PRESSURE: 126 MMHG

## 2019-03-17 VITALS — SYSTOLIC BLOOD PRESSURE: 125 MMHG | DIASTOLIC BLOOD PRESSURE: 82 MMHG

## 2019-03-17 VITALS — SYSTOLIC BLOOD PRESSURE: 120 MMHG | DIASTOLIC BLOOD PRESSURE: 76 MMHG

## 2019-03-17 VITALS — DIASTOLIC BLOOD PRESSURE: 53 MMHG | SYSTOLIC BLOOD PRESSURE: 107 MMHG

## 2019-03-17 VITALS — DIASTOLIC BLOOD PRESSURE: 78 MMHG | SYSTOLIC BLOOD PRESSURE: 127 MMHG

## 2019-03-17 VITALS — SYSTOLIC BLOOD PRESSURE: 127 MMHG | DIASTOLIC BLOOD PRESSURE: 71 MMHG

## 2019-03-17 VITALS — DIASTOLIC BLOOD PRESSURE: 78 MMHG | SYSTOLIC BLOOD PRESSURE: 126 MMHG

## 2019-03-17 VITALS — SYSTOLIC BLOOD PRESSURE: 121 MMHG | DIASTOLIC BLOOD PRESSURE: 92 MMHG

## 2019-03-17 VITALS — SYSTOLIC BLOOD PRESSURE: 132 MMHG | DIASTOLIC BLOOD PRESSURE: 68 MMHG

## 2019-03-17 LAB
ADD MANUAL DIFF: NO
ALBUMIN SERPL-MCNC: 3.3 G/DL (ref 3.4–5)
ALBUMIN/GLOB SERPL: 1.1 {RATIO} (ref 1–2.7)
ALP SERPL-CCNC: 65 U/L (ref 46–116)
ALT SERPL-CCNC: 47 U/L (ref 12–78)
ANION GAP SERPL CALC-SCNC: 6 MMOL/L (ref 5–15)
AST SERPL-CCNC: 17 U/L (ref 15–37)
BASOPHILS NFR BLD AUTO: 0.5 % (ref 0–2)
BILIRUB SERPL-MCNC: 0.5 MG/DL (ref 0.2–1)
BUN SERPL-MCNC: 15 MG/DL (ref 7–18)
CALCIUM SERPL-MCNC: 9 MG/DL (ref 8.5–10.1)
CHLORIDE SERPL-SCNC: 103 MMOL/L (ref 98–107)
CO2 SERPL-SCNC: 30 MMOL/L (ref 21–32)
CREAT SERPL-MCNC: 0.9 MG/DL (ref 0.55–1.3)
EOSINOPHIL NFR BLD AUTO: 2.3 % (ref 0–3)
ERYTHROCYTE [DISTWIDTH] IN BLOOD BY AUTOMATED COUNT: 12.5 % (ref 11.6–14.8)
GLOBULIN SER-MCNC: 3.1 G/DL
HCT VFR BLD CALC: 43.9 % (ref 42–52)
HGB BLD-MCNC: 15.3 G/DL (ref 14.2–18)
LYMPHOCYTES NFR BLD AUTO: 33.7 % (ref 20–45)
MCV RBC AUTO: 87 FL (ref 80–99)
MONOCYTES NFR BLD AUTO: 7.5 % (ref 1–10)
NEUTROPHILS NFR BLD AUTO: 55.9 % (ref 45–75)
PLATELET # BLD: 172 K/UL (ref 150–450)
POTASSIUM SERPL-SCNC: 3.9 MMOL/L (ref 3.5–5.1)
RBC # BLD AUTO: 5.07 M/UL (ref 4.7–6.1)
SODIUM SERPL-SCNC: 139 MMOL/L (ref 136–145)
WBC # BLD AUTO: 8.7 K/UL (ref 4.8–10.8)

## 2019-03-17 RX ADMIN — QUETIAPINE SCH MG: 200 TABLET, FILM COATED ORAL at 22:44

## 2019-03-17 RX ADMIN — MORPHINE SULFATE PRN MG: 2 INJECTION, SOLUTION INTRAMUSCULAR; INTRAVENOUS at 09:20

## 2019-03-17 RX ADMIN — LORAZEPAM PRN MG: 2 INJECTION, SOLUTION INTRAMUSCULAR; INTRAVENOUS at 05:36

## 2019-03-17 RX ADMIN — INSULIN ASPART SCH UNITS: 100 INJECTION, SOLUTION INTRAVENOUS; SUBCUTANEOUS at 05:39

## 2019-03-17 RX ADMIN — HEPARIN SODIUM SCH UNITS: 5000 INJECTION INTRAVENOUS; SUBCUTANEOUS at 09:00

## 2019-03-17 RX ADMIN — INSULIN ASPART SCH UNITS: 100 INJECTION, SOLUTION INTRAVENOUS; SUBCUTANEOUS at 17:46

## 2019-03-17 RX ADMIN — INSULIN DETEMIR SCH UNITS: 100 INJECTION, SOLUTION SUBCUTANEOUS at 09:30

## 2019-03-17 RX ADMIN — INSULIN ASPART SCH UNITS: 100 INJECTION, SOLUTION INTRAVENOUS; SUBCUTANEOUS at 12:56

## 2019-03-17 RX ADMIN — MORPHINE SULFATE PRN MG: 2 INJECTION, SOLUTION INTRAMUSCULAR; INTRAVENOUS at 18:50

## 2019-03-17 RX ADMIN — INSULIN DETEMIR SCH UNITS: 100 INJECTION, SOLUTION SUBCUTANEOUS at 20:39

## 2019-03-17 RX ADMIN — LORAZEPAM PRN MG: 2 INJECTION, SOLUTION INTRAMUSCULAR; INTRAVENOUS at 02:14

## 2019-03-17 RX ADMIN — MORPHINE SULFATE PRN MG: 2 INJECTION, SOLUTION INTRAMUSCULAR; INTRAVENOUS at 05:20

## 2019-03-17 RX ADMIN — INSULIN ASPART SCH UNITS: 100 INJECTION, SOLUTION INTRAVENOUS; SUBCUTANEOUS at 20:41

## 2019-03-17 RX ADMIN — INSULIN ASPART SCH UNITS: 100 INJECTION, SOLUTION INTRAVENOUS; SUBCUTANEOUS at 12:57

## 2019-03-17 RX ADMIN — MORPHINE SULFATE PRN MG: 2 INJECTION, SOLUTION INTRAMUSCULAR; INTRAVENOUS at 14:23

## 2019-03-17 RX ADMIN — HEPARIN SODIUM SCH UNITS: 5000 INJECTION INTRAVENOUS; SUBCUTANEOUS at 20:35

## 2019-03-17 RX ADMIN — MORPHINE SULFATE PRN MG: 2 INJECTION, SOLUTION INTRAMUSCULAR; INTRAVENOUS at 22:46

## 2019-03-17 RX ADMIN — DIVALPROEX SODIUM SCH MG: 500 TABLET, DELAYED RELEASE ORAL at 20:35

## 2019-03-17 RX ADMIN — INSULIN ASPART SCH UNITS: 100 INJECTION, SOLUTION INTRAVENOUS; SUBCUTANEOUS at 17:47

## 2019-03-17 RX ADMIN — DIVALPROEX SODIUM SCH MG: 500 TABLET, DELAYED RELEASE ORAL at 09:20

## 2019-03-17 RX ADMIN — INSULIN ASPART SCH UNITS: 100 INJECTION, SOLUTION INTRAVENOUS; SUBCUTANEOUS at 05:40

## 2019-03-17 NOTE — NUR
NURSE NOTES:

Report received from NACHO Fonseca. Patient was initially sleeping however woke up when entered 
the room.  Patient is awake, alert and oriented x 4.  Patient follows commands, speaks with 
clear verbal responses and does not appear in any acute or respiratory response.  Patient is 
being monitored on the cardiac monitor.  VS are 125/82, HR 92, RR 23 SPO2 96%.  Patient is 
SR. Patient is on 2L NC and tolerating.  Patient has hypoactive bowel sounds.  Patient is 
independent with use of urinal for voiding urine.  Patient has skin that is intact.  Patient 
has a LFA 20g that is TKO.  Patient is very irritable and resistive to care.  Safety 
measures in place with bed locked in the lowest position, sz precautions with padded side 
rails, bed alarms are set and call light within reach. Will continue to monitor and with 
patients plan of care.

## 2019-03-17 NOTE — NUR
NURSE NOTES:

Pt experienced seizure beginning at 0210. Seizure lasted 7 minutes. Ativan given, pt layed 
on side, two nurses at bedside monitoring. Pt stable, showing no sign of distress. Will 
continue to monitor.

## 2019-03-17 NOTE — PROGRESS NOTE
DATE:  03/17/2019

SUBJECTIVE:  This is a 38-year-old male patient with acute coronary

syndrome, continues to have some confusion, disorganized thought process,

mood lability, worsened by stress of his medical illness.  That is why,

his attending has requested daily psychiatric consultation.  He continues

to have mood lability, agitation, irritability, and confusion.  _____.



MENTAL STATUS EXAMINATION:  This is a 38-year-old male patient.  Appearance

is disheveled.  Attitude, _____.  Intellect poor.  Mood, depressed and

anxious.  Motor activity, psychomotor agitation.  Attention span is poor.

Orientation x2.  Speech is pressured.  Thought process, disorganized and

illogical.  Insight and judgment is poor



DIAGNOSIS:  Schizoaffective, bipolar type.



PLAN:  Treat him with a medication regimen consisting of Depakote 500 mg

q.12 h. and also Seroquel 200 mg at bedtime and then also treat this

patient is on Lexapro 10 mg daily and also treat this patient with Ativan

0.5 mg IV q.4 h. p.r.n. anxiety and agitation.  Provided him with 20

minutes of cognitive behavioral therapy to help him identify his automatic

negative thoughts and help him convert those negative thoughts to more

positive thoughts to reduce depression, anxiety, and mood lability.  Chart

was reviewed.  Discussed with staff.  Seen and assessed at bedside.  A 20

minutes of reality-based supportive psychotherapy provided.









  ______________________________________________

  Gulshan Armstrong M.D.





DR:  MATEUSZ

D:  03/17/2019 09:55

T:  03/17/2019 18:47

JOB#:  [id number]

CC:

## 2019-03-17 NOTE — NUR
NURSE NOTES:

Patient has eaten well all shift, is now being prepped for transfer.  Blood sugars now more 
under control.  Will give pain meds prior to transfer.

## 2019-03-17 NOTE — CONSULTATION
DATE OF CONSULTATION:  03/16/2019

PSYCHOTHERAPY CONSULTATION PROGRESS NOTE



CONSULTING PHYSICIAN:  Mynor Mayen PsyD.



TREATING ATTENDING PHYSICIAN:  Samir Cabrera D.O.



HISTORY OF PRESENT ILLNESS:  This patient is a male patient.  He has racing

thoughts.  He has a history of schizoaffective disorder, bipolar type.

The patient has been admitted to the hospital for acute coronary syndrome

and chest pain.  The patient has been experiencing anxiety and

irritability as well and restlessness, and for these reasons, he was

referred for psychotherapeutic services.  When assessed, the patient

states that he is feeling restless and irritable.  He states that he has

been having chest pain.  He states that no one knows why he is having this

chest pain.  He states that he is feeling anxious, restless, and

irritable.  He denies suicidal or homicidal thoughts of ideation.  He

denies any auditory or visual hallucinations.  At this time, he states

that he continues to feel very anxious because of his current situation.

He states he does not know where he is going to live either and for these

reasons, it caused him more anxiety.  He is cooperative with this

clinician and communicates well.  This clinician assessed this patient.

This patient is cooperative.



PAST MEDICAL HISTORY:  Includes a history of COPD, CVA, diabetes, and

left-sided weakness.



ALLERGIES:  Haldol.



SUBSTANCE ABUSE HISTORY:  The patient has history of marijuana use, alcohol

use, and smoking cigarettes.



PSYCHIATRIC HISTORY:  The patient does have a history of bipolar disorder

and possible schizoaffective disorder and he has been treated with

psychotropic medications in the past.



SOCIAL HISTORY:  This patient is a 38-year-old male patient.  He states

that now he lives in [place]; however, he states that he does not have

housing anymore.  Financially sustained through [facility].



MENTAL STATUS EXAMINATION:  The patient is alert and oriented to person,

place, time, and situation.  Mood is anxious.  Affect is congruent.

Thought process is organized.  Thought content is linear.  He has fair

attention and concentration.  Fair insight, judgment, and impulse

control.



TODAY, I ASSESSED THIS PATIENT, PROVIDED THE PATIENT WITH:



1. Supportive psychotherapy, which provided for the patient's emotional

outlet and means to cope with emotional distress.  The patient is able to

process his thoughts and feelings of anxiety and emotions in session.

2. Provided the patient with cognitive behavioral therapy, which can

help with the range of problems that may involve patterns of thinking or

acting there problematic and today addressing the patient's poor impulse

control, addressing the patient's poor frustration tolerance and anxiety,

providing the patient with psychoeducation on mental health symptoms, and

providing the patient with coping skills including positive self-talk,

positive thinking strategies, relaxation exercises, and being referred to

[facility], and also working on rational and

reality-based thoughts for this patient as he is very negative and

catastrophic in his thought process.  Continue with behavioral management

for this patient.  This clinician has reviewed the patient's chart and

discussed treatment with treatment team and to maintain medication

compliance with the progression of reality-based thoughts, discussing for

positive coping skills, and stabilizing the thoughts and behavior.



Psychotherapy service is 50 minutes.









  ______________________________________________

  Mynor Mayen PsyD.





DR:  Rhoda

D:  03/16/2019 16:44

T:  03/17/2019 01:12

JOB#:  [id number]

CC:

## 2019-03-17 NOTE — DIAGNOSTIC IMAGING REPORT
EXAM:

  XR Chest, 1 View

 

CLINICAL HISTORY:

  DYSPNEA

 

TECHNIQUE:

  Frontal view of the chest.

 

COMPARISON:

  Chest x-ray dated 3/14/19

 

FINDINGS:

  Lungs:  Unremarkable.  The lungs appear clear.  No focal consolidation.

  Pleural space:  Unremarkable.  The costophrenic angles are sharp.  No 

visible pneumothorax.

  Heart:  Unremarkable.  No cardiomegaly.

  Mediastinum:  Unremarkable.

  Bones/joints:  Unremarkable.

  Tubes, lines and devices:  Telemetry leads overlie the thorax.

 

IMPRESSION:     

  No acute findings.

## 2019-03-17 NOTE — NUR
NURSE NOTES:

Patient becomes very irritable when pain meds are due.  If pain meds are not on time, 
patient continuously calls on the call light until given.  Patient calms when meds are 
given.  Patient is AAO x 4.  Patient makes his needs known, speaks with clear verbal 
responses and does not appear in any acute response.  Patient is being monitored on the 
cardiac monitor.  VSS.  Patient is on 2L NC and tolerating.  Patient is independently uses 
urinal for voiding.  Patient has a LFA 20g that is TKO.  Safety measures in place with bed 
locked in the lowest position, sz precautions with padded side rails, bed alarms are set and 
call light within reach. Will continue to monitor and with patients plan of care.

## 2019-03-17 NOTE — PULMONOLGY CRITICAL CARE NOTE
Critical Care - Asmt/Plan


Problems:  


(1) Uncontrolled seizures


(2) COPD (chronic obstructive pulmonary disease)


(3) Diabetes mellitus


Respiratory:  monitor respiratory rate, adjust FIO2, CXR


Cardiac:  continue to monitor HR/BP


Renal:  F/U I&O


Infectious Disease:  check cultures


Gastrointestinal:  continue feedings/current rate


Endocrine:  monitor blood sugar, check HgA1C


Neurologic:  PRN Ativan


Prophylaxis:  Protonix


Time Spent (Minutes):  40


Notes Reviewed:  internist, cardio


Discussed with:  nurses





Critical Care - Objective





Last 24 Hour Vital Signs








  Date Time  Temp Pulse Resp B/P (MAP) Pulse Ox O2 Delivery O2 Flow Rate FiO2


 


3/17/19 13:00  100 21 120/76 (91) 96   


 


3/17/19 12:00 98.2 99 21 126/78 (94) 97   


 


3/17/19 11:00  100 22 124/52 (76) 97   


 


3/17/19 10:00  101 21 113/76 (88) 95   


 


3/17/19 09:00  108 20 119/84 (96) 95   


 


3/17/19 08:00 98.1 103 21 123/76 (92) 95   


 


3/17/19 08:00      Venturi Mask  


 


3/17/19 07:00  92 23 125/82 (96) 96   


 


3/17/19 06:00  88 23 102/56 (71) 98   


 


3/17/19 05:50 98.9       


 


3/17/19 05:00 98.1 87 21 121/65 (83) 93   


 


3/17/19 04:00      Nasal Cannula 2.0 


 


3/17/19 04:00  83      


 


3/17/19 04:00  80 18 106/74 (85) 95   


 


3/17/19 03:00  91 20 121/92 (102) 95   


 


3/17/19 02:00  85 24 104/72 (83) 94   


 


3/17/19 01:00  79 19 118/79 (92) 96   


 


3/17/19 00:00      Nasal Cannula 2.0 


 


3/17/19 00:00  84      


 


3/17/19 00:00 98.9 83 20 115/73 (87) 96   


 


3/16/19 23:00  82 19 115/67 (83) 95   


 


3/16/19 22:00  84 21 116/75 (89) 93   


 


3/16/19 21:00  90 22 101/57 (72) 94   


 


3/16/19 20:00  99      


 


3/16/19 20:00      Nasal Cannula 2.0 


 


3/16/19 20:00 98.5 100 20 120/76 (91) 94   


 


3/16/19 19:34  88 20   Room Air  21


 


3/16/19 19:00  86 22 105/58 (74) 93   


 


3/16/19 18:00  105 20 103/53 (70) 93   


 


3/16/19 17:00  115 21 106/65 (79) 93   


 


3/16/19 16:00  99 20 118/63 (81) 95   


 


3/16/19 15:00  100 20 118/64 (82) 96   


 


3/16/19 14:05 98.4 111 19 128/82 (97) 100   


 


3/16/19 14:00 98.6 125 30 138/69 (92) 96   








Status:  somnolent


Condition:  critical


HEENT:  atraumatic


Neck:  full ROM


Lungs:  chest wall tender


Heart:  HR/BP unstable


Abdomen:  soft, active bowel sounds, feeding tube


Extremities:  edema


Accucheck:  164





Critical Care - Subjective


ROS Limited/Unobtainable:  No


EKG Rhythm:  V-Paced


FI02:  21


Sputum Amount:  None


I&O:











Intake and Output  


 


 3/16/19 3/17/19





 19:00 07:00


 


Intake Total 160 ml 


 


Output Total 700 ml 800 ml


 


Balance -540 ml -800 ml


 


  


 


Intake Oral 100 ml 


 


IV Total 60 ml 


 


Output Urine Total 700 ml 800 ml


 


# Voids 1 








Labs:





Laboratory Tests








Test


  3/17/19


04:35


 


White Blood Count


  8.7 K/UL


(4.8-10.8)


 


Red Blood Count


  5.07 M/UL


(4.70-6.10)


 


Hemoglobin


  15.3 G/DL


(14.2-18.0)


 


Hematocrit


  43.9 %


(42.0-52.0)


 


Mean Corpuscular Volume 87 FL (80-99)  


 


Mean Corpuscular Hemoglobin


  30.2 PG


(27.0-31.0)


 


Mean Corpuscular Hemoglobin


Concent 34.9 G/DL


(32.0-36.0)


 


Red Cell Distribution Width


  12.5 %


(11.6-14.8)


 


Platelet Count


  172 K/UL


(150-450)


 


Mean Platelet Volume


  8.6 FL


(6.5-10.1)


 


Neutrophils (%) (Auto)


  55.9 %


(45.0-75.0)


 


Lymphocytes (%) (Auto)


  33.7 %


(20.0-45.0)


 


Monocytes (%) (Auto)


  7.5 %


(1.0-10.0)


 


Eosinophils (%) (Auto)


  2.3 %


(0.0-3.0)


 


Basophils (%) (Auto)


  0.5 %


(0.0-2.0)


 


Sodium Level


  139 MMOL/L


(136-145)


 


Potassium Level


  3.9 MMOL/L


(3.5-5.1)


 


Chloride Level


  103 MMOL/L


()


 


Carbon Dioxide Level


  30 MMOL/L


(21-32)


 


Anion Gap


  6 mmol/L


(5-15)


 


Blood Urea Nitrogen


  15 mg/dL


(7-18)


 


Creatinine


  0.9 MG/DL


(0.55-1.30)


 


Estimat Glomerular Filtration


Rate > 60 mL/min


(>60)


 


Glucose Level


  140 MG/DL


()  #H


 


Calcium Level


  9.0 MG/DL


(8.5-10.1)


 


Total Bilirubin


  0.5 MG/DL


(0.2-1.0)


 


Aspartate Amino Transf


(AST/SGOT) 17 U/L (15-37)


 


 


Alanine Aminotransferase


(ALT/SGPT) 47 U/L (12-78)


 


 


Alkaline Phosphatase


  65 U/L


()


 


Pro-B-Type Natriuretic Peptide


  24 pg/mL


(0-125)


 


Total Protein


  6.4 G/DL


(6.4-8.2)


 


Albumin


  3.3 G/DL


(3.4-5.0)  L


 


Globulin 3.1 g/dL  


 


Albumin/Globulin Ratio 1.1 (1.0-2.7)  

















Lissette Steward MD Mar 17, 2019 13:33

## 2019-03-17 NOTE — NUR
NURSE NOTES:

After seizure activity. Pt responsive to name, opens eyes spontaneously. HR maintained at 
70's throughout seizure activity, BP WNL.

## 2019-03-17 NOTE — NUR
NURSE NOTES:

Dr Steward assessed patient bedside.  Per MD ok to transfer. Will await Neuro for transfer 
ok.  Primary already gave ok.  Waiting for Neuro.  Will continue to monitor.

## 2019-03-17 NOTE — NUR
NURSE NOTES:

Dr Kenny assessed patient bedside.  Discussed results of diagnostic tests as well as 
episodes that were thought to be seizures.  Patient is able to speak, follow commands and 
reposition during episodes.  Per MD patient is ok to transfer, these are pseudoszs.  Patient 
can be transferred out of ICU.  Will continue to monitor.

## 2019-03-17 NOTE — NUR
HAND-OFF: 

Report given to NACHO Moody on 4th Floor Med/Surg. Patient with VSS and not in any acute 
distress.  Patient stated that one of his [employer] phones was missing however, all phones x 3 
including his  now accounted for with his belongings.

## 2019-03-17 NOTE — GENERAL PROGRESS NOTE
Assessment/Plan


Problem List:  


(1) ACS (acute coronary syndrome)


ICD Codes:  I24.9 - Acute ischemic heart disease, unspecified


SNOMED:  982219734


(2) Diabetes mellitus


ICD Codes:  E11.9 - Diabetes mellitus


SNOMED:  50561233


(3) COPD (chronic obstructive pulmonary disease)


ICD Codes:  J44.9 - Chronic obstructive pulmonary disease, unspecified


SNOMED:  22508313


(4) CVA (cerebral vascular accident)


ICD Codes:  I63.9 - Cerebral infarction, unspecified


SNOMED:  632773865


(5) Uncontrolled seizures


ICD Codes:  R56.9 - Unspecified convulsions


SNOMED:  70492863


Status:  unchanged


Assessment/Plan


seizure control pt diet pain control cardio f/u bp bs control cbc bmp am





Subjective


Constitutional:  Reports: weakness


Allergies:  


Coded Allergies:  


     HALOPERIDOL (Verified  Allergy, Unknown, 2/5/19)


All Systems:  reviewed and negative except above


Subjective


had seizure yesterday sl sob sl chest painin icu





Objective





Last 24 Hour Vital Signs








  Date Time  Temp Pulse Resp B/P (MAP) Pulse Ox O2 Delivery O2 Flow Rate FiO2


 


3/17/19 06:00  88 23 102/56 (71) 98   


 


3/17/19 05:50 98.9       


 


3/17/19 05:00 98.1 87 21 121/65 (83) 93   


 


3/17/19 04:00      Nasal Cannula 2.0 


 


3/17/19 04:00  83      


 


3/17/19 04:00  80 18 106/74 (85) 95   


 


3/17/19 03:00  91 20 121/92 (102) 95   


 


3/17/19 02:00  85 24 104/72 (83) 94   


 


3/17/19 01:00  79 19 118/79 (92) 96   


 


3/17/19 00:00      Nasal Cannula 2.0 


 


3/17/19 00:00  84      


 


3/17/19 00:00 98.9 83 20 115/73 (87) 96   


 


3/16/19 23:00  82 19 115/67 (83) 95   


 


3/16/19 22:00  84 21 116/75 (89) 93   


 


3/16/19 21:00  90 22 101/57 (72) 94   


 


3/16/19 20:00  99      


 


3/16/19 20:00      Nasal Cannula 2.0 


 


3/16/19 20:00 98.5 100 20 120/76 (91) 94   


 


3/16/19 19:34  88 20   Room Air  21


 


3/16/19 19:00  86 22 105/58 (74) 93   


 


3/16/19 18:00  105 20 103/53 (70) 93   


 


3/16/19 17:00  115 21 106/65 (79) 93   


 


3/16/19 16:00  99 20 118/63 (81) 95   


 


3/16/19 15:00  100 20 118/64 (82) 96   


 


3/16/19 14:05 98.4 111 19 128/82 (97) 100   


 


3/16/19 14:00 98.6 125 30 138/69 (92) 96   


 


3/16/19 12:00 98.1 89 19 116/63 (80)    





  89      


 


3/16/19 11:54 97.9       


 


3/16/19 09:00      Nasal Cannula 2.0 

















Intake and Output  


 


 3/16/19 3/17/19





 19:00 07:00


 


Intake Total 160 ml 


 


Output Total 700 ml 800 ml


 


Balance -540 ml -800 ml


 


  


 


Intake Oral 100 ml 


 


IV Total 60 ml 


 


Output Urine Total 700 ml 800 ml


 


# Voids 1 








Laboratory Tests


3/17/19 04:35: 


White Blood Count 8.7, Red Blood Count 5.07, Hemoglobin 15.3, Hematocrit 43.9, 

Mean Corpuscular Volume 87, Mean Corpuscular Hemoglobin 30.2, Mean Corpuscular 

Hemoglobin Concent 34.9, Red Cell Distribution Width 12.5, Platelet Count 172, 

Mean Platelet Volume 8.6, Neutrophils (%) (Auto) 55.9, Lymphocytes (%) (Auto) 

33.7, Monocytes (%) (Auto) 7.5, Eosinophils (%) (Auto) 2.3, Basophils (%) (Auto

) 0.5, Sodium Level 139, Potassium Level 3.9, Chloride Level 103, Carbon 

Dioxide Level 30, Anion Gap 6, Blood Urea Nitrogen 15, Creatinine 0.9, Estimat 

Glomerular Filtration Rate > 60, Glucose Level 140#H, Calcium Level 9.0, Total 

Bilirubin 0.5, Aspartate Amino Transf (AST/SGOT) 17, Alanine Aminotransferase (

ALT/SGPT) 47, Alkaline Phosphatase 65, Pro-B-Type Natriuretic Peptide 24, Total 

Protein 6.4, Albumin 3.3L, Globulin 3.1, Albumin/Globulin Ratio 1.1


Height (Feet):  5


Height (Inches):  11.00


Weight (Pounds):  261


General Appearance:  lethargic


EENT:  normal ENT inspection


Neck:  normal alignment


Cardiovascular:  normal peripheral pulses, normal rate, regular rhythm


Respiratory/Chest:  chest wall non-tender, lungs clear, normal breath sounds


Abdomen:  normal bowel sounds, non tender, soft


Extremities:  normal inspection


Edema:  no edema noted Arm (L), no edema noted Arm (R), no edema noted Leg (L), 

no edema noted Leg (R), no edema noted Pedal (L), no edema noted Pedal (R), no 

edema noted Generalized


Neurologic:  responsive, motor weakness


Skin:  normal pigmentation, warm/dry











Samir Cabrera DO Mar 17, 2019 08:44

## 2019-03-17 NOTE — GENERAL PROGRESS NOTE
Assessment/Plan


Problem List:  


(1) COPD (chronic obstructive pulmonary disease)


ICD Codes:  J44.9 - Chronic obstructive pulmonary disease, unspecified


SNOMED:  22995838


(2) Diabetes mellitus


ICD Codes:  E11.9 - Diabetes mellitus


SNOMED:  17374628


Assessment/Plan


continue Levemir 18 units bid 


continuu Novolog 7 units ac tid 


continue NISS ac / hs





Subjective


ROS Limited/Unobtainable:  Yes


Allergies:  


Coded Allergies:  


     HALOPERIDOL (Verified  Allergy, Unknown, 2/5/19)


Subjective


transferred to ICU due to seizure 


awake 


glucose values improved 











Item Value  Date Time


 


Bedside Blood Glucose 128 mg/dl H 3/17/19 0930


 


Bedside Blood Glucose 139 mg/dl H 3/17/19 0630


 


Bedside Blood Glucose 225 mg/dl H 3/16/19 2100


 


Bedside Blood Glucose 191 mg/dl H 3/16/19 1630


 


Bedside Blood Glucose 180 mg/dl H 3/16/19 1204











Objective





Last 24 Hour Vital Signs








  Date Time  Temp Pulse Resp B/P (MAP) Pulse Ox O2 Delivery O2 Flow Rate FiO2


 


3/17/19 08:00      Venturi Mask  


 


3/17/19 06:00  88 23 102/56 (71) 98   


 


3/17/19 05:50 98.9       


 


3/17/19 05:00 98.1 87 21 121/65 (83) 93   


 


3/17/19 04:00      Nasal Cannula 2.0 


 


3/17/19 04:00  83      


 


3/17/19 04:00  80 18 106/74 (85) 95   


 


3/17/19 03:00  91 20 121/92 (102) 95   


 


3/17/19 02:00  85 24 104/72 (83) 94   


 


3/17/19 01:00  79 19 118/79 (92) 96   


 


3/17/19 00:00      Nasal Cannula 2.0 


 


3/17/19 00:00  84      


 


3/17/19 00:00 98.9 83 20 115/73 (87) 96   


 


3/16/19 23:00  82 19 115/67 (83) 95   


 


3/16/19 22:00  84 21 116/75 (89) 93   


 


3/16/19 21:00  90 22 101/57 (72) 94   


 


3/16/19 20:00  99      


 


3/16/19 20:00      Nasal Cannula 2.0 


 


3/16/19 20:00 98.5 100 20 120/76 (91) 94   


 


3/16/19 19:34  88 20   Room Air  21


 


3/16/19 19:00  86 22 105/58 (74) 93   


 


3/16/19 18:00  105 20 103/53 (70) 93   


 


3/16/19 17:00  115 21 106/65 (79) 93   


 


3/16/19 16:00  99 20 118/63 (81) 95   


 


3/16/19 15:00  100 20 118/64 (82) 96   


 


3/16/19 14:05 98.4 111 19 128/82 (97) 100   


 


3/16/19 14:00 98.6 125 30 138/69 (92) 96   

















Intake and Output  


 


 3/16/19 3/17/19





 19:00 07:00


 


Intake Total 160 ml 


 


Output Total 700 ml 800 ml


 


Balance -540 ml -800 ml


 


  


 


Intake Oral 100 ml 


 


IV Total 60 ml 


 


Output Urine Total 700 ml 800 ml


 


# Voids 1 








Laboratory Tests


3/17/19 04:35: 


White Blood Count 8.7, Red Blood Count 5.07, Hemoglobin 15.3, Hematocrit 43.9, 

Mean Corpuscular Volume 87, Mean Corpuscular Hemoglobin 30.2, Mean Corpuscular 

Hemoglobin Concent 34.9, Red Cell Distribution Width 12.5, Platelet Count 172, 

Mean Platelet Volume 8.6, Neutrophils (%) (Auto) 55.9, Lymphocytes (%) (Auto) 

33.7, Monocytes (%) (Auto) 7.5, Eosinophils (%) (Auto) 2.3, Basophils (%) (Auto

) 0.5, Sodium Level 139, Potassium Level 3.9, Chloride Level 103, Carbon 

Dioxide Level 30, Anion Gap 6, Blood Urea Nitrogen 15, Creatinine 0.9, Estimat 

Glomerular Filtration Rate > 60, Glucose Level 140#H, Calcium Level 9.0, Total 

Bilirubin 0.5, Aspartate Amino Transf (AST/SGOT) 17, Alanine Aminotransferase (

ALT/SGPT) 47, Alkaline Phosphatase 65, Pro-B-Type Natriuretic Peptide 24, Total 

Protein 6.4, Albumin 3.3L, Globulin 3.1, Albumin/Globulin Ratio 1.1


Height (Feet):  5


Height (Inches):  11.00


Weight (Pounds):  261


General Appearance:  no apparent distress


Neck:  normal alignment


Cardiovascular:  normal rate


Respiratory/Chest:  lungs clear


Abdomen:  normal bowel sounds


Edema:  1+ Arm (L), 1+ Arm (R), 1+ Leg (L), 1+ Leg (R), 1+ Pedal (L), 1+ Pedal (

R), 1+ Generalized


Objective





Current Medications








 Medications


  (Trade)  Dose


 Ordered  Sig/Henry


 Route


 PRN Reason  Start Time


 Stop Time Status Last Admin


Dose Admin


 


 Acetaminophen


  (Tylenol)  650 mg  Q4H  PRN


 ORAL


 fever  3/16/19 14:23


 4/13/19 14:22   


 


 


 Acetaminophen/


 Hydrocodone Bitart


  (Norco 10/325)  1 tab  Q6H  PRN


 ORAL


 moderate pain   3/17/19 11:15


 3/24/19 11:14   


 


 


 Al Hydroxide/Mg


 Hydroxide


  (Mylanta II)  30 ml  Q6H  PRN


 ORAL


 dyspepsia  3/16/19 14:23


 4/13/19 14:22   


 


 


 Albuterol/


 Ipratropium


  (Albuterol/


 Ipratropium)  3 ml  Q4H  PRN


 HHN


 Shortness of Breath  3/16/19 14:24


 3/19/19 14:23   


 


 


 Alprazolam


  (Xanax)  0.5 mg  Q6H  PRN


 ORAL


 For Anxiety  3/16/19 14:23


 3/21/19 14:22   


 


 


 Aspirin


  (ASA)  81 mg  DAILY


 ORAL


   3/17/19 09:00


 4/14/19 08:59  3/17/19 09:20


 


 


 Clonidine HCl


  (Catapres Tab)  0.1 mg  Q4H  PRN


 ORAL


 For High Blood Pressure  3/16/19 14:23


 4/13/19 14:22   


 


 


 Dextrose


  (Dextrose 50%)  25 ml  Q30M  PRN


 IV


 Hypoglycemia  3/16/19 14:30


 4/13/19 17:59   


 


 


 Dextrose


  (Dextrose 50%)  50 ml  Q30M  PRN


 IV


 Hypoglycemia  3/16/19 14:30


 4/13/19 17:59   


 


 


 Divalproex Sodium


  (Depakote)  500 mg  Q12HR


 ORAL


   3/16/19 21:00


 4/13/19 20:59  3/17/19 09:20


 


 


 Escitalopram


 Oxalate


  (Lexapro)  10 mg  DAILY


 ORAL


   3/17/19 09:00


 4/14/19 08:59  3/17/19 09:20


 


 


 Heparin Sodium


  (Porcine)


  (Heparin 5000


 units/ml)  5,000 units  EVERY 12  HOURS


 SUBQ


   3/16/19 21:00


 4/13/19 20:59   


 


 


 Insulin Aspart


  (NovoLOG)    BEFORE MEALS AND  HS


 SUBQ


   3/16/19 16:30


 4/13/19 20:59  3/17/19 05:40


 


 


 Insulin Aspart


  (NovoLOG)  7 units  NOVOTIAC


 SUBQ


   3/16/19 16:50


 4/14/19 11:49  3/17/19 05:39


 


 


 Insulin Detemir


  (Levemir)  18 units  EVERY 12  HOURS


 SUBQ


   3/16/19 21:00


 4/13/19 22:29  3/17/19 09:30


 


 


 Lorazepam


  (Ativan 2mg/ml


 1ml)  0.5 mg  Q4H  PRN


 IV


 For Anxiety  3/16/19 14:24


 3/21/19 14:23  3/17/19 10:07


 


 


 Lorazepam


  (Ativan 2mg/ml


 1ml)  2 mg  Q2H  PRN


 IV


 SEIZURE  3/16/19 14:25


 3/23/19 14:24  3/17/19 05:36


 


 


 Morphine Sulfate


  (Morphine


 Sulfate)  1 mg  Q4H  PRN


 IVP


 severe pain   3/17/19 14:25


 3/21/19 14:24   


 


 


 Ondansetron HCl


  (Zofran)  4 mg  Q6H  PRN


 IVP


 Nausea & Vomiting  3/16/19 14:25


 4/13/19 14:24   


 


 


 Polyethylene


 Glycol


  (Miralax)  17 gm  HSPRN  PRN


 ORAL


 Constipation  3/16/19 14:25


 4/15/19 14:24   


 


 


 Quetiapine


 Fumarate


  (SEROquel)  200 mg  BEDTIME


 ORAL


   3/16/19 21:00


 4/13/19 20:59   


 


 


 Zolpidem Tartrate


  (Ambien)  5 mg  HSPRN  PRN


 ORAL


 Insomnia  3/16/19 14:25


 3/23/19 14:24   


 

















Dusty Denis MD Mar 17, 2019 12:15

## 2019-03-17 NOTE — NUR
NURSE NOTES:

Pt received in stable condition from ICU from NACHO [place]. Pt is sitting upright in bed, 
bed is locked in lowest position. side rails x2, seizure precautions in place with padded 
side rails. Will  continue to monitor patient.

## 2019-03-17 NOTE — NUR
NURSE NOTES:

Patient is resting comfortably.  Patient is AAO x 4 and has not had any sz like episodes all 
afternoon since Neuro MD has seen patient.  Patient continues to request pain meds around 
the clock.  Patient does not appear in any acute distress.  Patient is being monitored on 
the cardiac monitor.  VSS.  Patient is on 2L NC and tolerating.  Patient uses urinal for 
voiding independently.  Patient has a LFA 20g that is TKO.  Safety measures in place with 
bed locked in the lowest position, sz precautions with padded side rails, bed alarms are set 
and call light within reach. Will continue to monitor and with patients plan of care.

## 2019-03-17 NOTE — NUR
NURSE NOTES:

After pt's administration of morphine pt experienced seizure approximately 10 minutes after. 
Pt called nurse, was seen laying on his side and rocking back and fourth. After activity, pt 
was able to swiftly talk appropriately, answer questions, and follow commands. VSS. Showing 
no signs of distress. Will continue to monitor.

## 2019-03-17 NOTE — NUR
NURSE NOTES:

Patient used the call light to alert RN that patient was seeing an aura.  Patient then with 
eyes closed began to roll head and body back and forth with no muscle tension whatsoever.  
Patient described aura as "flashing lights."  RN asked patient if sz had begun and patient 
responded with a definitive yes.  RN requested patient to squeeze hands and was able to 
squeeze Right hand tightly and less on the left. RN asked if sz was more involved on the 
left and patient stated "yes it is on the left more."  The movement continued while RN timed 
for approximately 5.5 minutes.  While RN stood outside of door, patient repositioned himself 
during episode.  RN arrived with antianxiety medication and patient stated responded that he 
"I feel better."  The dosage with the Ativan for anxiety vs seizure is considerably less.  
Patient responded quickly.  The episode stopped immediately.  Patient did not have any type 
of post ictal episode post sz like activity.  Patient spoke without with issue, was not 
fatigued and did request his phone.  Will continue to monitor and report findings to his MD 
and neuro.  

-------------------------------------------------------------------------------

Addendum: 03/17/19 at 1120 by BRENNAN HERMAN RN

-------------------------------------------------------------------------------

during episode, there was no changes to VS throughout the entire episode.

## 2019-03-17 NOTE — GENERAL PROGRESS NOTE
Assessment/Plan


Assessment/Plan


(1) Chiari malformation type II


(2) Degenerative disc disease, cervical


(3) Lumbar degenerative disc disease


(4) Arthritis, lumbar spine


(5) Peripheral neuropathy


(6) Hydrocephalus


(7) Diabetes mellitus


Pt will be continued on the Morphine 1mg IV Q4H PRN pain


and started on Norco 10/325mg PO 1 tab Q6H PRN severe pain.


Pt was d/w Dr. Franco and he concurred.





Subjective


Date patient seen:  Mar 17, 2019


Time patient seen:  10:30 - am


Allergies:  


Coded Allergies:  


     HALOPERIDOL (Verified  Allergy, Unknown, 2/5/19)


Subjective





Constitutional:  Reports: weakness, Denies: chills, diaphoresis, fever, malaise

, no symptoms, other


HEENT:  Denies: blurred vision, double vision, ear discharge, ear pain, eye pain

, mouth pain, mouth swelling, no symptoms, nose congestion, nose pain, other, 

tearing, throat pain, throat swelling


Cardiovascular:  Denies: chest pain, edema, irregular heart rate, 

lightheadedness, no symptoms, other, palpitations, syncope


Respiratory:  Denies: SOB at rest, SOB with excertion, cough, no symptoms, 

orthopnea, other, shortness of breath, sputum, stridor, wheezing


Gastrointestinal/Abdominal:  Denies: abdomen distended, abdominal pain, black 

stools, blood in stool, constipated, diarrhea, difficulty swallowing, nausea, 

no symptoms, other, poor appetite, poor fluid intake, rectal bleeding, tarry 

stools, vomiting


Genitourinary:  Denies: burning, discharge, flank pain, frequency, hematuria, 

incontinence, no symptoms, other, pain, urgency


Neurologic/Psychiatric:  Reports: headache, numbness, tingling, weakness, Denies

: anxiety, depressed, emotional problems, no symptoms, other, paresthesia, pre-

existing deficit, seizure, tremors


Endocrine:  Denies: excessive sweating, flushing, increased hunger, increased 

thirst, increased urine, intolerance to cold, intolerance to heat, no symptoms, 

other, unexplained weight gain, unexplained weight loss


Hematologic/Lymphatic:  Denies: anemia, easy bleeding, easy bruising, no 

symptoms, other


 


Subjective


Patient is a known patient and has been admitted under the care of Dr. Cabrera. C/

o pain, 


Started on Morphine 1mg IV Q4H PRN severe pain and we were consulted so patient 


has adequate pain control while here in the hospital.





Objective





Last 24 Hour Vital Signs








  Date Time  Temp Pulse Resp B/P (MAP) Pulse Ox O2 Delivery O2 Flow Rate FiO2


 


3/17/19 08:00      Venturi Mask  


 


3/17/19 06:00  88 23 102/56 (71) 98   


 


3/17/19 05:50 98.9       


 


3/17/19 05:00 98.1 87 21 121/65 (83) 93   


 


3/17/19 04:00      Nasal Cannula 2.0 


 


3/17/19 04:00  83      


 


3/17/19 04:00  80 18 106/74 (85) 95   


 


3/17/19 03:00  91 20 121/92 (102) 95   


 


3/17/19 02:00  85 24 104/72 (83) 94   


 


3/17/19 01:00  79 19 118/79 (92) 96   


 


3/17/19 00:00      Nasal Cannula 2.0 


 


3/17/19 00:00  84      


 


3/17/19 00:00 98.9 83 20 115/73 (87) 96   


 


3/16/19 23:00  82 19 115/67 (83) 95   


 


3/16/19 22:00  84 21 116/75 (89) 93   


 


3/16/19 21:00  90 22 101/57 (72) 94   


 


3/16/19 20:00  99      


 


3/16/19 20:00      Nasal Cannula 2.0 


 


3/16/19 20:00 98.5 100 20 120/76 (91) 94   


 


3/16/19 19:34  88 20   Room Air  21


 


3/16/19 19:00  86 22 105/58 (74) 93   


 


3/16/19 18:00  105 20 103/53 (70) 93   


 


3/16/19 17:00  115 21 106/65 (79) 93   


 


3/16/19 16:00  99 20 118/63 (81) 95   


 


3/16/19 15:00  100 20 118/64 (82) 96   


 


3/16/19 14:05 98.4 111 19 128/82 (97) 100   


 


3/16/19 14:00 98.6 125 30 138/69 (92) 96   


 


3/16/19 12:00 98.1 89 19 116/63 (80)    





  89      


 


3/16/19 11:54 97.9       

















Intake and Output  


 


 3/16/19 3/17/19





 19:00 07:00


 


Intake Total 160 ml 


 


Output Total 700 ml 800 ml


 


Balance -540 ml -800 ml


 


  


 


Intake Oral 100 ml 


 


IV Total 60 ml 


 


Output Urine Total 700 ml 800 ml


 


# Voids 1 








Laboratory Tests


3/17/19 04:35: 


White Blood Count 8.7, Red Blood Count 5.07, Hemoglobin 15.3, Hematocrit 43.9, 

Mean Corpuscular Volume 87, Mean Corpuscular Hemoglobin 30.2, Mean Corpuscular 

Hemoglobin Concent 34.9, Red Cell Distribution Width 12.5, Platelet Count 172, 

Mean Platelet Volume 8.6, Neutrophils (%) (Auto) 55.9, Lymphocytes (%) (Auto) 

33.7, Monocytes (%) (Auto) 7.5, Eosinophils (%) (Auto) 2.3, Basophils (%) (Auto

) 0.5, Sodium Level 139, Potassium Level 3.9, Chloride Level 103, Carbon 

Dioxide Level 30, Anion Gap 6, Blood Urea Nitrogen 15, Creatinine 0.9, Estimat 

Glomerular Filtration Rate > 60, Glucose Level 140#H, Calcium Level 9.0, Total 

Bilirubin 0.5, Aspartate Amino Transf (AST/SGOT) 17, Alanine Aminotransferase (

ALT/SGPT) 47, Alkaline Phosphatase 65, Pro-B-Type Natriuretic Peptide 24, Total 

Protein 6.4, Albumin 3.3L, Globulin 3.1, Albumin/Globulin Ratio 1.1


Height (Feet):  5


Height (Inches):  11.00


Weight (Pounds):  261


Objective


General Appearance:  no apparent distress, alert


EENT:  PERRL/EOMI, normal ENT inspection


Neck:  normal alignment, supple


Cardiovascular:  normal rate, regular rhythm


Respiratory/Chest:  lungs clear, normal breath sounds


Abdomen:  non tender, soft


Extremities:  non-tender


Edema:  trace edema


Neurologic:  alert, responsive


Skin:  warm/dry











Bill Sanz Mar 17, 2019 11:03

## 2019-03-18 VITALS — DIASTOLIC BLOOD PRESSURE: 65 MMHG | SYSTOLIC BLOOD PRESSURE: 117 MMHG

## 2019-03-18 VITALS — DIASTOLIC BLOOD PRESSURE: 77 MMHG | SYSTOLIC BLOOD PRESSURE: 125 MMHG

## 2019-03-18 VITALS — SYSTOLIC BLOOD PRESSURE: 117 MMHG | DIASTOLIC BLOOD PRESSURE: 80 MMHG

## 2019-03-18 VITALS — SYSTOLIC BLOOD PRESSURE: 109 MMHG | DIASTOLIC BLOOD PRESSURE: 67 MMHG

## 2019-03-18 VITALS — DIASTOLIC BLOOD PRESSURE: 73 MMHG | SYSTOLIC BLOOD PRESSURE: 114 MMHG

## 2019-03-18 LAB
ADD MANUAL DIFF: NO
ANION GAP SERPL CALC-SCNC: 9 MMOL/L (ref 5–15)
BASOPHILS NFR BLD AUTO: 1.1 % (ref 0–2)
BUN SERPL-MCNC: 17 MG/DL (ref 7–18)
CALCIUM SERPL-MCNC: 9 MG/DL (ref 8.5–10.1)
CHLORIDE SERPL-SCNC: 103 MMOL/L (ref 98–107)
CO2 SERPL-SCNC: 27 MMOL/L (ref 21–32)
CREAT SERPL-MCNC: 0.9 MG/DL (ref 0.55–1.3)
EOSINOPHIL NFR BLD AUTO: 2.4 % (ref 0–3)
ERYTHROCYTE [DISTWIDTH] IN BLOOD BY AUTOMATED COUNT: 12.3 % (ref 11.6–14.8)
HCT VFR BLD CALC: 41 % (ref 42–52)
HGB BLD-MCNC: 14.6 G/DL (ref 14.2–18)
LYMPHOCYTES NFR BLD AUTO: 32.8 % (ref 20–45)
MCV RBC AUTO: 85 FL (ref 80–99)
MONOCYTES NFR BLD AUTO: 5.8 % (ref 1–10)
NEUTROPHILS NFR BLD AUTO: 57.9 % (ref 45–75)
PLATELET # BLD: 171 K/UL (ref 150–450)
POTASSIUM SERPL-SCNC: 3.8 MMOL/L (ref 3.5–5.1)
RBC # BLD AUTO: 4.8 M/UL (ref 4.7–6.1)
SODIUM SERPL-SCNC: 139 MMOL/L (ref 136–145)
WBC # BLD AUTO: 7.6 K/UL (ref 4.8–10.8)

## 2019-03-18 RX ADMIN — ASPIRIN 81 MG SCH MG: 81 TABLET ORAL at 08:07

## 2019-03-18 RX ADMIN — INSULIN ASPART SCH UNITS: 100 INJECTION, SOLUTION INTRAVENOUS; SUBCUTANEOUS at 17:46

## 2019-03-18 RX ADMIN — INSULIN DETEMIR SCH UNITS: 100 INJECTION, SOLUTION SUBCUTANEOUS at 08:15

## 2019-03-18 RX ADMIN — INSULIN ASPART SCH UNITS: 100 INJECTION, SOLUTION INTRAVENOUS; SUBCUTANEOUS at 12:12

## 2019-03-18 RX ADMIN — HEPARIN SODIUM SCH UNITS: 5000 INJECTION INTRAVENOUS; SUBCUTANEOUS at 08:07

## 2019-03-18 RX ADMIN — QUETIAPINE SCH MG: 200 TABLET, FILM COATED ORAL at 22:55

## 2019-03-18 RX ADMIN — MORPHINE SULFATE PRN MG: 2 INJECTION, SOLUTION INTRAMUSCULAR; INTRAVENOUS at 18:52

## 2019-03-18 RX ADMIN — MORPHINE SULFATE PRN MG: 2 INJECTION, SOLUTION INTRAMUSCULAR; INTRAVENOUS at 06:25

## 2019-03-18 RX ADMIN — MORPHINE SULFATE PRN MG: 2 INJECTION, SOLUTION INTRAMUSCULAR; INTRAVENOUS at 10:32

## 2019-03-18 RX ADMIN — HEPARIN SODIUM SCH UNITS: 5000 INJECTION INTRAVENOUS; SUBCUTANEOUS at 21:00

## 2019-03-18 RX ADMIN — INSULIN ASPART SCH UNITS: 100 INJECTION, SOLUTION INTRAVENOUS; SUBCUTANEOUS at 12:11

## 2019-03-18 RX ADMIN — MORPHINE SULFATE PRN MG: 2 INJECTION, SOLUTION INTRAMUSCULAR; INTRAVENOUS at 22:55

## 2019-03-18 RX ADMIN — INSULIN ASPART SCH UNITS: 100 INJECTION, SOLUTION INTRAVENOUS; SUBCUTANEOUS at 06:21

## 2019-03-18 RX ADMIN — DIVALPROEX SODIUM SCH MG: 500 TABLET, DELAYED RELEASE ORAL at 08:06

## 2019-03-18 RX ADMIN — INSULIN DETEMIR SCH UNITS: 100 INJECTION, SOLUTION SUBCUTANEOUS at 21:40

## 2019-03-18 RX ADMIN — DIVALPROEX SODIUM SCH MG: 500 TABLET, DELAYED RELEASE ORAL at 21:33

## 2019-03-18 RX ADMIN — MORPHINE SULFATE PRN MG: 2 INJECTION, SOLUTION INTRAMUSCULAR; INTRAVENOUS at 14:36

## 2019-03-18 RX ADMIN — INSULIN ASPART SCH UNITS: 100 INJECTION, SOLUTION INTRAVENOUS; SUBCUTANEOUS at 21:39

## 2019-03-18 NOTE — PROGRESS NOTE
DATE:  03/18/2019

SUBJECTIVE:  This is a male patient.  This patient continues to have some

confusion, some mood lability, decline in cognition below his baseline.

That is why, he does require daily psychiatric consultation.  He continues

to have mood lability secondary to acute coronary syndrome, diabetes,

COPD.



MENTAL STATUS EXAMINATION:  This is a 38-year-old male.  Appearance is

disheveled.  Attitude, irritable and agitated.  Affect, guarded and

restricted.  Intellect poor.  Mood depressed and anxious.  Motor activity,

psychomotor agitation.  Attention span is poor.  Orientation x2.  Speech

is pressured.  Thought process, disorganized and illogical.  Thought

content, auditory hallucinations, paranoid delusions.  Insight and

judgment is poor.



DIAGNOSIS:  Schizoaffective, bipolar type.



PLAN:  Treat him with Depakote 500 mg q.12 h., Seroquel 200 mg at bedtime,

Xanax 2.5 mg every 6 hours p.r.n. anxiety and agitation, Lexapro 10 mg

daily.  Provided him with 20 minutes of cognitive behavioral therapy to

help him identify his automatic negative thoughts and help him convert

those negative thoughts to more positive thoughts to reduce depression,

anxiety, and suicidality.  Chart reviewed.  Discussed with staff.  Seen

and assessed at the bedside.  20 minutes of cognitive behavioral therapy

provided.









  ______________________________________________

  Gulshan Armstrong M.D.





DR:  YASMANY

D:  03/18/2019 07:12

T:  03/18/2019 15:14

JOB#:  [id number]

CC:

## 2019-03-18 NOTE — CONSULTATION
DATE OF CONSULTATION:  03/16/2019

CONSULTING PHYSICIAN:  Garland Kenny M.D.



CHIEF COMPLAINT:  The patient is a middle-aged male with acute

coronary syndrome.  I was asked to see the patient in neurologic

consultation.



HISTORY OF PRESENT ILLNESS:  The patient is a middle-aged man with chest

pain . normal CT of the

chest.  _____03:14.  Vital signs are all fine.



 the patient has generalized seizures.  He is on Ativan 4 mg IV,

Depakote He has  neuropathy, syncopal episodes in the past.  In

February 2019, he was admitted to the hospital and discharged a few days

later.  CT scan, however, was negative.  The patient denies any

  The patient's medication on presentation 0n Seroquel,

Catapres, Lexapro, Levemir, NovoLog insulin, aspirin, 

Ativan as an outpatient.  The patient has component of headaches, myalgias

at times .  He is lethargic after he is on Ativan.  By the way,

he is a 38-year-old left-handed white male..  The

patient states he takes Depakote regularly and never skipped.  He states

he takes 5 tablets a day.  There is no family history of neurologic

disease.



PAST MEDICAL HISTORY: 



1. Hypertension.

2. Seizure.

3. Adult-onset diabetes mellitus, insulin-dependent.

4. Probable bipolar disorder.

5. Possible coronary artery disease.

6. Obesity.

7. Lumbar spine surgery, reason unknown.



ALLERGIES:  He is allergic to Haldol.



SURGERIES:  See above.



SOCIAL HISTORY:  He is alone.  He has 1 child in good health.



FAMILY HISTORY:  He states his parents are in good health.



REVIEW OF SYSTEMS:  Cannot be obtained.



PHYSICAL EXAMINATION:

GENERAL:  A well-developed obese man, lying in bed, in no acute distress.

VITAL SIGNS:  Blood pressure 128/82, pulse is 111 and regular, respiration

is 19, temperature is 98.4 degrees.

HEENT:  Examination of head, ears, eyes, nose, mouth and throat reveals a

scalp lesion in the right posterior frontal area.  There is tenderness in

the scalp.  He has poor dentition.  There is a large vertical

suboccipital scar.

NECK:  Supple.  Carotids are +2.  I could not hear any bruit.  I could not

hear any carotid sounds.

LUNGS:  Lungs are clear to auscultation.

CARDIOVASCULAR:  PMI was in the fifth intercostal space, midclavicular

line.  JVP was not seen.  Heart tones were distant.  Normal S1 and S2.

s3,s4 murmurs or rubs.

ABDOMEN:  His abdomen is obese.  Bowel sounds are intact.  There is no

tenderness, masses, or organomegaly.

BACK:  There is no tenderness to percussion or muscle spasm.

EXTREMITIES:  There is no edema or erythema.  Peripheral pulses in his arms

and legs are +2.

NEUROLOGIC EXAMINATION AND MENTAL STATUS:  The patient is lethargic, falls

asleep easily when not stimulated.  If we touch him he will

wake up and answer questions.  Speech was dysarthric.  Judgment:  Judgment

could not be tested.  Affect:  Affect was flat.  Memory:  Past memory was

probably intact as well as   Immediate recall 3 of 3 objects,

recent recall 0 of 3 objects.  Intellect:  Not tested.  Orientation:  He

thought the date was August 16, 2017.  However, he knew he is in the

[facility] and he is oriented to person.  Language function:

He could not spell world forwards or backwards.  His speech was

dysarthric.  Comprehension was fair.  There was right and left confusion

and finger agnosia.  However, he could repeat today is a nice day.

CRANIAL NERVE EXAMINATION:  Cranial Nerve II:  Visual fields are

intact.



Cranial Nerves III, IV, and VI:  The patient has abnormal eye movements,

_ and upgaze appear to be impaired.  Pupils are 5 mm,

round, and reactive to light, normal size.



Cranial Nerve V:  Facial sensation is intact to pinprick and probably to

fine touch.  Pterygoid strength is 5/5.



Cranial Nerve VII:  He has decreased left nasolabial fold.  1cranial 8

partially intact.



CRANIAL NERVE 11:  Is probable normal.



Cranial Nerve XII:  Tongue protrudes in the midline without fasciculations

or atrophy.

MUSCLE EXAMINATION:  Muscle bulk is normal.  Tone is decreased.  Strength

is generally 5/5 in all extremities.  He did have a left pronator drift.

Reflexes are 0 in the upper and lower extremities and probable downgoing

toes and testing for Babinski response.  Coordination, finger-to-nose

interestingly enough and done normally on the left.  He had trouble on the

right side.  He could not touch his nose.  Heel-to-shin testing is

normal.

SENSORY EXAMINATION:  The patient is partially intact to pinprick in all 4

extremities including his feet.



IMPRESSION:  The patient has  seizures  I will

continue him on Depakote 500 mg twice a day.  I will obtain EEG and CT

scan of brain and continue

 Depakote 500 mg bid and obtain EEG and CT

scan of the brain.









  ______________________________________________

  Garland Kenny MD





DR:  ADELSO

D:  03/16/2019 15:52

T:  03/17/2019 00:41

JOB#:  [id number]

CC:



DANIEL

## 2019-03-18 NOTE — NUR
NURSE NOTES:

PATIENT IS STABLE NO ACUTE DISTRESS

PATIENT currently and frequently AMBULATING AROUND HALLWAY.. GAIT STEADY

INFORMED PT OF FALL RISK PRECAUTION NOTED\

NO PAIN OR DISCOMFORT AT THIS TIME

ALL SCHEDULED MEDICATION GIVEN

CALL LIGHT IN REACH

Continuing to monitor

## 2019-03-18 NOTE — GENERAL PROGRESS NOTE
Assessment/Plan


Assessment/Plan


(1) Chiari malformation type II


(2) Degenerative disc disease, cervical


(3) Lumbar degenerative disc disease


(4) Arthritis, lumbar spine


(5) Peripheral neuropathy


(6) Hydrocephalus


(7) Diabetes mellitus


Pt will be continued on the Morphine and [place].


Pt was d/w Dr. Franco and he concurred.





Subjective


Date patient seen:  Mar 18, 2019


Time patient seen:  07:00 - am


Allergies:  


Coded Allergies:  


     HALOPERIDOL (Verified  Allergy, Unknown, 2/5/19)


Subjective


Constitutional:  Reports: weakness, Denies: chills, diaphoresis, fever, malaise

, no symptoms, other


HEENT:  Denies: blurred vision, double vision, ear discharge, ear pain, eye pain

, mouth pain, mouth swelling, no symptoms, nose congestion, nose pain, other, 

tearing, throat pain, throat swelling


Cardiovascular:  Denies: chest pain, edema, irregular heart rate, 

lightheadedness, no symptoms, other, palpitations, syncope


Respiratory:  Denies: SOB at rest, SOB with excertion, cough, no symptoms, 

orthopnea, other, shortness of breath, sputum, stridor, wheezing


Gastrointestinal/Abdominal:  Denies: abdomen distended, abdominal pain, black 

stools, blood in stool, constipated, diarrhea, difficulty swallowing, nausea, 

no symptoms, other, poor appetite, poor fluid intake, rectal bleeding, tarry 

stools, vomiting


Genitourinary:  Denies: burning, discharge, flank pain, frequency, hematuria, 

incontinence, no symptoms, other, pain, urgency


Neurologic/Psychiatric:  Reports: headache, numbness, tingling, weakness, Denies

: anxiety, depressed, emotional problems, no symptoms, other, paresthesia, pre-

existing deficit, seizure, tremors


Endocrine:  Denies: excessive sweating, flushing, increased hunger, increased 

thirst, increased urine, intolerance to cold, intolerance to heat, no symptoms, 

other, unexplained weight gain, unexplained weight loss


Hematologic/Lymphatic:  Denies: anemia, easy bleeding, easy bruising, no 

symptoms, other


 


Subjective


Patient is in bed and has no signs of pain or distress. Pain is tolerated on 

the 


Norco and Morphine. No new complaints at this time.





Objective





Last 24 Hour Vital Signs








  Date Time  Temp Pulse Resp B/P (MAP) Pulse Ox O2 Delivery O2 Flow Rate FiO2


 


3/18/19 04:00      Venturi Mask  


 


3/18/19 04:00 98.1 81 20 117/65 (82) 95   


 


3/18/19 02:04 97.9 94 18 114/73 (87) 96   


 


3/18/19 00:05  96 20   Room Air  21


 


3/17/19 23:00      Venturi Mask  


 


3/17/19 22:00 98.5 102 20 127/78 (94) 98   


 


3/17/19 21:00 98.5 102 20 127/78 (94) 98   


 


3/17/19 21:00      Venturi Mask  


 


3/17/19 18:00  105 21 126/81 (96) 99   


 


3/17/19 17:00  100 22 111/64 (80) 99   


 


3/17/19 16:00 98.4 99 17 132/68 (89) 96   


 


3/17/19 16:00      Venturi Mask  


 


3/17/19 15:00  105 21 107/53 (71) 98   


 


3/17/19 14:00  96 21 127/71 (89) 93   


 


3/17/19 13:00  100 21 120/76 (91) 96   


 


3/17/19 12:00 98.2 99 21 126/78 (94) 97   


 


3/17/19 12:00      Venturi Mask  


 


3/17/19 11:00  100 22 124/52 (76) 97   


 


3/17/19 10:00  101 21 113/76 (88) 95   


 


3/17/19 09:00  108 20 119/84 (96) 95   

















Intake and Output  


 


 3/17/19 3/18/19





 19:00 07:00


 


Intake Total 520 ml 


 


Output Total 2100 ml 


 


Balance -1580 ml 


 


  


 


Intake Oral 520 ml 


 


Output Urine Total 2100 ml 


 


# Voids  2








Height (Feet):  5


Height (Inches):  11.00


Weight (Pounds):  261


Objective


General Appearance:  no apparent distress, alert


EENT:  PERRL/EOMI, normal ENT inspection


Neck:  normal alignment, supple


Cardiovascular:  normal rate, regular rhythm


Respiratory/Chest:  lungs clear, normal breath sounds


Abdomen:  non tender, soft


Extremities:  non-tender


Edema:  trace edema


Neurologic:  alert, responsive


Skin:  warm/dry











Bill Sanz Mar 18, 2019 08:46

## 2019-03-18 NOTE — GENERAL PROGRESS NOTE
Assessment/Plan


Problem List:  


(1) ACS (acute coronary syndrome)


ICD Codes:  I24.9 - Acute ischemic heart disease, unspecified


SNOMED:  970407480


(2) Diabetes mellitus


ICD Codes:  E11.9 - Diabetes mellitus


SNOMED:  63383142


(3) COPD (chronic obstructive pulmonary disease)


ICD Codes:  J44.9 - Chronic obstructive pulmonary disease, unspecified


SNOMED:  75876227


(4) CVA (cerebral vascular accident)


ICD Codes:  I63.9 - Cerebral infarction, unspecified


SNOMED:  547437290


(5) Uncontrolled seizures


ICD Codes:  R56.9 - Unspecified convulsions


SNOMED:  48055993


Status:  unchanged


Assessment/Plan


seizure control pt diet pain control cardio f/u bp bs control cbc bmp am dc 

plan if cler





Subjective


Constitutional:  Reports: weakness


Allergies:  


Coded Allergies:  


     HALOPERIDOL (Verified  Allergy, Unknown, 2/5/19)


All Systems:  reviewed and negative except above


Subjective


o2nc had seizure yesterday sl sob





Objective





Last 24 Hour Vital Signs








  Date Time  Temp Pulse Resp B/P (MAP) Pulse Ox O2 Delivery O2 Flow Rate FiO2


 


3/18/19 12:00      Room Air  


 


3/18/19 08:00      Room Air  


 


3/18/19 07:00  83 18   Room Air  21


 


3/18/19 04:00      Venturi Mask  


 


3/18/19 04:00 98.1 81 20 117/65 (82) 95   


 


3/18/19 02:04 97.9 94 18 114/73 (87) 96   


 


3/18/19 00:05  96 20   Room Air  21


 


3/17/19 23:00      Venturi Mask  


 


3/17/19 22:00 98.5 102 20 127/78 (94) 98   


 


3/17/19 21:00 98.5 102 20 127/78 (94) 98   


 


3/17/19 21:00      Venturi Mask  


 


3/17/19 18:00  105 21 126/81 (96) 99   


 


3/17/19 17:00  100 22 111/64 (80) 99   


 


3/17/19 16:00 98.4 99 17 132/68 (89) 96   


 


3/17/19 16:00      Venturi Mask  


 


3/17/19 15:00  105 21 107/53 (71) 98   

















Intake and Output  


 


 3/17/19 3/18/19





 19:00 07:00


 


Intake Total 520 ml 


 


Output Total 2100 ml 


 


Balance -1580 ml 


 


  


 


Intake Oral 520 ml 


 


Output Urine Total 2100 ml 


 


# Voids  2








Laboratory Tests


3/18/19 09:45: 


White Blood Count 7.6, Red Blood Count 4.80, Hemoglobin 14.6, Hematocrit 41.0L, 

Mean Corpuscular Volume 85, Mean Corpuscular Hemoglobin 30.4, Mean Corpuscular 

Hemoglobin Concent 35.6, Red Cell Distribution Width 12.3, Platelet Count 171, 

Mean Platelet Volume 8.9, Neutrophils (%) (Auto) 57.9, Lymphocytes (%) (Auto) 

32.8, Monocytes (%) (Auto) 5.8, Eosinophils (%) (Auto) 2.4, Basophils (%) (Auto

) 1.1, Sodium Level 139, Potassium Level 3.8, Chloride Level 103, Carbon 

Dioxide Level 27, Anion Gap 9, Blood Urea Nitrogen 17, Creatinine 0.9, Estimat 

Glomerular Filtration Rate > 60, Glucose Level 207H, Calcium Level 9.0


Height (Feet):  5


Height (Inches):  11.00


Weight (Pounds):  261


General Appearance:  lethargic


EENT:  normal ENT inspection


Neck:  normal alignment


Cardiovascular:  normal peripheral pulses, normal rate, regular rhythm


Respiratory/Chest:  chest wall non-tender, lungs clear, normal breath sounds


Abdomen:  normal bowel sounds, non tender, soft


Extremities:  normal inspection


Edema:  no edema noted Arm (L), no edema noted Arm (R), no edema noted Leg (L), 

no edema noted Leg (R), no edema noted Pedal (L), no edema noted Pedal (R), no 

edema noted Generalized


Neurologic:  responsive, motor weakness


Skin:  normal pigmentation, warm/dry











Samir Cabrera DO Mar 18, 2019 14:28

## 2019-03-18 NOTE — GENERAL PROGRESS NOTE
Assessment/Plan


Problem List:  


(1) COPD (chronic obstructive pulmonary disease)


ICD Codes:  J44.9 - Chronic obstructive pulmonary disease, unspecified


SNOMED:  22334584


(2) Diabetes mellitus


ICD Codes:  E11.9 - Diabetes mellitus


SNOMED:  55410902


Assessment/Plan


continue Levemir 18 units bid 


continuu Novolog 7 units ac tid 


continue NISS ac / hs





Subjective


Allergies:  


Coded Allergies:  


     HALOPERIDOL (Verified  Allergy, Unknown, 2/5/19)


All Systems:  reviewed and negative except above


Subjective


transferred bed to [facility] surg


glucose values are stable 











Item Value  Date Time


 


Bedside Blood Glucose 169 mg/dl H 3/18/19 0553


 


Bedside Blood Glucose 164 mg/dl H 3/17/19 2055


 


Bedside Blood Glucose 169 mg/dl H 3/17/19 1747


 


Bedside Blood Glucose 164 mg/dl H 3/17/19 1257


 


Bedside Blood Glucose 128 mg/dl H 3/17/19 0930


 


Bedside Blood Glucose 139 mg/dl H 3/17/19 0630











Objective





Last 24 Hour Vital Signs








  Date Time  Temp Pulse Resp B/P (MAP) Pulse Ox O2 Delivery O2 Flow Rate FiO2


 


3/18/19 04:00      Venturi Mask  


 


3/18/19 04:00 98.1 81 20 117/65 (82) 95   


 


3/18/19 02:04 97.9 94 18 114/73 (87) 96   


 


3/18/19 00:05  96 20   Room Air  21


 


3/17/19 23:00      Venturi Mask  


 


3/17/19 22:00 98.5 102 20 127/78 (94) 98   


 


3/17/19 21:00 98.5 102 20 127/78 (94) 98   


 


3/17/19 21:00      Venturi Mask  


 


3/17/19 18:00  105 21 126/81 (96) 99   


 


3/17/19 17:00  100 22 111/64 (80) 99   


 


3/17/19 16:00 98.4 99 17 132/68 (89) 96   


 


3/17/19 16:00      Venturi Mask  


 


3/17/19 15:00  105 21 107/53 (71) 98   


 


3/17/19 14:00  96 21 127/71 (89) 93   


 


3/17/19 13:00  100 21 120/76 (91) 96   


 


3/17/19 12:00 98.2 99 21 126/78 (94) 97   


 


3/17/19 12:00      Venturi Mask  


 


3/17/19 11:00  100 22 124/52 (76) 97   


 


3/17/19 10:00  101 21 113/76 (88) 95   


 


3/17/19 09:00  108 20 119/84 (96) 95   


 


3/17/19 08:00 98.1 103 21 123/76 (92) 95   


 


3/17/19 08:00      Venturi Mask  


 


3/17/19 07:00  92 23 125/82 (96) 96   


 


3/17/19 07:00  71 18   Room Air  21

















Intake and Output  


 


 3/17/19 3/18/19





 18:59 06:59


 


Intake Total 520 ml 


 


Output Total 2100 ml 


 


Balance -1580 ml 


 


  


 


Intake Oral 520 ml 


 


Output Urine Total 2100 ml 


 


# Voids  2








Height (Feet):  5


Height (Inches):  11.00


Weight (Pounds):  261


General Appearance:  no apparent distress


Neck:  normal alignment


Cardiovascular:  normal rate


Respiratory/Chest:  lungs clear


Abdomen:  normal bowel sounds


Pelvis:  normal external exam


Objective





Current Medications








 Medications


  (Trade)  Dose


 Ordered  Sig/Henry


 Route


 PRN Reason  Start Time


 Stop Time Status Last Admin


Dose Admin


 


 Acetaminophen


  (Tylenol)  650 mg  Q4H  PRN


 ORAL


 fever  3/17/19 19:40


 4/13/19 19:39   


 


 


 Acetaminophen/


 Hydrocodone Bitart


  (Norco 10/325)  1 tab  Q6H  PRN


 ORAL


 moderate pain   3/17/19 19:42


 3/24/19 19:41   


 


 


 Al Hydroxide/Mg


 Hydroxide


  (Mylanta II)  30 ml  Q6H  PRN


 ORAL


 dyspepsia  3/17/19 19:41


 4/13/19 19:40   


 


 


 Albuterol/


 Ipratropium


  (Albuterol/


 Ipratropium)  3 ml  Q4H  PRN


 HHN


 Shortness of Breath  3/17/19 19:41


 3/19/19 19:40   


 


 


 Alprazolam


  (Xanax)  0.5 mg  Q6H  PRN


 ORAL


 For Anxiety  3/17/19 19:41


 3/21/19 19:40   


 


 


 Aspirin


  (ASA)  81 mg  DAILY


 ORAL


   3/18/19 09:00


 4/14/19 08:59   


 


 


 Clonidine HCl


  (Catapres Tab)  0.1 mg  Q4H  PRN


 ORAL


 For High Blood Pressure  3/17/19 19:41


 4/13/19 19:40   


 


 


 Dextrose


  (Dextrose 50%)  25 ml  Q30M  PRN


 IV


 Hypoglycemia  3/17/19 20:00


 4/13/19 17:59   


 


 


 Dextrose


  (Dextrose 50%)  50 ml  Q30M  PRN


 IV


 Hypoglycemia  3/17/19 20:00


 4/13/19 17:59   


 


 


 Divalproex Sodium


  (Depakote)  500 mg  Q12HR


 ORAL


   3/17/19 21:00


 4/13/19 20:59  3/17/19 20:35


 


 


 Escitalopram


 Oxalate


  (Lexapro)  10 mg  DAILY


 ORAL


   3/18/19 09:00


 4/14/19 08:59   


 


 


 Heparin Sodium


  (Porcine)


  (Heparin 5000


 units/ml)  5,000 units  EVERY 12  HOURS


 SUBQ


   3/17/19 21:00


 4/13/19 20:59   


 


 


 Insulin Aspart


  (NovoLOG)    BEFORE MEALS AND  HS


 SUBQ


   3/17/19 21:00


 4/13/19 20:59  3/17/19 20:41


 


 


 Insulin Aspart


  (NovoLOG)  7 units  NOVOTIAC


 SUBQ


   3/18/19 06:30


 4/14/19 11:49   


 


 


 Insulin Detemir


  (Levemir)  18 units  EVERY 12  HOURS


 SUBQ


   3/17/19 21:00


 4/13/19 22:29  3/17/19 20:39


 


 


 Lorazepam


  (Ativan 2mg/ml


 1ml)  0.5 mg  Q4H  PRN


 IV


 For Anxiety  3/17/19 19:42


 3/21/19 19:41   


 


 


 Lorazepam


  (Ativan 2mg/ml


 1ml)  2 mg  Q2H  PRN


 IV


 SEIZURE  3/17/19 19:42


 3/23/19 19:41   


 


 


 Morphine Sulfate


  (Morphine


 Sulfate)  1 mg  Q4H  PRN


 IVP


 severe pain   3/17/19 19:43


 3/21/19 19:42  3/17/19 22:46


 


 


 Ondansetron HCl


  (Zofran)  4 mg  Q6H  PRN


 IVP


 Nausea & Vomiting  3/17/19 20:30


 4/13/19 14:24   


 


 


 Polyethylene


 Glycol


  (Miralax)  17 gm  HSPRN  PRN


 ORAL


 Constipation  3/17/19 19:43


 4/16/19 19:42   


 


 


 Quetiapine


 Fumarate


  (SEROquel)  200 mg  BEDTIME


 ORAL


   3/17/19 21:00


 4/13/19 20:59  3/17/19 22:44


 


 


 Zolpidem Tartrate


  (Ambien)  5 mg  HSPRN  PRN


 ORAL


 Insomnia  3/17/19 19:43


 3/24/19 19:42   


 

















Dusty Denis MD Mar 18, 2019 06:23

## 2019-03-18 NOTE — PROGRESS NOTE
ADDENDUM



The patient's EEG was in the normal range activity was 8 to 9 cycles

per second.  There was no epileptiform abnormality.  There was "mild

generalized slowing", however, normal EEG does not exclude a seizure

disorder.









  ______________________________________________

  Garland Kenny MD





DR:  Analy

D:  03/17/2019 14:16

T:  03/17/2019 23:54

JOB#:  [id number]

CC:



DANIEL

## 2019-03-18 NOTE — PROGRESS NOTE
DATE:  03/17/2019

SUBJECTIVE:  This morning, the patient had a "seizure."  Apparently, he was

talking during the seizure, rolling from one side to the other.  During

the seizure, _____ rearranged his position, squeezed the nurse's hand to

command.  He is still on his medication.



OBJECTIVE:

VITAL SIGNS:  Pulse is 100, respirations 20, blood pressure is 120/96, and

temperature is 98.3 degrees.

MENTAL STATUS EXAMINATION:  He states he is in [facility], states it

is April, but he is oriented to person.  He still answers questions fairly

normally, but with dysarthric speech.

CRANIAL NERVE EXAMINATION:

CRANIAL NERVES III, IV, AND VI:  Intact.  Pupils are approximately 5 mm

wide and reactive.

CRANIAL NERVE V:  Facial strength 5/5.

CRANIAL NERVE VII:  No change.

CRANIAL NERVES IX TO XII:  Not tested.

MUSCLE EXAMINATION:  Muscle bulk and tone are normal.  Strength is 5/5.

Reflexes are 0 in the upper and lower extremities.



IMPRESSION:  Most part of the patient's seizures are pseudoseizures.

Therefore, he really does not belong in the ICU He has pseudo seizures.  The

patient also has noncompliance with his medication.  Therefore, I cannot

rule out real seizures.



PLAN:

1. Discharge from the ICU.

2. Continue his Depakote 500 mg b.i.d.









  ______________________________________________

  Garland Kenny MD





DR:  Analy

D:  03/17/2019 14:14

T:  03/18/2019 01:28

JOB#:  [id number]

CC:



DANIEL

## 2019-03-18 NOTE — NUR
PATIENT IS STABLE NO ACUTE DISTRESS

PATIENT AMBULATING AROUND HALLWAY.. GAIT STEADY

INFORMED PT OF FALL RISK PRECAUTION NOTED\

NO PAIN OR DISCOMFORT AT THIS TIME

ALL SCHEDULED MEDICATION GIVEN

CALL LIGHT IN REACH..

## 2019-03-18 NOTE — NUR
RECEIVED PATIENT IN BED AWAKE.. NO RESPIRATORY DISTRESS OR SOB

DENIES NO CHEST PAIN OR DISCOMFORT...

AMBULATING AROUND HALLWAY, GAIT STEADY INFORMED TO USE CALL LIGHT..

FOR NEEDS NOTED..

C/O MODERATED PAIN AND DISCOMFORT GENERALIZED..

PAIN MEDICATION GIVEN NOTED

CALL LIGHT IN REACH

## 2019-03-19 VITALS — DIASTOLIC BLOOD PRESSURE: 61 MMHG | SYSTOLIC BLOOD PRESSURE: 115 MMHG

## 2019-03-19 VITALS — DIASTOLIC BLOOD PRESSURE: 62 MMHG | SYSTOLIC BLOOD PRESSURE: 113 MMHG

## 2019-03-19 LAB
ADD MANUAL DIFF: NO
ANION GAP SERPL CALC-SCNC: 10 MMOL/L (ref 5–15)
BASOPHILS NFR BLD AUTO: 1 % (ref 0–2)
BUN SERPL-MCNC: 17 MG/DL (ref 7–18)
CALCIUM SERPL-MCNC: 9 MG/DL (ref 8.5–10.1)
CHLORIDE SERPL-SCNC: 100 MMOL/L (ref 98–107)
CO2 SERPL-SCNC: 26 MMOL/L (ref 21–32)
CREAT SERPL-MCNC: 0.8 MG/DL (ref 0.55–1.3)
EOSINOPHIL NFR BLD AUTO: 2.8 % (ref 0–3)
ERYTHROCYTE [DISTWIDTH] IN BLOOD BY AUTOMATED COUNT: 12.1 % (ref 11.6–14.8)
HCT VFR BLD CALC: 42.6 % (ref 42–52)
HGB BLD-MCNC: 14.6 G/DL (ref 14.2–18)
LYMPHOCYTES NFR BLD AUTO: 37.7 % (ref 20–45)
MCV RBC AUTO: 87 FL (ref 80–99)
MONOCYTES NFR BLD AUTO: 6.1 % (ref 1–10)
NEUTROPHILS NFR BLD AUTO: 52.5 % (ref 45–75)
PLATELET # BLD: 163 K/UL (ref 150–450)
POTASSIUM SERPL-SCNC: 4.1 MMOL/L (ref 3.5–5.1)
RBC # BLD AUTO: 4.91 M/UL (ref 4.7–6.1)
SODIUM SERPL-SCNC: 136 MMOL/L (ref 136–145)
WBC # BLD AUTO: 7.4 K/UL (ref 4.8–10.8)

## 2019-03-19 PROCEDURE — 4A00X4Z MEASUREMENT OF CENTRAL NERVOUS ELECTRICAL ACTIVITY, EXTERNAL APPROACH: ICD-10-PCS

## 2019-03-19 RX ADMIN — MORPHINE SULFATE PRN MG: 2 INJECTION, SOLUTION INTRAMUSCULAR; INTRAVENOUS at 21:20

## 2019-03-19 RX ADMIN — QUETIAPINE SCH MG: 200 TABLET, FILM COATED ORAL at 21:19

## 2019-03-19 RX ADMIN — HEPARIN SODIUM SCH UNITS: 5000 INJECTION INTRAVENOUS; SUBCUTANEOUS at 08:22

## 2019-03-19 RX ADMIN — INSULIN ASPART SCH UNITS: 100 INJECTION, SOLUTION INTRAVENOUS; SUBCUTANEOUS at 11:40

## 2019-03-19 RX ADMIN — MORPHINE SULFATE PRN MG: 2 INJECTION, SOLUTION INTRAMUSCULAR; INTRAVENOUS at 08:24

## 2019-03-19 RX ADMIN — INSULIN DETEMIR SCH UNITS: 100 INJECTION, SOLUTION SUBCUTANEOUS at 08:21

## 2019-03-19 RX ADMIN — MORPHINE SULFATE PRN MG: 2 INJECTION, SOLUTION INTRAMUSCULAR; INTRAVENOUS at 17:13

## 2019-03-19 RX ADMIN — HEPARIN SODIUM SCH UNITS: 5000 INJECTION INTRAVENOUS; SUBCUTANEOUS at 21:00

## 2019-03-19 RX ADMIN — INSULIN DETEMIR SCH UNITS: 100 INJECTION, SOLUTION SUBCUTANEOUS at 08:31

## 2019-03-19 RX ADMIN — DIVALPROEX SODIUM SCH MG: 500 TABLET, DELAYED RELEASE ORAL at 21:20

## 2019-03-19 RX ADMIN — MORPHINE SULFATE PRN MG: 2 INJECTION, SOLUTION INTRAMUSCULAR; INTRAVENOUS at 03:39

## 2019-03-19 RX ADMIN — DIVALPROEX SODIUM SCH MG: 500 TABLET, DELAYED RELEASE ORAL at 08:23

## 2019-03-19 RX ADMIN — ASPIRIN 81 MG SCH MG: 81 TABLET ORAL at 08:23

## 2019-03-19 RX ADMIN — LORAZEPAM PRN MG: 2 INJECTION, SOLUTION INTRAMUSCULAR; INTRAVENOUS at 10:32

## 2019-03-19 RX ADMIN — INSULIN ASPART SCH UNITS: 100 INJECTION, SOLUTION INTRAVENOUS; SUBCUTANEOUS at 16:58

## 2019-03-19 RX ADMIN — MORPHINE SULFATE PRN MG: 2 INJECTION, SOLUTION INTRAMUSCULAR; INTRAVENOUS at 12:59

## 2019-03-19 RX ADMIN — INSULIN ASPART SCH UNITS: 100 INJECTION, SOLUTION INTRAVENOUS; SUBCUTANEOUS at 05:56

## 2019-03-19 RX ADMIN — INSULIN ASPART SCH UNITS: 100 INJECTION, SOLUTION INTRAVENOUS; SUBCUTANEOUS at 16:57

## 2019-03-19 RX ADMIN — INSULIN DETEMIR SCH UNITS: 100 INJECTION, SOLUTION SUBCUTANEOUS at 21:19

## 2019-03-19 RX ADMIN — INSULIN ASPART SCH UNITS: 100 INJECTION, SOLUTION INTRAVENOUS; SUBCUTANEOUS at 11:42

## 2019-03-19 RX ADMIN — INSULIN ASPART SCH UNITS: 100 INJECTION, SOLUTION INTRAVENOUS; SUBCUTANEOUS at 05:55

## 2019-03-19 RX ADMIN — INSULIN ASPART SCH UNITS: 100 INJECTION, SOLUTION INTRAVENOUS; SUBCUTANEOUS at 21:20

## 2019-03-19 NOTE — CARDIOLOGY PROGRESS NOTE
Assessment/Plan


Assessment/Plan


1. Atypical/noncardiac chest pain, nonischemic nuclear stress test in April 2018

, normal LV systolic function with LVEF of about 60% to 65%.  A 12-lead 

electrocardiogram this admission does


not show any ischemic features.  


2. Hx of Hypertension, diet only.


3. Diabetes mellitus, continue ASA and atorvastatin.


4. CVA with left hemiparesis, continue ASA, will add statins.





Subjective


Subjective


No cardiac events reported.





Objective





Last 24 Hour Vital Signs








  Date Time  Temp Pulse Resp B/P (MAP) Pulse Ox O2 Delivery O2 Flow Rate FiO2


 


3/19/19 21:38  103 20   Room Air  21


 


3/19/19 21:00      Room Air  


 


3/19/19 20:00 98.2 61 20 115/61 (79) 96   


 


3/19/19 17:43 97.6       


 


3/19/19 08:00      Venturi Mask  


 


3/19/19 04:11      Venturi Mask  


 


3/19/19 04:02 97.6 70 19 113/62 (79) 97   


 


3/19/19 00:00      Venturi Mask  

















Intake and Output  


 


 3/18/19 3/19/19





 19:00 07:00


 


Intake Total 2325 ml 


 


Balance 2325 ml 


 


  


 


Intake Oral 2325 ml 


 


# Voids 12 8


 


# Bowel Movements 4 











Laboratory Tests








Test


  3/19/19


11:10


 


White Blood Count


  7.4 K/UL


(4.8-10.8)


 


Red Blood Count


  4.91 M/UL


(4.70-6.10)


 


Hemoglobin


  14.6 G/DL


(14.2-18.0)


 


Hematocrit


  42.6 %


(42.0-52.0)


 


Mean Corpuscular Volume 87 FL (80-99)  


 


Mean Corpuscular Hemoglobin


  29.7 PG


(27.0-31.0)


 


Mean Corpuscular Hemoglobin


Concent 34.2 G/DL


(32.0-36.0)


 


Red Cell Distribution Width


  12.1 %


(11.6-14.8)


 


Platelet Count


  163 K/UL


(150-450)


 


Mean Platelet Volume


  7.7 FL


(6.5-10.1)


 


Neutrophils (%) (Auto)


  52.5 %


(45.0-75.0)


 


Lymphocytes (%) (Auto)


  37.7 %


(20.0-45.0)


 


Monocytes (%) (Auto)


  6.1 %


(1.0-10.0)


 


Eosinophils (%) (Auto)


  2.8 %


(0.0-3.0)


 


Basophils (%) (Auto)


  1.0 %


(0.0-2.0)


 


Sodium Level


  136 MMOL/L


(136-145)


 


Potassium Level


  4.1 MMOL/L


(3.5-5.1)


 


Chloride Level


  100 MMOL/L


()


 


Carbon Dioxide Level


  26 MMOL/L


(21-32)


 


Anion Gap


  10 mmol/L


(5-15)


 


Blood Urea Nitrogen


  17 mg/dL


(7-18)


 


Creatinine


  0.8 MG/DL


(0.55-1.30)


 


Estimat Glomerular Filtration


Rate > 60 mL/min


(>60)


 


Glucose Level


  184 MG/DL


()  H


 


Calcium Level


  9.0 MG/DL


(8.5-10.1)








Objective


HEENT:  Atraumatic and normocephalic.  Anicteric.  Pupils are equal, round,


and reactive to light and accommodation.  Extraocular muscles intact.


NECK:  JVP less than 5 cm.  No carotid bruit.  Carotid upstroke is 2+


bilaterally.


CARDIOVASCULAR:  Normal S1, S2.  Regular rate and rhythm. Tachycardic. No 

murmurs,


gallops, or rubs.


LUNGS:  Clear to auscultation bilaterally.


ABDOMEN:  Soft, nontender, and nondistended.  No hepatosplenomegaly.


Positive bowel sounds.


EXTREMITIES:  No evidence of edema, clubbing, or cyanosis.











Oskar Donaldson MD Mar 19, 2019 23:52

## 2019-03-19 NOTE — PROGRESS NOTE
DATE:  03/19/2019



SUBJECTIVE:  This is a male patient, who is 38-year-old.  He has acute

coronary syndrome, diabetes, COPD, and seizure disorder.  He has increased

mood lability, worsened by stress of his medical illness.  That is why his

attending has requested daily psychiatric consultation for this patient.

He continues to have mood lability, agitation, and irritability.



MENTAL STATUS EXAMINATION:  This is a 38-year-old male.  Appearance is

disheveled.  Attitude, irritable and agitated.  Affect, guarded and

restricted.  Intellect, poor.  Mood, depressed and anxious.  Motor

activity, psychomotor agitation.  Attention is poor.  Orientation x2.

Speech is pressured.  Thought process, disorganized and illogical.

Insight and judgment is poor.



DIAGNOSIS:  Schizoaffective, bipolar type.



PLAN:  Treat him with Seroquel 200 mg nightly, Depakote 500 mg q.12 h.,

Xanax 0.5 mg every six hours p.r.n. anxiety and agitation, and Lexapro 10

mg daily.  Provided him with 20 minutes of cognitive behavioral therapy to

help him identify his automatic negative thoughts and help him convert

those negative thoughts to more positive thoughts to reduce depression,

anxiety, and suicidality.  20 minutes of cognitive behavioral therapy also

provided to help him have a more adaptive behavior pattern.  Chart was

reviewed.  Discussed with staff.









  ______________________________________________

  Gulshan Armstrong M.D.





DR:  SIMON

D:  03/19/2019 09:16

T:  03/19/2019 17:23

JOB#:  [id number]

CC:

## 2019-03-19 NOTE — ELECTROENCEPHALOGRAM
DATE OF PROCEDURE:  03/16/2019

REQUESTING PHYSICIANS:

Garland Kenny M.D. & Samir Cabrera D.O.



READING PHYSICIAN:  Jamari Woody M.D.



PROCEDURE PERFORMED:  Electroencephalogram.



HISTORY:  This EEG was performed on a 38-year-old 

gentleman with a history of seizures and a craniotomy 

in the past.  The purpose of this EEG was to evaluate 

the patient for the degree and type of cerebral dysfunction.



TECHNICAL NOTE:  This EEG was performed on a [employer]

[employer] Unit with electrodes placed on the scalp 

according to the International 10-20 system.  Scalp-to-scalp 

and scalp-to-ear montages were used.  The EEG was 

technically satisfactory and was performed in the awake, 

drowsy, and sleep states.



OBSERVATIONS:  

In the best awake state, the background activity consisted

of 7.5-8 Hz posterior rhythmic activity.  



Drowsiness was characterized by irregular 5-6 hertz theta 

activity.  



Stage II sleep was characterized by further slowing of the 

background in the delta and theta range, the presence of

vertex waves, and 12-14 Hz sleep spindles.



No focal abnormalities or epileptiform discharges were seen.



IMPRESSION:  

This is an abnormal EEG characterized by slowing of the

background in the 7.5-8 Hz range in the best awake state.



COMMENT:  

This study is consistent with an encephalopathy of a mild 

degree.

Clinical correlation is recommended.







________________________

Jamari Wooyd M.D., M.S.P.H.



DR:  ELISABET/JAMILA

D:  03/19/2019 18:33

T:  03/19/2019 23:00

JOB#:  [id number]
[facility]

## 2019-03-19 NOTE — GENERAL PROGRESS NOTE
Assessment/Plan


Problem List:  


(1) ACS (acute coronary syndrome)


ICD Codes:  I24.9 - Acute ischemic heart disease, unspecified


SNOMED:  163069403


(2) Diabetes mellitus


ICD Codes:  E11.9 - Diabetes mellitus


SNOMED:  48434177


(3) COPD (chronic obstructive pulmonary disease)


ICD Codes:  J44.9 - Chronic obstructive pulmonary disease, unspecified


SNOMED:  42759089


(4) CVA (cerebral vascular accident)


ICD Codes:  I63.9 - Cerebral infarction, unspecified


SNOMED:  606778567


(5) Uncontrolled seizures


ICD Codes:  R56.9 - Unspecified convulsions


SNOMED:  44512743


Status:  stable, progressing


Assessment/Plan


seizure control pt diet pain control cardio f/u bp bs control cbc bmp am dc 

plan if clear





Subjective


Constitutional:  Reports: weakness


Allergies:  


Coded Allergies:  


     HALOPERIDOL (Verified  Allergy, Unknown, 2/5/19)


All Systems:  reviewed and negative except above


Subjective


had seizure yesterday





Objective





Last 24 Hour Vital Signs








  Date Time  Temp Pulse Resp B/P (MAP) Pulse Ox O2 Delivery O2 Flow Rate FiO2


 


3/19/19 13:29 97.6       


 


3/19/19 08:00      Venturi Mask  


 


3/19/19 04:11      Venturi Mask  


 


3/19/19 04:02 97.6 70 19 113/62 (79) 97   


 


3/19/19 00:00      Venturi Mask  


 


3/18/19 21:00      Venturi Mask  


 


3/18/19 20:57  63 20   Room Air  21


 


3/18/19 20:00 98.5 75 19 109/67 (81) 96   


 


3/18/19 16:00      Room Air  


 


3/18/19 15:24 98.3 108 20 117/80 (92)    

















Intake and Output  


 


 3/18/19 3/19/19





 19:00 07:00


 


Intake Total 2325 ml 


 


Balance 2325 ml 


 


  


 


Intake Oral 2325 ml 


 


# Voids 12 8


 


# Bowel Movements 4 








Laboratory Tests


3/19/19 11:10: 


White Blood Count 7.4, Red Blood Count 4.91, Hemoglobin 14.6, Hematocrit 42.6, 

Mean Corpuscular Volume 87, Mean Corpuscular Hemoglobin 29.7, Mean Corpuscular 

Hemoglobin Concent 34.2, Red Cell Distribution Width 12.1, Platelet Count 163, 

Mean Platelet Volume 7.7, Neutrophils (%) (Auto) 52.5, Lymphocytes (%) (Auto) 

37.7, Monocytes (%) (Auto) 6.1, Eosinophils (%) (Auto) 2.8, Basophils (%) (Auto

) 1.0, Sodium Level 136, Potassium Level 4.1, Chloride Level 100, Carbon 

Dioxide Level 26, Anion Gap 10, Blood Urea Nitrogen 17, Creatinine 0.8, Estimat 

Glomerular Filtration Rate > 60, Glucose Level 184H, Calcium Level 9.0


Height (Feet):  5


Height (Inches):  11.00


Weight (Pounds):  261


General Appearance:  lethargic


EENT:  normal ENT inspection


Neck:  normal alignment


Cardiovascular:  normal peripheral pulses, normal rate, regular rhythm


Respiratory/Chest:  chest wall non-tender, lungs clear, normal breath sounds


Abdomen:  normal bowel sounds, non tender, soft


Extremities:  normal inspection


Edema:  no edema noted Arm (L), no edema noted Arm (R), no edema noted Leg (L), 

no edema noted Leg (R), no edema noted Pedal (L), no edema noted Pedal (R), no 

edema noted Generalized


Neurologic:  responsive, motor weakness


Skin:  normal pigmentation, warm/dry











Samir Cabrera DO Mar 19, 2019 14:16

## 2019-03-19 NOTE — NUR
NURSE NOTES:

Patient ambulating in hallway, no s/s distress noted. Able to make needs known. Siderails 
padded, seizure precaution maintained. Call light within reach.

## 2019-03-19 NOTE — NUR
NURSE NOTES:

Received patient from Amber GRIFFITH, patient is up in bed, no distress noted, bed is locked 
and in lowest position, call light within reach, will continue to monitor.

-------------------------------------------------------------------------------

Addendum: 03/19/19 at 1840 by LAUREEN CASH RN

-------------------------------------------------------------------------------

0730

## 2019-03-19 NOTE — NUR
Social Service Note



SW familiar with patient from multiple admissions.  Upon previous discharge patient was 
placed at [facility].  While out on pass patient was detained and held by [employer].  Since 
patient didn't communicate with facility and didn't return, patient was deemed an AMA.  
[facility] will not accept patient back.  Patient is chronically homeless.  Patient doesn't 
have medicare days or a skilled need to justify SNF placement.  Patient is an over utilizer 
of the healthcare system for housing.  Patient states he has exhausted his income for this 
month.  SW addressed board and care/independent living.  Patient understands he requires to 
use his income for housing.  Patient referred to [facility] [phone] (p), [phone] (f).  
Referral received and being reviewed.  Will follow up.

## 2019-03-19 NOTE — GENERAL PROGRESS NOTE
Assessment/Plan


Assessment/Plan


(1) Chiari malformation type II


(2) Degenerative disc disease, cervical


(3) Lumbar degenerative disc disease


(4) Arthritis, lumbar spine


(5) Peripheral neuropathy


(6) Hydrocephalus


(7) Diabetes mellitus


Pt will be continued on the Morphine and [place].


Pt was d/w Dr. Franco and he concurred.





Subjective


Date patient seen:  Mar 19, 2019


Time patient seen:  07:00 - am


Allergies:  


Coded Allergies:  


     HALOPERIDOL (Verified  Allergy, Unknown, 2/5/19)


Subjective


Constitutional:  Reports: weakness, Denies: chills, diaphoresis, fever, malaise

, no symptoms, other


HEENT:  Denies: blurred vision, double vision, ear discharge, ear pain, eye pain

, mouth pain, mouth swelling, no symptoms, nose congestion, nose pain, other, 

tearing, throat pain, throat swelling


Cardiovascular:  Denies: chest pain, edema, irregular heart rate, 

lightheadedness, no symptoms, other, palpitations, syncope


Respiratory:  Denies: SOB at rest, SOB with excertion, cough, no symptoms, 

orthopnea, other, shortness of breath, sputum, stridor, wheezing


Gastrointestinal/Abdominal:  Denies: abdomen distended, abdominal pain, black 

stools, blood in stool, constipated, diarrhea, difficulty swallowing, nausea, 

no symptoms, other, poor appetite, poor fluid intake, rectal bleeding, tarry 

stools, vomiting


Genitourinary:  Denies: burning, discharge, flank pain, frequency, hematuria, 

incontinence, no symptoms, other, pain, urgency


Neurologic/Psychiatric:  Reports: headache, numbness, tingling, weakness, Denies

: anxiety, depressed, emotional problems, no symptoms, other, paresthesia, pre-

existing deficit, seizure, tremors


Endocrine:  Denies: excessive sweating, flushing, increased hunger, increased 

thirst, increased urine, intolerance to cold, intolerance to heat, no symptoms, 

other, unexplained weight gain, unexplained weight loss


Hematologic/Lymphatic:  Denies: anemia, easy bleeding, easy bruising, no 

symptoms, other


 


Subjective


Patient is showing no signs of pain or distress. Pain has been


tolerated on the Morphine and Norco, no new complaints


at this time.





Objective





Last 24 Hour Vital Signs








  Date Time  Temp Pulse Resp B/P (MAP) Pulse Ox O2 Delivery O2 Flow Rate FiO2


 


3/19/19 04:11      Venturi Mask  


 


3/19/19 04:02 97.6 70 19 113/62 (79) 97   


 


3/19/19 00:00      Venturi Mask  


 


3/18/19 21:00      Venturi Mask  


 


3/18/19 20:57  63 20   Room Air  21


 


3/18/19 20:00 98.5 75 19 109/67 (81) 96   


 


3/18/19 16:00      Room Air  


 


3/18/19 15:24 98.3 108 20 117/80 (92)    


 


3/18/19 15:22 98.1       


 


3/18/19 12:00      Room Air  


 


3/18/19 09:00 97.6 105 19 125/77 (93) 97   

















Intake and Output  


 


 3/18/19 3/19/19





 18:59 06:59


 


Intake Total 2325 ml 


 


Balance 2325 ml 


 


  


 


Intake Oral 2325 ml 


 


# Voids 12 8


 


# Bowel Movements 4 








Laboratory Tests


3/18/19 09:45: 


White Blood Count 7.6, Red Blood Count 4.80, Hemoglobin 14.6, Hematocrit 41.0L, 

Mean Corpuscular Volume 85, Mean Corpuscular Hemoglobin 30.4, Mean Corpuscular 

Hemoglobin Concent 35.6, Red Cell Distribution Width 12.3, Platelet Count 171, 

Mean Platelet Volume 8.9, Neutrophils (%) (Auto) 57.9, Lymphocytes (%) (Auto) 

32.8, Monocytes (%) (Auto) 5.8, Eosinophils (%) (Auto) 2.4, Basophils (%) (Auto

) 1.1, Sodium Level 139, Potassium Level 3.8, Chloride Level 103, Carbon 

Dioxide Level 27, Anion Gap 9, Blood Urea Nitrogen 17, Creatinine 0.9, Estimat 

Glomerular Filtration Rate > 60, Glucose Level 207H, Calcium Level 9.0


Height (Feet):  5


Height (Inches):  11.00


Weight (Pounds):  261


Objective


General Appearance:  no apparent distress, alert


EENT:  PERRL/EOMI, normal ENT inspection


Neck:  normal alignment, supple


Cardiovascular:  normal rate, regular rhythm


Respiratory/Chest:  lungs clear, normal breath sounds


Abdomen:  non tender, soft


Extremities:  non-tender


Edema:  trace edema


Neurologic:  alert, responsive


Skin:  warm/dry











Bill Sanz Mar 19, 2019 08:46

## 2019-03-19 NOTE — GENERAL PROGRESS NOTE
Assessment/Plan


Problem List:  


(1) COPD (chronic obstructive pulmonary disease)


ICD Codes:  J44.9 - Chronic obstructive pulmonary disease, unspecified


SNOMED:  57464107


(2) Diabetes mellitus


ICD Codes:  E11.9 - Diabetes mellitus


SNOMED:  01034731


Assessment/Plan


continue Levemir 18 units bid 


continuu Novolog 7 units ac tid 


continue NISS ac / hs





Subjective


Allergies:  


Coded Allergies:  


     HALOPERIDOL (Verified  Allergy, Unknown, 2/5/19)


All Systems:  reviewed and negative except above


Subjective


events noted 








Current Medications








 Medications


  (Trade)  Dose


 Ordered  Sig/Henry


 Route


 PRN Reason  Start Time


 Stop Time Status Last Admin


Dose Admin


 


 Acetaminophen


  (Tylenol)  650 mg  Q4H  PRN


 ORAL


 fever  3/17/19 19:40


 4/13/19 19:39   


 


 


 Acetaminophen/


 Hydrocodone Bitart


  (Norco 10/325)  1 tab  Q6H  PRN


 ORAL


 moderate pain   3/17/19 19:42


 3/24/19 19:41   


 


 


 Al Hydroxide/Mg


 Hydroxide


  (Mylanta II)  30 ml  Q6H  PRN


 ORAL


 dyspepsia  3/17/19 19:41


 4/13/19 19:40   


 


 


 Albuterol/


 Ipratropium


  (Albuterol/


 Ipratropium)  3 ml  Q4H  PRN


 HHN


 Shortness of Breath  3/17/19 19:41


 3/19/19 19:40   


 


 


 Alprazolam


  (Xanax)  0.5 mg  Q6H  PRN


 ORAL


 For Anxiety  3/17/19 19:41


 3/21/19 19:40   


 


 


 Aspirin


  (ASA)  81 mg  DAILY


 ORAL


   3/18/19 09:00


 4/14/19 08:59  3/18/19 08:07


 


 


 Clonidine HCl


  (Catapres Tab)  0.1 mg  Q4H  PRN


 ORAL


 For High Blood Pressure  3/17/19 19:41


 4/13/19 19:40   


 


 


 Dextrose


  (Dextrose 50%)  25 ml  Q30M  PRN


 IV


 Hypoglycemia  3/17/19 20:00


 4/13/19 17:59   


 


 


 Dextrose


  (Dextrose 50%)  50 ml  Q30M  PRN


 IV


 Hypoglycemia  3/17/19 20:00


 4/13/19 17:59   


 


 


 Divalproex Sodium


  (Depakote)  500 mg  Q12HR


 ORAL


   3/17/19 21:00


 4/13/19 20:59  3/18/19 21:33


 


 


 Escitalopram


 Oxalate


  (Lexapro)  10 mg  DAILY


 ORAL


   3/18/19 09:00


 4/14/19 08:59  3/18/19 08:06


 


 


 Heparin Sodium


  (Porcine)


  (Heparin 5000


 units/ml)  5,000 units  EVERY 12  HOURS


 SUBQ


   3/17/19 21:00


 4/13/19 20:59   


 


 


 Insulin Aspart


  (NovoLOG)    BEFORE MEALS AND  HS


 SUBQ


   3/17/19 21:00


 4/13/19 20:59  3/19/19 05:55


 


 


 Insulin Aspart


  (NovoLOG)  7 units  NOVOTIAC


 SUBQ


   3/18/19 06:30


 4/14/19 11:49  3/19/19 05:56


 


 


 Insulin Detemir


  (Levemir)  18 units  EVERY 12  HOURS


 SUBQ


   3/17/19 21:00


 4/13/19 22:29  3/18/19 21:40


 


 


 Lorazepam


  (Ativan 2mg/ml


 1ml)  0.5 mg  Q4H  PRN


 IV


 For Anxiety  3/17/19 19:42


 3/21/19 19:41   


 


 


 Lorazepam


  (Ativan 2mg/ml


 1ml)  2 mg  Q2H  PRN


 IV


 SEIZURE  3/17/19 19:42


 3/23/19 19:41   


 


 


 Morphine Sulfate


  (Morphine


 Sulfate)  1 mg  Q4H  PRN


 IVP


 severe pain   3/17/19 19:43


 3/21/19 19:42  3/19/19 03:39


 


 


 Ondansetron HCl


  (Zofran)  4 mg  Q6H  PRN


 IVP


 Nausea & Vomiting  3/17/19 20:30


 4/13/19 14:24  3/18/19 22:55


 


 


 Polyethylene


 Glycol


  (Miralax)  17 gm  HSPRN  PRN


 ORAL


 Constipation  3/17/19 19:43


 4/16/19 19:42   


 


 


 Quetiapine


 Fumarate


  (SEROquel)  200 mg  BEDTIME


 ORAL


   3/17/19 21:00


 4/13/19 20:59  3/18/19 22:55


 


 


 Zolpidem Tartrate


  (Ambien)  5 mg  HSPRN  PRN


 ORAL


 Insomnia  3/17/19 19:43


 3/24/19 19:42   


 











Objective





Last 24 Hour Vital Signs








  Date Time  Temp Pulse Resp B/P (MAP) Pulse Ox O2 Delivery O2 Flow Rate FiO2


 


3/19/19 04:11      Venturi Mask  


 


3/19/19 04:02 97.6 70 19 113/62 (79) 97   


 


3/19/19 00:00      Venturi Mask  


 


3/18/19 21:00      Venturi Mask  


 


3/18/19 20:57  63 20   Room Air  21


 


3/18/19 20:00 98.5 75 19 109/67 (81) 96   


 


3/18/19 16:00      Room Air  


 


3/18/19 15:24 98.3 108 20 117/80 (92)    


 


3/18/19 15:22 98.1       


 


3/18/19 12:00      Room Air  


 


3/18/19 09:00 97.6 105 19 125/77 (93) 97   


 


3/18/19 08:00      Room Air  


 


3/18/19 07:00  83 18   Room Air  21

















Intake and Output  


 


 3/18/19 3/19/19





 18:59 06:59


 


Intake Total 2325 ml 


 


Balance 2325 ml 


 


  


 


Intake Oral 2325 ml 


 


# Voids 12 8


 


# Bowel Movements 4 








Laboratory Tests


3/18/19 09:45: 


White Blood Count 7.6, Red Blood Count 4.80, Hemoglobin 14.6, Hematocrit 41.0L, 

Mean Corpuscular Volume 85, Mean Corpuscular Hemoglobin 30.4, Mean Corpuscular 

Hemoglobin Concent 35.6, Red Cell Distribution Width 12.3, Platelet Count 171, 

Mean Platelet Volume 8.9, Neutrophils (%) (Auto) 57.9, Lymphocytes (%) (Auto) 

32.8, Monocytes (%) (Auto) 5.8, Eosinophils (%) (Auto) 2.4, Basophils (%) (Auto

) 1.1, Sodium Level 139, Potassium Level 3.8, Chloride Level 103, Carbon 

Dioxide Level 27, Anion Gap 9, Blood Urea Nitrogen 17, Creatinine 0.9, Estimat 

Glomerular Filtration Rate > 60, Glucose Level 207H, Calcium Level 9.0


Height (Feet):  5


Height (Inches):  11.00


Weight (Pounds):  261


General Appearance:  no apparent distress


Neck:  normal alignment


Cardiovascular:  normal rate


Respiratory/Chest:  lungs clear


Abdomen:  normal bowel sounds


Pelvis:  normal external exam


Objective





Current Medications








 Medications


  (Trade)  Dose


 Ordered  Sig/Henry


 Route


 PRN Reason  Start Time


 Stop Time Status Last Admin


Dose Admin


 


 Acetaminophen


  (Tylenol)  650 mg  Q4H  PRN


 ORAL


 fever  3/17/19 19:40


 4/13/19 19:39   


 


 


 Acetaminophen/


 Hydrocodone Bitart


  (Norco 10/325)  1 tab  Q6H  PRN


 ORAL


 moderate pain   3/17/19 19:42


 3/24/19 19:41   


 


 


 Al Hydroxide/Mg


 Hydroxide


  (Mylanta II)  30 ml  Q6H  PRN


 ORAL


 dyspepsia  3/17/19 19:41


 4/13/19 19:40   


 


 


 Albuterol/


 Ipratropium


  (Albuterol/


 Ipratropium)  3 ml  Q4H  PRN


 HHN


 Shortness of Breath  3/17/19 19:41


 3/19/19 19:40   


 


 


 Alprazolam


  (Xanax)  0.5 mg  Q6H  PRN


 ORAL


 For Anxiety  3/17/19 19:41


 3/21/19 19:40   


 


 


 Aspirin


  (ASA)  81 mg  DAILY


 ORAL


   3/18/19 09:00


 4/14/19 08:59  3/18/19 08:07


 


 


 Clonidine HCl


  (Catapres Tab)  0.1 mg  Q4H  PRN


 ORAL


 For High Blood Pressure  3/17/19 19:41


 4/13/19 19:40   


 


 


 Dextrose


  (Dextrose 50%)  25 ml  Q30M  PRN


 IV


 Hypoglycemia  3/17/19 20:00


 4/13/19 17:59   


 


 


 Dextrose


  (Dextrose 50%)  50 ml  Q30M  PRN


 IV


 Hypoglycemia  3/17/19 20:00


 4/13/19 17:59   


 


 


 Divalproex Sodium


  (Depakote)  500 mg  Q12HR


 ORAL


   3/17/19 21:00


 4/13/19 20:59  3/18/19 21:33


 


 


 Escitalopram


 Oxalate


  (Lexapro)  10 mg  DAILY


 ORAL


   3/18/19 09:00


 4/14/19 08:59  3/18/19 08:06


 


 


 Heparin Sodium


  (Porcine)


  (Heparin 5000


 units/ml)  5,000 units  EVERY 12  HOURS


 SUBQ


   3/17/19 21:00


 4/13/19 20:59   


 


 


 Insulin Aspart


  (NovoLOG)    BEFORE MEALS AND  HS


 SUBQ


   3/17/19 21:00


 4/13/19 20:59  3/19/19 05:55


 


 


 Insulin Aspart


  (NovoLOG)  7 units  NOVOTIAC


 SUBQ


   3/18/19 06:30


 4/14/19 11:49  3/19/19 05:56


 


 


 Insulin Detemir


  (Levemir)  18 units  EVERY 12  HOURS


 SUBQ


   3/17/19 21:00


 4/13/19 22:29  3/18/19 21:40


 


 


 Lorazepam


  (Ativan 2mg/ml


 1ml)  0.5 mg  Q4H  PRN


 IV


 For Anxiety  3/17/19 19:42


 3/21/19 19:41   


 


 


 Lorazepam


  (Ativan 2mg/ml


 1ml)  2 mg  Q2H  PRN


 IV


 SEIZURE  3/17/19 19:42


 3/23/19 19:41   


 


 


 Morphine Sulfate


  (Morphine


 Sulfate)  1 mg  Q4H  PRN


 IVP


 severe pain   3/17/19 19:43


 3/21/19 19:42  3/19/19 03:39


 


 


 Ondansetron HCl


  (Zofran)  4 mg  Q6H  PRN


 IVP


 Nausea & Vomiting  3/17/19 20:30


 4/13/19 14:24  3/18/19 22:55


 


 


 Polyethylene


 Glycol


  (Miralax)  17 gm  HSPRN  PRN


 ORAL


 Constipation  3/17/19 19:43


 4/16/19 19:42   


 


 


 Quetiapine


 Fumarate


  (SEROquel)  200 mg  BEDTIME


 ORAL


   3/17/19 21:00


 4/13/19 20:59  3/18/19 22:55


 


 


 Zolpidem Tartrate


  (Ambien)  5 mg  HSPRN  PRN


 ORAL


 Insomnia  3/17/19 19:43


 3/24/19 19:42   


 














Item Value  Date Time


 


Bedside Blood Glucose 174 mg/dl H 3/19/19 0559


 


Bedside Blood Glucose 146 mg/dl H 3/18/19 2140


 


Bedside Blood Glucose 246 mg/dl H 3/18/19 1746


 


Bedside Blood Glucose 231 mg/dl H 3/18/19 1212


 


Bedside Blood Glucose 137 mg/dl H 3/18/19 0815


 


Bedside Blood Glucose 169 mg/dl H 3/18/19 0621

















Dusty Denis MD Mar 19, 2019 06:24

## 2019-03-20 VITALS — SYSTOLIC BLOOD PRESSURE: 118 MMHG | DIASTOLIC BLOOD PRESSURE: 71 MMHG

## 2019-03-20 VITALS — SYSTOLIC BLOOD PRESSURE: 129 MMHG | DIASTOLIC BLOOD PRESSURE: 70 MMHG

## 2019-03-20 VITALS — SYSTOLIC BLOOD PRESSURE: 125 MMHG | DIASTOLIC BLOOD PRESSURE: 72 MMHG

## 2019-03-20 VITALS — DIASTOLIC BLOOD PRESSURE: 60 MMHG | SYSTOLIC BLOOD PRESSURE: 110 MMHG

## 2019-03-20 VITALS — SYSTOLIC BLOOD PRESSURE: 114 MMHG | DIASTOLIC BLOOD PRESSURE: 63 MMHG

## 2019-03-20 LAB
ADD MANUAL DIFF: NO
ANION GAP SERPL CALC-SCNC: 10 MMOL/L (ref 5–15)
BASOPHILS NFR BLD AUTO: 0.9 % (ref 0–2)
BUN SERPL-MCNC: 19 MG/DL (ref 7–18)
CALCIUM SERPL-MCNC: 9.2 MG/DL (ref 8.5–10.1)
CHLORIDE SERPL-SCNC: 101 MMOL/L (ref 98–107)
CO2 SERPL-SCNC: 26 MMOL/L (ref 21–32)
CREAT SERPL-MCNC: 0.9 MG/DL (ref 0.55–1.3)
EOSINOPHIL NFR BLD AUTO: 2.9 % (ref 0–3)
ERYTHROCYTE [DISTWIDTH] IN BLOOD BY AUTOMATED COUNT: 12.2 % (ref 11.6–14.8)
HCT VFR BLD CALC: 39.7 % (ref 42–52)
HGB BLD-MCNC: 13.5 G/DL (ref 14.2–18)
LYMPHOCYTES NFR BLD AUTO: 36.4 % (ref 20–45)
MCV RBC AUTO: 87 FL (ref 80–99)
MONOCYTES NFR BLD AUTO: 9.1 % (ref 1–10)
NEUTROPHILS NFR BLD AUTO: 50.8 % (ref 45–75)
PLATELET # BLD: 139 K/UL (ref 150–450)
POTASSIUM SERPL-SCNC: 4 MMOL/L (ref 3.5–5.1)
RBC # BLD AUTO: 4.57 M/UL (ref 4.7–6.1)
SODIUM SERPL-SCNC: 137 MMOL/L (ref 136–145)
WBC # BLD AUTO: 7.8 K/UL (ref 4.8–10.8)

## 2019-03-20 RX ADMIN — INSULIN ASPART SCH UNITS: 100 INJECTION, SOLUTION INTRAVENOUS; SUBCUTANEOUS at 12:13

## 2019-03-20 RX ADMIN — MORPHINE SULFATE PRN MG: 2 INJECTION, SOLUTION INTRAMUSCULAR; INTRAVENOUS at 01:57

## 2019-03-20 RX ADMIN — HYDROCODONE BITARTRATE AND ACETAMINOPHEN PRN TAB: 10; 325 TABLET ORAL at 09:02

## 2019-03-20 RX ADMIN — DIVALPROEX SODIUM SCH MG: 500 TABLET, DELAYED RELEASE ORAL at 09:01

## 2019-03-20 RX ADMIN — INSULIN DETEMIR SCH UNITS: 100 INJECTION, SOLUTION SUBCUTANEOUS at 20:41

## 2019-03-20 RX ADMIN — DIVALPROEX SODIUM SCH MG: 500 TABLET, DELAYED RELEASE ORAL at 20:37

## 2019-03-20 RX ADMIN — MORPHINE SULFATE PRN MG: 2 INJECTION, SOLUTION INTRAMUSCULAR; INTRAVENOUS at 19:11

## 2019-03-20 RX ADMIN — MORPHINE SULFATE PRN MG: 2 INJECTION, SOLUTION INTRAMUSCULAR; INTRAVENOUS at 10:18

## 2019-03-20 RX ADMIN — QUETIAPINE SCH MG: 200 TABLET, FILM COATED ORAL at 22:04

## 2019-03-20 RX ADMIN — LORAZEPAM PRN MG: 2 INJECTION, SOLUTION INTRAMUSCULAR; INTRAVENOUS at 21:48

## 2019-03-20 RX ADMIN — INSULIN DETEMIR SCH UNITS: 100 INJECTION, SOLUTION SUBCUTANEOUS at 09:36

## 2019-03-20 RX ADMIN — INSULIN ASPART SCH UNITS: 100 INJECTION, SOLUTION INTRAVENOUS; SUBCUTANEOUS at 06:16

## 2019-03-20 RX ADMIN — INSULIN ASPART SCH UNITS: 100 INJECTION, SOLUTION INTRAVENOUS; SUBCUTANEOUS at 20:40

## 2019-03-20 RX ADMIN — HEPARIN SODIUM SCH UNITS: 5000 INJECTION INTRAVENOUS; SUBCUTANEOUS at 09:31

## 2019-03-20 RX ADMIN — INSULIN ASPART SCH UNITS: 100 INJECTION, SOLUTION INTRAVENOUS; SUBCUTANEOUS at 12:12

## 2019-03-20 RX ADMIN — HEPARIN SODIUM SCH UNITS: 5000 INJECTION INTRAVENOUS; SUBCUTANEOUS at 20:41

## 2019-03-20 RX ADMIN — ASPIRIN 81 MG SCH MG: 81 TABLET ORAL at 09:01

## 2019-03-20 RX ADMIN — INSULIN ASPART SCH UNITS: 100 INJECTION, SOLUTION INTRAVENOUS; SUBCUTANEOUS at 17:05

## 2019-03-20 RX ADMIN — MORPHINE SULFATE PRN MG: 2 INJECTION, SOLUTION INTRAMUSCULAR; INTRAVENOUS at 23:54

## 2019-03-20 RX ADMIN — INSULIN ASPART SCH UNITS: 100 INJECTION, SOLUTION INTRAVENOUS; SUBCUTANEOUS at 06:19

## 2019-03-20 RX ADMIN — MORPHINE SULFATE PRN MG: 2 INJECTION, SOLUTION INTRAMUSCULAR; INTRAVENOUS at 15:18

## 2019-03-20 RX ADMIN — HYDROCODONE BITARTRATE AND ACETAMINOPHEN PRN TAB: 10; 325 TABLET ORAL at 17:01

## 2019-03-20 RX ADMIN — MORPHINE SULFATE PRN MG: 2 INJECTION, SOLUTION INTRAMUSCULAR; INTRAVENOUS at 06:13

## 2019-03-20 NOTE — NUR
NURSE NOTES:

RN SPOKE TO AMAN CABRERA AND MADE AWARE FOR CASE MANAGEMENT PLANS FOR PT TO GO TO [facility]. 
NEEDS PRESCRIPTIONS FOR PAIN MEDS.; PER AMAN CABRERA, HE WILL CALL IN TO [facility]. 
CRN MADE AWARE.

## 2019-03-20 NOTE — CARDIOLOGY PROGRESS NOTE
Assessment/Plan


Assessment/Plan


The patient is seen and examined, full consult note will be dictated.





Objective





Last 24 Hour Vital Signs








  Date Time  Temp Pulse Resp B/P (MAP) Pulse Ox O2 Delivery O2 Flow Rate FiO2


 


3/20/19 21:00      Room Air  


 


3/20/19 20:50  78 20   Nasal Cannula 2.0 28


 


3/20/19 20:00 98.2 79 18 118/71 (87) 94   


 


3/20/19 16:00 98.2 97 19 129/70 (89) 96   


 


3/20/19 12:00 97.9 74 16 114/63 (80) 98   


 


3/20/19 09:00      Room Air  


 


3/20/19 08:00  93 22 125/72 (89) 95   





  93      


 


3/20/19 07:45  79 20   Room Air  21


 


3/20/19 04:00 98.4 65 18 110/60 (77) 97   

















Intake and Output  


 


 3/19/19 3/20/19





 19:00 07:00


 


Intake Total 1080 ml 


 


Balance 1080 ml 


 


  


 


Intake Oral 1080 ml 











Laboratory Tests








Test


  3/20/19


08:55


 


White Blood Count


  7.8 K/UL


(4.8-10.8)


 


Red Blood Count


  4.57 M/UL


(4.70-6.10)  L


 


Hemoglobin


  13.5 G/DL


(14.2-18.0)  L


 


Hematocrit


  39.7 %


(42.0-52.0)  L


 


Mean Corpuscular Volume 87 FL (80-99)  


 


Mean Corpuscular Hemoglobin


  29.6 PG


(27.0-31.0)


 


Mean Corpuscular Hemoglobin


Concent 34.1 G/DL


(32.0-36.0)


 


Red Cell Distribution Width


  12.2 %


(11.6-14.8)


 


Platelet Count


  139 K/UL


(150-450)  L


 


Mean Platelet Volume


  8.0 FL


(6.5-10.1)


 


Neutrophils (%) (Auto)


  50.8 %


(45.0-75.0)


 


Lymphocytes (%) (Auto)


  36.4 %


(20.0-45.0)


 


Monocytes (%) (Auto)


  9.1 %


(1.0-10.0)


 


Eosinophils (%) (Auto)


  2.9 %


(0.0-3.0)


 


Basophils (%) (Auto)


  0.9 %


(0.0-2.0)


 


Sodium Level


  137 MMOL/L


(136-145)


 


Potassium Level


  4.0 MMOL/L


(3.5-5.1)


 


Chloride Level


  101 MMOL/L


()


 


Carbon Dioxide Level


  26 MMOL/L


(21-32)


 


Anion Gap


  10 mmol/L


(5-15)


 


Blood Urea Nitrogen


  19 mg/dL


(7-18)  H


 


Creatinine


  0.9 MG/DL


(0.55-1.30)


 


Estimat Glomerular Filtration


Rate > 60 mL/min


(>60)


 


Glucose Level


  209 MG/DL


()  H


 


Calcium Level


  9.2 MG/DL


(8.5-10.1)

















Oskar Donaldson MD Mar 20, 2019 23:51

## 2019-03-20 NOTE — CONSULTATION
History of Present Illness


General


Date patient seen:  Mar 20, 2019


Chief Complaint:  Chest Pain





Present Illness


HPI


37 y/o M with hx of syncopal episodes, neuropathy, COPD, CVA w/ left side 

weakness, Dm2, CAD, lumbar spine surgery, obesity, bipolar disorder, seizure 

disorder, HTN presents to ED on 3/14 with chest pain and seizures.





Patient had a scalp scab resultant from injury after seizure. NO signs of 

infection. It is itchy





Denied HA, f/c, SOB, n/v/d upon admission


Allergies:  


Coded Allergies:  


     HALOPERIDOL (Verified  Allergy, Unknown, 2/5/19)





Medication History


Scheduled


Aspirin* (Aspirin*), 81 MG ORAL DAILY, (Reported)


Divalproex Sodium* (Depakote*), 500 MG PO Q12HR, (Reported)


Escitalopram Oxalate* (Lexapro*), 10 MG ORAL DAILY, (Reported)


Insulin Aspart (Novolog), 15 UNIT SQ BEFORE MEALS, (Reported)


Insulin Detemir (Levemir Flexpen), 18 UNITS SUBQ EVERY 12 HOURS


Insulin Glargine (Lantus), 30 UNITS SUBQ BEDTIME, (Reported)


Quetiapine Fumarate* (Seroquel*), 200 MG ORAL BEDTIME, (Reported)





Scheduled PRN


Alprazolam* (Xanax*), 0.5 MG PO Q6HR PRN for For Anxiety, (Reported)


Clonidine Hcl* (Catapres*), 0.1 MG ORAL Q4HR PRN for For High Blood Pressure, (

Reported)


Insulin Aspart (Novolog), 0 SQ BEFORE MEALS PRN for Sliding Scale, (Reported)


Ipratropium/Albuterol Sulfate (DuoNeb 0.5-3(2.5)mg/3ml), 3 ML HHN Q4HR PRN for 

Shortness of Breath, (Reported)





Patient History


Healthcare decision maker





Resuscitation status





Advanced Directive on File





Patient History Narrative








Pmhx: as above





Shx:  The patient has history of marijuana use, alcohol use, and smoking 

cigarettes.   He smokes about a pack a day.  





Fhx non contributory





Review of Systems


All Other Systems:  negative except mentioned in HPI





Physical Exam


Physical Exam Narrative





GENERAL:  Calm in bed, oriented x3, and in no acute distress.


CARDIOVASCULAR:  No murmurs.


LUNGS:  Distant and clear.


ABDOMEN:  Bowel sounds positive.  Nontender.  Nondistended.


EXTREMITIES:  No cyanosis, clubbing, or edema.


NEUROLOGIC:  The patient moves all extremities, slightly weak





Last 24 Hour Vital Signs








  Date Time  Temp Pulse Resp B/P (MAP) Pulse Ox O2 Delivery O2 Flow Rate FiO2


 


3/20/19 12:00 97.9 74 16 114/63 (80) 98   


 


3/20/19 08:00  93 22 125/72 (89) 95   





  93      


 


3/20/19 07:45  79 20   Room Air  21


 


3/20/19 04:00 98.4 65 18 110/60 (77) 97   


 


3/19/19 21:38  103 20   Room Air  21


 


3/19/19 21:00      Room Air  


 


3/19/19 20:00 98.2 61 20 115/61 (79) 96   


 


3/19/19 17:43 97.6       

















Intake and Output  


 


 3/19/19 3/20/19





 18:59 06:59


 


Intake Total 1080 ml 


 


Balance 1080 ml 


 


  


 


Intake Oral 1080 ml 











Laboratory Tests








Test


  3/20/19


08:55


 


White Blood Count


  7.8 K/UL


(4.8-10.8)


 


Red Blood Count


  4.57 M/UL


(4.70-6.10)  L


 


Hemoglobin


  13.5 G/DL


(14.2-18.0)  L


 


Hematocrit


  39.7 %


(42.0-52.0)  L


 


Mean Corpuscular Volume 87 FL (80-99)  


 


Mean Corpuscular Hemoglobin


  29.6 PG


(27.0-31.0)


 


Mean Corpuscular Hemoglobin


Concent 34.1 G/DL


(32.0-36.0)


 


Red Cell Distribution Width


  12.2 %


(11.6-14.8)


 


Platelet Count


  139 K/UL


(150-450)  L


 


Mean Platelet Volume


  8.0 FL


(6.5-10.1)


 


Neutrophils (%) (Auto)


  50.8 %


(45.0-75.0)


 


Lymphocytes (%) (Auto)


  36.4 %


(20.0-45.0)


 


Monocytes (%) (Auto)


  9.1 %


(1.0-10.0)


 


Eosinophils (%) (Auto)


  2.9 %


(0.0-3.0)


 


Basophils (%) (Auto)


  0.9 %


(0.0-2.0)


 


Sodium Level


  137 MMOL/L


(136-145)


 


Potassium Level


  4.0 MMOL/L


(3.5-5.1)


 


Chloride Level


  101 MMOL/L


()


 


Carbon Dioxide Level


  26 MMOL/L


(21-32)


 


Anion Gap


  10 mmol/L


(5-15)


 


Blood Urea Nitrogen


  19 mg/dL


(7-18)  H


 


Creatinine


  0.9 MG/DL


(0.55-1.30)


 


Estimat Glomerular Filtration


Rate > 60 mL/min


(>60)


 


Glucose Level


  209 MG/DL


()  H


 


Calcium Level


  9.2 MG/DL


(8.5-10.1)








Height (Feet):  5


Height (Inches):  11.00


Weight (Pounds):  264


Medications





Current Medications








 Medications


  (Trade)  Dose


 Ordered  Sig/Henry


 Route


 PRN Reason  Start Time


 Stop Time Status Last Admin


Dose Admin


 


 Acetaminophen


  (Tylenol)  650 mg  Q4H  PRN


 ORAL


 fever  3/17/19 19:40


 4/13/19 19:39   


 


 


 Acetaminophen/


 Hydrocodone Bitart


  (Norco 10/325)  1 tab  Q6H  PRN


 ORAL


 moderate breakthrough pain   3/20/19 09:00


 3/24/19 08:59  3/20/19 09:02


 


 


 Al Hydroxide/Mg


 Hydroxide


  (Mylanta II)  30 ml  Q6H  PRN


 ORAL


 dyspepsia  3/17/19 19:41


 4/13/19 19:40   


 


 


 Alprazolam


  (Xanax)  0.5 mg  Q6H  PRN


 ORAL


 For Anxiety  3/17/19 19:41


 3/21/19 19:40   


 


 


 Aspirin


  (ASA)  81 mg  DAILY


 ORAL


   3/18/19 09:00


 4/14/19 08:59  3/20/19 09:01


 


 


 Clonidine HCl


  (Catapres Tab)  0.1 mg  Q4H  PRN


 ORAL


 For High Blood Pressure  3/17/19 19:41


 4/13/19 19:40   


 


 


 Dextrose


  (Dextrose 50%)  25 ml  Q30M  PRN


 IV


 Hypoglycemia  3/17/19 20:00


 4/13/19 17:59   


 


 


 Dextrose


  (Dextrose 50%)  50 ml  Q30M  PRN


 IV


 Hypoglycemia  3/17/19 20:00


 4/13/19 17:59   


 


 


 Divalproex Sodium


  (Depakote)  500 mg  Q12HR


 ORAL


   3/17/19 21:00


 4/13/19 20:59  3/20/19 09:01


 


 


 Escitalopram


 Oxalate


  (Lexapro)  10 mg  DAILY


 ORAL


   3/18/19 09:00


 4/14/19 08:59  3/20/19 09:01


 


 


 Heparin Sodium


  (Porcine)


  (Heparin 5000


 units/ml)  5,000 units  EVERY 12  HOURS


 SUBQ


   3/17/19 21:00


 4/13/19 20:59   


 


 


 Insulin Aspart


  (NovoLOG)    BEFORE MEALS AND  HS


 SUBQ


   3/17/19 21:00


 4/13/19 20:59  3/20/19 12:13


 


 


 Insulin Aspart


  (NovoLOG)  7 units  NOVOTIAC


 SUBQ


   3/18/19 06:30


 4/14/19 11:49  3/20/19 12:12


 


 


 Insulin Detemir


  (Levemir)  18 units  EVERY 12  HOURS


 SUBQ


   3/17/19 21:00


 4/13/19 22:29  3/20/19 09:36


 


 


 Lorazepam


  (Ativan 2mg/ml


 1ml)  0.5 mg  Q4H  PRN


 IV


 For Anxiety  3/17/19 19:42


 3/21/19 19:41  3/19/19 10:32


 


 


 Lorazepam


  (Ativan 2mg/ml


 1ml)  2 mg  Q2H  PRN


 IV


 SEIZURE  3/17/19 19:42


 3/23/19 19:41   


 


 


 Morphine Sulfate


  (Morphine


 Sulfate)  1 mg  Q4H  PRN


 IVP


 Severe Pain (Pain Scale 7-10)  3/20/19 10:15


 3/27/19 10:14  3/20/19 10:18


 


 


 Ondansetron HCl


  (Zofran)  4 mg  Q6H  PRN


 IVP


 Nausea & Vomiting  3/17/19 20:30


 4/13/19 14:24  3/20/19 10:18


 


 


 Polyethylene


 Glycol


  (Miralax)  17 gm  HSPRN  PRN


 ORAL


 Constipation  3/17/19 19:43


 4/16/19 19:42   


 


 


 Quetiapine


 Fumarate


  (SEROquel)  200 mg  BEDTIME


 ORAL


   3/17/19 21:00


 4/13/19 20:59  3/19/19 21:19


 


 


 Zolpidem Tartrate


  (Ambien)  5 mg  HSPRN  PRN


 ORAL


 Insomnia  3/17/19 19:43


 3/24/19 19:42   


 











Assessment/Plan


Assessment/Plan


Abx:


None





Assessment:





Scalp superficial wound- not infected





Chest pain- suspected 2ry to costochondritis


  3/16 CXR: No acute findings.





Afebrile


No leukocytosis- no evidence of active infectious process





Seizures episodes





hx of syncopal episodes


neuropathy


COPD


 CVA w/ left side weakness


Dm2


 CAD


 lumbar spine surgery


 obesity


bipolar disorder


seizure disorder


HTN





Plan:


-Continue to monitor off abx


-f.u cx


-Monitor CBC/CMP, temperatures


-aspiration precautions


-Cards, neuro f/u


-local wound care per hospital protocol





Thank you for this consultation.


Will continue to follow along with you.











Shagufta Arias M.D. Mar 20, 2019 14:32

## 2019-03-20 NOTE — NUR
NURSE NOTES:

PT AXOX4, ARGUES WITH NURSE. STATES HE CAN GET NORCO 10/325 BETWEEN MORPHINE 1MG DOSES. RN 
EXPLAINED TO PT, THE NORCO ORDER IS NOT FOR BREAKTHROUGH PAIN. RN CLARIFIED WITH AMAN CABRERA AND APPROVED CHANGING NORCO TO BREAKTHROUGH PAIN PRN. PT IN NO APPARENT DISTRESS AT 
THIS TIME.

## 2019-03-20 NOTE — GENERAL PROGRESS NOTE
Assessment/Plan


Problem List:  


(1) COPD (chronic obstructive pulmonary disease)


ICD Codes:  J44.9 - Chronic obstructive pulmonary disease, unspecified


SNOMED:  02309339


(2) Diabetes mellitus


ICD Codes:  E11.9 - Diabetes mellitus


SNOMED:  01738821


Assessment/Plan


continue Levemir 18 units bid 


continuu Novolog 7 units ac tid 


continue NISS ac / hs





Subjective


Allergies:  


Coded Allergies:  


     HALOPERIDOL (Verified  Allergy, Unknown, 2/5/19)


All Systems:  reviewed and negative except above


Subjective


events noted 











Item Value  Date Time


 


Bedside Blood Glucose 148 mg/dl H 3/19/19 2120


 


Bedside Blood Glucose 208 mg/dl H 3/19/19 1658


 


Bedside Blood Glucose 159 mg/dl H 3/19/19 1142


 


Bedside Blood Glucose 174 mg/dl H 3/19/19 0831


 


Bedside Blood Glucose 174 mg/dl H 3/19/19 0559











Objective





Last 24 Hour Vital Signs








  Date Time  Temp Pulse Resp B/P (MAP) Pulse Ox O2 Delivery O2 Flow Rate FiO2


 


3/20/19 04:00 98.4 65 18 110/60 (77) 97   


 


3/19/19 21:38  103 20   Room Air  21


 


3/19/19 21:00      Room Air  


 


3/19/19 20:00 98.2 61 20 115/61 (79) 96   


 


3/19/19 17:43 97.6       


 


3/19/19 08:00      Venturi Mask  

















Intake and Output  


 


 3/19/19 3/20/19





 19:00 07:00


 


Intake Total 1080 ml 


 


Balance 1080 ml 


 


  


 


Intake Oral 1080 ml 








Laboratory Tests


3/19/19 11:10: 


White Blood Count 7.4, Red Blood Count 4.91, Hemoglobin 14.6, Hematocrit 42.6, 

Mean Corpuscular Volume 87, Mean Corpuscular Hemoglobin 29.7, Mean Corpuscular 

Hemoglobin Concent 34.2, Red Cell Distribution Width 12.1, Platelet Count 163, 

Mean Platelet Volume 7.7, Neutrophils (%) (Auto) 52.5, Lymphocytes (%) (Auto) 

37.7, Monocytes (%) (Auto) 6.1, Eosinophils (%) (Auto) 2.8, Basophils (%) (Auto

) 1.0, Sodium Level 136, Potassium Level 4.1, Chloride Level 100, Carbon 

Dioxide Level 26, Anion Gap 10, Blood Urea Nitrogen 17, Creatinine 0.8, Estimat 

Glomerular Filtration Rate > 60, Glucose Level 184H, Calcium Level 9.0


Height (Feet):  5


Height (Inches):  11.00


Weight (Pounds):  261


General Appearance:  no apparent distress


Neck:  normal alignment


Cardiovascular:  normal rate


Respiratory/Chest:  normal breath sounds


Abdomen:  normal bowel sounds


Objective





Current Medications








 Medications


  (Trade)  Dose


 Ordered  Sig/Henry


 Route


 PRN Reason  Start Time


 Stop Time Status Last Admin


Dose Admin


 


 Acetaminophen


  (Tylenol)  650 mg  Q4H  PRN


 ORAL


 fever  3/17/19 19:40


 4/13/19 19:39   


 


 


 Acetaminophen/


 Hydrocodone Bitart


  (Norco 10/325)  1 tab  Q6H  PRN


 ORAL


 moderate pain   3/17/19 19:42


 3/24/19 19:41   


 


 


 Al Hydroxide/Mg


 Hydroxide


  (Mylanta II)  30 ml  Q6H  PRN


 ORAL


 dyspepsia  3/17/19 19:41


 4/13/19 19:40   


 


 


 Alprazolam


  (Xanax)  0.5 mg  Q6H  PRN


 ORAL


 For Anxiety  3/17/19 19:41


 3/21/19 19:40   


 


 


 Aspirin


  (ASA)  81 mg  DAILY


 ORAL


   3/18/19 09:00


 4/14/19 08:59  3/19/19 08:23


 


 


 Clonidine HCl


  (Catapres Tab)  0.1 mg  Q4H  PRN


 ORAL


 For High Blood Pressure  3/17/19 19:41


 4/13/19 19:40   


 


 


 Dextrose


  (Dextrose 50%)  25 ml  Q30M  PRN


 IV


 Hypoglycemia  3/17/19 20:00


 4/13/19 17:59   


 


 


 Dextrose


  (Dextrose 50%)  50 ml  Q30M  PRN


 IV


 Hypoglycemia  3/17/19 20:00


 4/13/19 17:59   


 


 


 Divalproex Sodium


  (Depakote)  500 mg  Q12HR


 ORAL


   3/17/19 21:00


 4/13/19 20:59  3/19/19 21:20


 


 


 Escitalopram


 Oxalate


  (Lexapro)  10 mg  DAILY


 ORAL


   3/18/19 09:00


 4/14/19 08:59  3/19/19 08:23


 


 


 Heparin Sodium


  (Porcine)


  (Heparin 5000


 units/ml)  5,000 units  EVERY 12  HOURS


 SUBQ


   3/17/19 21:00


 4/13/19 20:59   


 


 


 Insulin Aspart


  (NovoLOG)    BEFORE MEALS AND  HS


 SUBQ


   3/17/19 21:00


 4/13/19 20:59  3/19/19 21:20


 


 


 Insulin Aspart


  (NovoLOG)  7 units  NOVOTIAC


 SUBQ


   3/18/19 06:30


 4/14/19 11:49  3/19/19 16:58


 


 


 Insulin Detemir


  (Levemir)  18 units  EVERY 12  HOURS


 SUBQ


   3/17/19 21:00


 4/13/19 22:29  3/19/19 21:19


 


 


 Lorazepam


  (Ativan 2mg/ml


 1ml)  0.5 mg  Q4H  PRN


 IV


 For Anxiety  3/17/19 19:42


 3/21/19 19:41  3/19/19 10:32


 


 


 Lorazepam


  (Ativan 2mg/ml


 1ml)  2 mg  Q2H  PRN


 IV


 SEIZURE  3/17/19 19:42


 3/23/19 19:41   


 


 


 Morphine Sulfate


  (Morphine


 Sulfate)  1 mg  Q4H  PRN


 IVP


 severe pain   3/17/19 19:43


 3/21/19 19:42  3/20/19 01:57


 


 


 Ondansetron HCl


  (Zofran)  4 mg  Q6H  PRN


 IVP


 Nausea & Vomiting  3/17/19 20:30


 4/13/19 14:24  3/19/19 14:41


 


 


 Polyethylene


 Glycol


  (Miralax)  17 gm  HSPRN  PRN


 ORAL


 Constipation  3/17/19 19:43


 4/16/19 19:42   


 


 


 Quetiapine


 Fumarate


  (SEROquel)  200 mg  BEDTIME


 ORAL


   3/17/19 21:00


 4/13/19 20:59  3/19/19 21:19


 


 


 Zolpidem Tartrate


  (Ambien)  5 mg  HSPRN  PRN


 ORAL


 Insomnia  3/17/19 19:43


 3/24/19 19:42   


 

















Dusty Denis MD Mar 20, 2019 06:04

## 2019-03-20 NOTE — GENERAL PROGRESS NOTE
Assessment/Plan


Assessment/Plan


(1) Chiari malformation type II


(2) Degenerative disc disease, cervical


(3) Lumbar degenerative disc disease


(4) Arthritis, lumbar spine


(5) Peripheral neuropathy


(6) Hydrocephalus


(7) Diabetes mellitus


Pt will be continued on the Morphine and [place].


Pt was d/w Dr. Franco and he concurred.





Subjective


Date patient seen:  Mar 20, 2019


Time patient seen:  07:15 - am


Allergies:  


Coded Allergies:  


     HALOPERIDOL (Verified  Allergy, Unknown, 2/5/19)


Subjective


Constitutional:  Reports: weakness, Denies: chills, diaphoresis, fever, malaise

, no symptoms, other


HEENT:  Denies: blurred vision, double vision, ear discharge, ear pain, eye pain

, mouth pain, mouth swelling, no symptoms, nose congestion, nose pain, other, 

tearing, throat pain, throat swelling


Cardiovascular:  Denies: chest pain, edema, irregular heart rate, 

lightheadedness, no symptoms, other, palpitations, syncope


Respiratory:  Denies: SOB at rest, SOB with excertion, cough, no symptoms, 

orthopnea, other, shortness of breath, sputum, stridor, wheezing


Gastrointestinal/Abdominal:  Denies: abdomen distended, abdominal pain, black 

stools, blood in stool, constipated, diarrhea, difficulty swallowing, nausea, 

no symptoms, other, poor appetite, poor fluid intake, rectal bleeding, tarry 

stools, vomiting


Genitourinary:  Denies: burning, discharge, flank pain, frequency, hematuria, 

incontinence, no symptoms, other, pain, urgency


Neurologic/Psychiatric:  Reports: headache, numbness, tingling, weakness, Denies

: anxiety, depressed, emotional problems, no symptoms, other, paresthesia, pre-

existing deficit, seizure, tremors


Endocrine:  Denies: excessive sweating, flushing, increased hunger, increased 

thirst, increased urine, intolerance to cold, intolerance to heat, no symptoms, 

other, unexplained weight gain, unexplained weight loss


Hematologic/Lymphatic:  Denies: anemia, easy bleeding, easy bruising, no 

symptoms, other


 


Subjective


Patient is standing walking around with no signs of pain


His pain has been tolerated on the [place] and Morphine. 


No new complaints at this time.





Objective





Last 24 Hour Vital Signs








  Date Time  Temp Pulse Resp B/P (MAP) Pulse Ox O2 Delivery O2 Flow Rate FiO2


 


3/20/19 07:45  79 20   Room Air  21


 


3/20/19 04:00 98.4 65 18 110/60 (77) 97   


 


3/19/19 21:38  103 20   Room Air  21


 


3/19/19 21:00      Room Air  


 


3/19/19 20:00 98.2 61 20 115/61 (79) 96   


 


3/19/19 17:43 97.6       

















Intake and Output  


 


 3/19/19 3/20/19





 18:59 06:59


 


Intake Total 1080 ml 


 


Balance 1080 ml 


 


  


 


Intake Oral 1080 ml 








Laboratory Tests


3/19/19 11:10: 


White Blood Count 7.4, Red Blood Count 4.91, Hemoglobin 14.6, Hematocrit 42.6, 

Mean Corpuscular Volume 87, Mean Corpuscular Hemoglobin 29.7, Mean Corpuscular 

Hemoglobin Concent 34.2, Red Cell Distribution Width 12.1, Platelet Count 163, 

Mean Platelet Volume 7.7, Neutrophils (%) (Auto) 52.5, Lymphocytes (%) (Auto) 

37.7, Monocytes (%) (Auto) 6.1, Eosinophils (%) (Auto) 2.8, Basophils (%) (Auto

) 1.0, Sodium Level 136, Potassium Level 4.1, Chloride Level 100, Carbon 

Dioxide Level 26, Anion Gap 10, Blood Urea Nitrogen 17, Creatinine 0.8, Estimat 

Glomerular Filtration Rate > 60, Glucose Level 184H, Calcium Level 9.0


Height (Feet):  5


Height (Inches):  11.00


Weight (Pounds):  264


Objective


General Appearance:  no apparent distress, alert


EENT:  PERRL/EOMI, normal ENT inspection


Neck:  normal alignment, supple


Cardiovascular:  normal rate, regular rhythm


Respiratory/Chest:  lungs clear, normal breath sounds


Abdomen:  non tender, soft


Extremities:  non-tender


Edema:  trace edema


Neurologic:  alert, responsive


Skin:  warm/dry











Bill Sanz Mar 20, 2019 08:38

## 2019-03-20 NOTE — GENERAL PROGRESS NOTE
Assessment/Plan


Problem List:  


(1) ACS (acute coronary syndrome)


ICD Codes:  I24.9 - Acute ischemic heart disease, unspecified


SNOMED:  411870735


(2) Diabetes mellitus


ICD Codes:  E11.9 - Diabetes mellitus


SNOMED:  66438862


(3) COPD (chronic obstructive pulmonary disease)


ICD Codes:  J44.9 - Chronic obstructive pulmonary disease, unspecified


SNOMED:  06675379


(4) CVA (cerebral vascular accident)


ICD Codes:  I63.9 - Cerebral infarction, unspecified


SNOMED:  121851564


(5) Uncontrolled seizures


ICD Codes:  R56.9 - Unspecified convulsions


SNOMED:  82761753


Status:  stable, progressing


Assessment/Plan


seizure control pt diet pain control cardio f/u bp bs control cbc bmp am dc 

plan if clear





Subjective


Constitutional:  Reports: weakness


Allergies:  


Coded Allergies:  


     HALOPERIDOL (Verified  Allergy, Unknown, 2/5/19)


All Systems:  reviewed and negative except above


Subjective


calm in bed





Objective





Last 24 Hour Vital Signs








  Date Time  Temp Pulse Resp B/P (MAP) Pulse Ox O2 Delivery O2 Flow Rate FiO2


 


3/20/19 12:00 97.9 74 16 114/63 (80) 98   


 


3/20/19 09:00      Room Air  


 


3/20/19 08:00  93 22 125/72 (89) 95   





  93      


 


3/20/19 07:45  79 20   Room Air  21


 


3/20/19 04:00 98.4 65 18 110/60 (77) 97   


 


3/19/19 21:38  103 20   Room Air  21


 


3/19/19 21:00      Room Air  


 


3/19/19 20:00 98.2 61 20 115/61 (79) 96   


 


3/19/19 17:43 97.6       

















Intake and Output  


 


 3/19/19 3/20/19





 18:59 06:59


 


Intake Total 1080 ml 


 


Balance 1080 ml 


 


  


 


Intake Oral 1080 ml 








Laboratory Tests


3/20/19 08:55: 


White Blood Count 7.8, Red Blood Count 4.57L, Hemoglobin 13.5L, Hematocrit 39.7L

, Mean Corpuscular Volume 87, Mean Corpuscular Hemoglobin 29.6, Mean 

Corpuscular Hemoglobin Concent 34.1, Red Cell Distribution Width 12.2, Platelet 

Count 139L, Mean Platelet Volume 8.0, Neutrophils (%) (Auto) 50.8, Lymphocytes (

%) (Auto) 36.4, Monocytes (%) (Auto) 9.1, Eosinophils (%) (Auto) 2.9, Basophils 

(%) (Auto) 0.9, Sodium Level 137, Potassium Level 4.0, Chloride Level 101, 

Carbon Dioxide Level 26, Anion Gap 10, Blood Urea Nitrogen 19H, Creatinine 0.9, 

Estimat Glomerular Filtration Rate > 60, Glucose Level 209H, Calcium Level 9.2


Height (Feet):  5


Height (Inches):  11.00


Weight (Pounds):  264


General Appearance:  lethargic


EENT:  normal ENT inspection


Neck:  normal alignment


Cardiovascular:  normal peripheral pulses, normal rate, regular rhythm


Respiratory/Chest:  chest wall non-tender, lungs clear, normal breath sounds


Abdomen:  normal bowel sounds, non tender, soft


Extremities:  normal inspection


Edema:  no edema noted Arm (L), no edema noted Arm (R), no edema noted Leg (L), 

no edema noted Leg (R), no edema noted Pedal (L), no edema noted Pedal (R), no 

edema noted Generalized


Neurologic:  responsive, motor weakness


Skin:  normal pigmentation, warm/dry











Samir Cabrera DO Mar 20, 2019 14:38

## 2019-03-20 NOTE — PROGRESS NOTE
DATE:  03/20/2019

SUBJECTIVE:  This is a 38-year-old male patient with acute coronary

syndrome.  He has altered mental status, confusion, declined in cognition

below his baseline so his attending has requested daily psychiatric

consultation.



MENTAL STATUS EXAMINATION:  The patient is a 38-year-old male.  Appearance

is disheveled.  Attitude, irritable and agitated.  Affect, guarded and

restricted.  Intellect poor.  Mood depressed and anxious.  Motor activity,

psychomotor agitation.  Attention is poor.  Orientation x2.  Speech is low

volume and slurred.  Thought process, disorganized and illogical.  Insight

and judgment is poor.



DIAGNOSIS:  Schizoaffective, bipolar type.



PLAN:  Treat him with Depakote 500 mg q.12 h., Xanax 0.5 every six hours

p.r.n. anxiety and agitation, Lexapro 10 mg q.a.m., Seroquel 200 at

bedtime.   Provided him with 20 minutes of cognitive behavioral therapy to

help him identify his automatic negative thoughts and help him convert

those negative thoughts to more positive thinking automatically in order

to reduce depression, anxiety, and help him have more adaptive behavioral

pattern.  Chart was reviewed.  Discussed with staff.  Seen and assessed at

bedside.









  ______________________________________________

  Gulshan Armstrong M.D.





DR:  Thor

D:  03/20/2019 08:13

T:  03/20/2019 16:34

JOB#:  [id number]

CC:

## 2019-03-20 NOTE — NUR
NURSE NOTES:

Received patient on bed awake, no s/s of any distress, denies pain as of this time, IV line 
patent and intact, side rails are padded, Bed in low position and locked, call light within 
reach, will continue to monitor.

## 2019-03-20 NOTE — NUR
NURSE NOTES:

RN LEFT MESSAGE FOR DR. DENTON REGARDING PT REQUIRING PRESCRIPTIONS FOR GLUCOMETER, STRIPS, 
AND NOVOLOG DUE TO PLANS FOR PT BEING DISCHARGED TO Pending sale to [facility].

## 2019-03-21 VITALS — DIASTOLIC BLOOD PRESSURE: 71 MMHG | SYSTOLIC BLOOD PRESSURE: 125 MMHG

## 2019-03-21 VITALS — DIASTOLIC BLOOD PRESSURE: 63 MMHG | SYSTOLIC BLOOD PRESSURE: 110 MMHG

## 2019-03-21 VITALS — SYSTOLIC BLOOD PRESSURE: 106 MMHG | DIASTOLIC BLOOD PRESSURE: 59 MMHG

## 2019-03-21 VITALS — DIASTOLIC BLOOD PRESSURE: 65 MMHG | SYSTOLIC BLOOD PRESSURE: 110 MMHG

## 2019-03-21 RX ADMIN — INSULIN ASPART SCH UNITS: 100 INJECTION, SOLUTION INTRAVENOUS; SUBCUTANEOUS at 05:54

## 2019-03-21 RX ADMIN — MORPHINE SULFATE PRN MG: 2 INJECTION, SOLUTION INTRAMUSCULAR; INTRAVENOUS at 03:56

## 2019-03-21 RX ADMIN — ASPIRIN 81 MG SCH MG: 81 TABLET ORAL at 08:12

## 2019-03-21 RX ADMIN — MORPHINE SULFATE PRN MG: 2 INJECTION, SOLUTION INTRAMUSCULAR; INTRAVENOUS at 12:17

## 2019-03-21 RX ADMIN — HEPARIN SODIUM SCH UNITS: 5000 INJECTION INTRAVENOUS; SUBCUTANEOUS at 08:13

## 2019-03-21 RX ADMIN — INSULIN DETEMIR SCH UNITS: 100 INJECTION, SOLUTION SUBCUTANEOUS at 08:25

## 2019-03-21 RX ADMIN — MORPHINE SULFATE PRN MG: 2 INJECTION, SOLUTION INTRAMUSCULAR; INTRAVENOUS at 08:10

## 2019-03-21 RX ADMIN — LORAZEPAM PRN MG: 2 INJECTION, SOLUTION INTRAMUSCULAR; INTRAVENOUS at 05:49

## 2019-03-21 RX ADMIN — INSULIN ASPART SCH UNITS: 100 INJECTION, SOLUTION INTRAVENOUS; SUBCUTANEOUS at 12:26

## 2019-03-21 RX ADMIN — LORAZEPAM PRN MG: 2 INJECTION, SOLUTION INTRAMUSCULAR; INTRAVENOUS at 10:02

## 2019-03-21 RX ADMIN — DIVALPROEX SODIUM SCH MG: 500 TABLET, DELAYED RELEASE ORAL at 08:13

## 2019-03-21 RX ADMIN — INSULIN ASPART SCH UNITS: 100 INJECTION, SOLUTION INTRAVENOUS; SUBCUTANEOUS at 12:27

## 2019-03-21 RX ADMIN — LORAZEPAM PRN MG: 2 INJECTION, SOLUTION INTRAMUSCULAR; INTRAVENOUS at 14:05

## 2019-03-21 RX ADMIN — HYDROCODONE BITARTRATE AND ACETAMINOPHEN PRN TAB: 10; 325 TABLET ORAL at 02:33

## 2019-03-21 NOTE — PULMONOLOGY PROGRESS NOTE
Assessment/Plan


Problems:  


(1) Costochondritis


(2) COPD (chronic obstructive pulmonary disease)


(3) ACS (acute coronary syndrome)


(4) Diabetes mellitus


(5) Noncompliance


Assessment/Plan


no new complains


symptomatic treatment


seizures or pseudo seizures better controlled


sliding scale


diabetic diet


pain management


doing better


dc planning.





Subjective


ROS Limited/Unobtainable:  No


Constitutional:  Reports: no symptoms


HEENT:  Repors: no symptoms


Allergies:  


Coded Allergies:  


     HALOPERIDOL (Verified  Allergy, Unknown, 2/5/19)





Objective





Last 24 Hour Vital Signs








  Date Time  Temp Pulse Resp B/P (MAP) Pulse Ox O2 Delivery O2 Flow Rate FiO2


 


3/20/19 08:00  93 22 125/72 (89) 95   





  93      


 


3/20/19 07:45  79 20   Room Air  21


 


3/20/19 04:00 98.4 65 18 110/60 (77) 97   


 


3/19/19 21:38  103 20   Room Air  21


 


3/19/19 21:00      Room Air  


 


3/19/19 20:00 98.2 61 20 115/61 (79) 96   


 


3/19/19 17:43 97.6       

















Intake and Output  


 


 3/19/19 3/20/19





 18:59 06:59


 


Intake Total 1080 ml 


 


Balance 1080 ml 


 


  


 


Intake Oral 1080 ml 








Objective


General Appearance:  WD/WN


HEENT:  normocephalic, atraumatic


Respiratory/Chest:  chest wall non-tender, lungs clear


Cardiovascular:  normal peripheral pulses, normal rate


Abdomen:  normal bowel sounds, soft, non tender


Genitourinary:  normal external genitalia


Extremities:  no clubbing


Skin:  no rash, no lesions, no ulcers


Neurologic/Psychiatric:  CNs II-XII grossly normal, no motor/sensory deficits, 

abnormal gait


Lymphatic:  no neck adenopathy


Musculoskeletal:  normal muscle bulk


Laboratory Tests


3/20/19 08:55: 


White Blood Count 7.8, Red Blood Count 4.57L, Hemoglobin 13.5L, Hematocrit 39.7L

, Mean Corpuscular Volume 87, Mean Corpuscular Hemoglobin 29.6, Mean 

Corpuscular Hemoglobin Concent 34.1, Red Cell Distribution Width 12.2, Platelet 

Count 139L, Mean Platelet Volume 8.0, Neutrophils (%) (Auto) 50.8, Lymphocytes (

%) (Auto) 36.4, Monocytes (%) (Auto) 9.1, Eosinophils (%) (Auto) 2.9, Basophils 

(%) (Auto) 0.9, Sodium Level 137, Potassium Level 4.0, Chloride Level 101, 

Carbon Dioxide Level 26, Anion Gap 10, Blood Urea Nitrogen 19H, Creatinine 0.9, 

Estimat Glomerular Filtration Rate > 60, Glucose Level 209H, Calcium Level 9.2





Current Medications








 Medications


  (Trade)  Dose


 Ordered  Sig/Henry


 Route


 PRN Reason  Start Time


 Stop Time Status Last Admin


Dose Admin


 


 Acetaminophen


  (Tylenol)  650 mg  Q4H  PRN


 ORAL


 fever  3/17/19 19:40


 4/13/19 19:39   


 


 


 Acetaminophen/


 Hydrocodone Bitart


  (Norco 10/325)  1 tab  Q6H  PRN


 ORAL


 moderate breakthrough pain   3/20/19 09:00


 3/24/19 08:59  3/20/19 09:02


 


 


 Al Hydroxide/Mg


 Hydroxide


  (Mylanta II)  30 ml  Q6H  PRN


 ORAL


 dyspepsia  3/17/19 19:41


 4/13/19 19:40   


 


 


 Alprazolam


  (Xanax)  0.5 mg  Q6H  PRN


 ORAL


 For Anxiety  3/17/19 19:41


 3/21/19 19:40   


 


 


 Aspirin


  (ASA)  81 mg  DAILY


 ORAL


   3/18/19 09:00


 4/14/19 08:59  3/20/19 09:01


 


 


 Clonidine HCl


  (Catapres Tab)  0.1 mg  Q4H  PRN


 ORAL


 For High Blood Pressure  3/17/19 19:41


 4/13/19 19:40   


 


 


 Dextrose


  (Dextrose 50%)  25 ml  Q30M  PRN


 IV


 Hypoglycemia  3/17/19 20:00


 4/13/19 17:59   


 


 


 Dextrose


  (Dextrose 50%)  50 ml  Q30M  PRN


 IV


 Hypoglycemia  3/17/19 20:00


 4/13/19 17:59   


 


 


 Divalproex Sodium


  (Depakote)  500 mg  Q12HR


 ORAL


   3/17/19 21:00


 4/13/19 20:59  3/20/19 09:01


 


 


 Escitalopram


 Oxalate


  (Lexapro)  10 mg  DAILY


 ORAL


   3/18/19 09:00


 4/14/19 08:59  3/20/19 09:01


 


 


 Heparin Sodium


  (Porcine)


  (Heparin 5000


 units/ml)  5,000 units  EVERY 12  HOURS


 SUBQ


   3/17/19 21:00


 4/13/19 20:59   


 


 


 Insulin Aspart


  (NovoLOG)    BEFORE MEALS AND  HS


 SUBQ


   3/17/19 21:00


 4/13/19 20:59  3/20/19 12:13


 


 


 Insulin Aspart


  (NovoLOG)  7 units  NOVOTIAC


 SUBQ


   3/18/19 06:30


 4/14/19 11:49  3/20/19 12:12


 


 


 Insulin Detemir


  (Levemir)  18 units  EVERY 12  HOURS


 SUBQ


   3/17/19 21:00


 4/13/19 22:29  3/20/19 09:36


 


 


 Lorazepam


  (Ativan 2mg/ml


 1ml)  0.5 mg  Q4H  PRN


 IV


 For Anxiety  3/17/19 19:42


 3/21/19 19:41  3/19/19 10:32


 


 


 Lorazepam


  (Ativan 2mg/ml


 1ml)  2 mg  Q2H  PRN


 IV


 SEIZURE  3/17/19 19:42


 3/23/19 19:41   


 


 


 Morphine Sulfate


  (Morphine


 Sulfate)  1 mg  Q4H  PRN


 IVP


 Severe Pain (Pain Scale 7-10)  3/20/19 10:15


 3/27/19 10:14  3/20/19 10:18


 


 


 Ondansetron HCl


  (Zofran)  4 mg  Q6H  PRN


 IVP


 Nausea & Vomiting  3/17/19 20:30


 4/13/19 14:24  3/20/19 10:18


 


 


 Polyethylene


 Glycol


  (Miralax)  17 gm  HSPRN  PRN


 ORAL


 Constipation  3/17/19 19:43


 4/16/19 19:42   


 


 


 Quetiapine


 Fumarate


  (SEROquel)  200 mg  BEDTIME


 ORAL


   3/17/19 21:00


 4/13/19 20:59  3/19/19 21:19


 


 


 Zolpidem Tartrate


  (Ambien)  5 mg  HSPRN  PRN


 ORAL


 Insomnia  3/17/19 19:43


 3/24/19 19:42   


 

















Lissette Steward MD Mar 20, 2019 12:46
Assessment/Plan


Problems:  


(1) Costochondritis


(2) COPD (chronic obstructive pulmonary disease)


(3) ACS (acute coronary syndrome)


(4) Diabetes mellitus


(5) Noncompliance


Assessment/Plan


no new complains


symptomatic treatment


seizures or pseudo seizures better controlled


sliding scale


diabetic diet


pain management


doing better


dc planning.





Subjective


ROS Limited/Unobtainable:  No


Constitutional:  Reports: no symptoms


HEENT:  Repors: no symptoms


Allergies:  


Coded Allergies:  


     HALOPERIDOL (Verified  Allergy, Unknown, 2/5/19)





Objective





Last 24 Hour Vital Signs








  Date Time  Temp Pulse Resp B/P (MAP) Pulse Ox O2 Delivery O2 Flow Rate FiO2


 


3/21/19 12:00 98.5 84 20 125/71 (89) 99   


 


3/21/19 09:00      Room Air  


 


3/21/19 08:40 98.0       


 


3/21/19 08:00 98.0 73 19 110/63 (79) 96   


 


3/21/19 04:00 98.2 82 20 106/59 (75) 94   


 


3/21/19 00:00 98.4 72 18 110/65 (80) 95   


 


3/20/19 21:00      Room Air  


 


3/20/19 20:50  78 20   Nasal Cannula 2.0 28


 


3/20/19 20:00 98.2 79 18 118/71 (87) 94   


 


3/20/19 16:00 98.2 97 19 129/70 (89) 96   

















Intake and Output  


 


 3/20/19 3/21/19





 18:59 06:59


 


Intake Total 960 ml 480 ml


 


Balance 960 ml 480 ml


 


  


 


Intake Oral 960 ml 480 ml








Objective


General Appearance:  WD/WN


HEENT:  normocephalic, atraumatic


Respiratory/Chest:  chest wall non-tender, lungs clear


Cardiovascular:  normal peripheral pulses, normal rate


Abdomen:  normal bowel sounds, soft, non tender


Genitourinary:  normal external genitalia


Extremities:  no clubbing


Skin:  no rash, no lesions, no ulcers


Neurologic/Psychiatric:  CNs II-XII grossly normal, no motor/sensory deficits, 

abnormal gait


Lymphatic:  no neck adenopathy


Musculoskeletal:  normal muscle bulk





Current Medications








 Medications


  (Trade)  Dose


 Ordered  Sig/Henry


 Route


 PRN Reason  Start Time


 Stop Time Status Last Admin


Dose Admin


 


 Acetaminophen


  (Tylenol)  650 mg  Q4H  PRN


 ORAL


 fever  3/17/19 19:40


 4/13/19 19:39   


 


 


 Acetaminophen/


 Hydrocodone Bitart


  (Norco 10/325)  1 tab  Q6H  PRN


 ORAL


 moderate breakthrough pain   3/20/19 09:00


 3/24/19 08:59  3/21/19 02:33


 


 


 Al Hydroxide/Mg


 Hydroxide


  (Mylanta II)  30 ml  Q6H  PRN


 ORAL


 dyspepsia  3/17/19 19:41


 4/13/19 19:40   


 


 


 Alprazolam


  (Xanax)  0.5 mg  Q6H  PRN


 ORAL


 For Anxiety  3/17/19 19:41


 3/21/19 19:40  3/20/19 14:14


 


 


 Aspirin


  (ASA)  81 mg  DAILY


 ORAL


   3/18/19 09:00


 4/14/19 08:59  3/21/19 08:12


 


 


 Atorvastatin


 Calcium


  (Lipitor)  20 mg  BEDTIME


 ORAL


   3/21/19 21:00


 4/20/19 20:59   


 


 


 Clonidine HCl


  (Catapres Tab)  0.1 mg  Q4H  PRN


 ORAL


 For High Blood Pressure  3/17/19 19:41


 4/13/19 19:40   


 


 


 Dextrose


  (Dextrose 50%)  25 ml  Q30M  PRN


 IV


 Hypoglycemia  3/17/19 20:00


 4/13/19 17:59   


 


 


 Dextrose


  (Dextrose 50%)  50 ml  Q30M  PRN


 IV


 Hypoglycemia  3/17/19 20:00


 4/13/19 17:59   


 


 


 Divalproex Sodium


  (Depakote)  500 mg  Q12HR


 ORAL


   3/17/19 21:00


 4/13/19 20:59  3/21/19 08:13


 


 


 Escitalopram


 Oxalate


  (Lexapro)  10 mg  DAILY


 ORAL


   3/18/19 09:00


 4/14/19 08:59  3/21/19 08:12


 


 


 Heparin Sodium


  (Porcine)


  (Heparin 5000


 units/ml)  5,000 units  EVERY 12  HOURS


 SUBQ


   3/17/19 21:00


 4/13/19 20:59   


 


 


 Hydrocortisone


  (Hydrocortisone)  1 applic  BIDPRN  PRN


 TOPIC


 Itching  3/20/19 16:30


 4/19/19 16:29  3/21/19 12:18


 


 


 Insulin Aspart


  (NovoLOG)    BEFORE MEALS AND  HS


 SUBQ


   3/17/19 21:00


 4/13/19 20:59  3/21/19 12:26


 


 


 Insulin Aspart


  (NovoLOG)  7 units  NOVOTIAC


 SUBQ


   3/18/19 06:30


 4/14/19 11:49  3/21/19 12:27


 


 


 Insulin Detemir


  (Levemir)  18 units  EVERY 12  HOURS


 SUBQ


   3/17/19 21:00


 4/13/19 22:29  3/21/19 08:25


 


 


 Lorazepam


  (Ativan 2mg/ml


 1ml)  0.5 mg  Q4H  PRN


 IV


 For Anxiety  3/17/19 19:42


 3/21/19 19:41  3/21/19 10:02


 


 


 Lorazepam


  (Ativan 2mg/ml


 1ml)  2 mg  Q2H  PRN


 IV


 SEIZURE  3/17/19 19:42


 3/23/19 19:41   


 


 


 Morphine Sulfate


  (Morphine


 Sulfate)  1 mg  Q4H  PRN


 IVP


 Severe Pain (Pain Scale 7-10)  3/20/19 10:15


 3/27/19 10:14  3/21/19 12:17


 


 


 Mupirocin


  (Bactroban Oint)  1 applic  THREE TIMES A  DAY


 TOPIC


   3/20/19 18:00


 3/25/19 17:59  3/21/19 12:18


 


 


 Ondansetron HCl


  (Zofran)  4 mg  Q6H  PRN


 IVP


 Nausea & Vomiting  3/17/19 20:30


 4/13/19 14:24  3/21/19 12:17


 


 


 Polyethylene


 Glycol


  (Miralax)  17 gm  HSPRN  PRN


 ORAL


 Constipation  3/17/19 19:43


 4/16/19 19:42   


 


 


 Quetiapine


 Fumarate


  (SEROquel)  200 mg  BEDTIME


 ORAL


   3/17/19 21:00


 4/13/19 20:59  3/20/19 22:04


 


 


 Zolpidem Tartrate


  (Ambien)  5 mg  HSPRN  PRN


 ORAL


 Insomnia  3/17/19 19:43


 3/24/19 19:42   


 

















Lissette Steward MD Mar 21, 2019 12:37
Assessment/Plan


Problems:  


(1) Costochondritis


(2) COPD (chronic obstructive pulmonary disease)


(3) ACS (acute coronary syndrome)


(4) Diabetes mellitus


(5) Noncompliance


Assessment/Plan


symptomatic treatment


seizures or pseudo seizures better controlled


sliding scale


diabetic diet


pain management


doing better


dc planning.





Subjective


ROS Limited/Unobtainable:  No


Constitutional:  Reports: no symptoms


Allergies:  


Coded Allergies:  


     HALOPERIDOL (Verified  Allergy, Unknown, 2/5/19)





Objective





Last 24 Hour Vital Signs








  Date Time  Temp Pulse Resp B/P (MAP) Pulse Ox O2 Delivery O2 Flow Rate FiO2


 


3/18/19 12:00      Room Air  


 


3/18/19 08:00      Room Air  


 


3/18/19 04:00      Venturi Mask  


 


3/18/19 04:00 98.1 81 20 117/65 (82) 95   


 


3/18/19 02:04 97.9 94 18 114/73 (87) 96   


 


3/18/19 00:05  96 20   Room Air  21


 


3/17/19 23:00      Venturi Mask  


 


3/17/19 22:00 98.5 102 20 127/78 (94) 98   


 


3/17/19 21:00 98.5 102 20 127/78 (94) 98   


 


3/17/19 21:00      Venturi Mask  


 


3/17/19 18:00  105 21 126/81 (96) 99   


 


3/17/19 17:00  100 22 111/64 (80) 99   


 


3/17/19 16:00 98.4 99 17 132/68 (89) 96   


 


3/17/19 16:00      Venturi Mask  


 


3/17/19 15:00  105 21 107/53 (71) 98   


 


3/17/19 14:00  96 21 127/71 (89) 93   

















Intake and Output  


 


 3/17/19 3/18/19





 19:00 07:00


 


Intake Total 520 ml 


 


Output Total 2100 ml 


 


Balance -1580 ml 


 


  


 


Intake Oral 520 ml 


 


Output Urine Total 2100 ml 


 


# Voids  2








General Appearance:  WD/WN


HEENT:  normocephalic, atraumatic


Respiratory/Chest:  chest wall non-tender, lungs clear


Cardiovascular:  normal peripheral pulses, normal rate


Abdomen:  normal bowel sounds, soft, non tender


Genitourinary:  normal external genitalia


Extremities:  no clubbing


Skin:  no rash, no lesions, no ulcers


Neurologic/Psychiatric:  CNs II-XII grossly normal, no motor/sensory deficits, 

abnormal gait


Lymphatic:  no neck adenopathy


Musculoskeletal:  normal muscle bulk


Laboratory Tests


3/18/19 09:45: 


White Blood Count 7.6, Red Blood Count 4.80, Hemoglobin 14.6, Hematocrit 41.0L, 

Mean Corpuscular Volume 85, Mean Corpuscular Hemoglobin 30.4, Mean Corpuscular 

Hemoglobin Concent 35.6, Red Cell Distribution Width 12.3, Platelet Count 171, 

Mean Platelet Volume 8.9, Neutrophils (%) (Auto) 57.9, Lymphocytes (%) (Auto) 

32.8, Monocytes (%) (Auto) 5.8, Eosinophils (%) (Auto) 2.4, Basophils (%) (Auto

) 1.1, Sodium Level 139, Potassium Level 3.8, Chloride Level 103, Carbon 

Dioxide Level 27, Anion Gap 9, Blood Urea Nitrogen 17, Creatinine 0.9, Estimat 

Glomerular Filtration Rate > 60, Glucose Level 207H, Calcium Level 9.0





Current Medications








 Medications


  (Trade)  Dose


 Ordered  Sig/Henry


 Route


 PRN Reason  Start Time


 Stop Time Status Last Admin


Dose Admin


 


 Acetaminophen


  (Tylenol)  650 mg  Q4H  PRN


 ORAL


 fever  3/17/19 19:40


 4/13/19 19:39   


 


 


 Acetaminophen/


 Hydrocodone Bitart


  (Norco 10/325)  1 tab  Q6H  PRN


 ORAL


 moderate pain   3/17/19 19:42


 3/24/19 19:41   


 


 


 Al Hydroxide/Mg


 Hydroxide


  (Mylanta II)  30 ml  Q6H  PRN


 ORAL


 dyspepsia  3/17/19 19:41


 4/13/19 19:40   


 


 


 Albuterol/


 Ipratropium


  (Albuterol/


 Ipratropium)  3 ml  Q4H  PRN


 HHN


 Shortness of Breath  3/17/19 19:41


 3/19/19 19:40   


 


 


 Alprazolam


  (Xanax)  0.5 mg  Q6H  PRN


 ORAL


 For Anxiety  3/17/19 19:41


 3/21/19 19:40   


 


 


 Aspirin


  (ASA)  81 mg  DAILY


 ORAL


   3/18/19 09:00


 4/14/19 08:59  3/18/19 08:07


 


 


 Clonidine HCl


  (Catapres Tab)  0.1 mg  Q4H  PRN


 ORAL


 For High Blood Pressure  3/17/19 19:41


 4/13/19 19:40   


 


 


 Dextrose


  (Dextrose 50%)  25 ml  Q30M  PRN


 IV


 Hypoglycemia  3/17/19 20:00


 4/13/19 17:59   


 


 


 Dextrose


  (Dextrose 50%)  50 ml  Q30M  PRN


 IV


 Hypoglycemia  3/17/19 20:00


 4/13/19 17:59   


 


 


 Divalproex Sodium


  (Depakote)  500 mg  Q12HR


 ORAL


   3/17/19 21:00


 4/13/19 20:59  3/18/19 08:06


 


 


 Escitalopram


 Oxalate


  (Lexapro)  10 mg  DAILY


 ORAL


   3/18/19 09:00


 4/14/19 08:59  3/18/19 08:06


 


 


 Heparin Sodium


  (Porcine)


  (Heparin 5000


 units/ml)  5,000 units  EVERY 12  HOURS


 SUBQ


   3/17/19 21:00


 4/13/19 20:59   


 


 


 Insulin Aspart


  (NovoLOG)    BEFORE MEALS AND  HS


 SUBQ


   3/17/19 21:00


 4/13/19 20:59  3/18/19 12:11


 


 


 Insulin Aspart


  (NovoLOG)  7 units  NOVOTIAC


 SUBQ


   3/18/19 06:30


 4/14/19 11:49  3/18/19 12:12


 


 


 Insulin Detemir


  (Levemir)  18 units  EVERY 12  HOURS


 SUBQ


   3/17/19 21:00


 4/13/19 22:29  3/18/19 08:15


 


 


 Lorazepam


  (Ativan 2mg/ml


 1ml)  0.5 mg  Q4H  PRN


 IV


 For Anxiety  3/17/19 19:42


 3/21/19 19:41   


 


 


 Lorazepam


  (Ativan 2mg/ml


 1ml)  2 mg  Q2H  PRN


 IV


 SEIZURE  3/17/19 19:42


 3/23/19 19:41   


 


 


 Morphine Sulfate


  (Morphine


 Sulfate)  1 mg  Q4H  PRN


 IVP


 severe pain   3/17/19 19:43


 3/21/19 19:42  3/18/19 10:32


 


 


 Ondansetron HCl


  (Zofran)  4 mg  Q6H  PRN


 IVP


 Nausea & Vomiting  3/17/19 20:30


 4/13/19 14:24  3/18/19 12:34


 


 


 Polyethylene


 Glycol


  (Miralax)  17 gm  HSPRN  PRN


 ORAL


 Constipation  3/17/19 19:43


 4/16/19 19:42   


 


 


 Quetiapine


 Fumarate


  (SEROquel)  200 mg  BEDTIME


 ORAL


   3/17/19 21:00


 4/13/19 20:59  3/17/19 22:44


 


 


 Zolpidem Tartrate


  (Ambien)  5 mg  HSPRN  PRN


 ORAL


 Insomnia  3/17/19 19:43


 3/24/19 19:42   


 

















Lissette Steward MD Mar 18, 2019 13:08
Assessment/Plan


Problems:  


(1) Costochondritis


(2) COPD (chronic obstructive pulmonary disease)


(3) ACS (acute coronary syndrome)


(4) Diabetes mellitus


(5) Noncompliance


Assessment/Plan


wants more morphine


symptomatic treatment


seizures or pseudo seizures better controlled


sliding scale


diabetic diet


pain management


doing better


dc planning.





Subjective


ROS Limited/Unobtainable:  No


Constitutional:  Reports: no symptoms


HEENT:  Repors: no symptoms


Allergies:  


Coded Allergies:  


     HALOPERIDOL (Verified  Allergy, Unknown, 2/5/19)





Objective





Last 24 Hour Vital Signs








  Date Time  Temp Pulse Resp B/P (MAP) Pulse Ox O2 Delivery O2 Flow Rate FiO2


 


3/19/19 08:54 97.6       


 


3/19/19 08:00      Venturi Mask  


 


3/19/19 04:11      Venturi Mask  


 


3/19/19 04:02 97.6 70 19 113/62 (79) 97   


 


3/19/19 00:00      Venturi Mask  


 


3/18/19 21:00      Venturi Mask  


 


3/18/19 20:57  63 20   Room Air  21


 


3/18/19 20:00 98.5 75 19 109/67 (81) 96   


 


3/18/19 16:00      Room Air  


 


3/18/19 15:24 98.3 108 20 117/80 (92)    

















Intake and Output  


 


 3/18/19 3/19/19





 19:00 07:00


 


Intake Total 2325 ml 


 


Balance 2325 ml 


 


  


 


Intake Oral 2325 ml 


 


# Voids 12 8


 


# Bowel Movements 4 








Objective


General Appearance:  WD/WN


HEENT:  normocephalic, atraumatic


Respiratory/Chest:  chest wall non-tender, lungs clear


Cardiovascular:  normal peripheral pulses, normal rate


Abdomen:  normal bowel sounds, soft, non tender


Genitourinary:  normal external genitalia


Extremities:  no clubbing


Skin:  no rash, no lesions, no ulcers


Neurologic/Psychiatric:  CNs II-XII grossly normal, no motor/sensory deficits, 

abnormal gait


Lymphatic:  no neck adenopathy


Musculoskeletal:  normal muscle bulk


Laboratory Tests


3/19/19 11:10: 


White Blood Count 7.4, Red Blood Count 4.91, Hemoglobin 14.6, Hematocrit 42.6, 

Mean Corpuscular Volume 87, Mean Corpuscular Hemoglobin 29.7, Mean Corpuscular 

Hemoglobin Concent 34.2, Red Cell Distribution Width 12.1, Platelet Count 163, 

Mean Platelet Volume 7.7, Neutrophils (%) (Auto) 52.5, Lymphocytes (%) (Auto) 

37.7, Monocytes (%) (Auto) 6.1, Eosinophils (%) (Auto) 2.8, Basophils (%) (Auto

) 1.0, Sodium Level 136, Potassium Level 4.1, Chloride Level 100, Carbon 

Dioxide Level 26, Anion Gap 10, Blood Urea Nitrogen 17, Creatinine 0.8, Estimat 

Glomerular Filtration Rate > 60, Glucose Level 184H, Calcium Level 9.0





Current Medications








 Medications


  (Trade)  Dose


 Ordered  Sig/Henry


 Route


 PRN Reason  Start Time


 Stop Time Status Last Admin


Dose Admin


 


 Acetaminophen


  (Tylenol)  650 mg  Q4H  PRN


 ORAL


 fever  3/17/19 19:40


 4/13/19 19:39   


 


 


 Acetaminophen/


 Hydrocodone Bitart


  (Norco 10/325)  1 tab  Q6H  PRN


 ORAL


 moderate pain   3/17/19 19:42


 3/24/19 19:41   


 


 


 Al Hydroxide/Mg


 Hydroxide


  (Mylanta II)  30 ml  Q6H  PRN


 ORAL


 dyspepsia  3/17/19 19:41


 4/13/19 19:40   


 


 


 Albuterol/


 Ipratropium


  (Albuterol/


 Ipratropium)  3 ml  Q4H  PRN


 HHN


 Shortness of Breath  3/17/19 19:41


 3/19/19 19:40   


 


 


 Alprazolam


  (Xanax)  0.5 mg  Q6H  PRN


 ORAL


 For Anxiety  3/17/19 19:41


 3/21/19 19:40   


 


 


 Aspirin


  (ASA)  81 mg  DAILY


 ORAL


   3/18/19 09:00


 4/14/19 08:59  3/19/19 08:23


 


 


 Clonidine HCl


  (Catapres Tab)  0.1 mg  Q4H  PRN


 ORAL


 For High Blood Pressure  3/17/19 19:41


 4/13/19 19:40   


 


 


 Dextrose


  (Dextrose 50%)  25 ml  Q30M  PRN


 IV


 Hypoglycemia  3/17/19 20:00


 4/13/19 17:59   


 


 


 Dextrose


  (Dextrose 50%)  50 ml  Q30M  PRN


 IV


 Hypoglycemia  3/17/19 20:00


 4/13/19 17:59   


 


 


 Divalproex Sodium


  (Depakote)  500 mg  Q12HR


 ORAL


   3/17/19 21:00


 4/13/19 20:59  3/19/19 08:23


 


 


 Escitalopram


 Oxalate


  (Lexapro)  10 mg  DAILY


 ORAL


   3/18/19 09:00


 4/14/19 08:59  3/19/19 08:23


 


 


 Heparin Sodium


  (Porcine)


  (Heparin 5000


 units/ml)  5,000 units  EVERY 12  HOURS


 SUBQ


   3/17/19 21:00


 4/13/19 20:59   


 


 


 Insulin Aspart


  (NovoLOG)    BEFORE MEALS AND  HS


 SUBQ


   3/17/19 21:00


 4/13/19 20:59  3/19/19 11:42


 


 


 Insulin Aspart


  (NovoLOG)  7 units  NOVOTIAC


 SUBQ


   3/18/19 06:30


 4/14/19 11:49  3/19/19 11:40


 


 


 Insulin Detemir


  (Levemir)  18 units  EVERY 12  HOURS


 SUBQ


   3/17/19 21:00


 4/13/19 22:29  3/19/19 08:31


 


 


 Lorazepam


  (Ativan 2mg/ml


 1ml)  0.5 mg  Q4H  PRN


 IV


 For Anxiety  3/17/19 19:42


 3/21/19 19:41  3/19/19 10:32


 


 


 Lorazepam


  (Ativan 2mg/ml


 1ml)  2 mg  Q2H  PRN


 IV


 SEIZURE  3/17/19 19:42


 3/23/19 19:41   


 


 


 Morphine Sulfate


  (Morphine


 Sulfate)  1 mg  Q4H  PRN


 IVP


 severe pain   3/17/19 19:43


 3/21/19 19:42  3/19/19 08:24


 


 


 Ondansetron HCl


  (Zofran)  4 mg  Q6H  PRN


 IVP


 Nausea & Vomiting  3/17/19 20:30


 4/13/19 14:24  3/18/19 22:55


 


 


 Polyethylene


 Glycol


  (Miralax)  17 gm  HSPRN  PRN


 ORAL


 Constipation  3/17/19 19:43


 4/16/19 19:42   


 


 


 Quetiapine


 Fumarate


  (SEROquel)  200 mg  BEDTIME


 ORAL


   3/17/19 21:00


 4/13/19 20:59  3/18/19 22:55


 


 


 Zolpidem Tartrate


  (Ambien)  5 mg  HSPRN  PRN


 ORAL


 Insomnia  3/17/19 19:43


 3/24/19 19:42   


 

















Lissette Steward MD Mar 19, 2019 12:28
no

## 2019-03-21 NOTE — GENERAL PROGRESS NOTE
Assessment/Plan


Problem List:  


(1) COPD (chronic obstructive pulmonary disease)


ICD Codes:  J44.9 - Chronic obstructive pulmonary disease, unspecified


SNOMED:  73888685


(2) Diabetes mellitus


ICD Codes:  E11.9 - Diabetes mellitus


SNOMED:  18990155


Assessment/Plan


continue Levemir 18 units bid 


continuu Novolog 7 units ac tid 


continue NISS ac / hs





Subjective


Allergies:  


Coded Allergies:  


     HALOPERIDOL (Verified  Allergy, Unknown, 2/5/19)


All Systems:  reviewed and negative except above


Subjective


events noted 











Item Value  Date Time


 


Bedside Blood Glucose 280 mg/dl H 3/21/19 0601


 


Bedside Blood Glucose 302 mg/dl H 3/20/19 2100


 


Bedside Blood Glucose 259 mg/dl H 3/20/19 1705


 


Bedside Blood Glucose 152 mg/dl H 3/20/19 1213


 


Bedside Blood Glucose 183 mg/dl H 3/20/19 0936


 


Bedside Blood Glucose 183 mg/dl H 3/20/19 0630











Objective





Last 24 Hour Vital Signs








  Date Time  Temp Pulse Resp B/P (MAP) Pulse Ox O2 Delivery O2 Flow Rate FiO2


 


3/21/19 04:00 98.2 82 20 106/59 (75) 94   


 


3/21/19 00:00 98.4 72 18 110/65 (80) 95   


 


3/20/19 21:00      Room Air  


 


3/20/19 20:50  78 20   Nasal Cannula 2.0 28


 


3/20/19 20:00 98.2 79 18 118/71 (87) 94   


 


3/20/19 16:00 98.2 97 19 129/70 (89) 96   


 


3/20/19 12:00 97.9 74 16 114/63 (80) 98   


 


3/20/19 09:00      Room Air  


 


3/20/19 08:00  93 22 125/72 (89) 95   





  93      


 


3/20/19 07:45  79 20   Room Air  21

















Intake and Output  


 


 3/20/19 3/21/19





 19:00 07:00


 


Intake Total 960 ml 480 ml


 


Balance 960 ml 480 ml


 


  


 


Intake Oral 960 ml 480 ml








Laboratory Tests


3/20/19 08:55: 


White Blood Count 7.8, Red Blood Count 4.57L, Hemoglobin 13.5L, Hematocrit 39.7L

, Mean Corpuscular Volume 87, Mean Corpuscular Hemoglobin 29.6, Mean 

Corpuscular Hemoglobin Concent 34.1, Red Cell Distribution Width 12.2, Platelet 

Count 139L, Mean Platelet Volume 8.0, Neutrophils (%) (Auto) 50.8, Lymphocytes (

%) (Auto) 36.4, Monocytes (%) (Auto) 9.1, Eosinophils (%) (Auto) 2.9, Basophils 

(%) (Auto) 0.9, Sodium Level 137, Potassium Level 4.0, Chloride Level 101, 

Carbon Dioxide Level 26, Anion Gap 10, Blood Urea Nitrogen 19H, Creatinine 0.9, 

Estimat Glomerular Filtration Rate > 60, Glucose Level 209H, Calcium Level 9.2


Height (Feet):  5


Height (Inches):  11.00


Weight (Pounds):  264


General Appearance:  no apparent distress


Neck:  normal alignment


Respiratory/Chest:  lungs clear


Abdomen:  normal bowel sounds


Objective





Current Medications








 Medications


  (Trade)  Dose


 Ordered  Sig/Henry


 Route


 PRN Reason  Start Time


 Stop Time Status Last Admin


Dose Admin


 


 Acetaminophen


  (Tylenol)  650 mg  Q4H  PRN


 ORAL


 fever  3/17/19 19:40


 4/13/19 19:39   


 


 


 Acetaminophen/


 Hydrocodone Bitart


  (Norco 10/325)  1 tab  Q6H  PRN


 ORAL


 moderate breakthrough pain   3/20/19 09:00


 3/24/19 08:59  3/21/19 02:33


 


 


 Al Hydroxide/Mg


 Hydroxide


  (Mylanta II)  30 ml  Q6H  PRN


 ORAL


 dyspepsia  3/17/19 19:41


 4/13/19 19:40   


 


 


 Alprazolam


  (Xanax)  0.5 mg  Q6H  PRN


 ORAL


 For Anxiety  3/17/19 19:41


 3/21/19 19:40  3/20/19 14:14


 


 


 Aspirin


  (ASA)  81 mg  DAILY


 ORAL


   3/18/19 09:00


 4/14/19 08:59  3/20/19 09:01


 


 


 Atorvastatin


 Calcium


  (Lipitor)  20 mg  BEDTIME


 ORAL


   3/21/19 21:00


 4/20/19 20:59   


 


 


 Clonidine HCl


  (Catapres Tab)  0.1 mg  Q4H  PRN


 ORAL


 For High Blood Pressure  3/17/19 19:41


 4/13/19 19:40   


 


 


 Dextrose


  (Dextrose 50%)  25 ml  Q30M  PRN


 IV


 Hypoglycemia  3/17/19 20:00


 4/13/19 17:59   


 


 


 Dextrose


  (Dextrose 50%)  50 ml  Q30M  PRN


 IV


 Hypoglycemia  3/17/19 20:00


 4/13/19 17:59   


 


 


 Divalproex Sodium


  (Depakote)  500 mg  Q12HR


 ORAL


   3/17/19 21:00


 4/13/19 20:59  3/20/19 20:37


 


 


 Escitalopram


 Oxalate


  (Lexapro)  10 mg  DAILY


 ORAL


   3/18/19 09:00


 4/14/19 08:59  3/20/19 09:01


 


 


 Heparin Sodium


  (Porcine)


  (Heparin 5000


 units/ml)  5,000 units  EVERY 12  HOURS


 SUBQ


   3/17/19 21:00


 4/13/19 20:59   


 


 


 Hydrocortisone


  (Hydrocortisone)  1 applic  BIDPRN  PRN


 TOPIC


 Itching  3/20/19 16:30


 4/19/19 16:29   


 


 


 Insulin Aspart


  (NovoLOG)    BEFORE MEALS AND  HS


 SUBQ


   3/17/19 21:00


 4/13/19 20:59  3/21/19 05:54


 


 


 Insulin Aspart


  (NovoLOG)  7 units  NOVOTIAC


 SUBQ


   3/18/19 06:30


 4/14/19 11:49  3/21/19 05:54


 


 


 Insulin Detemir


  (Levemir)  18 units  EVERY 12  HOURS


 SUBQ


   3/17/19 21:00


 4/13/19 22:29  3/20/19 20:41


 


 


 Lorazepam


  (Ativan 2mg/ml


 1ml)  0.5 mg  Q4H  PRN


 IV


 For Anxiety  3/17/19 19:42


 3/21/19 19:41  3/21/19 05:49


 


 


 Lorazepam


  (Ativan 2mg/ml


 1ml)  2 mg  Q2H  PRN


 IV


 SEIZURE  3/17/19 19:42


 3/23/19 19:41   


 


 


 Morphine Sulfate


  (Morphine


 Sulfate)  1 mg  Q4H  PRN


 IVP


 Severe Pain (Pain Scale 7-10)  3/20/19 10:15


 3/27/19 10:14  3/21/19 03:56


 


 


 Mupirocin


  (Bactroban Oint)  1 applic  THREE TIMES A  DAY


 TOPIC


   3/20/19 18:00


 3/25/19 17:59  3/20/19 18:19


 


 


 Ondansetron HCl


  (Zofran)  4 mg  Q6H  PRN


 IVP


 Nausea & Vomiting  3/17/19 20:30


 4/13/19 14:24  3/21/19 03:56


 


 


 Polyethylene


 Glycol


  (Miralax)  17 gm  HSPRN  PRN


 ORAL


 Constipation  3/17/19 19:43


 4/16/19 19:42   


 


 


 Quetiapine


 Fumarate


  (SEROquel)  200 mg  BEDTIME


 ORAL


   3/17/19 21:00


 4/13/19 20:59  3/20/19 22:04


 


 


 Zolpidem Tartrate


  (Ambien)  5 mg  HSPRN  PRN


 ORAL


 Insomnia  3/17/19 19:43


 3/24/19 19:42   


 

















Dusty Denis MD Mar 21, 2019 06:40

## 2019-03-21 NOTE — PROGRESS NOTE
DATE:  03/21/2019

SUBJECTIVE:  This is a male patient who is 38 years old.  He continues to

have some _____ acute coronary syndrome.  He has got a lot of mood

lability and agitation, _____ worsened by stress of his medical illness

that is why his attending physician has requested daily psychiatric

consultation at this time.



DIAGNOSIS:  Schizoaffective bipolar type .



PLAN:  Treat him with Depakote 500 mg q.12 h., Seroquel 200 mg at bedtime,

Xanax 0.5 mg every six hours p.r.n. anxiety and agitation, and Lexapro 10

mg daily.  Provided him with 20 minutes of reality-based supportive

psychotherapy.  A  20 minutes of cognitive behavioral therapy to help him

identify his automatic negative thoughts and help him convert those

negative thoughts to more positive thoughts to reduce depression, anxiety,

and suicidality.  Chart was reviewed.  Discussed with staff.  Seen and

assessed in his room.









  ______________________________________________

  Gulshan Armstrong M.D.





DR:  Thor

D:  03/21/2019 11:12

T:  03/21/2019 19:20

JOB#:  [id number]

CC:

## 2019-03-21 NOTE — GENERAL PROGRESS NOTE
Assessment/Plan


Problem List:  


(1) ACS (acute coronary syndrome)


ICD Codes:  I24.9 - Acute ischemic heart disease, unspecified


SNOMED:  171281721


(2) Diabetes mellitus


ICD Codes:  E11.9 - Diabetes mellitus


SNOMED:  80664130


(3) COPD (chronic obstructive pulmonary disease)


ICD Codes:  J44.9 - Chronic obstructive pulmonary disease, unspecified


SNOMED:  55834110


(4) CVA (cerebral vascular accident)


ICD Codes:  I63.9 - Cerebral infarction, unspecified


SNOMED:  921157212


(5) Uncontrolled seizures


ICD Codes:  R56.9 - Unspecified convulsions


SNOMED:  94321059


Assessment/Plan


seizure control pt diet pain control cardio f/u  dc if clear





Subjective


Constitutional:  Reports: weakness


Allergies:  


Coded Allergies:  


     HALOPERIDOL (Verified  Allergy, Unknown, 2/5/19)


All Systems:  reviewed and negative except above


Subjective


calm in bed





Objective





Last 24 Hour Vital Signs








  Date Time  Temp Pulse Resp B/P (MAP) Pulse Ox O2 Delivery O2 Flow Rate FiO2


 


3/21/19 12:47 98.5       


 


3/21/19 12:00 98.5 84 20 125/71 (89) 99   


 


3/21/19 09:00      Room Air  


 


3/21/19 08:00 98.0 73 19 110/63 (79) 96   


 


3/21/19 04:00 98.2 82 20 106/59 (75) 94   


 


3/21/19 00:00 98.4 72 18 110/65 (80) 95   


 


3/20/19 21:00      Room Air  


 


3/20/19 20:50  78 20   Nasal Cannula 2.0 28


 


3/20/19 20:00 98.2 79 18 118/71 (87) 94   


 


3/20/19 16:00 98.2 97 19 129/70 (89) 96   

















Intake and Output  


 


 3/20/19 3/21/19





 18:59 06:59


 


Intake Total 960 ml 480 ml


 


Balance 960 ml 480 ml


 


  


 


Intake Oral 960 ml 480 ml








Height (Feet):  5


Height (Inches):  11.00


Weight (Pounds):  264


General Appearance:  alert


EENT:  normal ENT inspection


Neck:  normal alignment


Cardiovascular:  normal peripheral pulses, normal rate, regular rhythm


Respiratory/Chest:  chest wall non-tender, lungs clear, normal breath sounds


Abdomen:  normal bowel sounds, non tender, soft


Extremities:  normal inspection


Edema:  no edema noted Arm (L), no edema noted Arm (R), no edema noted Leg (L), 

no edema noted Leg (R), no edema noted Pedal (L), no edema noted Pedal (R), no 

edema noted Generalized


Neurologic:  responsive, motor weakness


Skin:  normal pigmentation, warm/dry











Samir Cabrera DO Mar 21, 2019 14:38

## 2019-03-21 NOTE — NUR
Social Service Note



Patient accepted at [facility] [address] [address], [phone].  Discharge plan discussed 
with patient who is agreeable to dc location.  Homeless discharge check list completed and 
provided to charge nurse. Pending medication delivery from [facility].  Patient 
will transfer via taxi to location.  Will continue to be available as needed.

## 2019-03-21 NOTE — NUR
NURSE NOTES:

Received pt from [facility]. Pt is alert and orient x4. pt is in RA. No SOB or acute 
respiratory distress noted. pt has intact iv access R wrist 20G SL. pt is eating breakfast. 
all needs attended, bed is locked and is in the lowest position. call light within easy 
reach. will continue to monitor.

## 2019-03-21 NOTE — INFECTIOUS DISEASES PROG NOTE
Assessment/Plan


Assessment/Plan


Abx:


None





Assessment:





Scalp superficial wound- not infected





Chest pain- suspected 2ry to costochondritis


  3/16 CXR: No acute findings.





Afebrile


No leukocytosis- no evidence of active infectious process





Seizures episodes





hx of syncopal episodes


neuropathy


COPD


 CVA w/ left side weakness


Dm2


 CAD


 lumbar spine surgery


 obesity


bipolar disorder


seizure disorder


HTN





Plan:


-Continue to monitor off abx


-f.u cx


-Monitor CBC/CMP, temperatures


-aspiration precautions


-Cards, neuro f/u


-local wound care per hospital protocol





Thank you for this consultation.


Will continue to follow along with you.





Subjective


Allergies:  


Coded Allergies:  


     HALOPERIDOL (Verified  Allergy, Unknown, 2/5/19)


Subjective


afebrile


no leukocytosis


discharge planning





Objective


Vital Signs





Last 24 Hour Vital Signs








  Date Time  Temp Pulse Resp B/P (MAP) Pulse Ox O2 Delivery O2 Flow Rate FiO2


 


3/21/19 12:47 98.5       


 


3/21/19 12:00 98.5 84 20 125/71 (89) 99   


 


3/21/19 09:00      Room Air  


 


3/21/19 08:00 98.0 73 19 110/63 (79) 96   


 


3/21/19 04:00 98.2 82 20 106/59 (75) 94   


 


3/21/19 00:00 98.4 72 18 110/65 (80) 95   


 


3/20/19 21:00      Room Air  


 


3/20/19 20:50  78 20   Nasal Cannula 2.0 28


 


3/20/19 20:00 98.2 79 18 118/71 (87) 94   


 


3/20/19 16:00 98.2 97 19 129/70 (89) 96   








Height (Feet):  5


Height (Inches):  11.00


Weight (Pounds):  264


Objective


GENERAL:  Calm in bed, oriented x3, and in no acute distress.


Head: scap in R frontal area on scalp- some scabbing from scathing, no signs of 

infection 


CARDIOVASCULAR:  No murmurs.


LUNGS:  Distant and clear.


ABDOMEN:  Bowel sounds positive.  Nontender.  Nondistended.


EXTREMITIES:  No cyanosis, clubbing, or edema.


NEUROLOGIC:  The patient moves all extremities, slightly weak





Current Medications








 Medications


  (Trade)  Dose


 Ordered  Sig/Henry


 Route


 PRN Reason  Start Time


 Stop Time Status Last Admin


Dose Admin


 


 Acetaminophen


  (Tylenol)  650 mg  Q4H  PRN


 ORAL


 fever  3/17/19 19:40


 4/13/19 19:39   


 


 


 Acetaminophen/


 Hydrocodone Bitart


  (Norco 10/325)  1 tab  Q6H  PRN


 ORAL


 moderate breakthrough pain   3/20/19 09:00


 3/24/19 08:59  3/21/19 02:33


 


 


 Al Hydroxide/Mg


 Hydroxide


  (Mylanta II)  30 ml  Q6H  PRN


 ORAL


 dyspepsia  3/17/19 19:41


 4/13/19 19:40   


 


 


 Alprazolam


  (Xanax)  0.5 mg  Q6H  PRN


 ORAL


 For Anxiety  3/17/19 19:41


 3/21/19 19:40  3/20/19 14:14


 


 


 Aspirin


  (ASA)  81 mg  DAILY


 ORAL


   3/18/19 09:00


 4/14/19 08:59  3/21/19 08:12


 


 


 Atorvastatin


 Calcium


  (Lipitor)  20 mg  BEDTIME


 ORAL


   3/21/19 21:00


 4/20/19 20:59   


 


 


 Clonidine HCl


  (Catapres Tab)  0.1 mg  Q4H  PRN


 ORAL


 For High Blood Pressure  3/17/19 19:41


 4/13/19 19:40   


 


 


 Dextrose


  (Dextrose 50%)  25 ml  Q30M  PRN


 IV


 Hypoglycemia  3/17/19 20:00


 4/13/19 17:59   


 


 


 Dextrose


  (Dextrose 50%)  50 ml  Q30M  PRN


 IV


 Hypoglycemia  3/17/19 20:00


 4/13/19 17:59   


 


 


 Divalproex Sodium


  (Depakote)  500 mg  Q12HR


 ORAL


   3/17/19 21:00


 4/13/19 20:59  3/21/19 08:13


 


 


 Escitalopram


 Oxalate


  (Lexapro)  10 mg  DAILY


 ORAL


   3/18/19 09:00


 4/14/19 08:59  3/21/19 08:12


 


 


 Heparin Sodium


  (Porcine)


  (Heparin 5000


 units/ml)  5,000 units  EVERY 12  HOURS


 SUBQ


   3/17/19 21:00


 4/13/19 20:59   


 


 


 Hydrocortisone


  (Hydrocortisone)  1 applic  BIDPRN  PRN


 TOPIC


 Itching  3/20/19 16:30


 4/19/19 16:29  3/21/19 12:18


 


 


 Insulin Aspart


  (NovoLOG)    BEFORE MEALS AND  HS


 SUBQ


   3/17/19 21:00


 4/13/19 20:59  3/21/19 12:26


 


 


 Insulin Aspart


  (NovoLOG)  7 units  NOVOTIAC


 SUBQ


   3/18/19 06:30


 4/14/19 11:49  3/21/19 12:27


 


 


 Insulin Detemir


  (Levemir)  18 units  EVERY 12  HOURS


 SUBQ


   3/17/19 21:00


 4/13/19 22:29  3/21/19 08:25


 


 


 Lorazepam


  (Ativan 2mg/ml


 1ml)  0.5 mg  Q4H  PRN


 IV


 For Anxiety  3/17/19 19:42


 3/21/19 19:41  3/21/19 10:02


 


 


 Lorazepam


  (Ativan 2mg/ml


 1ml)  2 mg  Q2H  PRN


 IV


 SEIZURE  3/17/19 19:42


 3/23/19 19:41   


 


 


 Morphine Sulfate


  (Morphine


 Sulfate)  1 mg  Q4H  PRN


 IVP


 Severe Pain (Pain Scale 7-10)  3/20/19 10:15


 3/27/19 10:14  3/21/19 12:17


 


 


 Mupirocin


  (Bactroban Oint)  1 applic  THREE TIMES A  DAY


 TOPIC


   3/20/19 18:00


 3/25/19 17:59  3/21/19 12:18


 


 


 Ondansetron HCl


  (Zofran)  4 mg  Q6H  PRN


 IVP


 Nausea & Vomiting  3/17/19 20:30


 4/13/19 14:24  3/21/19 12:17


 


 


 Polyethylene


 Glycol


  (Miralax)  17 gm  HSPRN  PRN


 ORAL


 Constipation  3/17/19 19:43


 4/16/19 19:42   


 


 


 Quetiapine


 Fumarate


  (SEROquel)  200 mg  BEDTIME


 ORAL


   3/17/19 21:00


 4/13/19 20:59  3/20/19 22:04


 


 


 Zolpidem Tartrate


  (Ambien)  5 mg  HSPRN  PRN


 ORAL


 Insomnia  3/17/19 19:43


 3/24/19 19:42   


 

















Shagufta Arias M.D. Mar 21, 2019 13:41

## 2019-03-21 NOTE — NUR
NURSE NOTES:

Dr sofia and Dr adam visited pt. Received D/C order, all D/C assessments and 
instructions done. pt is stable, V/S stable, all prescribed meds received from PHARMACY and 
given to pt and instructed to pt about time, route, dose of meds  and pt is aware to check 
BS before injecting Levemir and hold Levemir if BS is low. pt verbally confirmed to 
understand all. all belongings are with pt and iv access D/C ,  hospital prepared a cab for 
pt and pt left hospital.

## 2019-03-22 NOTE — DISCHARGE SUMMARY
Discharge Summary


Discharge Summary


_


DATE OF ADMISSION: 3/14/2019


DATE OF DISCHARGE: 03/21/2019





DISCHARGED BY: Dr. Samir Cabrera





CONSULTANTS: 


Dr. Shagufta Mayen, [facility] COURSE:


Patient is a 38-year-old male, who lives at home, presented to ED complaining 

of substernal chest pain, described to be sharp, 10 out of 10.  Pain was worse 

with exertion.  He took aspirin and it did not help.  He denied fever or 

chills.  He denied vomiting, diarrhea, calf pain or leg swelling.  He has 

history of seizure disorder, diabetes mellitus, CVA, hypertension, chronic back 

pain and psychosis.





On evaluation at ED, vital signs were stable.  Blood work showed glucose level 

of 255.  Troponin was negative.  Electrolytes were normal.  EKG done showed 

normal sinus rhythm with no acute changes.  Chest x-ray was negative.  He was 

given aspirin, nitroglycerin paste, morphine and Zofran.  He was admitted for 

evaluation of ACS.





Cardiac enzymes were monitored.  He was seen by cardiologist.  Patient had full 

cardiac workup done from prior admissions.  He had a nonischemic nuclear stress 

done last April 2018.  Twelve-lead electrocardiogram upon admission did not 

show any ischemic features.  He had an echocardiogram done on October 2018 that 

showed normal LV systolic function with LVEF of about 60-65%.  He was continued 

on aspirin.  Lipid panel was elevated.  He was given Lipitor 20 mg nightly.





Patient has a history of insulin-dependent diabetes.  Blood glucose was 

monitored.  He was continued on Levemir 18 units twice daily.  NovoLog dose was 

titrated.  He was given additional 7 units ac tid.  He was noncompliant with 

diet and medication.





He has history of schizoaffective disorder.  He was seen by psychiatrist.  He 

was continued on Depakote 500 mg 12 hours, Seroquel 200 mg nightly, Lexapro 10 

mg daily and Xanax 0.5 mg every 6 hours prn.





Pain management was consulted.  He was given morphine and Norco.





Patient has history of seizure disorder.   Neuro evaluation was done.  He was 

continued on Depakote 500 mg twice a day.  On 03/16/2019, he apparently had a 

seizure episode.  He was transferred to ICU.  He was placed on seizure 

precautions EEG showed encephalopathy of mild degree.  EEG was in normal range 

activity.  There was no epileptiform abnormality.  Head CT showed stable 

postoperative changes from suboccipital craniectomy.  There was no acute 

intracranial hemorrhage, infarct or mass.  Per neurologist evaluation, most 

part of patient's seizures were pseudoseizures.  Patient was noncompliant with 

his medications.  He was transferred out of ICU.





Patient had a scalp wound resultant from seizure.  ID was called to evaluate 

the wounds. There were no signs of infection.   There was no leukocytosis, 

scalp superficial wound did not seem infected.  He was recommended off 

antibiotics.  He was given local wound care per protocol.  He was given topical 

mupirocin ointment for the wound.





Social service was consulted regarding discharge plans.  Patient was homeless 

and was accepted at [facility].  He was provided with discharge 

medications and transportation.


Patient was eventually discharged.





FINAL DIAGNOSES: 


Chest pain, suspect secondary to costochondritis


Seizure disorder


Schizoaffective disorder bipolar type


Superficial scalp wound


COPD


CVA with left-sided hemiparesis


Dependent diabetes mellitus


Coronary artery disease


Hypertension


History of lumbar spine surgery


Neuropathy


Noncompliance





DISPOSITION: Patient was discharged home.





DISCHARGE MEDICATIONS: Refer to Discharge Medication List.





DISCHARGE INSTRUCTIONS: Follow-up in a week.








I have been assigned to complete a discharge summary on this account, I was not 

involved with the patient's management.











Evelyn Garcia NP Mar 22, 2019 13:49

## 2019-06-27 NOTE — PULMONOLOGY PROGRESS NOTE
Assessment/Plan


Problems:  


(1) Acute coronary syndrome


(2) Opiate dependence


(3) Generalized weakness


(4) hemiparesis/hemiataxia , left. acute


(5) COPD (chronic obstructive pulmonary disease)


(6) Anxiety


(7) Diabetes mellitus


Assessment/Plan


no new complains


symptomatic treatment


titrate fio2 to sat of 92%


respiratory treatment


sliding scale


dc planning in process


All medications and treatment were reviewed./





dc planning in progress





Subjective


ROS Limited/Unobtainable:  No


Allergies:  


Coded Allergies:  


     No Known Allergies (Unverified , 4/14/15)





Objective





Last 24 Hour Vital Signs








  Date Time  Temp Pulse Resp B/P (MAP) Pulse Ox O2 Delivery O2 Flow Rate FiO2


 


4/10/18 17:40 98.1       


 


4/10/18 17:10 98.1       


 


4/10/18 16:39 98.1       


 


4/10/18 15:43 98.1 94 19 115/53 97   





 98.1       


 


4/10/18 15:40 98.1       


 


4/10/18 13:07 98.1       


 


4/10/18 11:38 98.1 102 19 108/96 96   





 98.1       


 


4/10/18 09:48 98.4       


 


4/10/18 09:10    122/68    


 


4/10/18 09:07 98.4       


 


4/10/18 08:00 98.4 102 19 122/68 96   





 98.4       


 


4/10/18 07:25  93 16   Room Air  21


 


4/10/18 05:06 97.9       


 


4/10/18 04:00 97.9 101 22 123/73 98 Room Air  





 97.9       


 


4/10/18 00:00 98.6 97 22 116/65 97 Room Air  





 98.6       


 


4/9/18 21:48 98.1       


 


4/9/18 20:26 98.1       

















Intake and Output  


 


 4/9/18 4/10/18





 19:00 07:00


 


Intake Total 480 ml 200 ml


 


Balance 480 ml 200 ml


 


  


 


Intake Oral 480 ml 200 ml








Objective


General Appearance:  WD/WN


HEENT:  normocephalic


Respiratory/Chest:  chest wall non-tender, lungs clear


Cardiovascular:  normal peripheral pulses, regular rhythm


Abdomen:  normal bowel sounds, no organomegaly


Genitourinary:  normal external genitalia


Extremities:  no clubbing


Neurologic/Psychiatric:  CNs II-XII grossly normal, no motor/sensory deficits, 

oriented x 3


Laboratory Tests


4/10/18 05:10: 


White Blood Count 9.5, Red Blood Count 4.91, Hemoglobin 15.0, Hematocrit 42.5, 

Mean Corpuscular Volume 87, Mean Corpuscular Hemoglobin 30.5, Mean Corpuscular 

Hemoglobin Concent 35.2, Red Cell Distribution Width 12.1, Platelet Count 214, 

Mean Platelet Volume 8.4, Neutrophils (%) (Auto) 54.1, Lymphocytes (%) (Auto) 

34.8, Monocytes (%) (Auto) 7.3, Eosinophils (%) (Auto) 3.1H, Basophils (%) (Auto

) 0.8, Sodium Level 138, Potassium Level 3.8, Chloride Level 103, Carbon 

Dioxide Level 23, Anion Gap 12, Blood Urea Nitrogen 15, Creatinine 0.9, Estimat 

Glomerular Filtration Rate > 60, Glucose Level 133H, Calcium Level 9.4





Current Medications








 Medications


  (Trade)  Dose


 Ordered  Sig/Henry


 Route


 PRN Reason  Start Time


 Stop Time Status Last Admin


Dose Admin


 


 Acetaminophen


  (Tylenol)  650 mg  Q4H  PRN


 ORAL


 fever>100.5  4/6/18 22:00


 5/4/18 21:59   


 


 


 Al Hydroxide/Mg


 Hydroxide


  (Mylanta II)  30 ml  Q6H  PRN


 ORAL


 dyspepsia  4/6/18 22:00


 5/6/18 21:59   


 


 


 Albuterol/


 Ipratropium


  (Albuterol/


 Ipratropium)  3 ml  Q4H  PRN


 HHN


 Shortness of Breath  4/8/18 10:15


 4/13/18 10:14  4/8/18 22:47


 


 


 Aspirin


  (Ecotrin)  81 mg  DAILY


 ORAL


   4/7/18 09:00


 5/4/18 12:59  4/10/18 09:10


 


 


 Dextrose


  (Dextrose 50%)  25 ml  PRN  PRN


 IV


 BS 60 TO 69 MG/DL  4/6/18 22:00


 5/6/18 21:59   


 


 


 Dextrose


  (Dextrose 50%)  50 ml  PRN  PRN


 IV


 BS LESS THAN 60 MG/DL  4/6/18 22:00


 5/6/18 21:59   


 


 


 Divalproex Sodium


  (Depakote ER)  500 mg  EVERY 12  HOURS


 ORAL


   4/7/18 09:00


 5/7/18 08:59  4/10/18 09:10


 


 


 Heparin Sodium


  (Porcine)


  (Heparin 5000


 units/ml)  5,000 units  EVERY 12  HOURS


 SUBQ


   4/7/18 09:00


 5/7/18 08:59  4/8/18 08:33


 


 


 Insulin Aspart


  (NovoLOG)    BEFORE MEALS AND  HS


 SUBQ


   4/7/18 06:30


 5/4/18 11:29  4/10/18 16:32


 


 


 Lisinopril


  (Zestril)  10 mg  DAILY


 ORAL


   4/10/18 09:00


 5/10/18 08:59  4/10/18 09:10


 


 


 Lorazepam


  (Ativan 2mg/ml


 1ml)  0.5 mg  Q4H  PRN


 IV


 For Anxiety  4/6/18 22:00


 4/11/18 21:59  4/9/18 12:45


 


 


 Morphine Sulfate


  (Morphine


 Sulfate)  1 mg  Q4H  PRN


 IVP


 Severe Pain (Pain Scale 7-10)  4/6/18 22:00


 4/13/18 21:59  4/10/18 17:10


 


 


 Nicotine


  (Nicoderm)  1 patch  Q24H


 TDERMAL


   4/8/18 10:30


 5/8/18 10:29  4/10/18 09:49


 


 


 Ondansetron HCl


  (Zofran)  4 mg  Q6H  PRN


 IVP


 Nausea & Vomiting  4/6/18 22:00


 5/6/18 21:59  4/10/18 17:51


 


 


 Oxycodone/


 Acetaminophen


  (Percocet 5-325)  1 tab  Q4H  PRN


 ORAL


 Moderate Pain (Pain Scale 4-6)  4/6/18 22:00


 4/11/18 21:59   


 


 


 Polyethylene


 Glycol


  (Miralax)  17 gm  HSPRN  PRN


 ORAL


 Constipation  4/6/18 21:00


 5/6/18 20:59   


 


 


 Pregabalin


  (Lyrica)  75 mg  TID@1000,1500,2000


 ORAL


   4/7/18 10:00


 5/4/18 09:59  4/10/18 15:40


 


 


 Promethazine HCl/


 Codeine


  (Phenergan with


 Codeine)  5 ml  Q4H  PRN


 ORAL


 For Cough  4/6/18 22:00


 5/4/18 21:59   


 


 


 Quetiapine


 Fumarate


  (SEROquel)  300 mg  BEDTIME


 ORAL


   4/7/18 21:00


 5/4/18 20:59  4/9/18 21:48


 


 


 Theophylline


  (Florin-Dur)  100 mg  EVERY 12  HOURS


 ORAL


   4/7/18 09:00


 5/7/18 08:59  4/10/18 09:10


 


 


 Ziprasidone


  (Geodon)  20 mg  TWICE A  DAY


 ORAL


   4/7/18 09:00


 5/4/18 08:59  4/10/18 17:10


 


 


 Zolpidem Tartrate


  (Ambien)  5 mg  HSPRN  PRN


 ORAL


 Insomnia  4/6/18 22:00


 4/13/18 21:59  4/9/18 21:56


 

















Lissette Steward MD Apr 10, 2018 20:05 Discharged

## 2020-05-05 NOTE — NUR
Social service consult requested by  Sully Hinton regarding pt. coming from [facility]. Pt. is a 38 year old male who was admitted to [facility] from [facility] 
for chest pain. SW met with pt bedside. Pt. is alert and oriented x 4. Initially pt. was 
cooperative with SW during the assessment, however as the assessment continued pt. became 
rude and aggressive shouting at . SW inquired with pt if he is homeless. Pt. states he has 
been homeless for 4 months. SW also inquired with pt. if he is still suicidal, since pt. 
came from [facility]. Pt. states he is. Pt. stated, "I am hearing voices that are 
telling him to do things and hurt myself." Pt. states he wants to go back to [facility] to get 
psychiatric help. Pt. receives social security disability per month but refused to say how 
much and informed SW that he doesn't have the money for this month. RONY offered pt. winter 
shelter resources, however pt. became upset and began yelling at  saying, " I don't want 
it and leave me alone." RONY updated  Sully Hinton and GABBY Davis regarding RONY's 
conversation with the pt. 



SW to fax referral packet to [facility] at [phone].

-------------------------------------------------------------------------------

Addendum: 02/04/19 at 1103 by BUDDY URIBE

-------------------------------------------------------------------------------

 Referral packet was faxed to [facility] at [phone]. [Procedure: _________] : a [unfilled] procedure visit

## 2021-04-22 ENCOUNTER — APPOINTMENT (OUTPATIENT)
Dept: GENERAL RADIOLOGY | Age: 41
DRG: 178 | End: 2021-04-22
Payer: MEDICARE

## 2021-04-22 ENCOUNTER — HOSPITAL ENCOUNTER (INPATIENT)
Age: 41
LOS: 4 days | Discharge: HOME | DRG: 178 | End: 2021-04-26
Attending: EMERGENCY MEDICINE | Admitting: STUDENT IN AN ORGANIZED HEALTH CARE EDUCATION/TRAINING PROGRAM
Payer: MEDICARE

## 2021-04-22 DIAGNOSIS — J44.1 ACUTE EXACERBATION OF CHRONIC OBSTRUCTIVE PULMONARY DISEASE (COPD) (HCC): Primary | ICD-10-CM

## 2021-04-22 DIAGNOSIS — M10.9 ACUTE GOUT OF LEFT WRIST, UNSPECIFIED CAUSE: ICD-10-CM

## 2021-04-22 DIAGNOSIS — M25.532 ACUTE PAIN OF LEFT WRIST: ICD-10-CM

## 2021-04-22 DIAGNOSIS — J18.9 COMMUNITY ACQUIRED PNEUMONIA, UNSPECIFIED LATERALITY: ICD-10-CM

## 2021-04-22 LAB
AMPHETAMINE SCREEN, URINE: NORMAL
ANION GAP SERPL CALCULATED.3IONS-SCNC: 12 MMOL/L (ref 3–16)
BARBITURATE SCREEN URINE: NORMAL
BASOPHILS ABSOLUTE: 0.1 K/UL (ref 0–0.2)
BASOPHILS RELATIVE PERCENT: 0.4 %
BENZODIAZEPINE SCREEN, URINE: NORMAL
BUN BLDV-MCNC: 9 MG/DL (ref 7–20)
CALCIUM SERPL-MCNC: 9.1 MG/DL (ref 8.3–10.6)
CANNABINOID SCREEN URINE: NORMAL
CHLORIDE BLD-SCNC: 99 MMOL/L (ref 99–110)
CO2: 23 MMOL/L (ref 21–32)
COCAINE METABOLITE SCREEN URINE: NORMAL
CREAT SERPL-MCNC: 0.8 MG/DL (ref 0.9–1.3)
D DIMER: 228 NG/ML DDU (ref 0–229)
EOSINOPHILS ABSOLUTE: 0.1 K/UL (ref 0–0.6)
EOSINOPHILS RELATIVE PERCENT: 0.5 %
GFR AFRICAN AMERICAN: >60
GFR NON-AFRICAN AMERICAN: >60
GLUCOSE BLD-MCNC: 106 MG/DL (ref 70–99)
GLUCOSE BLD-MCNC: 194 MG/DL (ref 70–99)
GLUCOSE BLD-MCNC: 227 MG/DL (ref 70–99)
HCT VFR BLD CALC: 38.9 % (ref 40.5–52.5)
HEMOGLOBIN: 13.2 G/DL (ref 13.5–17.5)
LACTIC ACID, SEPSIS: 1.4 MMOL/L (ref 0.4–1.9)
LYMPHOCYTES ABSOLUTE: 1.4 K/UL (ref 1–5.1)
LYMPHOCYTES RELATIVE PERCENT: 11.5 %
Lab: NORMAL
MCH RBC QN AUTO: 28.2 PG (ref 26–34)
MCHC RBC AUTO-ENTMCNC: 34 G/DL (ref 31–36)
MCV RBC AUTO: 82.9 FL (ref 80–100)
METHADONE SCREEN, URINE: NORMAL
MONOCYTES ABSOLUTE: 0.8 K/UL (ref 0–1.3)
MONOCYTES RELATIVE PERCENT: 6.9 %
NEUTROPHILS ABSOLUTE: 9.7 K/UL (ref 1.7–7.7)
NEUTROPHILS RELATIVE PERCENT: 80.7 %
OPIATE SCREEN URINE: NORMAL
OXYCODONE URINE: NORMAL
PDW BLD-RTO: 16.1 % (ref 12.4–15.4)
PERFORMED ON: ABNORMAL
PERFORMED ON: ABNORMAL
PH UA: 5.5
PHENCYCLIDINE SCREEN URINE: NORMAL
PLATELET # BLD: 239 K/UL (ref 135–450)
PMV BLD AUTO: 8.2 FL (ref 5–10.5)
POTASSIUM REFLEX MAGNESIUM: 3.7 MMOL/L (ref 3.5–5.1)
PRO-BNP: 152 PG/ML (ref 0–124)
PROCALCITONIN: 0.02 NG/ML (ref 0–0.15)
PROPOXYPHENE SCREEN: NORMAL
RBC # BLD: 4.69 M/UL (ref 4.2–5.9)
SODIUM BLD-SCNC: 134 MMOL/L (ref 136–145)
TROPONIN: <0.01 NG/ML
URIC ACID, SERUM: 11.3 MG/DL (ref 3.5–7.2)
WBC # BLD: 12.1 K/UL (ref 4–11)

## 2021-04-22 PROCEDURE — U0005 INFEC AGEN DETEC AMPLI PROBE: HCPCS

## 2021-04-22 PROCEDURE — 83605 ASSAY OF LACTIC ACID: CPT

## 2021-04-22 PROCEDURE — 1200000000 HC SEMI PRIVATE

## 2021-04-22 PROCEDURE — 6370000000 HC RX 637 (ALT 250 FOR IP): Performed by: EMERGENCY MEDICINE

## 2021-04-22 PROCEDURE — U0003 INFECTIOUS AGENT DETECTION BY NUCLEIC ACID (DNA OR RNA); SEVERE ACUTE RESPIRATORY SYNDROME CORONAVIRUS 2 (SARS-COV-2) (CORONAVIRUS DISEASE [COVID-19]), AMPLIFIED PROBE TECHNIQUE, MAKING USE OF HIGH THROUGHPUT TECHNOLOGIES AS DESCRIBED BY CMS-2020-01-R: HCPCS

## 2021-04-22 PROCEDURE — 94760 N-INVAS EAR/PLS OXIMETRY 1: CPT

## 2021-04-22 PROCEDURE — 85379 FIBRIN DEGRADATION QUANT: CPT

## 2021-04-22 PROCEDURE — 84484 ASSAY OF TROPONIN QUANT: CPT

## 2021-04-22 PROCEDURE — 87040 BLOOD CULTURE FOR BACTERIA: CPT

## 2021-04-22 PROCEDURE — 99284 EMERGENCY DEPT VISIT MOD MDM: CPT

## 2021-04-22 PROCEDURE — 96375 TX/PRO/DX INJ NEW DRUG ADDON: CPT

## 2021-04-22 PROCEDURE — 6360000002 HC RX W HCPCS: Performed by: EMERGENCY MEDICINE

## 2021-04-22 PROCEDURE — 2580000003 HC RX 258: Performed by: EMERGENCY MEDICINE

## 2021-04-22 PROCEDURE — 96361 HYDRATE IV INFUSION ADD-ON: CPT

## 2021-04-22 PROCEDURE — 96374 THER/PROPH/DIAG INJ IV PUSH: CPT

## 2021-04-22 PROCEDURE — 83880 ASSAY OF NATRIURETIC PEPTIDE: CPT

## 2021-04-22 PROCEDURE — 84145 PROCALCITONIN (PCT): CPT

## 2021-04-22 PROCEDURE — 73130 X-RAY EXAM OF HAND: CPT

## 2021-04-22 PROCEDURE — 71046 X-RAY EXAM CHEST 2 VIEWS: CPT

## 2021-04-22 PROCEDURE — 80048 BASIC METABOLIC PNL TOTAL CA: CPT

## 2021-04-22 PROCEDURE — 85025 COMPLETE CBC W/AUTO DIFF WBC: CPT

## 2021-04-22 PROCEDURE — 84550 ASSAY OF BLOOD/URIC ACID: CPT

## 2021-04-22 PROCEDURE — 93005 ELECTROCARDIOGRAM TRACING: CPT | Performed by: EMERGENCY MEDICINE

## 2021-04-22 PROCEDURE — 80307 DRUG TEST PRSMV CHEM ANLYZR: CPT

## 2021-04-22 PROCEDURE — 94640 AIRWAY INHALATION TREATMENT: CPT

## 2021-04-22 RX ORDER — TRAZODONE HYDROCHLORIDE 50 MG/1
50 TABLET ORAL NIGHTLY
Status: DISCONTINUED | OUTPATIENT
Start: 2021-04-23 | End: 2021-04-26 | Stop reason: HOSPADM

## 2021-04-22 RX ORDER — ATORVASTATIN CALCIUM 10 MG/1
10 TABLET, FILM COATED ORAL DAILY
Status: DISCONTINUED | OUTPATIENT
Start: 2021-04-23 | End: 2021-04-26 | Stop reason: HOSPADM

## 2021-04-22 RX ORDER — METHYLPREDNISOLONE SODIUM SUCCINATE 125 MG/2ML
80 INJECTION, POWDER, LYOPHILIZED, FOR SOLUTION INTRAMUSCULAR; INTRAVENOUS ONCE
Status: COMPLETED | OUTPATIENT
Start: 2021-04-22 | End: 2021-04-22

## 2021-04-22 RX ORDER — PREDNISONE 20 MG/1
20 TABLET ORAL DAILY
Status: DISCONTINUED | OUTPATIENT
Start: 2021-04-23 | End: 2021-04-23

## 2021-04-22 RX ORDER — TRAZODONE HYDROCHLORIDE 50 MG/1
50 TABLET ORAL NIGHTLY
Status: ON HOLD | COMMUNITY
Start: 2021-01-27 | End: 2022-05-12 | Stop reason: SDUPTHER

## 2021-04-22 RX ORDER — IPRATROPIUM BROMIDE AND ALBUTEROL SULFATE 2.5; .5 MG/3ML; MG/3ML
1 SOLUTION RESPIRATORY (INHALATION) EVERY 4 HOURS PRN
Status: DISCONTINUED | OUTPATIENT
Start: 2021-04-22 | End: 2021-04-26 | Stop reason: HOSPADM

## 2021-04-22 RX ORDER — ASPIRIN 81 MG/1
81 TABLET ORAL NIGHTLY
Status: DISCONTINUED | OUTPATIENT
Start: 2021-04-23 | End: 2021-04-26 | Stop reason: HOSPADM

## 2021-04-22 RX ORDER — IBUPROFEN 600 MG/1
600 TABLET ORAL EVERY 8 HOURS PRN
Status: ON HOLD | COMMUNITY
Start: 2021-02-23 | End: 2022-05-10 | Stop reason: ALTCHOICE

## 2021-04-22 RX ORDER — ACETAMINOPHEN 325 MG/1
650 TABLET ORAL EVERY 6 HOURS PRN
Status: DISCONTINUED | OUTPATIENT
Start: 2021-04-22 | End: 2021-04-26 | Stop reason: HOSPADM

## 2021-04-22 RX ORDER — DIVALPROEX SODIUM 500 MG/1
500 TABLET, DELAYED RELEASE ORAL EVERY 12 HOURS
Status: ON HOLD | COMMUNITY
Start: 2021-04-12 | End: 2022-05-12 | Stop reason: SDUPTHER

## 2021-04-22 RX ORDER — NICOTINE POLACRILEX 4 MG
15 LOZENGE BUCCAL PRN
Status: DISCONTINUED | OUTPATIENT
Start: 2021-04-22 | End: 2021-04-26 | Stop reason: HOSPADM

## 2021-04-22 RX ORDER — KETOROLAC TROMETHAMINE 30 MG/ML
15 INJECTION, SOLUTION INTRAMUSCULAR; INTRAVENOUS ONCE
Status: COMPLETED | OUTPATIENT
Start: 2021-04-22 | End: 2021-04-22

## 2021-04-22 RX ORDER — LISINOPRIL 5 MG/1
5 TABLET ORAL DAILY
Status: DISCONTINUED | OUTPATIENT
Start: 2021-04-23 | End: 2021-04-26 | Stop reason: HOSPADM

## 2021-04-22 RX ORDER — POLYETHYLENE GLYCOL 3350 17 G/17G
17 POWDER, FOR SOLUTION ORAL DAILY PRN
Status: DISCONTINUED | OUTPATIENT
Start: 2021-04-22 | End: 2021-04-26 | Stop reason: HOSPADM

## 2021-04-22 RX ORDER — SODIUM CHLORIDE, SODIUM LACTATE, POTASSIUM CHLORIDE, AND CALCIUM CHLORIDE .6; .31; .03; .02 G/100ML; G/100ML; G/100ML; G/100ML
1000 INJECTION, SOLUTION INTRAVENOUS ONCE
Status: COMPLETED | OUTPATIENT
Start: 2021-04-22 | End: 2021-04-22

## 2021-04-22 RX ORDER — PROMETHAZINE HYDROCHLORIDE 25 MG/1
12.5 TABLET ORAL EVERY 6 HOURS PRN
Status: DISCONTINUED | OUTPATIENT
Start: 2021-04-22 | End: 2021-04-26 | Stop reason: HOSPADM

## 2021-04-22 RX ORDER — DEXTROSE MONOHYDRATE 50 MG/ML
100 INJECTION, SOLUTION INTRAVENOUS PRN
Status: DISCONTINUED | OUTPATIENT
Start: 2021-04-22 | End: 2021-04-26 | Stop reason: HOSPADM

## 2021-04-22 RX ORDER — IPRATROPIUM BROMIDE AND ALBUTEROL SULFATE 2.5; .5 MG/3ML; MG/3ML
1 SOLUTION RESPIRATORY (INHALATION) ONCE
Status: COMPLETED | OUTPATIENT
Start: 2021-04-22 | End: 2021-04-22

## 2021-04-22 RX ORDER — HYDROCODONE BITARTRATE AND ACETAMINOPHEN 5; 325 MG/1; MG/1
1 TABLET ORAL ONCE
Status: COMPLETED | OUTPATIENT
Start: 2021-04-22 | End: 2021-04-22

## 2021-04-22 RX ORDER — SODIUM CHLORIDE 9 MG/ML
25 INJECTION, SOLUTION INTRAVENOUS PRN
Status: DISCONTINUED | OUTPATIENT
Start: 2021-04-22 | End: 2021-04-26 | Stop reason: HOSPADM

## 2021-04-22 RX ORDER — SODIUM CHLORIDE 0.9 % (FLUSH) 0.9 %
5-40 SYRINGE (ML) INJECTION PRN
Status: DISCONTINUED | OUTPATIENT
Start: 2021-04-22 | End: 2021-04-26 | Stop reason: HOSPADM

## 2021-04-22 RX ORDER — ASPIRIN 81 MG/1
81 TABLET ORAL NIGHTLY
Status: ON HOLD | COMMUNITY
Start: 2021-04-13 | End: 2022-05-12 | Stop reason: HOSPADM

## 2021-04-22 RX ORDER — ONDANSETRON 2 MG/ML
4 INJECTION INTRAMUSCULAR; INTRAVENOUS EVERY 6 HOURS PRN
Status: DISCONTINUED | OUTPATIENT
Start: 2021-04-22 | End: 2021-04-26 | Stop reason: HOSPADM

## 2021-04-22 RX ORDER — LISINOPRIL 5 MG/1
5 TABLET ORAL DAILY
Status: ON HOLD | COMMUNITY
Start: 2021-01-22 | End: 2022-05-12 | Stop reason: SDUPTHER

## 2021-04-22 RX ORDER — IBUPROFEN 400 MG/1
400 TABLET ORAL
Status: DISCONTINUED | OUTPATIENT
Start: 2021-04-23 | End: 2021-04-23

## 2021-04-22 RX ORDER — DEXTROSE MONOHYDRATE 25 G/50ML
12.5 INJECTION, SOLUTION INTRAVENOUS PRN
Status: DISCONTINUED | OUTPATIENT
Start: 2021-04-22 | End: 2021-04-26 | Stop reason: HOSPADM

## 2021-04-22 RX ORDER — ATORVASTATIN CALCIUM 40 MG/1
40 TABLET, FILM COATED ORAL DAILY
Status: ON HOLD | COMMUNITY
Start: 2021-01-27 | End: 2022-05-12 | Stop reason: SDUPTHER

## 2021-04-22 RX ORDER — TIZANIDINE 4 MG/1
4 TABLET ORAL EVERY 6 HOURS PRN
Status: ON HOLD | COMMUNITY
Start: 2021-04-12 | End: 2022-05-12 | Stop reason: HOSPADM

## 2021-04-22 RX ORDER — SODIUM CHLORIDE 0.9 % (FLUSH) 0.9 %
5-40 SYRINGE (ML) INJECTION EVERY 12 HOURS SCHEDULED
Status: DISCONTINUED | OUTPATIENT
Start: 2021-04-23 | End: 2021-04-26 | Stop reason: HOSPADM

## 2021-04-22 RX ORDER — ACETAMINOPHEN 650 MG/1
650 SUPPOSITORY RECTAL EVERY 6 HOURS PRN
Status: DISCONTINUED | OUTPATIENT
Start: 2021-04-22 | End: 2021-04-26 | Stop reason: HOSPADM

## 2021-04-22 RX ADMIN — IPRATROPIUM BROMIDE AND ALBUTEROL SULFATE 1 AMPULE: .5; 3 SOLUTION RESPIRATORY (INHALATION) at 20:02

## 2021-04-22 RX ADMIN — HYDROCODONE BITARTRATE AND ACETAMINOPHEN 1 TABLET: 5; 325 TABLET ORAL at 20:59

## 2021-04-22 RX ADMIN — ALBUTEROL SULFATE 2.5 MG: 2.5 SOLUTION RESPIRATORY (INHALATION) at 20:03

## 2021-04-22 RX ADMIN — METHYLPREDNISOLONE SODIUM SUCCINATE 80 MG: 125 INJECTION, POWDER, FOR SOLUTION INTRAMUSCULAR; INTRAVENOUS at 20:00

## 2021-04-22 RX ADMIN — KETOROLAC TROMETHAMINE 15 MG: 30 INJECTION, SOLUTION INTRAMUSCULAR; INTRAVENOUS at 20:02

## 2021-04-22 RX ADMIN — SODIUM CHLORIDE, POTASSIUM CHLORIDE, SODIUM LACTATE AND CALCIUM CHLORIDE 1000 ML: 600; 310; 30; 20 INJECTION, SOLUTION INTRAVENOUS at 20:03

## 2021-04-22 ASSESSMENT — PAIN DESCRIPTION - PAIN TYPE
TYPE: ACUTE PAIN
TYPE: ACUTE PAIN

## 2021-04-22 ASSESSMENT — PAIN SCALES - GENERAL
PAINLEVEL_OUTOF10: 9

## 2021-04-22 ASSESSMENT — PAIN DESCRIPTION - ONSET
ONSET: ON-GOING
ONSET: GRADUAL
ONSET: ON-GOING

## 2021-04-22 ASSESSMENT — PAIN DESCRIPTION - FREQUENCY
FREQUENCY: CONTINUOUS
FREQUENCY: CONTINUOUS

## 2021-04-22 ASSESSMENT — PAIN - FUNCTIONAL ASSESSMENT
PAIN_FUNCTIONAL_ASSESSMENT: PREVENTS OR INTERFERES SOME ACTIVE ACTIVITIES AND ADLS
PAIN_FUNCTIONAL_ASSESSMENT: PREVENTS OR INTERFERES SOME ACTIVE ACTIVITIES AND ADLS

## 2021-04-22 ASSESSMENT — PAIN DESCRIPTION - PROGRESSION: CLINICAL_PROGRESSION: NOT CHANGED

## 2021-04-22 ASSESSMENT — PAIN DESCRIPTION - LOCATION: LOCATION: HEAD;NECK

## 2021-04-22 ASSESSMENT — PAIN DESCRIPTION - DESCRIPTORS: DESCRIPTORS: SHARP;SHOOTING

## 2021-04-22 ASSESSMENT — PAIN DESCRIPTION - ORIENTATION: ORIENTATION: LEFT

## 2021-04-22 NOTE — LETTER
NOTIFICATION RETURN TO WORK / SCHOOL    4/26/2021    Mr. Teagan Booker [id number]      To Whom It May Concern:    Laurel Juarez was inpatient at our hospital from April 22, 2021 and released on April 26, 2021. He may return to work on Friday April 30, 2021. If there are questions or concerns, please have the patient contact our office. Sincerely,      Kirstie GRACE, [facility]   [phone]

## 2021-04-23 LAB
ANION GAP SERPL CALCULATED.3IONS-SCNC: 13 MMOL/L (ref 3–16)
BASOPHILS ABSOLUTE: 0 K/UL (ref 0–0.2)
BASOPHILS RELATIVE PERCENT: 0.2 %
BUN BLDV-MCNC: 11 MG/DL (ref 7–20)
CALCIUM SERPL-MCNC: 8.9 MG/DL (ref 8.3–10.6)
CHLORIDE BLD-SCNC: 100 MMOL/L (ref 99–110)
CO2: 22 MMOL/L (ref 21–32)
CREAT SERPL-MCNC: 0.7 MG/DL (ref 0.9–1.3)
EKG ATRIAL RATE: 101 BPM
EKG DIAGNOSIS: NORMAL
EKG P AXIS: 56 DEGREES
EKG P-R INTERVAL: 142 MS
EKG Q-T INTERVAL: 340 MS
EKG QRS DURATION: 78 MS
EKG QTC CALCULATION (BAZETT): 440 MS
EKG R AXIS: 61 DEGREES
EKG T AXIS: 45 DEGREES
EKG VENTRICULAR RATE: 101 BPM
EOSINOPHILS ABSOLUTE: 0 K/UL (ref 0–0.6)
EOSINOPHILS RELATIVE PERCENT: 0 %
GFR AFRICAN AMERICAN: >60
GFR NON-AFRICAN AMERICAN: >60
GLUCOSE BLD-MCNC: 126 MG/DL (ref 70–99)
GLUCOSE BLD-MCNC: 164 MG/DL (ref 70–99)
GLUCOSE BLD-MCNC: 167 MG/DL (ref 70–99)
GLUCOSE BLD-MCNC: 254 MG/DL (ref 70–99)
GLUCOSE BLD-MCNC: 263 MG/DL (ref 70–99)
HCT VFR BLD CALC: 36.6 % (ref 40.5–52.5)
HEMOGLOBIN: 12.4 G/DL (ref 13.5–17.5)
LACTIC ACID: 1.5 MMOL/L (ref 0.4–2)
LYMPHOCYTES ABSOLUTE: 0.9 K/UL (ref 1–5.1)
LYMPHOCYTES RELATIVE PERCENT: 8.6 %
MCH RBC QN AUTO: 28.1 PG (ref 26–34)
MCHC RBC AUTO-ENTMCNC: 33.8 G/DL (ref 31–36)
MCV RBC AUTO: 83.2 FL (ref 80–100)
MONOCYTES ABSOLUTE: 0.3 K/UL (ref 0–1.3)
MONOCYTES RELATIVE PERCENT: 2.4 %
NEUTROPHILS ABSOLUTE: 9.6 K/UL (ref 1.7–7.7)
NEUTROPHILS RELATIVE PERCENT: 88.8 %
PDW BLD-RTO: 16.1 % (ref 12.4–15.4)
PERFORMED ON: ABNORMAL
PLATELET # BLD: 262 K/UL (ref 135–450)
PMV BLD AUTO: 8.4 FL (ref 5–10.5)
POTASSIUM REFLEX MAGNESIUM: 4.1 MMOL/L (ref 3.5–5.1)
RAPID INFLUENZA  B AGN: NEGATIVE
RAPID INFLUENZA A AGN: NEGATIVE
RBC # BLD: 4.4 M/UL (ref 4.2–5.9)
SARS-COV-2, PCR: NOT DETECTED
SODIUM BLD-SCNC: 135 MMOL/L (ref 136–145)
WBC # BLD: 10.8 K/UL (ref 4–11)

## 2021-04-23 PROCEDURE — 97530 THERAPEUTIC ACTIVITIES: CPT

## 2021-04-23 PROCEDURE — 97162 PT EVAL MOD COMPLEX 30 MIN: CPT

## 2021-04-23 PROCEDURE — 6360000002 HC RX W HCPCS: Performed by: EMERGENCY MEDICINE

## 2021-04-23 PROCEDURE — 36569 INSJ PICC 5 YR+ W/O IMAGING: CPT

## 2021-04-23 PROCEDURE — 87040 BLOOD CULTURE FOR BACTERIA: CPT

## 2021-04-23 PROCEDURE — 87449 NOS EACH ORGANISM AG IA: CPT

## 2021-04-23 PROCEDURE — 2580000003 HC RX 258: Performed by: EMERGENCY MEDICINE

## 2021-04-23 PROCEDURE — 93010 ELECTROCARDIOGRAM REPORT: CPT | Performed by: INTERNAL MEDICINE

## 2021-04-23 PROCEDURE — 76937 US GUIDE VASCULAR ACCESS: CPT

## 2021-04-23 PROCEDURE — 87641 MR-STAPH DNA AMP PROBE: CPT

## 2021-04-23 PROCEDURE — C1751 CATH, INF, PER/CENT/MIDLINE: HCPCS

## 2021-04-23 PROCEDURE — 2580000003 HC RX 258: Performed by: NURSE PRACTITIONER

## 2021-04-23 PROCEDURE — 94640 AIRWAY INHALATION TREATMENT: CPT

## 2021-04-23 PROCEDURE — 1200000000 HC SEMI PRIVATE

## 2021-04-23 PROCEDURE — 02HV33Z INSERTION OF INFUSION DEVICE INTO SUPERIOR VENA CAVA, PERCUTANEOUS APPROACH: ICD-10-PCS | Performed by: INTERNAL MEDICINE

## 2021-04-23 PROCEDURE — 36415 COLL VENOUS BLD VENIPUNCTURE: CPT

## 2021-04-23 PROCEDURE — 6370000000 HC RX 637 (ALT 250 FOR IP): Performed by: NURSE PRACTITIONER

## 2021-04-23 PROCEDURE — 87804 INFLUENZA ASSAY W/OPTIC: CPT

## 2021-04-23 PROCEDURE — 83605 ASSAY OF LACTIC ACID: CPT

## 2021-04-23 PROCEDURE — 6360000002 HC RX W HCPCS: Performed by: NURSE PRACTITIONER

## 2021-04-23 PROCEDURE — 6360000002 HC RX W HCPCS: Performed by: INTERNAL MEDICINE

## 2021-04-23 PROCEDURE — 2580000003 HC RX 258: Performed by: INTERNAL MEDICINE

## 2021-04-23 PROCEDURE — 97165 OT EVAL LOW COMPLEX 30 MIN: CPT

## 2021-04-23 PROCEDURE — 85025 COMPLETE CBC W/AUTO DIFF WBC: CPT

## 2021-04-23 PROCEDURE — 80048 BASIC METABOLIC PNL TOTAL CA: CPT

## 2021-04-23 PROCEDURE — 6370000000 HC RX 637 (ALT 250 FOR IP): Performed by: INTERNAL MEDICINE

## 2021-04-23 RX ORDER — INDOMETHACIN 25 MG/1
25 CAPSULE ORAL
Status: DISCONTINUED | OUTPATIENT
Start: 2021-04-23 | End: 2021-04-26 | Stop reason: HOSPADM

## 2021-04-23 RX ORDER — SODIUM CHLORIDE 9 MG/ML
25 INJECTION, SOLUTION INTRAVENOUS PRN
Status: DISCONTINUED | OUTPATIENT
Start: 2021-04-23 | End: 2021-04-26 | Stop reason: HOSPADM

## 2021-04-23 RX ORDER — SODIUM CHLORIDE 0.9 % (FLUSH) 0.9 %
5-40 SYRINGE (ML) INJECTION PRN
Status: DISCONTINUED | OUTPATIENT
Start: 2021-04-23 | End: 2021-04-26 | Stop reason: HOSPADM

## 2021-04-23 RX ORDER — MORPHINE SULFATE 2 MG/ML
2 INJECTION, SOLUTION INTRAMUSCULAR; INTRAVENOUS EVERY 8 HOURS PRN
Status: DISCONTINUED | OUTPATIENT
Start: 2021-04-23 | End: 2021-04-26 | Stop reason: HOSPADM

## 2021-04-23 RX ORDER — SODIUM CHLORIDE 0.9 % (FLUSH) 0.9 %
5-40 SYRINGE (ML) INJECTION EVERY 12 HOURS SCHEDULED
Status: DISCONTINUED | OUTPATIENT
Start: 2021-04-23 | End: 2021-04-26 | Stop reason: HOSPADM

## 2021-04-23 RX ORDER — COLCHICINE 0.6 MG/1
0.6 TABLET ORAL DAILY
Status: DISCONTINUED | OUTPATIENT
Start: 2021-04-23 | End: 2021-04-26 | Stop reason: HOSPADM

## 2021-04-23 RX ORDER — METHYLPREDNISOLONE SODIUM SUCCINATE 40 MG/ML
40 INJECTION, POWDER, LYOPHILIZED, FOR SOLUTION INTRAMUSCULAR; INTRAVENOUS EVERY 12 HOURS SCHEDULED
Status: DISCONTINUED | OUTPATIENT
Start: 2021-04-23 | End: 2021-04-26 | Stop reason: HOSPADM

## 2021-04-23 RX ORDER — LIDOCAINE HYDROCHLORIDE 10 MG/ML
5 INJECTION, SOLUTION EPIDURAL; INFILTRATION; INTRACAUDAL; PERINEURAL ONCE
Status: DISCONTINUED | OUTPATIENT
Start: 2021-04-23 | End: 2021-04-26 | Stop reason: HOSPADM

## 2021-04-23 RX ADMIN — INSULIN LISPRO 2 UNITS: 100 INJECTION, SOLUTION INTRAVENOUS; SUBCUTANEOUS at 22:29

## 2021-04-23 RX ADMIN — COLCHICINE 0.6 MG: 0.6 TABLET ORAL at 12:07

## 2021-04-23 RX ADMIN — SODIUM CHLORIDE, PRESERVATIVE FREE 10 ML: 5 INJECTION INTRAVENOUS at 09:26

## 2021-04-23 RX ADMIN — ATORVASTATIN CALCIUM 10 MG: 10 TABLET, FILM COATED ORAL at 09:25

## 2021-04-23 RX ADMIN — DIVALPROEX SODIUM 750 MG: 500 TABLET, DELAYED RELEASE ORAL at 09:25

## 2021-04-23 RX ADMIN — ASPIRIN 81 MG: 81 TABLET, COATED ORAL at 00:50

## 2021-04-23 RX ADMIN — ACETAMINOPHEN 650 MG: 325 TABLET ORAL at 00:50

## 2021-04-23 RX ADMIN — PREDNISONE 20 MG: 20 TABLET ORAL at 09:25

## 2021-04-23 RX ADMIN — SODIUM CHLORIDE, PRESERVATIVE FREE 10 ML: 5 INJECTION INTRAVENOUS at 23:55

## 2021-04-23 RX ADMIN — AZITHROMYCIN MONOHYDRATE 500 MG: 500 INJECTION, POWDER, LYOPHILIZED, FOR SOLUTION INTRAVENOUS at 00:51

## 2021-04-23 RX ADMIN — INDOMETHACIN 25 MG: 25 CAPSULE ORAL at 16:49

## 2021-04-23 RX ADMIN — MORPHINE SULFATE 2 MG: 2 INJECTION, SOLUTION INTRAMUSCULAR; INTRAVENOUS at 14:24

## 2021-04-23 RX ADMIN — INSULIN LISPRO 3 UNITS: 100 INJECTION, SOLUTION INTRAVENOUS; SUBCUTANEOUS at 16:49

## 2021-04-23 RX ADMIN — INSULIN LISPRO 1 UNITS: 100 INJECTION, SOLUTION INTRAVENOUS; SUBCUTANEOUS at 09:30

## 2021-04-23 RX ADMIN — Medication 1250 MG: at 10:39

## 2021-04-23 RX ADMIN — IBUPROFEN 400 MG: 400 TABLET, FILM COATED ORAL at 09:23

## 2021-04-23 RX ADMIN — Medication 1250 MG: at 02:42

## 2021-04-23 RX ADMIN — MORPHINE SULFATE 2 MG: 2 INJECTION, SOLUTION INTRAMUSCULAR; INTRAVENOUS at 23:07

## 2021-04-23 RX ADMIN — SERTRALINE 50 MG: 50 TABLET, FILM COATED ORAL at 09:25

## 2021-04-23 RX ADMIN — DIVALPROEX SODIUM 750 MG: 500 TABLET, DELAYED RELEASE ORAL at 00:50

## 2021-04-23 RX ADMIN — Medication 1250 MG: at 18:53

## 2021-04-23 RX ADMIN — LISINOPRIL 5 MG: 5 TABLET ORAL at 09:25

## 2021-04-23 RX ADMIN — TRAZODONE HYDROCHLORIDE 50 MG: 50 TABLET ORAL at 22:27

## 2021-04-23 RX ADMIN — IPRATROPIUM BROMIDE AND ALBUTEROL SULFATE 1 AMPULE: .5; 3 SOLUTION RESPIRATORY (INHALATION) at 10:48

## 2021-04-23 RX ADMIN — INSULIN LISPRO 1 UNITS: 100 INJECTION, SOLUTION INTRAVENOUS; SUBCUTANEOUS at 12:07

## 2021-04-23 RX ADMIN — METHYLPREDNISOLONE SODIUM SUCCINATE 40 MG: 40 INJECTION, POWDER, FOR SOLUTION INTRAMUSCULAR; INTRAVENOUS at 10:39

## 2021-04-23 RX ADMIN — CEFEPIME HYDROCHLORIDE 2000 MG: 2 INJECTION, POWDER, FOR SOLUTION INTRAVENOUS at 02:09

## 2021-04-23 RX ADMIN — DIVALPROEX SODIUM 750 MG: 500 TABLET, DELAYED RELEASE ORAL at 22:27

## 2021-04-23 RX ADMIN — Medication 10 ML: at 22:28

## 2021-04-23 RX ADMIN — INSULIN LISPRO 1 UNITS: 100 INJECTION, SOLUTION INTRAVENOUS; SUBCUTANEOUS at 01:00

## 2021-04-23 RX ADMIN — CEFEPIME HYDROCHLORIDE 2000 MG: 2 INJECTION, POWDER, FOR SOLUTION INTRAVENOUS at 14:24

## 2021-04-23 RX ADMIN — METHYLPREDNISOLONE SODIUM SUCCINATE 40 MG: 40 INJECTION, POWDER, FOR SOLUTION INTRAMUSCULAR; INTRAVENOUS at 22:27

## 2021-04-23 RX ADMIN — TRAZODONE HYDROCHLORIDE 50 MG: 50 TABLET ORAL at 00:49

## 2021-04-23 RX ADMIN — ASPIRIN 81 MG: 81 TABLET, COATED ORAL at 22:27

## 2021-04-23 ASSESSMENT — PAIN DESCRIPTION - LOCATION
LOCATION: ARM
LOCATION: ARM

## 2021-04-23 ASSESSMENT — PAIN DESCRIPTION - PROGRESSION: CLINICAL_PROGRESSION: NOT CHANGED

## 2021-04-23 ASSESSMENT — PAIN DESCRIPTION - FREQUENCY
FREQUENCY: CONTINUOUS
FREQUENCY: CONTINUOUS

## 2021-04-23 ASSESSMENT — PAIN DESCRIPTION - ONSET
ONSET: ON-GOING

## 2021-04-23 ASSESSMENT — PAIN DESCRIPTION - ORIENTATION
ORIENTATION: LEFT

## 2021-04-23 ASSESSMENT — PAIN SCALES - GENERAL: PAINLEVEL_OUTOF10: 4

## 2021-04-23 ASSESSMENT — PAIN DESCRIPTION - DESCRIPTORS
DESCRIPTORS: SHARP;SHOOTING
DESCRIPTORS: SHARP;SHOOTING

## 2021-04-23 ASSESSMENT — PAIN DESCRIPTION - PAIN TYPE: TYPE: ACUTE PAIN

## 2021-04-23 NOTE — H&P
blisters. No evidence of vascular compromise or circumferential swelling. CODE STATUS full. Past Medical History:        Diagnosis Date    [facility]     [facility]     Diabetes mellitus ([facility]     Gout     HTN (hypertension)     Seizure disorder ([facility]     Skin cancer        Past Surgical History:        Procedure Laterality Date    NECK SURGERY         Medications Prior to Admission:    Prior to Admission medications    Medication Sig Start Date End Date Taking? Authorizing Provider   aspirin 81 MG EC tablet Take 81 mg by mouth nightly  4/13/21  Yes Historical Provider, MD   atorvastatin (LIPITOR) 10 MG tablet Take 10 mg by mouth daily  1/27/21  Yes Historical Provider, MD   divalproex (DEPAKOTE) 250 MG DR tablet Take 750 mg by mouth every 12 hours 4/12/21  Yes Historical Provider, MD   metFORMIN (GLUCOPHAGE) 500 MG tablet Take 500 mg by mouth daily 2/23/21  Yes Historical Provider, MD   lisinopril (PRINIVIL;ZESTRIL) 5 MG tablet Take 5 mg by mouth daily 1/22/21  Yes Historical Provider, MD   sertraline (ZOLOFT) 50 MG tablet Take 50 mg by mouth daily 1/27/21  Yes Historical Provider, MD   tiZANidine (ZANAFLEX) 4 MG tablet Take 4 mg by mouth every 6 hours as needed 4/12/21  Yes Historical Provider, MD   traZODone (DESYREL) 50 MG tablet Take 50 mg by mouth nightly 1/27/21  Yes Historical Provider, MD   ibuprofen (ADVIL;MOTRIN) 600 MG tablet Take 600 mg by mouth every 8 hours as needed 2/23/21  Yes Historical Provider, MD       Allergies:  Benadryl [diphenhydramine]    Social History:  The patient currently lives at home. TOBACCO:   reports that he has never smoked. He has never used smokeless tobacco.  ETOH:   reports no history of alcohol use. Family History:  Reviewed in detail and negative for DM, Early CAD, Cancer, CVA. Positive as follows:    No family history on file.     REVIEW OF SYSTEMS:   Positive for left hand pain, shortness of breath, fatigue, generalized body aches and as noted in the HPI. All other systems reviewed and negative. PHYSICAL EXAM:    BP (!) 148/97   Pulse 104   Temp 99 °F (37.2 °C) (Oral)   Resp 22   Ht 5' 11\" (1.803 m)   Wt 239 lb 10.2 oz (108.7 kg)   SpO2 90%   BMI 33.42 kg/m²     General appearance: No evidence of toxemia but he does appear to be older than stated age. Appears to not feel well but no evidence of encephalopathy. HEENT Normal cephalic, atraumatic without obvious deformity. Pupils equal, round, and reactive to light. Extra ocular muscles intact. Conjunctivae/corneas clear. Scalp scar on the right frontal parietal region. Neck: Supple, No jugular venous distention/bruits. Trachea midline without thyromegaly or adenopathy with full range of motion. Lungs: Clear to auscultation, bilaterally without Rales/Wheezes/Rhonchi with good respiratory effort. Heart: Tachycardic regular rhythm. No murmurs rubs or gallops. No evidence of JVD. No evidence of peripheral edema. Abdomen: Soft, non-tender or non-distended without rigidity or guarding and positive bowel sounds all four quadrants. Extremities: Right upper extremity negative for injury or swelling. Full range of motion right shoulder elbow and wrist, and all digits on the right hand. Left shoulder full range of motion, left elbow full range of motion. Limited range of motion of the left wrist with extension and flexion, and limited range of motion of all 5 digits with abduction abduction flexion extension due to pinpoint pain at the dorsal aspect of the third metacarpal.  Bilateral radial pulses 2+. No evidence of blistering. No open wounds. No streaking. Lateral lower extremities warm and dry. No evidence of peripheral edema. Skin: Skin color, texture, turgor normal.  No rashes or lesions.   Neurologic: Alert and oriented X 3, neurovascularly intact with sensory/motor intact upper extremities/lower extremities, bilaterally. Cranial nerves: II-XII intact, grossly non-focal.  Mental status: Alert, oriented, thought content appropriate. Capillary Refill: Acceptable  < 3 seconds  Peripheral Pulses: +3 Easily felt, not easily obliterated with pressure      CXR:  I have reviewed the CXR with the following interpretation: Perihilar markings, atelectasis and generalized inflammatory process. EKG:  I have reviewed the EKG with the following interpretation: Sinus tachycardia rate 101, narrow complex. ME interval 142, QRS 78, . X-ray of the left hand: Soft tissue swelling noticed noted at the third digit with peritracheal erosion of the proximal phalanx. No foreign body. No gas. CBC   Recent Labs     04/22/21  1747   WBC 12.1*   HGB 13.2*   HCT 38.9*         RENAL  Recent Labs     04/22/21  1746   *   K 3.7   CL 99   CO2 23   BUN 9   CREATININE 0.8*     LFT'S  No results for input(s): AST, ALT, ALB, BILIDIR, BILITOT, ALKPHOS in the last 72 hours. COAG  No results for input(s): INR in the last 72 hours.   CARDIAC ENZYMES  Recent Labs     04/22/21 1746   TROPONINI <0.01       U/A:  No results found for: NITRITE, COLORU, WBCUA, RBCUA, MUCUS, BACTERIA, CLARITYU, SPECGRAV, LEUKOCYTESUR, BLOODU, GLUCOSEU, AMORPHOUS    ABG  No results found for: MMD4CQH, BEART, N4MFTSFK, PHART, THGBART, ZRR6STI, PO2ART, VZO2FTL        Active Hospital Problems    Diagnosis Date Noted    [facility] [J44.1] 04/22/2021         PHYSICIANS CERTIFICATION:    I certify that Bunny Sr is expected to be hospitalized for less than 2 midnights based on the following assessment and plan:      ASSESSMENT/PLAN:    SIRS versus sepsis: 2/2-> HCAP, COPD  Pro-Johnathon 0.02, lactic acid 1.4, no current evidence of pyrexia, no encephalopathy, no hypoxia, no hypotension      HCAP: Inpatient hospitalization April 12 through April 15  Chest x-ray indicating perihilar markings, productive Cough, fever, fatigue, weakness, tobacco smoker, positive known COPD nonoxygen dependent  Covid vaccination series completed  Covid testing in process  Leukocytosis: WBC 12.1 w/ left shift  Lactic acid: 1.4-> repeat 6 AM  Procalcitonin 0.02  Blood cultures x2 in process  MRSA nasal  Legionella  Strep pneumonia testing  Cefepime, azithromycin and vancomycin      COPD: Not oxygen dependent  Symptomatic with wheezing congestion and productive cough  Oxygen therapy: Titrate maintaining saturation greater than 92%, currently on room air  Patient is not typically on inhaled steroids and reports he has not seen a pulmonologist  Continue HCAP antibiotic regimen  DuoNeb   Oral steroids    Gout: Left hand, left third finger  Uric acid 11.2, pain tenderness and erythema of the left hand, left third metacarpal, and evidence on left hand x-ray  Supportive: Oral steroids: Prednisone 20 mg daily, oral NSAIDs  Tramadol short-term for the acute pain  Cool compress  Splinting of the left wrist may be helpful if pain is persistent      Seizure disorder: Continue Depakote per home regimen    DM:   Hypoglycemia protocol, SSI, FSBS  Carb controlled diet      HTN: Continue aspirin, statin, lisinopril regimen      DVT Prophylaxis: Lovenox  Diet: DIET CARB CONTROL;  Code Status: Full Code  PT/OT Eval Status: independent    Dispo - admit, inpt       Annette Delgado, APRN - CNP    Thank you No primary care provider on file. for the opportunity to be involved in this patient's care. If you have any questions or concerns please feel free to contact me at [phone].

## 2021-04-23 NOTE — CONSULTS
Clinical Pharmacy Note  Vancomycin Consult    Maria Guadalupe Liriano is a 38 y.o. male ordered Vancomycin for pneumonia; consult received from [facility] to manage therapy. Also receiving cefepime. Patient Active Problem List   Diagnosis    COPD with acute exacerbation ([place] [address]       Allergies:  Patient has no known allergies. Temp max:  Temp (24hrs), Av °F (37.2 °C), Min:99 °F (37.2 °C), Max:99 °F (37.2 °C)      Recent Labs     21  1747   WBC 12.1*       Recent Labs     21  1746   BUN 9   CREATININE 0.8*       No intake or output data in the 24 hours ending 21 2236      Ht Readings from Last 1 Encounters:   21 5' 11\" (1.803 m)        Wt Readings from Last 1 Encounters:   21 239 lb 10.2 oz (108.7 kg)         Estimated Creatinine Clearance: 152 mL/min (A) (based on SCr of 0.8 mg/dL (L)). Assessment/Plan:  Vancomycin 1250 mg IV every 8 hours ordered. Regimen projects a trough level of 15-20 mg/L. Level ordered for 21 @2200. Thank you for the consult.    Kelly Man, BalajiD

## 2021-04-23 NOTE — ED PROVIDER NOTES
History:   Diagnosis Date    COPD (chronic obstructive pulmonary disease) (HCC)     [facility]     [facility]     Gout     HTN (hypertension)     Seizure disorder ([facility]     Skin cancer      Past surgical history:   Past Surgical History:   Procedure Laterality Date    NECK SURGERY         Home medications:   Current Discharge Medication List      CONTINUE these medications which have NOT CHANGED    Details   aspirin 81 MG EC tablet Take 81 mg by mouth nightly       atorvastatin (LIPITOR) 10 MG tablet Take 10 mg by mouth daily       divalproex (DEPAKOTE) 250 MG DR tablet Take 750 mg by mouth every 12 hours      metFORMIN (GLUCOPHAGE) 500 MG tablet Take 500 mg by mouth daily      lisinopril (PRINIVIL;ZESTRIL) 5 MG tablet Take 5 mg by mouth daily      sertraline (ZOLOFT) 50 MG tablet Take 50 mg by mouth daily      tiZANidine (ZANAFLEX) 4 MG tablet Take 4 mg by mouth every 6 hours as needed      traZODone (DESYREL) 50 MG tablet Take 50 mg by mouth nightly      ibuprofen (ADVIL;MOTRIN) 600 MG tablet Take 600 mg by mouth every 8 hours as needed             Social history:  reports that he has never smoked. He has never used smokeless tobacco. He reports that he does not drink alcohol or use drugs. Family history:  No family history on file. Exam  ED Triage Vitals [04/22/21 1733]   BP Temp Temp Source Pulse Resp SpO2 Height Weight   (!) 140/89 99 °F (37.2 °C) Temporal 109 20 99 % 5' 11\" (1.803 m) 239 lb 10.2 oz (108.7 kg)     Nursing note and vitals reviewed. Constitutional: Well developed, well nourished. Non-toxic in appearance. HENT:      Head: Normocephalic and atraumatic. Ears: External ears normal.      Nose: Nose normal.     Mouth: Membrane mucosa moist and pink. Eyes: Anicteric sclera. No discharge. Neck: Supple. Trachea midline. Cardiovascular: tachycardia, reg rhythm; no murmurs, rubs, or gallops.  Radial 2+ and symmetric  Pulmonary/Chest: tachypnea, no respiratory distress. Diffuse wheezes. No stridor. No rales. Abdominal: Soft. No distension. Nontender to deep palpation all quadrants  Musculoskeletal: Moves all extremities. No gross deformity. There is moderate edema with increased warmth overlying left hand to distal wrist. Full active ROM all fingers with pain. No Kanavel signs. Neurological: Alert and oriented. Face symmetric. Speech is clear. 5/5 motor and sensation grossly intact BUE's. Skin: Warm and dry. No rash. Psychiatric: Normal mood and affect. Behavior is normal.    Procedures      EKG    EKG was reviewed by emergency department physician in the absence of a cardiologist    Narrow complex sinus rhythm, rate 101, normal axis, normal NC and QRS intervals, normal Qtc, no ST elevations or depressions, normal t-wave morphology, impression sinus tachycardia, no STEMI, no comparison available      Radiology  XR HAND LEFT (MIN 3 VIEWS)   Final Result   Edema throughout the 3rd digit. Paratracheal erosion in the proximal phalanx   as described. This could represent inflammatory arthritis given indication. Follow-up recommended. XR CHEST (2 VW)   Final Result   Increased perihilar markings.   Atelectasis and infectious or inflammatory   airway process are in the differential.             Labs  Results for orders placed or performed during the hospital encounter of 04/22/21   CBC Auto Differential   Result Value Ref Range    WBC 12.1 (H) 4.0 - 11.0 K/uL    RBC 4.69 4.20 - 5.90 M/uL    Hemoglobin 13.2 (L) 13.5 - 17.5 g/dL    Hematocrit 38.9 (L) 40.5 - 52.5 %    MCV 82.9 80.0 - 100.0 fL    MCH 28.2 26.0 - 34.0 pg    MCHC 34.0 31.0 - 36.0 g/dL    RDW 16.1 (H) 12.4 - 15.4 %    Platelets 266 049 - 229 K/uL    MPV 8.2 5.0 - 10.5 fL    Neutrophils % 80.7 %    Lymphocytes % 11.5 %    Monocytes % 6.9 %    Eosinophils % 0.5 %    Basophils % 0.4 %    Neutrophils Absolute 9.7 (H) 1.7 - 7.7 K/uL    Lymphocytes Absolute 1.4 1.0 - 5.1 K/uL Monocytes Absolute 0.8 0.0 - 1.3 K/uL    Eosinophils Absolute 0.1 0.0 - 0.6 K/uL    Basophils Absolute 0.1 0.0 - 0.2 K/uL   Basic Metabolic Panel w/ Reflex to MG   Result Value Ref Range    Sodium 134 (L) 136 - 145 mmol/L    Potassium reflex Magnesium 3.7 3.5 - 5.1 mmol/L    Chloride 99 99 - 110 mmol/L    CO2 23 21 - 32 mmol/L    Anion Gap 12 3 - 16    Glucose 106 (H) 70 - 99 mg/dL    BUN 9 7 - 20 mg/dL    CREATININE 0.8 (L) 0.9 - 1.3 mg/dL    GFR Non-African American >60 >60    GFR African American >60 >60    Calcium 9.1 8.3 - 10.6 mg/dL   Brain Natriuretic Peptide   Result Value Ref Range    Pro- (H) 0 - 124 pg/mL   Troponin   Result Value Ref Range    Troponin <0.01 <0.01 ng/mL   Uric Acid   Result Value Ref Range    Uric Acid, Serum 11.3 (H) 3.5 - 7.2 mg/dL   D-Dimer, Quantitative   Result Value Ref Range    D-Dimer, Quant 228 0 - 229 ng/mL DDU   Procalcitonin   Result Value Ref Range    Procalcitonin 0.02 0.00 - 0.15 ng/mL   Lactate, Sepsis   Result Value Ref Range    Lactic Acid, Sepsis 1.4 0.4 - 1.9 mmol/L   Urine Drug Screen   Result Value Ref Range    Amphetamine Screen, Urine Neg Negative <1000ng/mL    Barbiturate Screen, Ur Neg Negative <200 ng/mL    Benzodiazepine Screen, Urine Neg Negative <200 ng/mL    Cannabinoid Scrn, Ur Neg Negative <50 ng/mL    Cocaine Metabolite Screen, Urine Neg Negative <300 ng/mL    Opiate Scrn, Ur Neg Negative <300 ng/mL    PCP Screen, Urine Neg Negative <25 ng/mL    Methadone Screen, Urine Neg Negative <300 ng/mL    Propoxyphene Scrn, Ur Neg Negative <300 ng/mL    Oxycodone Urine Neg Negative <100 ng/ml    pH, UA 5.5     Drug Screen Comment: see below    POCT Glucose   Result Value Ref Range    POC Glucose 194 (H) 70 - 99 mg/dl    Performed on ACCU-CHEK    POCT Glucose   Result Value Ref Range    POC Glucose 227 (H) 70 - 99 mg/dl    Performed on ACCU-CHEK        Screenings   Elvin Coma Scale  Eye Opening: Spontaneous  Best Verbal Response: Oriented  Best Motor signed)  Attending Emergency Physician       Emanuel Moody DO  04/23/21 [id number]

## 2021-04-23 NOTE — PROGRESS NOTES
4 Eyes Skin Assessment     NAME:  Renuka Riley  YOB: 1980  MEDICAL RECORD NUMBER:  [id number]    The patient is being assess for  Admission    I agree that 2 RN's have performed a thorough Head to Toe Skin Assessment on the patient. ALL assessment sites listed below have been assessed. Areas assessed by both nurses:    Head, Face, Ears, Shoulders, Back, Chest, Arms, Elbows, Hands, Sacrum. Buttock, Coccyx, Ischium and Legs. Feet and Heels        Does the Patient have a Wound?  No noted wound(s)       Alireza Prevention initiated:  No   Wound Care Orders initiated:  No    Pressure Injury (Stage 3,4, Unstageable, DTI, NWPT, and Complex wounds) if present place consult order under [de-identified] No    New and Established Ostomies if present place consult order under : No      Nurse 1 eSignature: Electronically signed by Rock Chavez RN on 4/23/21 at 6:25 AM EDT    **SHARE this note so that the co-signing nurse is able to place an eSignature**    Nurse 2 eSignature: Electronically signed by Leela Hoffman RN on 4/24/21 at 1:02 PM EDT

## 2021-04-23 NOTE — PROGRESS NOTES
Medication Reconciliation    List of medications patient is currently taking is complete. Source of information: 1. Conversation with patient at bedside                                      2. EPIC records      Notes regarding home medications:   1. Patient received all of his morning home medications prior to arrival to the emergency department. 2. Patient was admitted at [facility] between 04/08/21-04/12/21. Patient was changed from divalproex 500 mg 1 tablet BID to divalproex 250 mg 3 Tabs (750 mg total) by mouth every 12 hours. Patient reports that he has been taking the 500 mg BID and not 750 mg BID. 3. Patient denies any other OTC or herbal medication use.     Ericka Oscar, Pharmacy Intern  4/22/2021 8:51 PM

## 2021-04-23 NOTE — PROGRESS NOTES
Physical Therapy    Facility/Department: [address] MED SURG  Initial Assessment    NAME: Dequan Pizarro  : 1980  MRN: [id number]    Date of Service: 2021    Discharge Recommendations:  Home with assist PRN, Patient would benefit from continued therapy after discharge, S Level 1, Home with Home health PT   PT Equipment Recommendations  Equipment Needed: Yes  Mobility Devices: Canes  Cane: Straight Naz Sotelo scored a  on the AM-PAC short mobility form. Current research shows that an AM-PAC score of 18 or greater is typically associated with a discharge to the patient's home setting. Based on the patient's AM-PAC score and their current functional mobility deficits, it is recommended that the patient have 2-3 sessions per week of Physical Therapy at d/c to increase the patient's independence. At this time, this patient demonstrates the endurance and safety to discharge home with HHPT and a follow up treatment frequency of 2-3x/wk. Please see assessment section for further patient specific details. If patient discharges prior to next session this note will serve as a discharge summary. Please see below for the latest assessment towards goals. HOME HEALTH CARE: LEVEL 1 STANDARD     -Initial home health evaluation to occur within 24-48 hours, in patient home    -Home health agency to establish plan of care for patient over 60 day period    -Medication Reconciliation    -PCP Visit scheduled within seven days of discharge    -PT/OT to evaluate with goal of regaining prior level of functioning    -OT to evaluate if patient has [address] needs for personal care       Assessment   Body structures, Functions, Activity limitations: Decreased functional mobility ; Decreased safe awareness;Decreased balance;Decreased cognition  Assessment: Patient is a 39 y/o. male who presented to the emergency department on 21 reporting multiple falls, increased weakness, productive cough of green phlegm, fevers although not actually taken his temp and left hand pain. Note that this visit is his 11th to a [facility] ED since 1/1/2021. Recent admission to [facility] [place] [facility]. 's for CVA symptoms - was concluded as conversion disorder - neurology and psych involved. Pt also seen by PT - recommended RW - refused because he has one at home - yet denies he has one to this PT. Pt reports he was independent with all functional mobility prior to admission. Pt currently functioning near baseline per the pt report, however gait is unsteady - recommended using DME and he adamantly refuses (was recommended this by [facility] PT several months ago as well). Anticipate the pt would do quite well if he would ambulate with an [facility] - anticipate he would be able to return home with PRN assist if he was agreeable. Also recommend level 1 HHPT. Will continue to follow  Treatment Diagnosis: impaired mobility  Prognosis: Fair  Decision Making: Medium Complexity  History: see below  Exam: see below  Clinical Presentation: evolving  PT Education: PT Role;Plan of Care;General Safety;Gait Training;Functional Mobility Training;Equipment  Barriers to Learning: cognition  REQUIRES PT FOLLOW UP: Yes  Activity Tolerance  Activity Tolerance: Patient Tolerated treatment well       Patient Diagnosis(es): The primary encounter diagnosis was Acute exacerbation of chronic obstructive pulmonary disease (COPD) ([address] Diagnoses of Acute pain of left wrist, Community acquired pneumonia, unspecified laterality, and Acute gout of left wrist, unspecified cause were also pertinent to this visit. has a past medical history of COPD (chronic obstructive pulmonary disease) ([address] CVA (cerebral vascular accident) ([address] Diabetes mellitus ([address] Gout, HTN (hypertension), Seizure disorder ([address] and Skin cancer. has a past surgical history that includes Neck surgery.     Restrictions  Restrictions/Precautions  Restrictions/Precautions: Fall Risk  Position Activity Restriction  Other position/activity restrictions: gout to left hand; residual deficits in LLE from CVA     Vision/Hearing  Vision: Impaired  Vision Exceptions: Wears glasses at all times  Hearing: Within functional limits       Subjective  General  Chart Reviewed: Yes  Patient assessed for rehabilitation services?: Yes  Additional Pertinent Hx: Patient is a 37 y/o. male who presented to the emergency department on 4/22/21 reporting multiple falls, increased weakness, productive cough of green phlegm, fevers although not actually taken his temp and left hand pain. Note that this visit is his 11th to a [facility] ED since 1/1/2021. Recent admission to [facility]. E's for CVA symptoms was concluded as conversion disorder - neurology and psych involved. Pt also seen by PT - recommended RW - refused because he has one at home. Response To Previous Treatment: Not applicable  Family / Caregiver Present: No  Referring Practitioner: Emelia Littlejohn MD  Referral Date : 04/23/21  Diagnosis: COPD exacerbation  Follows Commands: Within Functional Limits  Subjective  Subjective: Pt is agreeable to PT. Makes comments in regards to being tired from working too much.   Pain Screening  Patient Currently in Pain: Yes          Orientation  Orientation  Overall Orientation Status: Within Functional Limits     Social/Functional History  Social/Functional History  Lives With: (supervisor; reports he is recently engaged)  Type of Home: Apartment  Home Layout: One level  Home Access: Stairs to enter with rails  Entrance Stairs - Number of Steps: 4 ALTON  Entrance Stairs - Rails: Left  Bathroom Shower/Tub: Tub/Shower unit  Bathroom Toilet: Standard  ADL Assistance: Independent  Homemaking Assistance: Independent  Ambulation Assistance: Independent  Transfer Assistance: Independent  Active : No  Mode of Transportation: Walk  Occupation: Full time employment  Type of occupation: [employer] in [employer]  Additional Comments: Chart review reveals 11 ED visits since 1/1/2021. One of thos evisits included admission to [facility] [place] [facility]. E's for possible CVA - found to be conversion disorder - psych and neuro on board at that time. Pt also refused a RW that was recommended by PT due to already having one at home - pt denies having any DME at this time. Cognition   Cognition  Overall Cognitive Status: Exceptions  Arousal/Alertness: Appropriate responses to stimuli  Following Commands: Follows all commands without difficulty  Attention Span: Appears intact  Memory: Appears intact  Safety Judgement: Decreased awareness of need for safety;Decreased awareness of need for assistance  Problem Solving: Assistance required to correct errors made  Insights: Decreased awareness of deficits  Initiation: Requires cues for some  Sequencing: Requires cues for some    Objective  Strength RLE  Strength RLE: WFL  Strength LLE  Strength LLE: Exception  Comment: very mild generalized weakness  Motor Control  Gross Motor?: WFL     Bed mobility  Supine to Sit: Modified independent  Sit to Supine: Unable to assess(in recliner at end of session)  Transfers  Sit to Stand: Stand by assistance;Contact guard assistance  Stand to sit: Stand by assistance;Contact guard assistance  Ambulation  Ambulation?: Yes  Ambulation 1  Surface: level tile  Device: No Device  Assistance: Contact guard assistance  Quality of Gait: significant lateral sway  Gait Deviations: Slow Sasha; Increased CLAUDETTE  Distance: 36'  Comments: Pt ambulating as if he has a limp on LLE. Appears unsteady but never loses balance. Refuses to use any DME.   Stairs/Curb  Stairs?: No     Balance  Posture: Fair  Sitting - Static: Good  Sitting - Dynamic: Good  Standing - Static: Fair;+  Standing - Dynamic: Fair;-        Plan   Plan  Times per week: 2-3x/week  Current Treatment Recommendations: Strengthening, Functional Mobility Training, Transfer Training, Balance Training, Gait Training, Cognitive/Perceptual Training, Patient/Caregiver Education & Training, Safety Education & Training, Neuromuscular Re-education, Equipment Evaluation, Education, & procurement  Safety Devices  Type of devices:  All fall risk precautions in place, Call light within reach, Gait belt, Patient at risk for falls, Left in chair, Chair alarm in place, Nurse notified(nsg ok to use stedy)      AM-PAC Score  AM-PAC Inpatient Mobility Raw Score : 21 (04/23/21 1059)  AM-PAC Inpatient T-Scale Score : 50.25 (04/23/21 1059)  Mobility Inpatient CMS 0-100% Score: 28.97 (04/23/21 1059)  Mobility Inpatient CMS G-Code Modifier : CJ (04/23/21 1059)          Goals  Short term goals  Time Frame for Short term goals: by acute discharged  Short term goal 1: sit<>stand supv  Short term goal 2: ambulate > 100' with LRAD and SBA  Patient Goals   Patient goals : none stated       Therapy Time   Individual Concurrent Group Co-treatment   Time In 1010         Time Out 1050         Minutes 40         Timed Code Treatment Minutes: 25 Minutes       Latrelle Aschoff, PT

## 2021-04-23 NOTE — ED NOTES
ED SBAR report provider to [facility]. Patient to be transported to Room 4268 via stretcher by ED tech. Patient transported with bedside cardiac monitor and with IV medications infusing. IV site clean, dry, and intact. MEWS score and pain assessed as 9 and documented, pt received norco at 2100, reported to inpatient nurse . Updated patient on plan of care.        Jarrett Jean-Baptiste RN  04/22/21 4489

## 2021-04-23 NOTE — PLAN OF CARE
Problem: Pain:  Goal: Pain level will decrease  Description: Pain level will decrease  4/23/2021 0251 by Kenny Cuba RN  Outcome: Ongoing  4/23/2021 0251 by Kenny Cuba RN  Outcome: Ongoing  Goal: Control of acute pain  Description: Control of acute pain  4/23/2021 0251 by Kenny Cuba RN  Outcome: Ongoing  4/23/2021 0251 by Kenny Cuba RN  Outcome: Ongoing  Goal: Control of chronic pain  Description: Control of chronic pain  4/23/2021 0251 by Kenny Cuba RN  Outcome: Ongoing  4/23/2021 0251 by Kenny Cuba RN  Outcome: Ongoing     Problem: Falls - Risk of:  Goal: Will remain free from falls  Description: Will remain free from falls  4/23/2021 0251 by Kenny Cuba RN  Outcome: Ongoing  4/23/2021 0251 by Kenny Cuba RN  Outcome: Ongoing  Goal: Absence of physical injury  Description: Absence of physical injury  4/23/2021 0251 by Kenny Cuba RN  Outcome: Ongoing  4/23/2021 0251 by Kenny Cuba RN  Outcome: Ongoing

## 2021-04-23 NOTE — PROGRESS NOTES
Pt refusing orders for nasal swab tonight, education provided. No urine occurrence for collection at this time.

## 2021-04-23 NOTE — ED NOTES
Patient on his phone, on the stretcher. States he is still in pain. Notified Dr. Madhuri White.      Madisyn Mattson RN  04/22/21 2045

## 2021-04-23 NOTE — PROGRESS NOTES
Occupational Therapy   Occupational Therapy Initial Assessment and Tentative D/C    This note to serve as d/c summary should pt d/c prior to next session. Date: 2021   Patient Name: Mikala Aponte  MRN: [id number]     : 1980    Date of Service: 2021    Discharge Recommendations: Mikala Aponte scored a 21/24 on the AM-PAC ADL Inpatient form. At this time, no further OT is recommended upon discharge due to anticipate pt safe to return home with PRN assist.  Recommend patient returns to prior setting with prior services. Continue to assess pending progress, Home with assist PRN  OT Equipment Recommendations  Other: may benefit from shower chair pending improved endurance    Assessment   Performance deficits / Impairments: Decreased endurance;Decreased functional mobility ; Decreased ADL status; Decreased balance;Decreased high-level IADLs;Decreased safe awareness  Assessment: This is a 38 y.o. male with pertinent past medical history of COPD/asthma, gout, hypertension who was brought in by self for shortness of breath ongoing for the past 8 hours prior travel. Patient says feels like past exacerbations of COPD. Patient states over the past several days he has been falling down frequently at home, does not know if he is losing consciousness, cannot point to any particular reason for why he falls. Other seems to make his symptoms any better. They are acutely worsened with exertion. Associated with cough which has been dry. Patient also reports left hand pain and swelling ongoing since this morning as well. He denies any fevers, chills, chest pain, weakness or numbness. PTA pt from home with roommate where pt was Ind with mobility and ADLs. Pt currently functioning below baseline limited by decreased overall balance in stance and endurance. Pt completes functional mobility and transfers with SBA/supervision and no device. Pt with no SOB ambulating short distances in room ~50ft.  Pt with poor insight into deficits. Anticipate pt needing up to [address] for ADLs. Pt will benefit from skilled OT services at this time. Anticipate pt safe to return home with PRN assist and progression in therapy. Prognosis: Good  Decision Making: Low Complexity  Exam: see above  OT Education: Plan of Care;OT Role;Transfer Training;Energy Conservation  REQUIRES OT FOLLOW UP: Yes  Activity Tolerance  Activity Tolerance: Patient Tolerated treatment well;Patient limited by fatigue  Safety Devices  Safety Devices in place: Yes  Type of devices: Call light within reach;Nurse notified; Bed alarm in place; Left in bed           Patient Diagnosis(es): The primary encounter diagnosis was Acute exacerbation of chronic obstructive pulmonary disease (COPD) ([facility] [address] [facility] Diagnoses of Acute pain of left wrist, Community acquired pneumonia, unspecified laterality, and Acute gout of left wrist, unspecified cause were also pertinent to this visit. has a past medical history of COPD (chronic obstructive pulmonary disease) ([facility] CVA (cerebral vascular accident) ([facility] [address] [facility] Diabetes mellitus ([facility] [address] [facility] Gout, HTN (hypertension), Seizure disorder ([facility] [address] [facility] and Skin cancer. has a past surgical history that includes Neck surgery. Restrictions  Restrictions/Precautions  Restrictions/Precautions: Fall Risk  Position Activity Restriction  Other position/activity restrictions: gout to left hand; residual deficits in LLE from CVA    Subjective   General  Chart Reviewed: Yes  Patient assessed for rehabilitation services?: Yes  Additional Pertinent Hx: per ED note, This is a 38 y.o. male with pertinent past medical history of COPD/asthma, gout, hypertension who was brought in by self for shortness of breath ongoing for the past 8 hours prior travel. Patient says feels like past exacerbations of COPD.   Patient states over the past several days he has been falling down frequently at home, does not know if he is losing consciousness, cannot point to any particular reason for why he falls. Other seems to make his symptoms any better. They are acutely worsened with exertion. Associated with cough which has been dry. Patient also reports left hand pain and swelling ongoing since this morning as well. He denies any fevers, chills, chest pain, weakness or numbness. Family / Caregiver Present: No  Referring Practitioner: Niki Patterson MD  Subjective  Subjective: Pt agreeable to OT evaluation. Pt reports pain in L UE with no number stated. General Comment  Comments: okay for therapy per RN. Pain Assessment  Pain Assessment: 0-10  Pain Level: 9  Pain Type: Acute pain  Pain Location: Arm  Pain Orientation: Left  Pain Descriptors: Orie Locust; Shooting  Pain Frequency: Continuous  Pain Onset: On-going  Clinical Progression: Not changed  Functional Pain Assessment: Prevents or interferes some active activities and ADLs  Non-Pharmaceutical Pain Intervention(s): Rest  Social/Functional History  Social/Functional History  Lives With: (supervisor; reports he is recently engaged)  Type of Home: Apartment  Home Layout: One level  Home Access: Stairs to enter with rails  Entrance Stairs - Number of Steps: 4 ALTON  Entrance Stairs - Rails: Left  Bathroom Shower/Tub: Tub/Shower unit  Bathroom Toilet: Standard  ADL Assistance: Independent  Homemaking Assistance: Independent  Ambulation Assistance: Independent  Transfer Assistance: Independent  Active : No  Mode of Transportation: Walk  Occupation: Full time employment  Type of occupation: [employer] in [place]  Additional Comments: Chart review reveals 11 ED visits since 1/1/2021. One of thos evisits included admission to [facility]. E's for possible CVA - found to be conversion disorder - psych and neuro on board at that time. Pt also refused a RW that was recommended by PT due to already having one at home - pt denies having any DME at this time.        Objective   Vision: Impaired  Vision Exceptions: Wears glasses at all times  Hearing: Within functional limits    Orientation  Overall Orientation Status: Within Functional Limits     Balance  Sitting Balance: Supervision  Standing Balance: Stand by assistance  Functional Mobility  Functional - Mobility Device: No device  Activity: Other(short household distances in room; ~50ft)  Assist Level: Stand by assistance  Functional Mobility Comments: no overt LOB but appears somewhat unsteady; pt reports history of falls but also being on feet for 12+hours a day at work; pt noted to have some noted SOB  Wheelchair Bed Transfers  Wheelchair/Bed - Technique: Ambulating  Equipment Used: Bed  Level of Asssistance: Supervision  ADL  Additional Comments: Anticipate pt needing up to Min A for ADLs including dressing, bathing, and toileting based on ROM, strength, balance, and endurance  Tone RUE  RUE Tone: Normotonic  Tone LUE  LUE Tone: Normotonic  Coordination  Movements Are Fluid And Coordinated: Yes     Bed mobility  Supine to Sit: Modified independent  Sit to Supine: Modified independent  Scooting: Modified independent  Transfers  Sit to stand: Supervision  Stand to sit: Supervision     Cognition  Overall Cognitive Status: Exceptions  Arousal/Alertness: Appropriate responses to stimuli  Following Commands:  Follows all commands without difficulty  Attention Span: Appears intact  Memory: Appears intact  Safety Judgement: Decreased awareness of need for safety;Decreased awareness of need for assistance  Problem Solving: Assistance required to correct errors made  Insights: Decreased awareness of deficits  Initiation: Requires cues for some  Sequencing: Requires cues for some                 LUE AROM (degrees)  LUE AROM : WFL  RUE AROM (degrees)  RUE AROM : WFL  LUE Strength  Gross LUE Strength: WFL  RUE Strength  Gross RUE Strength: WFL                   Plan   Plan  Times per week: 3-5x  Current Treatment Recommendations: Strengthening, Endurance Training, Balance Training, Functional Mobility Training, Safety Education & Training, Self-Care / ADL, Equipment Evaluation, Education, & procurement, Patient/Caregiver Education & Training      AM-PAC Score        AM-PAC Inpatient Daily Activity Raw Score: 21 (04/23/21 1513)  AM-PAC Inpatient ADL T-Scale Score : 44.27 (04/23/21 1513)  ADL Inpatient CMS 0-100% Score: 32.79 (04/23/21 1513)  ADL Inpatient CMS G-Code Modifier : Seng Nida (04/23/21 1513)    Goals  Short term goals  Time Frame for Short term goals: prior to D/C  Short term goal 1: complete functional mobility and transfers Ind  Short term goal 2: complete toileting Ind  Short term goal 3: complete bathing and dressing Ind  Short term goal 4: complete grooming in stance at sink Ind  Short term goal 5: tolerate >10 minutes in stance Ind for increased ability to complete ADLs/IADLs  Long term goals  Time Frame for Long term goals : STG=LTG  Patient Goals   Patient goals : return home       Therapy Time   Individual Concurrent Group Co-treatment   Time In 7481         Time Out 1511         Minutes 24         Timed Code Treatment Minutes: 11 Minutes(15 minute eval)       Tasha Rao, OTR/L

## 2021-04-23 NOTE — PROGRESS NOTES
Admitted 38year old male from ED. VSS, telemetry monitor placed NSR, IV abx infusing without complications. Patient tolerated PO medication administration. Oriented to room, bed alarm on, call light within reach and able to call appropriately.

## 2021-04-24 LAB
GLUCOSE BLD-MCNC: 148 MG/DL (ref 70–99)
GLUCOSE BLD-MCNC: 235 MG/DL (ref 70–99)
GLUCOSE BLD-MCNC: 303 MG/DL (ref 70–99)
GLUCOSE BLD-MCNC: 357 MG/DL (ref 70–99)
L. PNEUMOPHILA SEROGP 1 UR AG: NORMAL
MRSA SCREEN RT-PCR: NORMAL
PERFORMED ON: ABNORMAL
STREP PNEUMONIAE ANTIGEN, URINE: NORMAL
VANCOMYCIN TROUGH: 12.2 UG/ML (ref 10–20)

## 2021-04-24 PROCEDURE — 6360000002 HC RX W HCPCS: Performed by: INTERNAL MEDICINE

## 2021-04-24 PROCEDURE — 6370000000 HC RX 637 (ALT 250 FOR IP): Performed by: INTERNAL MEDICINE

## 2021-04-24 PROCEDURE — 94761 N-INVAS EAR/PLS OXIMETRY MLT: CPT

## 2021-04-24 PROCEDURE — 6360000002 HC RX W HCPCS: Performed by: NURSE PRACTITIONER

## 2021-04-24 PROCEDURE — 2580000003 HC RX 258: Performed by: NURSE PRACTITIONER

## 2021-04-24 PROCEDURE — 6370000000 HC RX 637 (ALT 250 FOR IP): Performed by: NURSE PRACTITIONER

## 2021-04-24 PROCEDURE — 2580000003 HC RX 258: Performed by: INTERNAL MEDICINE

## 2021-04-24 PROCEDURE — 1200000000 HC SEMI PRIVATE

## 2021-04-24 PROCEDURE — 80202 ASSAY OF VANCOMYCIN: CPT

## 2021-04-24 PROCEDURE — 94640 AIRWAY INHALATION TREATMENT: CPT

## 2021-04-24 RX ORDER — INSULIN GLARGINE 100 [IU]/ML
18 INJECTION, SOLUTION SUBCUTANEOUS NIGHTLY
Status: DISCONTINUED | OUTPATIENT
Start: 2021-04-24 | End: 2021-04-25

## 2021-04-24 RX ADMIN — INSULIN GLARGINE 18 UNITS: 100 INJECTION, SOLUTION SUBCUTANEOUS at 23:30

## 2021-04-24 RX ADMIN — MORPHINE SULFATE 2 MG: 2 INJECTION, SOLUTION INTRAMUSCULAR; INTRAVENOUS at 07:32

## 2021-04-24 RX ADMIN — IPRATROPIUM BROMIDE AND ALBUTEROL SULFATE 1 AMPULE: .5; 3 SOLUTION RESPIRATORY (INHALATION) at 12:35

## 2021-04-24 RX ADMIN — INDOMETHACIN 25 MG: 25 CAPSULE ORAL at 12:13

## 2021-04-24 RX ADMIN — SERTRALINE 50 MG: 50 TABLET, FILM COATED ORAL at 08:47

## 2021-04-24 RX ADMIN — INDOMETHACIN 25 MG: 25 CAPSULE ORAL at 17:12

## 2021-04-24 RX ADMIN — INSULIN LISPRO 4 UNITS: 100 INJECTION, SOLUTION INTRAVENOUS; SUBCUTANEOUS at 17:13

## 2021-04-24 RX ADMIN — ATORVASTATIN CALCIUM 10 MG: 10 TABLET, FILM COATED ORAL at 08:47

## 2021-04-24 RX ADMIN — Medication 10 ML: at 08:48

## 2021-04-24 RX ADMIN — INSULIN LISPRO 2 UNITS: 100 INJECTION, SOLUTION INTRAVENOUS; SUBCUTANEOUS at 12:13

## 2021-04-24 RX ADMIN — CEFEPIME HYDROCHLORIDE 2000 MG: 2 INJECTION, POWDER, FOR SOLUTION INTRAVENOUS at 13:57

## 2021-04-24 RX ADMIN — PROMETHAZINE HYDROCHLORIDE 12.5 MG: 25 TABLET ORAL at 17:42

## 2021-04-24 RX ADMIN — METHYLPREDNISOLONE SODIUM SUCCINATE 40 MG: 40 INJECTION, POWDER, FOR SOLUTION INTRAMUSCULAR; INTRAVENOUS at 22:00

## 2021-04-24 RX ADMIN — CEFEPIME HYDROCHLORIDE 2000 MG: 2 INJECTION, POWDER, FOR SOLUTION INTRAVENOUS at 02:19

## 2021-04-24 RX ADMIN — TRAZODONE HYDROCHLORIDE 50 MG: 50 TABLET ORAL at 22:01

## 2021-04-24 RX ADMIN — Medication 1250 MG: at 22:00

## 2021-04-24 RX ADMIN — COLCHICINE 0.6 MG: 0.6 TABLET ORAL at 08:47

## 2021-04-24 RX ADMIN — LISINOPRIL 5 MG: 5 TABLET ORAL at 08:47

## 2021-04-24 RX ADMIN — METHYLPREDNISOLONE SODIUM SUCCINATE 40 MG: 40 INJECTION, POWDER, FOR SOLUTION INTRAMUSCULAR; INTRAVENOUS at 08:47

## 2021-04-24 RX ADMIN — INSULIN LISPRO 3 UNITS: 100 INJECTION, SOLUTION INTRAVENOUS; SUBCUTANEOUS at 23:30

## 2021-04-24 RX ADMIN — INDOMETHACIN 25 MG: 25 CAPSULE ORAL at 08:47

## 2021-04-24 RX ADMIN — INSULIN LISPRO 1 UNITS: 100 INJECTION, SOLUTION INTRAVENOUS; SUBCUTANEOUS at 08:49

## 2021-04-24 RX ADMIN — ASPIRIN 81 MG: 81 TABLET, COATED ORAL at 22:00

## 2021-04-24 RX ADMIN — MORPHINE SULFATE 2 MG: 2 INJECTION, SOLUTION INTRAMUSCULAR; INTRAVENOUS at 15:26

## 2021-04-24 RX ADMIN — Medication 1250 MG: at 12:13

## 2021-04-24 RX ADMIN — DIVALPROEX SODIUM 750 MG: 500 TABLET, DELAYED RELEASE ORAL at 22:00

## 2021-04-24 RX ADMIN — SODIUM CHLORIDE, PRESERVATIVE FREE 10 ML: 5 INJECTION INTRAVENOUS at 09:00

## 2021-04-24 RX ADMIN — Medication 1250 MG: at 03:24

## 2021-04-24 RX ADMIN — DIVALPROEX SODIUM 750 MG: 500 TABLET, DELAYED RELEASE ORAL at 08:47

## 2021-04-24 RX ADMIN — MORPHINE SULFATE 2 MG: 2 INJECTION, SOLUTION INTRAMUSCULAR; INTRAVENOUS at 23:16

## 2021-04-24 RX ADMIN — Medication 10 ML: at 22:02

## 2021-04-24 ASSESSMENT — PAIN DESCRIPTION - FREQUENCY: FREQUENCY: CONTINUOUS

## 2021-04-24 ASSESSMENT — PAIN - FUNCTIONAL ASSESSMENT: PAIN_FUNCTIONAL_ASSESSMENT: ACTIVITIES ARE NOT PREVENTED

## 2021-04-24 ASSESSMENT — PAIN DESCRIPTION - PROGRESSION: CLINICAL_PROGRESSION: GRADUALLY WORSENING

## 2021-04-24 ASSESSMENT — PAIN SCALES - GENERAL
PAINLEVEL_OUTOF10: 9
PAINLEVEL_OUTOF10: 0
PAINLEVEL_OUTOF10: 0
PAINLEVEL_OUTOF10: 8
PAINLEVEL_OUTOF10: 0

## 2021-04-24 ASSESSMENT — PAIN DESCRIPTION - PAIN TYPE: TYPE: ACUTE PAIN

## 2021-04-24 ASSESSMENT — PAIN DESCRIPTION - ONSET: ONSET: ON-GOING

## 2021-04-24 NOTE — PROGRESS NOTES
PICC placement    Upon arrival to place PICC line assessed chart for issues related to picc placement, check for consent, and did time out with RN Romayne Matin. Pt. Tolerated PICC placement well, no difficulty accessing right basilic vein and 3CG technology used to verify PICC tip placement. Positive P wave with no negative deflection. Printed wave form and placed In chart. Reported off to RN Romayne Matin ok to use line.

## 2021-04-24 NOTE — PROGRESS NOTES
Clinical Pharmacy Note  Vancomycin Consult    Donna Garcia is a 38 y.o. male ordered Vancomycin for pneumonia; consult received from [facility] to manage therapy. Also receiving cefepime. Patient Active Problem List   Diagnosis    COPD with acute exacerbation (HCC)       Allergies:  Benadryl [diphenhydramine]     Temp max:  Temp (24hrs), Av.9 °F (36.6 °C), Min:97.5 °F (36.4 °C), Max:98.4 °F (36.9 °C)      Recent Labs     21  1747 21  0853   WBC 12.1* 10.8       Recent Labs     21  1746 21  0852   BUN 9 11   CREATININE 0.8* 0.7*         Intake/Output Summary (Last 24 hours) at 2021 0309  Last data filed at 2021 2236  Gross per 24 hour   Intake 780 ml   Output 2350 ml   Net -1570 ml         Ht Readings from Last 1 Encounters:   21 5' 11\" (1.803 m)        Wt Readings from Last 1 Encounters:   21 236 lb 8.9 oz (107.3 kg)         Estimated Creatinine Clearance: 173 mL/min (A) (based on SCr of 0.7 mg/dL (L)). Assessment/Plan:  Trough level of 12.2 mg/L  Will continue vancomycin 1250 mg IV every 8 hours for now and check another trough tomorrow. Regimen projects a trough level of 15-20 mg/L. Level ordered for 21 @0300. Thank you for the consult.    Ruddy Bergeron, BalajiD

## 2021-04-24 NOTE — PLAN OF CARE
Problem: Pain:  Goal: Pain level will decrease  Description: Pain level will decrease  4/24/2021 1058 by Félix rOtiz RN  Outcome: Ongoing  4/24/2021 0012 by Andres Branch RN  Outcome: Ongoing     Problem: Pain:  Goal: Control of acute pain  Description: Control of acute pain  4/24/2021 1058 by Félix Ortiz RN  Outcome: Ongoing  4/24/2021 0012 by Andres Branch RN  Outcome: Ongoing     Problem: Falls - Risk of:  Goal: Will remain free from falls  Description: Will remain free from falls  4/24/2021 1058 by Félix Ortiz RN  Outcome: Ongoing  4/24/2021 0012 by Andres Branch RN  Outcome: Ongoing     Problem: Falls - Risk of:  Goal: Absence of physical injury  Description: Absence of physical injury  4/24/2021 1058 by Félix Ortiz RN  Outcome: Ongoing  4/24/2021 0012 by Andres Branch RN  Outcome: Ongoing

## 2021-04-24 NOTE — PROGRESS NOTES
Hospitalist Progress Note      PCP: Nestor Rodriguez MD    Date of Admission: 4/22/2021    Subjective: cont to c/o pain lt hand    Medications:  Reviewed    Infusion Medications    sodium chloride      sodium chloride      dextrose       Scheduled Medications    cefepime  2,000 mg Intravenous Q12H    methylPREDNISolone  40 mg Intravenous 2 times per day    colchicine  0.6 mg Oral Daily    indomethacin  25 mg Oral TID WC    lidocaine 1 % injection  5 mL Intradermal Once    sodium chloride flush  5-40 mL Intravenous 2 times per day    aspirin  81 mg Oral Nightly    atorvastatin  10 mg Oral Daily    lisinopril  5 mg Oral Daily    traZODone  50 mg Oral Nightly    sertraline  50 mg Oral Daily    divalproex  750 mg Oral BID    sodium chloride flush  5-40 mL Intravenous 2 times per day    enoxaparin  40 mg Subcutaneous Daily    insulin lispro  0-6 Units Subcutaneous TID WC    insulin lispro  0-3 Units Subcutaneous Nightly    vancomycin  1,250 mg Intravenous Q8H    vancomycin (VANCOCIN) intermittent dosing (placeholder)   Other RX Placeholder     PRN Meds: morphine, sodium chloride flush, sodium chloride, sodium chloride flush, sodium chloride, promethazine **OR** ondansetron, polyethylene glycol, acetaminophen **OR** acetaminophen, glucose, dextrose, glucagon (rDNA), dextrose, ipratropium-albuterol      Intake/Output Summary (Last 24 hours) at 4/24/2021 1118  Last data filed at 4/24/2021 0919  Gross per 24 hour   Intake 1400 ml   Output 3250 ml   Net -1850 ml       Physical Exam Performed:    /67   Pulse 91   Temp 98 °F (36.7 °C) (Oral)   Resp 18   Ht 5' 11\" (1.803 m)   Wt 243 lb 6.2 oz (110.4 kg)   SpO2 95%   BMI 33.95 kg/m²     General appearance: NAD  Lungs: Clear to auscultation, bilaterally without Rales/Wheezes/Rhonchi with good respiratory effort. Heart: Tachycardic regular rhythm.     Abdomen: Soft, non-tender or non-distended without rigidity or guarding and positive bowel sounds   Extremities: Right upper extremity negative for injury or swelling. Full range of motion right shoulder elbow and wrist, and all digits on the right hand. Left shoulder full range of motion, left elbow full range of motion. Limited range of motion of the left wrist with extension and flexion, and limited range of motion of all 5 digits with abduction abduction flexion extension due to pinpoint pain at the dorsal aspect of the third metacarpal.  Bilateral radial pulses 2+. No evidence of blistering. No open wounds. No streaking. Lateral lower extremities warm and dry. No evidence of peripheral edema. Neurologic: Alert and oriented X 3, neurovascularly intact with sensory/motor intact upper extremities/lower extremities, bilaterally. Cranial nerves: II-XII intact, grossly non-focal.  Mental status: Alert, oriented  Capillary Refill: Acceptable  < 3 seconds  Peripheral Pulses: +3 Easily felt, not easily obliterated with pressure       Labs:   Recent Labs     04/22/21 1747 04/23/21  0853   WBC 12.1* 10.8   HGB 13.2* 12.4*   HCT 38.9* 36.6*    262     Recent Labs     04/22/21 1746 04/23/21  0852   * 135*   K 3.7 4.1   CL 99 100   CO2 23 22   BUN 9 11   CREATININE 0.8* 0.7*   CALCIUM 9.1 8.9     No results for input(s): AST, ALT, BILIDIR, BILITOT, ALKPHOS in the last 72 hours. No results for input(s): INR in the last 72 hours. Recent Labs     04/22/21 1746   TROPONINI <0.01       Urinalysis:    No results found for: Laura Carol, BACTERIA, RBCUA, BLOODU, SPECGRAV, GLUCOSEU    Radiology:  XR HAND LEFT (MIN 3 VIEWS)   Final Result   Edema throughout the 3rd digit. Paratracheal erosion in the proximal phalanx   as described. This could represent inflammatory arthritis given indication. Follow-up recommended. XR CHEST (2 VW)   Final Result   Increased perihilar markings.   Atelectasis and infectious or inflammatory   airway process are in the differential. Assessment/Plan:    Active Hospital Problems    Diagnosis    COPD with acute exacerbation ([facility] [address] [J44.1]       SIRS versus sepsis: 2/2-> HCAP, COPD  Pro-Johnathon 0.02, lactic acid 1.4, no current evidence of pyrexia, no encephalopathy, no hypoxia, no hypotension        HCAP: Inpatient hospitalization April 12 through April 15  Chest x-ray indicating perihilar markings, productive Cough, fever, fatigue, weakness,  tobacco smoker, positive known COPD nonoxygen dependent  Covid vaccination series completed  Covid testing in process  Leukocytosis: WBC 12.1 w/ left shift  Lactic acid: 1.4-> repeat 6 AM  Procalcitonin 0.02  Blood cultures x2 in process  MRSA nasal ordered  Legionella/Strep pneumonia ag sent  On Cefepime and vancomycin        COPD: Not oxygen dependent  Symptomatic with wheezing congestion and productive cough  Oxygen therapy: Titrate maintaining saturation greater than 92%, currently on room air  Patient is not typically on inhaled steroids and reports he has not seen a pulmonologist  Continue HCAP antibiotic regimen  DuoNeb   IV solumedrol     Gout: Left hand, left third finger  Uric acid 11.2, pain tenderness and erythema of the left hand, left third metacarpal, and evidence on left hand x-ray  Supportive: IV steroids, oral NSAIDs/colchicine  Tramadol short-term for the acute pain, added IV morphine tid prn  Cool compress  Splinting of the left wrist may be helpful if pain is persistent        Seizure disorder: Continue Depakote per home regimen     DM II  Hypoglycemia protocol, SSI, FSBS  Carb controlled diet        HTN: Continue aspirin, statin, lisinopril regimen        DVT Prophylaxis: Lovenox  Diet: DIET CARB CONTROL;  Code Status: Full Code    PT/OT Eval Status: ordered    [facility] carlos manuel Gonzales MD

## 2021-04-25 LAB
ANION GAP SERPL CALCULATED.3IONS-SCNC: 11 MMOL/L (ref 3–16)
BASOPHILS ABSOLUTE: 0 K/UL (ref 0–0.2)
BASOPHILS RELATIVE PERCENT: 0.1 %
BUN BLDV-MCNC: 23 MG/DL (ref 7–20)
CALCIUM SERPL-MCNC: 9 MG/DL (ref 8.3–10.6)
CHLORIDE BLD-SCNC: 99 MMOL/L (ref 99–110)
CO2: 24 MMOL/L (ref 21–32)
CREAT SERPL-MCNC: 0.7 MG/DL (ref 0.9–1.3)
EOSINOPHILS ABSOLUTE: 0 K/UL (ref 0–0.6)
EOSINOPHILS RELATIVE PERCENT: 0 %
GFR AFRICAN AMERICAN: >60
GFR NON-AFRICAN AMERICAN: >60
GLUCOSE BLD-MCNC: 222 MG/DL (ref 70–99)
GLUCOSE BLD-MCNC: 279 MG/DL (ref 70–99)
GLUCOSE BLD-MCNC: 286 MG/DL (ref 70–99)
GLUCOSE BLD-MCNC: 349 MG/DL (ref 70–99)
GLUCOSE BLD-MCNC: 415 MG/DL (ref 70–99)
HCT VFR BLD CALC: 37.6 % (ref 40.5–52.5)
HEMOGLOBIN: 12.9 G/DL (ref 13.5–17.5)
LYMPHOCYTES ABSOLUTE: 1.2 K/UL (ref 1–5.1)
LYMPHOCYTES RELATIVE PERCENT: 9.5 %
MCH RBC QN AUTO: 28.4 PG (ref 26–34)
MCHC RBC AUTO-ENTMCNC: 34.3 G/DL (ref 31–36)
MCV RBC AUTO: 82.8 FL (ref 80–100)
MONOCYTES ABSOLUTE: 0.6 K/UL (ref 0–1.3)
MONOCYTES RELATIVE PERCENT: 4.4 %
NEUTROPHILS ABSOLUTE: 11.3 K/UL (ref 1.7–7.7)
NEUTROPHILS RELATIVE PERCENT: 86 %
PDW BLD-RTO: 15.9 % (ref 12.4–15.4)
PERFORMED ON: ABNORMAL
PLATELET # BLD: 250 K/UL (ref 135–450)
PMV BLD AUTO: 8.5 FL (ref 5–10.5)
POTASSIUM SERPL-SCNC: 4.3 MMOL/L (ref 3.5–5.1)
RBC # BLD: 4.54 M/UL (ref 4.2–5.9)
SODIUM BLD-SCNC: 134 MMOL/L (ref 136–145)
URIC ACID, SERUM: 7.6 MG/DL (ref 3.5–7.2)
VANCOMYCIN TROUGH: 18.2 UG/ML (ref 10–20)
WBC # BLD: 13.1 K/UL (ref 4–11)

## 2021-04-25 PROCEDURE — 85025 COMPLETE CBC W/AUTO DIFF WBC: CPT

## 2021-04-25 PROCEDURE — 6360000002 HC RX W HCPCS: Performed by: INTERNAL MEDICINE

## 2021-04-25 PROCEDURE — 6360000002 HC RX W HCPCS: Performed by: NURSE PRACTITIONER

## 2021-04-25 PROCEDURE — 2580000003 HC RX 258: Performed by: NURSE PRACTITIONER

## 2021-04-25 PROCEDURE — 6370000000 HC RX 637 (ALT 250 FOR IP): Performed by: NURSE PRACTITIONER

## 2021-04-25 PROCEDURE — 94761 N-INVAS EAR/PLS OXIMETRY MLT: CPT

## 2021-04-25 PROCEDURE — 2580000003 HC RX 258: Performed by: INTERNAL MEDICINE

## 2021-04-25 PROCEDURE — 6370000000 HC RX 637 (ALT 250 FOR IP): Performed by: INTERNAL MEDICINE

## 2021-04-25 PROCEDURE — 94640 AIRWAY INHALATION TREATMENT: CPT

## 2021-04-25 PROCEDURE — 1200000000 HC SEMI PRIVATE

## 2021-04-25 PROCEDURE — 36592 COLLECT BLOOD FROM PICC: CPT

## 2021-04-25 PROCEDURE — 80048 BASIC METABOLIC PNL TOTAL CA: CPT

## 2021-04-25 PROCEDURE — 80202 ASSAY OF VANCOMYCIN: CPT

## 2021-04-25 PROCEDURE — 84550 ASSAY OF BLOOD/URIC ACID: CPT

## 2021-04-25 RX ORDER — NICOTINE 21 MG/24HR
1 PATCH, TRANSDERMAL 24 HOURS TRANSDERMAL DAILY
Status: DISCONTINUED | OUTPATIENT
Start: 2021-04-25 | End: 2021-04-26 | Stop reason: HOSPADM

## 2021-04-25 RX ORDER — INSULIN GLARGINE 100 [IU]/ML
25 INJECTION, SOLUTION SUBCUTANEOUS NIGHTLY
Status: DISCONTINUED | OUTPATIENT
Start: 2021-04-25 | End: 2021-04-26 | Stop reason: HOSPADM

## 2021-04-25 RX ADMIN — MORPHINE SULFATE 2 MG: 2 INJECTION, SOLUTION INTRAMUSCULAR; INTRAVENOUS at 07:05

## 2021-04-25 RX ADMIN — ONDANSETRON 4 MG: 2 INJECTION INTRAMUSCULAR; INTRAVENOUS at 08:29

## 2021-04-25 RX ADMIN — LISINOPRIL 5 MG: 5 TABLET ORAL at 08:34

## 2021-04-25 RX ADMIN — ONDANSETRON 4 MG: 2 INJECTION INTRAMUSCULAR; INTRAVENOUS at 17:44

## 2021-04-25 RX ADMIN — IPRATROPIUM BROMIDE AND ALBUTEROL SULFATE 1 AMPULE: .5; 3 SOLUTION RESPIRATORY (INHALATION) at 11:49

## 2021-04-25 RX ADMIN — INSULIN LISPRO 6 UNITS: 100 INJECTION, SOLUTION INTRAVENOUS; SUBCUTANEOUS at 17:23

## 2021-04-25 RX ADMIN — Medication 1250 MG: at 12:21

## 2021-04-25 RX ADMIN — INDOMETHACIN 25 MG: 25 CAPSULE ORAL at 08:29

## 2021-04-25 RX ADMIN — ATORVASTATIN CALCIUM 10 MG: 10 TABLET, FILM COATED ORAL at 08:31

## 2021-04-25 RX ADMIN — TRAZODONE HYDROCHLORIDE 50 MG: 50 TABLET ORAL at 20:23

## 2021-04-25 RX ADMIN — INSULIN LISPRO 3 UNITS: 100 INJECTION, SOLUTION INTRAVENOUS; SUBCUTANEOUS at 12:21

## 2021-04-25 RX ADMIN — Medication 1250 MG: at 03:20

## 2021-04-25 RX ADMIN — Medication 1250 MG: at 20:23

## 2021-04-25 RX ADMIN — Medication 10 ML: at 08:37

## 2021-04-25 RX ADMIN — METHYLPREDNISOLONE SODIUM SUCCINATE 40 MG: 40 INJECTION, POWDER, FOR SOLUTION INTRAMUSCULAR; INTRAVENOUS at 08:29

## 2021-04-25 RX ADMIN — SERTRALINE 50 MG: 50 TABLET, FILM COATED ORAL at 08:28

## 2021-04-25 RX ADMIN — DIVALPROEX SODIUM 750 MG: 500 TABLET, DELAYED RELEASE ORAL at 20:23

## 2021-04-25 RX ADMIN — COLCHICINE 0.6 MG: 0.6 TABLET ORAL at 08:28

## 2021-04-25 RX ADMIN — METHYLPREDNISOLONE SODIUM SUCCINATE 40 MG: 40 INJECTION, POWDER, FOR SOLUTION INTRAMUSCULAR; INTRAVENOUS at 20:23

## 2021-04-25 RX ADMIN — INSULIN LISPRO 2 UNITS: 100 INJECTION, SOLUTION INTRAVENOUS; SUBCUTANEOUS at 20:39

## 2021-04-25 RX ADMIN — CEFEPIME HYDROCHLORIDE 2000 MG: 2 INJECTION, POWDER, FOR SOLUTION INTRAVENOUS at 14:47

## 2021-04-25 RX ADMIN — INSULIN GLARGINE 25 UNITS: 100 INJECTION, SOLUTION SUBCUTANEOUS at 20:24

## 2021-04-25 RX ADMIN — CEFEPIME HYDROCHLORIDE 2000 MG: 2 INJECTION, POWDER, FOR SOLUTION INTRAVENOUS at 02:14

## 2021-04-25 RX ADMIN — SODIUM CHLORIDE, PRESERVATIVE FREE 10 ML: 5 INJECTION INTRAVENOUS at 12:11

## 2021-04-25 RX ADMIN — SODIUM CHLORIDE, PRESERVATIVE FREE 10 ML: 5 INJECTION INTRAVENOUS at 20:40

## 2021-04-25 RX ADMIN — DIVALPROEX SODIUM 750 MG: 500 TABLET, DELAYED RELEASE ORAL at 08:29

## 2021-04-25 RX ADMIN — Medication 10 ML: at 20:25

## 2021-04-25 RX ADMIN — INSULIN LISPRO 2 UNITS: 100 INJECTION, SOLUTION INTRAVENOUS; SUBCUTANEOUS at 08:36

## 2021-04-25 RX ADMIN — INDOMETHACIN 25 MG: 25 CAPSULE ORAL at 12:21

## 2021-04-25 RX ADMIN — MORPHINE SULFATE 2 MG: 2 INJECTION, SOLUTION INTRAMUSCULAR; INTRAVENOUS at 23:45

## 2021-04-25 RX ADMIN — INDOMETHACIN 25 MG: 25 CAPSULE ORAL at 17:21

## 2021-04-25 RX ADMIN — MORPHINE SULFATE 2 MG: 2 INJECTION, SOLUTION INTRAMUSCULAR; INTRAVENOUS at 15:24

## 2021-04-25 RX ADMIN — ASPIRIN 81 MG: 81 TABLET, COATED ORAL at 20:23

## 2021-04-25 ASSESSMENT — PAIN - FUNCTIONAL ASSESSMENT
PAIN_FUNCTIONAL_ASSESSMENT: ACTIVITIES ARE NOT PREVENTED
PAIN_FUNCTIONAL_ASSESSMENT: ACTIVITIES ARE NOT PREVENTED

## 2021-04-25 ASSESSMENT — PAIN DESCRIPTION - ONSET: ONSET: ON-GOING

## 2021-04-25 ASSESSMENT — PAIN SCALES - GENERAL
PAINLEVEL_OUTOF10: 6
PAINLEVEL_OUTOF10: 9

## 2021-04-25 ASSESSMENT — PAIN SCALES - WONG BAKER
WONGBAKER_NUMERICALRESPONSE: 4
WONGBAKER_NUMERICALRESPONSE: 0
WONGBAKER_NUMERICALRESPONSE: 0

## 2021-04-25 ASSESSMENT — PAIN DESCRIPTION - LOCATION
LOCATION: ARM
LOCATION: ARM

## 2021-04-25 ASSESSMENT — PAIN DESCRIPTION - FREQUENCY
FREQUENCY: INTERMITTENT
FREQUENCY: CONTINUOUS

## 2021-04-25 ASSESSMENT — PAIN DESCRIPTION - PAIN TYPE
TYPE: ACUTE PAIN
TYPE: ACUTE PAIN

## 2021-04-25 ASSESSMENT — PAIN DESCRIPTION - PROGRESSION
CLINICAL_PROGRESSION: NOT CHANGED
CLINICAL_PROGRESSION: GRADUALLY IMPROVING
CLINICAL_PROGRESSION: GRADUALLY IMPROVING

## 2021-04-25 ASSESSMENT — PAIN DESCRIPTION - DESCRIPTORS
DESCRIPTORS: SHARP;SHOOTING
DESCRIPTORS: DISCOMFORT

## 2021-04-25 ASSESSMENT — PAIN DESCRIPTION - ORIENTATION: ORIENTATION: LEFT

## 2021-04-25 NOTE — PROGRESS NOTES
Hospitalist Progress Note      PCP: Freddy Brooke MD    Date of Admission: 4/22/2021    Subjective: still feels his SOB slightly improved, hand swelling and pain resolved    Medications:  Reviewed    Infusion Medications    sodium chloride      sodium chloride      dextrose       Scheduled Medications    insulin glargine  25 Units Subcutaneous Nightly    nicotine  1 patch Transdermal Daily    cefepime  2,000 mg Intravenous Q12H    methylPREDNISolone  40 mg Intravenous 2 times per day    colchicine  0.6 mg Oral Daily    indomethacin  25 mg Oral TID WC    lidocaine 1 % injection  5 mL Intradermal Once    sodium chloride flush  5-40 mL Intravenous 2 times per day    aspirin  81 mg Oral Nightly    atorvastatin  10 mg Oral Daily    lisinopril  5 mg Oral Daily    traZODone  50 mg Oral Nightly    sertraline  50 mg Oral Daily    divalproex  750 mg Oral BID    sodium chloride flush  5-40 mL Intravenous 2 times per day    enoxaparin  40 mg Subcutaneous Daily    insulin lispro  0-6 Units Subcutaneous TID WC    insulin lispro  0-3 Units Subcutaneous Nightly    vancomycin  1,250 mg Intravenous Q8H    vancomycin (VANCOCIN) intermittent dosing (placeholder)   Other RX Placeholder     PRN Meds: morphine, sodium chloride flush, sodium chloride, sodium chloride flush, sodium chloride, promethazine **OR** ondansetron, polyethylene glycol, acetaminophen **OR** acetaminophen, glucose, dextrose, glucagon (rDNA), dextrose, ipratropium-albuterol      Intake/Output Summary (Last 24 hours) at 4/25/2021 1625  Last data filed at 4/25/2021 1507  Gross per 24 hour   Intake 2220 ml   Output 4925 ml   Net -2705 ml       Physical Exam Performed:    /81   Pulse 110   Temp 97.6 °F (36.4 °C) (Oral)   Resp 18   Ht 5' 11\" (1.803 m)   Wt 243 lb 6.2 oz (110.4 kg)   SpO2 92%   BMI 33.95 kg/m²     General appearance: NAD  Lungs: minimal exp wheeze  Heart: Tachycardic regular rhythm.    Abdomen: Soft, non-tender or non-distended without rigidity or guarding and positive bowel sounds   Extremities: left hand swelling resolved  Neurologic: Alert and oriented X 3, neurovascularly intact with sensory/motor intact upper extremities/lower extremities, bilaterally.  Cranial nerves: II-XII intact, grossly non-focal.  Mental status: Alert, oriented  Capillary Refill: Acceptable  < 3 seconds  Peripheral Pulses: +3 Easily felt, not easily obliterated with pressure    Labs:   Recent Labs     04/22/21  1747 04/23/21  0853 04/25/21  1200   WBC 12.1* 10.8 13.1*   HGB 13.2* 12.4* 12.9*   HCT 38.9* 36.6* 37.6*    262 250     Recent Labs     04/22/21  1746 04/23/21  0852 04/25/21  1200   * 135* 134*   K 3.7 4.1 4.3   CL 99 100 99   CO2 23 22 24   BUN 9 11 23*   CREATININE 0.8* 0.7* 0.7*   CALCIUM 9.1 8.9 9.0     No results for input(s): AST, ALT, BILIDIR, BILITOT, ALKPHOS in the last 72 hours. No results for input(s): INR in the last 72 hours. Recent Labs     04/22/21  1746   TROPONINI <0.01       Urinalysis:    No results found for: Ajay Sheree, BACTERIA, RBCUA, BLOODU, SPECGRAV, GLUCOSEU    Radiology:  XR HAND LEFT (MIN 3 VIEWS)   Final Result   Edema throughout the 3rd digit. Paratracheal erosion in the proximal phalanx   as described. This could represent inflammatory arthritis given indication. Follow-up recommended. XR CHEST (2 VW)   Final Result   Increased perihilar markings.   Atelectasis and infectious or inflammatory   airway process are in the differential.                 Assessment/Plan:    Active Hospital Problems    Diagnosis    COPD with acute exacerbation ([facility] [address] [J44.1]         SIRS versus sepsis: 2/2-> HCAP, COPD  Pro-Johnathon 0.02, lactic acid 1.4, no current evidence of pyrexia, no encephalopathy, no hypoxia, no hypotension        HCAP: Inpatient hospitalization April 12 through April 15  Chest x-ray indicating perihilar markings, productive Cough, fever, fatigue, weakness,  tobacco smoker, positive known COPD nonoxygen dependent  Covid vaccination series completed  Covid tneg  Leukocytosis: WBC 12.1 w/ left shift  Procalcitonin 0.02  Blood cultures x2 neg  MRSA nasal ordered  Legionella/Strep pneumonia ag sent  On Cefepime and vancomycin        COPD: Not oxygen dependent  Symptomatic with wheezing congestion and productive cough  Oxygen therapy: Titrate maintaining saturation greater than 92%, currently on room air  Patient is not typically on inhaled steroids and reports he has not seen a pulmonologist  Continue HCAP antibiotic regimen  DuoNeb   IV solumedrol-->PO pred in am     Gout: Left hand, left third finger  Uric acid 11.2-->7.6, pain tenderness and erythema of the left hand, left third metacarpal, and evidence on left hand x-ray  Supportive: IV steroids, oral NSAIDs/colchicine  Tramadol short-term for the acute pain, added IV morphine tid prn  Cool compress  Splinting of the left wrist may be helpful if pain is persistent  Improved        Seizure disorder: Continue Depakote per home regimen     DM II - uncontrolled 2/2 steroids  Hypoglycemia protocol, SSI, FSBS  Carb controlled diet  Started lantus - increase dose        HTN: Continue aspirin, statin, lisinopril regimen        DVT Prophylaxis: Lovenox  Diet: DIET CARB CONTROL;  Code Status: Full Code    PT/OT Eval Status: ordered    Dispo - cont care,shane whitaker in am    Jaison Hercules MD

## 2021-04-25 NOTE — PROGRESS NOTES
Hospitalist Progress Note      PCP: Jacky Rice MD    Date of Admission: 4/22/2021     Subjective: pain still fairly controlled, swelling improved    Medications:  Reviewed    Infusion Medications    sodium chloride      sodium chloride      dextrose       Scheduled Medications    insulin glargine  18 Units Subcutaneous Nightly    cefepime  2,000 mg Intravenous Q12H    methylPREDNISolone  40 mg Intravenous 2 times per day    colchicine  0.6 mg Oral Daily    indomethacin  25 mg Oral TID WC    lidocaine 1 % injection  5 mL Intradermal Once    sodium chloride flush  5-40 mL Intravenous 2 times per day    aspirin  81 mg Oral Nightly    atorvastatin  10 mg Oral Daily    lisinopril  5 mg Oral Daily    traZODone  50 mg Oral Nightly    sertraline  50 mg Oral Daily    divalproex  750 mg Oral BID    sodium chloride flush  5-40 mL Intravenous 2 times per day    enoxaparin  40 mg Subcutaneous Daily    insulin lispro  0-6 Units Subcutaneous TID WC    insulin lispro  0-3 Units Subcutaneous Nightly    vancomycin  1,250 mg Intravenous Q8H    vancomycin (VANCOCIN) intermittent dosing (placeholder)   Other RX Placeholder     PRN Meds: morphine, sodium chloride flush, sodium chloride, sodium chloride flush, sodium chloride, promethazine **OR** ondansetron, polyethylene glycol, acetaminophen **OR** acetaminophen, glucose, dextrose, glucagon (rDNA), dextrose, ipratropium-albuterol      Intake/Output Summary (Last 24 hours) at 4/25/2021 0023  Last data filed at 4/24/2021 2202  Gross per 24 hour   Intake 1350 ml   Output 3700 ml   Net -2350 ml       Physical Exam Performed:    /85   Pulse 91   Temp 98.2 °F (36.8 °C) (Oral)   Resp 18   Ht 5' 11\" (1.803 m)   Wt 243 lb 6.2 oz (110.4 kg)   SpO2 92%   BMI 33.95 kg/m²     General appearance: NAD  Lungs: Clear to auscultation, bilaterally without Rales/Wheezes/Rhonchi with good respiratory effort.   Heart: Tachycardic regular rhythm.    Abdomen: Soft, non-tender or non-distended without rigidity or guarding and positive bowel sounds   Extremities: Right upper extremity negative for injury or swelling.  Full range of motion right shoulder elbow and wrist, and all digits on the right hand. Left shoulder full range of motion, left elbow full range of motion.  Limited range of motion of the left wrist with extension and flexion, and limited range of motion of all 5 digits with abduction abduction flexion extension due to pinpoint pain at the dorsal aspect of the third metacarpal.  Bilateral radial pulses 2+.  No evidence of blistering.  No open wounds.  No streaking. Lateral lower extremities warm and dry.  No evidence of peripheral edema. Neurologic: Alert and oriented X 3, neurovascularly intact with sensory/motor intact upper extremities/lower extremities, bilaterally.  Cranial nerves: II-XII intact, grossly non-focal.  Mental status: Alert, oriented  Capillary Refill: Acceptable  < 3 seconds  Peripheral Pulses: +3 Easily felt, not easily obliterated with pressure       Labs:   Recent Labs     04/22/21 1747 04/23/21  0853   WBC 12.1* 10.8   HGB 13.2* 12.4*   HCT 38.9* 36.6*    262     Recent Labs     04/22/21 1746 04/23/21  0852   * 135*   K 3.7 4.1   CL 99 100   CO2 23 22   BUN 9 11   CREATININE 0.8* 0.7*   CALCIUM 9.1 8.9     No results for input(s): AST, ALT, BILIDIR, BILITOT, ALKPHOS in the last 72 hours. No results for input(s): INR in the last 72 hours. Recent Labs     04/22/21 1746   TROPONINI <0.01       Urinalysis:    No results found for: Diane Crumb, BACTERIA, RBCUA, BLOODU, SPECGRAV, GLUCOSEU    Radiology:  XR HAND LEFT (MIN 3 VIEWS)   Final Result   Edema throughout the 3rd digit. Paratracheal erosion in the proximal phalanx   as described. This could represent inflammatory arthritis given indication. Follow-up recommended. XR CHEST (2 VW)   Final Result   Increased perihilar markings.   Atelectasis and infectious or inflammatory   airway process are in the differential.                 Assessment/Plan:    Active Hospital Problems    Diagnosis    [facility]         SIRS versus sepsis: 2/2-> HCAP, COPD  Pro-Johnathon 0.02, lactic acid 1.4, no current evidence of pyrexia, no encephalopathy, no hypoxia, no hypotension        HCAP: Inpatient hospitalization April 12 through April 15  Chest x-ray indicating perihilar markings, productive Cough, fever, fatigue, weakness,  tobacco smoker, positive known COPD nonoxygen dependent  Covid vaccination series completed  Covid tneg  Leukocytosis: WBC 12.1 w/ left shift  Procalcitonin 0.02  Blood cultures x2 neg  MRSA nasal ordered  Legionella/Strep pneumonia ag sent  On Cefepime and vancomycin        COPD: Not oxygen dependent  Symptomatic with wheezing congestion and productive cough  Oxygen therapy: Titrate maintaining saturation greater than 92%, currently on room air  Patient is not typically on inhaled steroids and reports he has not seen a pulmonologist  Continue HCAP antibiotic regimen  DuoNeb   IV solumedrol     Gout: Left hand, left third finger  Uric acid 11.2, pain tenderness and erythema of the left hand, left third metacarpal, and evidence on left hand x-ray  Supportive: IV steroids, oral NSAIDs/colchicine  Tramadol short-term for the acute pain, added IV morphine tid prn  Cool compress  Splinting of the left wrist may be helpful if pain is persistent  Improved        Seizure disorder: Continue Depakote per home regimen     DM II  Hypoglycemia protocol, SSI, FSBS  Carb controlled diet        HTN: Continue aspirin, statin, lisinopril regimen        DVT Prophylaxis: Lovenox  Diet: DIET CARB CONTROL;  Code Status: Full Code    PT/OT Eval Status: ordered    [address] [facility]    Ricarda Galvez MD

## 2021-04-25 NOTE — PLAN OF CARE
Problem: Pain:  Goal: Pain level will decrease  Description: Pain level will decrease  4/25/2021 1027 by Ragini Gavin RN  Outcome: Ongoing  4/25/2021 0159 by Li Wilson RN  Outcome: Ongoing     Problem: Pain:  Goal: Control of acute pain  Description: Control of acute pain  4/25/2021 1027 by Ragini Gavin RN  Outcome: Ongoing  4/25/2021 0159 by Li Wilson RN  Outcome: Ongoing     Problem: Falls - Risk of:  Goal: Will remain free from falls  Description: Will remain free from falls  4/25/2021 1027 by Ragini Gavin RN  Outcome: Ongoing  4/25/2021 0159 by Li Wilson RN  Outcome: Ongoing     Problem: Falls - Risk of:  Goal: Absence of physical injury  Description: Absence of physical injury  4/25/2021 1027 by Ragini Gavin RN  Outcome: Ongoing  4/25/2021 0159 by Li Wilson RN  Outcome: Ongoing

## 2021-04-25 NOTE — PLAN OF CARE
Problem: Pain:  Goal: Pain level will decrease  Description: Pain level will decrease  Outcome: Ongoing  Goal: Control of acute pain  Description: Control of acute pain  Outcome: Ongoing  Goal: Control of chronic pain  Description: Control of chronic pain  Outcome: Ongoing     Problem: Falls - Risk of:  Goal: Will remain free from falls  Description: Will remain free from falls  Outcome: Ongoing  Goal: Absence of physical injury  Description: Absence of physical injury  Outcome: Ongoing     Problem: Gas Exchange - Impaired:  Goal: Levels of oxygenation will improve  Description: Levels of oxygenation will improve  Outcome: Ongoing

## 2021-04-26 ENCOUNTER — APPOINTMENT (OUTPATIENT)
Dept: GENERAL RADIOLOGY | Age: 41
DRG: 178 | End: 2021-04-26
Payer: MEDICARE

## 2021-04-26 VITALS
SYSTOLIC BLOOD PRESSURE: 132 MMHG | RESPIRATION RATE: 18 BRPM | TEMPERATURE: 98.4 F | HEIGHT: 71 IN | HEART RATE: 89 BPM | WEIGHT: 243.83 LBS | OXYGEN SATURATION: 95 % | BODY MASS INDEX: 34.14 KG/M2 | DIASTOLIC BLOOD PRESSURE: 78 MMHG

## 2021-04-26 LAB
ANION GAP SERPL CALCULATED.3IONS-SCNC: 11 MMOL/L (ref 3–16)
BASOPHILS ABSOLUTE: 0 K/UL (ref 0–0.2)
BASOPHILS RELATIVE PERCENT: 0.1 %
BLOOD CULTURE, ROUTINE: NORMAL
BUN BLDV-MCNC: 25 MG/DL (ref 7–20)
CALCIUM SERPL-MCNC: 9 MG/DL (ref 8.3–10.6)
CHLORIDE BLD-SCNC: 97 MMOL/L (ref 99–110)
CO2: 25 MMOL/L (ref 21–32)
CREAT SERPL-MCNC: 0.7 MG/DL (ref 0.9–1.3)
EOSINOPHILS ABSOLUTE: 0 K/UL (ref 0–0.6)
EOSINOPHILS RELATIVE PERCENT: 0 %
GFR AFRICAN AMERICAN: >60
GFR NON-AFRICAN AMERICAN: >60
GLUCOSE BLD-MCNC: 309 MG/DL (ref 70–99)
GLUCOSE BLD-MCNC: 328 MG/DL (ref 70–99)
GLUCOSE BLD-MCNC: 342 MG/DL (ref 70–99)
HCT VFR BLD CALC: 35.5 % (ref 40.5–52.5)
HEMOGLOBIN: 11.8 G/DL (ref 13.5–17.5)
LYMPHOCYTES ABSOLUTE: 1.2 K/UL (ref 1–5.1)
LYMPHOCYTES RELATIVE PERCENT: 9.3 %
MCH RBC QN AUTO: 27.2 PG (ref 26–34)
MCHC RBC AUTO-ENTMCNC: 33.3 G/DL (ref 31–36)
MCV RBC AUTO: 81.9 FL (ref 80–100)
MONOCYTES ABSOLUTE: 0.5 K/UL (ref 0–1.3)
MONOCYTES RELATIVE PERCENT: 4.3 %
NEUTROPHILS ABSOLUTE: 10.7 K/UL (ref 1.7–7.7)
NEUTROPHILS RELATIVE PERCENT: 86.3 %
PDW BLD-RTO: 16 % (ref 12.4–15.4)
PERFORMED ON: ABNORMAL
PERFORMED ON: ABNORMAL
PLATELET # BLD: 217 K/UL (ref 135–450)
PMV BLD AUTO: 8.9 FL (ref 5–10.5)
POTASSIUM SERPL-SCNC: 4.7 MMOL/L (ref 3.5–5.1)
RBC # BLD: 4.33 M/UL (ref 4.2–5.9)
SODIUM BLD-SCNC: 133 MMOL/L (ref 136–145)
VANCOMYCIN TROUGH: 16.5 UG/ML (ref 10–20)
WBC # BLD: 12.4 K/UL (ref 4–11)

## 2021-04-26 PROCEDURE — 36592 COLLECT BLOOD FROM PICC: CPT

## 2021-04-26 PROCEDURE — 94760 N-INVAS EAR/PLS OXIMETRY 1: CPT

## 2021-04-26 PROCEDURE — 2580000003 HC RX 258: Performed by: INTERNAL MEDICINE

## 2021-04-26 PROCEDURE — 6370000000 HC RX 637 (ALT 250 FOR IP): Performed by: NURSE PRACTITIONER

## 2021-04-26 PROCEDURE — 85025 COMPLETE CBC W/AUTO DIFF WBC: CPT

## 2021-04-26 PROCEDURE — 97116 GAIT TRAINING THERAPY: CPT

## 2021-04-26 PROCEDURE — 2580000003 HC RX 258: Performed by: NURSE PRACTITIONER

## 2021-04-26 PROCEDURE — 6360000002 HC RX W HCPCS: Performed by: INTERNAL MEDICINE

## 2021-04-26 PROCEDURE — 80048 BASIC METABOLIC PNL TOTAL CA: CPT

## 2021-04-26 PROCEDURE — 6370000000 HC RX 637 (ALT 250 FOR IP): Performed by: INTERNAL MEDICINE

## 2021-04-26 PROCEDURE — 71046 X-RAY EXAM CHEST 2 VIEWS: CPT

## 2021-04-26 PROCEDURE — 80202 ASSAY OF VANCOMYCIN: CPT

## 2021-04-26 PROCEDURE — 6360000002 HC RX W HCPCS: Performed by: NURSE PRACTITIONER

## 2021-04-26 RX ORDER — LEVOFLOXACIN 750 MG/1
750 TABLET ORAL DAILY
Qty: 5 TABLET | Refills: 0 | Status: SHIPPED | OUTPATIENT
Start: 2021-04-26 | End: 2021-05-01

## 2021-04-26 RX ORDER — INDOMETHACIN 25 MG/1
25 CAPSULE ORAL
Qty: 30 CAPSULE | Refills: 0 | Status: ON HOLD | OUTPATIENT
Start: 2021-04-26 | End: 2022-05-12 | Stop reason: HOSPADM

## 2021-04-26 RX ORDER — PREDNISONE 10 MG/1
TABLET ORAL
Qty: 30 TABLET | Refills: 0 | Status: ON HOLD | OUTPATIENT
Start: 2021-04-26 | End: 2022-05-10

## 2021-04-26 RX ORDER — ALLOPURINOL 100 MG/1
100 TABLET ORAL DAILY
Qty: 90 TABLET | Refills: 1 | Status: ON HOLD | OUTPATIENT
Start: 2021-05-06 | End: 2022-05-12 | Stop reason: HOSPADM

## 2021-04-26 RX ORDER — COLCHICINE 0.6 MG/1
0.6 TABLET ORAL DAILY
Qty: 10 TABLET | Refills: 0 | Status: ON HOLD | OUTPATIENT
Start: 2021-04-27 | End: 2022-05-10

## 2021-04-26 RX ADMIN — ONDANSETRON 4 MG: 2 INJECTION INTRAMUSCULAR; INTRAVENOUS at 08:06

## 2021-04-26 RX ADMIN — Medication 1250 MG: at 12:48

## 2021-04-26 RX ADMIN — CEFEPIME HYDROCHLORIDE 2000 MG: 2 INJECTION, POWDER, FOR SOLUTION INTRAVENOUS at 13:56

## 2021-04-26 RX ADMIN — LISINOPRIL 5 MG: 5 TABLET ORAL at 08:05

## 2021-04-26 RX ADMIN — COLCHICINE 0.6 MG: 0.6 TABLET ORAL at 08:06

## 2021-04-26 RX ADMIN — INDOMETHACIN 25 MG: 25 CAPSULE ORAL at 08:05

## 2021-04-26 RX ADMIN — ATORVASTATIN CALCIUM 10 MG: 10 TABLET, FILM COATED ORAL at 08:05

## 2021-04-26 RX ADMIN — Medication 1250 MG: at 04:30

## 2021-04-26 RX ADMIN — MORPHINE SULFATE 2 MG: 2 INJECTION, SOLUTION INTRAMUSCULAR; INTRAVENOUS at 08:06

## 2021-04-26 RX ADMIN — SERTRALINE 50 MG: 50 TABLET, FILM COATED ORAL at 08:05

## 2021-04-26 RX ADMIN — SODIUM CHLORIDE, PRESERVATIVE FREE 10 ML: 5 INJECTION INTRAVENOUS at 08:19

## 2021-04-26 RX ADMIN — Medication 10 ML: at 08:07

## 2021-04-26 RX ADMIN — CEFEPIME HYDROCHLORIDE 2000 MG: 2 INJECTION, POWDER, FOR SOLUTION INTRAVENOUS at 02:30

## 2021-04-26 RX ADMIN — INSULIN LISPRO 4 UNITS: 100 INJECTION, SOLUTION INTRAVENOUS; SUBCUTANEOUS at 11:38

## 2021-04-26 RX ADMIN — METHYLPREDNISOLONE SODIUM SUCCINATE 40 MG: 40 INJECTION, POWDER, FOR SOLUTION INTRAMUSCULAR; INTRAVENOUS at 08:06

## 2021-04-26 RX ADMIN — INSULIN LISPRO 4 UNITS: 100 INJECTION, SOLUTION INTRAVENOUS; SUBCUTANEOUS at 08:08

## 2021-04-26 RX ADMIN — INDOMETHACIN 25 MG: 25 CAPSULE ORAL at 11:38

## 2021-04-26 RX ADMIN — DIVALPROEX SODIUM 750 MG: 500 TABLET, DELAYED RELEASE ORAL at 08:05

## 2021-04-26 ASSESSMENT — PAIN DESCRIPTION - PROGRESSION
CLINICAL_PROGRESSION: NOT CHANGED
CLINICAL_PROGRESSION: GRADUALLY IMPROVING

## 2021-04-26 ASSESSMENT — PAIN SCALES - WONG BAKER
WONGBAKER_NUMERICALRESPONSE: 0
WONGBAKER_NUMERICALRESPONSE: 0

## 2021-04-26 ASSESSMENT — PAIN DESCRIPTION - LOCATION: LOCATION: HEAD;HAND

## 2021-04-26 ASSESSMENT — PAIN DESCRIPTION - ONSET: ONSET: ON-GOING

## 2021-04-26 ASSESSMENT — PAIN DESCRIPTION - PAIN TYPE: TYPE: ACUTE PAIN

## 2021-04-26 ASSESSMENT — PAIN SCALES - GENERAL: PAINLEVEL_OUTOF10: 0

## 2021-04-26 NOTE — PROGRESS NOTES
Clinical Pharmacy Note  Vancomycin Consult    Reginald Trinh is a 38 y.o. male ordered Vancomycin for pneumonia; consult received from [facility] to manage therapy. Also receiving cefepime. Patient Active Problem List   Diagnosis    COPD with acute exacerbation (HCC)       Allergies:  Benadryl [diphenhydramine]     Temp max:  Temp (24hrs), Av.8 °F (36.6 °C), Min:97.6 °F (36.4 °C), Max:98.1 °F (36.7 °C)      Recent Labs     21  0853 21  1200 21  0330   WBC 10.8 13.1* 12.4*       Recent Labs     21  0852 21  1200 21  0330   BUN 11 * 25*   CREATININE 0.7* 0.7* 0.7*         Intake/Output Summary (Last 24 hours) at 2021 0500  Last data filed at 2021 2040  Gross per 24 hour   Intake 1520 ml   Output 3025 ml   Net -1505 ml         Ht Readings from Last 1 Encounters:   21 5' 11\" (1.803 m)        Wt Readings from Last 1 Encounters:   21 243 lb 6.2 oz (110.4 kg)         Estimated Creatinine Clearance: 175 mL/min (A) (based on SCr of 0.7 mg/dL (L)). Assessment/Plan:  Trough level of 16.5 mg/L  Will continue vancomycin 1250 mg IV every 8 hours    Thank you for the consult.    Panchito Wilson, BalajiD

## 2021-04-26 NOTE — PLAN OF CARE
Problem: Pain:  Description: Pain management should include both nonpharmacologic and pharmacologic interventions.   Goal: Pain level will decrease  Description: Pain level will decrease  4/26/2021 1101 by Afsaneh Alvares RN  Outcome: Completed  4/26/2021 1049 by Afsaneh Alvares RN  Outcome: Ongoing  4/25/2021 2316 by Kalyan Loera RN  Outcome: Ongoing  Goal: Control of acute pain  Description: Control of acute pain  4/26/2021 1101 by Afsaneh Alvares RN  Outcome: Completed  4/26/2021 1049 by Afsaneh Alvares RN  Outcome: Ongoing  4/25/2021 2316 by Kalyan Loera RN  Outcome: Ongoing  Goal: Control of chronic pain  Description: Control of chronic pain  4/26/2021 1101 by Afsaneh Alvares RN  Outcome: Completed  4/26/2021 1049 by Afsaneh Alvares RN  Outcome: Ongoing  4/25/2021 2316 by Kalyan Loera RN  Outcome: Ongoing     Problem: Falls - Risk of:  Goal: Will remain free from falls  Description: Will remain free from falls  4/26/2021 1101 by Afsaneh Alvares RN  Outcome: Completed  4/26/2021 1049 by Afsaneh Alvares RN  Outcome: Ongoing  4/25/2021 2316 by Kalyan Loera RN  Outcome: Ongoing  Goal: Absence of physical injury  Description: Absence of physical injury  4/26/2021 1101 by Afsaneh Alvares RN  Outcome: Completed  4/26/2021 1049 by Afsaneh Alvares RN  Outcome: Ongoing  4/25/2021 2316 by Kalyan Loera RN  Outcome: Ongoing     Problem: Gas Exchange - Impaired:  Goal: Levels of oxygenation will improve  Description: Levels of oxygenation will improve  4/26/2021 1101 by Afsaneh Alvares RN  Outcome: Completed  4/26/2021 1049 by Afsaneh Alvares RN  Outcome: Ongoing  4/25/2021 2316 by Kalyan Loera RN  Outcome: Ongoing

## 2021-04-26 NOTE — PROGRESS NOTES
Patient alert and oriented x4, VSS, sitting up in bed eating breakfast. Patient c/o nausea, SOB, and pain. PRN pain meds given per MAR, O2 WNL. Patient denies vomiting or diarrhea. Patient UAL and tolerating well, non-slip socks on. Patient denies further needs at this time. Bed in lowest position, call light within reach. Will continue to monitor pt needs.

## 2021-04-26 NOTE — PROGRESS NOTES
Physical Therapy  Facility/Department: [address] MED SURG  Daily Treatment Note  NAME: Mikala Aponte  : 1980  MRN: [id number]    Date of Service: 2021    Discharge Recommendations:  Home independently   PT Equipment Recommendations  Equipment Needed: No    Assessment   Body structures, Functions, Activity limitations: Decreased functional mobility ; Decreased safe awareness;Decreased balance;Decreased cognition  Assessment: Patient is a 37 y/o. male who presented to the emergency department on 21 reporting multiple falls, increased weakness, productive cough of green phlegm, fevers although not actually taken his temp and left hand pain. Note that this visit is his 11th to a [facility] ED since 2021. Recent admission to [facility]. E's for CVA symptoms was concluded as conversion disorder - neurology and psych involved. Pt also seen by PT - recommended RW - refused because he has one at home (however reported during initial evaluation that he does not have a RW). Pt currently functioning at his baseline. Recommend return home independently. Treatment Diagnosis: impaired mobility  Prognosis: Fair  Decision Making: Medium Complexity  History: see below  Exam: see below  Clinical Presentation: evolving  PT Education: PT Role;Plan of Care;General Safety;Gait Training;Functional Mobility Training;Equipment  Barriers to Learning: cognition  REQUIRES PT FOLLOW UP: No  Activity Tolerance  Activity Tolerance: Patient Tolerated treatment well     Patient Diagnosis(es): The primary encounter diagnosis was Acute exacerbation of chronic obstructive pulmonary disease (COPD) ([address] Diagnoses of Acute pain of left wrist, Community acquired pneumonia, unspecified laterality, and Acute gout of left wrist, unspecified cause were also pertinent to this visit.      has a past medical history of COPD (chronic obstructive pulmonary disease) ([address] CVA (cerebral vascular accident) ([address] Diabetes mellitus ([address] Gout, HTN (hypertension), Seizure disorder ([facility] and Skin cancer. has a past surgical history that includes Neck surgery. Restrictions  Restrictions/Precautions  Restrictions/Precautions: Fall Risk  Position Activity Restriction  Other position/activity restrictions: residual deficits in LLE from CVA     Subjective   General  Chart Reviewed: Yes  Additional Pertinent Hx: Patient is a 39 y/o. male who presented to the emergency department on 4/22/21 reporting multiple falls, increased weakness, productive cough of green phlegm, fevers although not actually taken his temp and left hand pain. Note that this visit is his 11th to a [facility] ED since 1/1/2021. Recent admission to [facility]. E's for CVA symptoms was concluded as conversion disorder - neurology and psych involved. Pt also seen by PT - recommended RW - refused because he has one at home. Response To Previous Treatment: Patient with no complaints from previous session. Family / Caregiver Present: No  Referring Practitioner: Emelia Littlejohn MD  Subjective  Subjective: Pt is agreeable to PT. Denies pain. Orientation  Orientation  Overall Orientation Status: Within Functional Limits     Cognition   Cognition  Overall Cognitive Status: Exceptions  Arousal/Alertness: Appropriate responses to stimuli  Following Commands: Follows all commands without difficulty  Attention Span: Appears intact  Memory: Appears intact  Safety Judgement: Decreased awareness of need for safety  Insights: Decreased awareness of deficits  Initiation: Does not require cues  Sequencing: Does not require cues     Objective   Bed mobility  Supine to Sit: Modified independent  Sit to Supine: Modified independent  Scooting: Modified independent  Transfers  Sit to Stand: Independent  Stand to sit: Independent  Ambulation  Ambulation?: Yes  Ambulation 1  Surface: level tile  Device: No Device  Assistance: Independent  Gait Deviations: Slow Sasha; Increased CLAUDETTE  Distance: 600' x 3

## 2021-04-26 NOTE — PROGRESS NOTES
CLINICAL PHARMACY NOTE: MEDS TO [facility] [employer] Select Patient?: No  Total # of Prescriptions Filled: 5   The following medications were delivered to the patient:  Discharge Medication List as of 4/26/2021  3:01 PM      START taking these medications    Details   indomethacin (INDOCIN) 25 MG capsule Take 1 capsule by mouth 3 times daily (with meals) for 10 days, Disp-30 capsule, R-0Normal      colchicine (COLCRYS) 0.6 MG tablet Take 1 tablet by mouth daily for 10 days, Disp-10 tablet, R-0Normal      levoFLOXacin (LEVAQUIN) 750 MG tablet Take 1 tablet by mouth daily for 5 days, Disp-5 tablet, R-0Normal      predniSONE (DELTASONE) 10 MG tablet Take 4 tablets once a day for 3 days, then take 3 tablets once a day for 3 days, then take 2 tablets once a day for 3 days, then take 1 tablet once a day for 3 days, then stop., Disp-30 tablet, R-0Normal      allopurinol (ZYLOPRIM) 100 MG tablet Take 1 tablet by mouth daily, Disp-90 tablet, R-1Normal         ·   Total # of Interventions Completed: 0  Time Spent (min): 30    Additional Documentation:

## 2021-04-26 NOTE — PROGRESS NOTES
Physician Progress Note      Tyrell Evans  CSN #:                  [id number]  :                       1980  ADMIT DATE:       2021 6:14 PM  [address] [place] DATE:  RESPONDING  PROVIDER #:        Nicole Nance MD          QUERY TEXT:    Pt admitted with exacerbation COPD and pneumonia. If possible, please   document in the progress notes and discharge summary if you are evaluating   and/or treating any of the following:    Note: CAP and HCAP indicate where the pneumonia was acquired, not a specific   type. The medical record reflects the following:  Risk Factors: COPD, smoker, recently hospitalized  Clinical Indicators: pneumonia following recent hospitalization, COVID not   detected, WBC 12.1, CXR shows increased perihilar markings. ? Atelectasis and   infectious or inflammatory airway process are in the differential  Treatment: Maxipime    Thank you,  Shandra Herrmann, RN, BSN, CCDS  [email or web address]  Options provided:  -- Gram negative pneumonia  -- Bacterial pneumonia  -- Viral pneumonia  -- Other - I will add my own diagnosis  -- Disagree - Not applicable / Not valid  -- Disagree - Clinically unable to determine / Unknown  -- Refer to Clinical Documentation Reviewer    PROVIDER RESPONSE TEXT:    This patient has gram negative pneumonia. Query created by: iDana Marie on 2021 11:11 AM      QUERY TEXT:    Patient admitted with exacerbation of COPD and pneumonia. Noted   documentation of SIRS vs Sepsis in H&P. If possible, please document in the   progress notes and discharge summary if you are evaluating and /or treating   any of the following:     The medical record reflects the following:  Risk Factors: exacerbation of COPD, pneumonia  Clinical Indicators: pneumonia, highest temp 99, pulse 120, resp 30; lactic   acid 1.4, Procalcitonin 0.02  Treatment: Solu Medrol, IV abx    Thank you,  Shandra Herrmann, RN, BSN, [email or web address] [email or web address]  Options provided:  -- Sepsis confirmed  -- SIRS confirmed and sepsis ruled out  -- Other - I will add my own diagnosis  -- Disagree - Not applicable / Not valid  -- Disagree - Clinically unable to determine / Unknown  -- Refer to Clinical Documentation Reviewer    PROVIDER RESPONSE TEXT:    SIRS confirmed and sepsis ruled out.     Query created by: Mitra Brown on 4/23/2021 11:11 AM      Electronically signed by:  Bernardino Grossman MD 4/26/2021 1:33 AM

## 2021-04-26 NOTE — DISCHARGE INSTR - COC
Continuity of Care Form    Patient Name: Dequan Pizarro   :  1980  MRN:  [id number]    Admit date:  2021  Discharge date:  2021    Code Status Order: Full Code   Advance Directives:   Advance Care Flowsheet Documentation     Date/Time Healthcare Directive Type of Healthcare Directive Copy in [address] Agent's Name Healthcare Agent's Phone Number    21 0035  No, patient does not have an advance directive for healthcare treatment -- -- -- -- --          Admitting Physician:  Aletha Diop DO  PCP: Sarahi Villeda MD    Discharging Nurse: Daphnie Espinal RN  [address] Unit/Room#: W3K-0269/7505-02  Discharging Unit Phone Number: [phone]    Emergency Contact:   Extended Emergency Contact Information  Primary Emergency Contact: Ronel Guerrero  [place] Phone: [phone]  Mobile Phone: [phone]  Relation: Other   needed? No    Past Surgical History:  Past Surgical History:   Procedure Laterality Date    NECK SURGERY         Immunization History: There is no immunization history on file for this patient.     Active Problems:  Patient Active Problem List   Diagnosis Code    COPD with acute exacerbation ([facility] [address] [facility] J44.1       Isolation/Infection:   Isolation          No Isolation        Patient Infection Status     Infection Onset Added Last Indicated Last Indicated By Review Planned Expiration Resolved Resolved By    None active    Resolved    COVID-19 Rule Out 21 COVID-19 (Ordered)   21 Rule-Out Test Resulted          Nurse Assessment:  Last Vital Signs: /78   Pulse 89   Temp 98.4 °F (36.9 °C) (Oral)   Resp 18   Ht 5' 11\" (1.803 m)   Wt 243 lb 13.3 oz (110.6 kg)   SpO2 95%   BMI 34.01 kg/m²     Last documented pain score (0-10 scale): Pain Level: 9  Last Weight:   Wt Readings from Last 1 Encounters:   21 243 lb 13.3 oz (110.6 kg)     Mental Status:  oriented and alert    IV Access:  - None    Nursing Mobility/ADLs:  Walking   Independent  Transfer  Independent  Bathing  Independent  Dressing  Independent  [address]  Med Delivery   whole    Wound Care Documentation and Therapy:        Elimination:  Continence:   · Bowel: No  · Bladder: No  Urinary Catheter: None   Colostomy/Ileostomy/Ileal Conduit: No       Date of Last BM: 4/26/2021    Intake/Output Summary (Last 24 hours) at 4/26/2021 1058  Last data filed at 4/26/2021 1006  Gross per 24 hour   Intake 1500 ml   Output 2050 ml   Net -550 ml     I/O last 3 completed shifts: In: 1520 [P.O.:1200; I.V.:70; IV Piggyback:250]  Out: 5413 [Urine:3025]    Safety Concerns:     None    Impairments/Disabilities:      None    Nutrition Therapy:  Current Nutrition Therapy:   - Oral Diet:  Carb Control 4 carbs/meal (1800kcals/day)    Routes of Feeding: Oral  Liquids: No Restrictions  Daily Fluid Restriction: no  Last Modified Barium Swallow with Video (Video Swallowing Test): not done    Treatments at the Time of Hospital Discharge:   Respiratory Treatments: none  Oxygen Therapy:  is not on home oxygen therapy.   Ventilator:    - No ventilator support    Rehab Therapies: none  Weight Bearing Status/Restrictions: No weight bearing restirctions  Other Medical Equipment (for information only, NOT a DME order):  none  Other Treatments: none    Patient's personal belongings (please select all that are sent with patient):  Patient discharged with all belongings    RN SIGNATURE:  Electronically signed by Brit Pacheco RN on 4/26/21 at 11:00 AM EDT    CASE MANAGEMENT/SOCIAL WORK SECTION    Inpatient Status Date: ***    Readmission Risk Assessment Score:  Readmission Risk              Risk of Unplanned Readmission:        11           Discharging to Facility/ Agency   · Name:   · Address:  · Phone:  · Fax:    Dialysis Facility (if applicable)   · Name:  · Address:  · Dialysis Schedule:  · Phone:  · Fax:    Case Manager/ signature: {Esignature:[id number]}    PHYSICIAN SECTION    Prognosis: {Prognosis:6036507890}    Condition at Discharge: [address] Patient Condition:625272576}    Rehab Potential (if transferring to Rehab): {Prognosis:0121257938}    Recommended Labs or Other Treatments After Discharge: ***    Physician Certification: I certify the above information and transfer of Sandra Leigh  is necessary for the continuing treatment of the diagnosis listed and that he requires {Admit to Appropriate Level of Care:00044} for {GREATER/LESS:561645546} 30 days.      Update Admission H&P: {CHP DME Changes in ICRGO:004628498}    PHYSICIAN SIGNATURE:  {Esignature:209407562}

## 2021-04-26 NOTE — PROGRESS NOTES
Educated patient on PICC line removal.  Removed PICC line per policy without difficulty. Patient laying still, no bleeding or pain, no s/s of distress. Will monitor.

## 2021-04-26 NOTE — DISCHARGE INSTR - DIET

## 2021-04-26 NOTE — PLAN OF CARE
Problem: Pain:  Description: Pain management should include both nonpharmacologic and pharmacologic interventions.   Goal: Pain level will decrease  Description: Pain level will decrease  4/26/2021 1049 by Winifred Marcos RN  Outcome: Ongoing  4/25/2021 2316 by José Luis Russ RN  Outcome: Ongoing  Goal: Control of acute pain  Description: Control of acute pain  4/26/2021 1049 by Winifred Marcos RN  Outcome: Ongoing  4/25/2021 2316 by José Luis Russ RN  Outcome: Ongoing  Goal: Control of chronic pain  Description: Control of chronic pain  4/26/2021 1049 by Winifred Marcos RN  Outcome: Ongoing  4/25/2021 2316 by José Luis Russ RN  Outcome: Ongoing     Problem: Falls - Risk of:  Goal: Will remain free from falls  Description: Will remain free from falls  4/26/2021 1049 by Winifred Marcos RN  Outcome: Ongoing  4/25/2021 2316 by José Luis Russ RN  Outcome: Ongoing  Goal: Absence of physical injury  Description: Absence of physical injury  4/26/2021 1049 by Winifred Marcos RN  Outcome: Ongoing  4/25/2021 2316 by José Luis Russ RN  Outcome: Ongoing     Problem: Gas Exchange - Impaired:  Goal: Levels of oxygenation will improve  Description: Levels of oxygenation will improve  4/26/2021 1049 by Winifred Marcos RN  Outcome: Ongoing  4/25/2021 2316 by José Luis Russ RN  Outcome: Ongoing

## 2021-04-26 NOTE — PLAN OF CARE
Problem: Pain:  Description: Pain management should include both nonpharmacologic and pharmacologic interventions.   Goal: Pain level will decrease  Description: Pain level will decrease  4/25/2021 2316 by Benjamin Aranda RN  Outcome: Ongoing  4/25/2021 1027 by Karin Cook RN  Outcome: Ongoing  Goal: Control of acute pain  Description: Control of acute pain  4/25/2021 2316 by Benjamin Aranda RN  Outcome: Ongoing  4/25/2021 1027 by Karin Cook RN  Outcome: Ongoing  Goal: Control of chronic pain  Description: Control of chronic pain  4/25/2021 2316 by Benjamin Aranda RN  Outcome: Ongoing  4/25/2021 1027 by Karin Cook RN  Outcome: Ongoing     Problem: Falls - Risk of:  Goal: Will remain free from falls  Description: Will remain free from falls  4/25/2021 2316 by Benjamin Aranda RN  Outcome: Ongoing  4/25/2021 1027 by Karin Cook RN  Outcome: Ongoing  Goal: Absence of physical injury  Description: Absence of physical injury  4/25/2021 2316 by Benjamin Aranda RN  Outcome: Ongoing  4/25/2021 1027 by Karin Cook RN  Outcome: Ongoing     Problem: Gas Exchange - Impaired:  Goal: Levels of oxygenation will improve  Description: Levels of oxygenation will improve  4/25/2021 2316 by Benjamin Aranda RN  Outcome: Ongoing  4/25/2021 1027 by Karin Cook RN  Outcome: Ongoing

## 2021-04-26 NOTE — PROGRESS NOTES
Reviewed discharge instructions with patient. Patient verbalized understanding. Will need Lyft home, social work notified.

## 2021-04-26 NOTE — CARE COORDINATION
RANDY delivered work note to patient. After he sees MD we will set up lyft. Patient stated that there were no time restrictions on being seen by MD he just would like to see her as soon as possible. Respectfully submitted,    Kirstie GRACE, CARMELLA-S  [facility]   [phone]    Electronically signed by Karyle Milling, LISW-S on 4/26/2021 at 12:32 PM
RANDY set up lyft and walked patient down to the lobby to show him where to wait. ETA 13 min. No further needs. Respectfully submitted,    JULIET Garcia  [facility]   [phone]    Electronically signed by JULIET Ramirez on 4/26/2021 at 3:30 PM
SW notes a new order for PT/OT. We will continue to follow for needs until recommendations are made regarding the patient's care. Respectfully submitted,    JULIET Guillen  [facility]   [phone]    Electronically signed by JULIET Chester on 4/23/2021 at 9:58 AM
SW spoke with patient today regarding discharge needs. Patient stated that he would like to return to work on Friday and needs a note. He also needs a lyft. SW verified address in system and cell number for text message alerts. RANDY will set up transport after speaking with nursing staff. Respectfully submitted,    JULIET Pearce  [facility]   [phone]    Electronically signed by JULIET Garrett on 4/26/2021 at 10:52 AM
needed. Respectfully submitted,    JULIET Puentes  [facility]   [phone]    Electronically signed by JULIET Mojica on 4/23/2021 at 8:39 AM

## 2021-04-27 LAB — CULTURE, BLOOD 2: NORMAL

## 2022-04-06 ENCOUNTER — APPOINTMENT (OUTPATIENT)
Dept: GENERAL RADIOLOGY | Age: 42
End: 2022-04-06
Payer: MEDICARE

## 2022-04-06 ENCOUNTER — HOSPITAL ENCOUNTER (EMERGENCY)
Age: 42
Discharge: HOME | End: 2022-04-06
Payer: MEDICARE

## 2022-04-06 VITALS
BODY MASS INDEX: 32.2 KG/M2 | DIASTOLIC BLOOD PRESSURE: 70 MMHG | HEIGHT: 71 IN | WEIGHT: 230 LBS | RESPIRATION RATE: 14 BRPM | SYSTOLIC BLOOD PRESSURE: 110 MMHG | HEART RATE: 92 BPM | TEMPERATURE: 98.1 F | OXYGEN SATURATION: 98 %

## 2022-04-06 DIAGNOSIS — M79.89 PAIN AND SWELLING OF RIGHT LOWER LEG: ICD-10-CM

## 2022-04-06 DIAGNOSIS — M79.661 PAIN AND SWELLING OF RIGHT LOWER LEG: ICD-10-CM

## 2022-04-06 DIAGNOSIS — M25.561 ACUTE PAIN OF RIGHT KNEE: Primary | ICD-10-CM

## 2022-04-06 LAB
GLUCOSE BLD-MCNC: 374 MG/DL (ref 70–99)
PERFORMED ON: ABNORMAL

## 2022-04-06 PROCEDURE — 73560 X-RAY EXAM OF KNEE 1 OR 2: CPT

## 2022-04-06 PROCEDURE — 93971 EXTREMITY STUDY: CPT

## 2022-04-06 PROCEDURE — 6370000000 HC RX 637 (ALT 250 FOR IP): Performed by: PHYSICIAN ASSISTANT

## 2022-04-06 PROCEDURE — 99283 EMERGENCY DEPT VISIT LOW MDM: CPT

## 2022-04-06 RX ORDER — IBUPROFEN 800 MG/1
800 TABLET ORAL EVERY 8 HOURS PRN
Qty: 20 TABLET | Refills: 0 | Status: ON HOLD | OUTPATIENT
Start: 2022-04-06 | End: 2022-05-10

## 2022-04-06 RX ORDER — HYDROCODONE BITARTRATE AND ACETAMINOPHEN 5; 325 MG/1; MG/1
1 TABLET ORAL ONCE
Status: COMPLETED | OUTPATIENT
Start: 2022-04-06 | End: 2022-04-06

## 2022-04-06 RX ORDER — CEPHALEXIN 500 MG/1
500 CAPSULE ORAL 3 TIMES DAILY
Qty: 21 CAPSULE | Refills: 0 | Status: SHIPPED | OUTPATIENT
Start: 2022-04-06 | End: 2022-04-13

## 2022-04-06 RX ORDER — HYDROCODONE BITARTRATE AND ACETAMINOPHEN 5; 325 MG/1; MG/1
1 TABLET ORAL EVERY 6 HOURS PRN
Qty: 10 TABLET | Refills: 0 | Status: SHIPPED | OUTPATIENT
Start: 2022-04-06 | End: 2022-04-09

## 2022-04-06 RX ORDER — IBUPROFEN 400 MG/1
800 TABLET ORAL ONCE
Status: COMPLETED | OUTPATIENT
Start: 2022-04-06 | End: 2022-04-06

## 2022-04-06 RX ADMIN — HYDROCODONE BITARTRATE AND ACETAMINOPHEN 1 TABLET: 5; 325 TABLET ORAL at 15:04

## 2022-04-06 RX ADMIN — IBUPROFEN 800 MG: 400 TABLET, FILM COATED ORAL at 15:04

## 2022-04-06 ASSESSMENT — PAIN DESCRIPTION - LOCATION: LOCATION: KNEE

## 2022-04-06 ASSESSMENT — PAIN DESCRIPTION - FREQUENCY: FREQUENCY: CONTINUOUS

## 2022-04-06 ASSESSMENT — PAIN DESCRIPTION - DESCRIPTORS: DESCRIPTORS: ACHING

## 2022-04-06 ASSESSMENT — PAIN - FUNCTIONAL ASSESSMENT: PAIN_FUNCTIONAL_ASSESSMENT: 0-10

## 2022-04-06 ASSESSMENT — PAIN DESCRIPTION - ORIENTATION: ORIENTATION: RIGHT

## 2022-04-06 ASSESSMENT — PAIN DESCRIPTION - PAIN TYPE: TYPE: ACUTE PAIN

## 2022-04-06 ASSESSMENT — PAIN SCALES - GENERAL
PAINLEVEL_OUTOF10: 10
PAINLEVEL_OUTOF10: 9

## 2022-04-06 ASSESSMENT — PAIN DESCRIPTION - ONSET: ONSET: GRADUAL

## 2022-04-06 NOTE — LETTER
[facility] Emergency Department  [address] [facility] [address]  Phone: [phone]             April 6, 2022    Patient: Rebecca Velez   YOB: 1980   Date of Visit: 4/6/2022       To Whom It May Concern:    Rebecca Velez was seen and treated in our emergency department on 4/6/2022. He may return to work on April 8th. Off work April 6th and 7th.       Sincerely,             Signature:__________________________________ Eye

## 2022-04-06 NOTE — LETTER
[facility] Emergency Department  [address] [facility] [address] [address]  Phone: [phone]               April 6, 2022    Patient: Jose Velázquez   YOB: 1980   Date of Visit: 4/6/2022       To Whom It May Concern:    Jose Velázquez was seen and treated in our emergency department on 4/6/2022. He may return to work on April 7th. Off work April 6th.       Sincerely,       Anabella Holly RN         Signature:__________________________________

## 2022-04-06 NOTE — ED NOTES
Able to walk to the   Ace to knee  Aware to follow up with pmd  To return  Worsening or changes  Aware to only use the ace during the day  To return increased redness fever  Aware bs is elevated     Missy Alexandre RN  04/06/22 9805

## 2022-04-07 ENCOUNTER — TELEPHONE (OUTPATIENT)
Dept: ORTHOPEDIC SURGERY | Age: 42
End: 2022-04-07

## 2022-04-07 NOTE — ED PROVIDER NOTES
[facility]        Pt Name: Maty Ortiz  MRN: [id number]  [place] 1980  Date of evaluation: 4/6/2022  Provider: DIANNE Haines  PCP: VY Shaikh APRN  Note Started: 12:48 AM EDT     The ED Attending Physician was available for consultation but did not see or evaluate this patient. CHIEF COMPLAINT       Chief Complaint   Patient presents with    Knee Pain     pt states right knee pain for 2 days       HISTORY OF PRESENT ILLNESS   (Location, Timing/Onset, Context/Setting, Quality, Duration, Modifying Factors, Severity, Associated Signs and Symptoms)  Note limiting factors. Chief Complaint: Right knee and lower leg pain    Maty Ortiz is a 42 y.o. male who presents pain in the right knee extending down the right lower leg. Says been going on for a couple days, no related trauma. Denies past history of problems with the knee. Denies numbness. He says the leg now appears little bit swollen and red. He says it is painful at rest but much worse with attempts to stand and ambulate. Denies any symptoms in the left leg. Nursing Notes were all reviewed and agreed with or any disagreements were addressed in the HPI. REVIEW OF SYSTEMS    (2-9 systems for level 4, 10 or more for level 5)     Review of Systems    Positives and pertinent negatives as per HPI.      PAST MEDICAL HISTORY     Past Medical History:   Diagnosis Date    [facility] obstructive [facility]     [facility]A [facility]cerebral vascular accident) [facility]     Diabetes mellitus ([facility]     Gout     HTN (hypertension)     Seizure disorder ([facility]     Skin cancer        SURGICAL HISTORY     Past Surgical History:   Procedure Laterality Date    NECK SURGERY         CURRENTMEDICATIONS       Discharge Medication List as of 4/6/2022  3:57 PM      CONTINUE these medications which have NOT CHANGED    Details   indomethacin (INDOCIN) 25 MG capsule Take 1 capsule by mouth 3 times daily (with meals) for 10 days, Disp-30 capsule, R-0Normal      colchicine (COLCRYS) 0.6 MG tablet Take 1 tablet by mouth daily for 10 days, Disp-10 tablet, R-0Normal      predniSONE (DELTASONE) 10 MG tablet Take 4 tablets once a day for 3 days, then take 3 tablets once a day for 3 days, then take 2 tablets once a day for 3 days, then take 1 tablet once a day for 3 days, then stop., Disp-30 tablet, R-0Normal      allopurinol (ZYLOPRIM) 100 MG tablet Take 1 tablet by mouth daily, Disp-90 tablet, R-1Normal      aspirin 81 MG EC tablet Take 81 mg by mouth nightly Historical Med      atorvastatin (LIPITOR) 10 MG tablet Take 10 mg by mouth daily Historical Med      divalproex (DEPAKOTE) 250 MG DR tablet Take 750 mg by mouth every 12 hoursHistorical Med      metFORMIN (GLUCOPHAGE) 500 MG tablet Take 500 mg by mouth dailyHistorical Med      lisinopril (PRINIVIL;ZESTRIL) 5 MG tablet Take 5 mg by mouth dailyHistorical Med      sertraline (ZOLOFT) 50 MG tablet Take 50 mg by mouth dailyHistorical Med      tiZANidine (ZANAFLEX) 4 MG tablet Take 4 mg by mouth every 6 hours as neededHistorical Med      traZODone (DESYREL) 50 MG tablet Take 50 mg by mouth nightlyHistorical Med      !! ibuprofen (ADVIL;MOTRIN) 600 MG tablet Take 600 mg by mouth every 8 hours as neededHistorical Med       !! - Potential duplicate medications found. Please discuss with provider. ALLERGIES     Benadryl [diphenhydramine]    FAMILYHISTORY     History reviewed. No pertinent family history.      SOCIAL HISTORY       Social History     Tobacco Use    Smoking status: Never Smoker    Smokeless tobacco: Never Used   Substance Use Topics    Alcohol use: Never    Drug use: Never       SCREENINGS           PHYSICAL EXAM    (up to 7 for level 4, 8 or more for level 5)     ED Triage Vitals [04/06/22 1352]   BP Temp Temp Source Pulse Resp SpO2 Height Weight   102/69 98.1 °F (36.7 °C) Oral 99 16 95 % 5' 11\" (1.803 m) 230 lb (104.3 kg)       Physical Exam  Vitals and nursing note reviewed. Constitutional:       General: He is not in acute distress. Appearance: Normal appearance. He is not ill-appearing. HENT:      Head: Normocephalic and atraumatic. Nose: Nose normal.   Eyes:      General:         Right eye: No discharge. Left eye: No discharge. Pulmonary:      Effort: Pulmonary effort is normal. No respiratory distress. Musculoskeletal:         General: Normal range of motion. Cervical back: Normal range of motion. Comments: Diffuse tenderness to palpation reported the right knee, with worsened pain reported with flexion of the knee, but negative for edema or deformity, with good range of motion. There is, however, some mild swelling and erythema noted diffusely to the right lower leg, warm to the touch. Normal examination of the right ankle and foot. 2+ dorsalis pedis pulse on the right. Sensation to light touch grossly intact and capillary refill <3 seconds in the digits of the right lower extremity. Skin:     General: Skin is warm and dry. Neurological:      General: No focal deficit present. Mental Status: He is alert and oriented to person, place, and time. Psychiatric:         Mood and Affect: Mood normal.         Behavior: Behavior normal.         DIAGNOSTIC RESULTS   LABS:    Labs Reviewed   POCT GLUCOSE - Abnormal; Notable for the following components:       Result Value    POC Glucose 374 (*)     All other components within normal limits   POCT GLUCOSE       When ordered only abnormal lab results are displayed. All other labs were within normal range or not returned as of this dictation. EKG: When ordered, EKG's are interpreted by the Emergency Department Physician in the absence of a cardiologist.  Please see their note for interpretation of EKG.     RADIOLOGY:   Non-plain film images such as CT, Ultrasound and MRI are read by the radiologist. Plain radiographic images are visualized and preliminarily interpreted by the ED Provider with the below findings:    Interpretation per the Radiologist below, if available at the time of this note:    XR KNEE RIGHT (1-2 VIEWS)   Final Result   No acute osseous abnormality         VL Extremity Venous Right   Final Result          CONSULTS:  None    PROCEDURES   Unless otherwise noted below, none. Procedures    EMERGENCY DEPARTMENT COURSE and DIFFERENTIAL DIAGNOSIS/MDM:   Vitals:    Vitals:    04/06/22 1352 04/06/22 1600   BP: 102/69 110/70   Pulse: 99 92   Resp: 16 14   Temp: 98.1 °F (36.7 °C)    TempSrc: Oral    SpO2: 95% 98%   Weight: 230 lb (104.3 kg)    Height: 5' 11\" (1.803 m)        Patient was given the following medications:  Medications   ibuprofen (ADVIL;MOTRIN) tablet 800 mg (800 mg Oral Given 4/6/22 1504)   HYDROcodone-acetaminophen (NORCO) 5-325 MG per tablet 1 tablet (1 tablet Oral Given 4/6/22 1504)           Patient's x-ray and venous Doppler ultrasound both showed no acute findings, no DVT or bony abnormality. Exam suggested possible cellulitis in the leg. No indication for hospitalization or further work-up. We discharged with prescriptions for antibiotic treatment and pain medication and given referral for orthopedics. The patient verbalized understanding and agreement with this plan of care. The patient was advised to return to the emergency department if symptoms should significantly worsen or if new and concerning symptoms should appear. I estimate there is LOW risk for UNSTABLE FRACTURE, COMPARTMENT SYNDROME, DEEP VENOUS THROMBOSIS, SEPTIC ARTHRITIS, NEUROVASCULAR COMPROMISE, or TENDON/LIGAMENT RUPTURE, thus I consider the discharge disposition reasonable. CRITICAL CARE TIME   None    FINAL IMPRESSION      1. Acute pain of right knee    2.  Pain and swelling of right lower leg          DISPOSITION/PLAN   DISPOSITION Decision To Discharge 04/06/2022 03:49:36 PM      PATIENT REFERRED TO:  Yancy Archer MD  [address] [facility] [address]  [phone]    Schedule an appointment as soon as possible for a visit   For orthopedic follow-up care      DISCHARGE MEDICATIONS:  Discharge Medication List as of 4/6/2022  3:57 PM      START taking these medications    Details   cephALEXin (KEFLEX) 500 MG capsule Take 1 capsule by mouth 3 times daily for 7 days, Disp-21 capsule, R-0Print      !! ibuprofen (ADVIL;MOTRIN) 800 MG tablet Take 1 tablet by mouth every 8 hours as needed for Pain, Disp-20 tablet, R-0Print      HYDROcodone-acetaminophen (NORCO) 5-325 MG per tablet Take 1 tablet by mouth every 6 hours as needed for Pain for up to 3 days. , Disp-10 tablet, R-0Print       !! - Potential duplicate medications found. Please discuss with provider.           DISCONTINUED MEDICATIONS:  Discharge Medication List as of 4/6/2022  3:57 PM               (Please note that portions of this note were completed with a voice recognition program.  Efforts were made to edit the dictations but occasionally words are mis-transcribed.)    DIANNE Severino (electronically signed)        Jeffry Severino  04/07/22 0798

## 2022-05-09 ENCOUNTER — APPOINTMENT (OUTPATIENT)
Dept: CT IMAGING | Age: 42
End: 2022-05-09
Payer: MEDICARE

## 2022-05-09 ENCOUNTER — APPOINTMENT (OUTPATIENT)
Dept: GENERAL RADIOLOGY | Age: 42
End: 2022-05-09
Payer: MEDICARE

## 2022-05-09 ENCOUNTER — HOSPITAL ENCOUNTER (OUTPATIENT)
Age: 42
Setting detail: OBSERVATION
Discharge: HOME | End: 2022-05-12
Attending: EMERGENCY MEDICINE | Admitting: STUDENT IN AN ORGANIZED HEALTH CARE EDUCATION/TRAINING PROGRAM
Payer: MEDICARE

## 2022-05-09 DIAGNOSIS — F44.9 CONVERSION DISORDER: ICD-10-CM

## 2022-05-09 DIAGNOSIS — R53.1 LEFT-SIDED WEAKNESS: Primary | ICD-10-CM

## 2022-05-09 DIAGNOSIS — R20.0 LEFT SIDED NUMBNESS: ICD-10-CM

## 2022-05-09 LAB
A/G RATIO: 1.9 (ref 1.1–2.2)
ALBUMIN SERPL-MCNC: 4.1 G/DL (ref 3.4–5)
ALP BLD-CCNC: 112 U/L (ref 40–129)
ALT SERPL-CCNC: 15 U/L (ref 10–40)
ANION GAP SERPL CALCULATED.3IONS-SCNC: 13 MMOL/L (ref 3–16)
AST SERPL-CCNC: 13 U/L (ref 15–37)
BASOPHILS ABSOLUTE: 0.1 K/UL (ref 0–0.2)
BASOPHILS RELATIVE PERCENT: 1.3 %
BILIRUB SERPL-MCNC: 0.3 MG/DL (ref 0–1)
BUN BLDV-MCNC: 13 MG/DL (ref 7–20)
CALCIUM SERPL-MCNC: 9.6 MG/DL (ref 8.3–10.6)
CHLORIDE BLD-SCNC: 98 MMOL/L (ref 99–110)
CO2: 20 MMOL/L (ref 21–32)
CREAT SERPL-MCNC: 1 MG/DL (ref 0.9–1.3)
EOSINOPHILS ABSOLUTE: 0.1 K/UL (ref 0–0.6)
EOSINOPHILS RELATIVE PERCENT: 1.1 %
GFR AFRICAN AMERICAN: >60
GFR NON-AFRICAN AMERICAN: >60
GLUCOSE BLD-MCNC: 573 MG/DL (ref 70–99)
HCT VFR BLD CALC: 41 % (ref 40.5–52.5)
HEMOGLOBIN: 14.1 G/DL (ref 13.5–17.5)
INR BLD: 0.86 (ref 0.88–1.12)
LYMPHOCYTES ABSOLUTE: 1.8 K/UL (ref 1–5.1)
LYMPHOCYTES RELATIVE PERCENT: 24 %
MCH RBC QN AUTO: 28.7 PG (ref 26–34)
MCHC RBC AUTO-ENTMCNC: 34.4 G/DL (ref 31–36)
MCV RBC AUTO: 83.3 FL (ref 80–100)
MONOCYTES ABSOLUTE: 0.5 K/UL (ref 0–1.3)
MONOCYTES RELATIVE PERCENT: 6.5 %
NEUTROPHILS ABSOLUTE: 5 K/UL (ref 1.7–7.7)
NEUTROPHILS RELATIVE PERCENT: 67.1 %
PDW BLD-RTO: 14.7 % (ref 12.4–15.4)
PLATELET # BLD: 209 K/UL (ref 135–450)
PMV BLD AUTO: 9 FL (ref 5–10.5)
POTASSIUM REFLEX MAGNESIUM: 4.5 MMOL/L (ref 3.5–5.1)
PROTHROMBIN TIME: 9.6 SEC (ref 9.9–12.7)
RBC # BLD: 4.93 M/UL (ref 4.2–5.9)
SODIUM BLD-SCNC: 131 MMOL/L (ref 136–145)
TOTAL PROTEIN: 6.3 G/DL (ref 6.4–8.2)
TROPONIN: <0.01 NG/ML
VALPROIC ACID LEVEL: <2.8 UG/ML (ref 50–100)
WBC # BLD: 7.5 K/UL (ref 4–11)

## 2022-05-09 PROCEDURE — 93005 ELECTROCARDIOGRAM TRACING: CPT | Performed by: EMERGENCY MEDICINE

## 2022-05-09 PROCEDURE — 70450 CT HEAD/BRAIN W/O DYE: CPT

## 2022-05-09 PROCEDURE — 85610 PROTHROMBIN TIME: CPT

## 2022-05-09 PROCEDURE — 71045 X-RAY EXAM CHEST 1 VIEW: CPT

## 2022-05-09 PROCEDURE — 6360000004 HC RX CONTRAST MEDICATION: Performed by: PHYSICIAN ASSISTANT

## 2022-05-09 PROCEDURE — 80053 COMPREHEN METABOLIC PANEL: CPT

## 2022-05-09 PROCEDURE — 36415 COLL VENOUS BLD VENIPUNCTURE: CPT

## 2022-05-09 PROCEDURE — 70496 CT ANGIOGRAPHY HEAD: CPT

## 2022-05-09 PROCEDURE — 80164 ASSAY DIPROPYLACETIC ACD TOT: CPT

## 2022-05-09 PROCEDURE — 99285 EMERGENCY DEPT VISIT HI MDM: CPT

## 2022-05-09 PROCEDURE — 85025 COMPLETE CBC W/AUTO DIFF WBC: CPT

## 2022-05-09 PROCEDURE — 84484 ASSAY OF TROPONIN QUANT: CPT

## 2022-05-09 RX ADMIN — IOPAMIDOL 75 ML: 755 INJECTION, SOLUTION INTRAVENOUS at 22:22

## 2022-05-09 NOTE — LETTER
[facility]  [address] [address]  Phone: [phone]             May 12, 2022    Patient: Kirby Urbano   YOB: 1980   Date of Visit: 5/9/2022       To Whom It May Concern:    Kirby Urbano was seen and treated in our facility  beginning 5/9/2022 until 5/12/2022  . He may return to work 5/13/22.       Sincerely,       Jorgito Pinedo MD         Electronically signed by Jorgito Pinedo MD on 5/12/22 at 10:04 AM EDT

## 2022-05-10 ENCOUNTER — APPOINTMENT (OUTPATIENT)
Dept: MRI IMAGING | Age: 42
End: 2022-05-10
Payer: MEDICARE

## 2022-05-10 PROBLEM — R53.1 LEFT-SIDED WEAKNESS: Status: ACTIVE | Noted: 2022-05-10

## 2022-05-10 LAB
ANION GAP SERPL CALCULATED.3IONS-SCNC: 13 MMOL/L (ref 3–16)
BASE EXCESS ARTERIAL: -0.5 MMOL/L (ref -3–3)
BETA-HYDROXYBUTYRATE: 0.08 MMOL/L (ref 0–0.27)
BUN BLDV-MCNC: 10 MG/DL (ref 7–20)
CALCIUM SERPL-MCNC: 8.7 MG/DL (ref 8.3–10.6)
CARBOXYHEMOGLOBIN ARTERIAL: 1.2 % (ref 0–1.5)
CHLORIDE BLD-SCNC: 93 MMOL/L (ref 99–110)
CO2: 22 MMOL/L (ref 21–32)
CREAT SERPL-MCNC: 0.7 MG/DL (ref 0.9–1.3)
EKG ATRIAL RATE: 113 BPM
EKG DIAGNOSIS: NORMAL
EKG P AXIS: 59 DEGREES
EKG P-R INTERVAL: 148 MS
EKG Q-T INTERVAL: 296 MS
EKG QRS DURATION: 80 MS
EKG QTC CALCULATION (BAZETT): 406 MS
EKG R AXIS: 51 DEGREES
EKG T AXIS: 58 DEGREES
EKG VENTRICULAR RATE: 113 BPM
GFR AFRICAN AMERICAN: >60
GFR NON-AFRICAN AMERICAN: >60
GLUCOSE BLD-MCNC: 231 MG/DL (ref 70–99)
GLUCOSE BLD-MCNC: 303 MG/DL (ref 70–99)
GLUCOSE BLD-MCNC: 351 MG/DL (ref 70–99)
GLUCOSE BLD-MCNC: 418 MG/DL (ref 70–99)
GLUCOSE BLD-MCNC: 499 MG/DL (ref 70–99)
GLUCOSE BLD-MCNC: 521 MG/DL (ref 70–99)
HCO3 ARTERIAL: 24.7 MMOL/L (ref 21–29)
HEMOGLOBIN, ART, EXTENDED: 14.2 G/DL (ref 13.5–17.5)
METHEMOGLOBIN ARTERIAL: 0.5 %
O2 SAT, ARTERIAL: 74.2 %
O2 THERAPY: ABNORMAL
PCO2 ARTERIAL: 41.4 MMHG (ref 35–45)
PERFORMED ON: ABNORMAL
PH ARTERIAL: 7.38 (ref 7.35–7.45)
PO2 ARTERIAL: 38.4 MMHG (ref 75–108)
POTASSIUM SERPL-SCNC: 4.2 MMOL/L (ref 3.5–5.1)
SODIUM BLD-SCNC: 128 MMOL/L (ref 136–145)
TCO2 ARTERIAL: 25.9 MMOL/L

## 2022-05-10 PROCEDURE — G0378 HOSPITAL OBSERVATION PER HR: HCPCS

## 2022-05-10 PROCEDURE — 97165 OT EVAL LOW COMPLEX 30 MIN: CPT

## 2022-05-10 PROCEDURE — 97162 PT EVAL MOD COMPLEX 30 MIN: CPT | Performed by: PHYSICAL THERAPIST

## 2022-05-10 PROCEDURE — 94760 N-INVAS EAR/PLS OXIMETRY 1: CPT

## 2022-05-10 PROCEDURE — 36600 WITHDRAWAL OF ARTERIAL BLOOD: CPT

## 2022-05-10 PROCEDURE — 6370000000 HC RX 637 (ALT 250 FOR IP): Performed by: INTERNAL MEDICINE

## 2022-05-10 PROCEDURE — 97530 THERAPEUTIC ACTIVITIES: CPT | Performed by: PHYSICAL THERAPIST

## 2022-05-10 PROCEDURE — 96375 TX/PRO/DX INJ NEW DRUG ADDON: CPT

## 2022-05-10 PROCEDURE — 2580000003 HC RX 258: Performed by: PHYSICIAN ASSISTANT

## 2022-05-10 PROCEDURE — 96372 THER/PROPH/DIAG INJ SC/IM: CPT

## 2022-05-10 PROCEDURE — 80048 BASIC METABOLIC PNL TOTAL CA: CPT

## 2022-05-10 PROCEDURE — 97530 THERAPEUTIC ACTIVITIES: CPT

## 2022-05-10 PROCEDURE — 6370000000 HC RX 637 (ALT 250 FOR IP): Performed by: NURSE PRACTITIONER

## 2022-05-10 PROCEDURE — 6360000002 HC RX W HCPCS: Performed by: INTERNAL MEDICINE

## 2022-05-10 PROCEDURE — 6360000002 HC RX W HCPCS: Performed by: NURSE PRACTITIONER

## 2022-05-10 PROCEDURE — 36415 COLL VENOUS BLD VENIPUNCTURE: CPT

## 2022-05-10 PROCEDURE — 6370000000 HC RX 637 (ALT 250 FOR IP): Performed by: PHYSICIAN ASSISTANT

## 2022-05-10 PROCEDURE — 96361 HYDRATE IV INFUSION ADD-ON: CPT

## 2022-05-10 PROCEDURE — 82010 KETONE BODYS QUAN: CPT

## 2022-05-10 PROCEDURE — 94640 AIRWAY INHALATION TREATMENT: CPT

## 2022-05-10 PROCEDURE — 6370000000 HC RX 637 (ALT 250 FOR IP): Performed by: STUDENT IN AN ORGANIZED HEALTH CARE EDUCATION/TRAINING PROGRAM

## 2022-05-10 PROCEDURE — 92523 SPEECH SOUND LANG COMPREHEN: CPT

## 2022-05-10 PROCEDURE — 96374 THER/PROPH/DIAG INJ IV PUSH: CPT

## 2022-05-10 PROCEDURE — 96376 TX/PRO/DX INJ SAME DRUG ADON: CPT

## 2022-05-10 PROCEDURE — 82803 BLOOD GASES ANY COMBINATION: CPT

## 2022-05-10 PROCEDURE — 92610 EVALUATE SWALLOWING FUNCTION: CPT

## 2022-05-10 PROCEDURE — 93010 ELECTROCARDIOGRAM REPORT: CPT | Performed by: INTERNAL MEDICINE

## 2022-05-10 PROCEDURE — 70551 MRI BRAIN STEM W/O DYE: CPT

## 2022-05-10 RX ORDER — ACETAMINOPHEN 500 MG
1000 TABLET ORAL ONCE
Status: COMPLETED | OUTPATIENT
Start: 2022-05-10 | End: 2022-05-10

## 2022-05-10 RX ORDER — DEXTROSE MONOHYDRATE 50 MG/ML
100 INJECTION, SOLUTION INTRAVENOUS PRN
Status: DISCONTINUED | OUTPATIENT
Start: 2022-05-10 | End: 2022-05-12 | Stop reason: HOSPADM

## 2022-05-10 RX ORDER — BUDESONIDE AND FORMOTEROL FUMARATE DIHYDRATE 160; 4.5 UG/1; UG/1
2 AEROSOL RESPIRATORY (INHALATION) 2 TIMES DAILY
Status: ON HOLD | COMMUNITY
End: 2022-05-12 | Stop reason: SDUPTHER

## 2022-05-10 RX ORDER — INSULIN LISPRO 100 [IU]/ML
0-12 INJECTION, SOLUTION INTRAVENOUS; SUBCUTANEOUS
Status: DISCONTINUED | OUTPATIENT
Start: 2022-05-10 | End: 2022-05-12 | Stop reason: HOSPADM

## 2022-05-10 RX ORDER — ENOXAPARIN SODIUM 100 MG/ML
40 INJECTION SUBCUTANEOUS DAILY
Status: DISCONTINUED | OUTPATIENT
Start: 2022-05-10 | End: 2022-05-10 | Stop reason: DRUGHIGH

## 2022-05-10 RX ORDER — TRAMADOL HYDROCHLORIDE 50 MG/1
50 TABLET ORAL EVERY 6 HOURS PRN
Status: DISCONTINUED | OUTPATIENT
Start: 2022-05-10 | End: 2022-05-12 | Stop reason: HOSPADM

## 2022-05-10 RX ORDER — INSULIN LISPRO 100 [IU]/ML
0-6 INJECTION, SOLUTION INTRAVENOUS; SUBCUTANEOUS NIGHTLY
Status: DISCONTINUED | OUTPATIENT
Start: 2022-05-10 | End: 2022-05-12 | Stop reason: HOSPADM

## 2022-05-10 RX ORDER — NICOTINE 21 MG/24HR
1 PATCH, TRANSDERMAL 24 HOURS TRANSDERMAL DAILY
Status: DISCONTINUED | OUTPATIENT
Start: 2022-05-10 | End: 2022-05-12 | Stop reason: HOSPADM

## 2022-05-10 RX ORDER — ALBUTEROL SULFATE 90 UG/1
2 AEROSOL, METERED RESPIRATORY (INHALATION) 4 TIMES DAILY
Status: DISCONTINUED | OUTPATIENT
Start: 2022-05-10 | End: 2022-05-12 | Stop reason: HOSPADM

## 2022-05-10 RX ORDER — ATORVASTATIN CALCIUM 10 MG/1
10 TABLET, FILM COATED ORAL DAILY
Status: DISCONTINUED | OUTPATIENT
Start: 2022-05-10 | End: 2022-05-10

## 2022-05-10 RX ORDER — ONDANSETRON 4 MG/1
4 TABLET, ORALLY DISINTEGRATING ORAL EVERY 8 HOURS PRN
Status: DISCONTINUED | OUTPATIENT
Start: 2022-05-10 | End: 2022-05-12 | Stop reason: HOSPADM

## 2022-05-10 RX ORDER — INSULIN GLARGINE 100 [IU]/ML
30 INJECTION, SOLUTION SUBCUTANEOUS NIGHTLY
Status: DISCONTINUED | OUTPATIENT
Start: 2022-05-10 | End: 2022-05-10

## 2022-05-10 RX ORDER — ASPIRIN 81 MG/1
81 TABLET ORAL DAILY
Status: DISCONTINUED | OUTPATIENT
Start: 2022-05-10 | End: 2022-05-12 | Stop reason: HOSPADM

## 2022-05-10 RX ORDER — 0.9 % SODIUM CHLORIDE 0.9 %
2000 INTRAVENOUS SOLUTION INTRAVENOUS ONCE
Status: COMPLETED | OUTPATIENT
Start: 2022-05-10 | End: 2022-05-10

## 2022-05-10 RX ORDER — ENOXAPARIN SODIUM 100 MG/ML
30 INJECTION SUBCUTANEOUS 2 TIMES DAILY
Status: DISCONTINUED | OUTPATIENT
Start: 2022-05-10 | End: 2022-05-12 | Stop reason: HOSPADM

## 2022-05-10 RX ORDER — NICOTINE POLACRILEX 4 MG
15 LOZENGE BUCCAL
Status: ACTIVE | OUTPATIENT
Start: 2022-05-10 | End: 2022-05-10

## 2022-05-10 RX ORDER — ATORVASTATIN CALCIUM 40 MG/1
40 TABLET, FILM COATED ORAL DAILY
Status: DISCONTINUED | OUTPATIENT
Start: 2022-05-10 | End: 2022-05-12 | Stop reason: HOSPADM

## 2022-05-10 RX ORDER — LORAZEPAM 2 MG/ML
0.5 INJECTION INTRAMUSCULAR ONCE
Status: COMPLETED | OUTPATIENT
Start: 2022-05-10 | End: 2022-05-10

## 2022-05-10 RX ORDER — ONDANSETRON 2 MG/ML
4 INJECTION INTRAMUSCULAR; INTRAVENOUS EVERY 6 HOURS PRN
Status: DISCONTINUED | OUTPATIENT
Start: 2022-05-10 | End: 2022-05-12 | Stop reason: HOSPADM

## 2022-05-10 RX ORDER — DIVALPROEX SODIUM 500 MG/1
500 TABLET, DELAYED RELEASE ORAL EVERY 12 HOURS
Status: DISCONTINUED | OUTPATIENT
Start: 2022-05-10 | End: 2022-05-12 | Stop reason: HOSPADM

## 2022-05-10 RX ORDER — POLYETHYLENE GLYCOL 3350 17 G/17G
17 POWDER, FOR SOLUTION ORAL DAILY PRN
Status: DISCONTINUED | OUTPATIENT
Start: 2022-05-10 | End: 2022-05-12 | Stop reason: HOSPADM

## 2022-05-10 RX ORDER — INSULIN GLARGINE 100 [IU]/ML
20 INJECTION, SOLUTION SUBCUTANEOUS NIGHTLY
Status: DISCONTINUED | OUTPATIENT
Start: 2022-05-10 | End: 2022-05-12 | Stop reason: HOSPADM

## 2022-05-10 RX ORDER — LISINOPRIL 5 MG/1
5 TABLET ORAL DAILY
Status: DISCONTINUED | OUTPATIENT
Start: 2022-05-10 | End: 2022-05-12 | Stop reason: HOSPADM

## 2022-05-10 RX ORDER — ASPIRIN 300 MG/1
300 SUPPOSITORY RECTAL DAILY
Status: DISCONTINUED | OUTPATIENT
Start: 2022-05-10 | End: 2022-05-12 | Stop reason: HOSPADM

## 2022-05-10 RX ORDER — INSULIN GLARGINE 100 [IU]/ML
10 INJECTION, SOLUTION SUBCUTANEOUS NIGHTLY
Status: DISCONTINUED | OUTPATIENT
Start: 2022-05-10 | End: 2022-05-10

## 2022-05-10 RX ADMIN — ASPIRIN 81 MG: 81 TABLET, COATED ORAL at 07:54

## 2022-05-10 RX ADMIN — INSULIN GLARGINE 20 UNITS: 100 INJECTION, SOLUTION SUBCUTANEOUS at 20:59

## 2022-05-10 RX ADMIN — ONDANSETRON 4 MG: 2 INJECTION INTRAMUSCULAR; INTRAVENOUS at 21:32

## 2022-05-10 RX ADMIN — INSULIN LISPRO 4 UNITS: 100 INJECTION, SOLUTION INTRAVENOUS; SUBCUTANEOUS at 20:58

## 2022-05-10 RX ADMIN — LISINOPRIL 5 MG: 5 TABLET ORAL at 07:54

## 2022-05-10 RX ADMIN — SODIUM CHLORIDE 2000 ML: 9 INJECTION, SOLUTION INTRAVENOUS at 00:38

## 2022-05-10 RX ADMIN — IBUPROFEN 600 MG: 200 TABLET, FILM COATED ORAL at 03:40

## 2022-05-10 RX ADMIN — TRAMADOL HYDROCHLORIDE 50 MG: 50 TABLET, COATED ORAL at 21:32

## 2022-05-10 RX ADMIN — INSULIN LISPRO 4 UNITS: 100 INJECTION, SOLUTION INTRAVENOUS; SUBCUTANEOUS at 14:17

## 2022-05-10 RX ADMIN — DIVALPROEX SODIUM 500 MG: 500 TABLET, DELAYED RELEASE ORAL at 07:54

## 2022-05-10 RX ADMIN — ENOXAPARIN SODIUM 30 MG: 100 INJECTION SUBCUTANEOUS at 20:59

## 2022-05-10 RX ADMIN — LORAZEPAM 0.5 MG: 2 INJECTION INTRAMUSCULAR; INTRAVENOUS at 07:56

## 2022-05-10 RX ADMIN — ATORVASTATIN CALCIUM 40 MG: 40 TABLET, FILM COATED ORAL at 07:53

## 2022-05-10 RX ADMIN — DIVALPROEX SODIUM 500 MG: 500 TABLET, DELAYED RELEASE ORAL at 20:59

## 2022-05-10 RX ADMIN — INSULIN LISPRO 12 UNITS: 100 INJECTION, SOLUTION INTRAVENOUS; SUBCUTANEOUS at 18:29

## 2022-05-10 RX ADMIN — INSULIN LISPRO 12 UNITS: 100 INJECTION, SOLUTION INTRAVENOUS; SUBCUTANEOUS at 08:09

## 2022-05-10 RX ADMIN — ONDANSETRON 4 MG: 2 INJECTION INTRAMUSCULAR; INTRAVENOUS at 15:34

## 2022-05-10 RX ADMIN — ENOXAPARIN SODIUM 30 MG: 100 INJECTION SUBCUTANEOUS at 07:54

## 2022-05-10 RX ADMIN — ALBUTEROL SULFATE 2 PUFF: 90 AEROSOL, METERED RESPIRATORY (INHALATION) at 20:22

## 2022-05-10 RX ADMIN — ACETAMINOPHEN 1000 MG: 500 TABLET ORAL at 00:22

## 2022-05-10 RX ADMIN — TRAMADOL HYDROCHLORIDE 50 MG: 50 TABLET, COATED ORAL at 15:34

## 2022-05-10 ASSESSMENT — ENCOUNTER SYMPTOMS
DIARRHEA: 0
SORE THROAT: 0
COUGH: 0
SHORTNESS OF BREATH: 0
CONSTIPATION: 0
BACK PAIN: 0
EYE REDNESS: 0
NAUSEA: 0
ABDOMINAL PAIN: 0
VOMITING: 0
EYE PAIN: 0

## 2022-05-10 ASSESSMENT — PAIN DESCRIPTION - FREQUENCY
FREQUENCY: CONTINUOUS
FREQUENCY: CONTINUOUS
FREQUENCY: INTERMITTENT

## 2022-05-10 ASSESSMENT — PAIN SCALES - GENERAL
PAINLEVEL_OUTOF10: 10
PAINLEVEL_OUTOF10: 10
PAINLEVEL_OUTOF10: 4
PAINLEVEL_OUTOF10: 6
PAINLEVEL_OUTOF10: 8
PAINLEVEL_OUTOF10: 0

## 2022-05-10 ASSESSMENT — PAIN DESCRIPTION - LOCATION
LOCATION: HEAD
LOCATION: HEAD;LEG

## 2022-05-10 ASSESSMENT — PAIN SCALES - WONG BAKER
WONGBAKER_NUMERICALRESPONSE: 0
WONGBAKER_NUMERICALRESPONSE: 6

## 2022-05-10 ASSESSMENT — PAIN DESCRIPTION - PAIN TYPE
TYPE: ACUTE PAIN

## 2022-05-10 ASSESSMENT — PAIN DESCRIPTION - ONSET
ONSET: ON-GOING

## 2022-05-10 ASSESSMENT — LIFESTYLE VARIABLES
HOW OFTEN DO YOU HAVE A DRINK CONTAINING ALCOHOL: MONTHLY OR LESS
HOW MANY STANDARD DRINKS CONTAINING ALCOHOL DO YOU HAVE ON A TYPICAL DAY: 1 OR 2

## 2022-05-10 ASSESSMENT — PAIN DESCRIPTION - DESCRIPTORS
DESCRIPTORS: ACHING
DESCRIPTORS: DISCOMFORT
DESCRIPTORS: DISCOMFORT

## 2022-05-10 NOTE — PROGRESS NOTES
Patients bed alarm alarming, patient found to be repositioning to the left side     Patients eyes opened. Now laying on left side. Patients wife states he is yelling out for her.      Will monitor

## 2022-05-10 NOTE — ED TRIAGE NOTES
Patient presents to ED accompanied by fiance who states she believes patient is having a seizure which he has hx of. Patient states he does not take his medicine. NIH assessed, patient's speech garbled, left arm and leg weakness noted. Patient's fiance stated they were in an argument about an hour ago and patient was found in this state approximately 30 minutes ago. Dr. Elliot Davis notified of symptoms, came to bedside to assess, stated to activate code stroke.

## 2022-05-10 NOTE — CARE COORDINATION
INITIAL CASE MANAGEMENT ASSESSMENT     Reviewed chart, met with patient to assess possible discharge needs. Explained Case Management role/services. SW interviewed patient via telephone today as he is currently placed in droplet precautions.     Living Situation: Patient reports that he resides in a single family home with his girlfriend and no pets. Prior to medical admission he had no trouble getting in and out of the property.       ADLs: Prior to medical admission patient reports that he was independent. He stated that he doesn't anticipate any needs at discharge.      DME: Prior to medical admission patient reports that he used no durable medical equipment. He stated that he doesn't anticipate any needs at discharge.      PT/OT Recs: Acute rehab at 5-7 days per week vs SNF placement. Nasrin Small MD for further direction. If MD is agreeable ARU will review. If not we will follow up with patient and family with a skilled nursing facility list.      Active Services: Prior to medical admission patient reports that he had no active services in place. He stated that he doesn't anticipate any needs at discharge.       Transportation: Prior to medical admission patient reports that he used a bus and/or scooter to get back and forth to medical appointments and errands. He stated that he will need transportation home at discharge.      Medications: Patient receives medications from [facility] in [place], [place]. At this time there are no issues with access or affordability. RANDY was able to work with registration to verify insurance today.     PCP: VY Fu, APRN [phone] (phone) and [phone] (fax number). Patient reports that his last appointment with this provider was February of 2021.        PLAN/COMMENTS:   1) Neurology clearance. 2) Preparing for SNF placement.    3) May need assistance with transportation at discharge.      RANDY provided contact information for patient or family to call with any questions. SW will follow and assist as needed.     Respectfully submitted,     JULIET Wood  [facility]   [phone]    Electronically signed by JULIET Bro on 5/10/2022 at 4:38 PM

## 2022-05-10 NOTE — PROGRESS NOTES
Occupational Therapy  Facility/Department: [facility] 8U PROGRESSIVE CARE  Occupational Therapy Initial Assessment    Name: Nathaniel Quintanilla  : 1980  MRN: [id number]  Date of Service: 5/10/2022    Discharge Recommendations:  Patient would benefit from continued therapy after discharge,Continue to assess pending progress  OT Equipment Recommendations  Other: will continue to assess     Nathaniel Quintanilla scored a 16/24 on the AM[facility] ADL Inpatient form. If symptoms improve, pt will be safe to return home; if symptoms persist, pt will benefit from skilled placement. Patient Diagnosis(es): The primary encounter diagnosis was Left-sided weakness. A diagnosis of Left sided numbness was also pertinent to this visit. Past Medical History:  has a past medical history of COPD (chronic obstructive pulmonary disease) ([facility] [address] CVA (cerebral vascular accident) ([facility] [address] Diabetes mellitus ([facility] [address] Gout, HTN (hypertension), Seizure disorder ([facility] [address] and Skin cancer. Past Surgical History:  has a past surgical history that includes Neck surgery. Assessment   Performance deficits / Impairments: Decreased functional mobility ; Decreased safe awareness;Decreased balance;Decreased coordination;Decreased ADL status; Decreased cognition;Decreased high-level IADLs;Decreased endurance;Decreased strength;Decreased ROM; Decreased fine motor control  Assessment: 43 y/o male admitted for CVA r/o. PTA pt lives in a hotel with significant other and was independent with ADLs and functional mobility. Today, pt demo'd significant tremors in L UE/LE when asked to complete ROM; however observed rewaching for cell phone and objects without tremors. Pt required min A to stand to stedy and had significant tremors when attempting to balance on 1 leg. Anticipate pt will require up to max A for ADLs based on balance and tremors observed. Pt is functioning below baseline and benefit from skilled therapy.  If tremors/weakness persist, pt may benefit from skilled placement prior to returning home. Prognosis: Good  Decision Making: Low Complexity  REQUIRES OT FOLLOW-UP: Yes  Activity Tolerance  Activity Tolerance: Patient Tolerated treatment well        Plan   Plan  Times per Week: 3-5  Current Treatment Recommendations: Strengthening,Balance training,ROM,Stair training,Functional mobility training,Endurance training,Self-Care / [place] re-education,Safety education & training     Restrictions  Restrictions/Precautions  Restrictions/Precautions: Fall Risk    Subjective   General  Chart Reviewed: Yes  Patient assessed for rehabilitation services?: Yes  Additional Pertinent Hx: Per H&P: \"36 y.o. male with PMHx of COPD, DVA, DM, HTN and seizure disorder presented to [facility] with left side weakness. When patient arrived to the emergency department he reported left side weakness and difficulty speaking. He stated the symptoms began just prior to arrival during an argument with his girlfriend. ED staff activated code stroke and ED physician spoke with  stroke team.. Heddy  Heddy  The CAT scan does show a small hypodensity in the cerebellum; the lesion is much older than the 30 minutes of symptoms that he currently has and decided against tPA. \" MRI pending  Family / Caregiver Present: Yes  Referring Practitioner: VY Sinha CNP  Diagnosis: TIA/CVA r/o  Subjective  Subjective: Pt seen bedside, flat affect, and agreeable to therapy. Pt reporting initial dizziness when sitting EOB and standing but resolved with time. General Comment  Comments: Per RN ok for therapy.      Social/Functional History  Social/Functional History  Lives With: Significant other  Type of Home:  (Hotel)  Bathroom Shower/Tub: Tub/Shower unit  Bathroom Toilet: Standard  Home Equipment:  (no DME)  ADL Assistance: Independent  Homemaking Assistance: Independent  Ambulation Assistance: Independent (no AD)  Transfer Assistance: Independent  Active : Yes  Occupation: Full time employment  Type of Occupation: Manager       Objective     Vision Exceptions: Wears glasses at all times  Hearing: Within functional limits          Safety Devices  Type of Devices: Call light within reach;Nurse notified; Left in bed        AROM:  (decreased shoulder ROM on L UE)  Strength:  (decreased strength L UE)  Coordination: Generally decreased, functional  Sensation: Impaired (reports L UE/LE numb)     ADL  LE Dressing:  (Pt able to bend over to adjust socks sitting EOB without tremors)  Additional Comments: Anticipate pt will require max A for LB bathing/dressing, SBA for UB bathing/dresing and grooming based on balance and endurance observed     Activity Tolerance  Activity Tolerance: Patient tolerated evaluation without incident     Bed mobility  Supine to Sit: Stand by assistance (HOB slightly elevated, use of bedrail)  Sit to Supine:  (pt remain sitting EOB with neuro NP at end of session)     Transfers  Sit to stand: Minimal assistance  Stand to sit: Minimal assistance  Transfer Comments: stood bed > stedy with min A. Pt able to take small steps in place with min A. When asked to stand on 1 leg, pt with significant full body tremors with RLE weightbear (when previously L LE shaking when sitting)     Pt tolerated sitting EOB ~ 10 min with CGA/SBA. Pt tolerated standing > 2 min with stedy support and min A for balance d/t tremors. Cognition  Overall Cognitive Status: Exceptions  Arousal/Alertness: Delayed responses to stimuli  Following Commands: Follows one step commands with increased time; Follows one step commands with repetition  Memory: Decreased recall of recent events;Decreased recall of biographical Information  Safety Judgement: Decreased awareness of need for safety  Cognition Comment: flat affect                  Education Given To: Patient; Family  Education Provided: Role of Therapy;Plan of Care;Transfer Training  Education Method: Demonstration;Verbal  Barriers to Learning: [place]uka Outcome: Continued education needed     LUE AROM (degrees)  LUE General AROM: shoulder flex ~ 90* with significant tremors; therefore able to achieve full range and pt able to hold above head without tremors. Left Hand AROM (degrees)  Left Hand General AROM: weak grasp when asked to squeeze therapist hand; able to hold onto stedy and text on cell phone without issue  RUE AROM (degrees)  RUE AROM : WFL  Right Hand AROM (degrees)  Right Hand AROM: WFL        Hand Assessment Comment  Hand Assessment Comment: Weak  strength on L when asked to squeeze therapist hand, however able to hold onto crossbar of stedy and text on cell phone without issue       AM-PAC Score  AM-PAC Inpatient Daily Activity Raw Score: 16 (05/10/22 1033)  AM-PAC Inpatient ADL T-Scale Score : 35.96 (05/10/22 1033)  ADL Inpatient CMS 0-100% Score: 53.32 (05/10/22 1033)  ADL Inpatient CMS G-Code Modifier : CK (05/10/22 1033)    Goals  Short Term Goals  Time Frame for Short term goals: Prior to DC: Short Term Goal 1: Pt will complete ADL transfers/mobility with supervision  Short Term Goal 2: Pt will tolerate standing > 5 min for functional task with supervision  Short Term Goal 3: Pt will complete toileting with supervision  Short Term Goal 4: Pt will complete LB Dressing with supervision  Patient Goals   Patient goals : to return home       Therapy Time   Individual Concurrent Group Co-treatment   Time In 0947         Time Out 1030         Minutes 43         Timed Code Treatment Minutes: 30 Minutes     This note to serve as OT d/c summary if pt is d/c-ed prior to next therapy session.     Daphnie Garrett, OTR/L

## 2022-05-10 NOTE — PROGRESS NOTES
NAME:  Avelina Florentino  YOB: 1980  MEDICAL RECORD NUMBER:  [id number]  TODAYS DATE:  5/10/2022    Discussed personal risk factors for Stroke /TIA with patient/family, and ways to reduce the risk for a recurrent stroke. Patient's personal risk factors which were identified are:     [] Alcohol Abuse: check with your physician before any alcohol consumption. [] Atrial fibrillation: may cause blood clots. [] Drug Abuse: Seek help, talk with your doctor  [] Clotting Disorder  [x] Diabetes  [x] Family history of stroke or heart disease  [x] High Blood Pressure/Hypertension: work with your physician. [x] High cholesterol: monitor cholesterol levels with your physician. [x] Overweight/Obesity: work with your physician for your ideal body weight. [x] Physical Inactivity: get regular exercise as directed by your physician. [] Personal history of previous TIA or stroke  [x] Poor Diet; decrease salt (sodium) in your diet, follow diet directed by physician. [] Smoking: Cigarette/Cigar: stop smoking. Advised pt. that you can reduce your risk for stroke/TIA by modifying/controlling the risk factors that you have. Pt.advised to take the medications as prescribed, which will be detailed in the discharge instructions, and to not stop taking them without consulting their physician. In addition, pt. advised to maintain a healthy diet, exercise regularly and to not smoke. [facility]'s Stroke treatment and prevention, Managing your recovery  notebook  provided and/or reviewed  with patient/family. The notebook includes, but not limited to, sections addressing warning signs & symptoms of a stroke, which are: sudden numbness or weakness especially on one side of the body, sudden confusion, difficulty speaking or understanding, sudden changes in vision, sudden dizziness or loss of balance/ coordination, sudden severe headache, syncope and seizure.   The need to call EMS (911) immediately if signs & symptoms occur is emphasized . The notebook also provides education on Stroke community resources and stroke advocacy. The need for follow-up after discharge was highlighted with patient/family with them being able to repeat understanding of the importance of this.       Electronically signed by Christen Colon RN on 5/10/2022 at 3:51 AM

## 2022-05-10 NOTE — PROGRESS NOTES
Patients \"wife\" came to nurses station  Stated hes having a seizure    Entered patients room, patient in bed. Right thigh \" twitching\"     Patient in bed with eyes closed. Responsive.  Seizure precautions continued, bed alarm placed on second zone      will monitor     Neuro on unit, notified

## 2022-05-10 NOTE — CONSULTS
Telemedicine Consult Note   Stroke Team                Patient Name: Maty Ortiz (18 y.o. male)  MRN: [id number]  : 1980  Admission Date: 2022   Current Date: 22    STROKE TIMELINE     ED arrival time: ED Arrival         Chief Complaint     Chief Complaint   Patient presents with    Seizures     ? ? Left sided weakness    History of Present Illness   This is a 42 y.o. man with a PMH of countless episodes of L sided weakness with numerous negative workups (thought to be conversion vs malingering in recent admissions), ?seizures, DM, HLD who presents for evaluation of another episode of L sided weakness. Fransisco Robb has been seen in the [facility] system and [facility] system countless times since 2019 for L sided weakness. He has had numerous CTAs, CTs, MRIs, and workup. None has clearly given an explanation for his recurrent symptoms. Tonight, he got in an argument with his new fiance and developed recurrent L sided symptoms and dysarthria. He tells me these symptoms are similar to his prior episodes. His fiance initially worried that it was a seizure although admitted she wasn't sure if this was like his prior seizures, as she has not seen them. He was brought to [facility]. I was called to evaluate him over telemedicine. I was able to confirm the above history, and report that he hasn't had an episode like this since February. He does report a headache with this episode that is severe; he does not think he gets a headache with all episodes although the history is a little vague on that. I discussed with the patient that this story is quite strange, and the fact that these symptoms keep occurring over and over is not typical of stroke. At the same time, the CT does show a small hypodensity in the cerebellum; this lesion is much older than the 30 minutes of symptoms he currently has, and raises the risk of tPA.  Because of the clinical history and presence of hypodensity, I discussed with him that tPA is not an option and he understood. I think it makes sense to work up this lesion, which could be a stroke, since he clearly has multiple risk factors for stroke. He is also hyperglycemic. Past Medical History:   Diagnosis Date    [facility]     [facility]     [facility]     Gout     HTN (hypertension)     [facility]izure disorder [facility]     Skin cancer       Past Surgical History:   Procedure Laterality Date    NECK SURGERY       Social History     Socioeconomic History    Marital status: Single     Spouse name: Not on file    Number of children: Not on file    Years of education: Not on file    Highest education level: Not on file   Occupational History    Not on file   Tobacco Use    Smoking status: Never Smoker    Smokeless tobacco: Never Used   Substance and Sexual Activity    Alcohol use: Never    Drug use: Never    Sexual activity: Not on file   Other Topics Concern    Not on file   Social History Narrative    Not on file     Social Determinants of Health     Financial Resource Strain:     Difficulty of Paying Living Expenses: Not on file   Food Insecurity:     Worried About Running Out of Food in the Last Year: Not on file    John of Food in the Last Year: Not on file   Transportation Needs:     Lack of Transportation (Medical): Not on file    Lack of Transportation (Non-Medical):  Not on file   Physical Activity:     Days of Exercise per Week: Not on file    Minutes of Exercise per Session: Not on file   Stress:     Feeling of Stress : Not on file   Social Connections:     Frequency of Communication with Friends and Family: Not on file    Frequency of Social Gatherings with Friends and Family: Not on file    Attends Voodoo Services: Not on file    Active Member of Clubs or Organizations: Not on file    Attends Club or Organization Meetings: Not on file    Marital Status: Not on file   Intimate Partner Violence:     Fear of Current or Ex-Partner: Not on file    Emotionally Abused: Not on file    Physically Abused: Not on file    Sexually Abused: Not on file   Housing Stability:     Unable to Pay for Housing in the Last Year: Not on file    Number of Teresa in the Last Year: Not on file    Unstable Housing in the Last Year: Not on file     No family history on file. No current facility-administered medications on file prior to encounter. Current Outpatient Medications on File Prior to Encounter   Medication Sig Dispense Refill    ibuprofen (ADVIL;MOTRIN) 800 MG tablet Take 1 tablet by mouth every 8 hours as needed for Pain 20 tablet 0    indomethacin (INDOCIN) 25 MG capsule Take 1 capsule by mouth 3 times daily (with meals) for 10 days 30 capsule 0    colchicine (COLCRYS) 0.6 MG tablet Take 1 tablet by mouth daily for 10 days 10 tablet 0    predniSONE (DELTASONE) 10 MG tablet Take 4 tablets once a day for 3 days, then take 3 tablets once a day for 3 days, then take 2 tablets once a day for 3 days, then take 1 tablet once a day for 3 days, then stop.  30 tablet 0    allopurinol (ZYLOPRIM) 100 MG tablet Take 1 tablet by mouth daily 90 tablet 1    aspirin 81 MG EC tablet Take 81 mg by mouth nightly       atorvastatin (LIPITOR) 10 MG tablet Take 10 mg by mouth daily       divalproex (DEPAKOTE) 250 MG DR tablet Take 750 mg by mouth every 12 hours      metFORMIN (GLUCOPHAGE) 500 MG tablet Take 500 mg by mouth daily      lisinopril (PRINIVIL;ZESTRIL) 5 MG tablet Take 5 mg by mouth daily      sertraline (ZOLOFT) 50 MG tablet Take 50 mg by mouth daily      tiZANidine (ZANAFLEX) 4 MG tablet Take 4 mg by mouth every 6 hours as needed      traZODone (DESYREL) 50 MG tablet Take 50 mg by mouth nightly      ibuprofen (ADVIL;MOTRIN) 600 MG tablet Take 600 mg by mouth every 8 hours as needed       Review of Systems         Physical Exam     There were no vitals filed for this visit. NIH Stroke Scale    Time Performed: 11:07 PM        1a  Level of consciousness: 0 - alert; keenly responsive   1b. LOC questions:  0 - answers both questions correctly   1c. LOC commands: 0=Performs both tasks correctly   2. Best Gaze: 0=normal   3. Visual: 0=No visual loss   4. Facial Palsy: 0=Normal symmetric movement   5a. Motor left arm: 1=Drift, limb holds 90 (or 45) degrees but drifts down before full 10 seconds: does not hit bed   5b. Motor right arm: 0=No drift, limb holds 90 (or 45) degrees for full 10 seconds   6a. motor left le=No drift, limb holds 90 (or 45) degrees for full 10 seconds   6b  Motor right le=No drift, limb holds 90 (or 45) degrees for full 10 seconds   7. Limb Ataxia: 1=Present in one limb   8. Sensory: 1=Mild to moderate sensory loss; patient feels pinprick is less sharp or is dull on the affected side; there is a loss of superficial pain with pinprick but patient is aware He is being touched   9. Best Language:  0 - no aphasia, normal   10. Dysarthria: 1=Mild to moderate, patient slurs at least some words and at worst, can be understood with some difficulty   11. Extinction and Inattention: 0=No abnormality         Total:   5     Other exam findings of note: He has a stutter and dysarthria. His exam is strange, he reports weakness in his L arm but then is able to pull himself up in bed and use his L arm well. Labs:  CBC: No results for input(s): WBC, HGB, PLT in the last 72 hours. BMP:  No results for input(s): NA, K, CL, CO2, BUN, CREATININE, GLUCOSE in the last 72 hours. Ca/Mg/Phos: No results for input(s): CALCIUM, MG, PHOS in the last 72 hours. Troponin: No results for input(s): TROPONINI in the last 72 hours. BNP: No results for input(s): BNP in the last 72 hours. Lipids: No results for input(s): CHOL, TRIG, HDL, LDLCALC, LABVLDL in the last 72 hours. ABGs: No results for input(s): PHART, PO2ART, GAI3ZOZ in the last 72 hours.   PT/INR: No results for input(s): PROTIME, INR in the last 72 hours. APTT: No results for input(s): APTT in the last 72 hours. HgA1C: Invalid input(s): HGBA1C    Radiology (my read): CT head with a small hypodensity in the cerebellum. CT HEAD WO CONTRAST    Result Date: 5/9/2022  1. Hypoattenuation in the left cerebellum of uncertain chronicity. MRI recommended to exclude acute infarct. 2. No acute hemorrhage or midline shift. 3. Slight dilatation of the lateral and 3rd ventricles of uncertain chronicity. 4. Other findings as described. Critical results were called by Dr. Jaqueline Espino DO to Dr. Jeromy Tom on 5/9/2022 at 22:33. CTA HEAD NECK W CONTRAST    Result Date: 5/9/2022  1. Suboptimal arterial opacification. No large vessel intracranial occlusion or high grade stenosis at the level of the [id number] given limitation. 2. No significant stenoses of the internal carotid arteries. 3. Other findings as described. Assessment   Strange syndrome, possibly vascular but seems unlikely considering how many times he has had this syndrome in the past. He is not a tPA candidate because of the new hypodensity on the CT and because of how many times he has had these symptoms in the past, but I think more workup is appropriate. CTA is without any large vessel disease. Recommendations/Plan   Rest of workup per inpatient/ED team.          If tPA given, keep blood pressure <180/105; otherwise permissive hypertension. If tPA given, do NOT give antiplatelets or anticoagulants until 24 hours and a safety scan has been completed. Additional Recommendations:   -NPO until passing bedside dysphagia screen or formal swallow  -MRI/CT at 24 hours as a safety scan  -Local Neurology consult  -PT/OT/ST     For a change in neuro exam or questions, call the  Stroke Team at [phone].           Electronically signed by Michael Castelan MD on 5/9/22 at 11:07 PM EDT    Stroke Team        Telemedicine Time: 30 minutes

## 2022-05-10 NOTE — PROGRESS NOTES
Pt states he has not taken any of his prescribed medicine in about a year.    Electronically signed by Letty Randhawa RN on 5/10/2022 at 3:49 AM

## 2022-05-10 NOTE — H&P
Hospital Medicine History & Physical      PCP: VY Kennedy, VY    Date of Admission: 5/9/2022    Date of Service: Pt seen/examined on 5/10/2022 and Placed in Observation. Chief Complaint:  Left side weakness      History Of Present Illness:      42 y.o. male with PMHx of COPD, DVA, DM, HTN and seizure disorder presented to [facility] with left side weakness. When patient arrived to the emergency department he reported left side weakness and difficulty speaking. He stated the symptoms began just prior to arrival during an argument with his girlfriend. ED staff activated code stroke and ED physician spoke with  stroke team.  Dr. Mulu Li with the stroke team reviewed records and evaluated the patient via telehealth. Apparently this patient had multiple similar presentations in the past and the stroke team did not believe this to be an acute stroke. The CAT scan does show a small hypodensity in the cerebellum; the lesion is much older than the 30 minutes of symptoms that he currently has and decided against tPA. They did recommend admitting the patient and working up the lesion which could be a stroke since he does have multiple risk factors for stroke. Patient is a diabetic and has not been on medications in more than a year. Pt has not been on medication for DM, HLD or seizure disorder in more than a year. He has not been able to make an appointment to see his doctor    Past Medical History:          Diagnosis Date    [facility]     [facility]bral vascular [facility]) [facility]     Diabetes mellitus ([facility]     Gout     HTN (hypertension)     Seizure disorder ([facility]     Skin cancer        Past Surgical History:          Procedure Laterality Date    NECK SURGERY         Medications Prior to Admission:      Prior to Admission medications    Medication Sig Start Date End Date Taking?  Authorizing Provider   ibuprofen (ADVIL;MOTRIN) 800 MG tablet Take 1 tablet by mouth every 8 hours as needed for Pain 4/6/22   DIANNE Manzano   indomethacin (INDOCIN) 25 MG capsule Take 1 capsule by mouth 3 times daily (with meals) for 10 days 4/26/21 5/6/21  Lisandra Gale MD   colchicine (COLCRYS) 0.6 MG tablet Take 1 tablet by mouth daily for 10 days 4/27/21 5/7/21  Lisandra Gale MD   predniSONE (DELTASONE) 10 MG tablet Take 4 tablets once a day for 3 days, then take 3 tablets once a day for 3 days, then take 2 tablets once a day for 3 days, then take 1 tablet once a day for 3 days, then stop. 4/26/21   Lisandra Gale MD   allopurinol (ZYLOPRIM) 100 MG tablet Take 1 tablet by mouth daily 5/6/21   Lisandra Gale MD   aspirin 81 MG EC tablet Take 81 mg by mouth nightly  4/13/21   Historical Provider, MD   atorvastatin (LIPITOR) 10 MG tablet Take 10 mg by mouth daily  1/27/21   Historical Provider, MD   divalproex (DEPAKOTE) 250 MG DR tablet Take 750 mg by mouth every 12 hours 4/12/21   Historical Provider, MD   metFORMIN (GLUCOPHAGE) 500 MG tablet Take 500 mg by mouth daily 2/23/21   Historical Provider, MD   lisinopril (PRINIVIL;ZESTRIL) 5 MG tablet Take 5 mg by mouth daily 1/22/21   Historical Provider, MD   sertraline (ZOLOFT) 50 MG tablet Take 50 mg by mouth daily 1/27/21   Historical Provider, MD   tiZANidine (ZANAFLEX) 4 MG tablet Take 4 mg by mouth every 6 hours as needed 4/12/21   Historical Provider, MD   traZODone (DESYREL) 50 MG tablet Take 50 mg by mouth nightly 1/27/21   Historical Provider, MD   ibuprofen (ADVIL;MOTRIN) 600 MG tablet Take 600 mg by mouth every 8 hours as needed 2/23/21   Historical Provider, MD       Allergies:  Benadryl [diphenhydramine]    Social History:      The patient currently lives with [facility]    TOBACCO:   reports that he has never smoked. He has never used smokeless tobacco.  ETOH:   reports no history of alcohol use. Family History:      Reviewed in detail positive as follows:    History reviewed.  No pertinent family history. REVIEW OF SYSTEMS:   Pertinent positives as noted in the HPI. All other systems reviewed and negative. PHYSICAL EXAM PERFORMED:    /78   Pulse 91   Temp 97.8 °F (36.6 °C) (Oral)   Resp 30   Wt 230 lb (104.3 kg)   SpO2 98%   BMI 32.08 kg/m²     General appearance: Obese,  male lying on ED cart in no apparent distress, appears stated age and cooperative. HEENT:  Normal cephalic, atraumatic without obvious deformity. Pupils equal, round, and reactive to light. Conjunctivae/corneas clear. Neck: Supple, with full range of motion. No jugular venous distention. Trachea midline. Respiratory:  Normal respiratory effort. Clear to auscultation bilaterally without accessory muscle use. Cardiovascular:  Regular rate and rhythm without murmurs, no lower extremity edema. Abdomen: Soft, obese abdomen non-tender, non-distended, without rebound or guarding. Normal bowel sounds. Musculoskeletal:  Moves all extremities equally. Full range of motion without deformity. Skin: Skin warm, dry and intact. No rashes or lesions. Neurologic:  Neurovascularly intact without any focal sensory/motor deficits.  Cranial nerves: II-XII intact, grossly non-focal.  Psychiatric:  Alert and oriented, thought content appropriate, normal insight  Capillary Refill: Brisk,< 3 seconds   Peripheral Pulses: +2 palpable, equal bilaterally       Labs:     Recent Labs     05/09/22 2240   WBC 7.5   HGB 14.1   HCT 41.0        Recent Labs     05/09/22  2240   *   K 4.5   CL 98*   CO2 20*   BUN 13   CREATININE 1.0   CALCIUM 9.6     Recent Labs     05/09/22  2240   AST 13*   ALT 15   BILITOT 0.3   ALKPHOS 112     Recent Labs     05/09/22  2240   INR 0.86*     Recent Labs     05/09/22  2240   TROPONINI <0.01       Urinalysis:    No results found for: Maco Cram, BACTERIA, RBCUA, BLOODU, SPECGRAV, GLUCOSEU    Radiology:     XR CHEST PORTABLE   Final Result   No radiographic evidence of acute pulmonary disease. CTA HEAD NECK W CONTRAST   Final Result   1. Suboptimal arterial opacification. No large vessel intracranial   occlusion or high grade stenosis at the level of the [id number] given   limitation. 2. No significant stenoses of the internal carotid arteries. 3. Other findings as described. CT HEAD WO CONTRAST   Final Result   1. Hypoattenuation in the left cerebellum of uncertain chronicity. MRI   recommended to exclude acute infarct. 2. No acute hemorrhage or midline shift. 3. Slight dilatation of the lateral and 3rd ventricles of uncertain   chronicity. 4. Other findings as described. Critical results were called by Dr. Quoc Allison DO to Dr. Mario Causey on   5/9/2022 at 22:33. ASSESSMENT:    Active Hospital Problems    Diagnosis Date Noted    Left-sided weakness [R53.1] 05/10/2022     Priority: Medium         PLAN:    Possible TIA/CVA  - with symptoms: left side weakness/aphasia  - admit to OBS to RO TIA/CVA  - head CT: Hypoattenuation in the left cerebellum of uncertain chronicity. Recommend MRI. No acute hemorrhage or midline shift  - CTA head/neck: Suboptimal arterial opacification.   No large vessel intracranial occlusion or high-grade stenosis at the level of the [place] no significant stenosis of the internal carotid arteries  - MRI in a.m.  - ASA, statin  - consult neurology   - check lipids, HBA1c  - allow permissive HTN, SBP < 220, DBP < 110    Seizure disorder  - seizure precautions  - depakote    Diabetes mellitus II   - hold home Glucophage  -  SSI and CCD    Essential (primary) hypertension   - monitor blood pressure  - continue home meds     Hyperlipidemia   - continue statin    DVT Prophylaxis: Lovenox  Diet: Diet NPO  Code Status: Full Code    PT/OT Eval Status: pending    Dispo - Observation       [address], APRN - CNP    Thank you Rio Guerra, APRN, APRN for the opportunity to be involved in this patient's care. If you have any questions or concerns please feel free to contact me at [phone].

## 2022-05-10 NOTE — ED PROVIDER NOTES
[address]        Pt Name: Avelina Florentino  MRN: [id number]  [place] 1980  Date of evaluation: 5/9/2022  Provider: Tk Baires PA-C  PCP: VY Ojeda, VY  Note Started: 11:36 PM EDT       I have seen and evaluated this patient with my supervising physician Dedrick Kamara MD.      [address]       Chief Complaint   Patient presents with    Seizures     ? HISTORY OF PRESENT ILLNESS   (Location/Symptom, Timing/Onset, Context/Setting, Quality, Duration, Modifying Factors, Severity)  Note limiting factors. Chief Complaint: Left-sided weakness, numbness, aphasia    Avelina Florentino is a 42 y.o. male who presents to the ED with complaint of left-sided weakness, seizure activity started around 2130 according to patient's significant other. She states that they got into an argument and patient walked away for about half an hour. When they found him again he had what they thought was a seizure. He had left-sided weakness, difficulty with speech, left-sided numbness. Patient also states that he did have a episode of incontinence. Patient does have a history of seizures, however he states that he has not taken his Keppra in many years. After reviewing patient's charts he has had several episodes of this in the past.    Nursing Notes were all reviewed and agreed with or any disagreements were addressed in the HPI. REVIEW OF SYSTEMS    (2-9 systems for level 4, 10 or more for level 5)     Review of Systems   Constitutional: Negative for chills and fever. HENT: Negative for ear pain and sore throat. Eyes: Negative for pain and redness. Respiratory: Negative for cough and shortness of breath. Cardiovascular: Negative for chest pain and leg swelling. Gastrointestinal: Negative for abdominal pain, constipation, diarrhea, nausea and vomiting. Genitourinary: Positive for enuresis.  Negative for hematuria. Musculoskeletal: Negative for back pain and neck pain. Skin: Negative for rash and wound. Neurological: Positive for headaches. Negative for light-headedness. PAST MEDICAL HISTORY     Past Medical History:   Diagnosis Date    [facility]     CVA [facility]cerebral vascular accident[facility]     Diabetes mellitus ([facility]     Gout     HTN (hypertension)     Seizure disorder ([facility]     Skin cancer        SURGICAL HISTORY     Past Surgical History:   Procedure Laterality Date    NECK SURGERY         CURRENTMEDICATIONS       Current Discharge Medication List      CONTINUE these medications which have NOT CHANGED    Details   budesonide-formoterol (SYMBICORT) 160-4.5 MCG/ACT AERO Inhale 2 puffs into the lungs 2 times daily      tiotropium (SPIRIVA) 18 MCG inhalation capsule Inhale 18 mcg into the lungs daily      indomethacin (INDOCIN) 25 MG capsule Take 1 capsule by mouth 3 times daily (with meals) for 10 days  Qty: 30 capsule, Refills: 0      allopurinol (ZYLOPRIM) 100 MG tablet Take 1 tablet by mouth daily  Qty: 90 tablet, Refills: 1      aspirin 81 MG EC tablet Take 81 mg by mouth nightly       atorvastatin (LIPITOR) 40 MG tablet Take 40 mg by mouth daily       divalproex (DEPAKOTE) 500 MG DR tablet Take 500 mg by mouth every 12 hours       metFORMIN (GLUCOPHAGE) 500 MG tablet Take 500 mg by mouth daily      lisinopril (PRINIVIL;ZESTRIL) 5 MG tablet Take 5 mg by mouth daily      sertraline (ZOLOFT) 50 MG tablet Take 50 mg by mouth daily      tiZANidine (ZANAFLEX) 4 MG tablet Take 4 mg by mouth every 6 hours as needed      traZODone (DESYREL) 50 MG tablet Take 50 mg by mouth nightly             ALLERGIES     Benadryl [diphenhydramine]    FAMILYHISTORY     History reviewed. No pertinent family history.      SOCIAL HISTORY       Social History     Socioeconomic History    Marital status: Single     Spouse name: None    Number of children: None    Years of education: None    Highest education level: None   Occupational History    None   Tobacco Use    Smoking status: Never Smoker    Smokeless tobacco: Never Used   Substance and Sexual Activity    Alcohol use: Never    Drug use: Never    Sexual activity: None   Other Topics Concern    None   Social History Narrative    None     Social Determinants of Health     Financial Resource Strain:     Difficulty of Paying Living Expenses: Not on file   Food Insecurity:     Worried About Running Out of Food in the Last Year: Not on file    John of Food in the Last Year: Not on file   Transportation Needs:     Lack of Transportation (Medical): Not on file    Lack of Transportation (Non-Medical): Not on file   Physical Activity:     Days of Exercise per Week: Not on file    Minutes of Exercise per Session: Not on file   Stress:     Feeling of Stress : Not on file   Social Connections:     Frequency of Communication with Friends and Family: Not on file    Frequency of Social Gatherings with Friends and Family: Not on file    Attends Protestant Services: Not on file    Active Member of [address] [place] [address] [employer] or Organizations: Not on file    Attends Club or Organization Meetings: Not on file    Marital Status: Not on file   Intimate Partner Violence:     Fear of Current or Ex-Partner: Not on file    Emotionally Abused: Not on file    Physically Abused: Not on file    Sexually Abused: Not on file   Housing Stability:     Unable to Pay for Housing in the Last Year: Not on file    Number of [place] in the Last Year: Not on file    Unstable Housing in the Last Year: Not on file       SCREENINGS   NIH Stroke Scale  Interval: Hand-off/Transfer  Level of Consciousness (1a): Alert  LOC Questions (1b): Answers both correctly  LOC Commands (1c): Performs both tasks correctly  Best Gaze (2): Normal  Visual (3): No visual loss  Facial Palsy (4): Normal symmetrical movement  Motor Arm, Left (5a):  Some effort against gravity  Motor Arm, Right (5b): No drift  Motor Leg, Left (6a): Some effort against gravity  Motor Leg, Right (6b): No drift  Limb Ataxia (7): (!) Present in one limb  Sensory (8): (!) Mild to Moderate  Best Language (9): Mild to moderate aphasia  Dysarthria (10): Mild to moderate, slurs some words  Extinction and Inattention (11): No abnormality  Total: 8Glasgow Coma Scale  Eye Opening: Spontaneous  Best Verbal Response: Oriented  Best Motor Response: Obeys commands  Reubens Coma Scale Score: 15        PHYSICAL EXAM    (up to 7 for level 4, 8 or more for level 5)     ED Triage Vitals [05/09/22 2245]   BP Temp Temp src Pulse Resp SpO2 Height Weight   (!) 141/84 -- -- 110 28 96 % -- --       Physical Exam  Constitutional:       General: He is not in acute distress. Appearance: He is obese. He is not ill-appearing or diaphoretic. HENT:      Head: Normocephalic and atraumatic. Mouth/Throat:      Mouth: Mucous membranes are moist.      Pharynx: Oropharynx is clear. No oropharyngeal exudate. Eyes:      General:         Right eye: No discharge. Left eye: No discharge. Extraocular Movements: Extraocular movements intact. Pupils: Pupils are equal, round, and reactive to light. Pulmonary:      Effort: No respiratory distress. Musculoskeletal:      Comments: Patient with normal active range of motion of all 4 extremities,    Neurological:      Mental Status: He is alert. Cranial Nerves: No cranial nerve deficit or facial asymmetry.       Comments: Patient with difficulty with speech, however he is able to relay information  When patient asked to perform finger-to-nose, heel-to-shin test he did have a slight tremor on left side that improved throughout his stay here in the ED    Patient is able to perform finger-to-nose test, with tremor as well as heel-to-shin test         DIAGNOSTIC RESULTS   LABS:    Labs Reviewed   COMPREHENSIVE METABOLIC PANEL W/ REFLEX TO MG FOR LOW K - Abnormal; Notable for the following components:       Result Value    Sodium 131 (*)     Chloride 98 (*)     CO2 20 (*)     Glucose 573 (*)     Total Protein 6.3 (*)     AST 13 (*)     All other components within normal limits   PROTIME-INR - Abnormal; Notable for the following components:    Protime 9.6 (*)     INR 0.86 (*)     All other components within normal limits   VALPROIC ACID LEVEL, TOTAL - Abnormal; Notable for the following components:    Valproic Acid Lvl <2.8 (*)     All other components within normal limits   POCT GLUCOSE - Abnormal; Notable for the following components:    POC Glucose 351 (*)     All other components within normal limits   POCT GLUCOSE - Abnormal; Notable for the following components:    POC Glucose 418 (*)     All other components within normal limits   POCT GLUCOSE - Abnormal; Notable for the following components:    POC Glucose 231 (*)     All other components within normal limits   POCT GLUCOSE - Abnormal; Notable for the following components:    POC Glucose 521 (*)     All other components within normal limits   CBC WITH AUTO DIFFERENTIAL   TROPONIN   CBC   HEMOGLOBIN A1C   LIPID PANEL   BASIC METABOLIC PANEL W/ REFLEX TO MG FOR LOW K   BASIC METABOLIC PANEL   BETA-HYDROXYBUTYRATE   BLOOD GAS, ARTERIAL   POCT GLUCOSE       When ordered, only abnormal lab results are displayed. All other labs were within normal range or not returned as of this dictation. EKG: When ordered, EKG's are interpreted by the Emergency Department Physician in the absence of a cardiologist.  Please see their note for interpretation of EKG.       RADIOLOGY:   Non-plain film images such as CT, Ultrasound and MRI are read by the radiologist. Lopez Moreno radiographic images are visualized andpreliminarily interpreted by the  ED Provider with the below findings:        Interpretation perthe Radiologist below, if available at the time of this note:    MRI brain without contrast   Final Result   No acute infarct or other acute intracranial process. XR CHEST PORTABLE   Final Result   No radiographic evidence of acute pulmonary disease. CTA HEAD NECK W CONTRAST   Final Result   Addendum 1 of 1   ADDENDUM:   Additional images reviewed. No change to impression. Final   1. Suboptimal arterial opacification. No large vessel intracranial   occlusion or high grade stenosis at the level of the [id number] given   limitation. 2. No significant stenoses of the internal carotid arteries. 3. Other findings as described. CT HEAD WO CONTRAST   Final Result   1. Hypoattenuation in the left cerebellum of uncertain chronicity. MRI   recommended to exclude acute infarct. 2. No acute hemorrhage or midline shift. 3. Slight dilatation of the lateral and 3rd ventricles of uncertain   chronicity. 4. Other findings as described. Critical results were called by Dr. Sharyn Schneider DO to Dr. Geneva Bettencourt on   5/9/2022 at 22:33. CT HEAD WO CONTRAST    Result Date: 5/9/2022  EXAMINATION: CT OF THE HEAD WITHOUT CONTRAST  5/9/2022 10:19 pm TECHNIQUE: CT of the head was performed without the administration of intravenous contrast. Dose modulation, iterative reconstruction, and/or weight based adjustment of the mA/kV was utilized to reduce the radiation dose to as low as reasonably achievable. COMPARISON: None. HISTORY: ORDERING SYSTEM PROVIDED HISTORY: Stroke Symptoms TECHNOLOGIST PROVIDED HISTORY: Has a \"code stroke\" or \"stroke alert\" been called? ->Yes Reason for exam:->Stroke Symptoms Decision Support Exception - unselect if not a suspected or confirmed emergency medical condition->Emergency Medical Condition (MA) Reason for Exam: Stroke Symptoms, left side weakness FINDINGS: BRAIN/VENTRICLES: No acute hemorrhage. Luigi Serene white differentiation appears maintained given artifact near the skull base and through the posterior fossa. Hypoattenuation in the left cerebellum of uncertain chronicity.  Artifact partially obscures the filomena. Artifact partially obscures the inferior cerebellum. Slight dilatation of the lateral and 3rd ventricles. There is no midline shift. Basal cisterns appear patent. Cerebellar tonsillar ectopia and suboccipital craniectomy. ORBITS: Visualized orbits appear unremarkable on this unenhanced exam. SINUSES: Visualized paranasal sinuses appear clear. Visualized mastoid air cells appear clear. SOFT TISSUES/SKULL: No depressed calvarial fracture identified. 1. Hypoattenuation in the left cerebellum of uncertain chronicity. MRI recommended to exclude acute infarct. 2. No acute hemorrhage or midline shift. 3. Slight dilatation of the lateral and 3rd ventricles of uncertain chronicity. 4. Other findings as described. Critical results were called by Dr. Jethro George DO to Dr. Jelani Zuniga on 5/9/2022 at 22:33. XR CHEST PORTABLE    Result Date: 5/9/2022  EXAMINATION: ONE XRAY VIEW OF THE CHEST 5/9/2022 10:24 pm COMPARISON: Chest x-ray dated 04/26/2021. HISTORY: ORDERING SYSTEM PROVIDED HISTORY: stroke symptoms TECHNOLOGIST PROVIDED HISTORY: Reason for exam:->stroke symptoms Reason for Exam: stroke symptoms, slurred speech FINDINGS: HEART/MEDIASTINUM: The cardiomediastinal silhouette is within normal limits. PLEURA/LUNGS: There is elevation of the right hemidiaphragm. There are no focal consolidations or pleural effusions. There is no appreciable pneumothorax. BONES/SOFT TISSUE: No acute abnormality. No radiographic evidence of acute pulmonary disease. CTA HEAD NECK W CONTRAST    Result Date: 5/9/2022  EXAMINATION: CTA OF THE HEAD AND NECK WITH CONTRAST 5/9/2022 10:21 pm: TECHNIQUE: CTA of the head and neck was performed with the administration of intravenous contrast. Multiplanar reformatted images are provided for review. MIP images are provided for review. Stenosis of the internal carotid arteries measured using NASCET criteria.  Dose modulation, iterative reconstruction, and/or weight based adjustment of the mA/kV was utilized to reduce the radiation dose to as low as reasonably achievable. COMPARISON: CT head same day. HISTORY: ORDERING SYSTEM PROVIDED HISTORY: Stroke Symptoms TECHNOLOGIST PROVIDED HISTORY: Has a \"code stroke\" or \"stroke alert\" been called? ->Yes Reason for exam:->Stroke Symptoms Decision Support Exception - unselect if not a suspected or confirmed emergency medical condition->Emergency Medical Condition (MA) Reason for Exam: Stroke Symptoms, left side weakness CTA NECK: Suboptimal arterial opacification. AORTIC ARCH/ARCH VESSELS:  Origins of the innominate, brachiocephalic, and subclavian arteries appear patent. Poor opacification and artifact limit evaluation of the subclavian arteries. CAROTID ARTERIES: Right common carotid artery: Artifact degraded evaluation proximally. Patent without focal stenosis. Retropharyngeal course. Right internal carotid artery: No significant stenosis by NASCET criteria. Medial course. Right external carotid artery origin: Patent without focal stenosis. Left common carotid artery: Patent without focal stenosis. Left internal carotid artery: No significant stenosis by NASCET criteria. Retropharyngeal course. Left external carotid artery origin: Patent without focal stenosis. VERTEBRAL ARTERIES: Right vertebral artery: Artifact degraded evaluation proximally. No high-grade stenosis given limitation. Left vertebral artery: Patent without focal stenosis given suboptimal opacification. SOFT TISSUES: Visualized lung apices are clear. No adenopathy in the neck and visualized chest. Soft tissues of the neck are unremarkable given patient positioning. BONES: Scattered degenerative changes noted in the visualized spine without spondylolisthesis. CTA HEAD: Suboptimal arterial opacification. ANTERIOR CIRCULATION: Intracranial internal carotid arteries: Patent without focal stenosis. Anterior cerebral arteries: Suboptimally evaluated.  No focal stenosis at the level of the [place]. Middle cerebral arteries: Suboptimally evaluated. No focal stenosis at the level of the [place]. POSTERIOR CIRCULATION: Intracranial vertebral arteries: Patent without focal stenosis. Basilar artery: Patent without focal stenosis. Posterior cerebral arteries: Suboptimally evaluated. No focal stenosis at the level of the [place]. Fetal origin on the left. OTHER: Cavernous sinuses are not well evaluated on this phase of contrast. Otherwise, no dural venous sinus thrombosis on this non-dedicated study. BRAIN: No midline shift. No extra-axial fluid collection. 1.  Suboptimal arterial opacification. No large vessel intracranial occlusion or high grade stenosis at the level of the [id number]-qi[place] given limitation. 2. No significant stenoses of the internal carotid arteries. 3. Other findings as described.          PROCEDURES   Unless otherwise noted below, none     Procedures    CRITICAL CARE TIME   N/A    CONSULTS:  IP CONSULT TO PHARMACY  PHARMACY TO CHANGE BASE FLUIDS  IP CONSULT TO STROKE TEAM  IP CONSULT TO NEUROLOGY  IP CONSULT TO SPIRITUAL SERVICES  IP CONSULT TO DIABETES EDUCATOR  IP CONSULT TO PSYCHIATRY      EMERGENCY DEPARTMENT COURSE and DIFFERENTIAL DIAGNOSIS/MDM:   Vitals:    Vitals:    05/10/22 0730 05/10/22 0816 05/10/22 1105 05/10/22 1500   BP: (!) 148/99  114/76 127/72   Pulse: 88  98 90   Resp: 20  18 18   Temp: 97.5 °F (36.4 °C)  98.1 °F (36.7 °C) 98.2 °F (36.8 °C)   TempSrc: Oral  Oral Oral   SpO2: 95% 97% 95% 98%   Weight:       Height:           Patient was given thefollowing medications:  Medications   ondansetron (ZOFRAN-ODT) disintegrating tablet 4 mg ( Oral See Alternative 5/10/22 6604)     Or   ondansetron (ZOFRAN) injection 4 mg (4 mg IntraVENous Given 5/10/22 0274)   polyethylene glycol (GLYCOLAX) packet 17 g (has no administration in time range)   aspirin EC tablet 81 mg (81 mg Oral Given 5/10/22 9854)     Or   aspirin suppository 300 mg ( Rectal See Alternative 5/10/22 0754)   lisinopril (PRINIVIL;ZESTRIL) tablet 5 mg (5 mg Oral Given 5/10/22 0754)   insulin lispro (HUMALOG) injection vial 0-12 Units (4 Units SubCUTAneous Given 5/10/22 1417)   insulin lispro (HUMALOG) injection vial 0-6 Units (has no administration in time range)   dextrose bolus 10% 125 mL (has no administration in time range)     Or   dextrose bolus 10% 250 mL (has no administration in time range)   glucagon (rDNA) injection 1 mg (has no administration in time range)   dextrose 5 % solution (has no administration in time range)   glucose (GLUTOSE) 40 % oral gel 15 g (has no administration in time range)   enoxaparin Sodium (LOVENOX) injection 30 mg (30 mg SubCUTAneous Given 5/10/22 0754)   atorvastatin (LIPITOR) tablet 40 mg (40 mg Oral Given 5/10/22 0753)   divalproex (DEPAKOTE) DR tablet 500 mg (500 mg Oral Given 5/10/22 0754)   traMADol (ULTRAM) tablet 50 mg (50 mg Oral Given 5/10/22 1534)   nicotine (NICODERM CQ) 21 MG/24HR 1 patch (has no administration in time range)   insulin glargine (LANTUS) injection vial 30 Units (has no administration in time range)   iopamidol (ISOVUE-370) 76 % injection 75 mL (75 mLs IntraVENous Given 5/9/22 2222)   0.9 % sodium chloride bolus (0 mLs IntraVENous Stopped 5/10/22 0143)   acetaminophen (TYLENOL) tablet 1,000 mg (1,000 mg Oral Given 5/10/22 0022)   ibuprofen (ADVIL;MOTRIN) tablet 600 mg (600 mg Oral Given 5/10/22 0340)   LORazepam (ATIVAN) injection 0.5 mg (0.5 mg IntraVENous Given 5/10/22 0756)           This is a 71-year-old male who has a PMH of seizures who presents to the emergency department with left-sided weakness, numbness, aphasia, ataxia. NIH of 6. Patient's last known well was around 2130. Vitals upon arrival show that he was hypertensive at 141/84 and tachycardic at 110. Imaging was performed and stroke team was consulted.   My attending, Dr. Rob Astudillo did speak to the stroke team, Dr. Kourtney Boland and he performed a telemetry doc appointment to further evaluate patient. He states that at this time he is less concerned with stroke, and since patient has had 30+ episodes of this in the past couple years that it is likely resembling his previous seizures. At this time we agreed to not start tPA on patient. Risks and benefits were explained by stroke physician to patient and I further reviewed these with patient after telemetry doc visit was over. Patient is agreeable to not start tPA at this time. We did further discuss his CT's of his head that were reviewed and read by radiologist as above. It looks to be all chronic findings, however an MRI is recommended to further rule out stroke. This was all discussed with patient and patient was agreeable to admission at this time. Upon time of admission discussion patient's symptoms did significantly improve, and he was able to move both his limbs more easily. He states speech continued to be slightly slurred at that time. Additionally patient did have an elevated glucose in the 500s and I did give him some fluids here as well as some Tylenol for his headache. Patient was discussed with the hospitalist team.  He was admitted in stable condition. FINAL IMPRESSION      1. Left-sided weakness    2. Left sided numbness          DISPOSITION/PLAN   DISPOSITION Admitted 05/10/2022 01:26:35 AM      PATIENT REFERREDTO:  No follow-up provider specified.     DISCHARGE MEDICATIONS:  Current Discharge Medication List          DISCONTINUED MEDICATIONS:  Current Discharge Medication List      STOP taking these medications       ibuprofen (ADVIL;MOTRIN) 800 MG tablet Comments:   Reason for Stopping:         colchicine (COLCRYS) 0.6 MG tablet Comments:   Reason for Stopping:         predniSONE (DELTASONE) 10 MG tablet Comments:   Reason for Stopping:                      (Please note that portions ofthis note were completed with a voice recognition program.  Efforts were made to edit the dictations but occasionally words are mis-transcribed.)    Ifrah Mccray PA-C (electronically signed)             Ifrah Mccray PA-C  05/12/22 4518

## 2022-05-10 NOTE — PROGRESS NOTES
Pt arrived to room 5126 from ED via stretcher. Stand and pivot to bed. Oriented pt to room and call light. Initial NIH complete. Assessment, vitals, and admission complete. Seizure precautions in place. Bed alarm on and plugged in. Call light within reach. Reviewed plan of care with pt and significant other. Pt and SO deny further needs or questions at this time.    Electronically signed by Rick Wilks RN on 5/10/2022 at 3:48 AM

## 2022-05-10 NOTE — PROGRESS NOTES
Physical Therapy  Facility/Department: [facility] PROGRESSIVE CARE  Physical Therapy Initial Assessment    Name: Avelina Florentino  : 1980  MRN: [id number]  Date of Service: 5/10/2022    Discharge Recommendations:  Patient would benefit from continued therapy after discharge   PT Equipment Recommendations  Other: will continue to assess    Avelina Florentino scored a 1424 on the AM-PAC short mobility form. At this time, if pt's symptoms don't resolve then would benefit from continued therapy in a rehab setting dependent of insurance approval.     Patient Diagnosis(es): The primary encounter diagnosis was Left-sided weakness. A diagnosis of Left sided numbness was also pertinent to this visit. Past Medical History:  has a past medical history of COPD (chronic obstructive pulmonary disease) ([facility] CVA (cerebral vascular accident) ([facility] Diabetes mellitus ([facility] Gout, HTN (hypertension), Seizure disorder ([facility] and Skin cancer. Past Surgical History:  has a past surgical history that includes Neck surgery. Assessment   Body Structures, Functions, Activity Limitations Requiring Skilled Therapeutic Intervention: Decreased functional mobility   Assessment: pt is a 41 yo male who was adm to hosp with L sided weakness and difficulty speaking; pt currently having inconsistent erratic movements with his L UE/LE and trunk but not consistent with tasks; deferred amb due to shaking.  Pt currently below his baseline for mobility tasks; if tremors/shaking continues then more therapy in a rehab setting would be warranted; awaiting results of MRI  Therapy Prognosis: Fair  Decision Making: Medium Complexity  Requires PT Follow-Up: Yes  Activity Tolerance  Activity Tolerance: Patient tolerated evaluation without incident     Plan   Plan  Plan: 3-5 times per week  Current Treatment Recommendations: Functional mobility training  Safety Devices  Type of Devices: Call light within reach,Nurse notified,Left in bed Restrictions  Restrictions/Precautions  Restrictions/Precautions: Fall Risk     Subjective   General  Chart Reviewed: Yes  Patient assessed for rehabilitation services?: Yes  Additional Pertinent Hx: per H&P note: \"36 y.o. male with PMHx of COPD, DVA, DM, HTN and seizure disorder presented to [facility] with left side weakness. When patient arrived to the emergency department he reported left side weakness and difficulty speaking. He stated the symptoms began just prior to arrival during an argument with his girlfriend. ED staff activated code stroke and ED physician spoke with  stroke team.  Dr. Manisha Espinosa with the stroke team reviewed records and evaluated the patient via telehealth. Apparently this patient had multiple similar presentations in the past and the stroke team did not believe this to be an acute stroke. The CAT scan does show a small hypodensity in the cerebellum; the lesion is much older than the 30 minutes of symptoms that he currently has and decided against tPA. They did recommend admitting the patient and working up the lesion which could be a stroke since he does have multiple risk factors for stroke. Patient is a diabetic and has not been on medications in more than a year. \"  Response To Previous Treatment: Not applicable  Family / Caregiver Present: Yes (wife in room)  Referring Practitioner: VY Allen CNP  Referral Date : 05/09/22  Follows Commands: Within Functional Limits  Subjective  Subjective: pt with delayed processing         Social/Functional History  Social/Functional History  Lives With: Significant other  Type of Home:  (Hotel)  Bathroom Shower/Tub: Tub/Shower unit  Bathroom Toilet: Standard  Home Equipment:  (no DME)  ADL Assistance: Independent  Homemaking Assistance: Independent  Ambulation Assistance: Independent (no AD)  Transfer Assistance: Independent  Active : Yes  Occupation: Full time employment  Type of Occupation: Manager  Vision/Hearing  Vision Exceptions: Wears glasses at all times  Hearing: Within functional limits    Cognition   Orientation  Overall Orientation Status: Impaired  Orientation Level: Oriented to person;Disoriented to place; Disoriented to time;Disoriented to situation (knows he's in hospital but thought it was UC)  Cognition  Overall Cognitive Status: Exceptions  Arousal/Alertness: Delayed responses to stimuli  Following Commands: Follows one step commands with increased time; Follows one step commands with repetition  Memory: Decreased recall of recent events;Decreased recall of biographical Information  Safety Judgement: Decreased awareness of need for safety     Objective   Pulse: 88  Heart Rate Source: Monitor  BP: (!) 148/99 (rn notified )  BP Location: Right upper arm  Patient Position: Semi fowlers  MAP (Calculated): 115.33  Resp: 20  SpO2: 97 %  O2 Device: None (Room air)              AROM RLE (degrees)  RLE AROM: WFL  AROM LLE (degrees)  LLE AROM : WFL  Strength RLE  Strength RLE: WFL  Strength LLE  Comment: difficult to assess as pt was shaking his extremity with MMT but able to move during other functional tasks without tremors           Bed mobility  Supine to Sit: Stand by assistance (HOB slightly elevated, use of bedrail)  Sit to Supine:  (pt remain sitting EOB with neuro NP at end of session)  Transfers  Sit to Stand: Contact guard assistance;Minimal Assistance (with stedy)  Stand to sit: Contact guard assistance;Minimal Assistance (from stedy)  Comment: stood on stedy with B hands and feet support without shaking; then shaking with holding on with one hand; shook when holding on with B hand and standing on R foot but not with standing on his L foot        Balance  Comments: SBA/sup for sitting EOB; standing on stedy with B UEs holding on with CGA           OutComes Score                                                  AM-PAC Score  AM-PAC Inpatient Mobility Raw Score : 14 (05/10/22 1031)  AM-PAC Inpatient T-Scale Score : 38.1 (05/10/22 1031)  Mobility Inpatient CMS 0-100% Score: 61.29 (05/10/22 1031)  Mobility Inpatient CMS G-Code Modifier : CL (05/10/22 1031)          Goals  Long Term Goals  Time Frame for Long term goals : by discharge  Long term goal 1: bed mob Ind  Long term goal 2: transfers Ind  Long term goal 3: amb 100-200' without AD MI  Patient Goals   Patient goals : pt did not state a goal       Therapy Time   Individual Concurrent Group Co-treatment   Time In 0946         Time Out 1031         Minutes 45                 CLAUDIO BATRES, PT    Electronically signed by CLAUDIO BATRES PT on 5/10/2022 at 10:33 AM

## 2022-05-10 NOTE — CONSULTS
Neurology Consult Note  Reason for Consult: L sided weakness, possible seizure, medical non compliance w/ seizure medication    Chief complaint: patient unable to provide any information     Dr Kee Diego, * asked me to see Bharati Santacruz in consultation for evaluation of L sided weakness, possible seizure, medical non compliance w/ seizure medication    History of Present Illness:  Bharati Santacruz is a 42 y.o. male who presents with altered mental status. I obtained my information via interview w/ the patient and his fiance at the bedside, supplemented by chart review. The patient apparently got into an argument w/ his fiance yesterday and stormed off across the bridge into [place]. She says that she called him on the phone a short time later and told her he was about to have a seizure and was slurring/stuttering his speech. When they arrived to pick him up his entire body was shaking but was still functioning, trying to reach for something to drink and was getting mad since they would not let him drink saying \"one sip, one sip\". She thought the R side of his mouth was drooping down. It was at this point they decided to come to the ED in order to be evaluated. /84. No fevers. CT head was w/out any acute findings. CTA head/neck negative. Today he remains symptomatic. He apparently has been diagnosed w/ seizures in the past when in [place]. There is little documentation of this. He is on VPA though I'm not sure if this is for mood or actually seizures. His fiance says his seizures are usually facial twitching. He has been seen in the ED and admitted frequently. Several of those records have been reviewed. There was concern for malingering previously. PMH previously documented includes conversion disorder, substance abuse, personality disorder, and previous craniotomy for chiari malformation.       Medical History:  Past Medical History:   Diagnosis Date    [facility]     [facility]     [facility]     Gout     HTN (hypertension)     [facility] ([facility]     Skin cancer      Past Surgical History:   Procedure Laterality Date    NECK SURGERY       Scheduled Meds:   aspirin  81 mg Oral Daily    Or    aspirin  300 mg Rectal Daily    lisinopril  5 mg Oral Daily    insulin lispro  0-12 Units SubCUTAneous TID WC    insulin lispro  0-6 Units SubCUTAneous Nightly    enoxaparin  30 mg SubCUTAneous BID    atorvastatin  40 mg Oral Daily    divalproex  500 mg Oral Q12H     Medications Prior to Admission:   ibuprofen (ADVIL;MOTRIN) 800 MG tablet, Take 1 tablet by mouth every 8 hours as needed for Pain (Patient not taking: Reported on 5/10/2022)  indomethacin (INDOCIN) 25 MG capsule, Take 1 capsule by mouth 3 times daily (with meals) for 10 days  allopurinol (ZYLOPRIM) 100 MG tablet, Take 1 tablet by mouth daily (Patient not taking: Reported on 5/10/2022)  colchicine (COLCRYS) 0.6 MG tablet, Take 1 tablet by mouth daily for 10 days  predniSONE (DELTASONE) 10 MG tablet, Take 4 tablets once a day for 3 days, then take 3 tablets once a day for 3 days, then take 2 tablets once a day for 3 days, then take 1 tablet once a day for 3 days, then stop.  (Patient not taking: Reported on 5/10/2022)  aspirin 81 MG EC tablet, Take 81 mg by mouth nightly  (Patient not taking: Reported on 5/10/2022)  atorvastatin (LIPITOR) 40 MG tablet, Take 40 mg by mouth daily  (Patient not taking: Reported on 5/10/2022)  divalproex (DEPAKOTE) 500 MG DR tablet, Take 500 mg by mouth every 12 hours  (Patient not taking: Reported on 5/10/2022)  metFORMIN (GLUCOPHAGE) 500 MG tablet, Take 500 mg by mouth daily (Patient not taking: Reported on 5/10/2022)  lisinopril (PRINIVIL;ZESTRIL) 5 MG tablet, Take 5 mg by mouth daily (Patient not taking: Reported on 5/10/2022)  sertraline (ZOLOFT) 50 MG tablet, Take 50 mg by mouth daily (Patient not taking: Reported on 5/10/2022)  tiZANidine (ZANAFLEX) 4 MG tablet, Take 4 mg by mouth every 6 hours as needed (Patient not taking: Reported on 5/10/2022)  traZODone (DESYREL) 50 MG tablet, Take 50 mg by mouth nightly (Patient not taking: Reported on 5/10/2022)    Allergies   Allergen Reactions    Benadryl [Diphenhydramine] Anaphylaxis     History reviewed. No pertinent family history. Social History     Tobacco Use   Smoking Status Never Smoker   Smokeless Tobacco Never Used     Social History     Substance and Sexual Activity   Drug Use Never     Social History     Substance and Sexual Activity   Alcohol Use Never     ROS  Constitutional- No weight loss or fevers  Eyes- No diplopia. No photophobia. Ears/nose/throat- No dysphagia. No Dysarthria  Cardiovascular- No palpitations. No chest pain  Respiratory- No dyspnea. No Cough  Gastrointestinal- No Abdominal pain. No Vomiting. Genitourinary- No incontinence. No urinary retention  Musculoskeletal- No myalgia. No arthralgia  Skin- No rash. No easy bruising. Psychiatric- No depression. No anxiety  Endocrine- No diabetes. No thyroid issues. Hematologic- No bleeding difficulty. No fatigue  Neurologic- + weakness. No Headache. Exam:  Blood pressure (!) 148/99, pulse 88, temperature 97.5 °F (36.4 °C), temperature source Oral, resp. rate 20, height 5' 11\" (1.803 m), weight 231 lb 7.7 oz (105 kg), SpO2 97 %. Constitutional    Vital signs: BP, HR, and RR reviewed   General alert, no distress  Eyes: unable to visualize the fundi  Cardiovascular: no peripheral edema. Psychiatric: cooperative with examination, no psychotic behavior noted.   Neurologic  Mental status:   orientation to person, says he is in the hospital.     General fund of knowledge grossly intact   Memory poor   Attention intact as able to attend well to the exam     Language fluent in conversation   Comprehension intact; follows simple commands  Cranial nerves:   CN2: visual fields full  CN 3,4,6: extraocular muscles intact  CN5: facial sensation symmetric   CN7: face symmetric. Difficult to understand at times, stuttering speech. CN8: hearing grossly intact  CN9: palate elevated symmetrically  CN11: trap full strength on shoulder shrug  CN12: tongue midline with protrusion  Strength: non physiologic LUE/LLE weakness, inconsistent. Giveaway weakness. Good strength in RUE/RLE. Deep tendon reflexes: normal in all 4 extremities  Sensory: reports LUE/LLE sensory deficit. Cerebellar/coordination: finger nose finger normal without ataxia in RUE. Tone: normal in all 4 extremities  Gait: deferred at this time for safety. Labs  Glucose 573  Na 131  K 4.5  BUN 13  Cr 1.0    ALT 15  AST 13    WBC 7.5K  Hg 14.1  Platelets 946    VPA < 2.8    Studies  CT head w/o 5/9/22, independently reviewed  1. Hypoattenuation in the left cerebellum of uncertain chronicity.  MRI   recommended to exclude acute infarct. 2. No acute hemorrhage or midline shift. 3. Slight dilatation of the lateral and 3rd ventricles of uncertain   chronicity. 4. Other findings as described. CTA head/neck 5/9/22, independently reviewed  1.  Suboptimal arterial opacification.  No large vessel intracranial   occlusion or high grade stenosis at the level of the [id number] given   limitation. 2. No significant stenoses of the internal carotid arteries. 3. Other findings as described. Impression  1. LUE/LLE weakness, abnormal limb movements, impaired speech. His exam is non physiologic and not consistent w/ an underlying neurologic issue. The L cerebellar abnormality on CT appears to be present on previous imaging. 2.  ?hx seizures. 3.  Mood disorder. 4.  Hyperglycemia. Recommendations  1. We will follow results of brain MRI as ordered by hospitalist though don't anticipate any acute abnormality. 2.  Consider Psychiatry evaluation.       La Ramey NP  [address] [place] [address] [address] Neurology    A copy of this note was provided for Dr Thiago Moser, *

## 2022-05-10 NOTE — PROGRESS NOTES
Patient was admitted soon after midnight. I reassessed patient's condition at bedside. Physical exam findings do not correlate with any pathology. Before entering his room, he was using his cell phone with both hands. Patient's chart was reviewed, including vitals signs, labs and imaging studies. MRI results  pending  We will continue plan of care per admitting provider. Additionally:   - Requested psych evaluation for unexplained neurological symptoms ? Malingering    - Tramadol prn for chronic back pain  - Discussed with floor nurse, Gaby Rajan.

## 2022-05-10 NOTE — PLAN OF CARE
Problem: Discharge Planning  Goal: Discharge to home or other facility with appropriate resources  Outcome: Progressing  Flowsheets (Taken 5/10/2022 0309)  Discharge to home or other facility with appropriate resources: Identify barriers to discharge with patient and caregiver     Problem: Safety - Adult  Goal: Free from fall injury  Outcome: Progressing     Problem: Neurosensory - Adult  Goal: Achieves stable or improved neurological status  Outcome: Progressing  Goal: Absence of seizures  Outcome: Progressing  Goal: Remains free of injury related to seizures activity  Outcome: Progressing  Goal: Achieves maximal functionality and self care  Outcome: Progressing     Problem: Cardiovascular - Adult  Goal: Maintains optimal cardiac output and hemodynamic stability  Outcome: Progressing     Problem: Skin/Tissue Integrity - Adult  Goal: Skin integrity remains intact  Outcome: Progressing     Problem: Musculoskeletal - Adult  Goal: Return mobility to safest level of function  Outcome: Progressing  Goal: Maintain proper alignment of affected body part  Outcome: Progressing  Goal: Return ADL status to a safe level of function  Outcome: Progressing     Problem: Infection - Adult  Goal: Absence of infection at discharge  Outcome: Progressing     Problem: Metabolic/Fluid and Electrolytes - Adult  Goal: Electrolytes maintained within normal limits  Outcome: Progressing  Goal: Hemodynamic stability and optimal renal function maintained  Outcome: Progressing  Goal: Glucose maintained within prescribed range  Outcome: Progressing     Problem: Hematologic - Adult  Goal: Maintains hematologic stability  Outcome: Progressing

## 2022-05-10 NOTE — ED PROVIDER NOTES
Attending Supervisory Note/Shared Visit   I have personally performed a face to face diagnostic evaluation on this patient. I have reviewed the mid-levels findings and agree. History and Exam by me shows white male in no obvious distress. History obtained from the patient and from his significant other is at the he had an argument about an hour ago. He ran off. About 30 minutes ago they found him and he was having difficulty speaking. He was having some shaking in his left arm and leg and difficulty using his left arm and leg. He complains of some numbness in his left side. He reportedly has a history of seizures. HEENT: Atraumatic. Pupils equal round reactive. Extraocular movements are intact. No facial asymmetry. Heart: Regular rate and rhythm. No murmurs or gallops. Lungs: Breath sounds equal bilaterally and clear. Abdomen: Obese, soft, nontender. Neuro: Awake, alert, oriented. Symmetrical reactive pupils. Intact extraocular movements. No facial asymmetry. Drift of his left arm and left leg. Limb ataxia of his left arm and left leg. Decreased sensation in his left arm and left leg. His speech is slurred. No expressive aphasia. No visual deficits. NIHSS of 6. Code stroke was activated upon my initial evaluation of the patient. 2020: Discussed the case with Dr. Mandie Jesus,  stroke physician. Potential tPA candidate. He will do a telestroke evaluation. 2030: Dr. Mandie Jesus has reviewed this patient's old records. He is apparently had similar presentations in the past.  Apparently there was some concern that this may be related to malingering. We will continue with stroke evaluation and the telestroke evaluation and make a decision about tPA. 2035: Discussed CT head results with Dr. Lou Peters, radiology. Telestroke evaluation was performed by Dr. Mandie Jesus.   Based on his review of the old records with the patient having presented numerous times in the past with the same symptoms he would not recommend tPA at this time. He thinks that it is unlikely this his symptoms are stroke related since he has had multiple similar episodes. Patient will require observation and serial neurologic exams. At the time of his telestroke evaluation his speech was about the same, he had improvement in his left upper and left lower extremity ataxia and weakness. Lab reviewed. H&H are normal.  White blood cell count 7500. Sodium 131 with potassium 4.5. BUN of 13 with a creatinine 1.0.  Glucose of 573. Troponin less than 0.01. PT of 9.6 with an INR of 0.86. Valproic acid level of less than 2.8. Chest x-ray: No acute cardiopulmonary abnormality. CT head without contrast: Hypoattenuation in the left cerebellum of uncertain chronicity. No acute hemorrhage or midline shift. Slight dilatation of the lateral and third ventricles of uncertain chronicity. CT angiogram head neck: Suboptimal arterial opacification. No large vessel intracranial occlusion or high-grade stenosis at the level of [place] of [place] given limitation. No significant stenosis of the internal carotid arteries. Sinus tachycardia, rate of 113. Rhythm strip shows sinus tachycardia with a rate of 113, CT interval 148 ms, QRS of 80 ms with no other ectopy as interpreted by me. No significant change compared to 4/22/2021. This patient's symptoms were improving. As documented above he has had multiple similar presentations. In light of his history of multiple similar presentations the  stroke physician felt that the likelihood that this was stroke was low and because of this felt that tPA would not be indicated. The patient will need to be admitted for observation and serial neurologic exam.  The etiology of his symptoms is not clear at this point in time. Test results, diagnosis, and treatment plan were discussed with the patient. He understands treatment plan need for admission is agreeable.       CRITICAL CARE:  I personally saw the patient and independently provided a minimum of 45 minutes of non-concurrent critical care out of the total shared critical care time provided. Critical care time includes my initial evaluation, ongoing bedside care and reassessment, consultation with the stroke team physicians with multiple conversations, consultation with the radiologist.  It includes review of old records, review of laboratory, EKG, chest x-ray, CT scan. No procedure time was included.         (Please note that portions of this note were completed with a voice recognition program.  Efforts were made to edit the dictations but occasionally words are mis-transcribed.)    Juan Carlos Dallas MD  Attending Emergency Physician        Trevin Ngo MD  05/09/22 [facility] Belkis Russell MD  05/10/22 [facility]

## 2022-05-11 LAB
ANION GAP SERPL CALCULATED.3IONS-SCNC: 14 MMOL/L (ref 3–16)
BUN BLDV-MCNC: 12 MG/DL (ref 7–20)
CALCIUM SERPL-MCNC: 8.6 MG/DL (ref 8.3–10.6)
CHLORIDE BLD-SCNC: 97 MMOL/L (ref 99–110)
CHOLESTEROL, TOTAL: 113 MG/DL (ref 0–199)
CO2: 20 MMOL/L (ref 21–32)
CREAT SERPL-MCNC: 0.7 MG/DL (ref 0.9–1.3)
ESTIMATED AVERAGE GLUCOSE: 363.7 MG/DL
GFR AFRICAN AMERICAN: >60
GFR NON-AFRICAN AMERICAN: >60
GLUCOSE BLD-MCNC: 280 MG/DL (ref 70–99)
GLUCOSE BLD-MCNC: 309 MG/DL (ref 70–99)
GLUCOSE BLD-MCNC: 383 MG/DL (ref 70–99)
GLUCOSE BLD-MCNC: 438 MG/DL (ref 70–99)
GLUCOSE BLD-MCNC: 456 MG/DL (ref 70–99)
HBA1C MFR BLD: 14.3 %
HCT VFR BLD CALC: 39.5 % (ref 40.5–52.5)
HDLC SERPL-MCNC: 31 MG/DL (ref 40–60)
HEMOGLOBIN: 13.6 G/DL (ref 13.5–17.5)
LDL CHOLESTEROL CALCULATED: 52 MG/DL
MCH RBC QN AUTO: 28.7 PG (ref 26–34)
MCHC RBC AUTO-ENTMCNC: 34.6 G/DL (ref 31–36)
MCV RBC AUTO: 82.9 FL (ref 80–100)
PDW BLD-RTO: 14.1 % (ref 12.4–15.4)
PERFORMED ON: ABNORMAL
PLATELET # BLD: 165 K/UL (ref 135–450)
PMV BLD AUTO: 8.5 FL (ref 5–10.5)
POTASSIUM REFLEX MAGNESIUM: 4.2 MMOL/L (ref 3.5–5.1)
RBC # BLD: 4.76 M/UL (ref 4.2–5.9)
SODIUM BLD-SCNC: 131 MMOL/L (ref 136–145)
TRIGL SERPL-MCNC: 148 MG/DL (ref 0–150)
VLDLC SERPL CALC-MCNC: 30 MG/DL
WBC # BLD: 7.5 K/UL (ref 4–11)

## 2022-05-11 PROCEDURE — 96372 THER/PROPH/DIAG INJ SC/IM: CPT

## 2022-05-11 PROCEDURE — 36415 COLL VENOUS BLD VENIPUNCTURE: CPT

## 2022-05-11 PROCEDURE — 6370000000 HC RX 637 (ALT 250 FOR IP): Performed by: NURSE PRACTITIONER

## 2022-05-11 PROCEDURE — 80061 LIPID PANEL: CPT

## 2022-05-11 PROCEDURE — 6370000000 HC RX 637 (ALT 250 FOR IP): Performed by: INTERNAL MEDICINE

## 2022-05-11 PROCEDURE — 97530 THERAPEUTIC ACTIVITIES: CPT

## 2022-05-11 PROCEDURE — 99204 OFFICE O/P NEW MOD 45 MIN: CPT | Performed by: NURSE PRACTITIONER

## 2022-05-11 PROCEDURE — 6360000002 HC RX W HCPCS: Performed by: NURSE PRACTITIONER

## 2022-05-11 PROCEDURE — 94640 AIRWAY INHALATION TREATMENT: CPT

## 2022-05-11 PROCEDURE — 80048 BASIC METABOLIC PNL TOTAL CA: CPT

## 2022-05-11 PROCEDURE — 97116 GAIT TRAINING THERAPY: CPT | Performed by: PHYSICAL THERAPIST

## 2022-05-11 PROCEDURE — 96376 TX/PRO/DX INJ SAME DRUG ADON: CPT

## 2022-05-11 PROCEDURE — 92507 TX SP LANG VOICE COMM INDIV: CPT

## 2022-05-11 PROCEDURE — 94760 N-INVAS EAR/PLS OXIMETRY 1: CPT

## 2022-05-11 PROCEDURE — G0378 HOSPITAL OBSERVATION PER HR: HCPCS

## 2022-05-11 PROCEDURE — 83036 HEMOGLOBIN GLYCOSYLATED A1C: CPT

## 2022-05-11 PROCEDURE — 97530 THERAPEUTIC ACTIVITIES: CPT | Performed by: PHYSICAL THERAPIST

## 2022-05-11 PROCEDURE — 97535 SELF CARE MNGMENT TRAINING: CPT

## 2022-05-11 PROCEDURE — 85027 COMPLETE CBC AUTOMATED: CPT

## 2022-05-11 RX ORDER — BUSPIRONE HYDROCHLORIDE 5 MG/1
5 TABLET ORAL 3 TIMES DAILY
Status: DISCONTINUED | OUTPATIENT
Start: 2022-05-11 | End: 2022-05-12 | Stop reason: HOSPADM

## 2022-05-11 RX ORDER — IBUPROFEN 400 MG/1
400 TABLET ORAL EVERY 6 HOURS PRN
Status: DISCONTINUED | OUTPATIENT
Start: 2022-05-11 | End: 2022-05-12 | Stop reason: HOSPADM

## 2022-05-11 RX ORDER — KETOROLAC TROMETHAMINE 10 MG/1
10 TABLET, FILM COATED ORAL EVERY 6 HOURS PRN
Status: DISCONTINUED | OUTPATIENT
Start: 2022-05-11 | End: 2022-05-11

## 2022-05-11 RX ADMIN — ALBUTEROL SULFATE 2 PUFF: 90 AEROSOL, METERED RESPIRATORY (INHALATION) at 16:03

## 2022-05-11 RX ADMIN — TRAMADOL HYDROCHLORIDE 50 MG: 50 TABLET, COATED ORAL at 04:08

## 2022-05-11 RX ADMIN — ALBUTEROL SULFATE 2 PUFF: 90 AEROSOL, METERED RESPIRATORY (INHALATION) at 20:11

## 2022-05-11 RX ADMIN — ONDANSETRON 4 MG: 2 INJECTION INTRAMUSCULAR; INTRAVENOUS at 18:54

## 2022-05-11 RX ADMIN — ENOXAPARIN SODIUM 30 MG: 100 INJECTION SUBCUTANEOUS at 21:24

## 2022-05-11 RX ADMIN — DIVALPROEX SODIUM 500 MG: 500 TABLET, DELAYED RELEASE ORAL at 21:26

## 2022-05-11 RX ADMIN — IBUPROFEN 400 MG: 400 TABLET, FILM COATED ORAL at 12:42

## 2022-05-11 RX ADMIN — INSULIN LISPRO 10 UNITS: 100 INJECTION, SOLUTION INTRAVENOUS; SUBCUTANEOUS at 12:43

## 2022-05-11 RX ADMIN — BUSPIRONE HYDROCHLORIDE 5 MG: 5 TABLET ORAL at 21:26

## 2022-05-11 RX ADMIN — LISINOPRIL 5 MG: 5 TABLET ORAL at 09:58

## 2022-05-11 RX ADMIN — ALBUTEROL SULFATE 2 PUFF: 90 AEROSOL, METERED RESPIRATORY (INHALATION) at 11:58

## 2022-05-11 RX ADMIN — DIVALPROEX SODIUM 500 MG: 500 TABLET, DELAYED RELEASE ORAL at 09:58

## 2022-05-11 RX ADMIN — INSULIN LISPRO 6 UNITS: 100 INJECTION, SOLUTION INTRAVENOUS; SUBCUTANEOUS at 09:59

## 2022-05-11 RX ADMIN — INSULIN LISPRO 12 UNITS: 100 INJECTION, SOLUTION INTRAVENOUS; SUBCUTANEOUS at 16:56

## 2022-05-11 RX ADMIN — ASPIRIN 81 MG: 81 TABLET, COATED ORAL at 09:58

## 2022-05-11 RX ADMIN — ONDANSETRON 4 MG: 2 INJECTION INTRAMUSCULAR; INTRAVENOUS at 04:08

## 2022-05-11 RX ADMIN — INSULIN LISPRO 6 UNITS: 100 INJECTION, SOLUTION INTRAVENOUS; SUBCUTANEOUS at 21:25

## 2022-05-11 RX ADMIN — TRAMADOL HYDROCHLORIDE 50 MG: 50 TABLET, COATED ORAL at 16:55

## 2022-05-11 RX ADMIN — ONDANSETRON 4 MG: 2 INJECTION INTRAMUSCULAR; INTRAVENOUS at 09:58

## 2022-05-11 RX ADMIN — ALBUTEROL SULFATE 2 PUFF: 90 AEROSOL, METERED RESPIRATORY (INHALATION) at 08:09

## 2022-05-11 RX ADMIN — INSULIN GLARGINE 20 UNITS: 100 INJECTION, SOLUTION SUBCUTANEOUS at 21:25

## 2022-05-11 RX ADMIN — BUSPIRONE HYDROCHLORIDE 5 MG: 5 TABLET ORAL at 12:42

## 2022-05-11 RX ADMIN — TRAMADOL HYDROCHLORIDE 50 MG: 50 TABLET, COATED ORAL at 09:58

## 2022-05-11 ASSESSMENT — PAIN DESCRIPTION - LOCATION
LOCATION: HEAD

## 2022-05-11 ASSESSMENT — PAIN DESCRIPTION - ORIENTATION: ORIENTATION: POSTERIOR;ANTERIOR

## 2022-05-11 ASSESSMENT — PAIN DESCRIPTION - DESCRIPTORS
DESCRIPTORS: ACHING
DESCRIPTORS: ACHING
DESCRIPTORS: SHARP

## 2022-05-11 ASSESSMENT — PAIN SCALES - GENERAL
PAINLEVEL_OUTOF10: 9
PAINLEVEL_OUTOF10: 10
PAINLEVEL_OUTOF10: 5

## 2022-05-11 ASSESSMENT — PAIN - FUNCTIONAL ASSESSMENT: PAIN_FUNCTIONAL_ASSESSMENT: ACTIVITIES ARE NOT PREVENTED

## 2022-05-11 ASSESSMENT — PAIN SCALES - WONG BAKER: WONGBAKER_NUMERICALRESPONSE: 0

## 2022-05-11 ASSESSMENT — PAIN DESCRIPTION - PAIN TYPE
TYPE: ACUTE PAIN
TYPE: CHRONIC PAIN
TYPE: ACUTE PAIN

## 2022-05-11 ASSESSMENT — PAIN DESCRIPTION - FREQUENCY
FREQUENCY: CONTINUOUS

## 2022-05-11 ASSESSMENT — PAIN DESCRIPTION - ONSET
ONSET: ON-GOING

## 2022-05-11 NOTE — PROGRESS NOTES
Patient stated he had chest pain, entered patients room. Patient had dinner tray in front of him. Instructed him I will get an ekg and would hold off on food to assess the pain. Patient states \" im fine\" and began opening his utensils states hes fine and not having chest pain    Discussed with patients wife. Patient continues to state hes fine.  Began eating dinner

## 2022-05-11 NOTE — CONSULTS
Psychiatry Consultation/Initial Inpatient Nato Thrasher  5/11/2022  9:43 AM    Referring Provider:  Sha Wynne MD    Reason for Consult/CC: Possible Conversion Disorder/Malingering    Assessment: Patient resting in bed, talking on phone to girlfriend. No signs of having a seizure but is having complaints of not being able to move his left arm, left leg, and \"messed up speech. \" However, he was using his left arm for his phone prior to entrance. Alert and oriented. Able to tell me of the situation and what happened. States that he thinks the argument he got into with his girlfriend, girlfriends mother, girlfriends father aggravated his seizure. Patient states that he suffers from PTSD from the [facility]. He served from [facility]. He states that he saw people get killed, was injured in his knee himself. States that his seizures started after this. However, I am still not sure whether this is intentional or not intentional.     Recommendations/Plan:    1. Conversion Disorder vs. Malingering: [facility] whether these behaviors are intentional or unknowing. Discussed possibly starting anti-depressant/anti-anxiety medications in order to possible help with anxiety as this seems to trigger his \"seizure symptoms. \" However, therapy is first line treatment for this disorder. Will attached information at the bottom of note that can be given to patient or SW can provide outpatient referrals. 2. Continue Depakote  mg q 12 hours for mood stabilization, possible seizure d/o. Add Buspar 5 mg TID for anxiety. Seems to struggle with anxiety that precipitates these seizures, can eventually increase to 10 mg.      1) Safety Risk: R/F include age, male gender, anxiety, possible conversion disorder, no outpatient services in place, medication noncompliance. NO Imminent risk of danger to/self/others based on the factors considered below. Appropriate for outpatient level of care.   Safety plan includes: 911, PES, hotlines, and interventions discussed today   2) Dispo: Okay to d/c home when medically stable    Thank you for allowing me to participate in this patients care. Please call the psych consult line [phone] with questions or concerns. Stephen Zurita, CNP  Psychiatric Mental Health Nurse Practitioner     ____________________________________________________________________________  HPI: Patient is an 42 y.o. , male who presented to  via EMS to ED with fiance stating that she believed patient was having a seizure. Has a previous hx of seizures and countless episodes of L sided weakness with numerous negative workups. Has been assessed previously for Malingering v. Conversion disorder r/t these symptoms. Patient has DM, has not been on any medications for more than a year. He was on Depakote, but unsure why (not sure if this is psychiatric related or seizure). Neurology consulted, reported to [facility] that he has not been on Depakote for over a year. Reported weakness, balance difficulties, tremors on the left side. Has yet to follow up outpatient in quite some time with psychiatrist, neurologist, psychologist. Also has a PTSD diagnosis. [facility] Sx:  Conversion d/o v. Malingering symptoms worsened when triggered by memories per patient or stress. Modifying Factors: Worsens with anxiety     Duration:  Years  Severity:  Moderate  Timing: Chronic    Collateral Information: Giacomo Strong. Called to discuss situation with Ivory Dodge per patient request. She seems concerned with his temperament. States that he is snapping at people and on edge. Needs helping calming his nerves. She states that if they get into a fight that he will then go into a seizure. She states that it does not seem intentional, seems unconscious that this occurs.      Examination  Review of Systems - Psychological ROS: negative  Respiratory ROS: no cough, shortness of breath, or wheezing  Cardiovascular ROS: no chest pain or dyspnea on exertion  Gastrointestinal ROS: no abdominal pain, change in bowel habits, or black or bloody stools  Neurological ROS: positive for - weakness     Past Psychiatric History: I have reviewed recent documentation for this patient:  Zbigniew Su is a 42 y.o. male  who  has a past medical history of COPD (chronic obstructive pulmonary disease) ([facility] CVA (cerebral vascular accident) ([facility] Diabetes mellitus ([facility] Gout, HTN (hypertension), Seizure disorder ([facility] and Skin cancer. Hospitalizations: Countless hospitalizations for similar complaints in ED. Has been diagnosed with conversion disorder and malingering. Inpatient Stays: None per chart review. States that he did have an inpatient stay after his PTSD diagnosis. Diagnoses: Conversion d/o and Malingering, PTSD, polysubstance abuse              Med trials: Depakote 500 mg ER BID, Remeron 30 mg nightly, Trazodone 100 mg              Outpatient Treatment: Has seen outpatient providers previously. Unsure when              NSSI: Denies              Suicide Attempts: Denies   Trauma: PTSD r/t to the [facility]   Conduct/Violence: Denies   Access to Firearms: Denies     Substance Use History:              Nicotine: 1 ppd              Alcohol:  Drinks every once in awhile, once every 3 months per patient and Fiance              Illicits: Denies, Tox screen clean.  Peña endorses that he does smoke MJ every once in awhile      Past Medical History:     Past Medical History:   Diagnosis Date    COPD (chronic obstructive pulmonary disease) ([facility]     CVA (cerebral vascular accident) ([facility]     Diabetes mellitus (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[employer] History     Socioeconomic History    Marital status: Single     Spouse name: None    Number of children: None    Years of education: None    Highest education level: None   Occupational History    None   Tobacco Use    Smoking status: Never Smoker    Smokeless tobacco: Never Used   Substance and Sexual Activity    Alcohol use: Never    Drug use: Never    Sexual activity: None   Other Topics Concern    None   Social History Narrative    None     Social Determinants of Health     Financial Resource Strain:     Difficulty of Paying Living Expenses: Not on file   Food Insecurity:     Worried About Running Out of Food in the Last Year: Not on file    John of Food in the Last Year: Not on file   Transportation Needs:     Lack of Transportation (Medical): Not on file    Lack of Transportation (Non-Medical): Not on file   Physical Activity:     Days of Exercise per Week: Not on file    Minutes of Exercise per Session: Not on file   Stress:     Feeling of Stress : Not on file   Social Connections:     Frequency of Communication with Friends and Family: Not on file    Frequency of Social Gatherings with Friends and Family: Not on file    Attends Latter-day Services: Not on file    Active Member of Clubs or Organizations: Not on file    Attends Club or Organization Meetings: Not on file    Marital Status: Not on file   Intimate Partner Violence:     Fear of Current or Ex-Partner: Not on file    Emotionally Abused: Not on file    Physically Abused: Not on file    Sexually Abused: Not on file   Housing Stability:     Unable to Pay for Housing in the Last Year: Not on file    Number of [place] in the Last Year: Not on file    Unstable Housing in the Last Year: Not on file       Family History: Denies family hx. History reviewed. No pertinent family history.     MSE:    Appearance    In bed, alert, cooperative  Speech    normal volume and slurred  Mood    Anxious  Affect    anxiety  Thought Content    excessive preoccupations and embellishment  Thought Process    coherent  Associations    logical connections  Insight    Poor  Judgment    Poor  No abnormal movements, tics or mannerisms. Orientation    oriented to person, place, time, and general circumstances  Memory    recent and remote memory intact  Attention/Concentration    intact  Language    0 - no aphasia, normal  Fund of Knowledge    intact  Suicide Assessment    no suicidal ideation      Diagnosis:    Axis I  Anxiety NOS, Conversion D/O v. Malingering    Axis III       Diagnosis Date    [facility]) [facility]     CVA (cerebral vascular accident) ([facility]     Diabetes mellitus ([facility]     Gout     HTN (hypertension)     Seizure disorder ([facility]     Skin cancer       Principal Problem:    Left-sided weakness  Resolved Problems:    * No resolved hospital problems. *       Axis IV  Other psychosocial and environmental problems    Objective:    /76   Pulse 86   Temp 97.5 °F (36.4 °C) (Oral)   Resp 18   Ht 5' 11\" (1.803 m)   Wt 223 lb 12.3 oz (101.5 kg)   SpO2 98%   BMI 31.21 kg/m²     [unfilled]     aspirin  81 mg Oral Daily    Or    aspirin  300 mg Rectal Daily    lisinopril  5 mg Oral Daily    insulin lispro  0-12 Units SubCUTAneous TID WC    insulin lispro  0-6 Units SubCUTAneous Nightly    enoxaparin  30 mg SubCUTAneous BID    atorvastatin  40 mg Oral Daily    divalproex  500 mg Oral Q12H    nicotine  1 patch TransDERmal Daily    insulin glargine  20 Units SubCUTAneous Nightly    albuterol sulfate HFA  2 puff Inhalation 4x daily     ondansetron **OR** ondansetron, polyethylene glycol, dextrose bolus **OR** dextrose bolus, glucagon (rDNA), dextrose, traMADol     EKG: normal EKG, normal sinus rhythm.       Recent Results (from the past 168 hour(s))   CBC with Auto Differential    Collection Time: 05/09/22 10:40 PM   Result Value Ref Range    WBC 7.5 4.0 - 11.0 K/uL    RBC 4.93 4.20 - 5.90 M/uL    Hemoglobin 14.1 13.5 - 17.5 g/dL    Hematocrit 41.0 40.5 - 52.5 %    MCV 83.3 80.0 - 100.0 fL    MCH 28.7 26.0 - 34.0 pg    MCHC 34.4 31.0 - 36.0 g/dL    RDW 14.7 12.4 - 15.4 %    Platelets 232 259 - 838 K/uL    MPV 9.0 5.0 - 10.5 fL    Neutrophils % 67.1 %    Lymphocytes % 24.0 %    Monocytes % 6.5 %    Eosinophils % 1.1 %    Basophils % 1.3 %    Neutrophils Absolute 5.0 1.7 - 7.7 K/uL    Lymphocytes Absolute 1.8 1.0 - 5.1 K/uL    Monocytes Absolute 0.5 0.0 - 1.3 K/uL    Eosinophils Absolute 0.1 0.0 - 0.6 K/uL    Basophils Absolute 0.1 0.0 - 0.2 K/uL   Comprehensive Metabolic Panel w/ Reflex to MG    Collection Time: 05/09/22 10:40 PM   Result Value Ref Range    Sodium 131 (L) 136 - 145 mmol/L    Potassium reflex Magnesium 4.5 3.5 - 5.1 mmol/L    Chloride 98 (L) 99 - 110 mmol/L    CO2 20 (L) 21 - 32 mmol/L    Anion Gap 13 3 - 16    Glucose 573 (H) 70 - 99 mg/dL    BUN 13 7 - 20 mg/dL    CREATININE 1.0 0.9 - 1.3 mg/dL    GFR Non-African American >60 >60    GFR African American >60 >60    Calcium 9.6 8.3 - 10.6 mg/dL    Total Protein 6.3 (L) 6.4 - 8.2 g/dL    Albumin 4.1 3.4 - 5.0 g/dL    Albumin/Globulin Ratio 1.9 1.1 - 2.2    Total Bilirubin 0.3 0.0 - 1.0 mg/dL    Alkaline Phosphatase 112 40 - 129 U/L    ALT 15 10 - 40 U/L    AST 13 (L) 15 - 37 U/L   Troponin    Collection Time: 05/09/22 10:40 PM   Result Value Ref Range    Troponin <0.01 <0.01 ng/mL   Protime-INR    Collection Time: 05/09/22 10:40 PM   Result Value Ref Range    Protime 9.6 (L) 9.9 - 12.7 sec    INR 0.86 (L) 0.88 - 1.12   Valproic Acid Level, Total    Collection Time: 05/09/22 10:40 PM   Result Value Ref Range    Valproic Acid Lvl <2.8 (L) 50.0 - 100.0 ug/mL   EKG 12 Lead    Collection Time: 05/09/22 11:45 PM   Result Value Ref Range    Ventricular Rate 113 BPM    Atrial Rate 113 BPM    P-R Interval 148 ms    QRS Duration 80 ms    Q-T Interval 296 ms    QTc Calculation (Bazett) 406 ms P Axis 59 degrees    R Axis 51 degrees    T Axis 58 degrees    Diagnosis       Sinus tachycardiaConfirmed by CAT MAGALLANES, Martha Holbrook ([id number]) on 5/10/2022 8:34:59 AM   POCT Glucose    Collection Time: 05/10/22  2:50 AM   Result Value Ref Range    POC Glucose 351 (H) 70 - 99 mg/dl    Performed on ACCU-CHEK    POCT Glucose    Collection Time: 05/10/22  7:50 AM   Result Value Ref Range    POC Glucose 418 (H) 70 - 99 mg/dl    Performed on ACCU-CHEK    POCT Glucose    Collection Time: 05/10/22 12:21 PM   Result Value Ref Range    POC Glucose 231 (H) 70 - 99 mg/dl    Performed on ACCU-CHEK    POCT Glucose    Collection Time: 05/10/22  5:10 PM   Result Value Ref Range    POC Glucose 521 (H) 70 - 99 mg/dl    Performed on ACCU-CHEK    Blood Gas, Arterial    Collection Time: 05/10/22  6:30 PM   Result Value Ref Range    pH, Arterial 7.383 7.350 - 7.450    pCO2, Arterial 41.4 35.0 - 45.0 mmHg    pO2, Arterial 38.4 (LL) 75.0 - 108.0 mmHg    HCO3, Arterial 24.7 21.0 - 29.0 mmol/L    Base Excess, Arterial -0.5 -3.0 - 3.0 mmol/L    Hemoglobin, Art, Extended 14.2 13.5 - 17.5 g/dL    O2 Sat, Arterial 74.2 (L) >92 %    Carboxyhgb, Arterial 1.2 0.0 - 1.5 %    Methemoglobin, Arterial 0.5 <1.5 %    TCO2, Arterial 25.9 Not Established mmol/L    O2 Therapy Unknown    Basic Metabolic Panel    Collection Time: 05/10/22  7:00 PM   Result Value Ref Range    Sodium 128 (L) 136 - 145 mmol/L    Potassium 4.2 3.5 - 5.1 mmol/L    Chloride 93 (L) 99 - 110 mmol/L    CO2 22 21 - 32 mmol/L    Anion Gap 13 3 - 16    Glucose 499 (H) 70 - 99 mg/dL    BUN 10 7 - 20 mg/dL    CREATININE 0.7 (L) 0.9 - 1.3 mg/dL    GFR Non-African American >60 >60    GFR African American >60 >60    Calcium 8.7 8.3 - 10.6 mg/dL   Beta-Hydroxybutyrate    Collection Time: 05/10/22  7:00 PM   Result Value Ref Range    Beta-Hydroxybutyrate 0.08 0.00 - 0.27 mmol/L   POCT Glucose    Collection Time: 05/10/22  8:27 PM   Result Value Ref Range    POC Glucose 303 (H) 70 - 99 mg/dl Performed on ACCU-CHEK    CBC    Collection Time: 05/11/22  4:46 AM   Result Value Ref Range    WBC 7.5 4.0 - 11.0 K/uL    RBC 4.76 4.20 - 5.90 M/uL    Hemoglobin 13.6 13.5 - 17.5 g/dL    Hematocrit 39.5 (L) 40.5 - 52.5 %    MCV 82.9 80.0 - 100.0 fL    MCH 28.7 26.0 - 34.0 pg    MCHC 34.6 31.0 - 36.0 g/dL    RDW 14.1 12.4 - 15.4 %    Platelets 385 284 - 603 K/uL    MPV 8.5 5.0 - 10.5 fL   Hemoglobin A1c    Collection Time: 05/11/22  4:46 AM   Result Value Ref Range    Hemoglobin A1C 14.3 See comment %    eAG 363.7 mg/dL   Lipid panel - fasting    Collection Time: 05/11/22  4:46 AM   Result Value Ref Range    Cholesterol, Total 113 0 - 199 mg/dL    Triglycerides 148 0 - 150 mg/dL    HDL 31 (L) 40 - 60 mg/dL    LDL Calculated 52 <100 mg/dL    VLDL Cholesterol Calculated 30 Not Established mg/dL   Basic Metabolic Panel w/ Reflex to MG    Collection Time: 05/11/22  4:46 AM   Result Value Ref Range    Sodium 131 (L) 136 - 145 mmol/L    Potassium reflex Magnesium 4.2 3.5 - 5.1 mmol/L    Chloride 97 (L) 99 - 110 mmol/L    CO2 20 (L) 21 - 32 mmol/L    Anion Gap 14 3 - 16    Glucose 309 (H) 70 - 99 mg/dL    BUN 12 7 - 20 mg/dL    CREATININE 0.7 (L) 0.9 - 1.3 mg/dL    GFR Non-African American >60 >60    GFR African American >60 >60    Calcium 8.6 8.3 - 10.6 mg/dL   POCT Glucose    Collection Time: 05/11/22  7:51 AM   Result Value Ref Range    POC Glucose 280 (H) 70 - 99 mg/dl    Performed on ACCU-CHEK          COMMERCIAL INSURANCE:     Psych BC: Psychological assessment, psychiatric/medication management, individual, family, couples, and group counseling    Call [phone] or [phone] (toll free)   Email [email or web address] County Regional Medical Center Locations:  reggie Degroot:  380 [facility] 19   o Absaraka Robert:  Thomson Jacey, [address]:  [address], [place] 986: [address], 9778 [facility], [facility]0 [address] [facility]: Karolynedmar, 4488 [facility] [address]:  2407 [facility], [address] [place] [address] Way:  107 [facility] 1285 [facility], [address] [place] [address] Avenue:  [address] [facility]ters[facility] 685 [facility]. [address]    [address] Location:   [facility]:  [address] 2200 [facility], [address] [place] [address] Counseling Services: Outpatient counseling and addiction assessment and services, including individual, group, family/couples therapy as well as DBT. Accepts many private insurance plans and offers a tiered payment plan for self-pay patients. Call for verification that your plan is accepted. - PHONE [phone] or [phone]  - [phone]  - Multiple locations:  o Js Adelaida: 718 [facility], 301 [facility] [address], [place], [address]: Isrrael, [address], [facility], [address]:  1275 [facility] , [address], [place], [address]:  [address], Jazmyne Escoto, [address]:  100 [facility] [address] [facility], [address]: 200 Merline Albright , 45 [facility], [address]: [address], [address], [place], 55 [facility] Counseling: We serve those struggling with depression, anxiety, loss and other life transitions, sexual abuse and other trauma, relationship difficulties, addictions, school performance, attendance, and behavior problems, and career uncertainty, anger control and stress management, among other issues. - Call: [phone]  - Multiple Locations:  o Angelica: [address], [facility] Nagi, [address] [facility] [address]: [address], [facility], 128 [facility] Street: [facility] 4488 Jonny , [place], 122 [facility]        PRIVATE PAY:    [facility] [place] [facility]:  We provide the highest quality of therapeutic professional services by focusing on the integration of emotional, relational, behavioral, and mental health [address], [address]ckay Denson 3        [address]  -Call: [phone]  -Multiple Locations  - Physicians [facility]   [address] 639 [facility], Daniella Denson [address] [place] [address]  John Cummins 4575, Janiya, 8001 [facility]iat[facility]  [address] [place] [address] Collin Wayne. [address]  [phone]           Uninsured:  [address] [place] [address], These services are for individuals who are non-insured. 9909 [facility], [place], 42 [facility]. [phone].       [email or web address]

## 2022-05-11 NOTE — PROGRESS NOTES
Hospitalist Progress Note      PCP: Jean Tiwari, VY, APRN    Date of Admission: 5/9/2022    Chief Complaint: [facility] Course: 43 yo admitted with sz like activity. Felt to have malingering vs pseudo sz  Seen by psych today    Subjective: having headaches. Not seizing right now      Medications:  Reviewed    Infusion Medications    dextrose       Scheduled Medications    busPIRone  5 mg Oral TID    aspirin  81 mg Oral Daily    Or    aspirin  300 mg Rectal Daily    lisinopril  5 mg Oral Daily    insulin lispro  0-12 Units SubCUTAneous TID WC    insulin lispro  0-6 Units SubCUTAneous Nightly    enoxaparin  30 mg SubCUTAneous BID    atorvastatin  40 mg Oral Daily    divalproex  500 mg Oral Q12H    nicotine  1 patch TransDERmal Daily    insulin glargine  20 Units SubCUTAneous Nightly    albuterol sulfate HFA  2 puff Inhalation 4x daily     PRN Meds: ondansetron **OR** ondansetron, polyethylene glycol, dextrose bolus **OR** dextrose bolus, glucagon (rDNA), dextrose, traMADol      Intake/Output Summary (Last 24 hours) at 5/11/2022 1220  Last data filed at 5/11/2022 1048  Gross per 24 hour   Intake 880 ml   Output 5875 ml   Net -4995 ml       Physical Exam Performed:    /76   Pulse 86   Temp 97.5 °F (36.4 °C) (Oral)   Resp 18   Ht 5' 11\" (1.803 m)   Wt 223 lb 12.3 oz (101.5 kg)   SpO2 96%   BMI 31.21 kg/m²     General appearance: No apparent distress, appears stated age and cooperative. HEENT: Pupils equal, round, and reactive to light. Conjunctivae/corneas clear. Neck: Supple, with full range of motion. No jugular venous distention. Trachea midline. Respiratory:  Normal respiratory effort. Clear to auscultation, bilaterally without Rales/Wheezes/Rhonchi. Cardiovascular: Regular rate and rhythm with normal S1/S2 without murmurs, rubs or gallops. Abdomen: Soft, non-tender, non-distended with normal bowel sounds. Musculoskeletal: No clubbing, cyanosis or edema bilaterally. Full range of motion without deformity. Skin: Skin color, texture, turgor normal.  No rashes or lesions. Neurologic:  Neurovascularly intact without any focal sensory/motor deficits. Cranial nerves: II-XII intact, grossly non-focal.  Psychiatric: Alert and oriented, thought content appropriate, normal insight  Capillary Refill: Brisk,3 seconds, normal   Peripheral Pulses: +2 palpable, equal bilaterally       Labs:   Recent Labs     05/09/22 2240 05/11/22  0446   WBC 7.5 7.5   HGB 14.1 13.6   HCT 41.0 39.5*    165     Recent Labs     05/09/22  2240 05/10/22  1900 05/11/22  0446   * 128* 131*   K 4.5 4.2 4.2   CL 98* 93* 97*   CO2 20* 22 20*   BUN 13 10 12   CREATININE 1.0 0.7* 0.7*   CALCIUM 9.6 8.7 8.6     Recent Labs     05/09/22 2240   AST 13*   ALT 15   BILITOT 0.3   ALKPHOS 112     Recent Labs     05/09/22 2240   INR 0.86*     Recent Labs     05/09/22 2240   TROPONINI <0.01       Urinalysis:    No results found for: Floyde Palomo, BACTERIA, RBCUA, BLOODU, [address], [place] [address]    Radiology:  MRI brain without contrast   Final Result   No acute infarct or other acute intracranial process. XR CHEST PORTABLE   Final Result   No radiographic evidence of acute pulmonary disease. CTA HEAD NECK W CONTRAST   Final Result   Addendum 1 of 1   ADDENDUM:   Additional images reviewed. No change to impression. Final   1. Suboptimal arterial opacification. No large vessel intracranial   occlusion or high grade stenosis at the level of the [id number] given   limitation. 2. No significant stenoses of the internal carotid arteries. 3. Other findings as described. CT HEAD WO CONTRAST   Final Result   1. Hypoattenuation in the left cerebellum of uncertain chronicity. MRI   recommended to exclude acute infarct. 2. No acute hemorrhage or midline shift. 3. Slight dilatation of the lateral and 3rd ventricles of uncertain   chronicity. 4. Other findings as described. Critical results were called by Dr. Robina Wooten, DO to Dr. Ricky Recinos on   5/9/2022 at 22:33. Assessment/Plan:      1  Conversion d/w-continue depakote. Add buspar today      Pt re-assurance. Home tomorrow    2  Cephalgia-avoid opoids. toradol     3  Dm type 2-controlled on meds    4  HTN-stable on present medications    D/w RN    DVT Prophylaxis: lovenox  Diet: ADULT DIET;  Regular  Code Status: Full Code    PT/OT Eval Status:ordered    Dispo - 2-3 d    Zakia Nelson MD

## 2022-05-11 NOTE — PROGRESS NOTES
Physical Therapy  Facility/Department: [facility] PROGRESSIVE CARE  Physical Therapy Treatment Note    Name: Lissy Joseph  : 1980  MRN: [id number]  Date of Service: 2022    Discharge Recommendations:  Patient would benefit from continued therapy after discharge (3-5x/wk)   PT Equipment Recommendations  Other: will continue to assess; may need a RW at discharge    Lissy Joseph scored a 14/24 on the AM-PAC short mobility form. Current research shows that an AM-PAC score of 17 or less is typically not associated with a discharge to the patient's home setting. Based on the patient's AM-PAC score and their current functional mobility deficits, it is recommended that the patient have 3-5 sessions per week of Physical Therapy at d/c to increase the patient's independence. Please see assessment section for further patient specific details. If patient discharges prior to next session this note will serve as a discharge summary. Please see below for the latest assessment towards goals. Patient Diagnosis(es): The primary encounter diagnosis was Left-sided weakness. A diagnosis of Left sided numbness was also pertinent to this visit. Past Medical History:  has a past medical history of COPD (chronic obstructive pulmonary disease) ([address] CVA (cerebral vascular accident) ([address] Diabetes mellitus ([address] Gout, HTN (hypertension), Seizure disorder ([address] and Skin cancer. Past Surgical History:  has a past surgical history that includes Neck surgery. Assessment   Body Structures, Functions, Activity Limitations Requiring Skilled Therapeutic Intervention: Decreased functional mobility   Assessment: Pt's MRI (-) for acute infarct. Pt continues to display ataxic like movements with mobility tasks, no tremors noted with sitting and static standing; pt able to use commode and cleanse himself and pull up his underware with CGA/SBA.  He continues to be a fall risk and appears to need more assist than his wife can provide; Per psych note unsure if malligering vs conversion disorder? .  Recommend continued therapy 3-5x/wk to address deficits; do no feel pt would tolerate an aggressive approach due to his headaches and desire to lay in bed. Will continue to follow  Therapy Prognosis: Fair  Requires PT Follow-Up: Yes  Activity Tolerance  Activity Tolerance Comments: limited by his headache and reports he was going to have a seizure if he didn't lay down     Plan   Plan  Plan: 3-5 times per week  Current Treatment Recommendations: Functional mobility training  Safety Devices  Type of Devices: Call light within reach,Nurse notified,Left in bed,Bed alarm in place     Restrictions  Restrictions/Precautions  Restrictions/Precautions: Fall Risk     Subjective   General  Chart Reviewed: Yes  Additional Pertinent Hx: per H&P note: \"36 y.o. male with PMHx of COPD, DVA, DM, HTN and seizure disorder presented to [facility] with left side weakness. When patient arrived to the emergency department he reported left side weakness and difficulty speaking. He stated the symptoms began just prior to arrival during an argument with his girlfriend. ED staff activated code stroke and ED physician spoke with [facility] stroke team.  Dr. Lauren Mendez with the stroke team reviewed records and evaluated the patient via telehealth. Apparently this patient had multiple similar presentations in the past and the stroke team did not believe this to be an acute stroke. The CAT scan does show a small hypodensity in the cerebellum; the lesion is much older than the 30 minutes of symptoms that he currently has and decided against tPA. They did recommend admitting the patient and working up the lesion which could be a stroke since he does have multiple risk factors for stroke. Patient is a diabetic and has not been on medications in more than a year. \"  Response To Previous Treatment: Patient with no complaints from previous session.   Family / Caregiver Present: No  Referring Practitioner: VY Coyne CNP  Referral Date : 05/09/22  Follows Commands: Within Functional Limits  Subjective  Subjective: pt agreeable to working with therapy; however throughout the session pt kept saying \"I have a headache thats going to bring on a seizure         Social/Functional History  Social/Functional History  Lives With: Significant other  Type of Home:  (Hotel)  Bathroom Shower/Tub: Tub/Shower unit  Bathroom Toilet: Standard  Home Equipment:  (no DME)  ADL Assistance: Independent  Homemaking Assistance: Independent  Ambulation Assistance: Independent (no AD)  Transfer Assistance: Independent  Active : Yes  Occupation: Full time employment  Type of Occupation: Manager  Vision/Hearing  Vision Exceptions: Wears glasses at all times  Hearing: Within functional limits    Cognition   Orientation  Overall Orientation Status: Within Functional Limits  Cognition  Overall Cognitive Status: WFL  Cognition Comment: has ataxic like behaviors intermittently throughout session and kept saying \"I'm going to have another seizure\"     Objective   Pulse: 86  Heart Rate Source: Monitor  BP: 115/76  BP Location: Right Arm  Patient Position: Semi fowlers  MAP (Calculated): 89  Resp: 18  SpO2: 98 %  O2 Device: None (Room air)                          Bed Mobility Training  Bed Mobility Training: Yes  Overall Level of Assistance: Stand-by assistance  Supine to Sit: Stand-by assistance  Sit to Supine: Stand-by assistance  Transfer Training  Transfer Training: Yes  Overall Level of Assistance: Contact-guard assistance;Assist X2 (Overall required same assist (CGA x 2) with and without RW but did better with RW due to arms being held steady. Pt with significant ataxic behavior but this seems to be inconsistent in presentation.   No loss of balance noted even with \"ataxic\" mvt.)  Interventions: Safety awareness training  Sit to Stand: Contact-guard assistance;Assist X2  Stand to Sit: Contact-guard assistance;Assist X2  Bed to Chair: Contact-guard assistance;Assist X2  Toilet Transfer: Contact-guard assistance;Assist X2  Bed mobility  Supine to Sit: Stand by assistance  Sit to Supine: Stand by assistance  Transfers  Sit to Stand: Contact guard assistance;Stand by assistance  Stand to sit: Contact guard assistance;Stand by assistance  Ambulation  Surface: level tile  Device: No Device  Assistance: Contact guard assistance;2 Person assistance  Quality of Gait: ataxic like movements; no overt LOB  Distance: 10'x2 and 3'  Comments: pt had a large BM in toilet; able to cleanse self and then washed hands standing at sink  More Ambulation?: Yes  Ambulation 2  Surface - 2: level tile  Device 2: Rolling Walker  Assistance 2: Contact guard assistance;2 Person assistance  Quality of Gait 2: pt continues to demonstrate ataxic like movements with walker but no overt LOB  Distance: 50'x2  Comments: assisted with positioning in bed for comfort; pt declined sitting up in chair     Balance  Comments: sup for sitting balance (no tremors with sitting);  CGA for static standing with and without walker (no tremors statically)           OutComes Score                                                  -PAC Score  -PAC Inpatient Mobility Raw Score : 14 (05/10/22 1031)  AM-PAC Inpatient T-Scale Score : 38.1 (05/10/22 1031)  Mobility Inpatient CMS 0-100% Score: 61.29 (05/10/22 1031)  Mobility Inpatient CMS G-Code Modifier : CL (05/10/22 1031)          Goals  Long Term Goals  Time Frame for Long term goals : by discharge  Long term goal 1: bed mob Ind  Long term goal 2: transfers Ind  Long term goal 3: amb 100-200' without AD MI  Patient Goals   Patient goals : pt did not state a goal       Education  Patient Education  Education Given To: Patient  Education Provided Comments: discussed SNF for therapy vs returning home  Education Method: Verbal  Education Outcome: Verbalized understanding      Therapy Time   Individual Concurrent Group Co-treatment   Time In 4772         Time Out 0470         Minutes 39                 CLAUDIO BATRES PT    Electronically signed by CLAUDIO BATRES PT on 5/11/2022 at 11:56 AM

## 2022-05-11 NOTE — PROGRESS NOTES
Speech Language/Pathology   SPEECH LANGUAGE AND CLINICAL BEDSIDE SWALLOWING TREATMENT  Speech Therapy Department     Bola Russo  AGE: 42 y.o. GENDER: male  : 1980  [id number]  EPISODE DATE:  2022    MEDICAL DIAGNOSIS: SEIZURE  Chief Complaint   Patient presents with    Seizures     ? ONSET: 2022       PAST MEDICAL HISTORY        Diagnosis Date    [facility] (chronic obstructive [facility] disease) [facility]     CVA (cerebral vascular accident) [facility]     Diabetes mellitus ([facility]     Gout     HTN (hypertension)     Seizure disorder ([facility]     Skin cancer        PAST SURGICAL HISTORY    Past Surgical History:   Procedure Laterality Date    NECK SURGERY         ALLERGIES    Allergies   Allergen Reactions    Benadryl [Diphenhydramine] Anaphylaxis       Chart Review:   History of Present Illness:  Mitch Zayas a 42 y. o. male who presents with altered mental status. The patient apparently got into an argument w/ his fiance yesterday and stormed off across the bridge into [place].  She says that she called him on the phone a short time later and told her he was about to have a seizure and was slurring/stuttering his speech.  When they arrived to pick him up his entire body was shaking but was still functioning, trying to reach for something to drink and was getting mad since they would not let him drink saying \"one sip, one sip\".  She thought the R side of his mouth was drooping down.  It was at this point they decided to come to the ED in order to be evaluated. /84.  No fevers.  CT head was w/out any acute findings.  CTA head/neck negative. Today he remains symptomatic. He apparently has been diagnosed w/ seizures in the past when in [facility] [address] is little documentation of this. [facility] is on VPA though I'm not sure if this is for mood or actually seizures.  His fiance says his seizures are usually facial twitching.  He has been seen in the ED and admitted frequently.  Several of those records have been reviewed. There was concern for malingering previously. PMH previously documented includes conversion disorder, substance abuse, personality disorder, and previous craniotomy for chiari malformation.     Current diet:   Regular/thin     Imaging:  Chest X-ray: 5/9/2022 No radiographic evidence of acute pulmonary disease     Head CT:  5/9/2022   Impression   1. Hypoattenuation in the left cerebellum of uncertain chronicity.  MRI   recommended to exclude acute infarct. 2. No acute hemorrhage or midline shift. 3. Slight dilatation of the lateral and 3rd ventricles of uncertain   chronicity. 4. Other findings as described      MRI Brain: 5/10/2022  Impression   No acute infarct or other acute intracranial process. History/Prior Level of Function:   Living Status: Lives With: Significant other in a Hotel; IND w ADLs, active , employed full time as a manager  Prior Dysphagia History: none; regular/thin prior  Prior Speech History: pt and sig other report no issues with speech or fluency PTA            Subjective:     Current diet: Regular/Thin  Pt/staff statements regarding diet tolerance: Adequate tolerance reported    Cognitive-communication treatment progress: Cog reported at baseline; persistent dysfluency reported/observed    Objective: Current Therapy Impression of progress/goal status    IMPRESSIONS  5/10/2022 Dysphagia: Oral and pharyngeal phases appear grossly WFL with regular dry textures and thin liquids; functional mastication and oral clearance despite edentulism. Potential for delayed swallow initiation, but no overt s/s of aspiration. Pt does report occasional globus with solids, inconsistently alleviated with liquid wash, which is new since admission. Mild dysfluency characterized by inconsistent pauses and short rushes at sentence onset; however, at times pt sentences are fluent and at ease.  Inconsistent mild dysarthria characterized by occasional articulatory imprecision at sentence level. Deficits appear exacerbated when pt is frustrated or distracted. Recommended Diet:  Regular/Thin  Medication Administration: PO  Compensatory Strategies:   Fully upright during and remain upright 30 minutes after PO    Status of Dysphagia Goals:   Pt will functionally tolerate recommended diet with no overt clinical s/s of aspiration not addressed  Goal Status: Speech and Language Goals  Pt will improve speech fluency in connected speech via graded tasks to >90% not met  Pt will improve articulatory precision in connected speech via graded tasks to >90% not met    Prognosis  Good for ST with continued start training    Education  Completed on recs/plan; understanding verbalized    Discharge Recommendations:  Pt will benefit from continued skilled Speech Therapy for Speech and Dysphagia services, prior to returning home. Please accept this as Speech Therapy Discharge status, if pt is discharged prior to next therapy session.     Objective: Assessment/Therapy activities    Treatment this session  Objective:  COMPREHENSION  Auditory Comprehension: [x]WFL []Mild   [] Moderate  []Severe  []To be assessed    EXPRESSION  Verbal Expression: [x]WFL []Mild   [] Moderate  []Severe  []To be assessed  Pragmatics/Social Functioning: [x]WFL []Mild   [] Moderate  []Severe  []To be assessed      MOTOR SPEECH  Motor Speech: []WFL [x]Mild   [] Moderate  []Severe  []To be assessed  Dysfluent ~25%: introduced easy onset; completes with benefit noted    COGNITION  []Unable to be assessed secondary to Aphasia   Overall Orientation : [x]WFL []Mild   [] Moderate  []Severe  []To be assessed   []Unable to be assessed secondary to Aphasia   Attention: [x]WFL []Mild   [] Moderate  []Severe  []To be assessed  Attention adequate for task completion  Memory: [x]WFL []Mild   [] Moderate  []Severe  []To be assessed  Problem Solving: [x]WFL []Mild   [] Moderate  []Severe  []To be assessed  Safety/Judgement: [x]WFL []Mild   [] Moderate  []Severe  []To be assessed    DYSPHAGIA TREATMENT   Positioning: NA   PO Trials: NA  · Thin Liquids  · Nectar thick liquids  · Honey Thick liquids  · Puree   · Soft food  · Regular food  Dysphagia Tx: NA  · Direct Dysphagia tx  · Dysphagia ex  · Training in compensatory strategies  · Pt response to ex/training:      Plan   Plan:    Plan of care: 3-5 times during acute care stay unless otherwise notified.      Timed Code Treatment: 0  Total Treatment Time: 15     Signed:  Farzana Martell [place], [address] [facility], [place], operated by [facility], [id number]  Speech-Language Pathologist  Portable phone: [phone]  05/11/22 3:00 PM

## 2022-05-11 NOTE — PROGRESS NOTES
Occupational Therapy  Facility/Department: [facility] 5 PROGRESSIVE CARE  Occupational Therapy Initial Assessment  Should patient be discharged prior to another treatment session, this note shall serve as the discharge summary. Name: Jose Velázquez  : 1980  MRN: [id number]  Date of Service: 2022    Discharge Recommendations:  Patient would benefit from continued therapy after discharge,Continue to assess pending progress,3-5 sessions per week  OT Equipment Recommendations  Other: will continue to assess       Patient Diagnosis(es): The primary encounter diagnosis was Left-sided weakness. A diagnosis of Left sided numbness was also pertinent to this visit. Past Medical History:  has a past medical history of COPD (chronic obstructive pulmonary disease) ([facility] [address] CVA (cerebral vascular accident) ([facility] [address] Diabetes mellitus ([facility] [address] Gout, HTN (hypertension), Seizure disorder ([facility] [address] and Skin cancer. Past Surgical History:  has a past surgical history that includes Neck surgery. Assessment               Plan   Plan  Times per Week: 3-5  Current Treatment Recommendations: Strengthening,Balance training,ROM,Stair training,Functional mobility training,Endurance training,Self-Care / Marden Nab re-education,Safety education & training,Patient/Caregiver education & training,Equipment evaluation, education, & procurement     Restrictions  Restrictions/Precautions  Restrictions/Precautions: Fall Risk    Subjective   General  Chart Reviewed: Yes  Patient assessed for rehabilitation services?: Yes  Additional Pertinent Hx: Per H&P: \"36 y.o. male with PMHx of COPD, DVA, DM, HTN and seizure disorder presented to [facility] with left side weakness. When patient arrived to the emergency department he reported left side weakness and difficulty speaking. He stated the symptoms began just prior to arrival during an argument with his girlfriend.   ED staff activated code stroke and ED physician spoke with [facility] stroke team.. Lorean Snare Lorean Snare The CAT scan does show a small hypodensity in the cerebellum; the lesion is much older than the 30 minutes of symptoms that he currently has and decided against tPA. \" MRI pending  Family / Caregiver Present: Yes  Referring Practitioner: VY Garrett CNP  Diagnosis: TIA/CVA r/o  Subjective  Subjective: Pt seen bedside, flat affect, and agreeable to therapy. Pt reporting initial dizziness when sitting EOB and standing but resolved with time. General Comment  Comments: Per RN ok for therapy. Social/Functional History  Social/Functional History  Lives With: Significant other  Type of Home:  (Hotel)  Bathroom Shower/Tub: Tub/Shower unit  Bathroom Toilet: Standard  Home Equipment:  (no DME)  ADL Assistance: Independent  Homemaking Assistance: Independent  Ambulation Assistance: Independent (no AD)  Transfer Assistance: Independent  Active : Yes  Occupation: Full time employment  Type of Occupation: Manager       Objective                Safety Devices  Type of Devices: Call light within reach;Nurse notified; Left in bed;Bed alarm in place  Bed Mobility Training  Bed Mobility Training: Yes  Overall Level of Assistance: Stand-by assistance  Supine to Sit: Stand-by assistance  Sit to Supine: Stand-by assistance  Transfer Training  Transfer Training: Yes  Overall Level of Assistance: Contact-guard assistance;Assist X2 (Overall required same assist (CGA x 2) with and without RW but did better with RW due to arms being held steady. Pt with significant ataxic behavior but this seems to be inconsistent in presentation.   No loss of balance noted even with \"ataxic\" mvt.)  Interventions: Safety awareness training  Sit to Stand: Contact-guard assistance;Assist X2  Stand to Sit: Contact-guard assistance;Assist X2  Bed to Chair: Contact-guard assistance;Assist X2  Toilet Transfer: Contact-guard assistance;Assist X2        ADL  Toileting: Contact guard assistance (CGA for stance due to ataxic behaviors. Pt managed clothing and completed pericare without assist of OT)     Activity Tolerance  Activity Tolerance: Patient tolerated evaluation without incident           Cognition  Overall Cognitive Status: WFL  Arousal/Alertness: Delayed responses to stimuli  Following Commands: Follows one step commands with increased time; Follows one step commands with repetition  Memory: Decreased recall of recent events;Decreased recall of biographical Information  Safety Judgement: Decreased awareness of need for safety  Cognition Comment: has ataxic like behaviors intermittently throughout session and kept saying \"I'm going to have another seizure\"                  Education Given To: Patient  Education Provided: Role of Therapy;Plan of Care;Transfer Training  Education Provided Comments: Cues on walker safety  Education Method: Demonstration;Verbal  Barriers to Learning: Other (Comment) (insight)  Education Outcome: Continued education needed                             OutComes Score         Anabel Martinez scored a 17/24 on the -[facility] ADL Inpatient form. Current research shows that an AM-PAC score of 17 or less is typically not associated with a discharge to the patient's home setting. Based on the patient's AM-PAC score and their current ADL deficits, it is recommended that the patient have 3-5 sessions per week of Occupational Therapy at d/c to increase the patient's independence unless symptoms resolve. Please see assessment section for further patient specific details. If patient discharges prior to next session this note will serve as a discharge summary. Please see below for the latest assessment towards goals.                                             AM-PAC Score        AM-[facility] Inpatient Daily Activity Raw Score: 17 (05/11/22 1148)  AM-PAC Inpatient ADL T-Scale Score : 37.26 (05/11/22 1148)  ADL Inpatient CMS 0-100% Score: 50.11 (05/11/22 1148)  ADL Inpatient CMS G-Code Modifier : CK (05/11/22 1148)    Goals  Short Term Goals  Time Frame for Short term goals: Prior to DC:   Short Term Goal 1: Pt will complete ADL transfers/mobility with supervision  Short Term Goal 2: Pt will tolerate standing > 5 min for functional task with supervision  Short Term Goal 3: Pt will complete toileting with supervision  Short Term Goal 4: Pt will complete LB Dressing with supervision  Patient Goals   Patient goals : to return home       Therapy Time   Individual Concurrent Group Co-treatment   Time In 5664         Time Out 1148         Minutes 31         Timed Code Treatment Minutes: [address], OT

## 2022-05-12 VITALS
OXYGEN SATURATION: 97 % | HEIGHT: 71 IN | TEMPERATURE: 97.8 F | HEART RATE: 95 BPM | BODY MASS INDEX: 30.71 KG/M2 | WEIGHT: 219.36 LBS | RESPIRATION RATE: 16 BRPM | SYSTOLIC BLOOD PRESSURE: 110 MMHG | DIASTOLIC BLOOD PRESSURE: 71 MMHG

## 2022-05-12 LAB
GLUCOSE BLD-MCNC: 275 MG/DL (ref 70–99)
GLUCOSE BLD-MCNC: 507 MG/DL (ref 70–99)
PERFORMED ON: ABNORMAL
PERFORMED ON: ABNORMAL

## 2022-05-12 PROCEDURE — 97530 THERAPEUTIC ACTIVITIES: CPT | Performed by: PHYSICAL THERAPIST

## 2022-05-12 PROCEDURE — 97116 GAIT TRAINING THERAPY: CPT | Performed by: PHYSICAL THERAPIST

## 2022-05-12 PROCEDURE — 96376 TX/PRO/DX INJ SAME DRUG ADON: CPT

## 2022-05-12 PROCEDURE — 94760 N-INVAS EAR/PLS OXIMETRY 1: CPT

## 2022-05-12 PROCEDURE — 6360000002 HC RX W HCPCS: Performed by: NURSE PRACTITIONER

## 2022-05-12 PROCEDURE — 6370000000 HC RX 637 (ALT 250 FOR IP): Performed by: INTERNAL MEDICINE

## 2022-05-12 PROCEDURE — G0378 HOSPITAL OBSERVATION PER HR: HCPCS

## 2022-05-12 PROCEDURE — 6370000000 HC RX 637 (ALT 250 FOR IP): Performed by: NURSE PRACTITIONER

## 2022-05-12 PROCEDURE — 94640 AIRWAY INHALATION TREATMENT: CPT

## 2022-05-12 PROCEDURE — 96372 THER/PROPH/DIAG INJ SC/IM: CPT

## 2022-05-12 RX ORDER — DIVALPROEX SODIUM 500 MG/1
500 TABLET, DELAYED RELEASE ORAL EVERY 12 HOURS
Qty: 90 TABLET | Refills: 0 | Status: SHIPPED | OUTPATIENT
Start: 2022-05-12

## 2022-05-12 RX ORDER — TRAZODONE HYDROCHLORIDE 50 MG/1
50 TABLET ORAL NIGHTLY
Qty: 30 TABLET | Refills: 0 | Status: SHIPPED | OUTPATIENT
Start: 2022-05-12

## 2022-05-12 RX ORDER — ATORVASTATIN CALCIUM 40 MG/1
40 TABLET, FILM COATED ORAL DAILY
Qty: 30 TABLET | Refills: 0 | Status: SHIPPED | OUTPATIENT
Start: 2022-05-12

## 2022-05-12 RX ORDER — IBUPROFEN 400 MG/1
400 TABLET ORAL EVERY 6 HOURS PRN
Qty: 30 TABLET | Refills: 0 | Status: SHIPPED | OUTPATIENT
Start: 2022-05-12

## 2022-05-12 RX ORDER — LISINOPRIL 5 MG/1
5 TABLET ORAL DAILY
Qty: 30 TABLET | Refills: 0 | Status: SHIPPED | OUTPATIENT
Start: 2022-05-12

## 2022-05-12 RX ORDER — BUSPIRONE HYDROCHLORIDE 5 MG/1
5 TABLET ORAL 3 TIMES DAILY
Qty: 90 TABLET | Refills: 0 | Status: SHIPPED | OUTPATIENT
Start: 2022-05-12

## 2022-05-12 RX ORDER — BUDESONIDE AND FORMOTEROL FUMARATE DIHYDRATE 160; 4.5 UG/1; UG/1
2 AEROSOL RESPIRATORY (INHALATION) 2 TIMES DAILY
Qty: 10.2 G | Refills: 0 | Status: SHIPPED | OUTPATIENT
Start: 2022-05-12

## 2022-05-12 RX ADMIN — ONDANSETRON 4 MG: 4 TABLET, ORALLY DISINTEGRATING ORAL at 11:21

## 2022-05-12 RX ADMIN — ASPIRIN 81 MG: 81 TABLET, COATED ORAL at 09:21

## 2022-05-12 RX ADMIN — INSULIN LISPRO 6 UNITS: 100 INJECTION, SOLUTION INTRAVENOUS; SUBCUTANEOUS at 09:25

## 2022-05-12 RX ADMIN — ALBUTEROL SULFATE 2 PUFF: 90 AEROSOL, METERED RESPIRATORY (INHALATION) at 09:14

## 2022-05-12 RX ADMIN — LISINOPRIL 5 MG: 5 TABLET ORAL at 09:22

## 2022-05-12 RX ADMIN — TRAMADOL HYDROCHLORIDE 50 MG: 50 TABLET, COATED ORAL at 05:10

## 2022-05-12 RX ADMIN — TRAMADOL HYDROCHLORIDE 50 MG: 50 TABLET, COATED ORAL at 11:21

## 2022-05-12 RX ADMIN — ENOXAPARIN SODIUM 30 MG: 100 INJECTION SUBCUTANEOUS at 09:22

## 2022-05-12 RX ADMIN — ONDANSETRON 4 MG: 4 TABLET, ORALLY DISINTEGRATING ORAL at 04:08

## 2022-05-12 RX ADMIN — DIVALPROEX SODIUM 500 MG: 500 TABLET, DELAYED RELEASE ORAL at 09:22

## 2022-05-12 RX ADMIN — ONDANSETRON 4 MG: 2 INJECTION INTRAMUSCULAR; INTRAVENOUS at 04:14

## 2022-05-12 RX ADMIN — ATORVASTATIN CALCIUM 40 MG: 40 TABLET, FILM COATED ORAL at 09:21

## 2022-05-12 RX ADMIN — BUSPIRONE HYDROCHLORIDE 5 MG: 5 TABLET ORAL at 09:22

## 2022-05-12 ASSESSMENT — PAIN DESCRIPTION - PAIN TYPE
TYPE: CHRONIC PAIN

## 2022-05-12 ASSESSMENT — PAIN DESCRIPTION - ORIENTATION
ORIENTATION: ANTERIOR;POSTERIOR

## 2022-05-12 ASSESSMENT — PAIN - FUNCTIONAL ASSESSMENT
PAIN_FUNCTIONAL_ASSESSMENT: ACTIVITIES ARE NOT PREVENTED

## 2022-05-12 ASSESSMENT — PAIN DESCRIPTION - LOCATION
LOCATION: HEAD

## 2022-05-12 ASSESSMENT — PAIN DESCRIPTION - FREQUENCY
FREQUENCY: CONTINUOUS

## 2022-05-12 ASSESSMENT — PAIN SCALES - GENERAL
PAINLEVEL_OUTOF10: 5
PAINLEVEL_OUTOF10: 9
PAINLEVEL_OUTOF10: 5
PAINLEVEL_OUTOF10: 0
PAINLEVEL_OUTOF10: 5

## 2022-05-12 ASSESSMENT — PAIN DESCRIPTION - DESCRIPTORS
DESCRIPTORS: SPASM
DESCRIPTORS: SHARP
DESCRIPTORS: SHARP

## 2022-05-12 ASSESSMENT — PAIN DESCRIPTION - ONSET
ONSET: ON-GOING

## 2022-05-12 ASSESSMENT — PAIN SCALES - WONG BAKER
WONGBAKER_NUMERICALRESPONSE: 0
WONGBAKER_NUMERICALRESPONSE: 0

## 2022-05-12 NOTE — DISCHARGE SUMMARY
Hospital Medicine Discharge Summary    Patient ID: Anabel Martinez      Patient's PCP: VY Ordonez, APRN    Admit Date: 5/9/2022     Discharge Date:   5/12/2022      Admitting Provider: Lisbeth Dalton DO     Discharge Provider: Ricki Peoples MD     Discharge Diagnoses:  Conversion disorder versus malingering with symptoms of seizure-like activity  Anxiety  Depression/mood disorder  Diabetes mellitus type 2  Hypertension  Poor social situation       Active Hospital Problems    Diagnosis     Conversion disorder [F44.9]      Priority: Medium    Malingering [Z76.5]      Priority: Medium    Anxiety [F41.9]      Priority: Medium    Left-sided weakness [R53.1]      Priority: Medium       The patient was seen and examined on day of discharge and this discharge summary is in conjunction with any daily progress note from day of discharge. Hospital Course: Patient is a 40-year-old male admitted to the hospital with seizure-like activity. Seen by neurology not felt to be seizures but more likely conversion disorder. Psychiatry was consulted and they confirmed that this was conversion disorder versus malingering. Advised to continue Depakote for mood stabilization with addition of BuSpar. Was discharged from the hospital on 5/12/2022  I ordered all of his medications as he reportedly ran out. We did do meds to beds  Patient does seem pain seeking and I did not prescribe any controlled substances such as Ultram            Physical Exam Performed:     /71   Pulse 93   Temp 97.7 °F (36.5 °C) (Oral)   Resp 16   Ht 5' 11\" (1.803 m)   Wt 219 lb 5.7 oz (99.5 kg)   SpO2 97%   BMI 30.59 kg/m²       General appearance:  No apparent distress, appears stated age and cooperative. HEENT:  Normal cephalic, atraumatic without obvious deformity. Pupils equal, round, and reactive to light. Extra ocular muscles intact. Conjunctivae/corneas clear. Neck: Supple, with full range of motion.  No jugular venous distention. Trachea midline. Respiratory:  Normal respiratory effort. Clear to auscultation, bilaterally without Rales/Wheezes/Rhonchi. Cardiovascular:  Regular rate and rhythm with normal S1/S2 without murmurs, rubs or gallops. Abdomen: Soft, non-tender, non-distended with normal bowel sounds. Musculoskeletal:  No clubbing, cyanosis or edema bilaterally. Full range of motion without deformity. Skin: Skin color, texture, turgor normal.  No rashes or lesions. Neurologic:  Neurovascularly intact without any focal sensory/motor deficits. Cranial nerves: II-XII intact, grossly non-focal.  Psychiatric:  Alert and oriented, thought content appropriate, normal insight  Capillary Refill: Brisk,< 3 seconds   Peripheral Pulses: +2 palpable, equal bilaterally       Labs: For convenience and continuity at follow-up the following most recent labs are provided:      CBC:    Lab Results   Component Value Date    WBC 7.5 05/11/2022    HGB 13.6 05/11/2022    HCT 39.5 05/11/2022     05/11/2022       Renal:    Lab Results   Component Value Date     05/11/2022    K 4.2 05/11/2022    CL 97 05/11/2022    CO2 20 05/11/2022    BUN 12 05/11/2022    CREATININE 0.7 05/11/2022    CALCIUM 8.6 05/11/2022         Significant Diagnostic Studies    Radiology:   MRI brain without contrast   Final Result   No acute infarct or other acute intracranial process. XR CHEST PORTABLE   Final Result   No radiographic evidence of acute pulmonary disease. CTA HEAD NECK W CONTRAST   Final Result   Addendum 1 of 1   ADDENDUM:   Additional images reviewed. No change to impression. Final   1. Suboptimal arterial opacification. No large vessel intracranial   occlusion or high grade stenosis at the level of the [id number] given   limitation. 2. No significant stenoses of the internal carotid arteries. 3. Other findings as described. CT HEAD WO CONTRAST   Final Result   1.  Hypoattenuation in the left cerebellum of uncertain chronicity. MRI   recommended to exclude acute infarct. 2. No acute hemorrhage or midline shift. 3. Slight dilatation of the lateral and 3rd ventricles of uncertain   chronicity. 4. Other findings as described. Critical results were called by Dr. Holger Rosas DO to Dr. Lul Canada on   5/9/2022 at 22:33.                 Consults:     IP CONSULT TO PHARMACY  PHARMACY TO CHANGE BASE FLUIDS  IP CONSULT TO STROKE TEAM  IP CONSULT TO NEUROLOGY  IP CONSULT TO SPIRITUAL SERVICES  IP CONSULT TO DIABETES EDUCATOR  IP CONSULT TO PSYCHIATRY  IP CONSULT TO SPIRITUAL SERVICES  IP CONSULT TO SOCIAL WORK    Disposition:  home    Condition at Discharge: Stable    Discharge Instructions/Follow-up:  Follow up with pcp based on list provided and mental health specialists based on list provided    Code Status:  Full Code     Activity: activity as tolerated    Diet: cardiac diet and diabetic diet      Discharge Medications:     Current Discharge Medication List           Details   busPIRone (BUSPAR) 5 MG tablet Take 1 tablet by mouth 3 times daily  Qty: 90 tablet, Refills: 0              Details   budesonide-formoterol (SYMBICORT) 160-4.5 MCG/ACT AERO Inhale 2 puffs into the lungs 2 times daily  Qty: 10.2 g, Refills: 0      tiotropium (SPIRIVA) 18 MCG inhalation capsule Inhale 1 capsule into the lungs daily  Qty: 30 capsule, Refills: 0      divalproex (DEPAKOTE) 500 MG DR tablet Take 1 tablet by mouth every 12 hours  Qty: 90 tablet, Refills: 0      traZODone (DESYREL) 50 MG tablet Take 1 tablet by mouth nightly  Qty: 30 tablet, Refills: 0      metFORMIN (GLUCOPHAGE) 500 MG tablet Take 1 tablet by mouth daily  Qty: 30 tablet, Refills: 0      atorvastatin (LIPITOR) 40 MG tablet Take 1 tablet by mouth daily  Qty: 30 tablet, Refills: 0      lisinopril (PRINIVIL;ZESTRIL) 5 MG tablet Take 1 tablet by mouth daily  Qty: 30 tablet, Refills: 0      ibuprofen (ADVIL;MOTRIN) 400 MG tablet Take 1 tablet by mouth every 6 hours as needed for Pain (Severe Pain (7-10))  Qty: 30 tablet, Refills: 0             Time Spent on discharge is more than 45 minutes in the examination, evaluation, counseling and review of medications and discharge plan. Signed:    Sheri Mercado MD   5/12/2022      Thank you VY Garcia, VY for the opportunity to be involved in this patient's care. If you have any questions or concerns, please feel free to contact me at [phone].

## 2022-05-12 NOTE — PROGRESS NOTES
Pt and his fiance keep asking for juice, regular soda and snacks. When asked who this is for, they stated for Jade Lr. Discussed his elevated HAIC and monitoring his sugar filled beverages and carb intake. Their response is that he has a regular diet ordered. Discussed his blood sugar of 438. He stated he has not taken medication in over a year due to no provider. Discussed what uncontrolled blood sugars can cause. Seemed unconcerned. Played his video game on his phone during the conversation, and only response was \"Well, what can I have to drink? \"  There were multiple empty. regular pepsi cans and sugar filled bottled beverages scattered in the room.

## 2022-05-12 NOTE — CARE COORDINATION
CASE MANAGEMENT DISCHARGE SUMMARY: Discharge order noted. List of mental health providers supplied by Jyoti MCCLELLAN provided to the patient. List for PCP's provided to the patient.      DISCHARGE DATE: 5/12/22    DISCHARGED TO HOME     TRANSPORTATION: Significant other             TIME: Morning     PREFERRED PHARMACY: [address] [place] [address] [phone]              Espinoza RUIZN RN  Case Management  [phone]    Electronically signed by Espinoza Winter, RN on 5/12/2022 at 10:03 AM

## 2022-05-12 NOTE — DISCHARGE INSTR - COC
Continuity of Care Form    Patient Name: Toy Vanessa   :  1980  MRN:  [id number]    Admit date:  2022  Discharge date:  2022    Code Status Order: Full Code   Advance Directives:      Admitting Physician:  Kat Hood DO  PCP: VY Wright APRN    [facility] O[facility]-9573/9351-39  Discharging Unit Phone Number: [phone]    Emergency Contact:   Extended Emergency Contact Information  Primary Emergency Contact: Lin Patten  Mobile Phone: [phone]  Relation: Domestic Partner   needed? No  Secondary Emergency Contact: Christen Purcell  Mobile Phone: [phone]  Relation: Other   needed? No    Past Surgical History:  Past Surgical History:   Procedure Laterality Date    NECK SURGERY         Immunization History: There is no immunization history on file for this patient.     Active Problems:  Patient Active Problem List   Diagnosis Code    COPD with acute exacerbation ([facility] [address] J44.1    Left-sided weakness R53.1    Conversion disorder F44.9    Malingering Z76.5    Anxiety F41.9       Isolation/Infection:   Isolation            No Isolation          Patient Infection Status       Infection Onset Added Last Indicated Last Indicated By Review Planned Expiration Resolved Resolved By    None active    Resolved    COVID-19 (Rule Out) 21 COVID-19 (Ordered)   21 Rule-Out Test Resulted            Nurse Assessment:  Last Vital Signs: /71   Pulse 93   Temp 97.7 °F (36.5 °C) (Oral)   Resp 16   Ht 5' 11\" (1.803 m)   Wt 219 lb 5.7 oz (99.5 kg)   SpO2 97%   BMI 30.59 kg/m²     Last documented pain score (0-10 scale): Pain Level: 5  Last Weight:   Wt Readings from Last 1 Encounters:   22 219 lb 5.7 oz (99.5 kg)     Mental Status:  oriented and alert    IV Access:  - None    Nursing Mobility/ADLs:  Walking   Assisted  Transfer  Assisted  Bathing  Assisted  Dressing Assisted  Toileting  Assisted  Feeding  Independent  Med Admin  Assisted  Med Delivery   whole    Wound Care Documentation and Therapy:        Elimination:  Continence: Bowel: Yes  Bladder: Yes  Urinary Catheter: None   Colostomy/Ileostomy/Ileal Conduit: {YES / CN:30110}       Date of Last BM: 05/12/2022    Intake/Output Summary (Last 24 hours) at 5/12/2022 1144  Last data filed at 5/12/2022 0725  Gross per 24 hour   Intake 3840 ml   Output 5475 ml   Net -1635 ml     I/O last 3 completed shifts: In: 8130 [P.O.:4320]  Out: 9075 [Urine:9075]    Safety Concerns: At Risk for Falls    Impairments/Disabilities:      None    Nutrition Therapy:  Current Nutrition Therapy:   - Oral Diet:  NPO and General    Routes of Feeding: Oral  Liquids: Thin Liquids  Daily Fluid Restriction: no  Last Modified Barium Swallow with Video (Video Swallowing Test): not done    Treatments at the Time of Hospital Discharge:   Respiratory Treatments:   Oxygen Therapy:  is not on home oxygen therapy. Ventilator:    - No ventilator support    Rehab Therapies: Physical Therapy  out pt   Weight Bearing Status/Restrictions: No weight bearing restrictions  Other Medical Equipment (for information only, NOT a DME order):    Other Treatments:     Patient's personal belongings (please select all that are sent with patient):  None    RN SIGNATURE:  Electronically signed by Cici Choudhary RN on 5/12/22 at 11:48 AM EDT    CASE MANAGEMENT/SOCIAL WORK SECTION    Inpatient Status Date: ***    Readmission Risk Assessment Score:  Readmission Risk              Risk of Unplanned Readmission:  0           Discharging to Facility/ Agency   Name:   Address:  Phone:  Fax:    Dialysis Facility (if applicable)   Name:  Address:  Dialysis Schedule:  Phone:  Fax:    / signature: {Esignature:044047841}    PHYSICIAN SECTION    Prognosis: {Prognosis:7894646535}    Condition at Discharge: 50Isma Diamond Patient Condition:472388917}    Rehab Potential (if transferring to Rehab): {Prognosis:8150453014}    Recommended Labs or Other Treatments After Discharge: ***    Physician Certification: I certify the above information and transfer of Camille Jimenez  is necessary for the continuing treatment of the diagnosis listed and that he requires {Admit to Appropriate Level of Care:40203} for {GREATER/LESS:663033649} 30 days.      Update Admission H&P: {CHP DME Changes in [facility]:095315040}    PHYSICIAN SIGNATURE:  {Esignature:080742947}

## 2022-05-12 NOTE — PROGRESS NOTES
Physical Therapy  Facility/Department: [address] PROGRESSIVE CARE  Physical Therapy Initial Assessment    Name: Ramiro Estevez  : 1980  MRN: [id number]  Date of Service: 2022    Discharge Recommendations:  Outpatient PT   PT Equipment Recommendations  Equipment Needed: No    Ramiro Estevez scored a 14/24 on the AM-PAC short mobility form. Current research shows that an AM-PAC score of 18 or greater is typically associated with a discharge to the patient's home setting. Based on the patient's AM-PAC score and their current functional mobility deficits, it is recommended that the patient have 2-3 sessions per week of Physical Therapy at d/c to increase the patient's independence. At this time, this patient demonstrates the endurance and safety to discharge home with OP PT and a follow up treatment frequency of 2-3x/wk. Please see assessment section for further patient specific details. If patient discharges prior to next session this note will serve as a discharge summary. Please see below for the latest assessment towards goals. Discussed with nursing and MD that pt's significant other mentioned that they will be moving to a hotel near [facility] as they want to continue with care here. Therefore they need a little notice to get a hotel room for discharge. Patient Diagnosis(es): The primary encounter diagnosis was Left-sided weakness. Diagnoses of Left sided numbness and Conversion disorder were also pertinent to this visit. Past Medical History:  has a past medical history of COPD (chronic obstructive pulmonary disease) ([address] CVA (cerebral vascular accident) ([address] Diabetes mellitus ([address] Gout, HTN (hypertension), Seizure disorder ([address] and Skin cancer. Past Surgical History:  has a past surgical history that includes Neck surgery.     Assessment   Body Structures, Functions, Activity Limitations Requiring Skilled Therapeutic Intervention: Decreased functional mobility   Assessment: Per MD, pt's symptoms related to conversion disorder; pt amb with therapist and able to complete functional tasks with SBA; encouraged his wife to allow him to complete tasks without assist as he needs to practice normalized movements. Pt and wife agree that he would benefit from OP therapy; Have discussed with neuro OP therapist so pt can follow up as OP  Therapy Prognosis: Fair  Decision Making: Medium Complexity  Requires PT Follow-Up: Yes  Activity Tolerance  Activity Tolerance: Patient tolerated treatment well  Activity Tolerance Comments: pt had multiple c/o headache and feeling like he was going to have a seizure; discussed that the headache he is experiencing may be related to his suboccipital muscles from tightness placed on his suboccipital nerves; discussed gentle massage to ease the tension     Plan   Plan  Plan: 3-5 times per week  Current Treatment Recommendations: Functional mobility training  Safety Devices  Type of Devices: Call light within reach,Left in chair,Nurse notified,All fall risk precautions in place     Restrictions  Restrictions/Precautions  Restrictions/Precautions: Fall Risk     Subjective   General  Chart Reviewed: Yes  Additional Pertinent Hx: per H&P note: \"36 y.o. male with PMHx of COPD, DVA, DM, HTN and seizure disorder presented to [facility] with left side weakness. When patient arrived to the emergency department he reported left side weakness and difficulty speaking. He stated the symptoms began just prior to arrival during an argument with his girlfriend. ED staff activated code stroke and ED physician spoke with [facility] stroke team.  Dr. Ayana Trevizo with the stroke team reviewed records and evaluated the patient via telehealth. Apparently this patient had multiple similar presentations in the past and the stroke team did not believe this to be an acute stroke.   The CAT scan does show a small hypodensity in the cerebellum; the lesion is much older than the 30 minutes of symptoms that he currently has and decided against tPA. They did recommend admitting the patient and working up the lesion which could be a stroke since he does have multiple risk factors for stroke. Patient is a diabetic and has not been on medications in more than a year. \"  Response To Previous Treatment: Patient with no complaints from previous session.   Family / Caregiver Present: No  Referring Practitioner: Christianna Duverney, APRN - CNP  Referral Date : 05/09/22  Follows Commands: Within Functional Limits  Subjective  Subjective: pt agreeable to working with therapy; however throughout the session pt kept saying \"I have a headache thats going to bring on a seizure         Social/Functional History  Social/Functional History  Lives With: Significant other  Type of Home:  (Hotel)  Bathroom Shower/Tub: Tub/Shower unit  Bathroom Toilet: Standard  Home Equipment:  (no DME)  ADL Assistance: Independent  Homemaking Assistance: Independent  Ambulation Assistance: Independent (no AD)  Transfer Assistance: Independent  Active : Yes  Occupation: Full time employment  Type of Occupation: Manager  Vision/Hearing  Vision Exceptions: Wears glasses at all times  Hearing: Within functional limits    Cognition   Orientation  Overall Orientation Status: Within Functional Limits  Cognition  Overall Cognitive Status: WFL  Cognition Comment: has ataxic like behaviors intermittently throughout session and kept saying \"I'm going to have another seizure\"     Objective   Pulse: 93  Heart Rate Source: Monitor  BP: 110/71  BP Location: Left upper arm  Patient Position: Semi fowlers  MAP (Calculated): 84  Resp: 18  SpO2: 96 %  O2 Device: None (Room air)                             Bed mobility  Supine to Sit: Modified independent  Sit to Supine: Modified independent  Transfers  Sit to Stand: Stand by assistance  Stand to sit: Stand by assistance  Ambulation  Surface: level tile  Device: No Device  Assistance: Contact guard assistance;Stand by assistance  Quality of Gait: initially CGA progressing to SBA; continues to have ataxic like symptoms  Distance: 150' x2 and 25'  Comments: Had a long discussion with pt and his significant other regarding need to get up and complete functional mobility tasks and visualize normal movement patterns; discussed that his brain will try to nomalize his movements so its important to not give him more assist than he needs so he doesn't become dependent on his wife as he is able to move well despite the tremorous movements     Balance  Comments: MI for sitting balance; SBA for standing balance           OutComes Score                                                  AM-PAC Score  AM-PAC Inpatient Mobility Raw Score : 14 (05/10/22 1031)  AM-PAC Inpatient T-Scale Score : 38.1 (05/10/22 1031)  Mobility Inpatient CMS 0-100% Score: 61.29 (05/10/22 1031)  Mobility Inpatient CMS G-Code Modifier : CL (05/10/22 1031)          Goals  Long Term Goals  Time Frame for Long term goals : by discharge  Long term goal 1: bed mob Ind - met  Long term goal 2: transfers Ind - ongoing  Long term goal 3: amb 100-200' without AD MI - ongoing  Patient Goals   Patient goals : pt did not state a goal       Education  Patient Education  Education Given To: Patient; Family  Education Provided Comments: discussed in depth need to practice normalized movements and try to control his anxiety; discussed benefits of OP PT to further normalize his movements  Education Method: Verbal  Education Outcome: Verbalized understanding      Therapy Time   Individual Concurrent Group Co-treatment   Time In 0745         Time Out 0856         Minutes 70                 CLAUDIO BATRES PT    Electronically signed by CLAUDIO BATRES PT on 5/12/2022 at 8:59 AM

## 2023-03-04 ENCOUNTER — APPOINTMENT (OUTPATIENT)
Dept: GENERAL RADIOLOGY | Age: 43
End: 2023-03-04
Payer: MEDICARE

## 2023-03-04 ENCOUNTER — APPOINTMENT (OUTPATIENT)
Dept: CT IMAGING | Age: 43
End: 2023-03-04
Payer: MEDICARE

## 2023-03-04 ENCOUNTER — HOSPITAL ENCOUNTER (EMERGENCY)
Age: 43
Discharge: HOME | End: 2023-03-05
Attending: EMERGENCY MEDICINE
Payer: MEDICARE

## 2023-03-04 VITALS
DIASTOLIC BLOOD PRESSURE: 77 MMHG | HEART RATE: 86 BPM | BODY MASS INDEX: 29.17 KG/M2 | TEMPERATURE: 98.1 F | RESPIRATION RATE: 22 BRPM | SYSTOLIC BLOOD PRESSURE: 127 MMHG | HEIGHT: 71 IN | WEIGHT: 208.34 LBS | OXYGEN SATURATION: 96 %

## 2023-03-04 DIAGNOSIS — R55 NEAR SYNCOPE: ICD-10-CM

## 2023-03-04 DIAGNOSIS — Z59.00 HOMELESSNESS: Primary | ICD-10-CM

## 2023-03-04 DIAGNOSIS — R45.89 NON-SUICIDAL DEPRESSED MOOD: ICD-10-CM

## 2023-03-04 LAB
A/G RATIO: 1.3 (ref 1.1–2.2)
ALBUMIN SERPL-MCNC: 3.3 G/DL (ref 3.4–5)
ALP BLD-CCNC: 88 U/L (ref 40–129)
ALT SERPL-CCNC: 9 U/L (ref 10–40)
AMPHETAMINE SCREEN, URINE: NORMAL
ANION GAP SERPL CALCULATED.3IONS-SCNC: 7 MMOL/L (ref 3–16)
AST SERPL-CCNC: 10 U/L (ref 15–37)
BARBITURATE SCREEN URINE: NORMAL
BASOPHILS ABSOLUTE: 0.1 K/UL (ref 0–0.2)
BASOPHILS RELATIVE PERCENT: 0.7 %
BENZODIAZEPINE SCREEN, URINE: NORMAL
BILIRUB SERPL-MCNC: 0.6 MG/DL (ref 0–1)
BILIRUBIN URINE: NEGATIVE
BLOOD, URINE: NEGATIVE
BUN BLDV-MCNC: 9 MG/DL (ref 7–20)
CALCIUM SERPL-MCNC: 8.9 MG/DL (ref 8.3–10.6)
CANNABINOID SCREEN URINE: NORMAL
CHLORIDE BLD-SCNC: 101 MMOL/L (ref 99–110)
CLARITY: CLEAR
CO2: 24 MMOL/L (ref 21–32)
COCAINE METABOLITE SCREEN URINE: NORMAL
COLOR: ABNORMAL
CREAT SERPL-MCNC: 0.6 MG/DL (ref 0.9–1.3)
EOSINOPHILS ABSOLUTE: 0.2 K/UL (ref 0–0.6)
EOSINOPHILS RELATIVE PERCENT: 1.7 %
ETHANOL: NORMAL MG/DL (ref 0–0.08)
FENTANYL SCREEN, URINE: NORMAL
GFR SERPL CREATININE-BSD FRML MDRD: >60 ML/MIN/{1.73_M2}
GLUCOSE BLD-MCNC: 221 MG/DL (ref 70–99)
GLUCOSE BLD-MCNC: 247 MG/DL (ref 70–99)
GLUCOSE URINE: 500 MG/DL
HCT VFR BLD CALC: 40.2 % (ref 40.5–52.5)
HEMOGLOBIN: 13.7 G/DL (ref 13.5–17.5)
INR BLD: 0.94 (ref 0.87–1.14)
KETONES, URINE: NEGATIVE MG/DL
LEUKOCYTE ESTERASE, URINE: NEGATIVE
LYMPHOCYTES ABSOLUTE: 2.7 K/UL (ref 1–5.1)
LYMPHOCYTES RELATIVE PERCENT: 29.4 %
Lab: NORMAL
MCH RBC QN AUTO: 29 PG (ref 26–34)
MCHC RBC AUTO-ENTMCNC: 34.1 G/DL (ref 31–36)
MCV RBC AUTO: 85.1 FL (ref 80–100)
METHADONE SCREEN, URINE: NORMAL
MICROSCOPIC EXAMINATION: ABNORMAL
MONOCYTES ABSOLUTE: 0.6 K/UL (ref 0–1.3)
MONOCYTES RELATIVE PERCENT: 7 %
NEUTROPHILS ABSOLUTE: 5.6 K/UL (ref 1.7–7.7)
NEUTROPHILS RELATIVE PERCENT: 61.2 %
NITRITE, URINE: NEGATIVE
OPIATE SCREEN URINE: NORMAL
OXYCODONE URINE: NORMAL
PDW BLD-RTO: 14.8 % (ref 12.4–15.4)
PERFORMED ON: ABNORMAL
PH UA: 6
PH UA: 6 (ref 5–8)
PHENCYCLIDINE SCREEN URINE: NORMAL
PLATELET # BLD: 181 K/UL (ref 135–450)
PMV BLD AUTO: 8.5 FL (ref 5–10.5)
POTASSIUM REFLEX MAGNESIUM: 3.8 MMOL/L (ref 3.5–5.1)
PROTEIN UA: NEGATIVE MG/DL
PROTHROMBIN TIME: 12.5 SEC (ref 11.7–14.5)
RAPID INFLUENZA  B AGN: NEGATIVE
RAPID INFLUENZA A AGN: NEGATIVE
RBC # BLD: 4.73 M/UL (ref 4.2–5.9)
SALICYLATE, SERUM: <0.3 MG/DL (ref 15–30)
SARS-COV-2, NAAT: NOT DETECTED
SODIUM BLD-SCNC: 132 MMOL/L (ref 136–145)
SPECIFIC GRAVITY UA: 1.02 (ref 1–1.03)
TOTAL PROTEIN: 5.9 G/DL (ref 6.4–8.2)
TROPONIN: <0.01 NG/ML
URINE REFLEX TO CULTURE: ABNORMAL
URINE TYPE: ABNORMAL
UROBILINOGEN, URINE: 2 E.U./DL
WBC # BLD: 9.1 K/UL (ref 4–11)

## 2023-03-04 PROCEDURE — 36415 COLL VENOUS BLD VENIPUNCTURE: CPT

## 2023-03-04 PROCEDURE — 81003 URINALYSIS AUTO W/O SCOPE: CPT

## 2023-03-04 PROCEDURE — 73080 X-RAY EXAM OF ELBOW: CPT

## 2023-03-04 PROCEDURE — 85610 PROTHROMBIN TIME: CPT

## 2023-03-04 PROCEDURE — 80179 DRUG ASSAY SALICYLATE: CPT

## 2023-03-04 PROCEDURE — 71045 X-RAY EXAM CHEST 1 VIEW: CPT

## 2023-03-04 PROCEDURE — 80307 DRUG TEST PRSMV CHEM ANLYZR: CPT

## 2023-03-04 PROCEDURE — 82077 ASSAY SPEC XCP UR&BREATH IA: CPT

## 2023-03-04 PROCEDURE — 99285 EMERGENCY DEPT VISIT HI MDM: CPT

## 2023-03-04 PROCEDURE — 85025 COMPLETE CBC W/AUTO DIFF WBC: CPT

## 2023-03-04 PROCEDURE — 87804 INFLUENZA ASSAY W/OPTIC: CPT

## 2023-03-04 PROCEDURE — 80053 COMPREHEN METABOLIC PANEL: CPT

## 2023-03-04 PROCEDURE — 6370000000 HC RX 637 (ALT 250 FOR IP): Performed by: GENERAL ACUTE CARE HOSPITAL

## 2023-03-04 PROCEDURE — 84484 ASSAY OF TROPONIN QUANT: CPT

## 2023-03-04 PROCEDURE — 87635 SARS-COV-2 COVID-19 AMP PRB: CPT

## 2023-03-04 PROCEDURE — 93005 ELECTROCARDIOGRAM TRACING: CPT | Performed by: GENERAL ACUTE CARE HOSPITAL

## 2023-03-04 PROCEDURE — 70450 CT HEAD/BRAIN W/O DYE: CPT

## 2023-03-04 RX ORDER — ACETAMINOPHEN 500 MG
1000 TABLET ORAL ONCE
Status: COMPLETED | OUTPATIENT
Start: 2023-03-04 | End: 2023-03-04

## 2023-03-04 RX ADMIN — ACETAMINOPHEN 1000 MG: 500 TABLET ORAL at 18:29

## 2023-03-04 SDOH — ECONOMIC STABILITY - HOUSING INSECURITY: HOMELESSNESS UNSPECIFIED: Z59.00

## 2023-03-04 ASSESSMENT — PAIN DESCRIPTION - ORIENTATION: ORIENTATION: RIGHT;LEFT

## 2023-03-04 ASSESSMENT — PAIN SCALES - GENERAL
PAINLEVEL_OUTOF10: 9
PAINLEVEL_OUTOF10: 8

## 2023-03-04 ASSESSMENT — PAIN DESCRIPTION - PAIN TYPE: TYPE: ACUTE PAIN

## 2023-03-04 ASSESSMENT — PAIN DESCRIPTION - LOCATION: LOCATION: BACK

## 2023-03-04 ASSESSMENT — PAIN DESCRIPTION - FREQUENCY: FREQUENCY: CONTINUOUS

## 2023-03-04 ASSESSMENT — PAIN - FUNCTIONAL ASSESSMENT: PAIN_FUNCTIONAL_ASSESSMENT: 0-10
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
## 2023-03-04 NOTE — ED PROVIDER NOTES
ED Attending Attestation Note    This patient was seen by the advanced practice provider. I personally saw the patient and performed a substantive portion of the visit including all aspects of the medical decision making. Briefly, 36 y.o. male who  has a past medical history of COPD (chronic obstructive pulmonary disease) ([facility] [address] CVA (cerebral vascular accident) ([facility] [address] Diabetes mellitus ([facility] [address] Gout, HTN (hypertension), Seizure disorder ([facility] [address] and Skin cancer. presents with EMS for evaluation of being drowsy and near syncope when the patient was trying to sign into [facility] today for depression and hallucinations. Patient denies any chest pain or shortness of breath. Denies any neck pain or back pain. No abdominal pain. No nausea or vomiting. Denies any illicit drug use but states he did take Klonopin this morning at 5 AM.  Denies any suicidal ideations. States that he is homeless and that he also has depression. States he was signing into [facility] because of his depression. Focused exam:   Gen: 36 y.o. male, NAD  HEENT: NCAT. PERRL. EOMI. CV: RRR w/o MRG  Lungs: CTAB. No incr WOB. Abdomen: Soft, nontender, nondistended. No rebound/guarding. MSK: No lower extremity swelling or calf tenderness bilaterally  Neuro GCS 15, cranial nerves II through XII intact, 5/5 strength throughout, light touch and station grossly intact, no dysarthria, no aphasia, no facial droop  Skin warm and dry  Psych: No suicidal or homicidal ideation. Normal speech pattern. The Ekg interpreted by me shows  Normal sinus rhythm with a rate of 79, normal axis, , QRS 90, QTc 463, no ST elevations, nonspecific ST changes, no ST depressions, T wave inversion inferior leads, poor R wave progression, rhythm change from sinus tachycardia normal sinus rhythm when compared EKG from 5/9/2022     MDM:   Patient seen and evaluated. History and physical as above. Nontoxic and afebrile.   Patient presents Left message for patient to return call.      HST printed and sent to scanning.     Titration order sent to [place], referral requested.    for near syncope. Patient is alert and answering questions appropriately during my evaluation. Vital signs are normal without any tachycardia, tachypnea or fever. Tolerating room air. Normal blood pressure 119/79.    3:40 PM EST  Patient has been refusing blood draws as well as not cooperating with exams. Patient also attempting to manipulate the staff. Discussed with patient that he was sent to the emergency room for help with near syncope and that if he would like to have care that he needs to consent and allow blood draws to be performed. Patient states understanding.    5:01 PM EST  Patient signed out to Dr. Ann Marie Cross at shift change. Please see separate documentation for final results and disposition. For further details of the patient's emergency department visit, please see the advanced practice provider's documentation. Denia Merchant MD     This report has been produced using speech recognition software and may contain errors related to that system including errors in grammar, punctuation, and spelling, as well as words and phrases that may be inappropriate. If there are any questions or concerns please feel free to contact the dictating provider for clarification.         Denia Merchant MD  03/04/23 5365

## 2023-03-04 NOTE — ED NOTES
This RN and 2 ER techs made 5 attempts to straight stick the patient for blood and were not successful. Contacted lab to come and draw.      Mary Fuentes RN  03/04/23 4827

## 2023-03-04 NOTE — ED NOTES
Called into pt's room per primary RN, pt immediately argumentative with this RN. Pt was asked to calm down and re-approach the concern. Pt then stated \"Im done\"  I gave pt a few seconds and then stated, \"sir, are you going to let us get your blood\"  He then stated, \"I never said I wouldn't\"  This RN restated his previous comment, and then he stated \" I said Im done arguing\"  Again this rn approached ability to get blood, and stated he never stopped anyone. I told the pt that there have been several techs in and nurses, and now myself, pt continued to get verbally aggressive, throwing hands and bobbing his head towards his RN. Pt was accusing primary RN of refusing to do things that I just had witnessed primary RN attempting to do (lab work, attempting to do NIH scale)  Pt continued to attempt to control the room, and being manipulative, pt would put words in to mouths of staff, that were never communicated. Pt was requested to control his anger in which pt continued to be verbally aggressive. This RN stated, that shortly, we would re-attempt the situation. As I was leaving the room, pt stated he wanted to be discharged. This was communicated to Aristeo May NP. Will address with primary RN to approach attending.       Ofe Allred RN  03/04/23 3365

## 2023-03-04 NOTE — ED NOTES
Patient stated that he is not able to urinate at this time due to being dehydrated.       Kaela Condon RN  03/04/23 5642

## 2023-03-04 NOTE — ED PROVIDER NOTES
[address]        Pt Name: Santa Hililard  MRN: [id number]  [place] 1980  Date of evaluation: 3/4/2023  Provider: VY Salgado - CNP  PCP: VY Peoples, VY  Note Started: 2:29 PM EST 3/4/23       I have seen and evaluated this patient with my supervising physician Xochilt Collado MD.      [address]       Chief Complaint   Patient presents with    Loss of Consciousness     Patient was brought in by EMS from the lobby-triage at [facility]. The facility called 911 due to the patient going in and out of consciousness. Denies taking anything medication or drugs other than his klonopin at 5 am.  Hx of MI, COPD, depression and diabetes. Patient is homeless. HISTORY OF PRESENT ILLNESS: 1 or more Elements     History From: Patient/EMS record  Limitations to history : Altered Mental Status    Santa Hilliard is a 36 y.o. male who presents to the emergency department today via EMS from [facility] behavioral for evaluation of altered mental status. Patient with history of homelessness. Unable to obtain complete review of systems due to to patient's mental status. Difficult to ascertain whether patient cannot or will not answer questions. Patient states that he went to [facility] behavioral today due to depression. Staff at [facility] behavioral contacted EMS after they noticed patient \"going in and out of consciousness\". Nursing Notes were all reviewed and agreed with or any disagreements were addressed in the HPI. REVIEW OF SYSTEMS :      Review of Systems   Unable to perform ROS: Mental status change (Altered mental status/history of psychiatric illness)     Positives and Pertinent negatives as per HPI.      SURGICAL HISTORY     Past Surgical History:   Procedure Laterality Date    NECK SURGERY         CURRENTMEDICATIONS       Previous Medications    ATORVASTATIN (LIPITOR) 40 MG TABLET    Take 1 tablet by mouth daily    BUDESONIDE-FORMOTEROL (SYMBICORT) 160-4.5 MCG/ACT AERO    Inhale 2 puffs into the lungs 2 times daily    BUSPIRONE (BUSPAR) 5 MG TABLET    Take 1 tablet by mouth 3 times daily    DIVALPROEX (DEPAKOTE) 500 MG DR TABLET    Take 1 tablet by mouth every 12 hours    IBUPROFEN (ADVIL;MOTRIN) 400 MG TABLET    Take 1 tablet by mouth every 6 hours as needed for Pain (Severe Pain (7-10))    LISINOPRIL (PRINIVIL;ZESTRIL) 5 MG TABLET    Take 1 tablet by mouth daily    METFORMIN (GLUCOPHAGE) 500 MG TABLET    Take 1 tablet by mouth daily    TIOTROPIUM (SPIRIVA) 18 MCG INHALATION CAPSULE    Inhale 1 capsule into the lungs daily    TRAZODONE (DESYREL) 50 MG TABLET    Take 1 tablet by mouth nightly       ALLERGIES     Benadryl [diphenhydramine]    FAMILYHISTORY     No family history on file. SOCIAL HISTORY       Social History     Tobacco Use    Smoking status: Never    Smokeless tobacco: Never   Vaping Use    Vaping Use: Never used   Substance Use Topics    Alcohol use: Not Currently    Drug use: Never       SCREENINGS        Elvin Coma Scale  Eye Opening: Spontaneous  Best Verbal Response: Confused  Best Motor Response: Obeys commands  [place] Coma Scale Score: 14                CIWA Assessment  BP: 119/79 (Simultaneous filing. User may not have seen previous data.)  Heart Rate: 89           PHYSICAL EXAM  1 or more Elements     ED Triage Vitals [03/04/23 1323]   BP Temp Temp Source Heart Rate Resp SpO2 Height Weight   119/79 98.1 °F (36.7 °C) Oral 88 25 97 % 5' 11\" (1.803 m) 208 lb 5.4 oz (94.5 kg)       Physical Exam  Vitals and nursing note reviewed. Constitutional:       Appearance: Normal appearance. He is ill-appearing. Comments: Unkempt, appears chronically ill   HENT:      Head: Normocephalic and atraumatic. Right Ear: External ear normal.      Left Ear: External ear normal.      Nose: Nose normal.      Mouth/Throat:      Mouth: Mucous membranes are dry.       Pharynx: Oropharynx is clear. Eyes:      General:         Right eye: No discharge. Left eye: No discharge. Extraocular Movements: Extraocular movements intact. Conjunctiva/sclera: Conjunctivae normal.      Pupils: Pupils are equal, round, and reactive to light. Cardiovascular:      Rate and Rhythm: Normal rate and regular rhythm. Pulses: Normal pulses. Heart sounds: Normal heart sounds. Pulmonary:      Effort: Pulmonary effort is normal. No respiratory distress. Breath sounds: Normal breath sounds. Abdominal:      General: Bowel sounds are normal.      Palpations: Abdomen is soft. Tenderness: There is no abdominal tenderness. There is no right CVA tenderness or left CVA tenderness. Musculoskeletal:         General: Normal range of motion. Left elbow: Swelling present. No deformity, effusion or lacerations. Normal range of motion. Tenderness present. Cervical back: Normal range of motion and neck supple. No rigidity or tenderness. Right lower leg: No edema. Left lower leg: No edema. Skin:     General: Skin is warm and dry. Capillary Refill: Capillary refill takes less than 2 seconds. Neurological:      General: No focal deficit present. Mental Status: He is confused. GCS: GCS eye subscore is 3. GCS verbal subscore is 4. GCS motor subscore is 6. Motor: No seizure activity. Comments: Drowsy but arouses easily to verbal stimuli    History of previous CVA with residual left-sided weakness   Psychiatric:         Attention and Perception: He perceives auditory hallucinations. Mood and Affect: Mood is depressed. Affect is flat. Speech: Speech is delayed and slurred. Behavior: Behavior is withdrawn. Thought Content: Thought content does not include homicidal or suicidal ideation. Cognition and Memory: Cognition is impaired. Memory is impaired. Judgment: Judgment is impulsive and inappropriate. DIAGNOSTIC RESULTS   LABS:    Labs Reviewed   POCT GLUCOSE - Abnormal; Notable for the following components:       Result Value    POC Glucose 247 (*)     All other components within normal limits   COVID-19, RAPID   RAPID INFLUENZA A/B ANTIGENS   CBC WITH AUTO DIFFERENTIAL   COMPREHENSIVE METABOLIC PANEL W/ REFLEX TO MG FOR LOW K   TROPONIN   PROTIME-INR   ETHANOL   SALICYLATE LEVEL   URINE DRUG SCREEN   URINALYSIS WITH REFLEX TO CULTURE   POCT GLUCOSE       When ordered only abnormal lab results are displayed. All other labs were within normal range or not returned as of this dictation. EKG: When ordered, EKG's are interpreted by the Emergency Department Physician in the absence of a cardiologist.  Please see their note for interpretation of EKG. RADIOLOGY:   Non-plain film images such as CT, Ultrasound and MRI are read by the radiologist. Plain radiographic images are visualized and preliminarily interpreted by the ED Provider with the below findings:        Interpretation per the Radiologist below, if available at the time of this note:    XR ELBOW LEFT (MIN 3 VIEWS)   Preliminary Result   Soft tissue swelling overlying the olecranon with associated calcifications. Findings are most consistent with gouty olecranon bursitis. No erosions at   this time. XR CHEST PORTABLE   Final Result   No acute process. CT HEAD WO CONTRAST   Final Result   No acute intracranial abnormality. No results found. No results found. PROCEDURES   Unless otherwise noted below, none     Procedures    CRITICAL CARE TIME (.cctime)   There was a high probability of life-threatening clinical deterioration in the patient's condition requiring my urgent intervention. I personally saw the patient and independently provided 13 minutes of non-concurrent critical care out of the total shared critical care time provided, excluding separately reportable procedures.          PAST MEDICAL HISTORY has a past medical history of COPD (chronic obstructive pulmonary disease) ([address] CVA (cerebral vascular accident) ([address] Diabetes mellitus ([address] Gout, HTN (hypertension), Seizure disorder ([address] and Skin cancer. EMERGENCY DEPARTMENT COURSE and DIFFERENTIAL DIAGNOSIS/MDM:   Vitals:    Vitals:    03/04/23 1323 03/04/23 1324   BP: 119/79    Pulse: 88 89   Resp: 25 24   Temp: 98.1 °F (36.7 °C)    TempSrc: Oral    SpO2: 97% 98%   Weight: 208 lb 5.4 oz (94.5 kg)    Height: 5' 11\" (1.803 m)        Patient was given the following medications:  Medications   acetaminophen (TYLENOL) tablet 1,000 mg (has no administration in time range)             Is this patient to be included in the SEP-1 Core Measure due to severe sepsis or septic shock? No   Exclusion criteria - the patient is NOT to be included for SEP-1 Core Measure due to:  2+ SIRS criteria are not met    Chronic Conditions affecting care:    has a past medical history of COPD (chronic obstructive pulmonary disease) ([address] CVA (cerebral vascular accident) ([address] Diabetes mellitus ([address] Gout, HTN (hypertension), Seizure disorder ([address] and Skin cancer. CONSULTS: (Who and What was discussed)  IP CONSULT TO PHARMACY  PHARMACY TO CHANGE BASE FLUIDS      Social determinants affecting health-homelessness and mental illness    Records Reviewed (External and Source) previous records reviewed in order to gain further information regarding patient's PMH is well as his HPI. Nursing notes reviewed. [facility] wellness visit on February 14, 2023      Patient was in the hospital few weeks ago. He was having chest pain. He states that he always has chest pain. He was recommended to have a stress test which did not get done in the hospital so he left [address] He has not followed up with cardiology at this point. He has chronic cough and shortness of breath and is an avid smoker. He states he is trying to cut back.  At discharge she was on Symbicort but had not filled it in a few months. He is a diabetic supposed to be following with endocrinology although I do not see any recent notes. He states that his monitor has not been working so he has not been taking any insulin products, however I informed him that regardless of what his monitor is doing he should be on the Lantus. He seemed taken aback. Trying to get a history from this patient was difficult. He wandered with his discussion. He did not answer questions very directly. His basic issue is that he has chronic chest pain, chronic cough, and chronic dyspnea. Of note at time of my visitation with him he had 60 problems on his problem list and I have been able to correct his problem list to 31 total problems but given that I have never seen him before could be even less. CC/HPI Summary, DDx, ED Course, and Reassessment: Previous records reviewed in order to gain further information regarding patient's PMH is well as his HPI. Nursing notes reviewed. This a 60-year-old  male with history of multiple comorbid's including previous CVA with residual left-sided weakness, COPD, current smoker, polysubstance abuse, diabetes, conversion disorder, anxiety, depression, and chronic pain who presents for medical clearance. Patient presented to [facility] behavioral health reporting depression. Staff at intake contacted 911 reporting that patient \"kept going in and out\". Patient is homeless. Patient denies alcohol consumption. He denies use of any illicit drugs today. Patient denies suicidal or homicidal thoughts. He does report auditory hallucinations. Patient is selective with answering questions. Physical exam complete. Patient arrives nontoxic, afebrile, normotensive. GCS is 14. He is without any new focal neurologic deficits. Patient reports pain in his back which he states is a chronic issue. He denies chest pain or trouble breathing.   Patient denies headache, dizziness, blurred vision. He denies having any unilateral extremity weakness. He denies recent travel or known sick contacts. He denies fever, chills, or other symptoms. Differential diagnoses include but not limited to sepsis, seizure, CVA, TIA, medication side effect, intracranial hemorrhage, electrolyte derangement    Patient roomed. IV access established. Laboratory and imaging studies pending at this time. 15:20-end of shift handoff given to ED attending Dr. Kay Landry who agrees to follow-up on laboratory and imaging studies and make final disposition accordingly. I am the Primary Clinician of Record. FINAL IMPRESSION      1. Homelessness    2. Altered mental status, unspecified altered mental status type    3. Non-suicidal depressed mood          DISPOSITION/PLAN     DISPOSITION        PATIENT REFERRED TO:  No follow-up provider specified.     DISCHARGE MEDICATIONS:  New Prescriptions    No medications on file       DISCONTINUED MEDICATIONS:  Discontinued Medications    No medications on file              (Please note that portions of this note were completed with a voice recognition program.  Efforts were made to edit the dictations but occasionally words are mis-transcribed.)    VY Viveros CNP (electronically signed)        VY Viveros CNP  03/04/23 1524

## 2023-03-04 NOTE — ED TRIAGE NOTES
Patient was brought in by EMS from the lobby-triage at [facility]. The facility called 911 due to the patient going in and out of consciousness. Denies taking anything medication or drugs other than his klonopin and Celexa at 5 am.  Hx of MI, COPD, depression, seizures and diabetes. Patient is homeless.

## 2023-03-04 NOTE — ED PROVIDER NOTES
12:07 AM: I discussed the history, physical examination, laboratory and imaging studies, and treatment plan with Dr. Ronni Dimas. Ramona [place] was signed out to me in stable condition. Please see Dr. Adriana Condon documentation for details of their history, physical, and laboratory studies. Upon re-examination, a summary of Grace Rolons history, physical examination, and studies are as follows:       Patient reports that he was trying to check into a behavioral facility, [facility], and they called 911 because he was dozing off or passing in and out of consciousness. He states that he has no chest pain or shortness of breath or palpitations. No numbness or weakness of extremities or any trouble with vision, speech or balance. He does states he was nodding off repeatedly. To me he denies any other complaints. On exam heart regular rhythm lungs clear to auscultation bilaterally. Patient does have a speech impediment but is not aphasic in my opinion or dysarthric.     Labs Reviewed   CBC WITH AUTO DIFFERENTIAL - Abnormal; Notable for the following components:       Result Value    Hematocrit 40.2 (*)     All other components within normal limits   COMPREHENSIVE METABOLIC PANEL W/ REFLEX TO MG FOR LOW K - Abnormal; Notable for the following components:    Sodium 132 (*)     Glucose 221 (*)     Creatinine 0.6 (*)     Total Protein 5.9 (*)     Albumin 3.3 (*)     ALT 9 (*)     AST 10 (*)     All other components within normal limits   SALICYLATE LEVEL - Abnormal; Notable for the following components:    Salicylate, Serum <0.4 (*)     All other components within normal limits   URINALYSIS WITH REFLEX TO CULTURE - Abnormal; Notable for the following components:    Color, UA DARK YELLOW (*)     Glucose, Ur 500 (*)     Urobilinogen, Urine 2.0 (*)     All other components within normal limits   POCT GLUCOSE - Abnormal; Notable for the following components:    POC Glucose 247 (*)     All other components within normal limits COVID-19, RAPID   RAPID INFLUENZA A/B ANTIGENS   TROPONIN   PROTIME-INR   ETHANOL   URINE DRUG SCREEN   POCT GLUCOSE     XR ELBOW LEFT (MIN 3 VIEWS)   Preliminary Result   Soft tissue swelling overlying the olecranon with associated calcifications. Findings are most consistent with gouty olecranon bursitis. No erosions at   this time. XR CHEST PORTABLE   Final Result   No acute process. CT HEAD WO CONTRAST   Final Result   No acute intracranial abnormality. I personally saw this patient and performed a substantive portion of the visit including all aspects of the medical decision making. MEDICAL DECISION MAKING  History From: History from : Patient  Limitations to history : None  ED Medication Orders (From admission, onward)      Start Ordered     Status Ordering Provider    03/04/23 1400 03/04/23 1359  acetaminophen (TYLENOL) tablet 1,000 mg  ONCE         Last MAR action: Given - by Matthew Dubose on 03/04/23 at [address], [place]              Chronic Conditions: Anxiety    CONSULTS: (Who and What was discussed)  IP CONSULT TO PHARMACY  PHARMACY TO CHANGE BASE FLUIDS    Discussion with Other Profesionals : None    Social Determinants : Patient is Homeless    Records Reviewed : None    CC/HPI Summary, DDx, ED Course, and Reassessment: Patient was observed in the ER for more than 8 hours and had no further episodes of syncope nor any arrhythmias on monitor. I believe he is safe for discharge. I am the Primary Physician of Record.   Results for orders placed or performed during the hospital encounter of 03/04/23   COVID-19, Rapid    Specimen: Nasopharyngeal Swab   Result Value Ref Range    SARS-CoV-2, NAAT Not Detected Not Detected   Rapid influenza A/B antigens    Specimen: Nasopharyngeal   Result Value Ref Range    Rapid Influenza A Ag Negative Negative    Rapid Influenza B Ag Negative Negative   CBC with Auto Differential   Result Value Ref Range    WBC 9.1 4.0 - 11.0 K/uL RBC 4.73 4.20 - 5.90 M/uL    Hemoglobin 13.7 13.5 - 17.5 g/dL    Hematocrit 40.2 (L) 40.5 - 52.5 %    MCV 85.1 80.0 - 100.0 fL    MCH 29.0 26.0 - 34.0 pg    MCHC 34.1 31.0 - 36.0 g/dL    RDW 14.8 12.4 - 15.4 %    Platelets 133 730 - 279 K/uL    MPV 8.5 5.0 - 10.5 fL    Neutrophils % 61.2 %    Lymphocytes % 29.4 %    Monocytes % 7.0 %    Eosinophils % 1.7 %    Basophils % 0.7 %    Neutrophils Absolute 5.6 1.7 - 7.7 K/uL    Lymphocytes Absolute 2.7 1.0 - 5.1 K/uL    Monocytes Absolute 0.6 0.0 - 1.3 K/uL    Eosinophils Absolute 0.2 0.0 - 0.6 K/uL    Basophils Absolute 0.1 0.0 - 0.2 K/uL   Comprehensive Metabolic Panel w/ Reflex to MG   Result Value Ref Range    Sodium 132 (L) 136 - 145 mmol/L    Potassium reflex Magnesium 3.8 3.5 - 5.1 mmol/L    Chloride 101 99 - 110 mmol/L    CO2 24 21 - 32 mmol/L    Anion Gap 7 3 - 16    Glucose 221 (H) 70 - 99 mg/dL    BUN 9 7 - 20 mg/dL    Creatinine 0.6 (L) 0.9 - 1.3 mg/dL    Est, Glom Filt Rate >60 >60    Calcium 8.9 8.3 - 10.6 mg/dL    Total Protein 5.9 (L) 6.4 - 8.2 g/dL    Albumin 3.3 (L) 3.4 - 5.0 g/dL    Albumin/Globulin Ratio 1.3 1.1 - 2.2    Total Bilirubin 0.6 0.0 - 1.0 mg/dL    Alkaline Phosphatase 88 40 - 129 U/L    ALT 9 (L) 10 - 40 U/L    AST 10 (L) 15 - 37 U/L   Troponin   Result Value Ref Range    Troponin <0.01 <0.01 ng/mL   Protime-INR   Result Value Ref Range    Protime 12.5 11.7 - 14.5 sec    INR 0.94 0.87 - 1.14   Ethanol   Result Value Ref Range    Ethanol Lvl None Detected mg/dL   Salicylate   Result Value Ref Range    Salicylate, Serum <7.9 (L) 15.0 - 30.0 mg/dL   Urine Drug Screen   Result Value Ref Range    Amphetamine Screen, Urine Neg Negative <1000ng/mL    Barbiturate Screen, Ur Neg Negative <200 ng/mL    Benzodiazepine Screen, Urine Neg Negative <200 ng/mL    Cannabinoid Scrn, Ur Neg Negative <50 ng/mL    Cocaine Metabolite Screen, Urine Neg Negative <300 ng/mL    Opiate Scrn, Ur Neg Negative <300 ng/mL    PCP Screen, Urine Neg Negative <25 ng/mL Methadone Screen, Urine Neg Negative <300 ng/mL    Oxycodone Urine Neg Negative <100 ng/ml    FENTANYL SCREEN, URINE Neg Negative <5 ng/mL    pH, UA 6.0     Drug Screen Comment: see below    Urinalysis with Reflex to Culture    Specimen: Urine, clean catch   Result Value Ref Range    Color, UA DARK YELLOW (A) Straw/Yellow    Clarity, UA Clear Clear    Glucose, Ur 500 (A) Negative mg/dL    Bilirubin Urine Negative Negative    Ketones, Urine Negative Negative mg/dL    Specific Gravity, UA 1.019 1.005 - 1.030    Blood, Urine Negative Negative    pH, UA 6.0 5.0 - 8.0    Protein, UA Negative Negative mg/dL    Urobilinogen, Urine 2.0 (A) <2.0 E.U./dL    Nitrite, Urine Negative Negative    Leukocyte Esterase, Urine Negative Negative    Microscopic Examination Not Indicated     Urine Type NotGiven     Urine Reflex to Culture Not Indicated    POCT Glucose   Result Value Ref Range    POC Glucose 247 (H) 70 - 99 mg/dl    Performed on ACCU-CHEK        I estimate there is LOW risk for ACUTE CORONARY SYNDROME, INTRACRANIAL HEMORRHAGE, MALIGNANT DYSRHYTHMIA, MENINGITIS, PNEUMONIA, PULMONARY EMBOLISM, SEPSIS, SUBARACHNOID HEMORRHAGE, SUBDURAL HEMATOMA, STROKE, or URINARY TRACT INFECTION, thus I consider the discharge disposition reasonable. Darío Dumas and I have discussed the diagnosis and risks, and we agree with discharging home to follow-up with their primary doctor. We also discussed returning to the Emergency Department immediately if new or worsening symptoms occur. We have discussed the symptoms which are most concerning (e.g., changing or worsening pain, weakness, vomiting, fever) that necessitate immediate return. Final Impression    1. Homelessness    2. Non-suicidal depressed mood    3. Near syncope        Blood pressure 127/77, pulse 86, temperature 98.1 °F (36.7 °C), temperature source Oral, resp. rate 22, height 5' 11\" (1.803 m), weight 208 lb 5.4 oz (94.5 kg), SpO2 96 %.        Rene Larson MD  03/05/23 Manny Donnelly 1874

## 2023-03-04 NOTE — ED NOTES
This ER tech went in for blood work and a EKG, pt repeatively keep screaming at this tech that he does not want blood work done. This ER tech began placing stickers on pt for EKG per pt approval pt then began screaming at tech that he did not want it done. ER tech stopped immediately and began to leave room to notify nurse. Pt then started yelling out the room, \"Do the shit I don't want it done but i'm not refusing. \" ER tech began applying stickers again and pt started cussing at this tech \"fucking retard just do the shit. \" ER tech stopped applying stickers and left room. ER tech Doc Kipper to attempt to redirect for EKG and blood work.      Summer Game  03/04/23 [id number]

## 2023-03-04 NOTE — ED NOTES
ROGELIO arguelles obtained blood labs successfully via straight stick.      Summer Game  03/04/23 Cherie Madrid  03/04/23 [id number]

## 2023-03-04 NOTE — ED NOTES
Patient states that he still cannot urinate since he hasn't had anything to drink.      Geraldine Jane RN  03/04/23 9853

## 2023-03-05 LAB
EKG ATRIAL RATE: 79 BPM
EKG DIAGNOSIS: NORMAL
EKG P AXIS: 62 DEGREES
EKG P-R INTERVAL: 166 MS
EKG Q-T INTERVAL: 404 MS
EKG QRS DURATION: 90 MS
EKG QTC CALCULATION (BAZETT): 463 MS
EKG R AXIS: -28 DEGREES
EKG T AXIS: 24 DEGREES
EKG VENTRICULAR RATE: 79 BPM

## 2023-03-05 PROCEDURE — 93010 ELECTROCARDIOGRAM REPORT: CPT | Performed by: INTERNAL MEDICINE

## 2023-03-05 NOTE — ED NOTES
D/C: Order noted for d/c. Pt confirmed d/c paperwork  have correct name. Discharge and education instructions reviewed with patient. Teach-back successful. Pt verbalized understanding and signed d/c papers. Pt denied questions at this time. No acute distress noted. Patient instructed to follow-up as noted - return to emergency department if symptoms worsen. Patient verbalized understanding. Discharged per EDMD with discharge instructions. Pt discharged to private vehicle. Patient stable upon departure. Thanked patient for choosing [facility]) for care.           Nilda Wu RN  03/05/23 1107

## 2023-09-16 ENCOUNTER — APPOINTMENT (OUTPATIENT)
Dept: CT IMAGING | Facility: HOSPITAL | Age: 43
End: 2023-09-16
Payer: MEDICARE

## 2023-09-16 ENCOUNTER — APPOINTMENT (OUTPATIENT)
Dept: MRI IMAGING | Facility: HOSPITAL | Age: 43
End: 2023-09-16
Payer: MEDICARE

## 2023-09-16 ENCOUNTER — HOSPITAL ENCOUNTER (OUTPATIENT)
Facility: HOSPITAL | Age: 43
LOS: 2 days | Discharge: HOME | End: 2023-09-18
Attending: EMERGENCY MEDICINE | Admitting: STUDENT IN AN ORGANIZED HEALTH CARE EDUCATION/TRAINING PROGRAM
Payer: MEDICARE

## 2023-09-16 DIAGNOSIS — R27.0 ATAXIA: ICD-10-CM

## 2023-09-16 DIAGNOSIS — Z87.898 HISTORY OF SEIZURE: ICD-10-CM

## 2023-09-16 DIAGNOSIS — Z76.5 MALINGERING: ICD-10-CM

## 2023-09-16 DIAGNOSIS — R41.841 COGNITIVE COMMUNICATION DEFICIT: ICD-10-CM

## 2023-09-16 DIAGNOSIS — R25.1 TREMORS OF NERVOUS SYSTEM: Primary | ICD-10-CM

## 2023-09-16 DIAGNOSIS — R26.2 TROUBLE WALKING: ICD-10-CM

## 2023-09-16 DIAGNOSIS — G40.909 SEIZURE DISORDER: Chronic | ICD-10-CM

## 2023-09-16 PROBLEM — Z91.199 NON-COMPLIANT PATIENT: Status: ACTIVE | Noted: 2021-09-10

## 2023-09-16 PROBLEM — F41.9 ANXIETY: Status: ACTIVE | Noted: 2023-09-16

## 2023-09-16 PROBLEM — G93.5 CHIARI MALFORMATION TYPE I: Chronic | Status: ACTIVE | Noted: 2023-09-13

## 2023-09-16 PROBLEM — G47.00 INSOMNIA: Status: ACTIVE | Noted: 2022-09-04

## 2023-09-16 PROBLEM — G43.111 INTRACTABLE MIGRAINE WITH AURA WITH STATUS MIGRAINOSUS: Status: ACTIVE | Noted: 2021-01-25

## 2023-09-16 PROBLEM — R79.89 PSEUDOHYPONATREMIA: Status: ACTIVE | Noted: 2022-08-31

## 2023-09-16 PROBLEM — F15.959 METHAMPHETAMINE-INDUCED PSYCHOTIC DISORDER: Chronic | Status: ACTIVE | Noted: 2021-10-04

## 2023-09-16 PROBLEM — E11.59 HYPERTENSION ASSOCIATED WITH DIABETES: Chronic | Status: ACTIVE | Noted: 2020-02-04

## 2023-09-16 PROBLEM — R53.1 LEFT-SIDED WEAKNESS: Status: ACTIVE | Noted: 2022-05-10

## 2023-09-16 PROBLEM — C43.9 MELANOMA: Chronic | Status: ACTIVE | Noted: 2020-01-21

## 2023-09-16 PROBLEM — F33.3 MAJOR DEPRESSIVE DISORDER, RECURRENT, SEVERE WITH PSYCHOTIC FEATURES: Chronic | Status: ACTIVE | Noted: 2022-05-27

## 2023-09-16 PROBLEM — Z86.73 HISTORY OF CARDIOEMBOLIC CEREBROVASCULAR ACCIDENT (CVA): Status: ACTIVE | Noted: 2020-01-21

## 2023-09-16 PROBLEM — I69.354 HEMIPLEGIA AND HEMIPARESIS FOLLOWING CEREBRAL INFARCTION AFFECTING LEFT NON-DOMINANT SIDE: Chronic | Status: ACTIVE | Noted: 2021-02-23

## 2023-09-16 PROBLEM — Z72.0 TOBACCO ABUSE: Status: ACTIVE | Noted: 2019-10-29

## 2023-09-16 PROBLEM — E66.9 DIABETES MELLITUS TYPE 2 IN OBESE: Chronic | Status: ACTIVE | Noted: 2019-06-10

## 2023-09-16 PROBLEM — F15.21 METHAMPHETAMINE DEPENDENCE IN REMISSION: Chronic | Status: ACTIVE | Noted: 2023-09-13

## 2023-09-16 PROBLEM — E11.65 TYPE 2 DIABETES MELLITUS WITH HYPERGLYCEMIA: Chronic | Status: ACTIVE | Noted: 2020-06-03

## 2023-09-16 PROBLEM — E11.9 TYPE 2 DIABETES MELLITUS WITHOUT RETINOPATHY: Chronic | Status: ACTIVE | Noted: 2023-07-13

## 2023-09-16 PROBLEM — G89.29 CHRONIC PAIN OF RIGHT KNEE: Status: ACTIVE | Noted: 2020-11-17

## 2023-09-16 PROBLEM — F43.12 CHRONIC POST-TRAUMATIC STRESS DISORDER (PTSD): Status: ACTIVE | Noted: 2022-05-27

## 2023-09-16 PROBLEM — M25.561 CHRONIC PAIN OF RIGHT KNEE: Status: ACTIVE | Noted: 2020-11-17

## 2023-09-16 PROBLEM — H01.02A SQUAMOUS BLEPHARITIS OF UPPER AND LOWER EYELIDS OF BOTH EYES: Status: ACTIVE | Noted: 2023-07-13

## 2023-09-16 PROBLEM — F44.5 CONVERSION DISORDER WITH ATTACKS OR SEIZURES: Status: ACTIVE | Noted: 2023-09-16

## 2023-09-16 PROBLEM — F10.21 HISTORY OF ALCOHOLISM: Status: ACTIVE | Noted: 2021-11-15

## 2023-09-16 PROBLEM — F41.1 GENERALIZED ANXIETY DISORDER: Status: ACTIVE | Noted: 2022-05-27

## 2023-09-16 PROBLEM — J44.9 COPD (CHRONIC OBSTRUCTIVE PULMONARY DISEASE): Chronic | Status: ACTIVE | Noted: 2021-05-09

## 2023-09-16 PROBLEM — G47.33 OSA (OBSTRUCTIVE SLEEP APNEA): Status: ACTIVE | Noted: 2020-11-25

## 2023-09-16 PROBLEM — I15.2 HYPERTENSION ASSOCIATED WITH DIABETES: Chronic | Status: ACTIVE | Noted: 2020-02-04

## 2023-09-16 PROBLEM — E11.69 DIABETES MELLITUS TYPE 2 IN OBESE: Chronic | Status: ACTIVE | Noted: 2019-06-10

## 2023-09-16 PROBLEM — G81.94 HEMIPLEGIA, UNSPECIFIED AFFECTING LEFT NONDOMINANT SIDE: Chronic | Status: ACTIVE | Noted: 2019-11-04

## 2023-09-16 PROBLEM — T14.91XA SUICIDE ATTEMPT: Chronic | Status: ACTIVE | Noted: 2022-05-27

## 2023-09-16 PROBLEM — F12.20 CANNABIS DEPENDENCE: Chronic | Status: ACTIVE | Noted: 2019-06-10

## 2023-09-16 PROBLEM — M50.30 DDD (DEGENERATIVE DISC DISEASE), CERVICAL: Status: ACTIVE | Noted: 2023-07-04

## 2023-09-16 PROBLEM — F15.10 METHAMPHETAMINE ABUSE: Chronic | Status: ACTIVE | Noted: 2021-08-03

## 2023-09-16 PROBLEM — F60.9 PERSONALITY DISORDER, UNSPECIFIED: Chronic | Status: ACTIVE | Noted: 2019-06-09

## 2023-09-16 PROBLEM — H01.02B SQUAMOUS BLEPHARITIS OF UPPER AND LOWER EYELIDS OF BOTH EYES: Status: ACTIVE | Noted: 2023-07-13

## 2023-09-16 PROBLEM — F10.21 ALCOHOL USE DISORDER, SEVERE, IN EARLY REMISSION: Chronic | Status: ACTIVE | Noted: 2023-09-13

## 2023-09-16 LAB
ALBUMIN SERPL-MCNC: 4.1 G/DL (ref 3.5–5.2)
ALBUMIN/GLOB SERPL: 1.5 G/DL
ALP SERPL-CCNC: 70 U/L (ref 39–117)
ALT SERPL W P-5'-P-CCNC: 16 U/L (ref 1–41)
AMPHET+METHAMPHET UR QL: NEGATIVE
AMPHETAMINES UR QL: NEGATIVE
ANION GAP SERPL CALCULATED.3IONS-SCNC: 11 MMOL/L (ref 5–15)
ANION GAP SERPL CALCULATED.3IONS-SCNC: 14 MMOL/L (ref 5–15)
APAP SERPL-MCNC: <5 MCG/ML (ref 0–30)
AST SERPL-CCNC: 20 U/L (ref 1–40)
BARBITURATES UR QL SCN: NEGATIVE
BASOPHILS # BLD AUTO: 0.07 10*3/MM3 (ref 0–0.2)
BASOPHILS NFR BLD AUTO: 0.7 % (ref 0–1.5)
BENZODIAZ UR QL SCN: POSITIVE
BILIRUB SERPL-MCNC: 0.3 MG/DL (ref 0–1.2)
BILIRUB UR QL STRIP: NEGATIVE
BUN SERPL-MCNC: 13 MG/DL (ref 6–20)
BUN SERPL-MCNC: 14 MG/DL (ref 6–20)
BUN/CREAT SERPL: 13.7 (ref 7–25)
BUN/CREAT SERPL: 15.2 (ref 7–25)
BUPRENORPHINE SERPL-MCNC: NEGATIVE NG/ML
CALCIUM SPEC-SCNC: 8.7 MG/DL (ref 8.6–10.5)
CALCIUM SPEC-SCNC: 9.2 MG/DL (ref 8.6–10.5)
CANNABINOIDS SERPL QL: NEGATIVE
CHLORIDE SERPL-SCNC: 103 MMOL/L (ref 98–107)
CHLORIDE SERPL-SCNC: 103 MMOL/L (ref 98–107)
CK SERPL-CCNC: 288 U/L (ref 20–200)
CLARITY UR: CLEAR
CO2 SERPL-SCNC: 20 MMOL/L (ref 22–29)
CO2 SERPL-SCNC: 23 MMOL/L (ref 22–29)
COCAINE UR QL: NEGATIVE
COLOR UR: YELLOW
CREAT SERPL-MCNC: 0.92 MG/DL (ref 0.76–1.27)
CREAT SERPL-MCNC: 0.95 MG/DL (ref 0.76–1.27)
DEPRECATED RDW RBC AUTO: 39.9 FL (ref 37–54)
EGFRCR SERPLBLD CKD-EPI 2021: 101.9 ML/MIN/1.73
EGFRCR SERPLBLD CKD-EPI 2021: 105.8 ML/MIN/1.73
EOSINOPHIL # BLD AUTO: 0.19 10*3/MM3 (ref 0–0.4)
EOSINOPHIL NFR BLD AUTO: 2 % (ref 0.3–6.2)
ERYTHROCYTE [DISTWIDTH] IN BLOOD BY AUTOMATED COUNT: 12.9 % (ref 12.3–15.4)
ETHANOL BLD-MCNC: <10 MG/DL (ref 0–10)
FENTANYL UR-MCNC: NEGATIVE NG/ML
GLOBULIN UR ELPH-MCNC: 2.7 GM/DL
GLUCOSE SERPL-MCNC: 225 MG/DL (ref 65–99)
GLUCOSE SERPL-MCNC: 261 MG/DL (ref 65–99)
GLUCOSE UR STRIP-MCNC: NEGATIVE MG/DL
HCT VFR BLD AUTO: 41.9 % (ref 37.5–51)
HGB BLD-MCNC: 14.7 G/DL (ref 13–17.7)
HGB UR QL STRIP.AUTO: NEGATIVE
IMM GRANULOCYTES # BLD AUTO: 0.03 10*3/MM3 (ref 0–0.05)
IMM GRANULOCYTES NFR BLD AUTO: 0.3 % (ref 0–0.5)
KETONES UR QL STRIP: ABNORMAL
LEUKOCYTE ESTERASE UR QL STRIP.AUTO: NEGATIVE
LYMPHOCYTES # BLD AUTO: 2.55 10*3/MM3 (ref 0.7–3.1)
LYMPHOCYTES NFR BLD AUTO: 26.2 % (ref 19.6–45.3)
MAGNESIUM SERPL-MCNC: 1.6 MG/DL (ref 1.6–2.6)
MAGNESIUM SERPL-MCNC: 1.8 MG/DL (ref 1.6–2.6)
MCH RBC QN AUTO: 30.2 PG (ref 26.6–33)
MCHC RBC AUTO-ENTMCNC: 35.1 G/DL (ref 31.5–35.7)
MCV RBC AUTO: 86 FL (ref 79–97)
METHADONE UR QL SCN: NEGATIVE
MONOCYTES # BLD AUTO: 0.96 10*3/MM3 (ref 0.1–0.9)
MONOCYTES NFR BLD AUTO: 9.9 % (ref 5–12)
NEUTROPHILS NFR BLD AUTO: 5.93 10*3/MM3 (ref 1.7–7)
NEUTROPHILS NFR BLD AUTO: 60.9 % (ref 42.7–76)
NITRITE UR QL STRIP: NEGATIVE
NRBC BLD AUTO-RTO: 0 /100 WBC (ref 0–0.2)
OPIATES UR QL: NEGATIVE
OXYCODONE UR QL SCN: NEGATIVE
PCP UR QL SCN: NEGATIVE
PH UR STRIP.AUTO: 5.5 [PH] (ref 5–8)
PLATELET # BLD AUTO: 245 10*3/MM3 (ref 140–450)
PMV BLD AUTO: 10.4 FL (ref 6–12)
POTASSIUM SERPL-SCNC: 3.9 MMOL/L (ref 3.5–5.2)
POTASSIUM SERPL-SCNC: 4 MMOL/L (ref 3.5–5.2)
PROPOXYPH UR QL: NEGATIVE
PROT SERPL-MCNC: 6.8 G/DL (ref 6–8.5)
PROT UR QL STRIP: NEGATIVE
RBC # BLD AUTO: 4.87 10*6/MM3 (ref 4.14–5.8)
SALICYLATES SERPL-MCNC: <0.3 MG/DL
SODIUM SERPL-SCNC: 137 MMOL/L (ref 136–145)
SODIUM SERPL-SCNC: 137 MMOL/L (ref 136–145)
SP GR UR STRIP: 1.02 (ref 1–1.03)
TRICYCLICS UR QL SCN: NEGATIVE
TSH SERPL DL<=0.05 MIU/L-ACNC: 1.63 UIU/ML (ref 0.27–4.2)
UROBILINOGEN UR QL STRIP: ABNORMAL
VALPROATE SERPL-MCNC: 11.4 MCG/ML (ref 50–125)
WBC NRBC COR # BLD: 9.73 10*3/MM3 (ref 3.4–10.8)

## 2023-09-16 PROCEDURE — 83735 ASSAY OF MAGNESIUM: CPT | Performed by: STUDENT IN AN ORGANIZED HEALTH CARE EDUCATION/TRAINING PROGRAM

## 2023-09-16 PROCEDURE — 82077 ASSAY SPEC XCP UR&BREATH IA: CPT | Performed by: EMERGENCY MEDICINE

## 2023-09-16 PROCEDURE — 80307 DRUG TEST PRSMV CHEM ANLYZR: CPT | Performed by: EMERGENCY MEDICINE

## 2023-09-16 PROCEDURE — 70551 MRI BRAIN STEM W/O DYE: CPT

## 2023-09-16 PROCEDURE — 82550 ASSAY OF CK (CPK): CPT | Performed by: STUDENT IN AN ORGANIZED HEALTH CARE EDUCATION/TRAINING PROGRAM

## 2023-09-16 PROCEDURE — 25010000002 LORAZEPAM PER 2 MG: Performed by: EMERGENCY MEDICINE

## 2023-09-16 PROCEDURE — 80179 DRUG ASSAY SALICYLATE: CPT | Performed by: STUDENT IN AN ORGANIZED HEALTH CARE EDUCATION/TRAINING PROGRAM

## 2023-09-16 PROCEDURE — 25010000002 THIAMINE PER 100 MG: Performed by: STUDENT IN AN ORGANIZED HEALTH CARE EDUCATION/TRAINING PROGRAM

## 2023-09-16 PROCEDURE — 80053 COMPREHEN METABOLIC PANEL: CPT | Performed by: EMERGENCY MEDICINE

## 2023-09-16 PROCEDURE — 80143 DRUG ASSAY ACETAMINOPHEN: CPT | Performed by: STUDENT IN AN ORGANIZED HEALTH CARE EDUCATION/TRAINING PROGRAM

## 2023-09-16 PROCEDURE — 81003 URINALYSIS AUTO W/O SCOPE: CPT | Performed by: EMERGENCY MEDICINE

## 2023-09-16 PROCEDURE — 85025 COMPLETE CBC W/AUTO DIFF WBC: CPT | Performed by: EMERGENCY MEDICINE

## 2023-09-16 PROCEDURE — 84443 ASSAY THYROID STIM HORMONE: CPT | Performed by: EMERGENCY MEDICINE

## 2023-09-16 PROCEDURE — 96374 THER/PROPH/DIAG INJ IV PUSH: CPT

## 2023-09-16 PROCEDURE — 96372 THER/PROPH/DIAG INJ SC/IM: CPT

## 2023-09-16 PROCEDURE — 99284 EMERGENCY DEPT VISIT MOD MDM: CPT

## 2023-09-16 PROCEDURE — 80164 ASSAY DIPROPYLACETIC ACD TOT: CPT | Performed by: EMERGENCY MEDICINE

## 2023-09-16 PROCEDURE — 25010000002 ENOXAPARIN PER 10 MG: Performed by: STUDENT IN AN ORGANIZED HEALTH CARE EDUCATION/TRAINING PROGRAM

## 2023-09-16 RX ORDER — DEXTROSE MONOHYDRATE 25 G/50ML
25 INJECTION, SOLUTION INTRAVENOUS
Status: DISCONTINUED | OUTPATIENT
Start: 2023-09-16 | End: 2023-09-18 | Stop reason: HOSPADM

## 2023-09-16 RX ORDER — VALPROIC ACID 250 MG/5ML
500 SOLUTION ORAL ONCE
Status: COMPLETED | OUTPATIENT
Start: 2023-09-16 | End: 2023-09-16

## 2023-09-16 RX ORDER — BISACODYL 5 MG/1
5 TABLET, DELAYED RELEASE ORAL DAILY PRN
Status: DISCONTINUED | OUTPATIENT
Start: 2023-09-16 | End: 2023-09-18 | Stop reason: HOSPADM

## 2023-09-16 RX ORDER — SODIUM CHLORIDE 0.9 % (FLUSH) 0.9 %
10 SYRINGE (ML) INJECTION AS NEEDED
Status: DISCONTINUED | OUTPATIENT
Start: 2023-09-16 | End: 2023-09-18 | Stop reason: HOSPADM

## 2023-09-16 RX ORDER — IBUPROFEN 600 MG/1
1 TABLET ORAL
Status: DISCONTINUED | OUTPATIENT
Start: 2023-09-16 | End: 2023-09-18 | Stop reason: HOSPADM

## 2023-09-16 RX ORDER — POLYETHYLENE GLYCOL 3350 17 G/17G
17 POWDER, FOR SOLUTION ORAL DAILY PRN
Status: DISCONTINUED | OUTPATIENT
Start: 2023-09-16 | End: 2023-09-18 | Stop reason: HOSPADM

## 2023-09-16 RX ORDER — SODIUM CHLORIDE 9 MG/ML
40 INJECTION, SOLUTION INTRAVENOUS AS NEEDED
Status: DISCONTINUED | OUTPATIENT
Start: 2023-09-16 | End: 2023-09-18 | Stop reason: HOSPADM

## 2023-09-16 RX ORDER — INSULIN LISPRO 100 [IU]/ML
2-9 INJECTION, SOLUTION INTRAVENOUS; SUBCUTANEOUS
Status: DISCONTINUED | OUTPATIENT
Start: 2023-09-17 | End: 2023-09-18 | Stop reason: HOSPADM

## 2023-09-16 RX ORDER — ENOXAPARIN SODIUM 100 MG/ML
40 INJECTION SUBCUTANEOUS NIGHTLY
Status: DISCONTINUED | OUTPATIENT
Start: 2023-09-16 | End: 2023-09-18 | Stop reason: HOSPADM

## 2023-09-16 RX ORDER — NICOTINE POLACRILEX 4 MG
15 LOZENGE BUCCAL
Status: DISCONTINUED | OUTPATIENT
Start: 2023-09-16 | End: 2023-09-18 | Stop reason: HOSPADM

## 2023-09-16 RX ORDER — DIVALPROEX SODIUM 500 MG/1
500 TABLET, DELAYED RELEASE ORAL 2 TIMES DAILY
Status: ON HOLD | COMMUNITY
End: 2023-09-16

## 2023-09-16 RX ORDER — DIVALPROEX SODIUM 250 MG/1
500 TABLET, EXTENDED RELEASE ORAL DAILY
Status: DISCONTINUED | OUTPATIENT
Start: 2023-09-17 | End: 2023-09-18 | Stop reason: HOSPADM

## 2023-09-16 RX ORDER — LORAZEPAM 2 MG/ML
1 INJECTION INTRAMUSCULAR ONCE
Status: COMPLETED | OUTPATIENT
Start: 2023-09-16 | End: 2023-09-16

## 2023-09-16 RX ORDER — ACETAMINOPHEN 325 MG/1
650 TABLET ORAL EVERY 4 HOURS PRN
Status: DISCONTINUED | OUTPATIENT
Start: 2023-09-16 | End: 2023-09-18 | Stop reason: HOSPADM

## 2023-09-16 RX ORDER — AMOXICILLIN 250 MG
2 CAPSULE ORAL 2 TIMES DAILY PRN
Status: DISCONTINUED | OUTPATIENT
Start: 2023-09-16 | End: 2023-09-18 | Stop reason: HOSPADM

## 2023-09-16 RX ORDER — SODIUM CHLORIDE 0.9 % (FLUSH) 0.9 %
10 SYRINGE (ML) INJECTION EVERY 12 HOURS SCHEDULED
Status: DISCONTINUED | OUTPATIENT
Start: 2023-09-16 | End: 2023-09-18 | Stop reason: HOSPADM

## 2023-09-16 RX ORDER — BISACODYL 10 MG
10 SUPPOSITORY, RECTAL RECTAL DAILY PRN
Status: DISCONTINUED | OUTPATIENT
Start: 2023-09-16 | End: 2023-09-18 | Stop reason: HOSPADM

## 2023-09-16 RX ADMIN — Medication 10 ML: at 22:08

## 2023-09-16 RX ADMIN — VALPROIC ACID 500 MG: 250 SOLUTION ORAL at 20:40

## 2023-09-16 RX ADMIN — ACETAMINOPHEN 650 MG: 325 TABLET ORAL at 22:08

## 2023-09-16 RX ADMIN — LORAZEPAM 1 MG: 2 INJECTION INTRAMUSCULAR; INTRAVENOUS at 18:56

## 2023-09-16 RX ADMIN — ENOXAPARIN SODIUM 40 MG: 100 INJECTION SUBCUTANEOUS at 22:09

## 2023-09-16 RX ADMIN — THIAMINE HYDROCHLORIDE 500 MG: 100 INJECTION, SOLUTION INTRAMUSCULAR; INTRAVENOUS at 23:36

## 2023-09-16 NOTE — PLAN OF CARE
Received a call from ED physician Dr. Thorne regarding patient presenting with slow/stuttering speech and ongoing whole body tremors.  Last known normal sounds to be over 24 hours.  He was seen at [facility] yesterday and received a unremarkable work-up per his report.  Per his report he has a history of previous stroke, seizure disorder, meth use.  Dr. Thorne has already ordered a CT of the head.  I have recommended MRI brain.  Our team will see the patient once he is in ED room and MRI is completed if positive.       Addendum: MRI brain is negative for acute stroke. Recommend general neurology consult for tremors

## 2023-09-16 NOTE — ED PROVIDER NOTES
[place]    EMERGENCY DEPARTMENT ENCOUNTER      Pt Name: Ronnie He  MRN: [id number]  YOB: 1980  Date of evaluation: 9/16/2023  Provider: Nahum Guzman DO    CHIEF COMPLAINT       Chief Complaint   Patient presents with    Extremity Weakness       HPI  Stated Reason for Visit: Patient complains of shaky legs not able to support his weight, seen at  yesterday evening for same, no resolve.       HISTORY OF PRESENT ILLNESS  (Location/Symptom, Timing/Onset, Context/Setting, Quality, Duration, Modifying Factors, Severity.)   Ronnie He is a 42 y.o. male who presents to the emergency department for a few various complaints.  Patient states sometime yesterday afternoon evening started having difficulty with his speech, stuttering speech, difficulty getting his words out, felt off balance, increased tremors in his upper and lower extremities.  He is states he feels this started after he was recently started on atorvastatin medication.  He notes he went to [facility] of hospital, cannot remember which one last night and had work-up and imaging completed with no acute findings.  States he was sent home, scheduled to follow-up with neurologist at some point in October, Dr. Fried.  He notes a previous history of seizures but is been about a year since he has had a seizure, he is currently on Depakote and has been compliant with his medications.  He notes prior history of CVA about 4 years ago with left-sided weakness, persistent memory issues.  He is usually able to ambulate without any assistance.  He does admit to currently staying in a recovery house with a prior history of crystal meth usage.  He notes one of his children passed away 2 months ago hit by a drunk  with significant recent stressors.  He denies any recent seizure, no fever or chills, no neck pain or stiffness.  Denies any headache, vision changes.  He notes he is able to get up and get around but is very off balance,  "falling over forward into the left side.  He feels his left side is slightly weaker compared to his right, but notes that may be his normal baseline.  He states he has not been to our facility in the past.  He denies any other acute systemic complaints at this time.  History of skin cancer with resection from the right frontal forehead.  History of COPD, diabetes, compliant with his medications.  Does not believe he takes any blood thinning medications.      Nursing notes were reviewed.      PAST MEDICAL HISTORY   No past medical history on file.  CVA, diabetes, COPD, meth use    SURGICAL HISTORY     No past surgical history on file.      CURRENT MEDICATIONS       Current Facility-Administered Medications:     [COMPLETED] Insert Peripheral IV, , , Once **AND** sodium chloride 0.9 % flush 10 mL, 10 mL, Intravenous, PRN, Nahum Guzman, DO    Valproic Acid (DEPAKENE) syrup 500 mg, 500 mg, Oral, Once, Nahum Guzman, DO  No current outpatient medications on file.    ALLERGIES     Patient has no known allergies.    FAMILY HISTORY     No family history on file.       SOCIAL HISTORY       Social History     Socioeconomic History    Marital status: Single         PHYSICAL EXAM    (up to 7 for level 4, 8 or more for level 5)     Vitals:    09/16/23 1606   BP: (!) 128/109   BP Location: Right arm   Patient Position: Sitting   Pulse: 119   Resp: 19   Temp: 98.5 °F (36.9 °C)   TempSrc: Oral   SpO2: 95%   Weight: 101 kg (222 lb)   Height: 180.3 cm (71\")       Physical Exam  General : Patient is awake, alert, oriented, nontoxic-appearing, underlying neurological deficits appreciated, generalized tremors are noted  HEENT: Pupils are equally round, EOMI, conjunctivae clear, there is no injection no icterus.  Oral mucosa is moist, uvula midline  Neck: Neck is supple, full range of motion, trachea midline  Cardiac: Heart regular rate, rhythm, no murmurs, rubs, or gallops  Lungs: Lungs are clear to auscultation, there " is no wheezing, rhonchi, or rales. There is no use of accessory muscles  Chest wall: There is no tenderness to palpation over the chest wall or over ribs  Abdomen: Abdomen is soft, nontender, nondistended. There are no firm or pulsatile masses, no rebound rigidity or guarding  Musculoskeletal: Patient does not have any flaccid weakness, with movement of his bilateral upper and lower extremities there is intermittent tremors appreciated in, appears intentional in nature no focal muscle deficits are appreciated  Neuro: Patient is awake alert answer questions appropriately, stuttered speech is noted, he is appropriate in his responses.  Does not have any facial droop, no unilateral weakness, numbness or tingling.  Tremor to bilateral upper and lower extremities is appreciated.  Stuttering speech is noted.  No unilateral flaccid paralysis.  Dermatology: Skin is warm and dry      DIAGNOSTIC RESULTS     EKG:  All EKGs are interpreted by the Emergency Department Physician who either signs or Co-signs this chart in the absence of a cardiologist.    No orders to display       RADIOLOGY:     [x] Radiologist's Report Reviewed:  MRI Brain Without Contrast   Final Result   Impression:   No acute findings.            Electronically Signed: Dave Marlow MD     9/16/2023 7:24 PM EDT     Workstation ID: [id number]      CT Head Without Contrast    (Results Pending)       I ordered and independently reviewed the above noted radiographic studies.      I viewed images of MRI brain which showed no acute intracranial abnormalities, tumor, mass, bleed per my independent interpretation.    See radiologist's dictation for official interpretation.      ED BEDSIDE ULTRASOUND:   Performed by ED Physician - none    LABS:    I have reviewed and interpreted all of the currently available lab results from this visit (if applicable):  Results for orders placed or performed during the hospital encounter of 09/16/23   Comprehensive Metabolic Panel     Specimen: Blood   Result Value Ref Range    Glucose 225 (H) 65 - 99 mg/dL    BUN 14 6 - 20 mg/dL    Creatinine 0.92 0.76 - 1.27 mg/dL    Sodium 137 136 - 145 mmol/L    Potassium 3.9 3.5 - 5.2 mmol/L    Chloride 103 98 - 107 mmol/L    CO2 23.0 22.0 - 29.0 mmol/L    Calcium 9.2 8.6 - 10.5 mg/dL    Total Protein 6.8 6.0 - 8.5 g/dL    Albumin 4.1 3.5 - 5.2 g/dL    ALT (SGPT) 16 1 - 41 U/L    AST (SGOT) 20 1 - 40 U/L    Alkaline Phosphatase 70 39 - 117 U/L    Total Bilirubin 0.3 0.0 - 1.2 mg/dL    Globulin 2.7 gm/dL    A/G Ratio 1.5 g/dL    BUN/Creatinine Ratio 15.2 7.0 - 25.0    Anion Gap 11.0 5.0 - 15.0 mmol/L    eGFR 105.8 >60.0 mL/min/1.73   Urinalysis With Microscopic If Indicated (No Culture) - Urine, Clean Catch    Specimen: Urine, Clean Catch   Result Value Ref Range    Color, UA Yellow Yellow, Straw    Appearance, UA Clear Clear    pH, UA 5.5 5.0 - 8.0    Specific Gravity, UA 1.022 1.001 - 1.030    Glucose, UA Negative Negative    Ketones, UA Trace (A) Negative    Bilirubin, UA Negative Negative    Blood, UA Negative Negative    Protein, UA Negative Negative    Leuk Esterase, UA Negative Negative    Nitrite, UA Negative Negative    Urobilinogen, UA 1.0 E.U./dL 0.2 - 1.0 E.U./dL   Ethanol    Specimen: Blood   Result Value Ref Range    Ethanol <10 0 - 10 mg/dL   Urine Drug Screen - Urine, Clean Catch    Specimen: Urine, Clean Catch   Result Value Ref Range    THC, Screen, Urine Negative Negative    Phencyclidine (PCP), Urine Negative Negative    Cocaine Screen, Urine Negative Negative    Methamphetamine, Ur Negative Negative    Opiate Screen Negative Negative    Amphetamine Screen, Urine Negative Negative    Benzodiazepine Screen, Urine Positive (A) Negative    Tricyclic Antidepressants Screen Negative Negative    Methadone Screen, Urine Negative Negative    Barbiturates Screen, Urine Negative Negative    Oxycodone Screen, Urine Negative Negative    Propoxyphene Screen Negative Negative    Buprenorphine, Screen,  Urine Negative Negative   TSH    Specimen: Blood   Result Value Ref Range    TSH 1.630 0.270 - 4.200 uIU/mL   Valproic Acid Level, Total    Specimen: Blood   Result Value Ref Range    Valproic Acid 11.4 (L) 50.0 - 125.0 mcg/mL   CBC Auto Differential    Specimen: Blood   Result Value Ref Range    WBC 9.73 3.40 - 10.80 10*3/mm3    RBC 4.87 4.14 - 5.80 10*6/mm3    Hemoglobin 14.7 13.0 - 17.7 g/dL    Hematocrit 41.9 37.5 - 51.0 %    MCV 86.0 79.0 - 97.0 fL    MCH 30.2 26.6 - 33.0 pg    MCHC 35.1 31.5 - 35.7 g/dL    RDW 12.9 12.3 - 15.4 %    RDW-SD 39.9 37.0 - 54.0 fl    MPV 10.4 6.0 - 12.0 fL    Platelets 245 140 - 450 10*3/mm3    Neutrophil % 60.9 42.7 - 76.0 %    Lymphocyte % 26.2 19.6 - 45.3 %    Monocyte % 9.9 5.0 - 12.0 %    Eosinophil % 2.0 0.3 - 6.2 %    Basophil % 0.7 0.0 - 1.5 %    Immature Grans % 0.3 0.0 - 0.5 %    Neutrophils, Absolute 5.93 1.70 - 7.00 10*3/mm3    Lymphocytes, Absolute 2.55 0.70 - 3.10 10*3/mm3    Monocytes, Absolute 0.96 (H) 0.10 - 0.90 10*3/mm3    Eosinophils, Absolute 0.19 0.00 - 0.40 10*3/mm3    Basophils, Absolute 0.07 0.00 - 0.20 10*3/mm3    Immature Grans, Absolute 0.03 0.00 - 0.05 10*3/mm3    nRBC 0.0 0.0 - 0.2 /100 WBC   Fentanyl, Urine - Urine, Clean Catch    Specimen: Urine, Clean Catch   Result Value Ref Range    Fentanyl, Urine Negative Negative        If labs were ordered, I independently reviewed the results and considered them in treating the patient.      EMERGENCY DEPARTMENT COURSE and DIFFERENTIAL DIAGNOSIS/MDM:   Vitals:  AS OF 20:21 EDT    BP - (!) 128/109  HR - 119  TEMP - 98.5 °F (36.9 °C) (Oral)  O2 SATS - 95%      Orders placed during this visit:  Orders Placed This Encounter   Procedures    CT Head Without Contrast    MRI Brain Without Contrast    Comprehensive Metabolic Panel    Urinalysis With Microscopic If Indicated (No Culture) - Urine, Clean Catch    Ethanol    Urine Drug Screen - Urine, Clean Catch    TSH    Valproic Acid Level, Total    CBC Auto  Differential    Fentanyl, Urine - Urine, Clean Catch    Insert Peripheral IV    CBC & Differential       All labs have been independently reviewed by me.  All radiology studies have been reviewed by me and the radiologist dictating the report.  All EKG's have been independently viewed and interpreted by me.      Discussion below represents my analysis of pertinent findings related to patient's condition, differential diagnosis, treatment plan and final disposition.    Differential diagnosis:  The differential diagnosis associated with the patient's presentation includes: CVA, TIA, polysubstance drug abuse, medication reaction, electro abnormality    Additional sources  Discussed/ obtained information from independent historians:   [] Spouse  [] Parent  [] Family member  [] Friend  [] EMS   [] Other:    External (non-ED) record review:   [] Inpatient record:   [] Office record:   [] Outpatient record:   [] Prior Outpatient labs:   [] Prior Outpatient radiology:   [] Primary Care record:   [] Outside ED record:   [] Other:     Patient's care impacted by:   [x] Diabetes  [] Hypertension  [] CHF  [] Hyperlipidemia  [] Coronary Artery Disease   [] COPD   [] Cancer   [] Tobacco Abuse   [] Substance Abuse    [x] Other: CVA    Care significantly affected by Social Determinants of Health (housing and economic circumstances, unemployment)    [] Yes     [x] No   If yes, Patient's care significantly limited by Social Determinants of Health including:   [] Inadequate housing   [] Low income   [] Alcoholism and drug addiction in family   [] Problems related to primary support group   [] Unemployment   [] Problems related to employment   [] Other Social Determinants of Health:       MEDICATIONS ADMINISTERED IN ED:  Medications   sodium chloride 0.9 % flush 10 mL (has no administration in time range)   Valproic Acid (DEPAKENE) syrup 500 mg (has no administration in time range)   LORazepam (ATIVAN) injection 1 mg (1 mg Intravenous  Given 9/16/23 1856)              This is a patient with complicated history, unfortunately I do not have anything in the chart for previous review but his history includes CVA, seizures, possible TBI, COPD, diabetes, substance abuse with methamphetamine usage who presents with tremors in his bilateral upper and lower extremities, stuttering speech, difficulty with ambulation.  He does note a remote CVA with left-sided weakness, does not have any flaccid paralysis currently.  Work-up from outside facility is unable to be reviewed by myself, patient is very limited in knowledge of what was completed from last night.  His vital signs are stable during my initial evaluation, does have intermittent intentional tremors in his upper and lower extremities.  We will plan to move forward with IV, labs and imaging including CT scan of the brain.  Also I discussed with our stroke team for further neurological assessment, likely will obtain an MRI.  Patient blood work and labs are unremarkable with no signs of acute infectious etiology, no sepsis, kidney function liver function electrolytes are stable.  Valproic acid is low at 11.4, will give him a loading dose of Depakote.  MRI of the brain without any acute intracranial abnormality.  On reassessment the patient still having issues with tremors, difficulty ambulating, will likely benefit from a neurology consultation, potentially rehab and therapy and placement.  Patient is comfortable this plan of care.  Case discussed with hospitalist, Dr. Curtis, for admission.        PROCEDURES:  Procedures    CRITICAL CARE TIME    Total Critical Care time was 0 minutes, excluding separately reportable procedures.   There was a high probability of clinically significant/life threatening deterioration in the patient's condition which required my urgent intervention.      FINAL IMPRESSION      1. Tremors of nervous system    2. Trouble walking    3. History of seizure           DISPOSITION/PLAN     ED Disposition       ED Disposition   Decision to Admit    Condition   --    Comment   --           Comment: Please note this report has been produced using speech recognition software.      Nahum Guzman DO  Attending Emergency Physician         Nahum Guzman DO  09/16/23 2023

## 2023-09-17 LAB
AMMONIA BLD-SCNC: 30 UMOL/L (ref 16–60)
ANION GAP SERPL CALCULATED.3IONS-SCNC: 15 MMOL/L (ref 5–15)
BUN SERPL-MCNC: 13 MG/DL (ref 6–20)
BUN/CREAT SERPL: 15.5 (ref 7–25)
CALCIUM SPEC-SCNC: 8.6 MG/DL (ref 8.6–10.5)
CHLORIDE SERPL-SCNC: 103 MMOL/L (ref 98–107)
CO2 SERPL-SCNC: 20 MMOL/L (ref 22–29)
CREAT SERPL-MCNC: 0.84 MG/DL (ref 0.76–1.27)
D-LACTATE SERPL-SCNC: 1.2 MMOL/L (ref 0.5–2)
EGFRCR SERPLBLD CKD-EPI 2021: 111 ML/MIN/1.73
FOLATE SERPL-MCNC: 12.9 NG/ML (ref 4.78–24.2)
GLUCOSE BLDC GLUCOMTR-MCNC: 189 MG/DL (ref 70–130)
GLUCOSE BLDC GLUCOMTR-MCNC: 218 MG/DL (ref 70–130)
GLUCOSE BLDC GLUCOMTR-MCNC: 259 MG/DL (ref 70–130)
GLUCOSE BLDC GLUCOMTR-MCNC: 259 MG/DL (ref 70–130)
GLUCOSE SERPL-MCNC: 128 MG/DL (ref 65–99)
MAGNESIUM SERPL-MCNC: 1.6 MG/DL (ref 1.6–2.6)
POTASSIUM SERPL-SCNC: 4 MMOL/L (ref 3.5–5.2)
SODIUM SERPL-SCNC: 138 MMOL/L (ref 136–145)
VIT B12 BLD-MCNC: 633 PG/ML (ref 211–946)

## 2023-09-17 PROCEDURE — 99214 OFFICE O/P EST MOD 30 MIN: CPT | Performed by: STUDENT IN AN ORGANIZED HEALTH CARE EDUCATION/TRAINING PROGRAM

## 2023-09-17 PROCEDURE — 83605 ASSAY OF LACTIC ACID: CPT | Performed by: STUDENT IN AN ORGANIZED HEALTH CARE EDUCATION/TRAINING PROGRAM

## 2023-09-17 PROCEDURE — 25010000002 ENOXAPARIN PER 10 MG: Performed by: STUDENT IN AN ORGANIZED HEALTH CARE EDUCATION/TRAINING PROGRAM

## 2023-09-17 PROCEDURE — 25010000002 ONDANSETRON PER 1 MG: Performed by: STUDENT IN AN ORGANIZED HEALTH CARE EDUCATION/TRAINING PROGRAM

## 2023-09-17 PROCEDURE — 63710000001 INSULIN LISPRO (HUMAN) PER 5 UNITS: Performed by: STUDENT IN AN ORGANIZED HEALTH CARE EDUCATION/TRAINING PROGRAM

## 2023-09-17 PROCEDURE — 96375 TX/PRO/DX INJ NEW DRUG ADDON: CPT

## 2023-09-17 PROCEDURE — 82948 REAGENT STRIP/BLOOD GLUCOSE: CPT

## 2023-09-17 PROCEDURE — 82140 ASSAY OF AMMONIA: CPT | Performed by: STUDENT IN AN ORGANIZED HEALTH CARE EDUCATION/TRAINING PROGRAM

## 2023-09-17 PROCEDURE — 80048 BASIC METABOLIC PNL TOTAL CA: CPT | Performed by: STUDENT IN AN ORGANIZED HEALTH CARE EDUCATION/TRAINING PROGRAM

## 2023-09-17 PROCEDURE — 82746 ASSAY OF FOLIC ACID SERUM: CPT | Performed by: STUDENT IN AN ORGANIZED HEALTH CARE EDUCATION/TRAINING PROGRAM

## 2023-09-17 PROCEDURE — 83735 ASSAY OF MAGNESIUM: CPT | Performed by: STUDENT IN AN ORGANIZED HEALTH CARE EDUCATION/TRAINING PROGRAM

## 2023-09-17 PROCEDURE — 82607 VITAMIN B-12: CPT | Performed by: STUDENT IN AN ORGANIZED HEALTH CARE EDUCATION/TRAINING PROGRAM

## 2023-09-17 PROCEDURE — 25010000002 THIAMINE PER 100 MG: Performed by: STUDENT IN AN ORGANIZED HEALTH CARE EDUCATION/TRAINING PROGRAM

## 2023-09-17 PROCEDURE — 96372 THER/PROPH/DIAG INJ SC/IM: CPT

## 2023-09-17 RX ORDER — ASPIRIN 81 MG/1
81 TABLET, CHEWABLE ORAL DAILY
Status: DISCONTINUED | OUTPATIENT
Start: 2023-09-17 | End: 2023-09-18 | Stop reason: HOSPADM

## 2023-09-17 RX ORDER — ONDANSETRON 2 MG/ML
4 INJECTION INTRAMUSCULAR; INTRAVENOUS EVERY 6 HOURS PRN
Status: DISCONTINUED | OUTPATIENT
Start: 2023-09-17 | End: 2023-09-18 | Stop reason: HOSPADM

## 2023-09-17 RX ADMIN — Medication 10 ML: at 08:00

## 2023-09-17 RX ADMIN — INSULIN LISPRO 4 UNITS: 100 INJECTION, SOLUTION INTRAVENOUS; SUBCUTANEOUS at 11:52

## 2023-09-17 RX ADMIN — INSULIN LISPRO 2 UNITS: 100 INJECTION, SOLUTION INTRAVENOUS; SUBCUTANEOUS at 07:59

## 2023-09-17 RX ADMIN — ACETAMINOPHEN 650 MG: 325 TABLET ORAL at 10:59

## 2023-09-17 RX ADMIN — THIAMINE HYDROCHLORIDE 500 MG: 100 INJECTION, SOLUTION INTRAMUSCULAR; INTRAVENOUS at 04:48

## 2023-09-17 RX ADMIN — ACETAMINOPHEN 650 MG: 325 TABLET ORAL at 04:53

## 2023-09-17 RX ADMIN — ACETAMINOPHEN 650 MG: 325 TABLET ORAL at 21:25

## 2023-09-17 RX ADMIN — DIVALPROEX SODIUM 500 MG: 250 TABLET, EXTENDED RELEASE ORAL at 07:58

## 2023-09-17 RX ADMIN — ASPIRIN 81 MG: 81 TABLET, CHEWABLE ORAL at 14:50

## 2023-09-17 RX ADMIN — ACETAMINOPHEN 650 MG: 325 TABLET ORAL at 17:53

## 2023-09-17 RX ADMIN — INSULIN LISPRO 6 UNITS: 100 INJECTION, SOLUTION INTRAVENOUS; SUBCUTANEOUS at 21:19

## 2023-09-17 RX ADMIN — Medication 10 ML: at 21:19

## 2023-09-17 RX ADMIN — THIAMINE HYDROCHLORIDE 500 MG: 100 INJECTION, SOLUTION INTRAMUSCULAR; INTRAVENOUS at 14:50

## 2023-09-17 RX ADMIN — ENOXAPARIN SODIUM 40 MG: 100 INJECTION SUBCUTANEOUS at 21:19

## 2023-09-17 RX ADMIN — ONDANSETRON 4 MG: 2 INJECTION INTRAMUSCULAR; INTRAVENOUS at 10:59

## 2023-09-17 RX ADMIN — THIAMINE HYDROCHLORIDE 500 MG: 100 INJECTION, SOLUTION INTRAMUSCULAR; INTRAVENOUS at 21:19

## 2023-09-17 RX ADMIN — INSULIN LISPRO 6 UNITS: 100 INJECTION, SOLUTION INTRAVENOUS; SUBCUTANEOUS at 16:29

## 2023-09-17 NOTE — CONSULTS
"[facility] Neurology  Consult Note    Patient Name: Ronnie He  : 1980  MRN: [id number]  Primary Care Physician:  Dylon Olmos MD  Referring Physician: No ref. provider found  Date of admission: 2023    Subjective     Reason for Consult: Seizure, tremor    Ronnie He is a 42 y.o. male with history of conversion disorder, polysubstance abuse who is presenting with persistent tremors.    He says the tremors and stuttering speech started abruptly Thursday after he started a new medication, statin, for his cholesterol.  He says he started shaking all over and fell outside, however later than says he was in his room when he told his roommate he felt funny.  And the lights were bothering him.    He said he went to [facility] where they gave him Benadryl and Ativan.  He said they \"did not tell him anything\" and discharged him back home.  His  said he needed to come to [facility] for evaluation for seizures.    He says he is on Depakote for seizures.  He had his first seizure a year ago.  On chart review, it appears he has a diagnosis of nonepileptic spells, conversion disorder, and malingering.  There is 1 EEG from 2019 that was normal.  There are multiple MRI brains that are unremarkable, without evidence of prior stroke (either chronic or acute).     Review Of Systems   Constitutional:   Cardiovascular: Negative for chest pain or palpitations.  Respiratory: Negative for shortness of breath or cough.  Gastrointestinal: Negative for nausea and vomiting.  Genitourinary: Negative for bladder incontinence.  Musculoskeletal: Negative for aches and pains in the muscles or joints.  Dermatological: Negative for skin breakdown.   Neurological: Negative for headache, pain, weakness or vision changes.     Personal History     History reviewed. No pertinent past medical history.    History reviewed. No pertinent surgical history.    Family History: family history is not on file. " Otherwise pertinent FHx was reviewed and not pertinent to current issue.    Social History:  reports that he has been smoking cigarettes. He has a 12.50 pack-year smoking history. He has never used smokeless tobacco. He reports that he does not currently use alcohol. He reports current drug use. Drug: Methamphetamines.    Home Medications:        Current Medications:     Current Facility-Administered Medications:     acetaminophen (TYLENOL) tablet 650 mg, 650 mg, Oral, Q4H PRN, Jay Curtis MD, 650 mg at 09/17/23 1059    sennosides-docusate (PERICOLACE) 8.6-50 MG per tablet 2 tablet, 2 tablet, Oral, BID PRN **AND** polyethylene glycol (MIRALAX) packet 17 g, 17 g, Oral, Daily PRN **AND** bisacodyl (DULCOLAX) EC tablet 5 mg, 5 mg, Oral, Daily PRN **AND** bisacodyl (DULCOLAX) suppository 10 mg, 10 mg, Rectal, Daily PRN, Jay Curtis MD    Calcium Replacement - Follow Nurse / BPA Driven Protocol, , Does not apply, PRN, Jay Curtis MD    dextrose (D50W) (25 g/50 mL) IV injection 25 g, 25 g, Intravenous, Q15 Min PRN, Jay Curtis MD    dextrose (GLUTOSE) oral gel 15 g, 15 g, Oral, Q15 Min PRN, Jay Curtis MD    divalproex (DEPAKOTE ER) 24 hr tablet 500 mg, 500 mg, Oral, Daily, Jay Curtis MD, 500 mg at 09/17/23 0758    Enoxaparin Sodium (LOVENOX) syringe 40 mg, 40 mg, Subcutaneous, Nightly, Jay Curtis MD, 40 mg at 09/16/23 2209    glucagon (GLUCAGEN) injection 1 mg, 1 mg, Intramuscular, Q15 Min PRN, Jay Curtis MD    Insulin Lispro (humaLOG) injection 2-9 Units, 2-9 Units, Subcutaneous, 4x Daily AC & at Bedtime, Jay Curtis MD, 4 Units at 09/17/23 1152    Magnesium Standard Dose Replacement - Follow Nurse / BPA Driven Protocol, , Does not apply, PRN, Jay Curtis MD    ondansetron (ZOFRAN) injection 4 mg, 4 mg, Intravenous, Q6H PRN, King Aguila DO, 4 mg at 09/17/23 1059    Phosphorus Replacement - Follow Nurse / BPA Driven Protocol, , Does not apply, PRN,  Jay Curtis MD    Potassium Replacement - Follow Nurse / BPA Driven Protocol, , Does not apply, PRN, Jay Curtis MD    [COMPLETED] Insert Peripheral IV, , , Once **AND** sodium chloride 0.9 % flush 10 mL, 10 mL, Intravenous, PRN, Nahum Guzman, DO    sodium chloride 0.9 % flush 10 mL, 10 mL, Intravenous, Q12H, Jay Curtis MD, 10 mL at 09/17/23 0800    sodium chloride 0.9 % flush 10 mL, 10 mL, Intravenous, PRN, Jay Curtis MD    sodium chloride 0.9 % infusion 40 mL, 40 mL, Intravenous, PRN, Jay Curtis MD    thiamine (B-1) 500 mg in sodium chloride 0.9 % 100 mL IVPB, 500 mg, Intravenous, Q8H, Jay Curtis MD, Last Rate: 200 mL/hr at 09/17/23 0448, 500 mg at 09/17/23 0448     Allergies:  No Known Allergies    Objective     Vitals:  Temp:  [97.8 °F (36.6 °C)-98.5 °F (36.9 °C)] 97.8 °F (36.6 °C)  Heart Rate:  [] 95  Resp:  [18-19] 18  BP: (126-139)/() 129/89    Neurologic Exam   Mental status/Cognition: alert; oriented to person, place, year, and month;   Speech/language: fluent, Stuttering speech that fluctuates with normal speech; comprehension intact    Cranial nerves:   Pupils equal round and reactive, EOMI.  Face symmetric.  Tongue protrudes midline.      Motor: No pronator drift in bilateral arms.  Had difficulty raising legs to antigravity due to intensity of tremor.  Tremor noted to bilateral hands and feet, with variable amplitude and frequency that seem to increase with movement and attention.  Tremor was distractible and entrainment was present.    Reflexes:   Right Left Comments   Biceps 2 2    Triceps      Brachioradialis 2 2    Patellar 2 2    Achilles 2 2    Quintanilla      Plantar      Jaw Jerk       Coordination/Complex Motor:   - Finger-to-nose intact bilaterally without dysmetria associated with tremor      Laboratory Results:   Lab Results   Component Value Date    GLUCOSE 128 (H) 09/17/2023    CALCIUM 8.6 09/17/2023     09/17/2023    K 4.0  09/17/2023    CO2 20.0 (L) 09/17/2023     09/17/2023    BUN 13 09/17/2023    CREATININE 0.84 09/17/2023    EGFRIFAFRI 132 11/21/2021    EGFRIFNONA 114 11/21/2021    BCR 15.5 09/17/2023    ANIONGAP 15.0 09/17/2023     Lab Results   Component Value Date    WBC 9.73 09/16/2023    HGB 14.7 09/16/2023    HCT 41.9 09/16/2023    MCV 86.0 09/16/2023     09/16/2023     No results found for: CHOL  Lab Results   Component Value Date    HDL 34 (L) 07/02/2023    HDL 31 (L) 05/11/2022    HDL 31 (L) 02/08/2022     Lab Results   Component Value Date    LDL 36 07/02/2023    LDL 52 05/11/2022    LDL 62 02/08/2022     Lab Results   Component Value Date    TRIG 104 07/02/2023    TRIG 148 05/11/2022    TRIG 144 02/08/2022     Lab Results   Component Value Date    HGBA1C 13.5 (H) 08/31/2022     Lab Results   Component Value Date    INR 0.94 03/04/2023    INR 0.86 (L) 05/09/2022    PROTIME 12.5 03/04/2023    PROTIME 9.6 (L) 05/09/2022     Lab Results   Component Value Date    HGXXTLWE50 524 02/04/2020     No results found for: FOLATE    TSH 1.63    Images/Procedures       Assessment / Plan   Active Hospital Problems:  Conversion disorder  Functional tremor  Possible history of seizure  PNES    Brief Patient Summary:  Ronnie He is a 42 y.o. male with history of conversion disorder, polysubstance abuse presenting with tremor and stuttering speech.  His exam is notable for stuttering speech and a likely functional tremor.  However since he is on Depakote, which can cause tremor as well as increased ammonia levels, would recommend checking ammonia to rule out metabolic cause of his tremor.  He reports that his tremor started when he took statin medication, however this is not a known side effect of statins and unlikely to be the cause.     Unclear history of seizure disorder, as his prior seizure occurred during polysubstance use.  Prior EEGs were normal and he has a documented history of nonepileptic events.    I do not see  evidence of prior stroke on this MRI brain, or on any prior MRI brain.  However in the event patient had TIA in the past we will continue aspirin 81 mg daily for stroke risk reduction.  Okay to discontinue statin due to patient intolerance    Plan:   -Check ammonia  -Continue Depakote 500 mg daily  -PT/OT, patient is agreeable to discharge to rehab if needed  -Psychiatrist or therapist for for CBT in the outpatient setting  -Continue aspirin 81 mg  -Okay to discontinue statin due to intolerance    I have discussed the above with the patient  Time spent with patient: 55 minutes in face-to-face evaluation and management of the patient.       Salvador Juarez, DO

## 2023-09-17 NOTE — PLAN OF CARE
Goal Outcome Evaluation:  Plan of Care Reviewed With: patient        Progress: no change     Pt arrived to the floor around 2128. Pt is alert and oriented x2-3; forgetful. Vss on room air. NSR on tele. Pt has slept off and on throughout the shift. PRN tylenol given as ordered for pain. Urinal provided and voiding spontaneously. Will continue to monitor.

## 2023-09-17 NOTE — H&P
[facility] Medicine Services  HISTORY AND PHYSICAL    Patient Name: Ronnie He  : 1980  MRN: [id number]  Primary Care Physician: Dylon Olmos MD  Date of admission: 2023      Subjective   Subjective     Chief Complaint:  Tremor    HPI:  Ronnie He is a 42 y.o. male with history of seizure disorder, polysubstance use in remission, currently living in a long term house, prior CVA with residual left-sided weakness who presents with tremors.  He states that he was in his normal state of health until yesterday.  He had just been prescribed atorvastatin and Jardiance which she had just started taking yesterday.  Yesterday morning he began to have tremors in his arms and legs, and yesterday evening he had 1 episode where he passed out and this was witnessed by his roommate who reported that he had shaking of his upper and lower extremities.  Patient woke up within a minute and was slightly confused.  He did not bite his tongue but did urinate.  He has been taking Depakote daily for seizure disorder.  Initially he went to [facility] ED where work-up was overall unremarkable however he did have a lactate of 3 at the time.  He was discharged and still felt poorly so he came to [facility] ED.  He smokes 1/2 pack a day cigarettes.  Has been sober from alcohol and meth for 90 days.       Personal History     History reviewed. No pertinent past medical history.      Oncology Problem List:  Melanoma (2020; Status: Active)       History reviewed. No pertinent surgical history.    Family History: family history is not on file.     Social History:  reports that he has been smoking cigarettes. He has a 12.50 pack-year smoking history. He has never used smokeless tobacco. He reports that he does not currently use alcohol. He reports current drug use. Drug: Methamphetamines.  Social History     Social History Narrative    Not on file       Medications:  Available home  medication information reviewed.  No medications prior to admission.       No Known Allergies    Objective   Objective     Vital Signs:   Temp:  [98.1 °F (36.7 °C)-98.5 °F (36.9 °C)] 98.2 °F (36.8 °C)  Heart Rate:  [] 103  Resp:  [18-19] 18  BP: (128-139)/() 137/96       Physical Exam  Constitutional:       General: He is not in acute distress.     Appearance: Normal appearance. He is not ill-appearing or toxic-appearing.   HENT:      Nose: No congestion.   Eyes:      General: No scleral icterus.     Extraocular Movements: Extraocular movements intact.      Conjunctiva/sclera: Conjunctivae normal.      Pupils: Pupils are equal, round, and reactive to light.   Cardiovascular:      Rate and Rhythm: Normal rate and regular rhythm.      Pulses: Normal pulses.      Heart sounds: No murmur heard.  Pulmonary:      Effort: Pulmonary effort is normal. No respiratory distress.      Breath sounds: Normal breath sounds. No stridor. No wheezing, rhonchi or rales.   Abdominal:      General: Abdomen is flat. There is no distension.      Palpations: Abdomen is soft. There is no mass.      Tenderness: There is no abdominal tenderness. There is no guarding or rebound.      Hernia: No hernia is present.   Musculoskeletal:      Right lower leg: No edema.      Left lower leg: No edema.   Skin:     General: Skin is warm.      Coloration: Skin is not jaundiced.   Neurological:      General: No focal deficit present.      Mental Status: He is alert and oriented to person, place, and time.      Coordination: Coordination abnormal.      Comments: Pupils equal and reactive to light, extraocular movements intact  Facial expression intact  Tongue protrudes in midline  No pronator drift  Extension tremor and abnormal coordination to finger-to-nose and heel-to-shin testing in all 4 extremities          Result Review:  I have personally reviewed the results from the time of this admission to 9/16/2023 23:55 EDT and agree with these  findings:  [x]  Laboratory list / accordion  []  Microbiology  [x]  Radiology  [x]  EKG/Telemetry   []  Cardiology/Vascular   []  Pathology  []  Old records  []  Other:  Most notable findings include: Urine drug screen negative, ethanol negative, CK mildly elevated at 288, salicylate and Tylenol levels negative.  TSH normal.  CMP normal except elevated glucose to 225.  CBC unremarkable        LAB RESULTS:      Lab 09/16/23  1708 09/16/23  0008   WBC 9.73  --    HEMOGLOBIN 14.7  --    HEMATOCRIT 41.9  --    PLATELETS 245  --    NEUTROS ABS 5.93  --    IMMATURE GRANS (ABS) 0.03  --    LYMPHS ABS 2.55  --    MONOS ABS 0.96*  --    EOS ABS 0.19  --    MCV 86.0  --    PROCALCITONIN  --  <0.25         Lab 09/16/23  2219 09/16/23  1708   SODIUM 137 137   POTASSIUM 4.0 3.9   CHLORIDE 103 103   CO2 20.0* 23.0   ANION GAP 14.0 11.0   BUN 13 14   CREATININE 0.95 0.92   EGFR 101.9 105.8   GLUCOSE 261* 225*   CALCIUM 8.7 9.2   MAGNESIUM 1.6 1.8   TSH  --  1.630         Lab 09/16/23  1708   TOTAL PROTEIN 6.8   ALBUMIN 4.1   GLOBULIN 2.7   ALT (SGPT) 16   AST (SGOT) 20   BILIRUBIN 0.3   ALK PHOS 70                     UA          4/4/2023    16:14 7/2/2023    20:39 9/16/2023    18:04   Urinalysis   Specific Gravity, UA 1.025     1.010     1.022    Ketones, UA   Trace    Blood, UA   Negative    Leukocytes, UA   Negative    Nitrite, UA   Negative       Details          This result is from an external source.               Microbiology Results (last 10 days)       ** No results found for the last 240 hours. **            MRI Brain Without Contrast    Result Date: 9/16/2023  MRI BRAIN WO CONTRAST Date of Exam: 9/16/2023 7:00 PM EDT Indication: Acute neurologic deficit, suspicion of an acute cerebrovascular accident.  Comparison: None available. Technique:  Routine multiplanar/multisequence sequence images of the brain were obtained without contrast administration. Findings: A few of the pulse sequences are motion degraded. There is no  restricted diffusion to indicate acute ischemia. The sulci, fissures, ventricles, and basal cisterns are normal for age. The gray-white matter differentiation is preserved. There is no abnormal white matter signal. There is no acute hemorrhage, midline shift, or suspicious extra-axial fluid collections. There are normal signal voids within the intracranial arteries and the major dural sinuses. The orbital contents are normal. There is some mild mucosal thickening in the ethmoid sinuses indicating chronic sinus disease. The mastoid sinuses are clear.     Impression: Impression: No acute findings. Electronically Signed: Dave Marlow MD  9/16/2023 7:24 PM EDT  Workstation ID: [id number]         Assessment & Plan   Assessment & Plan     Active Hospital Problems    Diagnosis  POA    **Ataxia [R27.0]  Yes    Alcohol use disorder, severe, in early remission [F10.21]  Yes     Formatting of this note might be different from the original. Sober since 6/2023      Generalized anxiety disorder [F41.1]  Yes    Diabetes mellitus type 2 in obese [E11.69, E66.9]  Yes     Formatting of this note might be different from the original. Patient following with endocrinology but often noncompliant Last Assessment & Plan: Formatting of this note is different from the original. Goal A1C: < 7.0 - Last A1c - 11.2 - 2/7/2022  - not at goal   Compliance: - compliant with diet and medications Retinopathy Screening: Retinopathy Not Present - Last Eye Exam - 1/21/2020 - Equipment issues. NExt visit   Nephropathy Assessment: - currently on ACE or ARB Foot Assessment: No foot exam found - no ulcers or pre-ulcers Diet Advice: - discussed improving diet by reducing carbohydrates at today's visit ASA Therapy: - patient currently on aspirin Statin Therapy: - currently on a statin Medication Management: - a reassessment of the patients current diagnoses, medications, labs, potential SE, appropriate dose and risks assessed and discussed today Lab Results  Component Value Date  HGBA1C 11.2 (A) 02/07/2022  HGBA1C 12.7 (H) 10/10/2021  HGBA1C 6.5 (H) 02/14/2021       42-year-old male with history of seizure disorder and history of polysubstance use who presents with 1 day of tremors and unsteady gait.  Work-up thus far unrevealing of cause, will admit for neurology assessment.    Tremor  Unsteady gait  -New since yesterday of unclear etiology at this time, CK mildly elevated, no obvious electrolyte abnormalities, TSH normal, urine drug screen negative.  MRI of the brain without acute findings.  -We will start treatment with empiric high-dose thiamine IV x3 days  -Neurology consult in the morning  -We will obtain PT OT assessment    Seizure disorder  -Likely had seizure event yesterday, unclear if this was caused by addition of new meds or adherence to current medication.  Depakote level subtherapeutic on admission  -Continue on Depakote 500 mg long-acting daily    Polysubstance use  -Urine drug screen negative on admission.  Patient states he has been sober for 90 days.    Type 2 diabetes  -will hold off on SGLT2 for now given timing of symptoms.  Will cover with correction factor while inpatient    Hyperlipidemia  -Hold statin for now given timing of symptoms    DVT prophylaxis: Lovenox      CODE STATUS:    Code Status and Medical Interventions:   Ordered at: 09/16/23 2129     Level Of Support Discussed With:    Patient     Code Status (Patient has no pulse and is not breathing):    CPR (Attempt to Resuscitate)     Medical Interventions (Patient has pulse or is breathing):    Full Support       Expected Discharge (click hyperlink to enter date then refresh the note)  Expected Discharge Date: 9/18/2023; Expected Discharge Time:      Jay Curtis MD  09/16/23

## 2023-09-17 NOTE — CASE MANAGEMENT/SOCIAL WORK
Discharge Planning Assessment  [facility]     Patient Name: Ronnie He  MRN: [id number]  Today's Date: 9/17/2023    Admit Date: 9/16/2023    Plan: return to sober living facility   Discharge Needs Assessment       Row Name 09/17/23 1142       Living Environment    People in Home alone    Unique Family Situation Pt is currently at a sober living facility in [facility]    Current Living Arrangements home    Potentially Unsafe Housing Conditions none    Primary Care Provided by self    Provides Primary Care For no one    Family Caregiver if Needed friend(s)    Quality of Family Relationships unable to assess    Able to Return to Prior Arrangements yes       Resource/Environmental Concerns    Resource/Environmental Concerns none       Food Insecurity    Within the past 12 months, you worried that your food would run out before you got the money to buy more. Never true    Within the past 12 months, the food you bought just didn't last and you didn't have money to get more. Never true       Transition Planning    Patient/Family Anticipates Transition to home    Patient/Family Anticipated Services at Transition none    Transportation Anticipated family or friend will provide       Discharge Needs Assessment    Readmission Within the Last 30 Days no previous admission in last 30 days    Equipment Currently Used at Home none    Concerns to be Addressed discharge planning    Anticipated Changes Related to Illness none    Equipment Needed After Discharge none                   Discharge Plan       Row Name 09/17/23 1143       Plan    Plan return to sober living facility    Patient/Family in Agreement with Plan yes    Plan Comments Pt reports he is currently living at a sober living facility in [facility]. He is independent with ADLs and denies use of any DME. He is followed by his PCP and has drug coverage. At this time his plan for discharge is to return to his facility. CM to follow for any discharge needs     Final Discharge Disposition Code 01 - home or self-care                  Continued Care and Services - Admitted Since 9/16/2023    Coordination has not been started for this encounter.       Expected Discharge Date and Time       Expected Discharge Date Expected Discharge Time    Sep 18, 2023            Demographic Summary       Row Name 09/17/23 1141       General Information    Admission Type inpatient    Referral Source physician    Reason for Consult discharge planning    Preferred Language English    General Information Comments PCP Dylon Olmos       Contact Information    Permission Granted to Share Info With family/designee    Contact Information Comments [place] [phone]                   Functional Status       Row Name 09/17/23 1142       Functional Status, IADL    Medications independent    Meal Preparation independent    Housekeeping independent    Laundry independent    Shopping independent       Mental Status    General Appearance WDL WDL                   Psychosocial    No documentation.                  Abuse/Neglect    No documentation.                  Legal    No documentation.                  Substance Abuse    No documentation.                  Patient Forms    No documentation.                     Ariana Moreland RN

## 2023-09-17 NOTE — PROGRESS NOTES
[facility] Medicine Services  PROGRESS NOTE    Patient Name: Ronnie He  : 1980  MRN: [id number]    Date of Admission: 2023  Primary Care Physician: Dylon Olmos MD    Subjective   Subjective     CC:  Seizure, tremor, difficulty speaking     HPI:  Patient reports continued difficulty speaking due to stuttered speech beginning 2 days ago in addition to tremors in all 4 extremities beginning around the same time.  He denies any inciting event.  Additionally, endorses a diffuse headache that is worse with light.      Objective   Objective     Vital Signs:   Temp:  [97.8 °F (36.6 °C)-98.5 °F (36.9 °C)] 98.2 °F (36.8 °C)  Heart Rate:  [] 95  Resp:  [16-19] 16  BP: (126-139)/() 127/70     Physical Exam:  General appearance: alert, awake, oriented, no acute distress   Cardiovascular: RRR, no murmurs or rubs, radial pulse full 2/4 BL   HEENT: Pupils 3mm BL equally reactive to light   Respiratory: CTAB, no crackles or wheezes   Abdomen: soft, non-tender, no organomegaly, bowel sounds normoactive    Neuro/CNS: alert and oriented x3, stuttered speech, tremors in BL UE (rapid), BL LE (slow), LUE and LLE weakness (4/5).     Results Reviewed:  LAB RESULTS:      Lab 23  0046 23  1708 23  0008   WBC  --  9.73  --    HEMOGLOBIN  --  14.7  --    HEMATOCRIT  --  41.9  --    PLATELETS  --  245  --    NEUTROS ABS  --  5.93  --    IMMATURE GRANS (ABS)  --  0.03  --    LYMPHS ABS  --  2.55  --    MONOS ABS  --  0.96*  --    EOS ABS  --  0.19  --    MCV  --  86.0  --    PROCALCITONIN  --   --  <0.25   LACTATE 1.2  --   --          Lab 23  0529 23  2219 23  1708   SODIUM 138 137 137   POTASSIUM 4.0 4.0 3.9   CHLORIDE 103 103 103   CO2 20.0* 20.0* 23.0   ANION GAP 15.0 14.0 11.0   BUN 13 13 14   CREATININE 0.84 0.95 0.92   EGFR 111.0 101.9 105.8   GLUCOSE 128* 261* 225*   CALCIUM 8.6 8.7 9.2   MAGNESIUM 1.6 1.6 1.8   TSH  --   --  1.630          Lab 09/16/23  1708   TOTAL PROTEIN 6.8   ALBUMIN 4.1   GLOBULIN 2.7   ALT (SGPT) 16   AST (SGOT) 20   BILIRUBIN 0.3   ALK PHOS 70                 Lab 09/17/23  0529   FOLATE 12.90   VITAMIN B 12 633         Brief Urine Lab Results  (Last result in the past 365 days)        Color   Clarity   Blood   Leuk Est   Nitrite   Protein   CREAT   Urine HCG        09/16/23 1804 Yellow   Clear   Negative   Negative   Negative   Negative                   Microbiology Results Abnormal       None            MRI Brain Without Contrast    Result Date: 9/16/2023  MRI BRAIN WO CONTRAST Date of Exam: 9/16/2023 7:00 PM EDT Indication: Acute neurologic deficit, suspicion of an acute cerebrovascular accident.  Comparison: None available. Technique:  Routine multiplanar/multisequence sequence images of the brain were obtained without contrast administration. Findings: A few of the pulse sequences are motion degraded. There is no restricted diffusion to indicate acute ischemia. The sulci, fissures, ventricles, and basal cisterns are normal for age. The gray-white matter differentiation is preserved. There is no abnormal white matter signal. There is no acute hemorrhage, midline shift, or suspicious extra-axial fluid collections. There are normal signal voids within the intracranial arteries and the major dural sinuses. The orbital contents are normal. There is some mild mucosal thickening in the ethmoid sinuses indicating chronic sinus disease. The mastoid sinuses are clear.     Impression: Impression: No acute findings. Electronically Signed: Dave Marlow MD  9/16/2023 7:24 PM EDT  Workstation ID: [id number]         Current medications:  Scheduled Meds:divalproex, 500 mg, Oral, Daily  enoxaparin, 40 mg, Subcutaneous, Nightly  insulin lispro, 2-9 Units, Subcutaneous, 4x Daily AC & at Bedtime  sodium chloride, 10 mL, Intravenous, Q12H  thiamine (B-1) IV, 500 mg, Intravenous, Q8H      Continuous Infusions:   PRN Meds:.  acetaminophen     senna-docusate sodium **AND** polyethylene glycol **AND** bisacodyl **AND** bisacodyl    Calcium Replacement - Follow Nurse / BPA Driven Protocol    dextrose    dextrose    glucagon (human recombinant)    Magnesium Standard Dose Replacement - Follow Nurse / BPA Driven Protocol    ondansetron    Phosphorus Replacement - Follow Nurse / BPA Driven Protocol    Potassium Replacement - Follow Nurse / BPA Driven Protocol    [COMPLETED] Insert Peripheral IV **AND** sodium chloride    sodium chloride    sodium chloride    Assessment & Plan   Assessment & Plan     Active Hospital Problems    Diagnosis  POA    **Ataxia [R27.0]  Yes    Alcohol use disorder, severe, in early remission [F10.21]  Yes    Generalized anxiety disorder [F41.1]  Yes    Diabetes mellitus type 2 in obese [E11.69, E66.9]  Yes      Resolved Hospital Problems   No resolved problems to display.        Brief Hospital Course to date:  Ronnie He is a 42 y.o. male with past medical history significant for seizure disorder, polysubstance abuse, diabetes mellitus, CVA vs TIA, admitted for seizure-like activity and tremors.     Tremor  Unsteady gait  Headache   Hx CVA vs TIA?  -Symptoms persistent, though should note tremors appear to be intermittent depending upon which action is requested from patient on exam  -MRI w no acute findings or suggestive of old infarcts  -Neurology consulted   -Continue depakote and ASA   -Check ammonia   -PT/OT/SLP consult   -Fall precautions      Seizure disorder  -Reported seizure-like episode 9/15; hx of nonepileptic events   -Neurology following, continue depakote 500mg daily   - Seizure precautions     Polysubstance use  -UDS negative   - patient reports sobriety x90 days     Type 2 diabetes  -SSI with accuchecks ACHS      Hyperlipidemia  -DC due to intolerance     Expected Discharge Location and Transportation: Home w home health   Expected Discharge   Expected Discharge Date: 9/18/2023; Expected Discharge Time:      Total  time spent: 35min  Time spent includes time reviewing chart, face-to-face time, counseling patient/family/caregiver, ordering medications/tests/procedures, communicating with other health care professionals, documenting clinical information in the electronic health record, and coordination of care.     DVT prophylaxis:  Medical DVT prophylaxis orders are present.     AM-PAC 6 Clicks Score (PT): 16 (09/17/23 0800)    CODE STATUS:   Code Status and Medical Interventions:   Ordered at: 09/16/23 6830     Level Of Support Discussed With:    Patient     Code Status (Patient has no pulse and is not breathing):    CPR (Attempt to Resuscitate)     Medical Interventions (Patient has pulse or is breathing):    Full Support       King Aguila, DO  09/17/23

## 2023-09-18 ENCOUNTER — READMISSION MANAGEMENT (OUTPATIENT)
Dept: CALL CENTER | Facility: HOSPITAL | Age: 43
End: 2023-09-18
Payer: MEDICARE

## 2023-09-18 VITALS
DIASTOLIC BLOOD PRESSURE: 90 MMHG | SYSTOLIC BLOOD PRESSURE: 147 MMHG | TEMPERATURE: 98 F | OXYGEN SATURATION: 96 % | WEIGHT: 223 LBS | HEIGHT: 71 IN | RESPIRATION RATE: 18 BRPM | HEART RATE: 103 BPM | BODY MASS INDEX: 31.22 KG/M2

## 2023-09-18 LAB
ANION GAP SERPL CALCULATED.3IONS-SCNC: 11 MMOL/L (ref 5–15)
BUN SERPL-MCNC: 14 MG/DL (ref 6–20)
BUN/CREAT SERPL: 17.1 (ref 7–25)
CALCIUM SPEC-SCNC: 8.4 MG/DL (ref 8.6–10.5)
CHLORIDE SERPL-SCNC: 102 MMOL/L (ref 98–107)
CO2 SERPL-SCNC: 24 MMOL/L (ref 22–29)
CREAT SERPL-MCNC: 0.82 MG/DL (ref 0.76–1.27)
EGFRCR SERPLBLD CKD-EPI 2021: 111.8 ML/MIN/1.73
GLUCOSE BLDC GLUCOMTR-MCNC: 205 MG/DL (ref 70–130)
GLUCOSE BLDC GLUCOMTR-MCNC: 283 MG/DL (ref 70–130)
GLUCOSE BLDC GLUCOMTR-MCNC: 437 MG/DL (ref 70–130)
GLUCOSE SERPL-MCNC: 191 MG/DL (ref 65–99)
MAGNESIUM SERPL-MCNC: 1.6 MG/DL (ref 1.6–2.6)
POTASSIUM SERPL-SCNC: 4 MMOL/L (ref 3.5–5.2)
SODIUM SERPL-SCNC: 137 MMOL/L (ref 136–145)

## 2023-09-18 PROCEDURE — 97162 PT EVAL MOD COMPLEX 30 MIN: CPT

## 2023-09-18 PROCEDURE — 97116 GAIT TRAINING THERAPY: CPT

## 2023-09-18 PROCEDURE — 80048 BASIC METABOLIC PNL TOTAL CA: CPT | Performed by: STUDENT IN AN ORGANIZED HEALTH CARE EDUCATION/TRAINING PROGRAM

## 2023-09-18 PROCEDURE — 25010000002 THIAMINE PER 100 MG: Performed by: STUDENT IN AN ORGANIZED HEALTH CARE EDUCATION/TRAINING PROGRAM

## 2023-09-18 PROCEDURE — 99214 OFFICE O/P EST MOD 30 MIN: CPT | Performed by: STUDENT IN AN ORGANIZED HEALTH CARE EDUCATION/TRAINING PROGRAM

## 2023-09-18 PROCEDURE — 97535 SELF CARE MNGMENT TRAINING: CPT

## 2023-09-18 PROCEDURE — 92523 SPEECH SOUND LANG COMPREHEN: CPT

## 2023-09-18 PROCEDURE — 82948 REAGENT STRIP/BLOOD GLUCOSE: CPT

## 2023-09-18 PROCEDURE — 63710000001 INSULIN LISPRO (HUMAN) PER 5 UNITS: Performed by: STUDENT IN AN ORGANIZED HEALTH CARE EDUCATION/TRAINING PROGRAM

## 2023-09-18 PROCEDURE — G0378 HOSPITAL OBSERVATION PER HR: HCPCS

## 2023-09-18 PROCEDURE — 97166 OT EVAL MOD COMPLEX 45 MIN: CPT

## 2023-09-18 PROCEDURE — 83735 ASSAY OF MAGNESIUM: CPT | Performed by: STUDENT IN AN ORGANIZED HEALTH CARE EDUCATION/TRAINING PROGRAM

## 2023-09-18 RX ORDER — ASPIRIN 81 MG/1
81 TABLET, CHEWABLE ORAL DAILY
Qty: 30 TABLET | Refills: 2 | Status: SHIPPED | OUTPATIENT
Start: 2023-09-19

## 2023-09-18 RX ORDER — DIVALPROEX SODIUM 500 MG/1
500 TABLET, EXTENDED RELEASE ORAL DAILY
Qty: 30 TABLET | Refills: 2 | Status: SHIPPED | OUTPATIENT
Start: 2023-09-19

## 2023-09-18 RX ADMIN — INSULIN LISPRO 6 UNITS: 100 INJECTION, SOLUTION INTRAVENOUS; SUBCUTANEOUS at 12:26

## 2023-09-18 RX ADMIN — ACETAMINOPHEN 650 MG: 325 TABLET ORAL at 06:19

## 2023-09-18 RX ADMIN — THIAMINE HYDROCHLORIDE 500 MG: 100 INJECTION, SOLUTION INTRAMUSCULAR; INTRAVENOUS at 06:13

## 2023-09-18 RX ADMIN — ASPIRIN 81 MG: 81 TABLET, CHEWABLE ORAL at 08:01

## 2023-09-18 RX ADMIN — ACETAMINOPHEN 650 MG: 325 TABLET ORAL at 10:28

## 2023-09-18 RX ADMIN — Medication 10 ML: at 08:04

## 2023-09-18 RX ADMIN — DIVALPROEX SODIUM 500 MG: 250 TABLET, EXTENDED RELEASE ORAL at 08:06

## 2023-09-18 RX ADMIN — INSULIN LISPRO 4 UNITS: 100 INJECTION, SOLUTION INTRAVENOUS; SUBCUTANEOUS at 08:01

## 2023-09-18 NOTE — CASE MANAGEMENT/SOCIAL WORK
"Continued Stay Note  [facility]     Patient Name: Ronnie He  MRN: [id number]  Today's Date: 9/18/2023    Admit Date: 9/16/2023    Plan: return to sober living facility   Discharge Plan       Row Name 09/18/23 1146       Plan    Plan Comments RN was able to walk pt without a walker with minimal standby assist. CM discussed with pt that due to this he would not qualify for skilled rehab but HH could be arranged if he wanted. He is agreeable to this. Pt did report he was given a walker \"about a month ago\" but does not know where it is. CM has offered to arrange for him to get a new one but it would be selfpay due to the medicare 5 year DME rule. He has declined this offer. SW has provided pt with a list of outpt psych providers per neuro recommendations. At this time his plan for discharge is to return to his sober living facility. CM had attempted to arrange home PT with [facility] who is pts insurance preferred provider but they declined at this time. Geovanna with [employer] is reaching out to their sister companies to see if they might accept. Pt has declined  offer to arrange a Lyft and reports he would prefer to use the bus. No other dc needs at this time    After working with PT again pt has declined HH and reports he feels able to return home without it.                   Discharge Codes    No documentation.                 Expected Discharge Date and Time       Expected Discharge Date Expected Discharge Time    Sep 18, 2023               Ariana Moreland RN    "

## 2023-09-18 NOTE — PROGRESS NOTES
[facility] Neurology    Progress Note    Patient Name: Ronnie He  : 1980  MRN: [id number]  Primary Care Physician:  Dylon Olmos MD  Date of admission: 2023    Subjective     Reason for consult: Seizure, tremor    History of Present Illness   Patient is upset saying he is discharging today.  No evidence of Sattur or tremor    Review of Systems   General: Negative for fever, nausea, or vomiting.   Neurological: Negative for headache, pain, or weakness.     Objective     Vitals:   Temp:  [97.7 °F (36.5 °C)-98.5 °F (36.9 °C)] 97.7 °F (36.5 °C)  Heart Rate:  [] 91  Resp:  [16-18] 18  BP: (120-151)/(70-94) 120/94    Neurologic Exam:   Mental status/Cognition: alert;   Speech/language: fluent; comprehension intact    Cranial nerves:   Face symmetric.  No stutter.  Speech fluent, mild dysarthria    Motor: Moves all extremities, able to use cell phone,     Current Medications    Current Facility-Administered Medications:     acetaminophen (TYLENOL) tablet 650 mg, 650 mg, Oral, Q4H PRN, Jay Curtis MD, 650 mg at 23 1028    aspirin chewable tablet 81 mg, 81 mg, Oral, Daily, Salvador Juarez DO, 81 mg at 23 0801    sennosides-docusate (PERICOLACE) 8.6-50 MG per tablet 2 tablet, 2 tablet, Oral, BID PRN **AND** polyethylene glycol (MIRALAX) packet 17 g, 17 g, Oral, Daily PRN **AND** bisacodyl (DULCOLAX) EC tablet 5 mg, 5 mg, Oral, Daily PRN **AND** bisacodyl (DULCOLAX) suppository 10 mg, 10 mg, Rectal, Daily PRN, Jay Curtis MD    Calcium Replacement - Follow Nurse / BPA Driven Protocol, , Does not apply, PRN, Jay Curtis MD    dextrose (D50W) (25 g/50 mL) IV injection 25 g, 25 g, Intravenous, Q15 Min PRN, Jay Curtis MD    dextrose (GLUTOSE) oral gel 15 g, 15 g, Oral, Q15 Min PRN, Jay Curtis MD    divalproex (DEPAKOTE ER) 24 hr tablet 500 mg, 500 mg, Oral, Daily, Jay Curtis MD, 500 mg at 23 0806    Enoxaparin Sodium (LOVENOX)  syringe 40 mg, 40 mg, Subcutaneous, Nightly, Jay Curtis MD, 40 mg at 09/17/23 2119    glucagon (GLUCAGEN) injection 1 mg, 1 mg, Intramuscular, Q15 Min PRN, Jay Curtis MD    Insulin Lispro (humaLOG) injection 2-9 Units, 2-9 Units, Subcutaneous, 4x Daily AC & at Bedtime, Jay Curtsi MD, 4 Units at 09/18/23 0801    Magnesium Standard Dose Replacement - Follow Nurse / BPA Driven Protocol, , Does not apply, Ever GREY John L, MD    ondansetron (ZOFRAN) injection 4 mg, 4 mg, Intravenous, Q6H PRN, King Aguila DO, 4 mg at 09/17/23 1059    Phosphorus Replacement - Follow Nurse / BPA Driven Protocol, , Does not apply, Ever GREY John L, MD    Potassium Replacement - Follow Nurse / BPA Driven Protocol, , Does not apply, Ever GREY John L, MD    [COMPLETED] Insert Peripheral IV, , , Once **AND** sodium chloride 0.9 % flush 10 mL, 10 mL, Intravenous, PRN, Nahum Guzman,     sodium chloride 0.9 % flush 10 mL, 10 mL, Intravenous, Q12H, Jay Curtis MD, 10 mL at 09/18/23 0804    sodium chloride 0.9 % flush 10 mL, 10 mL, Intravenous, PRN, Jay Curtis MD    sodium chloride 0.9 % infusion 40 mL, 40 mL, Intravenous, PRN, Jay Curtis MD    thiamine (B-1) 500 mg in sodium chloride 0.9 % 100 mL IVPB, 500 mg, Intravenous, Q8H, Jay Curtis MD, Last Rate: 200 mL/hr at 09/18/23 0613, 500 mg at 09/18/23 0613    Laboratory Results:   Lab Results   Component Value Date    GLUCOSE 191 (H) 09/18/2023    CALCIUM 8.4 (L) 09/18/2023     09/18/2023    K 4.0 09/18/2023    CO2 24.0 09/18/2023     09/18/2023    BUN 14 09/18/2023    CREATININE 0.82 09/18/2023    EGFRIFAFRI 132 11/21/2021    EGFRIFNONA 114 11/21/2021    BCR 17.1 09/18/2023    ANIONGAP 11.0 09/18/2023     Lab Results   Component Value Date    WBC 9.73 09/16/2023    HGB 14.7 09/16/2023    HCT 41.9 09/16/2023    MCV 86.0 09/16/2023     09/16/2023     No results found for: CHOL  Lab Results   Component  Value Date    HDL 34 (L) 07/02/2023    HDL 31 (L) 05/11/2022    HDL 31 (L) 02/08/2022     Lab Results   Component Value Date    LDL 36 07/02/2023    LDL 52 05/11/2022    LDL 62 02/08/2022     Lab Results   Component Value Date    TRIG 104 07/02/2023    TRIG 148 05/11/2022    TRIG 144 02/08/2022     Lab Results   Component Value Date    HGBA1C 13.5 (H) 08/31/2022     Lab Results   Component Value Date    INR 0.94 03/04/2023    INR 0.86 (L) 05/09/2022    PROTIME 12.5 03/04/2023    PROTIME 9.6 (L) 05/09/2022     Lab Results   Component Value Date    FOLATE 12.90 09/17/2023     Lab Results   Component Value Date    BHQSOCJL74 633 09/17/2023     TSH 1.63   Ammonia 30    Images/Procedures   MRI brain without contrast 9/16/2023  No acute findings    Assessment / Plan   Active Hospital Problems:  Conversion disorder  Functional tremor  History of seizures  PNES    Brief Patient Summary:  Ronnie He is a 42 y.o. male with history of conversion disorder, polysubstance abuse presenting with tremor and stuttering speech.  His exam is notable for significant improvement in stuttering speech and absence of tremor.  Suspect these are not organic neurologic in origin and he should follow-up with a psychiatrist or therapist for CBT for treatment.  His history of seizures and prior stroke is unclear so out of abundance of caution we will continue Depakote and aspirin daily.  No further work-up, patient is okay to discharge from a neurology perspective    Plan:   -Continue Depakote 500 mg daily  -Psychiatrist or therapist for for CBT in the outpatient setting  -Continue aspirin 81 mg  -Okay to discharge from a neurology perspective      I have discussed the above with the patient  Time spent with patient: 35 minutes in face-to-face evaluation and management of the patient.      Salvador Juarez, , MS

## 2023-09-18 NOTE — DISCHARGE SUMMARY
[facility] Medicine Services  DISCHARGE SUMMARY    Patient Name: Ronnie He  : 1980  MRN: [id number]    Date of Admission: 2023  6:12 PM  Date of Discharge:  23  Primary Care Physician: Dylon Olmos MD    Consults       Date and Time Order Name Status Description    2023  9:47 AM Inpatient Neurology Consult General Completed             Hospital Course     Presenting Problem: Ataxia     Active Hospital Problems    Diagnosis  POA    **Ataxia [R27.0]  Yes    Alcohol use disorder, severe, in early remission [F10.21]  Yes    Generalized anxiety disorder [F41.1]  Yes    Diabetes mellitus type 2 in obese [E11.69, E66.9]  Yes      Resolved Hospital Problems   No resolved problems to display.          Hospital Course:  Ronnie He is a 42 y.o. male with past medical history significant for seizure disorder, polysubstance abuse, diabetes mellitus, CVA vs TIA, who was admitted for evaluation of seizure-like activity and tremors. MRI brain was negative for any acute findings. Neurology evaluated, felt symptoms were not organic neurologic in origin. Recommended to continue aspirin and Depakote daily. Also recommended CBT on discharge. Patient's symptoms improved and he was discharged back to his sober living house but he refused to go there. Patient was then discharged home.       Discharge Follow Up Recommendations for outpatient labs/diagnostics:  - Follow up with PCP in 1 week.  - Provided therapy resources on AVS.  - Recommended to use walker to help prevent falls.     Day of Discharge     HPI:   Patient initially had tremors this morning and reported feeling off balance when walking. However on return in the afternoon, tremors had resolved, balance issues had improved. Patient was able to walk with walker with nursing and physical therapy.      Vital Signs:   Temp:  [97.7 °F (36.5 °C)-98.1 °F (36.7 °C)] 98 °F (36.7 °C)  Heart Rate:  [] 103  Resp:   [16-18] 18  BP: (120-151)/(77-94) 147/90      Physical Exam:  General appearance: alert, awake, oriented, no acute distress   Cardiovascular: RRR on exam this morning, no murmurs or rubs  Respiratory: CTAB, no crackles or wheezes   Abdomen: soft, non-tender, no organomegaly, bowel sounds normoactive    Neuro/CNS: alert and oriented x3, stuttered speech, tremors in BL UE (rapid), BL LE (slow). Tremors resolved in afternoon.     Pertinent  and/or Most Recent Results     LAB RESULTS:      Lab 09/17/23  0046 09/16/23  1708 09/16/23  0008   WBC  --  9.73  --    HEMOGLOBIN  --  14.7  --    HEMATOCRIT  --  41.9  --    PLATELETS  --  245  --    NEUTROS ABS  --  5.93  --    IMMATURE GRANS (ABS)  --  0.03  --    LYMPHS ABS  --  2.55  --    MONOS ABS  --  0.96*  --    EOS ABS  --  0.19  --    MCV  --  86.0  --    PROCALCITONIN  --   --  <0.25   LACTATE 1.2  --   --          Lab 09/18/23  0521 09/17/23  0529 09/16/23  2219 09/16/23  1708   SODIUM 137 138 137 137   POTASSIUM 4.0 4.0 4.0 3.9   CHLORIDE 102 103 103 103   CO2 24.0 20.0* 20.0* 23.0   ANION GAP 11.0 15.0 14.0 11.0   BUN 14 13 13 14   CREATININE 0.82 0.84 0.95 0.92   EGFR 111.8 111.0 101.9 105.8   GLUCOSE 191* 128* 261* 225*   CALCIUM 8.4* 8.6 8.7 9.2   MAGNESIUM 1.6 1.6 1.6 1.8   TSH  --   --   --  1.630         Lab 09/16/23  1708   TOTAL PROTEIN 6.8   ALBUMIN 4.1   GLOBULIN 2.7   ALT (SGPT) 16   AST (SGOT) 20   BILIRUBIN 0.3   ALK PHOS 70                 Lab 09/17/23  0529   FOLATE 12.90   VITAMIN B 12 633         Brief Urine Lab Results  (Last result in the past 365 days)        Color   Clarity   Blood   Leuk Est   Nitrite   Protein   CREAT   Urine HCG        09/16/23 1804 Yellow   Clear   Negative   Negative   Negative   Negative                 Microbiology Results (last 10 days)       ** No results found for the last 240 hours. **            MRI Brain Without Contrast    Result Date: 9/16/2023  MRI BRAIN WO CONTRAST Date of Exam: 9/16/2023 7:00 PM EDT  Indication: Acute neurologic deficit, suspicion of an acute cerebrovascular accident.  Comparison: None available. Technique:  Routine multiplanar/multisequence sequence images of the brain were obtained without contrast administration. Findings: A few of the pulse sequences are motion degraded. There is no restricted diffusion to indicate acute ischemia. The sulci, fissures, ventricles, and basal cisterns are normal for age. The gray-white matter differentiation is preserved. There is no abnormal white matter signal. There is no acute hemorrhage, midline shift, or suspicious extra-axial fluid collections. There are normal signal voids within the intracranial arteries and the major dural sinuses. The orbital contents are normal. There is some mild mucosal thickening in the ethmoid sinuses indicating chronic sinus disease. The mastoid sinuses are clear.     Impression: No acute findings. Electronically Signed: Dave Marlow MD  9/16/2023 7:24 PM EDT  Workstation ID: [id number]                 Plan for Follow-up of Pending Labs/Results: N/A    Discharge Details        Discharge Medications        New Medications        Instructions Start Date   aspirin 81 MG chewable tablet   81 mg, Oral, Daily   Start Date: September 19, 2023     divalproex 500 MG 24 hr tablet  Commonly known as: DEPAKOTE ER   500 mg, Oral, Daily   Start Date: September 19, 2023              No Known Allergies      Discharge Disposition:  Home or Self Care    Diet:  Hospital:No active diet order      Activity:  Activity Instructions    No activity restrictions            Restrictions or Other Recommendations:  N/A       CODE STATUS:    Code Status and Medical Interventions:   Ordered at: 09/16/23 2129     Level Of Support Discussed With:    Patient     Code Status (Patient has no pulse and is not breathing):    CPR (Attempt to Resuscitate)     Medical Interventions (Patient has pulse or is breathing):    Full Support       No future  appointments.    Additional Instructions for the Follow-ups that You Need to Schedule       Ambulatory Referral to Home Health   As directed      Face to Face Visit Date: 9/18/2023   Follow-up provider for Plan of Care?: I treated the patient in an acute care facility and will not continue treatment after discharge.   Follow-up provider: DIANA OLMOS [[id number]]   Reason/Clinical Findings: ataxia   Describe mobility limitations that make leaving home difficult: impaired mobility   Nursing/Therapeutic Services Requested: Physical Therapy   Frequency: 1 Week 1        Discharge Follow-up with PCP   As directed       Currently Documented PCP:    Diana Olmos MD    PCP Phone Number:    [phone]     Follow Up Details: in 1 week                      Lien Cottrell DO  09/18/23      Time Spent on Discharge:  I spent  >30  minutes on this discharge activity which included: face-to-face encounter with the patient, reviewing the data in the system, coordination of the care with the nursing staff as well as consultants, documentation, and entering orders.

## 2023-09-18 NOTE — CASE MANAGEMENT/SOCIAL WORK
Continued Stay Note  [facility]     Patient Name: Ronnie He  MRN: [id number]  Today's Date: 9/18/2023    Admit Date: 9/16/2023    Plan: return to sober living facility   Discharge Plan       Row Name 09/18/23 1514       Plan    Plan Comments MSW spoke with pt at bedside and provided information for local mental health counselors. Pt had no further questions or concerns at this time for .                   Discharge Codes    No documentation.                 Expected Discharge Date and Time       Expected Discharge Date Expected Discharge Time    Sep 18, 2023               MAKENZIE Cruz     36.7

## 2023-09-18 NOTE — DISCHARGE INSTRUCTIONS
Constipation: Patient complains of constipation.  Stool pattern has been {numbers 0-6:56157} {stool descriptions:09661} stool(s) {time unit:16380}. Onset was {0-10:81739} {time units:11} ago Defecation has been {defecation:88298}. Co-Morbid conditions:{constipating co-morbid:82276}. Symptoms have been {symptom progression:39431}. Current Health Habits: Eating fiber? {yes/amt/no:59111} Exercise?{yes***/no:76345} Water intake? {yes/amt/no:04927} Current OTC/RX therapy has been {laxative list:89010} which has been {effective/ineffective:00322}.

## 2023-09-18 NOTE — PLAN OF CARE
Goal Outcome Evaluation:  Plan of Care Reviewed With: patient        Progress: improving  Outcome Evaluation: Pt amb in [place] with FWW and CGA. Pt navigated 10 steps with CGA and L HR. Mild unsteadiness, no LOB noted. Activity limited by fatigue. Plan is for pt to d/c home when medically appropriate. Pt declined HHPT.      Anticipated Discharge Disposition (PT): skilled nursing facility

## 2023-09-18 NOTE — NURSING NOTE
Pt. Up walking in room and was upset because he was fall precautions and we would not let him be up ad malachi.  Threatened to leave AMA before meds and discharge instructions were given, MD was notified, and eventually pt. Agreed to follow through with discharge.  Pt. D/c'd back to sober living home.  CM gave him instructions and info for follow up therapy.  Pt. Refused [employer] and insisted on going by bus.  Meds delivered to bedside via pharmacy and Nurse Manager escorted pt downstairs and to bus stop via w/c.  No acute distress noted.  Tai Cedeno RN

## 2023-09-18 NOTE — PLAN OF CARE
Problem: Fall Injury Risk  Goal: Absence of Fall and Fall-Related Injury  Intervention: Identify and Manage Contributors  Recent Flowsheet Documentation  Taken 9/18/2023 1217 by Rogelio Cedeno RN  Medication Review/Management: medications reviewed  Taken 9/18/2023 1000 by Rogelio Cedeno RN  Medication Review/Management: medications reviewed  Taken 9/18/2023 0801 by Rogelio Cedeno RN  Medication Review/Management: medications reviewed  Intervention: Promote Injury-Free Environment  Recent Flowsheet Documentation  Taken 9/18/2023 1217 by Rogleio Cedeno RN  Safety Promotion/Fall Prevention:   activity supervised   clutter free environment maintained   assistive device/personal items within reach   toileting scheduled   safety round/check completed   room organization consistent   nonskid shoes/slippers when out of bed   gait belt   fall prevention program maintained  Taken 9/18/2023 1000 by Rogelio Cedeno RN  Safety Promotion/Fall Prevention:   activity supervised   clutter free environment maintained   assistive device/personal items within reach   toileting scheduled   safety round/check completed   room organization consistent   nonskid shoes/slippers when out of bed   gait belt   fall prevention program maintained  Taken 9/18/2023 0801 by Rogelio Cedeno RN  Safety Promotion/Fall Prevention:   activity supervised   clutter free environment maintained   assistive device/personal items within reach   toileting scheduled   safety round/check completed   room organization consistent   nonskid shoes/slippers when out of bed   fall prevention program maintained   gait belt   Goal Outcome Evaluation:

## 2023-09-18 NOTE — THERAPY EVALUATION
Patient Name: Ronnie He  : 1980    MRN: [id number]                              Today's Date: 2023       Admit Date: 2023    Visit Dx:     ICD-10-CM ICD-9-CM   1. Tremors of nervous system  R25.1 781.0   2. Trouble walking  R26.2 719.7   3. History of seizure  Z87.898 V13.89   4. Cognitive communication deficit  R41.841 799.52     Patient Active Problem List   Diagnosis    Ataxia    Alcohol use disorder, severe, in early remission    Anxiety    Cannabis dependence    Chiari malformation type I    Chronic pain of right knee    Chronic post-traumatic stress disorder (PTSD)    Conversion disorder with attacks or seizures    COPD (chronic obstructive pulmonary disease)    DDD (degenerative disc disease), cervical    Diabetes mellitus type 2 in obese    Generalized anxiety disorder    Hemiplegia and hemiparesis following cerebral infarction affecting left non-dominant side    Hemiplegia, unspecified affecting left nondominant side    History of alcoholism    History of cardioembolic cerebrovascular accident (CVA)    Hypertension associated with diabetes    Insomnia    Intractable migraine with aura with status migrainosus    Left-sided weakness    Major depressive disorder, recurrent, severe with psychotic features    Malingering    Melanoma    Methamphetamine abuse    Methamphetamine dependence in remission    Methamphetamine-induced psychotic disorder    Non-compliant patient    PAULA (obstructive sleep apnea)    Personality disorder, unspecified    Pseudohyponatremia    Seizure disorder    Squamous blepharitis of upper and lower eyelids of both eyes    Suicide attempt    Tobacco abuse    Type 2 diabetes mellitus with hyperglycemia    Type 2 diabetes mellitus without retinopathy     History reviewed. No pertinent past medical history.  History reviewed. No pertinent surgical history.   General Information       Row Name 23 0820          Physical Therapy Time and Intention    Document Type  evaluation  -     Mode of Treatment physical therapy  -       Row Name 09/18/23 0820          General Information    Patient Profile Reviewed yes  -     Prior Level of Function min assist:;all household mobility;community mobility;gait;transfer;ADL's;bed mobility  -     Existing Precautions/Restrictions fall  -     Barriers to Rehab ineffective coping  -       Row Name 09/18/23 0820          Living Environment    People in Home alone  -       Row Name 09/18/23 0820          Home Main Entrance    Number of Stairs, Main Entrance one  -     Stair Railings, Main Entrance none  -       Row Name 09/18/23 0820          Stairs Within Home, Primary    Stairs, Within Home, Primary steps to bedroom  -     Number of Stairs, Within Home, Primary eight  -       Row Name 09/18/23 0820          Cognition    Orientation Status (Cognition) oriented to;person;verbal cues/prompts needed for orientation;place;situation;time  unable to state name of [facility] and stated the year was 2022.  -       Row Name 09/18/23 0820          Safety Issues, Functional Mobility    Safety Issues Affecting Function (Mobility) insight into deficits/self-awareness;problem-solving;judgment;safety precautions follow-through/compliance  -     Impairments Affecting Function (Mobility) balance;cognition;coordination;endurance/activity tolerance;motor control;pain;strength;motor planning  -               User Key  (r) = Recorded By, (t) = Taken By, (c) = Cosigned By      Initials Name Provider Type     Malena Coughlin PT Physical Therapist                   Mobility       Row Name 09/18/23 0910          Bed Mobility    Bed Mobility supine-sit  -     Supine-Sit [place] (Bed Mobility) standby assist  -     Comment, (Bed Mobility) increased time to come to sit EOB  -       Row Name 09/18/23 0910          Transfers    Comment, (Transfers) Pt performed STS x4 with FWW and Mod Ax2 progressing to CGAx2. Full body shaking noted  "when in standing with anterior weight shifting requiring assistance for upright posture. Transition progressed with repetition and less assistance provided. Good eccentric control to return to sitting noted though shaking. Pt took steps to chair wtih Mod A x2 and FWW. VC for suquencing. Good foot placement control though shaking noted. Activity limited by fatigue. HR remained <110 bpm throughout all mobility. Pt reported being \"worn out\" after mobility.  -       Row Name 09/18/23 0910          Bed-Chair Transfer    Bed-Chair [place] (Transfers) moderate assist (50% patient effort);2 person assist;verbal cues;nonverbal cues (demo/gesture)  -     Assistive Device (Bed-Chair Transfers) walker, front-wheeled  -       Row Name 09/18/23 0910          Sit-Stand Transfer    Sit-Stand [place] (Transfers) moderate assist (50% patient effort);2 person assist;verbal cues;nonverbal cues (demo/gesture)  -     Assistive Device (Sit-Stand Transfers) walker, front-wheeled  -       Row Name 09/18/23 0910          Gait/Stairs (Locomotion)    [place] Level (Gait) not tested  -               User Key  (r) = Recorded By, (t) = Taken By, (c) = Cosigned By      Initials Name Provider Type     Malena Coughlin, HALIMA Physical Therapist                   Obj/Interventions       Row Name 09/18/23 0990          Range of Motion Comprehensive    General Range of Motion no range of motion deficits identified  -       Row Name 09/18/23 0917          Strength Comprehensive (MMT)    General Manual Muscle Testing (MMT) Assessment other (see comments)  -     Comment, General Manual Muscle Testing (MMT) Assessment Difficulty to assess MMT due to decreased command following and motor control. Shaking noted on command for BLE movement though minimal noted spontaneously with tasks like adjusting socks without being asked to.  -       Row Name 09/18/23 0939          Balance    Balance Assessment sitting static balance;sitting " dynamic balance;sit to stand dynamic balance;standing static balance;standing dynamic balance  -     Static Sitting Balance standby assist  -     Dynamic Sitting Balance standby assist  -     Position, Sitting Balance sitting edge of bed  -     Static Standing Balance moderate assist;non-verbal cues (demo/gesture);verbal cues;2-person assist  -     Dynamic Standing Balance moderate assist;2-person assist;non-verbal cues (demo/gesture)  -     Position/Device Used, Standing Balance supported;walker, rolling  -     Balance Interventions sitting;standing;sit to stand;occupation based/functional task  -     Comment, Balance anterior weight shifting with full body shaking noted.  -       Row Name 09/18/23 0925          Sensory Assessment (Somatosensory)    Sensory Assessment (Somatosensory) LE sensation intact  -               User Key  (r) = Recorded By, (t) = Taken By, (c) = Cosigned By      Initials Name Provider Type     Malena Coughlin, PT Physical Therapist                   Goals/Plan       Row Name 09/18/23 0932          Bed Mobility Goal 1 (PT)    Activity/Assistive Device (Bed Mobility Goal 1, PT) sit to supine/supine to sit  -     [place] Level/Cues Needed (Bed Mobility Goal 1, PT) modified independence  -     Time Frame (Bed Mobility Goal 1, PT) long term goal (LTG);10 days  -     Progress/Outcomes (Bed Mobility Goal 1, PT) goal ongoing  -       Row Name 09/18/23 0932          Transfer Goal 1 (PT)    Activity/Assistive Device (Transfer Goal 1, PT) sit-to-stand/stand-to-sit;bed-to-chair/chair-to-bed  -     [place] Level/Cues Needed (Transfer Goal 1, PT) standby assist  -     Time Frame (Transfer Goal 1, PT) long term goal (LTG);10 days  -     Progress/Outcome (Transfer Goal 1, PT) goal ongoing  -       Row Name 09/18/23 0932          Gait Training Goal 1 (PT)    Activity/Assistive Device (Gait Training Goal 1, PT) gait (walking locomotion)  -     [place]  Level (Gait Training Goal 1, PT) standby assist  -HP     Distance (Gait Training Goal 1, PT) 150  -HP     Time Frame (Gait Training Goal 1, PT) long term goal (LTG);10 days  -HP     Progress/Outcome (Gait Training Goal 1, PT) goal ongoing  -       Row Name 09/18/23 0932          Therapy Assessment/Plan (PT)    Planned Therapy Interventions (PT) balance training;bed mobility training;gait training;home exercise program;patient/family education;transfer training;stretching;strengthening;stair training;ROM (range of motion);wheelchair management/propulsion training  -               User Key  (r) = Recorded By, (t) = Taken By, (c) = Cosigned By      Initials Name Provider Type    HP Malena Coughlin, PT Physical Therapist                   Clinical Impression       Row Name 09/18/23 0928          Pain    Pretreatment Pain Rating 0/10 - no pain  -HP     Posttreatment Pain Rating 0/10 - no pain  -       Row Name 09/18/23 0928          Plan of Care Review    Plan of Care Reviewed With patient  -     Progress no change  -     Outcome Evaluation Pt performed bed mobility with SBA. Pt required Mod A x2 for STS and taking steps to chair with FWW due to unsteadiness noted and full body shaking observed. Less assistance required with repetitions. Activity limited by fatigue. Recommend d/c to SNF to address deficits and decrease risk for falls.  -       Row Name 09/18/23 0928          Therapy Assessment/Plan (PT)    Rehab Potential (PT) good, to achieve stated therapy goals  -     Criteria for Skilled Interventions Met (PT) yes;meets criteria;skilled treatment is necessary  -     Therapy Frequency (PT) daily  -       Row Name 09/18/23 0928          Vital Signs    Pre Systolic BP Rehab 123  -HP     Pre Treatment Diastolic BP 94  -HP     Intra Systolic BP Rehab 139  -HP     Intra Treatment Diastolic BP 96  -HP     Post Systolic BP Rehab 136  -HP     Post Treatment Diastolic BP 97  -HP     Pretreatment Heart Rate  (beats/min) 91  -HP     Intratreatment Heart Rate (beats/min) 109  -HP     Posttreatment Heart Rate (beats/min) 89  -HP     Pre Patient Position Supine  -HP     Intra Patient Position Standing  -HP     Post Patient Position Sitting  -HP       Row Name 09/18/23 0928          Positioning and Restraints    Pre-Treatment Position in bed  -HP     Post Treatment Position chair  -HP     In Chair notified nsg;reclined;sitting;call light within reach;encouraged to call for assist;exit alarm on;legs elevated;compression device;waffle cushion  -               User Key  (r) = Recorded By, (t) = Taken By, (c) = Cosigned By      Initials Name Provider Type     Malena Coughlin PT Physical Therapist                   Outcome Measures       Row Name 09/18/23 0933          How much help from another person do you currently need...    Turning from your back to your side while in flat bed without using bedrails? 3  -HP     Moving from lying on back to sitting on the side of a flat bed without bedrails? 3  -HP     Moving to and from a bed to a chair (including a wheelchair)? 2  -HP     Standing up from a chair using your arms (e.g., wheelchair, bedside chair)? 2  -HP     Climbing 3-5 steps with a railing? 2  -HP     To walk in hospital room? 2  -HP     AM-PAC 6 Clicks Score (PT) 14  -HP     Highest level of mobility 4 --> Transferred to chair/commode  -       Row Name 09/18/23 0933          Functional Assessment    Outcome Measure Options AM-PAC 6 Clicks Basic Mobility (PT)  -               User Key  (r) = Recorded By, (t) = Taken By, (c) = Cosigned By      Initials Name Provider Type     Malena Coughlin PT Physical Therapist                                 Physical Therapy Education       Title: PT OT SLP Therapies (Done)       Topic: Physical Therapy (Done)       Point: Mobility training (Done)       Learning Progress Summary             Patient Acceptance, E,D, VU,NR by  at 9/18/2023 0933                         Point:  Home exercise program (Done)       Learning Progress Summary             Patient Acceptance, E,D, VU,NR by  at 9/18/2023 0933                         Point: Body mechanics (Done)       Learning Progress Summary             Patient Acceptance, E,D, VU,NR by  at 9/18/2023 0933                         Point: Precautions (Done)       Learning Progress Summary             Patient Acceptance, E,D, VU,NR by  at 9/18/2023 0933                                         User Key       Initials Effective Dates Name Provider Type Discipline     06/01/21 -  Malena Coughlin, PT Physical Therapist PT                  PT Recommendation and Plan  Planned Therapy Interventions (PT): balance training, bed mobility training, gait training, home exercise program, patient/family education, transfer training, stretching, strengthening, stair training, ROM (range of motion), wheelchair management/propulsion training  Plan of Care Reviewed With: patient  Progress: no change  Outcome Evaluation: Pt performed bed mobility with SBA. Pt required Mod A x2 for STS and taking steps to chair with FWW due to unsteadiness noted and full body shaking observed. Less assistance required with repetitions. Activity limited by fatigue. Recommend d/c to SNF to address deficits and decrease risk for falls.     Time Calculation:   PT Evaluation Complexity  History, PT Evaluation Complexity: 1-2 personal factors and/or comorbidities  Examination of Body Systems (PT Eval Complexity): total of 3 or more elements  Clinical Presentation (PT Evaluation Complexity): evolving  Clinical Decision Making (PT Evaluation Complexity): moderate complexity  Overall Complexity (PT Evaluation Complexity): moderate complexity     PT Charges       Row Name 09/18/23 0823             Time Calculation    Start Time 0823  -HP      PT Received On 09/18/23  -      PT Goal Re-Cert Due Date 09/28/23  -         Untimed Charges    PT Eval/Re-eval Minutes 55  -HP         Total  Minutes    Untimed Charges Total Minutes 55  -HP       Total Minutes 55  -HP                User Key  (r) = Recorded By, (t) = Taken By, (c) = Cosigned By      Initials Name Provider Type     Malena Coughlin, PT Physical Therapist                  Therapy Charges for Today       Code Description Service Date Service Provider Modifiers Qty    04609241904 HC PT EVAL MOD COMPLEXITY 4 9/18/2023 Malena Coughlin, HALIMA GP 1            PT G-Codes  Outcome Measure Options: AM-PAC 6 Clicks Basic Mobility (PT)  AM-PAC 6 Clicks Score (PT): 14  PT Discharge Summary  Anticipated Discharge Disposition (PT): skilled nursing facility    Malena Coughlin PT  9/18/2023

## 2023-09-18 NOTE — PLAN OF CARE
Goal Outcome Evaluation:  Plan of Care Reviewed With: patient        Progress: no change     Pt is alert and oriented x3; disoriented to place;forgetful. Vss on room air. NSR on tele. Pt has slept off and on throughout the shift. PRN tylenol given as ordered HA. Urinal provided and voiding spontaneously. Pt repositions independently in bed. Will continue to monitor.

## 2023-09-18 NOTE — PLAN OF CARE
Goal Outcome Evaluation:  Plan of Care Reviewed With: patient              SLP evaluation completed. Will continue to address speech dysfluency and mild cognitive difficulties. Please see note for further details and recommendations.

## 2023-09-18 NOTE — SIGNIFICANT NOTE
Patient was discharged to sober living house by [facility], but he refused to go there.  He wanted to take the bus home, so after he was discharged I escorted him to the [employer] bus stop in front of the hospital and waited with him for about 20 minutes until the bus arrived.  I gave patient an extra cane to take with him.  He boarded the [employer] bus going inbound on [facility] at 16:20.  He said he was going home on [facility].      Jake Argueta RN- nurse manager

## 2023-09-18 NOTE — PLAN OF CARE
Goal Outcome Evaluation:  Plan of Care Reviewed With: patient        Progress: no change  Outcome Evaluation: OT evaluation completed. Pt performed bed mobility with SBA. Pt completed steps to chair and STS x4 using a RWx with modA x2 though this did progress to CGA x2. Pt with full body shaking/ataxia BLE>BUEs when standing. Seated ADLs performed with set up and SBA, standing ADLs completed mod/maxA. Pt presents below his functional baseline due to shaking/tremors while standing, as well as deficits in strength, balance, and activity tolerance. Pt recommend a d/c to SNF to address deficits listed and assist in restoring PLOF.      Anticipated Discharge Disposition (OT): skilled nursing facility

## 2023-09-18 NOTE — PLAN OF CARE
Goal Outcome Evaluation:  Plan of Care Reviewed With: patient        Progress: no change  Outcome Evaluation: Pt performed bed mobility with SBA. Pt required Mod A x2 for STS and taking steps to chair with FWW due to unsteadiness noted and full body shaking observed. Less assistance required with repetitions. Activity limited by fatigue. Recommend d/c to SNF to address deficits and decrease risk for falls.      Anticipated Discharge Disposition (PT): skilled nursing facility

## 2023-09-18 NOTE — THERAPY EVALUATION
Acute Care - Speech Language Pathology Initial Evaluation  [facility]  Cognitive-Communication Evaluation     Patient Name: Ronnie He  : 1980  MRN: [id number]  Today's Date: 2023               Admit Date: 2023     Visit Dx:    ICD-10-CM ICD-9-CM   1. Tremors of nervous system  R25.1 781.0   2. Trouble walking  R26.2 719.7   3. History of seizure  Z87.898 V13.89   4. Cognitive communication deficit  R41.841 799.52     Patient Active Problem List   Diagnosis    Ataxia    Alcohol use disorder, severe, in early remission    Anxiety    Cannabis dependence    Chiari malformation type I    Chronic pain of right knee    Chronic post-traumatic stress disorder (PTSD)    Conversion disorder with attacks or seizures    COPD (chronic obstructive pulmonary disease)    DDD (degenerative disc disease), cervical    Diabetes mellitus type 2 in obese    Generalized anxiety disorder    Hemiplegia and hemiparesis following cerebral infarction affecting left non-dominant side    Hemiplegia, unspecified affecting left nondominant side    History of alcoholism    History of cardioembolic cerebrovascular accident (CVA)    Hypertension associated with diabetes    Insomnia    Intractable migraine with aura with status migrainosus    Left-sided weakness    Major depressive disorder, recurrent, severe with psychotic features    Malingering    Melanoma    Methamphetamine abuse    Methamphetamine dependence in remission    Methamphetamine-induced psychotic disorder    Non-compliant patient    PAULA (obstructive sleep apnea)    Personality disorder, unspecified    Pseudohyponatremia    Seizure disorder    Squamous blepharitis of upper and lower eyelids of both eyes    Suicide attempt    Tobacco abuse    Type 2 diabetes mellitus with hyperglycemia    Type 2 diabetes mellitus without retinopathy     History reviewed. No pertinent past medical history.  History reviewed. No pertinent surgical history.    SLP Recommendation and  Plan  SLP Diagnosis: mild, fluency disorder, cognitive-linguistic disorder (09/18/23 0840)           [facility] [place] for Skilled Therapy Interventions Met: yes (09/18/23 0840)  Anticipated Discharge Disposition (SLP): inpatient rehabilitation facility (09/18/23 0840)        Predicted Duration Therapy Intervention (Days): until discharge (09/18/23 0840)                             Plan of Care Reviewed With: patient (09/18/23 0930)      SLP EVALUATION (last 72 hours)       SLP SLC Evaluation       Row Name 09/18/23 0840                   Communication Assessment/Intervention    Document Type evaluation  -SM        Subjective Information no complaints  -        Patient Observations alert;cooperative  -           General Information    Patient Profile Reviewed yes  -SM        Pertinent History Of Current Problem Adm with seizure, tremor, stutter. MRI negative. Hx polysubstance though sober x90 days, hx conversion d/o and malingering.  -        Prior Level of Function-Communication WF  -        Plans/Goals Discussed with patient;agreed upon  -        Barriers to Rehab none identified  -        Patient's Goals for Discharge functional communication;functional cognition  -           Pain    Additional Documentation Pain Scale: Numbers Pre/Post-Treatment (Group)  -           Pain Scale: Numbers Pre/Post-Treatment    Pretreatment Pain Rating 0/10 - no pain  -SM        Posttreatment Pain Rating 0/10 - no pain  -SM           Comprehension Assessment/Intervention    Comprehension Assessment/Intervention Auditory Comprehension;Reading Comprehension  -           Auditory Comprehension Assessment/Intervention    Answers Questions (Communication) yes/no;wh questions;personal;WFL  -        Able to Follow Commands (Communication) 1-step;2-step;WFL  -SM        Narrative Discourse conversational level;WFL  -           Reading Comprehension Assessment/Intervention    Functional Reading Tasks mild impairment  -      "   Reading Comprehension, Comment r/t cognitive difficulties  -           Expression Assessment/Intervention    Expression Assessment/Intervention verbal expression;graphic expression  -           Verbal Expression Assessment/Intervention    Verbal Expression WFL;other (see comments)  -        Verbal Expression, Comment Negatively impacted by speech/fluency difficulties  -           Graphic Expression Assessment/Intervention    Functional Correspondence mild impairment;unable/difficult to assess  -        Graphic Expression, Comment difficult to assess r/t tremor. Pt with difficulty texting. Partially tremor related though also r/t cognitive difficulties, with decreased thought formation  -           Oral Musculature and Cranial Nerve Assessment    Oral Motor General Assessment WFL  -           Motor Speech Assessment/Intervention    Characteristics Consistent with Dysarthria decreased articulation  -        Speech intelligibility 90%;in quiet environment;with unfamiliar listener  -        Motor Speech, Comment Disfluent with initial sound blocks and repetitions  -           Cognitive Assessment Intervention- SLP    Orientation Status (Cognition) person;time;situation;WFL;place;mild impairment;other (see comments)  \"hospital\" Did not know name  -        Memory (Cognitive) mental manipulation;mild impairment  -        Attention (Cognitive) selective;sustained;WFL  -        Thought Organization (Cognitive) mental manipulation;mild impairment  -        Reasoning (Cognitive) mental flexibility;mild impairment  -        Problem Solving (Cognitive) simple;WFL  -        Executive Function (Cognition) deficit awareness;judgement;WFL;home management activities;mild impairment  -           SLP Evaluation Clinical Impressions    SLP Diagnosis mild;fluency disorder;cognitive-linguistic disorder  -        Rehab Potential/Prognosis good  -[facility] Criteria for Skilled Therapy Interventions " Met yes  -        Functional Impact difficulty in expressing complex messages;difficulty completing home management task  -           Recommendations    Therapy Frequency (SLP SLC) 5 days per week  -SM        Predicted Duration Therapy Intervention (Days) until discharge  -        Anticipated Discharge Disposition (SLP) inpatient rehabilitation facility  -                  User Key  (r) = Recorded By, (t) = Taken By, (c) = Cosigned By      Initials Name Effective Dates    Tamanna Aguilera MS CCC-SLP 02/03/23 -                        EDUCATION  The patient has been educated in the following areas:     Cognitive Impairment Communication Impairment.           SLP GOALS       Row Name 09/18/23 0840             Patient will demonstrate functional cognitive-linguistic skills for return to discharge environment    [place] Independently  -      Time frame by discharge  -SM         Organizational Skills Goal 1 (SLP)    Improve Thought Organization Through Goal 1 (SLP) completing mental manipulation task;100%;with minimal cues (75-90%)  -      Time Frame (Thought Organization Skills Goal 1, SLP) short term goal (STG)  -SM         Reasoning Goal 1 (SLP)    Improve Reasoning Through Goal 1 (SLP) complete basic reasoning task;complete mental flexibility task;100%;with minimal cues (75-90%)  -SM      Time Frame (Reasoning Goal 1, SLP) short term goal (STG)  -SM         SLC STG Goal 1 (SLP)    Additional Goal 1, SLP Produce fluent phrases with 80% accuracy and min cues for easy onset and other fluency strategies  -SM      Time Frame (Additional Goal 1, SLP) short term goal (STG)  -SM                User Key  (r) = Recorded By, (t) = Taken By, (c) = Cosigned By      Initials Name Provider Type    Tamanna Aguilera MS CCC-SLP Speech and Language Pathologist                            Time Calculation:      Time Calculation- SLP       Row Name 09/18/23 0931             Time Calculation- SLP    SLP  Start Time 0840  -      SLP Received On 09/18/23  -         Untimed Charges    08902-BM Eval Speech and Production w/ Language Minutes 54  -SM         Total Minutes    Untimed Charges Total Minutes 54  -SM       Total Minutes 54  -SM                User Key  (r) = Recorded By, (t) = Taken By, (c) = Cosigned By      Initials Name Provider Type    Tamanna Aguilera, MS CCC-SLP Speech and Language Pathologist                    Therapy Charges for Today       Code Description Service Date Service Provider Modifiers Qty    [id number]  ST EVAL SPEECH AND PROD W LANG  4 9/18/2023 Tamanna Mancuso MS CCC-SLP GN 1                       Tamanna Mancuso MS CCC-SLP  9/18/2023

## 2023-09-18 NOTE — THERAPY EVALUATION
Patient Name: Ronnie He  : 1980    MRN: [id number]                              Today's Date: 2023       Admit Date: 2023    Visit Dx:     ICD-10-CM ICD-9-CM   1. Tremors of nervous system  R25.1 781.0   2. Trouble walking  R26.2 719.7   3. History of seizure  Z87.898 V13.89   4. Cognitive communication deficit  R41.841 799.52     Patient Active Problem List   Diagnosis    Ataxia    Alcohol use disorder, severe, in early remission    Anxiety    Cannabis dependence    Chiari malformation type I    Chronic pain of right knee    Chronic post-traumatic stress disorder (PTSD)    Conversion disorder with attacks or seizures    COPD (chronic obstructive pulmonary disease)    DDD (degenerative disc disease), cervical    Diabetes mellitus type 2 in obese    Generalized anxiety disorder    Hemiplegia and hemiparesis following cerebral infarction affecting left non-dominant side    Hemiplegia, unspecified affecting left nondominant side    History of alcoholism    History of cardioembolic cerebrovascular accident (CVA)    Hypertension associated with diabetes    Insomnia    Intractable migraine with aura with status migrainosus    Left-sided weakness    Major depressive disorder, recurrent, severe with psychotic features    Malingering    Melanoma    Methamphetamine abuse    Methamphetamine dependence in remission    Methamphetamine-induced psychotic disorder    Non-compliant patient    PAULA (obstructive sleep apnea)    Personality disorder, unspecified    Pseudohyponatremia    Seizure disorder    Squamous blepharitis of upper and lower eyelids of both eyes    Suicide attempt    Tobacco abuse    Type 2 diabetes mellitus with hyperglycemia    Type 2 diabetes mellitus without retinopathy     History reviewed. No pertinent past medical history.  History reviewed. No pertinent surgical history.   General Information       Row Name 23 0905          OT Time and Intention    Document Type evaluation  -KF      Mode of Treatment occupational therapy  -       Row Name 09/18/23 0905          General Information    Patient Profile Reviewed yes  -KF     Prior Level of Function independent:;all household mobility;community mobility;bed mobility;ADL's  -KF     Existing Precautions/Restrictions fall;other (see comments)  Ataxic  -KF     Barriers to Rehab medically complex;previous functional deficit  -KF       Row Name 09/18/23 0905          Occupational Profile    Environmental Supports and Barriers (Occupational Profile) Pt currently residing at a [facility].  -       Row Name 09/18/23 0905          Home Main Entrance    Number of Stairs, Main Entrance one  -KF     Stair Railings, Main Entrance none  -KF       Row Name 09/18/23 0905          Stairs Within Home, Primary    Number of Stairs, Within Home, Primary eight;other (see comments)  Steps to bedroom  -       Row Name 09/18/23 0905          Cognition    Orientation Status (Cognition) oriented to;person;situation;verbal cues/prompts needed for orientation;place;time;other (see comments)  Unable to state the name of the [facility] and stated the year wa 2022  -KF       Row Name 09/18/23 0905          Safety Issues, Functional Mobility    Safety Issues Affecting Function (Mobility) awareness of need for assistance;impulsivity;insight into deficits/self-awareness;judgment;positioning of assistive device;problem-solving;safety precaution awareness;safety precautions follow-through/compliance;sequencing abilities  -KF     Impairments Affecting Function (Mobility) balance;coordination;endurance/activity tolerance;motor control;motor planning;postural/trunk control;sensation/sensory awareness;strength  -KF               User Key  (r) = Recorded By, (t) = Taken By, (c) = Cosigned By      Initials Name Provider Type    KF Cristina Cao OT Occupational Therapist                     Mobility/ADL's       Row Name 09/18/23 1001          Bed Mobility    Bed Mobility  supine-sit  -KF     Supine-Sit [place] (Bed Mobility) standby assist  -     Assistive Device (Bed Mobility) head of bed elevated  -       Row Name 09/18/23 1001          Transfers    Transfers sit-stand transfer;stand-sit transfer;bed-chair transfer  -     Comment, (Transfers) Pt completed steps to chair transfer using RWx with modA x2. Full body shaking with LEs>UEs noted with mild/moderate anterior weight shift. V/TCs provided for posture correction. Pt then completed x4 STS with modA x2, though progressed to CGA x2. Tremors/shaking continued, however appeared less rigorous.  -       Row Name 09/18/23 1001          Bed-Chair Transfer    Bed-Chair [place] (Transfers) moderate assist (50% patient effort);2 person assist;verbal cues;nonverbal cues (demo/gesture)  -     Assistive Device (Bed-Chair Transfers) walker, front-wheeled  -       Row Name 09/18/23 1001          Sit-Stand Transfer    Sit-Stand [place] (Transfers) moderate assist (50% patient effort);2 person assist;verbal cues;nonverbal cues (demo/gesture)  -     Assistive Device (Sit-Stand Transfers) walker, front-wheeled  -KF       Row Name 09/18/23 1001          Stand-Sit Transfer    Stand-Sit [place] (Transfers) moderate assist (50% patient effort);2 person assist;verbal cues;nonverbal cues (demo/gesture)  -     Assistive Device (Stand-Sit Transfers) walker, front-wheeled  -KF       Row Name 09/18/23 1001          Activities of Daily Living    BADL Assessment/Intervention upper body dressing;lower body dressing;grooming;feeding  -       Row Name 09/18/23 1001          Upper Body Dressing Assessment/Training    [place] Level (Upper Body Dressing) doff;don;other (see comments);minimum assist (75% patient effort)  hospital gown  -     Position (Upper Body Dressing) unsupported sitting  -       Row Name 09/18/23 1001          Lower Body Dressing Assessment/Training    [place] Level (Lower Body Dressing)  doff;don;socks;set up;standby assist;pants/bottoms;moderate assist (50% patient effort)  -KF     Position (Lower Body Dressing) unsupported sitting;supported standing  -KF     Comment, (Lower Body Dressing) Pt able to don/doff socks with set up and SBA while unsupported in chair. Underwear changed modA while pt standing with RWx.  -       Row Name 09/18/23 1001          Grooming Assessment/Training    [place] Level (Grooming) wash face, hands;set up;standby assist  -KF     Position (Grooming) unsupported sitting  -       Row Name 09/18/23 1001          Self-Feeding Assessment/Training    [place] Level (Feeding) liquids to mouth;scoop food and bring to mouth;set up  -KF     Position (Self-Feeding) supported sitting  -KF     Comment, (Feeding) Pt notes increased spills during breakfast due to tremors/shaking at BUEs.  -KF               User Key  (r) = Recorded By, (t) = Taken By, (c) = Cosigned By      Initials Name Provider Type    KF Cristina Cao OT Occupational Therapist                   Obj/Interventions       Row Name 09/18/23 1005          Sensory Assessment (Somatosensory)    Sensory Assessment (Somatosensory) UE sensation intact  -     Sensory Assessment Pt notes heightened sensation at R hand.  -[facility] Name 09/18/23 1005          Vision Assessment/Intervention    Visual Impairment/Limitations WNL  -[facility] Name 09/18/23 1005          Range of Motion Comprehensive    General Range of Motion bilateral upper extremity ROM WFL  -[facility] Name 09/18/23 1005          Strength Comprehensive (MMT)    General Manual Muscle Testing (MMT) Assessment upper extremity strength deficits identified  -     Comment, General Manual Muscle Testing (MMT) Assessment BUEs grossly 4/5  -       Row Name 09/18/23 1005          Balance    Balance Assessment sitting static balance;sitting dynamic balance;standing static balance;standing dynamic balance  -KF     Static Sitting Balance standby  assist  -KF     Dynamic Sitting Balance standby assist  -KF     Position, Sitting Balance unsupported;sitting in chair  -KF     Static Standing Balance moderate assist;2-person assist;verbal cues;non-verbal cues (demo/gesture)  -KF     Dynamic Standing Balance moderate assist;2-person assist;verbal cues;non-verbal cues (demo/gesture)  -KF     Position/Device Used, Standing Balance supported;walker, front-wheeled  -KF     Balance Interventions sitting;standing;sit to stand;supported;static;dynamic;occupation based/functional task  -KF               User Key  (r) = Recorded By, (t) = Taken By, (c) = Cosigned By      Initials Name Provider Type    KF Cristina Cao, OT Occupational Therapist                   Goals/Plan       Row Name 09/18/23 1013          Transfer Goal 1 (OT)    Activity/Assistive Device (Transfer Goal 1, OT) bed-to-chair/chair-to-bed;toilet  -KF     [place] Level/Cues Needed (Transfer Goal 1, OT) standby assist  -KF     Time Frame (Transfer Goal 1, OT) long term goal (LTG);10 days  -KF     Progress/Outcome (Transfer Goal 1, OT) new goal  -KF       Row Name 09/18/23 1013          Dressing Goal 1 (OT)    Activity/Device (Dressing Goal 1, OT) dressing skills, all  -KF     [place]/Cues Needed (Dressing Goal 1, OT) standby assist  -KF     Time Frame (Dressing Goal 1, OT) long term goal (LTG);10 days  -KF     Progress/Outcome (Dressing Goal 1, OT) new goal  -KF       Row Name 09/18/23 1013          Grooming Goal 1 (OT)    Activity/Device (Grooming Goal 1, OT) grooming skills, all  -KF     [place] (Grooming Goal 1, OT) standby assist  -KF     Time Frame (Grooming Goal 1, OT) long term goal (LTG);10 days  -KF     Progress/Outcome (Grooming Goal 1, OT) new goal  -KF       Row Name 09/18/23 1013          Therapy Assessment/Plan (OT)    Planned Therapy Interventions (OT) activity tolerance training;adaptive equipment training;BADL retraining;functional balance retraining;IADL  retraining;occupation/activity based interventions;neuromuscular control/coordination retraining;strengthening exercise;transfer/mobility retraining  -               User Key  (r) = Recorded By, (t) = Taken By, (c) = Cosigned By      Initials Name Provider Type    Cristina Yoo, ABBY Occupational Therapist                   Clinical Impression       Row Name 09/18/23 1009          Pain Assessment    Pretreatment Pain Rating 0/10 - no pain  -KF     Posttreatment Pain Rating 0/10 - no pain  -KF     Pain Intervention(s) Repositioned;Ambulation/increased activity  -       Row Name 09/18/23 1009          Plan of Care Review    Plan of Care Reviewed With patient  -KF     Progress no change  -KF     Outcome Evaluation OT evaluation completed. Pt performed bed mobility with SBA. Pt completed steps to chair and STS x4 using a RWx with modA x2 though this did progress to CGA x2. Pt with full body shaking/ataxia BLE>BUEs when standing. Seated ADLs performed with set up and SBA, standing ADLs completed mod/maxA. Pt presents below his functional baseline due to shaking/tremors while standing, as well as deficits in strength, balance, and activity tolerance. Pt recommend a d/c to SNF to address deficits listed and assist in restoring PLOF.  -       Row Name 09/18/23 1009          Therapy Assessment/Plan (OT)    Rehab Potential (OT) good, to achieve stated therapy goals  -     Criteria for Skilled Therapeutic Interventions Met (OT) yes;skilled treatment is necessary  -     Therapy Frequency (OT) daily  -       Row Name 09/18/23 1009          Therapy Plan Review/Discharge Plan (OT)    Anticipated Discharge Disposition (OT) skilled nursing facility  -       Row Name 09/18/23 1009          Vital Signs    Pre Systolic BP Rehab 123  -KF     Pre Treatment Diastolic BP 94  -KF     Intra Systolic BP Rehab 132  -KF     Intra Treatment Diastolic BP 94  -KF     Post Systolic BP Rehab 130  -KF     Post Treatment Diastolic BP  97  -KF     Pretreatment Heart Rate (beats/min) 91  -KF     Intratreatment Heart Rate (beats/min) 109  -KF     Posttreatment Heart Rate (beats/min) 99  -KF     Intra SpO2 (%) 95  -KF     O2 Delivery Intra Treatment room air  -KF     Pre Patient Position Supine  -KF     Intra Patient Position Standing  -KF     Post Patient Position Sitting  -KF       Row Name 09/18/23 1009          Positioning and Restraints    Pre-Treatment Position in bed  -KF     Post Treatment Position chair  -KF     In Chair notified nsg;reclined;call light within reach;encouraged to call for assist;exit alarm on;legs elevated;waffle cushion  -KF               User Key  (r) = Recorded By, (t) = Taken By, (c) = Cosigned By      Initials Name Provider Type    Cristina Yoo OT Occupational Therapist                   Outcome Measures       Row Name 09/18/23 1014          How much help from another is currently needed...    Putting on and taking off regular lower body clothing? 2  -KF     Bathing (including washing, rinsing, and drying) 2  -KF     Toileting (which includes using toilet bed pan or urinal) 3  -KF     Putting on and taking off regular upper body clothing 3  -KF     Taking care of personal grooming (such as brushing teeth) 3  -KF     Eating meals 3  -KF     AM-PAC 6 Clicks Score (OT) 16  -KF       Row Name 09/18/23 0933          How much help from another person do you currently need...    Turning from your back to your side while in flat bed without using bedrails? 3  -HP     Moving from lying on back to sitting on the side of a flat bed without bedrails? 3  -HP     Moving to and from a bed to a chair (including a wheelchair)? 2  -HP     Standing up from a chair using your arms (e.g., wheelchair, bedside chair)? 2  -HP     Climbing 3-5 steps with a railing? 2  -HP     To walk in hospital room? 2  -HP     AM-PAC 6 Clicks Score (PT) 14  -HP     Highest level of mobility 4 --> Transferred to chair/commode  -HP       Row Name  09/18/23 1014 09/18/23 0933       Functional Assessment    Outcome Measure Options AM-PAC 6 Clicks Daily Activity (OT)  - AM-PAC 6 Clicks Basic Mobility (PT)  -              User Key  (r) = Recorded By, (t) = Taken By, (c) = Cosigned By      Initials Name Provider Type     Malena Coughlin PT Physical Therapist    Cristina Yoo, OT Occupational Therapist                    Occupational Therapy Education       Title: PT OT SLP Therapies (In Progress)       Topic: Occupational Therapy (In Progress)       Point: ADL training (Done)       Description:   Instruct learner(s) on proper safety adaptation and remediation techniques during self care or transfers.   Instruct in proper use of assistive devices.                  Learning Progress Summary             Patient Acceptance, E, VU by  at 9/18/2023 0828                         Point: Home exercise program (Not Started)       Description:   Instruct learner(s) on appropriate technique for monitoring, assisting and/or progressing therapeutic exercises/activities.                  Learner Progress:  Not documented in this visit.              Point: Precautions (Done)       Description:   Instruct learner(s) on prescribed precautions during self-care and functional transfers.                  Learning Progress Summary             Patient Acceptance, E, VU by  at 9/18/2023 0828                         Point: Body mechanics (Done)       Description:   Instruct learner(s) on proper positioning and spine alignment during self-care, functional mobility activities and/or exercises.                  Learning Progress Summary             Patient Acceptance, E, VU by  at 9/18/2023 0828                                         User Key       Initials Effective Dates Name Provider Type Discipline     08/09/23 -  Cristina Cao OT Occupational Therapist OT                  OT Recommendation and Plan  Planned Therapy Interventions (OT): activity tolerance training,  adaptive equipment training, BADL retraining, functional balance retraining, IADL retraining, occupation/activity based interventions, neuromuscular control/coordination retraining, strengthening exercise, transfer/mobility retraining  Therapy Frequency (OT): daily  Plan of Care Review  Plan of Care Reviewed With: patient  Progress: no change  Outcome Evaluation: OT evaluation completed. Pt performed bed mobility with SBA. Pt completed steps to chair and STS x4 using a RWx with modA x2 though this did progress to CGA x2. Pt with full body shaking/ataxia BLE>BUEs when standing. Seated ADLs performed with set up and SBA, standing ADLs completed mod/maxA. Pt presents below his functional baseline due to shaking/tremors while standing, as well as deficits in strength, balance, and activity tolerance. Pt recommend a d/c to SNF to address deficits listed and assist in restoring PLOF.     Time Calculation:   Evaluation Complexity (OT)  Review Occupational Profile/Medical/Therapy History Complexity: expanded/moderate complexity  Assessment, Occupational Performance/Identification of Deficit Complexity: 3-5 performance deficits  Clinical Decision Making Complexity (OT): detailed assessment/moderate complexity  Overall Complexity of Evaluation (OT): moderate complexity     Time Calculation- OT       Row Name 09/18/23 1015             Time Calculation- OT    OT Start Time 0828  -KF      OT Received On 09/18/23  -KF      OT Goal Re-Cert Due Date 09/28/23  -KF         Timed Charges    73909 - OT Self Care/Mgmt Minutes 10  -KF         Untimed Charges    OT Eval/Re-eval Minutes 48  -KF         Total Minutes    Timed Charges Total Minutes 10  -KF      Untimed Charges Total Minutes 48  -KF       Total Minutes 58  -KF                User Key  (r) = Recorded By, (t) = Taken By, (c) = Cosigned By      Initials Name Provider Type    Cristina Yoo OT Occupational Therapist                  Therapy Charges for Today       Code  Description Service Date Service Provider Modifiers Qty    75726500801 HC OT EVAL MOD COMPLEXITY 4 9/18/2023 Cristina Cao OT GO 1    37213258121 HC OT SELF CARE/MGMT/TRAIN EA 15 MIN 9/18/2023 Cristina Cao OT GO 1                 Cristina Cao OT  9/18/2023

## 2023-09-18 NOTE — THERAPY TREATMENT NOTE
Patient Name: Ronnie He  : 1980    MRN: [id number]                              Today's Date: 2023       Admit Date: 2023    Visit Dx:     ICD-10-CM ICD-9-CM   1. Tremors of nervous system  R25.1 781.0   2. Trouble walking  R26.2 719.7   3. History of seizure  Z87.898 V13.89   4. Cognitive communication deficit  R41.841 799.52   5. Seizure disorder  G40.909 345.90   6. Malingering  Z76.5 V65.2   7. Ataxia  R27.0 781.3     Patient Active Problem List   Diagnosis    Ataxia    Alcohol use disorder, severe, in early remission    Anxiety    Cannabis dependence    Chiari malformation type I    Chronic pain of right knee    Chronic post-traumatic stress disorder (PTSD)    Conversion disorder with attacks or seizures    COPD (chronic obstructive pulmonary disease)    DDD (degenerative disc disease), cervical    Diabetes mellitus type 2 in obese    Generalized anxiety disorder    Hemiplegia and hemiparesis following cerebral infarction affecting left non-dominant side    Hemiplegia, unspecified affecting left nondominant side    History of alcoholism    History of cardioembolic cerebrovascular accident (CVA)    Hypertension associated with diabetes    Insomnia    Intractable migraine with aura with status migrainosus    Left-sided weakness    Major depressive disorder, recurrent, severe with psychotic features    Malingering    Melanoma    Methamphetamine abuse    Methamphetamine dependence in remission    Methamphetamine-induced psychotic disorder    Non-compliant patient    PAULA (obstructive sleep apnea)    Personality disorder, unspecified    Pseudohyponatremia    Seizure disorder    Squamous blepharitis of upper and lower eyelids of both eyes    Suicide attempt    Tobacco abuse    Type 2 diabetes mellitus with hyperglycemia    Type 2 diabetes mellitus without retinopathy     History reviewed. No pertinent past medical history.  History reviewed. No pertinent surgical history.   General Information        Row Name 09/18/23 1455 09/18/23 0820       Physical Therapy Time and Intention    Document Type therapy note (daily note)  - evaluation  -    Mode of Treatment physical therapy  - physical therapy  -      Row Name 09/18/23 1455 09/18/23 0820       General Information    Patient Profile Reviewed yes  -HP yes  -HP    Prior Level of Function -- min assist:;all household mobility;community mobility;gait;transfer;ADL's;bed mobility  -    Existing Precautions/Restrictions fall;other (see comments)  Ataxic  - fall  -    Barriers to Rehab -- ineffective coping  -      Row Name 09/18/23 0820          Living Environment    People in Home alone  -       Row Name 09/18/23 0820          Home Main Entrance    Number of Stairs, Main Entrance one  -HP     Stair Railings, Main Entrance none  -       Row Name 09/18/23 0820          Stairs Within Home, Primary    Stairs, Within Home, Primary steps to bedroom  -     Number of Stairs, Within Home, Primary eight  -       Row Name 09/18/23 1455 09/18/23 0820       Cognition    Orientation Status (Cognition) oriented to;person;situation;verbal cues/prompts needed for orientation;place;time  - oriented to;person;verbal cues/prompts needed for orientation;place;situation;time  unable to state name of hospital and stated the year was 2022.  -      Row Name 09/18/23 1455 09/18/23 0820       Safety Issues, Functional Mobility    Safety Issues Affecting Function (Mobility) -- insight into deficits/self-awareness;problem-solving;judgment;safety precautions follow-through/compliance  -    Impairments Affecting Function (Mobility) balance;coordination;endurance/activity tolerance;motor control;motor planning;postural/trunk control;sensation/sensory awareness;strength  - balance;cognition;coordination;endurance/activity tolerance;motor control;pain;strength;motor planning  -              User Key  (r) = Recorded By, (t) = Taken By, (c) = Cosigned By       "Initials Name Provider Type     CedMalena, PT Physical Therapist                   Mobility       Row Name 09/18/23 1456 09/18/23 0910       Bed Mobility    Bed Mobility -- supine-sit  -HP    Supine-Sit [place] (Bed Mobility) -- standby assist  -    Comment, (Bed Mobility) pt UI  - increased time to come to sit EOB  -      Row Name 09/18/23 1456 09/18/23 0910       Transfers    Comment, (Transfers) VC for reaching for chair when sitting  - Pt performed STS x4 with FWW and Mod Ax2 progressing to CGAx2. Full body shaking noted when in standing with anterior weight shifting requiring assistance for upright posture. Transition progressed with repetition and less assistance provided. Good eccentric control to return to sitting noted though shaking. Pt took steps to chair wtih Mod A x2 and FWW. VC for suquencing. Good foot placement control though shaking noted. Activity limited by fatigue. HR remained <110 bpm throughout all mobility. Pt reported being \"worn out\" after mobility.  -      Row Name 09/18/23 0910          Bed-Chair Transfer    Bed-Chair [place] (Transfers) moderate assist (50% patient effort);2 person assist;verbal cues;nonverbal cues (demo/gesture)  -     Assistive Device (Bed-Chair Transfers) walker, front-wheeled  -HP       Row Name 09/18/23 1456 09/18/23 0910       Sit-Stand Transfer    Sit-Stand [place] (Transfers) contact guard;2 person assist;verbal cues;nonverbal cues (demo/gesture)  - moderate assist (50% patient effort);2 person assist;verbal cues;nonverbal cues (demo/gesture)  -    Assistive Device (Sit-Stand Transfers) -- walker, front-wheeled  -HP      Row Name 09/18/23 1456 09/18/23 0910       Gait/Stairs (Locomotion)    [place] Level (Gait) contact guard  -HP not tested  -    Assistive Device (Gait) walker, front-wheeled  -HP --    Distance in Feet (Gait) 100  -HP --    Deviations/Abnormal Patterns (Gait) bilateral deviations;base of support, " wide;weight shifting decreased;stride length decreased  - --    Bilateral Gait Deviations forward flexed posture;heel strike decreased  - --    [place] Level (Stairs) contact guard;2 person assist  - --    Handrail Location (Stairs) left side (descending)  - --    Number of Steps (Stairs) 10  -HP --    Ascending Technique (Stairs) step-to-step  - --    Descending Technique (Stairs) step-to-step  - --    Comment, (Gait/Stairs) Pt amb in [place] and navigated steps with CGA. Mild unsteadiness noted. No physical assistance required. BLE shaking noted though improved from this AM. No LOB observed. Activity limited by fatigue.  - --              User Key  (r) = Recorded By, (t) = Taken By, (c) = Cosigned By      Initials Name Provider Type     Malena Coughlin, PT Physical Therapist                   Obj/Interventions       Row Name 09/18/23 0986          Range of Motion Comprehensive    General Range of Motion no range of motion deficits identified  -       Row Name 09/18/23 0907          Strength Comprehensive (MMT)    General Manual Muscle Testing (MMT) Assessment other (see comments)  -     Comment, General Manual Muscle Testing (MMT) Assessment Difficulty to assess MMT due to decreased command following and motor control. Shaking noted on command for BLE movement though minimal noted spontaneously with tasks like adjusting socks without being asked to.  -       Row Name 09/18/23 1457 09/18/23 0919       Balance    Balance Assessment sitting static balance;sitting dynamic balance;sit to stand dynamic balance;standing static balance;standing dynamic balance  - sitting static balance;sitting dynamic balance;sit to stand dynamic balance;standing static balance;standing dynamic balance  -    Static Sitting Balance modified independence  - standby assist  -    Dynamic Sitting Balance modified independence  - standby assist  -    Position, Sitting Balance sitting in chair  - sitting edge  of bed  -HP    Static Standing Balance contact guard  -HP moderate assist;non-verbal cues (demo/gesture);verbal cues;2-person assist  -HP    Dynamic Standing Balance contact guard  -HP moderate assist;2-person assist;non-verbal cues (demo/gesture)  -HP    Position/Device Used, Standing Balance supported;walker, rolling  -HP supported;walker, rolling  -HP    Balance Interventions sitting;standing;sit to stand;occupation based/functional task  -HP sitting;standing;sit to stand;occupation based/functional task  -HP    Comment, Balance -- anterior weight shifting with full body shaking noted.  -HP      Row Name 09/18/23 0925          Sensory Assessment (Somatosensory)    Sensory Assessment (Somatosensory) LE sensation intact  -HP               User Key  (r) = Recorded By, (t) = Taken By, (c) = Cosigned By      Initials Name Provider Type    HP Malena Coughlin, PT Physical Therapist                   Goals/Plan       Row Name 09/18/23 0932          Bed Mobility Goal 1 (PT)    Activity/Assistive Device (Bed Mobility Goal 1, PT) sit to supine/supine to sit  -HP     [place] Level/Cues Needed (Bed Mobility Goal 1, PT) modified independence  -HP     Time Frame (Bed Mobility Goal 1, PT) long term goal (LTG);10 days  -HP     Progress/Outcomes (Bed Mobility Goal 1, PT) goal ongoing  -       Row Name 09/18/23 0932          Transfer Goal 1 (PT)    Activity/Assistive Device (Transfer Goal 1, PT) sit-to-stand/stand-to-sit;bed-to-chair/chair-to-bed  -HP     [place] Level/Cues Needed (Transfer Goal 1, PT) standby assist  -HP     Time Frame (Transfer Goal 1, PT) long term goal (LTG);10 days  -HP     Progress/Outcome (Transfer Goal 1, PT) goal ongoing  -       Row Name 09/18/23 0932          Gait Training Goal 1 (PT)    Activity/Assistive Device (Gait Training Goal 1, PT) gait (walking locomotion)  -HP     [place] Level (Gait Training Goal 1, PT) standby assist  -HP     Distance (Gait Training Goal 1, PT) 150  -HP      Time Frame (Gait Training Goal 1, PT) long term goal (LTG);10 days  -     Progress/Outcome (Gait Training Goal 1, PT) goal ongoing  -       Row Name 09/18/23 0932          Therapy Assessment/Plan (PT)    Planned Therapy Interventions (PT) balance training;bed mobility training;gait training;home exercise program;patient/family education;transfer training;stretching;strengthening;stair training;ROM (range of motion);wheelchair management/propulsion training  -               User Key  (r) = Recorded By, (t) = Taken By, (c) = Cosigned By      Initials Name Provider Type    HP Malena Coughlin, PT Physical Therapist                   Clinical Impression       Row Name 09/18/23 1457 09/18/23 0928       Pain    Pretreatment Pain Rating 0/10 - no pain  -HP 0/10 - no pain  -HP    Posttreatment Pain Rating 0/10 - no pain  -HP 0/10 - no pain  -HP      Row Name 09/18/23 1457 09/18/23 0928       Plan of Care Review    Plan of Care Reviewed With patient  -HP patient  -HP    Progress improving  - no change  -    Outcome Evaluation Pt amb in [place] with FWW and CGA. Pt navigated 10 steps with CGA and L HR. Mild unsteadiness, no LOB noted. Activity limited by fatigue. Plan is for pt to d/c home when medically appropriate. Pt declined HHPT.  - Pt performed bed mobility with SBA. Pt required Mod A x2 for STS and taking steps to chair with FWW due to unsteadiness noted and full body shaking observed. Less assistance required with repetitions. Activity limited by fatigue. Recommend d/c to SNF to address deficits and decrease risk for falls.  -      Row Name 09/18/23 0928          Therapy Assessment/Plan (PT)    Rehab Potential (PT) good, to achieve stated therapy goals  -     Criteria for Skilled Interventions Met (PT) yes;meets criteria;skilled treatment is necessary  -     Therapy Frequency (PT) daily  -       Row Name 09/18/23 1457 09/18/23 0928       Vital Signs    Pre Systolic BP Rehab -- 123  -HP    Pre Treatment  Diastolic BP -- 94  -HP    Intra Systolic BP Rehab -- 139  -HP    Intra Treatment Diastolic BP -- 96  -HP    Post Systolic BP Rehab -- 136  -HP    Post Treatment Diastolic BP -- 97  -HP    Pretreatment Heart Rate (beats/min) -- 91  -HP    Intratreatment Heart Rate (beats/min) -- 109  -HP    Posttreatment Heart Rate (beats/min) -- 89  -HP    Pre Patient Position Sitting  -HP Supine  -HP    Intra Patient Position Standing  -HP Standing  -HP    Post Patient Position Sitting  -HP Sitting  -HP      Row Name 09/18/23 1457 09/18/23 0928       Positioning and Restraints    Pre-Treatment Position sitting in chair/recliner  -HP in bed  -HP    Post Treatment Position chair  -HP chair  -HP    In Chair notified nsg;reclined;sitting;call light within reach;encouraged to call for assist;exit alarm on;legs elevated;waffle cushion  -HP notified nsg;reclined;sitting;call light within reach;encouraged to call for assist;exit alarm on;legs elevated;compression device;waffle cushion  -HP              User Key  (r) = Recorded By, (t) = Taken By, (c) = Cosigned By      Initials Name Provider Type    HP Malena Coughlin, PT Physical Therapist                   Outcome Measures       Row Name 09/18/23 1500 09/18/23 0933       How much help from another person do you currently need...    Turning from your back to your side while in flat bed without using bedrails? 3  -HP 3  -HP    Moving from lying on back to sitting on the side of a flat bed without bedrails? 3  -HP 3  -HP    Moving to and from a bed to a chair (including a wheelchair)? 3  -HP 2  -HP    Standing up from a chair using your arms (e.g., wheelchair, bedside chair)? 3  -HP 2  -HP    Climbing 3-5 steps with a railing? 3  -HP 2  -HP    To walk in hospital room? 3  -HP 2  -HP    AM-PAC 6 Clicks Score (PT) 18  -HP 14  -HP    Highest level of mobility 6 --> Walked 10 steps or more  -HP 4 --> Transferred to chair/commode  -HP      Row Name 09/18/23 1500 09/18/23 1014       Functional  Assessment    Outcome Measure Options AM-PAC 6 Clicks Basic Mobility (PT)  - AM-PAC 6 Clicks Daily Activity (OT)  -KF      Row Name 09/18/23 0933          Functional Assessment    Outcome Measure Options AM-PAC 6 Clicks Basic Mobility (PT)  -               User Key  (r) = Recorded By, (t) = Taken By, (c) = Cosigned By      Initials Name Provider Type     Malena Coughlin, HALIMA Physical Therapist    Cristina Yoo OT Occupational Therapist                                 Physical Therapy Education       Title: PT OT SLP Therapies (In Progress)       Topic: Physical Therapy (Done)       Point: Mobility training (Done)       Learning Progress Summary             Patient Acceptance, E,D, VU by  at 9/18/2023 1501    Acceptance, E,D, VU,NR by  at 9/18/2023 0933                         Point: Home exercise program (Done)       Learning Progress Summary             Patient Acceptance, E,D, VU by  at 9/18/2023 1501    Acceptance, E,D, VU,NR by  at 9/18/2023 0933                         Point: Body mechanics (Done)       Learning Progress Summary             Patient Acceptance, E,D, VU by  at 9/18/2023 1501    Acceptance, E,D, VU,NR by  at 9/18/2023 0933                         Point: Precautions (Done)       Learning Progress Summary             Patient Acceptance, E,D, VU by  at 9/18/2023 1501    Acceptance, E,D, VU,NR by  at 9/18/2023 0933                                         User Key       Initials Effective Dates Name Provider Type Discipline     06/01/21 -  Malena Coughlin, PT Physical Therapist PT                  PT Recommendation and Plan  Planned Therapy Interventions (PT): balance training, bed mobility training, gait training, home exercise program, patient/family education, transfer training, stretching, strengthening, stair training, ROM (range of motion), wheelchair management/propulsion training  Plan of Care Reviewed With: patient  Progress: improving  Outcome Evaluation: Pt amb in  [place] with FWW and CGA. Pt navigated 10 steps with CGA and L HR. Mild unsteadiness, no LOB noted. Activity limited by fatigue. Plan is for pt to d/c home when medically appropriate. Pt declined HHPT.     Time Calculation:   PT Evaluation Complexity  History, PT Evaluation Complexity: 1-2 personal factors and/or comorbidities  Examination of Body Systems (PT Eval Complexity): total of 3 or more elements  Clinical Presentation (PT Evaluation Complexity): evolving  Clinical Decision Making (PT Evaluation Complexity): moderate complexity  Overall Complexity (PT Evaluation Complexity): moderate complexity     PT Charges       Row Name 09/18/23 1438 09/18/23 0823          Time Calculation    Start Time 1438  -HP 0823  -HP     PT Received On 09/18/23  -HP 09/18/23  -HP     PT Goal Re-Cert Due Date -- 09/28/23  -HP        Timed Charges    22665 - Gait Training Minutes  15  -HP --        Untimed Charges    PT Eval/Re-eval Minutes -- 55  -HP        Total Minutes    Timed Charges Total Minutes 15  -HP --     Untimed Charges Total Minutes -- 55  -HP      Total Minutes 15  -HP 55  -HP               User Key  (r) = Recorded By, (t) = Taken By, (c) = Cosigned By      Initials Name Provider Type    HP Malena Coughlin, PT Physical Therapist                  Therapy Charges for Today       Code Description Service Date Service Provider Modifiers Qty    12874924915 HC PT EVAL MOD COMPLEXITY 4 9/18/2023 Malena Coughlin, PT GP 1    81895109579 HC GAIT TRAINING EA 15 MIN 9/18/2023 Malena Coughlin, PT GP 1    17716899540 HC PT THER SUPP EA 15 MIN 9/18/2023 Malena Coughlin, PT GP 1            PT G-Codes  Outcome Measure Options: AM-PAC 6 Clicks Basic Mobility (PT)  AM-PAC 6 Clicks Score (PT): 18  AM-PAC 6 Clicks Score (OT): 16  PT Discharge Summary  Anticipated Discharge Disposition (PT): skilled nursing facility    Malena Coughlin PT  9/18/2023

## 2023-09-19 NOTE — OUTREACH NOTE
Prep Survey      Flowsheet Row Responses   [facility] facility patient discharged from? [facility]   Is LACE score < 7 ? No   Eligibility Readm Mgmt   Discharge diagnosis ataxia   Does the patient have one of the following disease processes/diagnoses(primary or secondary)? Other   Does the patient have Home health ordered? Yes   What is the Home health agency?  [facility], declined returning to sober living facility   Is there a DME ordered? No   Prep survey completed? Yes            Anna DAVIS - Registered Nurse

## 2023-09-21 ENCOUNTER — READMISSION MANAGEMENT (OUTPATIENT)
Dept: CALL CENTER | Facility: HOSPITAL | Age: 43
End: 2023-09-21
Payer: MEDICARE

## 2023-09-21 NOTE — OUTREACH NOTE
Medical Week 1 Survey      Flowsheet Row Responses   [facility] patient discharged from? [facility]   Does the patient have one of the following disease processes/diagnoses(primary or secondary)? Other   Week 1 attempt successful? No   Unsuccessful attempts Attempt 3            Phil RODRIGUEZ - Registered Nurse

## 2023-10-27 ENCOUNTER — HOSPITAL ENCOUNTER (OUTPATIENT)
Facility: HOSPITAL | Age: 43
Setting detail: OBSERVATION
Discharge: NURSING, REHAB OR LONG-TERM CARE | End: 2023-11-03
Attending: EMERGENCY MEDICINE | Admitting: HOSPITALIST
Payer: MEDICARE

## 2023-10-27 ENCOUNTER — APPOINTMENT (OUTPATIENT)
Dept: CT IMAGING | Facility: HOSPITAL | Age: 43
End: 2023-10-27
Payer: MEDICARE

## 2023-10-27 ENCOUNTER — APPOINTMENT (OUTPATIENT)
Dept: GENERAL RADIOLOGY | Facility: HOSPITAL | Age: 43
End: 2023-10-27
Payer: MEDICARE

## 2023-10-27 DIAGNOSIS — N28.9 RENAL LESION: ICD-10-CM

## 2023-10-27 DIAGNOSIS — R00.0 SINUS TACHYCARDIA BY ELECTROCARDIOGRAM: ICD-10-CM

## 2023-10-27 DIAGNOSIS — D72.829 LEUKOCYTOSIS, UNSPECIFIED TYPE: ICD-10-CM

## 2023-10-27 DIAGNOSIS — R05.9 COUGH, UNSPECIFIED TYPE: ICD-10-CM

## 2023-10-27 DIAGNOSIS — K92.0 HEMATEMESIS WITH NAUSEA: ICD-10-CM

## 2023-10-27 DIAGNOSIS — F17.200 TOBACCO USE DISORDER: ICD-10-CM

## 2023-10-27 DIAGNOSIS — J18.9 PNEUMONIA OF LEFT LOWER LOBE DUE TO INFECTIOUS ORGANISM: Primary | ICD-10-CM

## 2023-10-27 DIAGNOSIS — R07.89 ATYPICAL CHEST PAIN: ICD-10-CM

## 2023-10-27 DIAGNOSIS — R42 DIZZINESS: ICD-10-CM

## 2023-10-27 DIAGNOSIS — J44.1 COPD WITH ACUTE EXACERBATION: ICD-10-CM

## 2023-10-27 DIAGNOSIS — E11.65 HYPERGLYCEMIA DUE TO DIABETES MELLITUS: ICD-10-CM

## 2023-10-27 LAB
ALBUMIN SERPL-MCNC: 4.5 G/DL (ref 3.5–5.2)
ALBUMIN/GLOB SERPL: 1.8 G/DL
ALP SERPL-CCNC: 86 U/L (ref 39–117)
ALT SERPL W P-5'-P-CCNC: 19 U/L (ref 1–41)
ANION GAP SERPL CALCULATED.3IONS-SCNC: 11 MMOL/L (ref 5–15)
AST SERPL-CCNC: 14 U/L (ref 1–40)
BASOPHILS # BLD AUTO: 0.01 10*3/MM3 (ref 0–0.2)
BASOPHILS NFR BLD AUTO: 0.1 % (ref 0–1.5)
BILIRUB SERPL-MCNC: 0.3 MG/DL (ref 0–1.2)
BUN SERPL-MCNC: 14 MG/DL (ref 6–20)
BUN/CREAT SERPL: 18.9 (ref 7–25)
CALCIUM SPEC-SCNC: 9.3 MG/DL (ref 8.6–10.5)
CHLORIDE SERPL-SCNC: 102 MMOL/L (ref 98–107)
CO2 SERPL-SCNC: 21 MMOL/L (ref 22–29)
CREAT SERPL-MCNC: 0.74 MG/DL (ref 0.76–1.27)
D DIMER PPP FEU-MCNC: <0.27 MCGFEU/ML (ref 0–0.5)
DEPRECATED RDW RBC AUTO: 40.5 FL (ref 37–54)
EGFRCR SERPLBLD CKD-EPI 2021: 115.3 ML/MIN/1.73
EOSINOPHIL # BLD AUTO: 0 10*3/MM3 (ref 0–0.4)
EOSINOPHIL NFR BLD AUTO: 0 % (ref 0.3–6.2)
ERYTHROCYTE [DISTWIDTH] IN BLOOD BY AUTOMATED COUNT: 13.2 % (ref 12.3–15.4)
FLUAV RNA RESP QL NAA+PROBE: NOT DETECTED
FLUBV RNA RESP QL NAA+PROBE: NOT DETECTED
GEN 5 2HR TROPONIN T REFLEX: 13 NG/L
GLOBULIN UR ELPH-MCNC: 2.5 GM/DL
GLUCOSE SERPL-MCNC: 218 MG/DL (ref 65–99)
HCT VFR BLD AUTO: 39.8 % (ref 37.5–51)
HGB BLD-MCNC: 13.9 G/DL (ref 13–17.7)
HOLD SPECIMEN: NORMAL
HOLD SPECIMEN: NORMAL
IMM GRANULOCYTES # BLD AUTO: 0.09 10*3/MM3 (ref 0–0.05)
IMM GRANULOCYTES NFR BLD AUTO: 0.7 % (ref 0–0.5)
LIPASE SERPL-CCNC: 59 U/L (ref 13–60)
LYMPHOCYTES # BLD AUTO: 1.11 10*3/MM3 (ref 0.7–3.1)
LYMPHOCYTES NFR BLD AUTO: 8.6 % (ref 19.6–45.3)
MCH RBC QN AUTO: 29.9 PG (ref 26.6–33)
MCHC RBC AUTO-ENTMCNC: 34.9 G/DL (ref 31.5–35.7)
MCV RBC AUTO: 85.6 FL (ref 79–97)
MONOCYTES # BLD AUTO: 0.5 10*3/MM3 (ref 0.1–0.9)
MONOCYTES NFR BLD AUTO: 3.9 % (ref 5–12)
NEUTROPHILS NFR BLD AUTO: 11.23 10*3/MM3 (ref 1.7–7)
NEUTROPHILS NFR BLD AUTO: 86.7 % (ref 42.7–76)
NRBC BLD AUTO-RTO: 0 /100 WBC (ref 0–0.2)
NT-PROBNP SERPL-MCNC: 658.3 PG/ML (ref 0–450)
PLATELET # BLD AUTO: 272 10*3/MM3 (ref 140–450)
PMV BLD AUTO: 10.9 FL (ref 6–12)
POTASSIUM SERPL-SCNC: 4 MMOL/L (ref 3.5–5.2)
PROT SERPL-MCNC: 7 G/DL (ref 6–8.5)
RBC # BLD AUTO: 4.65 10*6/MM3 (ref 4.14–5.8)
SARS-COV-2 RNA RESP QL NAA+PROBE: NOT DETECTED
SODIUM SERPL-SCNC: 134 MMOL/L (ref 136–145)
TROPONIN T DELTA: -2 NG/L
TROPONIN T SERPL HS-MCNC: 15 NG/L
WBC NRBC COR # BLD: 12.94 10*3/MM3 (ref 3.4–10.8)
WHOLE BLOOD HOLD COAG: NORMAL
WHOLE BLOOD HOLD SPECIMEN: NORMAL

## 2023-10-27 PROCEDURE — 93005 ELECTROCARDIOGRAM TRACING: CPT

## 2023-10-27 PROCEDURE — 36415 COLL VENOUS BLD VENIPUNCTURE: CPT

## 2023-10-27 PROCEDURE — 87636 SARSCOV2 & INF A&B AMP PRB: CPT | Performed by: EMERGENCY MEDICINE

## 2023-10-27 PROCEDURE — 93005 ELECTROCARDIOGRAM TRACING: CPT | Performed by: EMERGENCY MEDICINE

## 2023-10-27 PROCEDURE — 83735 ASSAY OF MAGNESIUM: CPT | Performed by: EMERGENCY MEDICINE

## 2023-10-27 PROCEDURE — 94640 AIRWAY INHALATION TREATMENT: CPT

## 2023-10-27 PROCEDURE — 25010000002 METHYLPREDNISOLONE PER 125 MG: Performed by: EMERGENCY MEDICINE

## 2023-10-27 PROCEDURE — 85025 COMPLETE CBC W/AUTO DIFF WBC: CPT | Performed by: EMERGENCY MEDICINE

## 2023-10-27 PROCEDURE — 80053 COMPREHEN METABOLIC PANEL: CPT

## 2023-10-27 PROCEDURE — 25010000002 ONDANSETRON PER 1 MG: Performed by: EMERGENCY MEDICINE

## 2023-10-27 PROCEDURE — 84145 PROCALCITONIN (PCT): CPT | Performed by: EMERGENCY MEDICINE

## 2023-10-27 PROCEDURE — 99284 EMERGENCY DEPT VISIT MOD MDM: CPT

## 2023-10-27 PROCEDURE — 71045 X-RAY EXAM CHEST 1 VIEW: CPT

## 2023-10-27 PROCEDURE — 83690 ASSAY OF LIPASE: CPT

## 2023-10-27 PROCEDURE — 25810000003 SODIUM CHLORIDE 0.9 % SOLUTION: Performed by: EMERGENCY MEDICINE

## 2023-10-27 PROCEDURE — 83605 ASSAY OF LACTIC ACID: CPT | Performed by: EMERGENCY MEDICINE

## 2023-10-27 PROCEDURE — 84484 ASSAY OF TROPONIN QUANT: CPT | Performed by: EMERGENCY MEDICINE

## 2023-10-27 PROCEDURE — 96375 TX/PRO/DX INJ NEW DRUG ADDON: CPT

## 2023-10-27 PROCEDURE — 84484 ASSAY OF TROPONIN QUANT: CPT

## 2023-10-27 PROCEDURE — 87040 BLOOD CULTURE FOR BACTERIA: CPT | Performed by: EMERGENCY MEDICINE

## 2023-10-27 PROCEDURE — 85379 FIBRIN DEGRADATION QUANT: CPT | Performed by: EMERGENCY MEDICINE

## 2023-10-27 PROCEDURE — 74176 CT ABD & PELVIS W/O CONTRAST: CPT

## 2023-10-27 PROCEDURE — 83880 ASSAY OF NATRIURETIC PEPTIDE: CPT

## 2023-10-27 RX ORDER — ASPIRIN 81 MG/1
324 TABLET, CHEWABLE ORAL ONCE
Status: DISCONTINUED | OUTPATIENT
Start: 2023-10-27 | End: 2023-10-27

## 2023-10-27 RX ORDER — ONDANSETRON 2 MG/ML
4 INJECTION INTRAMUSCULAR; INTRAVENOUS ONCE
Status: COMPLETED | OUTPATIENT
Start: 2023-10-27 | End: 2023-10-27

## 2023-10-27 RX ORDER — IPRATROPIUM BROMIDE AND ALBUTEROL SULFATE 2.5; .5 MG/3ML; MG/3ML
3 SOLUTION RESPIRATORY (INHALATION)
Status: DISCONTINUED | OUTPATIENT
Start: 2023-10-27 | End: 2023-10-30

## 2023-10-27 RX ORDER — METHYLPREDNISOLONE SODIUM SUCCINATE 125 MG/2ML
125 INJECTION, POWDER, LYOPHILIZED, FOR SOLUTION INTRAMUSCULAR; INTRAVENOUS ONCE
Status: COMPLETED | OUTPATIENT
Start: 2023-10-27 | End: 2023-10-27

## 2023-10-27 RX ORDER — PANTOPRAZOLE SODIUM 40 MG/10ML
80 INJECTION, POWDER, LYOPHILIZED, FOR SOLUTION INTRAVENOUS ONCE
Status: COMPLETED | OUTPATIENT
Start: 2023-10-27 | End: 2023-10-27

## 2023-10-27 RX ORDER — SODIUM CHLORIDE 0.9 % (FLUSH) 0.9 %
10 SYRINGE (ML) INJECTION AS NEEDED
Status: DISCONTINUED | OUTPATIENT
Start: 2023-10-27 | End: 2023-11-01

## 2023-10-27 RX ADMIN — IPRATROPIUM BROMIDE AND ALBUTEROL SULFATE 3 ML: 2.5; .5 SOLUTION RESPIRATORY (INHALATION) at 22:44

## 2023-10-27 RX ADMIN — SODIUM CHLORIDE 1000 ML: 9 INJECTION, SOLUTION INTRAVENOUS at 22:32

## 2023-10-27 RX ADMIN — PANTOPRAZOLE SODIUM 80 MG: 40 INJECTION, POWDER, LYOPHILIZED, FOR SOLUTION INTRAVENOUS at 22:34

## 2023-10-27 RX ADMIN — ONDANSETRON 4 MG: 2 INJECTION INTRAMUSCULAR; INTRAVENOUS at 22:34

## 2023-10-27 RX ADMIN — METHYLPREDNISOLONE SODIUM SUCCINATE 125 MG: 125 INJECTION INTRAMUSCULAR; INTRAVENOUS at 22:34

## 2023-10-27 NOTE — Clinical Note
Level of Care: Telemetry [5]   Diagnosis: Pneumonia [176782]   Admitting Physician: MARTÍN GREENE III [[id number]]   Attending Physician: MARTÍN GREENE III [[id number]]   Bed Request Comments: tele obs (not CDU)

## 2023-10-27 NOTE — LETTER
[facility]  [address] [facility] [id number]  [phone]        November 3, 2023      Patient: Ronnie He  YOB: 1980  Date of Visit: 10/27/2023              Radha Santillan RN

## 2023-10-28 PROBLEM — J18.9 COMMUNITY ACQUIRED PNEUMONIA OF LEFT LOWER LOBE OF LUNG: Status: ACTIVE | Noted: 2023-10-28

## 2023-10-28 LAB
AMPHET+METHAMPHET UR QL: NEGATIVE
AMPHETAMINES UR QL: NEGATIVE
ANION GAP SERPL CALCULATED.3IONS-SCNC: 11 MMOL/L (ref 5–15)
BARBITURATES UR QL SCN: NEGATIVE
BASOPHILS # BLD AUTO: 0.01 10*3/MM3 (ref 0–0.2)
BASOPHILS NFR BLD AUTO: 0.1 % (ref 0–1.5)
BENZODIAZ UR QL SCN: NEGATIVE
BILIRUB UR QL STRIP: NEGATIVE
BUN SERPL-MCNC: 15 MG/DL (ref 6–20)
BUN/CREAT SERPL: 21.1 (ref 7–25)
BUPRENORPHINE SERPL-MCNC: NEGATIVE NG/ML
CALCIUM SPEC-SCNC: 8.8 MG/DL (ref 8.6–10.5)
CANNABINOIDS SERPL QL: NEGATIVE
CHLORIDE SERPL-SCNC: 102 MMOL/L (ref 98–107)
CLARITY UR: CLEAR
CO2 SERPL-SCNC: 21 MMOL/L (ref 22–29)
COCAINE UR QL: NEGATIVE
COLOR UR: YELLOW
CREAT SERPL-MCNC: 0.71 MG/DL (ref 0.76–1.27)
D-LACTATE SERPL-SCNC: 1.8 MMOL/L (ref 0.5–2)
DEPRECATED RDW RBC AUTO: 41.6 FL (ref 37–54)
EGFRCR SERPLBLD CKD-EPI 2021: 116.7 ML/MIN/1.73
EOSINOPHIL # BLD AUTO: 0 10*3/MM3 (ref 0–0.4)
EOSINOPHIL NFR BLD AUTO: 0 % (ref 0.3–6.2)
ERYTHROCYTE [DISTWIDTH] IN BLOOD BY AUTOMATED COUNT: 13.2 % (ref 12.3–15.4)
ETHANOL BLD-MCNC: <10 MG/DL (ref 0–10)
FENTANYL UR-MCNC: NEGATIVE NG/ML
GLUCOSE BLDC GLUCOMTR-MCNC: 142 MG/DL (ref 70–130)
GLUCOSE BLDC GLUCOMTR-MCNC: 283 MG/DL (ref 70–130)
GLUCOSE BLDC GLUCOMTR-MCNC: 306 MG/DL (ref 70–130)
GLUCOSE BLDC GLUCOMTR-MCNC: 354 MG/DL (ref 70–130)
GLUCOSE SERPL-MCNC: 351 MG/DL (ref 65–99)
GLUCOSE UR STRIP-MCNC: ABNORMAL MG/DL
HBA1C MFR BLD: 8 % (ref 4.8–5.6)
HCT VFR BLD AUTO: 35.8 % (ref 37.5–51)
HGB BLD-MCNC: 12.5 G/DL (ref 13–17.7)
HGB UR QL STRIP.AUTO: NEGATIVE
HOLD SPECIMEN: NORMAL
IMM GRANULOCYTES # BLD AUTO: 0.12 10*3/MM3 (ref 0–0.05)
IMM GRANULOCYTES NFR BLD AUTO: 1.1 % (ref 0–0.5)
KETONES UR QL STRIP: NEGATIVE
LEUKOCYTE ESTERASE UR QL STRIP.AUTO: NEGATIVE
LYMPHOCYTES # BLD AUTO: 1.01 10*3/MM3 (ref 0.7–3.1)
LYMPHOCYTES NFR BLD AUTO: 9.3 % (ref 19.6–45.3)
MAGNESIUM SERPL-MCNC: 1.7 MG/DL (ref 1.6–2.6)
MAGNESIUM SERPL-MCNC: 2 MG/DL (ref 1.6–2.6)
MCH RBC QN AUTO: 30.1 PG (ref 26.6–33)
MCHC RBC AUTO-ENTMCNC: 34.9 G/DL (ref 31.5–35.7)
MCV RBC AUTO: 86.3 FL (ref 79–97)
METHADONE UR QL SCN: NEGATIVE
MONOCYTES # BLD AUTO: 0.27 10*3/MM3 (ref 0.1–0.9)
MONOCYTES NFR BLD AUTO: 2.5 % (ref 5–12)
NEUTROPHILS NFR BLD AUTO: 87 % (ref 42.7–76)
NEUTROPHILS NFR BLD AUTO: 9.43 10*3/MM3 (ref 1.7–7)
NITRITE UR QL STRIP: NEGATIVE
NRBC BLD AUTO-RTO: 0 /100 WBC (ref 0–0.2)
OPIATES UR QL: POSITIVE
OXYCODONE UR QL SCN: NEGATIVE
PCP UR QL SCN: NEGATIVE
PH UR STRIP.AUTO: 6 [PH] (ref 5–8)
PLATELET # BLD AUTO: 239 10*3/MM3 (ref 140–450)
PMV BLD AUTO: 11 FL (ref 6–12)
POTASSIUM SERPL-SCNC: 4.7 MMOL/L (ref 3.5–5.2)
PROCALCITONIN SERPL-MCNC: <0.02 NG/ML (ref 0–0.25)
PROPOXYPH UR QL: NEGATIVE
PROT UR QL STRIP: ABNORMAL
QT INTERVAL: 298 MS
QT INTERVAL: 324 MS
QTC INTERVAL: 419 MS
QTC INTERVAL: 440 MS
RBC # BLD AUTO: 4.15 10*6/MM3 (ref 4.14–5.8)
SODIUM SERPL-SCNC: 134 MMOL/L (ref 136–145)
SP GR UR STRIP: 1.02 (ref 1–1.03)
TRICYCLICS UR QL SCN: NEGATIVE
UROBILINOGEN UR QL STRIP: ABNORMAL
WBC NRBC COR # BLD: 10.84 10*3/MM3 (ref 3.4–10.8)

## 2023-10-28 PROCEDURE — 83735 ASSAY OF MAGNESIUM: CPT

## 2023-10-28 PROCEDURE — 25810000003 SODIUM CHLORIDE 0.9 % SOLUTION

## 2023-10-28 PROCEDURE — G0378 HOSPITAL OBSERVATION PER HR: HCPCS

## 2023-10-28 PROCEDURE — 83036 HEMOGLOBIN GLYCOSYLATED A1C: CPT

## 2023-10-28 PROCEDURE — 96366 THER/PROPH/DIAG IV INF ADDON: CPT

## 2023-10-28 PROCEDURE — 85025 COMPLETE CBC W/AUTO DIFF WBC: CPT

## 2023-10-28 PROCEDURE — 25810000003 SODIUM CHLORIDE 0.9 % SOLUTION 250 ML FLEX CONT: Performed by: EMERGENCY MEDICINE

## 2023-10-28 PROCEDURE — 63710000001 INSULIN LISPRO (HUMAN) PER 5 UNITS

## 2023-10-28 PROCEDURE — 94664 DEMO&/EVAL PT USE INHALER: CPT

## 2023-10-28 PROCEDURE — 80048 BASIC METABOLIC PNL TOTAL CA: CPT

## 2023-10-28 PROCEDURE — 82948 REAGENT STRIP/BLOOD GLUCOSE: CPT

## 2023-10-28 PROCEDURE — 96365 THER/PROPH/DIAG IV INF INIT: CPT

## 2023-10-28 PROCEDURE — 25810000003 SODIUM CHLORIDE 0.9 % SOLUTION 250 ML FLEX CONT

## 2023-10-28 PROCEDURE — 25010000002 CEFTRIAXONE PER 250 MG

## 2023-10-28 PROCEDURE — 25010000002 AZITHROMYCIN PER 500 MG

## 2023-10-28 PROCEDURE — 94799 UNLISTED PULMONARY SVC/PX: CPT

## 2023-10-28 PROCEDURE — 82077 ASSAY SPEC XCP UR&BREATH IA: CPT

## 2023-10-28 PROCEDURE — 25010000002 KETOROLAC TROMETHAMINE PER 15 MG: Performed by: NURSE PRACTITIONER

## 2023-10-28 PROCEDURE — 94761 N-INVAS EAR/PLS OXIMETRY MLT: CPT

## 2023-10-28 PROCEDURE — 81003 URINALYSIS AUTO W/O SCOPE: CPT | Performed by: EMERGENCY MEDICINE

## 2023-10-28 PROCEDURE — 96361 HYDRATE IV INFUSION ADD-ON: CPT

## 2023-10-28 PROCEDURE — 25010000002 AZITHROMYCIN PER 500 MG: Performed by: EMERGENCY MEDICINE

## 2023-10-28 PROCEDURE — 96375 TX/PRO/DX INJ NEW DRUG ADDON: CPT

## 2023-10-28 PROCEDURE — 96376 TX/PRO/DX INJ SAME DRUG ADON: CPT

## 2023-10-28 PROCEDURE — 96367 TX/PROPH/DG ADDL SEQ IV INF: CPT

## 2023-10-28 PROCEDURE — 25010000002 CEFTRIAXONE PER 250 MG: Performed by: EMERGENCY MEDICINE

## 2023-10-28 PROCEDURE — 80307 DRUG TEST PRSMV CHEM ANLYZR: CPT | Performed by: EMERGENCY MEDICINE

## 2023-10-28 PROCEDURE — 63710000001 INSULIN DETEMIR PER 5 UNITS: Performed by: INTERNAL MEDICINE

## 2023-10-28 PROCEDURE — 25010000002 ONDANSETRON PER 1 MG

## 2023-10-28 RX ORDER — BISACODYL 5 MG/1
5 TABLET, DELAYED RELEASE ORAL DAILY PRN
Status: DISCONTINUED | OUTPATIENT
Start: 2023-10-28 | End: 2023-10-30

## 2023-10-28 RX ORDER — SODIUM CHLORIDE 9 MG/ML
75 INJECTION, SOLUTION INTRAVENOUS CONTINUOUS
Status: DISCONTINUED | OUTPATIENT
Start: 2023-10-28 | End: 2023-10-28

## 2023-10-28 RX ORDER — ATORVASTATIN CALCIUM 10 MG/1
10 TABLET, FILM COATED ORAL DAILY
COMMUNITY

## 2023-10-28 RX ORDER — ATORVASTATIN CALCIUM 10 MG/1
10 TABLET, FILM COATED ORAL DAILY
Status: DISCONTINUED | OUTPATIENT
Start: 2023-10-28 | End: 2023-11-03 | Stop reason: HOSPADM

## 2023-10-28 RX ORDER — DEXTROSE MONOHYDRATE 25 G/50ML
25 INJECTION, SOLUTION INTRAVENOUS
Status: DISCONTINUED | OUTPATIENT
Start: 2023-10-28 | End: 2023-11-03 | Stop reason: HOSPADM

## 2023-10-28 RX ORDER — FLUOXETINE HYDROCHLORIDE 20 MG/1
20 CAPSULE ORAL DAILY
Status: DISCONTINUED | OUTPATIENT
Start: 2023-10-28 | End: 2023-11-03 | Stop reason: HOSPADM

## 2023-10-28 RX ORDER — BENZONATATE 100 MG/1
200 CAPSULE ORAL 3 TIMES DAILY PRN
Status: DISCONTINUED | OUTPATIENT
Start: 2023-10-28 | End: 2023-11-03 | Stop reason: HOSPADM

## 2023-10-28 RX ORDER — IBUPROFEN 600 MG/1
1 TABLET ORAL
Status: DISCONTINUED | OUTPATIENT
Start: 2023-10-28 | End: 2023-11-03 | Stop reason: HOSPADM

## 2023-10-28 RX ORDER — ISOSORBIDE MONONITRATE 30 MG/1
30 TABLET, EXTENDED RELEASE ORAL DAILY
COMMUNITY
End: 2023-11-03 | Stop reason: HOSPADM

## 2023-10-28 RX ORDER — PANTOPRAZOLE SODIUM 40 MG/10ML
40 INJECTION, POWDER, LYOPHILIZED, FOR SOLUTION INTRAVENOUS
Status: DISCONTINUED | OUTPATIENT
Start: 2023-10-28 | End: 2023-11-03 | Stop reason: HOSPADM

## 2023-10-28 RX ORDER — BISACODYL 10 MG
10 SUPPOSITORY, RECTAL RECTAL DAILY PRN
Status: DISCONTINUED | OUTPATIENT
Start: 2023-10-28 | End: 2023-10-30

## 2023-10-28 RX ORDER — POLYETHYLENE GLYCOL 3350 17 G/17G
17 POWDER, FOR SOLUTION ORAL DAILY PRN
Status: DISCONTINUED | OUTPATIENT
Start: 2023-10-28 | End: 2023-10-30

## 2023-10-28 RX ORDER — ACETAMINOPHEN 160 MG/5ML
650 SOLUTION ORAL EVERY 4 HOURS PRN
Status: DISCONTINUED | OUTPATIENT
Start: 2023-10-28 | End: 2023-11-03 | Stop reason: HOSPADM

## 2023-10-28 RX ORDER — ACETAMINOPHEN 650 MG/1
650 SUPPOSITORY RECTAL EVERY 4 HOURS PRN
Status: DISCONTINUED | OUTPATIENT
Start: 2023-10-28 | End: 2023-11-03 | Stop reason: HOSPADM

## 2023-10-28 RX ORDER — SODIUM CHLORIDE 9 MG/ML
40 INJECTION, SOLUTION INTRAVENOUS AS NEEDED
Status: DISCONTINUED | OUTPATIENT
Start: 2023-10-28 | End: 2023-11-03 | Stop reason: HOSPADM

## 2023-10-28 RX ORDER — INSULIN LISPRO 100 [IU]/ML
2-7 INJECTION, SOLUTION INTRAVENOUS; SUBCUTANEOUS
Status: DISCONTINUED | OUTPATIENT
Start: 2023-10-28 | End: 2023-11-03 | Stop reason: HOSPADM

## 2023-10-28 RX ORDER — ASPIRIN 81 MG/1
81 TABLET, CHEWABLE ORAL DAILY
Status: DISCONTINUED | OUTPATIENT
Start: 2023-10-28 | End: 2023-11-03 | Stop reason: HOSPADM

## 2023-10-28 RX ORDER — QUETIAPINE FUMARATE 25 MG/1
50 TABLET, FILM COATED ORAL NIGHTLY
Status: DISCONTINUED | OUTPATIENT
Start: 2023-10-28 | End: 2023-10-28

## 2023-10-28 RX ORDER — ISOSORBIDE MONONITRATE 30 MG/1
30 TABLET, EXTENDED RELEASE ORAL DAILY
Status: DISCONTINUED | OUTPATIENT
Start: 2023-10-28 | End: 2023-11-03

## 2023-10-28 RX ORDER — QUETIAPINE FUMARATE 50 MG/1
50 TABLET, FILM COATED ORAL NIGHTLY
COMMUNITY

## 2023-10-28 RX ORDER — NICOTINE 21 MG/24HR
1 PATCH, TRANSDERMAL 24 HOURS TRANSDERMAL
Status: DISCONTINUED | OUTPATIENT
Start: 2023-10-28 | End: 2023-11-03 | Stop reason: HOSPADM

## 2023-10-28 RX ORDER — AMOXICILLIN 250 MG
2 CAPSULE ORAL 2 TIMES DAILY
Status: DISCONTINUED | OUTPATIENT
Start: 2023-10-28 | End: 2023-10-30

## 2023-10-28 RX ORDER — SODIUM CHLORIDE 0.9 % (FLUSH) 0.9 %
10 SYRINGE (ML) INJECTION AS NEEDED
Status: DISCONTINUED | OUTPATIENT
Start: 2023-10-28 | End: 2023-11-03 | Stop reason: HOSPADM

## 2023-10-28 RX ORDER — KETOROLAC TROMETHAMINE 30 MG/ML
30 INJECTION, SOLUTION INTRAMUSCULAR; INTRAVENOUS ONCE
Status: COMPLETED | OUTPATIENT
Start: 2023-10-28 | End: 2023-10-28

## 2023-10-28 RX ORDER — ONDANSETRON 2 MG/ML
4 INJECTION INTRAMUSCULAR; INTRAVENOUS EVERY 6 HOURS PRN
Status: DISCONTINUED | OUTPATIENT
Start: 2023-10-28 | End: 2023-11-03 | Stop reason: HOSPADM

## 2023-10-28 RX ORDER — ONDANSETRON 4 MG/1
4 TABLET, FILM COATED ORAL EVERY 6 HOURS PRN
Status: DISCONTINUED | OUTPATIENT
Start: 2023-10-28 | End: 2023-11-03 | Stop reason: HOSPADM

## 2023-10-28 RX ORDER — FLUOXETINE HYDROCHLORIDE 20 MG/1
20 CAPSULE ORAL DAILY
COMMUNITY

## 2023-10-28 RX ORDER — ACETAMINOPHEN 325 MG/1
650 TABLET ORAL EVERY 4 HOURS PRN
Status: DISCONTINUED | OUTPATIENT
Start: 2023-10-28 | End: 2023-11-03 | Stop reason: HOSPADM

## 2023-10-28 RX ORDER — SODIUM CHLORIDE 0.9 % (FLUSH) 0.9 %
10 SYRINGE (ML) INJECTION EVERY 12 HOURS SCHEDULED
Status: DISCONTINUED | OUTPATIENT
Start: 2023-10-28 | End: 2023-11-01

## 2023-10-28 RX ORDER — NICOTINE POLACRILEX 4 MG
15 LOZENGE BUCCAL
Status: DISCONTINUED | OUTPATIENT
Start: 2023-10-28 | End: 2023-11-03 | Stop reason: HOSPADM

## 2023-10-28 RX ORDER — DIVALPROEX SODIUM 250 MG/1
500 TABLET, EXTENDED RELEASE ORAL DAILY
Status: DISCONTINUED | OUTPATIENT
Start: 2023-10-28 | End: 2023-11-02

## 2023-10-28 RX ORDER — QUETIAPINE FUMARATE 25 MG/1
50 TABLET, FILM COATED ORAL NIGHTLY
Status: DISCONTINUED | OUTPATIENT
Start: 2023-10-28 | End: 2023-11-03 | Stop reason: HOSPADM

## 2023-10-28 RX ORDER — IPRATROPIUM BROMIDE AND ALBUTEROL SULFATE 2.5; .5 MG/3ML; MG/3ML
3 SOLUTION RESPIRATORY (INHALATION)
Status: DISCONTINUED | OUTPATIENT
Start: 2023-10-28 | End: 2023-10-28

## 2023-10-28 RX ADMIN — ASPIRIN 81 MG CHEWABLE TABLET 81 MG: 81 TABLET CHEWABLE at 08:58

## 2023-10-28 RX ADMIN — INSULIN DETEMIR 10 UNITS: 100 INJECTION, SOLUTION SUBCUTANEOUS at 21:06

## 2023-10-28 RX ADMIN — Medication 1 PATCH: at 17:50

## 2023-10-28 RX ADMIN — CEFTRIAXONE SODIUM 2000 MG: 2 INJECTION, POWDER, FOR SOLUTION INTRAMUSCULAR; INTRAVENOUS at 00:05

## 2023-10-28 RX ADMIN — IPRATROPIUM BROMIDE AND ALBUTEROL SULFATE 3 ML: 2.5; .5 SOLUTION RESPIRATORY (INHALATION) at 09:26

## 2023-10-28 RX ADMIN — Medication 10 ML: at 21:07

## 2023-10-28 RX ADMIN — ATORVASTATIN CALCIUM 10 MG: 10 TABLET, FILM COATED ORAL at 08:58

## 2023-10-28 RX ADMIN — IPRATROPIUM BROMIDE AND ALBUTEROL SULFATE 3 ML: 2.5; .5 SOLUTION RESPIRATORY (INHALATION) at 19:16

## 2023-10-28 RX ADMIN — FLUOXETINE 20 MG: 20 CAPSULE ORAL at 08:58

## 2023-10-28 RX ADMIN — PANTOPRAZOLE SODIUM 40 MG: 40 INJECTION, POWDER, LYOPHILIZED, FOR SOLUTION INTRAVENOUS at 21:04

## 2023-10-28 RX ADMIN — ACETAMINOPHEN 650 MG: 325 TABLET ORAL at 09:14

## 2023-10-28 RX ADMIN — AZITHROMYCIN 500 MG: 500 INJECTION, POWDER, LYOPHILIZED, FOR SOLUTION INTRAVENOUS at 02:30

## 2023-10-28 RX ADMIN — PANTOPRAZOLE SODIUM 40 MG: 40 INJECTION, POWDER, LYOPHILIZED, FOR SOLUTION INTRAVENOUS at 08:58

## 2023-10-28 RX ADMIN — QUETIAPINE FUMARATE 50 MG: 25 TABLET ORAL at 21:06

## 2023-10-28 RX ADMIN — ISOSORBIDE MONONITRATE 30 MG: 30 TABLET, EXTENDED RELEASE ORAL at 08:58

## 2023-10-28 RX ADMIN — ONDANSETRON 4 MG: 2 INJECTION INTRAMUSCULAR; INTRAVENOUS at 21:06

## 2023-10-28 RX ADMIN — ACETAMINOPHEN 650 MG: 325 TABLET ORAL at 21:06

## 2023-10-28 RX ADMIN — INSULIN LISPRO 6 UNITS: 100 INJECTION, SOLUTION INTRAVENOUS; SUBCUTANEOUS at 08:58

## 2023-10-28 RX ADMIN — DIVALPROEX SODIUM 500 MG: 250 TABLET, EXTENDED RELEASE ORAL at 08:58

## 2023-10-28 RX ADMIN — INSULIN LISPRO 5 UNITS: 100 INJECTION, SOLUTION INTRAVENOUS; SUBCUTANEOUS at 21:06

## 2023-10-28 RX ADMIN — ACETAMINOPHEN 650 MG: 325 TABLET ORAL at 03:44

## 2023-10-28 RX ADMIN — SODIUM CHLORIDE 75 ML/HR: 9 INJECTION, SOLUTION INTRAVENOUS at 03:35

## 2023-10-28 RX ADMIN — KETOROLAC TROMETHAMINE 30 MG: 30 INJECTION, SOLUTION INTRAMUSCULAR; INTRAVENOUS at 23:25

## 2023-10-28 RX ADMIN — SENNOSIDES AND DOCUSATE SODIUM 2 TABLET: 8.6; 5 TABLET ORAL at 21:07

## 2023-10-28 RX ADMIN — AZITHROMYCIN 500 MG: 500 INJECTION, POWDER, LYOPHILIZED, FOR SOLUTION INTRAVENOUS at 21:05

## 2023-10-28 RX ADMIN — ACETAMINOPHEN 650 MG: 325 TABLET ORAL at 17:15

## 2023-10-28 RX ADMIN — QUETIAPINE FUMARATE 50 MG: 25 TABLET ORAL at 04:26

## 2023-10-28 RX ADMIN — INSULIN LISPRO 4 UNITS: 100 INJECTION, SOLUTION INTRAVENOUS; SUBCUTANEOUS at 17:16

## 2023-10-28 RX ADMIN — ACETAMINOPHEN 650 MG: 325 TABLET ORAL at 13:39

## 2023-10-28 RX ADMIN — CEFTRIAXONE 2000 MG: 2 INJECTION, POWDER, FOR SOLUTION INTRAMUSCULAR; INTRAVENOUS at 21:05

## 2023-10-28 RX ADMIN — ONDANSETRON 4 MG: 2 INJECTION INTRAMUSCULAR; INTRAVENOUS at 09:14

## 2023-10-28 RX ADMIN — EMPAGLIFLOZIN 10 MG: 10 TABLET, FILM COATED ORAL at 17:15

## 2023-10-28 RX ADMIN — IPRATROPIUM BROMIDE AND ALBUTEROL SULFATE 3 ML: 2.5; .5 SOLUTION RESPIRATORY (INHALATION) at 13:16

## 2023-10-28 NOTE — H&P
[facility] Medicine Services  HISTORY AND PHYSICAL    Patient Name: Ronnie He  : 1980  MRN: [id number]  Primary Care Physician: Dylon Olmos MD  Date of admission: 10/27/2023    Subjective   Subjective     Chief Complaint:  Shortness of breath    HPI:  Ronnie He is a 42 y.o. male with PMH of HTN, HLD, DM2, ETOH/polysubstance abuse in remission, COPD and seizure disorder presents to the ED for shortness of breath. Patient is currently in sober living. Patient has been short of breath and chest pain for about 4 days. He has had a cough without sputum production. He has had chills, but no fever. He was admitted to  and diagnosed with pneumonia. On discharge, patient was given doxy and prednisone. However, he took it today and had vomiting. He had bloody streaks in vomitus. He has not been able to have a BM in about 4 days. He has had poor PO intake due to nausea. He has had dizziness when sitting up or standing.         Review of Systems   Constitutional:  Positive for activity change, appetite change and chills. Negative for diaphoresis and fever.   HENT:  Positive for congestion. Negative for sore throat and trouble swallowing.    Eyes: Negative.    Respiratory:  Positive for cough, chest tightness and shortness of breath.    Cardiovascular: Negative.    Gastrointestinal:  Positive for constipation, nausea and vomiting. Negative for abdominal distention, abdominal pain, blood in stool and diarrhea.   Endocrine: Negative.    Genitourinary:  Negative for decreased urine volume, difficulty urinating and dysuria.   Musculoskeletal: Negative.    Skin: Negative.    Neurological:  Positive for dizziness and light-headedness. Negative for headaches.   Hematological: Negative.    Psychiatric/Behavioral:  Negative for agitation and confusion.                 Personal History     Past Medical History:   Diagnosis Date    Anxiety     Diabetes mellitus     Hypertension           Oncology Problem List:  Melanoma (01/21/2020; Status: Active)  Oncology/Hematology History       History reviewed. No pertinent surgical history.    Family History:  family history is not on file.     Social History:  reports that he has been smoking cigarettes. He has a 12.50 pack-year smoking history. He has never used smokeless tobacco. He reports that he does not currently use alcohol. He reports current drug use. Drug: Methamphetamines.  Social History     Social History Narrative    Not on file       Medications:  aspirin and divalproex    No Known Allergies    Objective   Objective     Vital Signs:   Temp:  [97.9 °F (36.6 °C)] 97.9 °F (36.6 °C)  Heart Rate:  [] 99  Resp:  [18-20] 18  BP: (116-137)/(69-98) 120/86    Physical Exam  Vitals reviewed.   Constitutional:       General: He is not in acute distress.     Appearance: He is normal weight.   HENT:      Head: Normocephalic.      Nose: Nose normal. No congestion.      Mouth/Throat:      Mouth: Mucous membranes are moist.   Eyes:      Extraocular Movements: Extraocular movements intact.      Pupils: Pupils are equal, round, and reactive to light.   Cardiovascular:      Rate and Rhythm: Regular rhythm. Tachycardia present.      Pulses: Normal pulses.      Heart sounds: Normal heart sounds. No murmur heard.  Pulmonary:      Effort: Pulmonary effort is normal. No respiratory distress.      Breath sounds: No wheezing or rhonchi.   Abdominal:      General: Bowel sounds are normal. There is no distension.      Palpations: Abdomen is soft.      Tenderness: There is no abdominal tenderness.   Musculoskeletal:         General: No swelling. Normal range of motion.      Cervical back: Normal range of motion. No rigidity.   Skin:     General: Skin is warm.      Capillary Refill: Capillary refill takes less than 2 seconds.   Neurological:      General: No focal deficit present.      Mental Status: He is alert and oriented to person, place, and time. Mental  status is at baseline.      Motor: No weakness.   Psychiatric:         Mood and Affect: Mood normal.         Behavior: Behavior normal.         Thought Content: Thought content normal.         Judgment: Judgment normal.            Result Review:  I have personally reviewed the results from the time of this admission to 10/28/2023 02:20 EDT and agree with these findings:  [x]  Laboratory list / accordion  [x]  Microbiology  [x]  Radiology  []  EKG/Telemetry   []  Cardiology/Vascular   []  Pathology  [x]  Old records  []  Other:  Most notable findings include:     LAB RESULTS:      Lab 10/27/23  2046 10/27/23  0022 10/26/23  1946 10/26/23  1139   WBC 12.94*  --  9.28  --    HEMOGLOBIN 13.9  --  13.4*  --    HEMATOCRIT 39.8  --  38.2*  --    PLATELETS 272  --  217  --    NEUTROS ABS 11.23*  --  6.04  --    IMMATURE GRANS (ABS) 0.09*  --  0.03  --    LYMPHS ABS 1.11  --  1.99  --    MONOS ABS 0.50  --  1.04*  --    EOS ABS 0.00  --  0.13  --    MCV 85.6  --  85  --    LACTATE, ARTERIAL  --  3.9*  --   --    PROTIME  --   --   --  9.9   APTT  --   --   --  25.3   D DIMER QUANT <0.27  --   --   --          Lab 10/27/23  2046 10/27/23  0022 10/26/23  1139   SODIUM 134*  --   --    POTASSIUM 4.0 3.8  --    CHLORIDE 102 103  --    CO2 21.0*  --   --    ANION GAP 11.0  --   --    BUN 14  --   --    CREATININE 0.74*  --   --    EGFR 115.3  --   --    GLUCOSE 218* 355*  --    CALCIUM 9.3  --   --    IONIZED CALCIUM  --  4.5*  --    MAGNESIUM  --   --  1.7         Lab 10/27/23  2046   TOTAL PROTEIN 7.0   ALBUMIN 4.5   GLOBULIN 2.5   ALT (SGPT) 19   AST (SGOT) 14   BILIRUBIN 0.3   ALK PHOS 86   LIPASE 59         Lab 10/27/23  2247 10/27/23  2046 10/26/23  2214 10/26/23  1946 10/26/23  1139   PROBNP  --  658.3*  --   --   --    TROPONIN T  --   --  13  --   --    TROPONIN T (HIGHLY SENSITIVE)  --   --   --  15  --    HSTROP T 13 15*  --   --   --    PROTIME  --   --   --   --  9.9   INR  --   --   --   --  0.90                 Lab  10/27/23  0022   BASE EXCESS ART -4.1*     Brief Urine Lab Results  (Last result in the past 365 days)        Color   Clarity   Blood   Leuk Est   Nitrite   Protein   CREAT   Urine HCG        10/28/23 0039 Yellow   Clear   Negative   Negative   Negative   Trace                 Microbiology Results (last 10 days)       Procedure Component Value - Date/Time    COVID PRE-OP / PRE-PROCEDURE SCREENING ORDER (NO ISOLATION) - Swab, Nasopharynx [[id number]]  (Normal) Collected: 10/27/23 2241    Lab Status: Final result Specimen: Swab from Nasopharynx Updated: 10/27/23 2316    Narrative:      The following orders were created for panel order COVID PRE-OP / PRE-PROCEDURE SCREENING ORDER (NO ISOLATION) - Swab, Nasopharynx.  Procedure                               Abnormality         Status                     ---------                               -----------         ------                     COVID-19 and FLU A/B PCR...[[id number]]  Normal              Final result                 Please view results for these tests on the individual orders.    COVID-19 and FLU A/B PCR - Swab, Nasopharynx [[id number]]  (Normal) Collected: 10/27/23 2241    Lab Status: Final result Specimen: Swab from Nasopharynx Updated: 10/27/23 2316     COVID19 Not Detected     Influenza A PCR Not Detected     Influenza B PCR Not Detected    Narrative:      Fact sheet for providers: https://www.fda.gov/media/752327/download    Fact sheet for patients: https://www.fda.gov/media/868353/download    Test performed by PCR.            CT Abdomen Pelvis Without Contrast    Result Date: 10/27/2023  CT ABDOMEN PELVIS WO CONTRAST Date of Exam: 10/27/2023 11:26 PM EDT Indication: Abdominal pain, acute, nonlocalized constipation for 4 days, epigastric pain, vomiting, hematemesis. Comparison: None available. Technique: Axial CT images were obtained of the abdomen and pelvis without the administration of contrast. Reconstructed coronal and sagittal images were also  obtained. Automated exposure control and iterative construction methods were used. Findings: There is a small focus of pneumonia in the left lower lobe posteriorly. The right lung base is clear. Heart size is normal. Distal esophagus is unremarkable. There are no acute findings in the superficial soft tissues. There are no acute osseous abnormalities or destructive bone lesions. There are mild thoracolumbar degenerative changes, most pronounced at the L1-L2 disc. There is a transitional lumbosacral vertebra. The liver is homogeneous. There is gradient attenuation of the gallbladder lumen that may reflect stones or sludge. No evidence of acute cholecystitis. The bile ducts, pancreas, stomach, spleen and adrenal glands appear within normal limits. There is a small exophytic lesion at the inferior aspect of the left kidney measuring 12 mm, which is indeterminate in part due to mild motion and otherwise due to small size and noncontrast technique. The appendix is normal. The colon is normal. Small bowel is nondistended. Aorta is normal diameter. There is no ascites, pneumoperitoneum or lymphadenopathy. The prostate gland is normal size and the urinary bladder is nondistended.     Impression: Impression: 1.Small focus of pneumonia in the left lower lobe. 2.No acute abdominal or pelvic abnormality. 3.12 mm exophytic lesion at the inferior aspect of the left kidney. This is indeterminate in part due to motion and otherwise due to small size and noncontrast technique. Consider 3-month follow-up renal protocol CT or MRI to more definitively evaluate.. Electronically Signed: Yonatan Yun MD  10/27/2023 11:59 PM EDT  Workstation ID: [id number]    XR Chest 1 View    Result Date: 10/27/2023  XR CHEST 1 VW Date of Exam: 10/27/2023 9:40 PM EDT Indication: Chest Pain Triage Protocol Comparison: None available. Findings:  There is no acute infiltrate. There is no large pleural effusion. The cardiac silhouette is unremarkable. The  visualized augusto structures demonstrate no abnormality.     Impression: Impression: No acute pulmonary disease. Electronically Signed: Jose Luis Davis MD  10/27/2023 9:59 PM EDT  Workstation ID: [id number]    CT Angiogram Chest Pulmonary Embolism    Result Date: 10/27/2023  CLINICAL INDICATION: Pulmonary embolism (PE) suspected, high prob TECHNIQUE: Imaging of the chest was performed from thoracic inlet through upper abdomen, using spiral technique, following administration of IV contrast, Omnipaque 350, 100 mL per the pulmonary angiogram protocol. In addition, 3D images were created and reviewed. TOTAL DLP (Dose-Length Product): 496.99 mGy.cm. Please note: The reported value represents the total of one or more individual components during the CT acquisition on this date and at this time, and as such, the same value may appear in more than one CT report depending on the interpreting/reporting physicians. COMPARISON: Chest x-ray October 26, 2023 FINDINGS: Pulmonary Arteries/Vessels: Limited evaluation due to suboptimal bolus timing. However, no discrete pulmonary embolism noted within the main or segmental pulmonary arteries. Coronary artery calcifications. Right Heart Strain: No evidence of right heart strain. Pleural/Pericardial space: No pneumothorax.  No pleural effusions.  No pericardial effusion. Lymph Nodes: No lymphadenopathy within the chest by CT size criteria. Lungs: The central airways are patent. Tree-in-bud nodularity noted within the depending left lower lobe. No consolidation. Mediastinum: Otherwise unremarkable. Chest wall: No chest wall hematoma or contusion. Bones: No acute fracture within the chest. Mild degenerative and hypertrophic changes in the cervical thoracic and lumbar spine. Minimal curvature or scoliosis of the spine which might be positional. Upper Abdomen: Limited imaging of the upper abdomen is unremarkable.    Impression: No pulmonary embolism. Respiratory motion artifact and poor  contrast opacification of peripheral vessels limits peripheral evaluation. No large central pulmonary emboli. Tree-in-bud, patchy nodularity noted within the dependent left lower lobe, which could be related to aspiration or atypical infectious process. CRITICAL RESULT:   No. COMMUNICATION: Per this written report. Preliminary report signed by Tony Jain MD on 10/27/2023 5:21 AM By electronically signing this report, I, the attending physician, attest that I have personally reviewed the images/data for the above examination(s) and agree with the final edited report. Drafted by Tony Jain MD on 10/27/2023 5:13 AM Final report signed by Trenton Olivia MD on 10/27/2023 5:26 AM    XR Chest 1 View    Result Date: 10/26/2023  CLINICAL INDICATION: SOA TECHNIQUE: XR CHEST 1 VIEW COMPARISON: None. FINDINGS: No consolidation, effusion, or pneumothorax. The cardiac size within normal limits with unremarkable cardiac and mediastinal contours. No free subdiaphragmatic gas. No acute osseous findings.    Impression: No acute findings. CRITICAL RESULT:   No. COMMUNICATION: Per this written report. Drafted by Aki Tinoco MD on 10/26/2023 8:32 PM Final report signed by Aki Tinoco MD on 10/26/2023 8:32 PM    XR Chest 1 View    Result Date: 10/26/2023  PORTABLE CHEST HISTORY: Precordial chest pain. COMPARISON: None. FINDINGS: The heart is normal in size. The mediastinum is unremarkable. The lungs are clear. There is no pneumothorax.     Impression: No acute cardiopulmonary process. Images reviewed, interpreted, and dictated by Dr. RACHAEL Muller. Transcribed by Aleisha Duran PA-C.         Assessment & Plan   Assessment & Plan       Community acquired pneumonia of left lower lobe of lung    Conversion disorder with attacks or seizures    COPD (chronic obstructive pulmonary disease)    Diabetes mellitus type 2 in obese    History of alcoholism    Hypertension associated with diabetes     "Non-compliant patient    Personality disorder, unspecified    Ronnie He is a 42 y.o. male with PMH of HTN, HLD, DM2, ETOH/polysubstance abuse in remission, COPD and seizure disorder presents to the ED for shortness of breath.     LLL Pneumonia  COPD  -CT chest shows small LLL pneumonia  -Rocephin and azithro  -Duonebs    Hematemesis   -Likely Ada Chairez tear  -With ETOH history, will give protonix IV BID  -Hgb stable, trend    DM2  -Pending HgbA1c  -ACHS finger   -SSI    HTN  HLD  -Continue home aspirin, statin, lisinopril     Seizure Disorder  -Continue home depakote  -Seizure precautions     Hx of Polysubstance Abuse  Hx of ETOH  -Patient reports being sober 3mo, 25 days  -UDS+ for opiates, received IV morphine at [place] 10/26          DVT prophylaxis:  SCDs    CODE STATUS:  FULL       Expected Discharge  Expected discharge date/ time has not been documented.        Signature: Electronically signed by GENNY Henriquez, 10/28/23, 2:49 AM EDT.    Total APC Time: 60 minutes        Attending   Admission Attestation       I have performed an independent face-to-face diagnostic evaluation including performing an independent physical examination.  The documented plan of care above was reviewed and developed with the advanced practice clinician (APC).  I have updated the HPI as appropriate.    Brief HPI    42 M who states that he has noticed increased shortness of breath over the last 4 days.  He confirms increased frequency of cough over the last 4 days, but denies any production.  He states he was admitted to  yesterday and diagnosed with pneumonia, but then notes that he was discharged yesterday and prescribed doxycycline and prednisone on discharge.  He states that after taking that today (Friday), he had some nausea and emesis with some small \"streaks of blood\" in the vomited material.  He states after stopping the above medications, this has not recurred.  He confirms some chills over the last couple of " days but no fever; he did not take a temperature during the episode of chills.    Attending Physical Exam:  Temp:  [97.9 °F (36.6 °C)-98.2 °F (36.8 °C)] 98.1 °F (36.7 °C)  Heart Rate:  [] 112  Resp:  [18-20] 20  BP: (116-137)/(69-98) 132/98    Constitutional: Awake, alert, NAD.  Eyes: PERRLA, sclerae anicteric, no conjunctival injection  HENT: NCAT, mucous membranes moist  Neck: Supple, no thyromegaly, no lymphadenopathy, trachea midline  Respiratory: There is good air movement to auscultation bilaterally with some diminished sounds at the LLL, nonlabored respirations   Cardiovascular: Tachycardic with rate 104 on my exam, no murmurs, rubs, or gallops, palpable pedal pulses bilaterally  Gastrointestinal: Positive bowel sounds, soft, nontender, nondistended  Musculoskeletal: No bilateral ankle edema, no clubbing or cyanosis to extremities  Psychiatric: Appropriate affect, cooperative  Neurologic: Oriented x 3, strength symmetric in all extremities, Cranial Nerves grossly intact to confrontation, speech clear  Skin: No rashes, normal turgor.    Assessment and Plan:    See assessment and plan documented by APC above and updated/edited by me as appropriate.    Total time spent: 25 minutes  Time spent includes time reviewing chart, face-to-face time, counseling patient/family/caregiver, ordering medications/tests/procedures, communicating with other health care professionals, documenting clinical information in the electronic health record, and coordination of care.       Niels Agrawal III, DO  10/28/23

## 2023-10-28 NOTE — PROGRESS NOTES
[facility] Medicine Services  ADMISSION FOLLOW-UP NOTE          Patient admitted after midnight, H&P by my partner performed earlier on today's date reviewed.  Interim findings, labs, and charting also reviewed.        The [facility] Hospital Problem List has been managed and updated to include any new diagnoses:  Active Hospital Problems    Diagnosis  POA    **Community acquired pneumonia of left lower lobe of lung [J18.9]  Unknown    Conversion disorder with attacks or seizures [F44.5]  Yes    Major depressive disorder, recurrent, severe with psychotic features [F33.3]  Yes    History of alcoholism [F10.21]  Not Applicable    Non-compliant patient [Z91.199]  Not Applicable    COPD (chronic obstructive pulmonary disease) [J44.9]  Yes    Hypertension associated with diabetes [E11.59, I15.2]  Yes    Diabetes mellitus type 2 in obese [E11.69, E66.9]  Yes    Personality disorder, unspecified [F60.9]  Yes      Resolved Hospital Problems   No resolved problems to display.         ADDITIONAL PLAN:  - detailed assessment and plan from admission reviewed     42 y/o male with PMH of HTN, HLD, DM2, ETOH/polysubstance abuse in remission, COPD and seizure disorder presents to the ED for shortness of breath.      LLL Pneumonia  COPD  -CT chest shows LLL pneumonia  -Rocephin and azithro  -Duonebs     Hematemesis - no further episodes  -Likely Ada Chairez tear  -continue protonix IV BID, no witnessed episodes here. If reoccurs will have GI evaluate  -Hgb stable     DM2  - HgbA1c 8  -SSI, add basal coverage as blood sugar uncontrolled     HTN  HLD  -Continue home aspirin, statin, lisinopril      Seizure Disorder  -Continue home depakote  -Seizure precautions      Hx of Polysubstance Abuse  Hx of ETOH  -Patient reports being sober 3mo, 25 days  -UDS+ for opiates, received IV morphine at [place] 10/26       Expected Discharge   Expected Discharge Date: 10/29/2023; Expected Discharge Time:      Bria GILMORE  DO Andres  10/28/23

## 2023-10-29 ENCOUNTER — APPOINTMENT (OUTPATIENT)
Dept: GENERAL RADIOLOGY | Facility: HOSPITAL | Age: 43
End: 2023-10-29
Payer: MEDICARE

## 2023-10-29 PROBLEM — K59.09 OTHER CONSTIPATION: Status: ACTIVE | Noted: 2023-10-29

## 2023-10-29 LAB
ANION GAP SERPL CALCULATED.3IONS-SCNC: 9 MMOL/L (ref 5–15)
BASOPHILS # BLD AUTO: 0.04 10*3/MM3 (ref 0–0.2)
BASOPHILS NFR BLD AUTO: 0.4 % (ref 0–1.5)
BUN SERPL-MCNC: 22 MG/DL (ref 6–20)
BUN/CREAT SERPL: 26.5 (ref 7–25)
CALCIUM SPEC-SCNC: 8.7 MG/DL (ref 8.6–10.5)
CHLORIDE SERPL-SCNC: 105 MMOL/L (ref 98–107)
CO2 SERPL-SCNC: 24 MMOL/L (ref 22–29)
CREAT SERPL-MCNC: 0.83 MG/DL (ref 0.76–1.27)
DEPRECATED RDW RBC AUTO: 40.8 FL (ref 37–54)
EGFRCR SERPLBLD CKD-EPI 2021: 111.4 ML/MIN/1.73
EOSINOPHIL # BLD AUTO: 0.02 10*3/MM3 (ref 0–0.4)
EOSINOPHIL NFR BLD AUTO: 0.2 % (ref 0.3–6.2)
ERYTHROCYTE [DISTWIDTH] IN BLOOD BY AUTOMATED COUNT: 13.1 % (ref 12.3–15.4)
GLUCOSE BLDC GLUCOMTR-MCNC: 129 MG/DL (ref 70–130)
GLUCOSE BLDC GLUCOMTR-MCNC: 173 MG/DL (ref 70–130)
GLUCOSE BLDC GLUCOMTR-MCNC: 189 MG/DL (ref 70–130)
GLUCOSE BLDC GLUCOMTR-MCNC: 252 MG/DL (ref 70–130)
GLUCOSE SERPL-MCNC: 140 MG/DL (ref 65–99)
HCT VFR BLD AUTO: 38.5 % (ref 37.5–51)
HGB BLD-MCNC: 13.5 G/DL (ref 13–17.7)
IMM GRANULOCYTES # BLD AUTO: 0.08 10*3/MM3 (ref 0–0.05)
IMM GRANULOCYTES NFR BLD AUTO: 0.9 % (ref 0–0.5)
LYMPHOCYTES # BLD AUTO: 3.35 10*3/MM3 (ref 0.7–3.1)
LYMPHOCYTES NFR BLD AUTO: 37.6 % (ref 19.6–45.3)
MCH RBC QN AUTO: 30 PG (ref 26.6–33)
MCHC RBC AUTO-ENTMCNC: 35.1 G/DL (ref 31.5–35.7)
MCV RBC AUTO: 85.6 FL (ref 79–97)
MONOCYTES # BLD AUTO: 0.71 10*3/MM3 (ref 0.1–0.9)
MONOCYTES NFR BLD AUTO: 8 % (ref 5–12)
NEUTROPHILS NFR BLD AUTO: 4.72 10*3/MM3 (ref 1.7–7)
NEUTROPHILS NFR BLD AUTO: 52.9 % (ref 42.7–76)
NRBC BLD AUTO-RTO: 0 /100 WBC (ref 0–0.2)
PLATELET # BLD AUTO: 197 10*3/MM3 (ref 140–450)
PMV BLD AUTO: 10.4 FL (ref 6–12)
POTASSIUM SERPL-SCNC: 3.8 MMOL/L (ref 3.5–5.2)
RBC # BLD AUTO: 4.5 10*6/MM3 (ref 4.14–5.8)
SODIUM SERPL-SCNC: 138 MMOL/L (ref 136–145)
WBC NRBC COR # BLD: 8.92 10*3/MM3 (ref 3.4–10.8)

## 2023-10-29 PROCEDURE — 63710000001 INSULIN LISPRO (HUMAN) PER 5 UNITS: Performed by: NURSE PRACTITIONER

## 2023-10-29 PROCEDURE — 63710000001 INSULIN LISPRO (HUMAN) PER 5 UNITS

## 2023-10-29 PROCEDURE — 63710000001 INSULIN DETEMIR PER 5 UNITS: Performed by: INTERNAL MEDICINE

## 2023-10-29 PROCEDURE — 94761 N-INVAS EAR/PLS OXIMETRY MLT: CPT

## 2023-10-29 PROCEDURE — 25010000002 CEFTRIAXONE PER 250 MG

## 2023-10-29 PROCEDURE — 25810000003 SODIUM CHLORIDE 0.9 % SOLUTION 250 ML FLEX CONT

## 2023-10-29 PROCEDURE — 80048 BASIC METABOLIC PNL TOTAL CA: CPT | Performed by: INTERNAL MEDICINE

## 2023-10-29 PROCEDURE — 96376 TX/PRO/DX INJ SAME DRUG ADON: CPT

## 2023-10-29 PROCEDURE — 25010000002 AZITHROMYCIN PER 500 MG

## 2023-10-29 PROCEDURE — 74018 RADEX ABDOMEN 1 VIEW: CPT

## 2023-10-29 PROCEDURE — 94799 UNLISTED PULMONARY SVC/PX: CPT

## 2023-10-29 PROCEDURE — 25010000002 ONDANSETRON PER 1 MG

## 2023-10-29 PROCEDURE — 94664 DEMO&/EVAL PT USE INHALER: CPT

## 2023-10-29 PROCEDURE — G0378 HOSPITAL OBSERVATION PER HR: HCPCS

## 2023-10-29 PROCEDURE — 63710000001 ONDANSETRON PER 8 MG

## 2023-10-29 PROCEDURE — 85025 COMPLETE CBC W/AUTO DIFF WBC: CPT | Performed by: INTERNAL MEDICINE

## 2023-10-29 PROCEDURE — 82948 REAGENT STRIP/BLOOD GLUCOSE: CPT

## 2023-10-29 RX ORDER — INSULIN LISPRO 100 [IU]/ML
3 INJECTION, SOLUTION INTRAVENOUS; SUBCUTANEOUS
Status: DISCONTINUED | OUTPATIENT
Start: 2023-10-29 | End: 2023-10-30

## 2023-10-29 RX ORDER — MINERAL OIL 100 G/100G
1 OIL RECTAL ONCE
Status: COMPLETED | OUTPATIENT
Start: 2023-10-29 | End: 2023-10-29

## 2023-10-29 RX ADMIN — AZITHROMYCIN 500 MG: 500 INJECTION, POWDER, LYOPHILIZED, FOR SOLUTION INTRAVENOUS at 22:29

## 2023-10-29 RX ADMIN — CEFTRIAXONE 2000 MG: 2 INJECTION, POWDER, FOR SOLUTION INTRAMUSCULAR; INTRAVENOUS at 22:28

## 2023-10-29 RX ADMIN — INSULIN LISPRO 2 UNITS: 100 INJECTION, SOLUTION INTRAVENOUS; SUBCUTANEOUS at 17:26

## 2023-10-29 RX ADMIN — IPRATROPIUM BROMIDE AND ALBUTEROL SULFATE 3 ML: 2.5; .5 SOLUTION RESPIRATORY (INHALATION) at 12:42

## 2023-10-29 RX ADMIN — SENNOSIDES AND DOCUSATE SODIUM 2 TABLET: 8.6; 5 TABLET ORAL at 09:05

## 2023-10-29 RX ADMIN — PANTOPRAZOLE SODIUM 40 MG: 40 INJECTION, POWDER, LYOPHILIZED, FOR SOLUTION INTRAVENOUS at 09:05

## 2023-10-29 RX ADMIN — Medication 10 ML: at 09:06

## 2023-10-29 RX ADMIN — ISOSORBIDE MONONITRATE 30 MG: 30 TABLET, EXTENDED RELEASE ORAL at 09:05

## 2023-10-29 RX ADMIN — ONDANSETRON 4 MG: 2 INJECTION INTRAMUSCULAR; INTRAVENOUS at 13:03

## 2023-10-29 RX ADMIN — ONDANSETRON HYDROCHLORIDE 4 MG: 4 TABLET, FILM COATED ORAL at 07:11

## 2023-10-29 RX ADMIN — INSULIN LISPRO 3 UNITS: 100 INJECTION, SOLUTION INTRAVENOUS; SUBCUTANEOUS at 22:30

## 2023-10-29 RX ADMIN — MINERAL OIL 1 ENEMA: 100 ENEMA RECTAL at 13:58

## 2023-10-29 RX ADMIN — EMPAGLIFLOZIN 10 MG: 10 TABLET, FILM COATED ORAL at 09:05

## 2023-10-29 RX ADMIN — INSULIN LISPRO 2 UNITS: 100 INJECTION, SOLUTION INTRAVENOUS; SUBCUTANEOUS at 11:28

## 2023-10-29 RX ADMIN — ACETAMINOPHEN 650 MG: 325 TABLET ORAL at 22:35

## 2023-10-29 RX ADMIN — SENNOSIDES AND DOCUSATE SODIUM 2 TABLET: 8.6; 5 TABLET ORAL at 22:28

## 2023-10-29 RX ADMIN — ACETAMINOPHEN 650 MG: 325 TABLET ORAL at 11:27

## 2023-10-29 RX ADMIN — IPRATROPIUM BROMIDE AND ALBUTEROL SULFATE 3 ML: 2.5; .5 SOLUTION RESPIRATORY (INHALATION) at 20:30

## 2023-10-29 RX ADMIN — Medication 1 PATCH: at 09:12

## 2023-10-29 RX ADMIN — ASPIRIN 81 MG CHEWABLE TABLET 81 MG: 81 TABLET CHEWABLE at 09:05

## 2023-10-29 RX ADMIN — Medication 10 ML: at 22:30

## 2023-10-29 RX ADMIN — FLUOXETINE 20 MG: 20 CAPSULE ORAL at 09:05

## 2023-10-29 RX ADMIN — QUETIAPINE FUMARATE 50 MG: 25 TABLET ORAL at 22:28

## 2023-10-29 RX ADMIN — INSULIN LISPRO 3 UNITS: 100 INJECTION, SOLUTION INTRAVENOUS; SUBCUTANEOUS at 17:26

## 2023-10-29 RX ADMIN — PANTOPRAZOLE SODIUM 40 MG: 40 INJECTION, POWDER, LYOPHILIZED, FOR SOLUTION INTRAVENOUS at 17:26

## 2023-10-29 RX ADMIN — INSULIN LISPRO 4 UNITS: 100 INJECTION, SOLUTION INTRAVENOUS; SUBCUTANEOUS at 22:29

## 2023-10-29 RX ADMIN — ACETAMINOPHEN 650 MG: 325 TABLET ORAL at 04:40

## 2023-10-29 RX ADMIN — INSULIN DETEMIR 10 UNITS: 100 INJECTION, SOLUTION SUBCUTANEOUS at 22:29

## 2023-10-29 RX ADMIN — DIVALPROEX SODIUM 500 MG: 250 TABLET, EXTENDED RELEASE ORAL at 09:05

## 2023-10-29 RX ADMIN — INSULIN LISPRO 3 UNITS: 100 INJECTION, SOLUTION INTRAVENOUS; SUBCUTANEOUS at 11:27

## 2023-10-29 RX ADMIN — IPRATROPIUM BROMIDE AND ALBUTEROL SULFATE 3 ML: 2.5; .5 SOLUTION RESPIRATORY (INHALATION) at 07:54

## 2023-10-29 RX ADMIN — ATORVASTATIN CALCIUM 10 MG: 10 TABLET, FILM COATED ORAL at 09:05

## 2023-10-29 NOTE — PROGRESS NOTES
"    [facility] Medicine Services  PROGRESS NOTE    Patient Name: Ronnie He  : 1980  MRN: [id number]    Date of Admission: 10/27/2023  Primary Care Physician: Dylon Olmos MD    Subjective   Subjective     CC:  Seizure    HPI:  Patient sitting up in bed in NAD but complaining intermittent headache he states he only got Tylenol for and vomiting right after he eats and no BM.  As provider was walking out of the room, patient asked \"so I get to stay 1 more night?\" KUB ordered for abdominal pain and to check for stool burden.      Objective   Objective     Vital Signs:   Temp:  [98 °F (36.7 °C)-98.3 °F (36.8 °C)] 98 °F (36.7 °C)  Heart Rate:  [] 99  Resp:  [18] 18  BP: (119-141)/(80-86) 130/86     Physical Exam:  Constitutional: Alert, WD, WN male in NAD  Eyes: EOMI, sclerae anicteric, no conjunctival injection  Head: NCAT  ENT: [place], moist mucous membranes   Respiratory: Nonlabored, symmetrical chest expansion, LLL mild wheeze, 94% RA  Cardiovascular: RRR, no M/R/G, +DP pulses bilaterally  Gastrointestinal: Soft, NT, ND +BS  Musculoskeletal: UGARTE; no LE edema bilaterally  Neurologic: Oriented x4, strength symmetric in all extremities, follows all commands, CN II-XII intact, speech clear  Skin: No rashes on exposed skin  Psychiatric: Pleasant and cooperative; normal affect    Results Reviewed:  LAB RESULTS:      Lab 10/29/23  0613 10/28/23  0752 10/27/23  2247 10/27/23  2046 10/27/23  0022 10/26/23  1946 10/26/23  1139   WBC 8.92 10.84*  --  12.94*  --  9.28  --    HEMOGLOBIN 13.5 12.5*  --  13.9  --  13.4*  --    HEMATOCRIT 38.5 35.8*  --  39.8  --  38.2*  --    PLATELETS 197 239  --  272  --  217  --    NEUTROS ABS 4.72 9.43*  --  11.23*  --  6.04  --    IMMATURE GRANS (ABS) 0.08* 0.12*  --  0.09*  --  0.03  --    LYMPHS ABS 3.35* 1.01  --  1.11  --  1.99  --    MONOS ABS 0.71 0.27  --  0.50  --  1.04*  --    EOS ABS 0.02 0.00  --  0.00  --  0.13  --    MCV 85.6 " 86.3  --  85.6  --  85  --    PROCALCITONIN  --   --  <0.02  --   --   --   --    LACTATE  --   --   --  1.8  --   --   --    LACTATE, ARTERIAL  --   --   --   --  3.9*  --   --    PROTIME  --   --   --   --   --   --  9.9   APTT  --   --   --   --   --   --  25.3   D DIMER QUANT  --   --   --  <0.27  --   --   --          Lab 10/29/23  0613 10/28/23  0752 10/27/23  2247 10/27/23  2046 10/27/23  0022 10/26/23  1139   SODIUM 138 134*  --  134*  --   --    POTASSIUM 3.8 4.7  --  4.0 3.8  --    CHLORIDE 105 102  --  102 103  --    CO2 24.0 21.0*  --  21.0*  --   --    ANION GAP 9.0 11.0  --  11.0  --   --    BUN 22* 15  --  14  --   --    CREATININE 0.83 0.71*  --  0.74*  --   --    EGFR 111.4 116.7  --  115.3  --   --    GLUCOSE 140* 351*  --  218* 355*  --    CALCIUM 8.7 8.8  --  9.3  --   --    IONIZED CALCIUM  --   --   --   --  4.5*  --    MAGNESIUM  --  2.0 1.7  --   --  1.7   HEMOGLOBIN A1C  --  8.00*  --   --   --   --          Lab 10/27/23  2046   TOTAL PROTEIN 7.0   ALBUMIN 4.5   GLOBULIN 2.5   ALT (SGPT) 19   AST (SGOT) 14   BILIRUBIN 0.3   ALK PHOS 86   LIPASE 59         Lab 10/27/23  2247 10/27/23  2046 10/26/23  2214 10/26/23  1946 10/26/23  1139   PROBNP  --  658.3*  --   --   --    TROPONIN T  --   --  13  --   --    TROPONIN T (HIGHLY SENSITIVE)  --   --   --  15  --    HSTROP T 13 15*  --   --   --    PROTIME  --   --   --   --  9.9   INR  --   --   --   --  0.90                 Lab 10/27/23  0022   BASE EXCESS ART -4.1*     Brief Urine Lab Results  (Last result in the past 365 days)        Color   Clarity   Blood   Leuk Est   Nitrite   Protein   CREAT   Urine HCG        10/28/23 0039 Yellow   Clear   Negative   Negative   Negative   Trace                   Microbiology Results Abnormal       Procedure Component Value - Date/Time    Blood Culture - Blood, Wrist, Left [[id number]]  (Normal) Collected: 10/27/23 2236    Lab Status: Preliminary result Specimen: Blood from Wrist, Left Updated: 10/29/23 4365      Blood Culture No growth at 24 hours    Blood Culture - Blood, Arm, Right [[id number]]  (Normal) Collected: 10/27/23 2232    Lab Status: Preliminary result Specimen: Blood from Arm, Right Updated: 10/29/23 0915     Blood Culture No growth at 24 hours    COVID PRE-OP / PRE-PROCEDURE SCREENING ORDER (NO ISOLATION) - Swab, Nasopharynx [[id number]]  (Normal) Collected: 10/27/23 2241    Lab Status: Final result Specimen: Swab from Nasopharynx Updated: 10/27/23 2316    Narrative:      The following orders were created for panel order COVID PRE-OP / PRE-PROCEDURE SCREENING ORDER (NO ISOLATION) - Swab, Nasopharynx.  Procedure                               Abnormality         Status                     ---------                               -----------         ------                     COVID-19 and FLU A/B PCR...[[id number]]  Normal              Final result                 Please view results for these tests on the individual orders.    COVID-19 and FLU A/B PCR - Swab, Nasopharynx [[id number]]  (Normal) Collected: 10/27/23 2241    Lab Status: Final result Specimen: Swab from Nasopharynx Updated: 10/27/23 2316     COVID19 Not Detected     Influenza A PCR Not Detected     Influenza B PCR Not Detected    Narrative:      Fact sheet for providers: https://www.fda.gov/media/246742/download    Fact sheet for patients: https://www.fda.gov/media/295504/download    Test performed by PCR.            CT Abdomen Pelvis Without Contrast    Result Date: 10/27/2023  CT ABDOMEN PELVIS WO CONTRAST Date of Exam: 10/27/2023 11:26 PM EDT Indication: Abdominal pain, acute, nonlocalized constipation for 4 days, epigastric pain, vomiting, hematemesis. Comparison: None available. Technique: Axial CT images were obtained of the abdomen and pelvis without the administration of contrast. Reconstructed coronal and sagittal images were also obtained. Automated exposure control and iterative construction methods were used. Findings: There is a small  focus of pneumonia in the left lower lobe posteriorly. The right lung base is clear. Heart size is normal. Distal esophagus is unremarkable. There are no acute findings in the superficial soft tissues. There are no acute osseous abnormalities or destructive bone lesions. There are mild thoracolumbar degenerative changes, most pronounced at the L1-L2 disc. There is a transitional lumbosacral vertebra. The liver is homogeneous. There is gradient attenuation of the gallbladder lumen that may reflect stones or sludge. No evidence of acute cholecystitis. The bile ducts, pancreas, stomach, spleen and adrenal glands appear within normal limits. There is a small exophytic lesion at the inferior aspect of the left kidney measuring 12 mm, which is indeterminate in part due to mild motion and otherwise due to small size and noncontrast technique. The appendix is normal. The colon is normal. Small bowel is nondistended. Aorta is normal diameter. There is no ascites, pneumoperitoneum or lymphadenopathy. The prostate gland is normal size and the urinary bladder is nondistended.     Impression: Impression: 1.Small focus of pneumonia in the left lower lobe. 2.No acute abdominal or pelvic abnormality. 3.12 mm exophytic lesion at the inferior aspect of the left kidney. This is indeterminate in part due to motion and otherwise due to small size and noncontrast technique. Consider 3-month follow-up renal protocol CT or MRI to more definitively evaluate.. Electronically Signed: Yonatan uYn MD  10/27/2023 11:59 PM EDT  Workstation ID: [id number]    XR Chest 1 View    Result Date: 10/27/2023  XR CHEST 1 VW Date of Exam: 10/27/2023 9:40 PM EDT Indication: Chest Pain Triage Protocol Comparison: None available. Findings:  There is no acute infiltrate. There is no large pleural effusion. The cardiac silhouette is unremarkable. The visualized augusto structures demonstrate no abnormality.     Impression: Impression: No acute pulmonary disease.  Electronically Signed: Jose Luis Davis MD  10/27/2023 9:59 PM EDT  Workstation ID: [id number]         Current medications:  Scheduled Meds:aspirin, 81 mg, Oral, Daily  atorvastatin, 10 mg, Oral, Daily  azithromycin, 500 mg, Intravenous, Q24H  cefTRIAXone, 2,000 mg, Intravenous, Q24H  divalproex, 500 mg, Oral, Daily  empagliflozin, 10 mg, Oral, Daily  FLUoxetine, 20 mg, Oral, Daily  insulin detemir, 10 Units, Subcutaneous, Nightly  insulin lispro, 2-7 Units, Subcutaneous, 4x Daily AC & at Bedtime  Insulin Lispro, 3 Units, Subcutaneous, 4x Daily With Meals & Nightly  ipratropium-albuterol, 3 mL, Nebulization, 4x Daily - RT  isosorbide mononitrate, 30 mg, Oral, Daily  nicotine, 1 patch, Transdermal, Q24H  pantoprazole, 40 mg, Intravenous, BID AC  QUEtiapine, 50 mg, Oral, Nightly  senna-docusate sodium, 2 tablet, Oral, BID  sodium chloride, 10 mL, Intravenous, Q12H      Continuous Infusions:   PRN Meds:.  acetaminophen **OR** acetaminophen **OR** acetaminophen    benzonatate    senna-docusate sodium **AND** polyethylene glycol **AND** bisacodyl **AND** bisacodyl    dextrose    dextrose    glucagon (human recombinant)    ondansetron **OR** ondansetron    sodium chloride    sodium chloride    sodium chloride    Assessment & Plan   Assessment & Plan     Active Hospital Problems    Diagnosis  POA    **Community acquired pneumonia of left lower lobe of lung [J18.9]  Yes    Other constipation [K59.09]  Yes    Conversion disorder with attacks or seizures [F44.5]  Yes    Major depressive disorder, recurrent, severe with psychotic features [F33.3]  Yes    History of alcoholism [F10.21]  Not Applicable    Non-compliant patient [Z91.199]  Not Applicable    COPD (chronic obstructive pulmonary disease) [J44.9]  Yes    Hypertension associated with diabetes [E11.59, I15.2]  Yes    Diabetes mellitus type 2 in obese [E11.69, E66.9]  Yes    Personality disorder, unspecified [F60.9]  Yes      Resolved Hospital Problems   No resolved  problems to display.        Brief Hospital Course to date:  Ronnie He is a 42 y.o. male with PMH of HTN, HLD, DM2, ETOH/polysubstance abuse in remission, COPD and seizure disorder presents to the ED for shortness of breath.     These problems are new to me today    This patient's problems and plans were partially entered by my partner and updated as appropriate by me 10/29/23.     LLL Pneumonia  COPD  -CT chest shows LLL pneumonia  -Rocephin and azithro  -Duonebs  --AM labs unremarkable     Hematemesis - no further episodes  --Hgb 13.5  --Likely Ada Chairez tear  -continue protonix IV BID, no witnessed episodes here. If reoccurs will have GI evaluate     DM2 w/A1c 8  --24H glucose 129-351  -- Basal, SSI; adjust as needed  -- Started preprandial 10/29     HTN  HLD  -Continue home aspirin, statin, lisinopril      Seizure Disorder  -Continue home depakote  -Seizure precautions      Hx of Polysubstance Abuse  Hx of ETOH  -Patient reports being sober 3mo, 25 days  -UDS+ for opiates, received IV morphine at [place] 10/26    Constipation  -- Stat KUB ordered  -- If KUB moderate to large stool burden, will increase bowel meds    Expected Discharge Location and Transportation:   Expected Discharge   Expected Discharge Date: 10/30/2023; Expected Discharge Time:      DVT prophylaxis:  Mechanical DVT prophylaxis orders are present.     AM-PAC 6 Clicks Score (PT): 24 (10/29/23 0800)    CODE STATUS:   Code Status and Medical Interventions:   Ordered at: 10/28/23 0308     Code Status (Patient has no pulse and is not breathing):    CPR (Attempt to Resuscitate)     Medical Interventions (Patient has pulse or is breathing):    Full Support       Soo Laguerre, APRN  10/29/23

## 2023-10-30 ENCOUNTER — APPOINTMENT (OUTPATIENT)
Dept: MRI IMAGING | Facility: HOSPITAL | Age: 43
End: 2023-10-30
Payer: MEDICARE

## 2023-10-30 ENCOUNTER — APPOINTMENT (OUTPATIENT)
Dept: CT IMAGING | Facility: HOSPITAL | Age: 43
End: 2023-10-30
Payer: MEDICARE

## 2023-10-30 LAB
GLUCOSE BLDC GLUCOMTR-MCNC: 151 MG/DL (ref 70–130)
GLUCOSE BLDC GLUCOMTR-MCNC: 166 MG/DL (ref 70–130)
GLUCOSE BLDC GLUCOMTR-MCNC: 206 MG/DL (ref 70–130)
GLUCOSE BLDC GLUCOMTR-MCNC: 246 MG/DL (ref 70–130)
GLUCOSE BLDC GLUCOMTR-MCNC: 247 MG/DL (ref 70–130)
MAGNESIUM SERPL-MCNC: 2.2 MG/DL (ref 1.6–2.6)

## 2023-10-30 PROCEDURE — 82948 REAGENT STRIP/BLOOD GLUCOSE: CPT

## 2023-10-30 PROCEDURE — G0378 HOSPITAL OBSERVATION PER HR: HCPCS

## 2023-10-30 PROCEDURE — 25010000002 CEFTRIAXONE PER 250 MG

## 2023-10-30 PROCEDURE — 94664 DEMO&/EVAL PT USE INHALER: CPT

## 2023-10-30 PROCEDURE — 63710000001 INSULIN LISPRO (HUMAN) PER 5 UNITS: Performed by: NURSE PRACTITIONER

## 2023-10-30 PROCEDURE — 96376 TX/PRO/DX INJ SAME DRUG ADON: CPT

## 2023-10-30 PROCEDURE — 63710000001 INSULIN DETEMIR PER 5 UNITS: Performed by: NURSE PRACTITIONER

## 2023-10-30 PROCEDURE — 96375 TX/PRO/DX INJ NEW DRUG ADDON: CPT

## 2023-10-30 PROCEDURE — 25010000002 AZITHROMYCIN PER 500 MG

## 2023-10-30 PROCEDURE — 94799 UNLISTED PULMONARY SVC/PX: CPT

## 2023-10-30 PROCEDURE — 94761 N-INVAS EAR/PLS OXIMETRY MLT: CPT

## 2023-10-30 PROCEDURE — 83735 ASSAY OF MAGNESIUM: CPT | Performed by: NURSE PRACTITIONER

## 2023-10-30 PROCEDURE — 63710000001 INSULIN LISPRO (HUMAN) PER 5 UNITS

## 2023-10-30 PROCEDURE — 25810000003 SODIUM CHLORIDE 0.9 % SOLUTION 250 ML FLEX CONT

## 2023-10-30 PROCEDURE — 82607 VITAMIN B-12: CPT | Performed by: NURSE PRACTITIONER

## 2023-10-30 PROCEDURE — 25010000002 KETOROLAC TROMETHAMINE PER 15 MG: Performed by: NURSE PRACTITIONER

## 2023-10-30 PROCEDURE — 70551 MRI BRAIN STEM W/O DYE: CPT

## 2023-10-30 PROCEDURE — 63710000001 ONDANSETRON PER 8 MG

## 2023-10-30 PROCEDURE — 82306 VITAMIN D 25 HYDROXY: CPT | Performed by: NURSE PRACTITIONER

## 2023-10-30 PROCEDURE — 72125 CT NECK SPINE W/O DYE: CPT

## 2023-10-30 PROCEDURE — 25010000002 DIPHENHYDRAMINE PER 50 MG: Performed by: NURSE PRACTITIONER

## 2023-10-30 PROCEDURE — 82746 ASSAY OF FOLIC ACID SERUM: CPT | Performed by: NURSE PRACTITIONER

## 2023-10-30 PROCEDURE — 25010000002 PROCHLORPERAZINE 10 MG/2ML SOLUTION: Performed by: NURSE PRACTITIONER

## 2023-10-30 PROCEDURE — 70450 CT HEAD/BRAIN W/O DYE: CPT

## 2023-10-30 RX ORDER — KETOROLAC TROMETHAMINE 15 MG/ML
15 INJECTION, SOLUTION INTRAMUSCULAR; INTRAVENOUS ONCE
Status: COMPLETED | OUTPATIENT
Start: 2023-10-30 | End: 2023-10-30

## 2023-10-30 RX ORDER — LORAZEPAM 1 MG/1
1 TABLET ORAL ONCE
Status: COMPLETED | OUTPATIENT
Start: 2023-10-30 | End: 2023-10-30

## 2023-10-30 RX ORDER — PROCHLORPERAZINE EDISYLATE 5 MG/ML
10 INJECTION INTRAMUSCULAR; INTRAVENOUS ONCE
Status: COMPLETED | OUTPATIENT
Start: 2023-10-30 | End: 2023-10-30

## 2023-10-30 RX ORDER — AMOXICILLIN 250 MG
2 CAPSULE ORAL 2 TIMES DAILY
Status: DISCONTINUED | OUTPATIENT
Start: 2023-10-30 | End: 2023-11-03 | Stop reason: HOSPADM

## 2023-10-30 RX ORDER — IPRATROPIUM BROMIDE AND ALBUTEROL SULFATE 2.5; .5 MG/3ML; MG/3ML
3 SOLUTION RESPIRATORY (INHALATION)
Status: DISCONTINUED | OUTPATIENT
Start: 2023-10-30 | End: 2023-11-02

## 2023-10-30 RX ORDER — INSULIN LISPRO 100 [IU]/ML
5 INJECTION, SOLUTION INTRAVENOUS; SUBCUTANEOUS
Status: DISCONTINUED | OUTPATIENT
Start: 2023-10-30 | End: 2023-11-01

## 2023-10-30 RX ORDER — DIPHENHYDRAMINE HYDROCHLORIDE 50 MG/ML
25 INJECTION INTRAMUSCULAR; INTRAVENOUS EVERY 6 HOURS PRN
Status: DISCONTINUED | OUTPATIENT
Start: 2023-10-30 | End: 2023-11-03 | Stop reason: HOSPADM

## 2023-10-30 RX ORDER — BISACODYL 5 MG/1
5 TABLET, DELAYED RELEASE ORAL DAILY PRN
Status: DISCONTINUED | OUTPATIENT
Start: 2023-10-30 | End: 2023-11-03 | Stop reason: HOSPADM

## 2023-10-30 RX ORDER — POLYETHYLENE GLYCOL 3350 17 G/17G
17 POWDER, FOR SOLUTION ORAL DAILY
Status: DISCONTINUED | OUTPATIENT
Start: 2023-10-30 | End: 2023-11-03 | Stop reason: HOSPADM

## 2023-10-30 RX ORDER — BISACODYL 10 MG
10 SUPPOSITORY, RECTAL RECTAL DAILY PRN
Status: DISCONTINUED | OUTPATIENT
Start: 2023-10-30 | End: 2023-11-03 | Stop reason: HOSPADM

## 2023-10-30 RX ORDER — IPRATROPIUM BROMIDE AND ALBUTEROL SULFATE 2.5; .5 MG/3ML; MG/3ML
3 SOLUTION RESPIRATORY (INHALATION) EVERY 4 HOURS PRN
Status: DISCONTINUED | OUTPATIENT
Start: 2023-10-30 | End: 2023-10-30

## 2023-10-30 RX ORDER — LACTULOSE 10 G/15ML
10 SOLUTION ORAL ONCE
Status: DISCONTINUED | OUTPATIENT
Start: 2023-10-30 | End: 2023-10-31

## 2023-10-30 RX ADMIN — ACETAMINOPHEN 650 MG: 325 TABLET ORAL at 05:33

## 2023-10-30 RX ADMIN — INSULIN LISPRO 5 UNITS: 100 INJECTION, SOLUTION INTRAVENOUS; SUBCUTANEOUS at 17:43

## 2023-10-30 RX ADMIN — ONDANSETRON HYDROCHLORIDE 4 MG: 4 TABLET, FILM COATED ORAL at 09:55

## 2023-10-30 RX ADMIN — INSULIN LISPRO 5 UNITS: 100 INJECTION, SOLUTION INTRAVENOUS; SUBCUTANEOUS at 14:02

## 2023-10-30 RX ADMIN — INSULIN LISPRO 3 UNITS: 100 INJECTION, SOLUTION INTRAVENOUS; SUBCUTANEOUS at 09:55

## 2023-10-30 RX ADMIN — CEFTRIAXONE 2000 MG: 2 INJECTION, POWDER, FOR SOLUTION INTRAMUSCULAR; INTRAVENOUS at 20:43

## 2023-10-30 RX ADMIN — INSULIN LISPRO 2 UNITS: 100 INJECTION, SOLUTION INTRAVENOUS; SUBCUTANEOUS at 14:02

## 2023-10-30 RX ADMIN — ATORVASTATIN CALCIUM 10 MG: 10 TABLET, FILM COATED ORAL at 09:55

## 2023-10-30 RX ADMIN — PANTOPRAZOLE SODIUM 40 MG: 40 INJECTION, POWDER, LYOPHILIZED, FOR SOLUTION INTRAVENOUS at 17:43

## 2023-10-30 RX ADMIN — PANTOPRAZOLE SODIUM 40 MG: 40 INJECTION, POWDER, LYOPHILIZED, FOR SOLUTION INTRAVENOUS at 09:54

## 2023-10-30 RX ADMIN — AZITHROMYCIN 500 MG: 500 INJECTION, POWDER, LYOPHILIZED, FOR SOLUTION INTRAVENOUS at 20:42

## 2023-10-30 RX ADMIN — Medication 1 PATCH: at 09:54

## 2023-10-30 RX ADMIN — DIVALPROEX SODIUM 500 MG: 250 TABLET, EXTENDED RELEASE ORAL at 09:55

## 2023-10-30 RX ADMIN — Medication 10 ML: at 09:53

## 2023-10-30 RX ADMIN — ACETAMINOPHEN 650 MG: 325 TABLET ORAL at 21:45

## 2023-10-30 RX ADMIN — POLYETHYLENE GLYCOL 3350 17 G: 17 POWDER, FOR SOLUTION ORAL at 13:50

## 2023-10-30 RX ADMIN — SENNOSIDES AND DOCUSATE SODIUM 2 TABLET: 8.6; 5 TABLET ORAL at 09:55

## 2023-10-30 RX ADMIN — PROCHLORPERAZINE EDISYLATE 10 MG: 5 INJECTION INTRAMUSCULAR; INTRAVENOUS at 14:52

## 2023-10-30 RX ADMIN — INSULIN LISPRO 3 UNITS: 100 INJECTION, SOLUTION INTRAVENOUS; SUBCUTANEOUS at 09:56

## 2023-10-30 RX ADMIN — FLUOXETINE 20 MG: 20 CAPSULE ORAL at 09:55

## 2023-10-30 RX ADMIN — IPRATROPIUM BROMIDE AND ALBUTEROL SULFATE 3 ML: 2.5; .5 SOLUTION RESPIRATORY (INHALATION) at 20:36

## 2023-10-30 RX ADMIN — KETOROLAC TROMETHAMINE 15 MG: 15 INJECTION, SOLUTION INTRAMUSCULAR; INTRAVENOUS at 13:50

## 2023-10-30 RX ADMIN — ACETAMINOPHEN 650 MG: 325 TABLET ORAL at 16:10

## 2023-10-30 RX ADMIN — IPRATROPIUM BROMIDE AND ALBUTEROL SULFATE 3 ML: 2.5; .5 SOLUTION RESPIRATORY (INHALATION) at 14:07

## 2023-10-30 RX ADMIN — ASPIRIN 81 MG CHEWABLE TABLET 81 MG: 81 TABLET CHEWABLE at 09:54

## 2023-10-30 RX ADMIN — INSULIN DETEMIR 15 UNITS: 100 INJECTION, SOLUTION SUBCUTANEOUS at 21:45

## 2023-10-30 RX ADMIN — QUETIAPINE FUMARATE 50 MG: 25 TABLET ORAL at 20:44

## 2023-10-30 RX ADMIN — ONDANSETRON HYDROCHLORIDE 4 MG: 4 TABLET, FILM COATED ORAL at 16:09

## 2023-10-30 RX ADMIN — EMPAGLIFLOZIN 10 MG: 10 TABLET, FILM COATED ORAL at 09:55

## 2023-10-30 RX ADMIN — LORAZEPAM 1 MG: 1 TABLET ORAL at 18:52

## 2023-10-30 RX ADMIN — INSULIN LISPRO 3 UNITS: 100 INJECTION, SOLUTION INTRAVENOUS; SUBCUTANEOUS at 17:43

## 2023-10-30 RX ADMIN — DIPHENHYDRAMINE HYDROCHLORIDE 25 MG: 50 INJECTION INTRAMUSCULAR; INTRAVENOUS at 14:52

## 2023-10-30 RX ADMIN — ISOSORBIDE MONONITRATE 30 MG: 30 TABLET, EXTENDED RELEASE ORAL at 09:54

## 2023-10-30 RX ADMIN — INSULIN LISPRO 3 UNITS: 100 INJECTION, SOLUTION INTRAVENOUS; SUBCUTANEOUS at 21:45

## 2023-10-30 RX ADMIN — ACETAMINOPHEN 650 MG: 325 TABLET ORAL at 10:08

## 2023-10-30 RX ADMIN — SENNOSIDES AND DOCUSATE SODIUM 2 TABLET: 8.6; 5 TABLET ORAL at 20:42

## 2023-10-30 NOTE — CASE MANAGEMENT/SOCIAL WORK
Continued Stay Note   [place]     Patient Name: Ronnie He  MRN: [id number]  Today's Date: 10/30/2023    Admit Date: 10/27/2023    Plan: Patient wants a skilled nursing home   Discharge Plan       Row Name 10/30/23 1538       Plan    Plan Patient wants a skilled nursing home    Plan Comments I spoke with patient in regards to discharge planning. He tells me he is residing at [facility]. He would like a skilled nursing home. Physical therapy is ordered to see if indicated. He has [employer] Medicare. His PCP myrtle Olmos. He has no advanced directives.    Final Discharge Disposition Code 30 - still a patient                   Discharge Codes    No documentation.                 Expected Discharge Date and Time       Expected Discharge Date Expected Discharge Time    Oct 31, 2023               Radha Santillan RN

## 2023-10-30 NOTE — PROGRESS NOTES
[facility] Medicine Services  PROGRESS NOTE    Patient Name: Ronnie He  : 1980  MRN: [id number]    Date of Admission: 10/27/2023  Primary Care Physician: Dylon Olmos MD    Subjective   Subjective     CC:  Seizure    HPI:  Patient patient seen resting up in bed awake and alert.  Complaining of nausea, shortness of air and severe constipation.  Reports last BM was 6 days ago.  Nurse reports patient complaining of headache 9/10 scale however he does not mention it to me on exam.  Complaining of mild shortness of air with exertion and occasional cough.  Nurse reports he was very irritable and angry with her this morning that she was doing a neuro assessment and attempting to get him his morning medicines as scheduled.  He has since apologized.  States he feels bad still and is definitely not ready to go home today.      Objective   Objective     Vital Signs:   Temp:  [97.6 °F (36.4 °C)-98.4 °F (36.9 °C)] 98 °F (36.7 °C)  Heart Rate:  [] 116  Resp:  [18] 18  BP: (111-144)/(70-96) 118/71     Physical Exam:  Constitutional: awake, alert.  Resting up in bed in no acute distress.  HENT: NCAT, mucous membranes moist  Respiratory: Clear to auscultation bilaterally but decreased at left base, respiratory effort normal on room air.  No current crackles or wheezes appreciated.  Sats WNL on room air.  Cardiovascular: RRR, no murmurs, rubs, or gallops  Gastrointestinal: Positive bowel sounds, soft, nontender, nondistended.  Obese abdomen.  Musculoskeletal: No bilateral ankle edema.  Ascencio spontaneously.  Psychiatric: Appropriate affect, cooperative  Neurologic: Oriented x 3, strength symmetric in all extremities, Cranial Nerves grossly intact to confrontation, speech clear.  Follows commands.  Skin: No rashes      Results Reviewed:  LAB RESULTS:      Lab 10/29/23  0613 10/28/23  0752 10/27/23  2247 10/27/23  2046 10/27/23  0022 10/26/23  1946 10/26/23  1139   WBC 8.92  10.84*  --  12.94*  --  9.28  --    HEMOGLOBIN 13.5 12.5*  --  13.9  --  13.4*  --    HEMATOCRIT 38.5 35.8*  --  39.8  --  38.2*  --    PLATELETS 197 239  --  272  --  217  --    NEUTROS ABS 4.72 9.43*  --  11.23*  --  6.04  --    IMMATURE GRANS (ABS) 0.08* 0.12*  --  0.09*  --  0.03  --    LYMPHS ABS 3.35* 1.01  --  1.11  --  1.99  --    MONOS ABS 0.71 0.27  --  0.50  --  1.04*  --    EOS ABS 0.02 0.00  --  0.00  --  0.13  --    MCV 85.6 86.3  --  85.6  --  85  --    PROCALCITONIN  --   --  <0.02  --   --   --   --    LACTATE  --   --   --  1.8  --   --   --    LACTATE, ARTERIAL  --   --   --   --  3.9*  --   --    PROTIME  --   --   --   --   --   --  9.9   APTT  --   --   --   --   --   --  25.3   D DIMER QUANT  --   --   --  <0.27  --   --   --          Lab 10/29/23  0613 10/28/23  0752 10/27/23  2247 10/27/23  2046 10/27/23  0022 10/26/23  1139   SODIUM 138 134*  --  134*  --   --    POTASSIUM 3.8 4.7  --  4.0 3.8  --    CHLORIDE 105 102  --  102 103  --    CO2 24.0 21.0*  --  21.0*  --   --    ANION GAP 9.0 11.0  --  11.0  --   --    BUN 22* 15  --  14  --   --    CREATININE 0.83 0.71*  --  0.74*  --   --    EGFR 111.4 116.7  --  115.3  --   --    GLUCOSE 140* 351*  --  218* 355*  --    CALCIUM 8.7 8.8  --  9.3  --   --    IONIZED CALCIUM  --   --   --   --  4.5*  --    MAGNESIUM  --  2.0 1.7  --   --  1.7   HEMOGLOBIN A1C  --  8.00*  --   --   --   --          Lab 10/27/23  2046   TOTAL PROTEIN 7.0   ALBUMIN 4.5   GLOBULIN 2.5   ALT (SGPT) 19   AST (SGOT) 14   BILIRUBIN 0.3   ALK PHOS 86   LIPASE 59         Lab 10/27/23  2247 10/27/23  2046 10/26/23  2214 10/26/23  1946 10/26/23  1139   PROBNP  --  658.3*  --   --   --    TROPONIN T  --   --  13  --   --    TROPONIN T (HIGHLY SENSITIVE)  --   --   --  15  --    HSTROP T 13 15*  --   --   --    PROTIME  --   --   --   --  9.9   INR  --   --   --   --  0.90                 Lab 10/27/23  0022   BASE EXCESS ART -4.1*     Brief Urine Lab Results  (Last result in  the past 365 days)        Color   Clarity   Blood   Leuk Est   Nitrite   Protein   CREAT   Urine HCG        10/28/23 0039 Yellow   Clear   Negative   Negative   Negative   Trace                   Microbiology Results Abnormal       Procedure Component Value - Date/Time    Blood Culture - Blood, Wrist, Left [[id number]]  (Normal) Collected: 10/27/23 2239    Lab Status: Preliminary result Specimen: Blood from Wrist, Left Updated: 10/30/23 0915     Blood Culture No growth at 2 days    Blood Culture - Blood, Arm, Right [[id number]]  (Normal) Collected: 10/27/23 2232    Lab Status: Preliminary result Specimen: Blood from Arm, Right Updated: 10/30/23 0915     Blood Culture No growth at 2 days    COVID PRE-OP / PRE-PROCEDURE SCREENING ORDER (NO ISOLATION) - Swab, Nasopharynx [[id number]]  (Normal) Collected: 10/27/23 2241    Lab Status: Final result Specimen: Swab from Nasopharynx Updated: 10/27/23 2316    Narrative:      The following orders were created for panel order COVID PRE-OP / PRE-PROCEDURE SCREENING ORDER (NO ISOLATION) - Swab, Nasopharynx.  Procedure                               Abnormality         Status                     ---------                               -----------         ------                     COVID-19 and FLU A/B PCR...[[id number]]  Normal              Final result                 Please view results for these tests on the individual orders.    COVID-19 and FLU A/B PCR - Swab, Nasopharynx [[id number]]  (Normal) Collected: 10/27/23 2241    Lab Status: Final result Specimen: Swab from Nasopharynx Updated: 10/27/23 2316     COVID19 Not Detected     Influenza A PCR Not Detected     Influenza B PCR Not Detected    Narrative:      Fact sheet for providers: https://www.fda.gov/media/920644/download    Fact sheet for patients: https://www.fda.gov/media/909963/download    Test performed by PCR.            CT Head Without Contrast    Result Date: 10/30/2023  CT HEAD WO CONTRAST, CT CERVICAL SPINE WO CONTRAST  Date of Exam: 10/30/2023 12:51 PM EDT Indication: LUE tingling. Comparison: MRI 9/16/2023. Technique: Axial CT images were obtained of the head and cervical spine without contrast administration.  Automated exposure control and iterative construction methods were used. Findings: CT head: Postoperative changes are present with the appearance of likely prior Chiari decompression with suboccipital craniectomy and posterior C1 arch resection noted. There is no evidence of intracranial hemorrhage, mass or mass effect. The ventricles are normal in size and configuration. The orbits are normal. The paranasal sinuses appear clear. The calvarium is otherwise intact. CT cervical spine: Posterior C1 arch resection noted as above. Cortical margins are intact without evidence of acute fracture. There is straightening of the usual cervical lordosis, otherwise without listhesis or subluxation. The paraspinal soft tissues demonstrate no acute or suspicious findings. The lung apices are clear. Multilevel spondylosis is present, with some areas of disc osteophyte complex formation and facet arthropathy, limited in characterization on CT but appearing likely most advanced at  C3-4 and C4-5, with some at least moderate associated spinal canal and neuroforaminal narrowing present.     Impression: Impression: No acute intracranial abnormality. Redemonstrated postoperative changes from presumed prior Chiari decompression. No acute findings in the cervical spine. Multilevel cervical spondylosis is evident, appearing most advanced at C3-4 and C4-5. Electronically Signed: Jakob Perkins MD  10/30/2023 1:17 PM EDT  Workstation ID: [id number]    CT Cervical Spine Without Contrast    Result Date: 10/30/2023  CT HEAD WO CONTRAST, CT CERVICAL SPINE WO CONTRAST Date of Exam: 10/30/2023 12:51 PM EDT Indication: LUE tingling. Comparison: MRI 9/16/2023. Technique: Axial CT images were obtained of the head and cervical spine without contrast  administration.  Automated exposure control and iterative construction methods were used. Findings: CT head: Postoperative changes are present with the appearance of likely prior Chiari decompression with suboccipital craniectomy and posterior C1 arch resection noted. There is no evidence of intracranial hemorrhage, mass or mass effect. The ventricles are normal in size and configuration. The orbits are normal. The paranasal sinuses appear clear. The calvarium is otherwise intact. CT cervical spine: Posterior C1 arch resection noted as above. Cortical margins are intact without evidence of acute fracture. There is straightening of the usual cervical lordosis, otherwise without listhesis or subluxation. The paraspinal soft tissues demonstrate no acute or suspicious findings. The lung apices are clear. Multilevel spondylosis is present, with some areas of disc osteophyte complex formation and facet arthropathy, limited in characterization on CT but appearing likely most advanced at  C3-4 and C4-5, with some at least moderate associated spinal canal and neuroforaminal narrowing present.     Impression: Impression: No acute intracranial abnormality. Redemonstrated postoperative changes from presumed prior Chiari decompression. No acute findings in the cervical spine. Multilevel cervical spondylosis is evident, appearing most advanced at C3-4 and C4-5. Electronically Signed: Jakob Perkins MD  10/30/2023 1:17 PM EDT  Workstation ID: [id number]    XR Abdomen KUB    Result Date: 10/29/2023  XR ABDOMEN KUB Date of Exam: 10/29/2023 12:51 PM EDT Indication: No BM and vomiting Comparison: CT October 27, 2023 Findings: There appears to be a large amount of formed stool in the colon. No definite small bowel dilatation is seen. There is mild distention of the stomach. No ectopic bowel gas is identified on these images. Probable phleboliths are noted in the pelvis.     Impression: Impression: 1.Large amount of formed stool in the  colon suspicious for constipation. 2.No definite small bowel dilatation. Electronically Signed: Bartolome Jeff  10/29/2023 1:08 PM EDT  Workstation ID: [id number]         Current medications:  Scheduled Meds:aspirin, 81 mg, Oral, Daily  atorvastatin, 10 mg, Oral, Daily  azithromycin, 500 mg, Intravenous, Q24H  cefTRIAXone, 2,000 mg, Intravenous, Q24H  divalproex, 500 mg, Oral, Daily  empagliflozin, 10 mg, Oral, Daily  FLUoxetine, 20 mg, Oral, Daily  insulin detemir, 15 Units, Subcutaneous, Nightly  insulin lispro, 2-7 Units, Subcutaneous, 4x Daily AC & at Bedtime  Insulin Lispro, 5 Units, Subcutaneous, 4x Daily With Meals & Nightly  isosorbide mononitrate, 30 mg, Oral, Daily  lactulose, 10 g, Oral, Once  nicotine, 1 patch, Transdermal, Q24H  pantoprazole, 40 mg, Intravenous, BID AC  senna-docusate sodium, 2 tablet, Oral, BID   And  polyethylene glycol, 17 g, Oral, Daily  QUEtiapine, 50 mg, Oral, Nightly  sodium chloride, 10 mL, Intravenous, Q12H      Continuous Infusions:   PRN Meds:.  acetaminophen **OR** acetaminophen **OR** acetaminophen    benzonatate    senna-docusate sodium **AND** polyethylene glycol **AND** bisacodyl **AND** bisacodyl    dextrose    dextrose    glucagon (human recombinant)    ipratropium-albuterol    ondansetron **OR** ondansetron    sodium chloride    sodium chloride    sodium chloride    Assessment & Plan   Assessment & Plan     Active Hospital Problems    Diagnosis  POA    **Community acquired pneumonia of left lower lobe of lung [J18.9]  Yes    Other constipation [K59.09]  Yes    Conversion disorder with attacks or seizures [F44.5]  Yes    Major depressive disorder, recurrent, severe with psychotic features [F33.3]  Yes    History of alcoholism [F10.21]  Not Applicable    Non-compliant patient [Z91.199]  Not Applicable    COPD (chronic obstructive pulmonary disease) [J44.9]  Yes    Hypertension associated with diabetes [E11.59, I15.2]  Yes    Diabetes mellitus type 2 in obese [E11.69, E66.9]   Yes    Personality disorder, unspecified [F60.9]  Yes      Resolved Hospital Problems   No resolved problems to display.        Brief Hospital Course to date:  Ronnie He is a 42 y.o. male with PMH of HTN, HLD, DM2, ETOH/polysubstance abuse in remission, COPD and seizure disorder presents to the ED for shortness of breath.     This patient's problems and plans were partially entered by my partner and updated as appropriate by me 10/30/23.    Assessment/Plan:  Pt is new to me today      LLL Pneumonia  COPD  -CT chest shows LLL pneumonia  -Rocephin and azithro  -Duonebs scheduled.  Encourage ambulation and pulmonary toileting.  -- No new labs today.  Improving.  Still complaining of very minimal shortness of breath with exertion.  Occasional cough.     Hematemesis - no further episodes  --Hgb 13.5  --Likely Ada Chairez tear  -continue protonix IV BID, no witnessed episodes here. If reoccurs will have GI evaluate.  No further.  Stable.  Monitor.     DM2 w/A1c 8  --24H glucose 129-252  -- Basal, SSI; adjust as needed  -- Started preprandial 10/29.  Glucose still running high.  We will slightly increase basal/bolus regimen.     HTN  HLD  -Continue home aspirin, statin, lisinopril   --Stable     Seizure Disorder  -Continue home depakote  -Seizure precautions      Hx of Polysubstance Abuse  Hx of ETOH  -Patient reports being sober 3mo, 25 days  -UDS+ for opiates, received IV morphine at [place] [place] 10/26  Addendum: Patient complaining of persistent left upper extremity hand tingling for several days.  Also complains of intermittent left lower extremity tingling.  History of cervical and lumbar spine issues.  Otherwise stable.  We will check CT of the head and neck without contrast.  -- Asking for medicine for headache.  No narcotics.  We will give one-time dose of Toradol after CT scan.  (CTs completed.  No acute finding).  Symptoms improved.    Constipation  -- Stat KUB ordered yesterday 10/29.  -- KUB noted large  stool burden, will increase bowel meds today and give one-time dose of lactulose.    Expected Discharge Location and Transportation:   Expected Discharge   Expected Discharge Date: 10/31/2023; Expected Discharge Time:      DVT prophylaxis:  Mechanical DVT prophylaxis orders are present.     AM-PAC 6 Clicks Score (PT): 22 (10/30/23 0915)    CODE STATUS:   Code Status and Medical Interventions:   Ordered at: 10/28/23 0308     Code Status (Patient has no pulse and is not breathing):    CPR (Attempt to Resuscitate)     Medical Interventions (Patient has pulse or is breathing):    Full Support       Rosanne Ross, APRN  10/30/23

## 2023-10-31 LAB
25(OH)D3 SERPL-MCNC: 21.6 NG/ML (ref 30–100)
FOLATE SERPL-MCNC: 11.8 NG/ML (ref 4.78–24.2)
GLUCOSE BLDC GLUCOMTR-MCNC: 160 MG/DL (ref 70–130)
GLUCOSE BLDC GLUCOMTR-MCNC: 182 MG/DL (ref 70–130)
GLUCOSE BLDC GLUCOMTR-MCNC: 227 MG/DL (ref 70–130)
GLUCOSE BLDC GLUCOMTR-MCNC: 236 MG/DL (ref 70–130)
GLUCOSE BLDC GLUCOMTR-MCNC: 237 MG/DL (ref 70–130)
VIT B12 BLD-MCNC: 671 PG/ML (ref 211–946)

## 2023-10-31 PROCEDURE — 63710000001 INSULIN LISPRO (HUMAN) PER 5 UNITS: Performed by: NURSE PRACTITIONER

## 2023-10-31 PROCEDURE — 63710000001 INSULIN DETEMIR PER 5 UNITS: Performed by: NURSE PRACTITIONER

## 2023-10-31 PROCEDURE — G0378 HOSPITAL OBSERVATION PER HR: HCPCS

## 2023-10-31 PROCEDURE — 82948 REAGENT STRIP/BLOOD GLUCOSE: CPT

## 2023-10-31 PROCEDURE — 94761 N-INVAS EAR/PLS OXIMETRY MLT: CPT

## 2023-10-31 PROCEDURE — 97161 PT EVAL LOW COMPLEX 20 MIN: CPT

## 2023-10-31 PROCEDURE — 25010000002 CEFTRIAXONE PER 250 MG

## 2023-10-31 PROCEDURE — 97110 THERAPEUTIC EXERCISES: CPT

## 2023-10-31 PROCEDURE — 94799 UNLISTED PULMONARY SVC/PX: CPT

## 2023-10-31 PROCEDURE — 94664 DEMO&/EVAL PT USE INHALER: CPT

## 2023-10-31 PROCEDURE — 96376 TX/PRO/DX INJ SAME DRUG ADON: CPT

## 2023-10-31 PROCEDURE — 25010000002 ONDANSETRON PER 1 MG

## 2023-10-31 PROCEDURE — 63710000001 INSULIN LISPRO (HUMAN) PER 5 UNITS

## 2023-10-31 RX ORDER — MAGNESIUM CARB/ALUMINUM HYDROX 105-160MG
296 TABLET,CHEWABLE ORAL ONCE
Status: DISCONTINUED | OUTPATIENT
Start: 2023-10-31 | End: 2023-10-31 | Stop reason: RX

## 2023-10-31 RX ORDER — LACTULOSE 10 G/15ML
10 SOLUTION ORAL 2 TIMES DAILY
Status: DISCONTINUED | OUTPATIENT
Start: 2023-10-31 | End: 2023-10-31

## 2023-10-31 RX ADMIN — INSULIN DETEMIR 15 UNITS: 100 INJECTION, SOLUTION SUBCUTANEOUS at 21:45

## 2023-10-31 RX ADMIN — INSULIN LISPRO 3 UNITS: 100 INJECTION, SOLUTION INTRAVENOUS; SUBCUTANEOUS at 09:06

## 2023-10-31 RX ADMIN — Medication 1 PATCH: at 09:27

## 2023-10-31 RX ADMIN — INSULIN LISPRO 2 UNITS: 100 INJECTION, SOLUTION INTRAVENOUS; SUBCUTANEOUS at 21:45

## 2023-10-31 RX ADMIN — ATORVASTATIN CALCIUM 10 MG: 10 TABLET, FILM COATED ORAL at 09:04

## 2023-10-31 RX ADMIN — ACETAMINOPHEN, ASPIRIN, CAFFEINE 1 TABLET: 250; 65; 250 TABLET, FILM COATED ORAL at 19:15

## 2023-10-31 RX ADMIN — ONDANSETRON 4 MG: 2 INJECTION INTRAMUSCULAR; INTRAVENOUS at 11:48

## 2023-10-31 RX ADMIN — INSULIN LISPRO 5 UNITS: 100 INJECTION, SOLUTION INTRAVENOUS; SUBCUTANEOUS at 11:34

## 2023-10-31 RX ADMIN — IPRATROPIUM BROMIDE AND ALBUTEROL SULFATE 3 ML: 2.5; .5 SOLUTION RESPIRATORY (INHALATION) at 13:10

## 2023-10-31 RX ADMIN — SENNOSIDES AND DOCUSATE SODIUM 2 TABLET: 8.6; 5 TABLET ORAL at 20:53

## 2023-10-31 RX ADMIN — PANTOPRAZOLE SODIUM 40 MG: 40 INJECTION, POWDER, LYOPHILIZED, FOR SOLUTION INTRAVENOUS at 09:05

## 2023-10-31 RX ADMIN — SENNOSIDES AND DOCUSATE SODIUM 2 TABLET: 8.6; 5 TABLET ORAL at 09:06

## 2023-10-31 RX ADMIN — ACETAMINOPHEN 650 MG: 325 TABLET ORAL at 07:46

## 2023-10-31 RX ADMIN — DIVALPROEX SODIUM 500 MG: 250 TABLET, EXTENDED RELEASE ORAL at 09:03

## 2023-10-31 RX ADMIN — POLYETHYLENE GLYCOL 3350 17 G: 17 POWDER, FOR SOLUTION ORAL at 11:33

## 2023-10-31 RX ADMIN — ACETAMINOPHEN 650 MG: 325 TABLET ORAL at 15:40

## 2023-10-31 RX ADMIN — EMPAGLIFLOZIN 10 MG: 10 TABLET, FILM COATED ORAL at 09:04

## 2023-10-31 RX ADMIN — FLUOXETINE 20 MG: 20 CAPSULE ORAL at 09:04

## 2023-10-31 RX ADMIN — Medication 10 ML: at 09:05

## 2023-10-31 RX ADMIN — INSULIN LISPRO 2 UNITS: 100 INJECTION, SOLUTION INTRAVENOUS; SUBCUTANEOUS at 17:12

## 2023-10-31 RX ADMIN — CEFTRIAXONE 2000 MG: 2 INJECTION, POWDER, FOR SOLUTION INTRAMUSCULAR; INTRAVENOUS at 20:53

## 2023-10-31 RX ADMIN — IPRATROPIUM BROMIDE AND ALBUTEROL SULFATE 3 ML: 2.5; .5 SOLUTION RESPIRATORY (INHALATION) at 18:51

## 2023-10-31 RX ADMIN — INSULIN LISPRO 5 UNITS: 100 INJECTION, SOLUTION INTRAVENOUS; SUBCUTANEOUS at 21:45

## 2023-10-31 RX ADMIN — PANTOPRAZOLE SODIUM 40 MG: 40 INJECTION, POWDER, LYOPHILIZED, FOR SOLUTION INTRAVENOUS at 17:12

## 2023-10-31 RX ADMIN — IPRATROPIUM BROMIDE AND ALBUTEROL SULFATE 3 ML: 2.5; .5 SOLUTION RESPIRATORY (INHALATION) at 07:50

## 2023-10-31 RX ADMIN — INSULIN LISPRO 5 UNITS: 100 INJECTION, SOLUTION INTRAVENOUS; SUBCUTANEOUS at 09:06

## 2023-10-31 RX ADMIN — QUETIAPINE FUMARATE 50 MG: 25 TABLET ORAL at 20:54

## 2023-10-31 RX ADMIN — ASPIRIN 81 MG CHEWABLE TABLET 81 MG: 81 TABLET CHEWABLE at 09:04

## 2023-10-31 RX ADMIN — INSULIN LISPRO 3 UNITS: 100 INJECTION, SOLUTION INTRAVENOUS; SUBCUTANEOUS at 11:33

## 2023-10-31 RX ADMIN — ACETAMINOPHEN, ASPIRIN, CAFFEINE 1 TABLET: 250; 65; 250 TABLET, FILM COATED ORAL at 09:04

## 2023-10-31 RX ADMIN — INSULIN LISPRO 5 UNITS: 100 INJECTION, SOLUTION INTRAVENOUS; SUBCUTANEOUS at 17:12

## 2023-10-31 RX ADMIN — ONDANSETRON 4 MG: 2 INJECTION INTRAMUSCULAR; INTRAVENOUS at 17:47

## 2023-10-31 RX ADMIN — ISOSORBIDE MONONITRATE 30 MG: 30 TABLET, EXTENDED RELEASE ORAL at 09:04

## 2023-10-31 NOTE — THERAPY EVALUATION
Patient Name: Ronnie He  : 1980    MRN: [id number]                              Today's Date: 10/31/2023       Admit Date: 10/27/2023    Visit Dx:     ICD-10-CM ICD-9-CM   1. Pneumonia of left lower lobe due to infectious organism  J18.9 486   2. COPD with acute exacerbation  J44.1 491.21   3. Cough, unspecified type  R05.9 786.2   4. Tobacco use disorder  F17.200 305.1   5. Hematemesis with nausea  K92.0 578.0     787.02   6. Hyperglycemia due to diabetes mellitus  E11.65 250.02   7. Atypical chest pain  R07.89 786.59   8. Leukocytosis, unspecified type  D72.829 288.60   9. Sinus tachycardia by electrocardiogram  R00.0 785.0   10. Renal lesion  N28.9 593.9   11. Dizziness  R42 780.4     Patient Active Problem List   Diagnosis    Ataxia    Alcohol use disorder, severe, in early remission    Anxiety    Cannabis dependence    Chiari malformation type I    Chronic pain of right knee    Chronic post-traumatic stress disorder (PTSD)    Conversion disorder with attacks or seizures    COPD (chronic obstructive pulmonary disease)    DDD (degenerative disc disease), cervical    Diabetes mellitus type 2 in obese    Generalized anxiety disorder    Hemiplegia and hemiparesis following cerebral infarction affecting left non-dominant side    Hemiplegia, unspecified affecting left nondominant side    History of alcoholism    History of cardioembolic cerebrovascular accident (CVA)    Hypertension associated with diabetes    Insomnia    Intractable migraine with aura with status migrainosus    Left-sided weakness    Major depressive disorder, recurrent, severe with psychotic features    Malingering    Melanoma    Methamphetamine abuse    Methamphetamine dependence in remission    Methamphetamine-induced psychotic disorder    Non-compliant patient    PAULA (obstructive sleep apnea)    Personality disorder, unspecified    Pseudohyponatremia    Seizure disorder    Squamous blepharitis of upper and lower eyelids of both eyes     Suicide attempt    Tobacco abuse    Type 2 diabetes mellitus with hyperglycemia    Type 2 diabetes mellitus without retinopathy    Community acquired pneumonia of left lower lobe of lung    Other constipation     Past Medical History:   Diagnosis Date    Anxiety     Diabetes mellitus     Hypertension      History reviewed. No pertinent surgical history.   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 Works at [employer].  -KR     Existing Precautions/Restrictions fall;orthostatic hypotension  -KR     Barriers to Rehab ineffective coping  -KR       Row Name 10/31/23 08          Living Environment    People in Home other (see comments)  lives in Sober Living  -KR       Row Name 10/31/23 08          Home Main Entrance    Number of Stairs, Main Entrance nine  -KR     Stair Railings, Main Entrance railing on right side (ascending)  -KR       Row Name 10/31/23 08          Stairs Within Home, Primary    Stairs, Within Home, Primary 9 steps down to bedroom  -KR     Number of Stairs, Within Home, Primary nine  -KR     Stair Railings, Within Home, Primary railing on left side (ascending)  -KR       Row Name 10/31/23 08          Cognition    Orientation Status (Cognition) oriented to;person;verbal cues/prompts needed for orientation;time;place  -KR       Row Name 10/31/23 08          Safety Issues, Functional Mobility    Safety Issues Affecting Function (Mobility) safety precaution awareness;safety precautions follow-through/compliance;insight into deficits/self-awareness  -KR     Impairments Affecting Function (Mobility)  balance;endurance/activity tolerance;strength;sensation/sensory awareness;coordination  -KR               User Key  (r) = Recorded By, (t) = Taken By, (c) = Cosigned By      Initials Name Provider Type    Rosa M Bautista PT Physical Therapist                   Mobility       Row Name 10/31/23 0849          Bed Mobility    Bed Mobility supine-sit  -KR     Supine-Sit [place] (Bed Mobility) modified independence  -KR     Assistive Device (Bed Mobility) head of bed elevated  -KR       Row Name 10/31/23 0849          Sit-Stand Transfer    Sit-Stand [place] (Transfers) contact guard  -KR     Assistive Device (Sit-Stand Transfers) walker, front-wheeled  -KR     Comment, (Sit-Stand Transfer) 1x from bed, 1x from chair. BP sitting 143/89; BP standing 122/87.  -KR       Row Name 10/31/23 0849          Gait/Stairs (Locomotion)    [place] Level (Gait) minimum assist (75% patient effort);1 person assist;verbal cues;nonverbal cues (demo/gesture)  -KR     Assistive Device (Gait) walker, front-wheeled  -KR     Distance in Feet (Gait) 14  -KR     Deviations/Abnormal Patterns (Gait) cierra decreased;gait speed decreased;stride length decreased;weight shifting decreased  -KR     Comment, (Gait/Stairs) pt with ataxic LLE and noteable instability. Farther ambulation distance limited by weakness. Pt reporting lightheadedness following ambulation trial.  -KR               User Key  (r) = Recorded By, (t) = Taken By, (c) = Cosigned By      Initials Name Provider Type    Rosa M Bautista PT Physical Therapist                   Obj/Interventions       Row Name 10/31/23 0925          Range of Motion Comprehensive    General Range of Motion bilateral lower extremity ROM WNL  -KR       Row Name 10/31/23 0925          Strength Comprehensive (MMT)    General Manual Muscle Testing (MMT) Assessment lower extremity strength deficits identified  -KR     Comment, General Manual Muscle Testing (MMT) Assessment RLE 5/5, L hip  flexion 3/5, L knee extension 3+/5, L knee flexion 3+/5, L ankle DF 3/5, L ankle PF 3/5  -KR       Row Name 10/31/23 0925          Motor Skills    Motor Skills coordination;functional endurance  -KR     Coordination left;lower extremity;heel to shin;moderate impairment;other (see comments)  BLE rapid toe taps WFL  -KR     Functional Endurance Pt instructed on and performed 10 breaths on IS. Pt also performed 10x B shoulder flexion with paced PLB.  -KR       Row Name 10/31/23 0925          Balance    Balance Assessment sitting static balance;sitting dynamic balance;standing static balance;standing dynamic balance  -KR     Static Sitting Balance standby assist  -KR     Dynamic Sitting Balance standby assist  -KR     Position, Sitting Balance unsupported;sitting edge of bed;sitting in chair  -KR     Static Standing Balance contact guard  -KR     Dynamic Standing Balance minimal assist;1-person assist;non-verbal cues (demo/gesture);verbal cues  -KR     Position/Device Used, Standing Balance supported;walker, front-wheeled  -KR     Balance Interventions sitting;standing;sit to stand;supported;static;dynamic  -KR       Row Name 10/31/23 0980          Sensory Assessment (Somatosensory)    Sensory Assessment (Somatosensory) right LE;left LE  -KR     Left LE Sensory Assessment general sensation;light touch awareness;proprioception;intact;light touch localization;impaired  -KR     Right LE Sensory Assessment general sensation;light touch awareness;light touch localization;proprioception;intact  -KR               User Key  (r) = Recorded By, (t) = Taken By, (c) = Cosigned By      Initials Name Provider Type    KR Rosa M Wilburn PT Physical Therapist                   Goals/Plan       Row Name 10/31/23 0930          Bed Mobility Goal 1 (PT)    Activity/Assistive Device (Bed Mobility Goal 1, PT) bed mobility activities, all  -KR     [place] Level/Cues Needed (Bed Mobility Goal 1, PT) independent  -KR     Time Frame (Bed  Mobility Goal 1, PT) long term goal (LTG);2 weeks  -KR       Row Name 10/31/23 0930          Transfer Goal 1 (PT)    Activity/Assistive Device (Transfer Goal 1, PT) sit-to-stand/stand-to-sit;bed-to-chair/chair-to-bed  -KR     [place] Level/Cues Needed (Transfer Goal 1, PT) independent  -KR     Time Frame (Transfer Goal 1, PT) long term goal (LTG);2 weeks  -KR       Row Name 10/31/23 0930          Gait Training Goal 1 (PT)    Activity/Assistive Device (Gait Training Goal 1, PT) gait (walking locomotion);improve balance and speed;increase endurance/gait distance  -KR     [place] Level (Gait Training Goal 1, PT) independent  -KR     Distance (Gait Training Goal 1, PT) 500  -KR     Time Frame (Gait Training Goal 1, PT) long term goal (LTG);2 weeks  -KR       Row Name 10/31/23 0930          Stairs Goal 1 (PT)    Activity/Assistive Device (Stairs Goal 1, PT) stairs, all skills;using handrail, right  -KR     [place] Level/Cues Needed (Stairs Goal 1, PT) modified independence  -KR     Number of Stairs (Stairs Goal 1, PT) 9  -KR     Time Frame (Stairs Goal 1, PT) long term goal (LTG);2 weeks  -KR       Row Name 10/31/23 0930          Therapy Assessment/Plan (PT)    Planned Therapy Interventions (PT) balance training;bed mobility training;gait training;home exercise program;postural re-education;patient/family education;neuromuscular re-education;motor coordination training;ROM (range of motion);stair training;strengthening;stretching;transfer training  -KR               User Key  (r) = Recorded By, (t) = Taken By, (c) = Cosigned By      Initials Name Provider Type    Rosa M Bautista, PT Physical Therapist                   Clinical Impression       Row Name 10/31/23 0928          Pain    Pretreatment Pain Rating 9/10  -KR     Posttreatment Pain Rating 9/10  -KR     Pain Location - head  -KR     Pre/Posttreatment Pain Comment nsg aware  -KR     Pain Intervention(s) Repositioned;Ambulation/increased activity   -KR       Row Name 10/31/23 0928          Plan of Care Review    Plan of Care Reviewed With patient  -KR     Outcome Evaluation Pt presents with decreased LLE strength and coordination, balance and coordination below baseline, as well as, orthostatic hypotension contributing to impairments in transfers, ambulation, and overall fxnl mobility. Pt with symptomatic orthostatic drop in BP when transitioning from sit to stand. Pt will benefit from PT to address aforementioned deficits and return to PLOF. PT rec IRF upon dc.  -KR       Row Name 10/31/23 0928          Therapy Assessment/Plan (PT)    Rehab Potential (PT) good, to achieve stated therapy goals  -KR     Criteria for Skilled Interventions Met (PT) yes;skilled treatment is necessary  -KR     Therapy Frequency (PT) daily  -KR       Row Name 10/31/23 0928          Vital Signs    Pre Systolic BP Rehab 143  -KR     Pre Treatment Diastolic BP 89  -KR     Intra Systolic BP Rehab 122  -KR     Intra Treatment Diastolic BP 87  -KR     Post Systolic BP Rehab 134  -KR     Post Treatment Diastolic BP 91  -KR     Posttreatment Heart Rate (beats/min) 109  -KR     Post SpO2 (%) 94  -KR     O2 Delivery Post Treatment room air  -KR     Pre Patient Position Supine  -KR     Intra Patient Position Standing  -KR     Post Patient Position Sitting  -KR       Row Name 10/31/23 0928          Positioning and Restraints    Pre-Treatment Position in bed  -KR     Post Treatment Position chair  -KR     In Chair notified nsg;reclined;call light within reach;encouraged to call for assist;exit alarm on;legs elevated  -KR               User Key  (r) = Recorded By, (t) = Taken By, (c) = Cosigned By      Initials Name Provider Type    KR Rosa M Wilburn, PT Physical Therapist                   Outcome Measures       Row Name 10/31/23 0931          How much help from another person do you currently need...    Turning from your back to your side while in flat bed without using bedrails? 4  -KR      Moving from lying on back to sitting on the side of a flat bed without bedrails? 4  -KR     Moving to and from a bed to a chair (including a wheelchair)? 3  -KR     Standing up from a chair using your arms (e.g., wheelchair, bedside chair)? 3  -KR     Climbing 3-5 steps with a railing? 2  -KR     To walk in hospital room? 3  -KR     AM-PAC 6 Clicks Score (PT) 19  -KR     Highest level of mobility 6 --> Walked 10 steps or more  -KR               User Key  (r) = Recorded By, (t) = Taken By, (c) = Cosigned By      Initials Name Provider Type    Rosa M Bautista PT Physical Therapist                                 Physical Therapy Education       Title: PT OT SLP Therapies (Done)       Topic: Physical Therapy (Done)       Point: Mobility training (Done)       Learning Progress Summary             Patient Acceptance, E, VU by BRAULIO at 10/31/2023 0932                         Point: Home exercise program (Done)       Learning Progress Summary             Patient Acceptance, E, VU by KR at 10/31/2023 0932                         Point: Body mechanics (Done)       Learning Progress Summary             Patient Acceptance, E, VU by KR at 10/31/2023 0932                         Point: Precautions (Done)       Learning Progress Summary             Patient Acceptance, E, VU by KR at 10/31/2023 0932                                         User Key       Initials Effective Dates Name Provider Type Discipline    BRUALIO 12/30/22 -  Rosa M Wilburn PT Physical Therapist PT                  PT Recommendation and Plan  Planned Therapy Interventions (PT): balance training, bed mobility training, gait training, home exercise program, postural re-education, patient/family education, neuromuscular re-education, motor coordination training, ROM (range of motion), stair training, strengthening, stretching, transfer training  Plan of Care Reviewed With: patient  Outcome Evaluation: Pt presents with decreased LLE strength and coordination,  balance and coordination below baseline, as well as, orthostatic hypotension contributing to impairments in transfers, ambulation, and overall fxnl mobility. Pt with symptomatic orthostatic drop in BP when transitioning from sit to stand. Pt will benefit from PT to address aforementioned deficits and return to PLOF. PT rec IRF upon dc.     Time Calculation:   PT Evaluation Complexity  History, PT Evaluation Complexity: 3 or more personal factors and/or comorbidities  Examination of Body Systems (PT Eval Complexity): total of 4 or more elements  Clinical Presentation (PT Evaluation Complexity): stable  Clinical Decision Making (PT Evaluation Complexity): low complexity  Overall Complexity (PT Evaluation Complexity): low complexity     PT Charges       Row Name 10/31/23 0932             Time Calculation    Start Time 0818  -KR      PT Received On 10/31/23  -KR      PT Goal Re-Cert Due Date 11/10/23  -KR         Timed Charges    21609 - PT Therapeutic Exercise Minutes 8  -KR         Untimed Charges    PT Eval/Re-eval Minutes 47  -KR         Total Minutes    Timed Charges Total Minutes 8  -KR      Untimed Charges Total Minutes 47  -KR       Total Minutes 55  -KR                User Key  (r) = Recorded By, (t) = Taken By, (c) = Cosigned By      Initials Name Provider Type    Rosa M Bautista, PT Physical Therapist                  Therapy Charges for Today       Code Description Service Date Service Provider Modifiers Qty    09314979958 HC PT THER PROC EA 15 MIN 10/31/2023 Rosa M Wilburn, PT GP 1    57362650839 HC PT EVAL LOW COMPLEXITY 4 10/31/2023 Rosa M Wilburn PT GP 1            PT G-Codes  AM-PAC 6 Clicks Score (PT): 19  PT Discharge Summary  Anticipated Discharge Disposition (PT): inpatient rehabilitation facility    Rosa M Wilburn PT  10/31/2023

## 2023-10-31 NOTE — PROGRESS NOTES
[facility] Medicine Services  PROGRESS NOTE    Patient Name: Ronnie He  : 1980  MRN: [id number]    Date of Admission: 10/27/2023  Primary Care Physician: Dylon Olmos MD    Subjective   Subjective     CC:  Seizure    HPI:  Patient sitting up in bed in NAD.  Started complaining about left leg numbness and tingling that started 10/30/2023.  Provider asked about his headache and he stated that he still had and needed something for it.  Still states he has had no BM.  Increased bowel meds ordered.  Patient now wants to go to rehab.    Objective   Objective     Vital Signs:   Temp:  [97.7 °F (36.5 °C)-98.4 °F (36.9 °C)] 98.3 °F (36.8 °C)  Heart Rate:  [] 115  Resp:  [18-20] 18  BP: (114-143)/(72-91) 126/88     Physical Exam:  Constitutional: Alert, WD, WN male in NAD  Eyes: EOMI, sclerae anicteric, no conjunctival injection  Head: NCAT  ENT: [place], moist mucous membranes   Respiratory: Nonlabored, symmetrical chest expansion, CTAB, 93% RA  Cardiovascular: Tachycardia, no M/R/G, +DP pulses bilaterally  Gastrointestinal: Soft, NT, ND +BS  Musculoskeletal: UGARTE; no LE edema bilaterally  Neurologic: Oriented x4, follows all commands, speech clear  Skin: No rashes on exposed skin  Psychiatric: Pleasant and cooperative; normal affect    Results Reviewed:  LAB RESULTS:      Lab 10/29/23  0613 10/28/23  0752 10/27/23  2247 10/27/23  2046 10/27/23  0022 10/26/23  1946 10/26/23  1139   WBC 8.92 10.84*  --  12.94*  --  9.28  --    HEMOGLOBIN 13.5 12.5*  --  13.9  --  13.4*  --    HEMATOCRIT 38.5 35.8*  --  39.8  --  38.2*  --    PLATELETS 197 239  --  272  --  217  --    NEUTROS ABS 4.72 9.43*  --  11.23*  --  6.04  --    IMMATURE GRANS (ABS) 0.08* 0.12*  --  0.09*  --  0.03  --    LYMPHS ABS 3.35* 1.01  --  1.11  --  1.99  --    MONOS ABS 0.71 0.27  --  0.50  --  1.04*  --    EOS ABS 0.02 0.00  --  0.00  --  0.13  --    MCV 85.6 86.3  --  85.6  --  85  --    PROCALCITONIN   --   --  <0.02  --   --   --   --    LACTATE  --   --   --  1.8  --   --   --    LACTATE, ARTERIAL  --   --   --   --  3.9*  --   --    PROTIME  --   --   --   --   --   --  9.9   APTT  --   --   --   --   --   --  25.3   D DIMER QUANT  --   --   --  <0.27  --   --   --          Lab 10/30/23  1607 10/29/23  0613 10/28/23  0752 10/27/23  2247 10/27/23  2046 10/27/23  0022 10/26/23  1139   SODIUM  --  138 134*  --  134*  --   --    POTASSIUM  --  3.8 4.7  --  4.0 3.8  --    CHLORIDE  --  105 102  --  102 103  --    CO2  --  24.0 21.0*  --  21.0*  --   --    ANION GAP  --  9.0 11.0  --  11.0  --   --    BUN  --  22* 15  --  14  --   --    CREATININE  --  0.83 0.71*  --  0.74*  --   --    EGFR  --  111.4 116.7  --  115.3  --   --    GLUCOSE  --  140* 351*  --  218* 355*  --    CALCIUM  --  8.7 8.8  --  9.3  --   --    IONIZED CALCIUM  --   --   --   --   --  4.5*  --    MAGNESIUM 2.2  --  2.0 1.7  --   --  1.7   HEMOGLOBIN A1C  --   --  8.00*  --   --   --   --          Lab 10/27/23  2046   TOTAL PROTEIN 7.0   ALBUMIN 4.5   GLOBULIN 2.5   ALT (SGPT) 19   AST (SGOT) 14   BILIRUBIN 0.3   ALK PHOS 86   LIPASE 59         Lab 10/27/23  2247 10/27/23  2046 10/26/23  2214 10/26/23  1946 10/26/23  1139   PROBNP  --  658.3*  --   --   --    TROPONIN T  --   --  13  --   --    TROPONIN T (HIGHLY SENSITIVE)  --   --   --  15  --    HSTROP T 13 15*  --   --   --    PROTIME  --   --   --   --  9.9   INR  --   --   --   --  0.90             Lab 10/30/23  1607   FOLATE 11.80   VITAMIN B 12 671         Lab 10/27/23  0022   BASE EXCESS ART -4.1*     Brief Urine Lab Results  (Last result in the past 365 days)        Color   Clarity   Blood   Leuk Est   Nitrite   Protein   CREAT   Urine HCG        10/28/23 0039 Yellow   Clear   Negative   Negative   Negative   Trace                   Microbiology Results Abnormal       Procedure Component Value - Date/Time    Blood Culture - Blood, Wrist, Left [[id number]]  (Normal) Collected: 10/27/23  2239    Lab Status: Preliminary result Specimen: Blood from Wrist, Left Updated: 10/31/23 0915     Blood Culture No growth at 3 days    Blood Culture - Blood, Arm, Right [[id number]]  (Normal) Collected: 10/27/23 2232    Lab Status: Preliminary result Specimen: Blood from Arm, Right Updated: 10/31/23 0915     Blood Culture No growth at 3 days    COVID PRE-OP / PRE-PROCEDURE SCREENING ORDER (NO ISOLATION) - Swab, Nasopharynx [[id number]]  (Normal) Collected: 10/27/23 2241    Lab Status: Final result Specimen: Swab from Nasopharynx Updated: 10/27/23 2316    Narrative:      The following orders were created for panel order COVID PRE-OP / PRE-PROCEDURE SCREENING ORDER (NO ISOLATION) - Swab, Nasopharynx.  Procedure                               Abnormality         Status                     ---------                               -----------         ------                     COVID-19 and FLU A/B PCR...[[id number]]  Normal              Final result                 Please view results for these tests on the individual orders.    COVID-19 and FLU A/B PCR - Swab, Nasopharynx [[id number]]  (Normal) Collected: 10/27/23 2241    Lab Status: Final result Specimen: Swab from Nasopharynx Updated: 10/27/23 2316     COVID19 Not Detected     Influenza A PCR Not Detected     Influenza B PCR Not Detected    Narrative:      Fact sheet for providers: https://www.fda.gov/media/153081/download    Fact sheet for patients: https://www.fda.gov/media/067470/download    Test performed by PCR.            MRI Brain Without Contrast    Result Date: 10/30/2023  MRI BRAIN WO CONTRAST Date of Exam: 10/30/2023 6:37 PM CDT Indication: left sided tingling ( x several days).  Comparison: Head CT 10/30/2023, MRI brain 9/16/2023 Technique:  Routine multiplanar/multisequence sequence images of the brain were obtained without contrast administration. Findings: There is motion artifact throughout examination which limits image quality. There is no diffusion  restriction to suggest acute infarct. There is no evidence of acute or chronic intracranial hemorrhage. No mass effect or midline shift. No abnormal extra-axial collections. The basal ganglia, brainstem and cerebellum appear within normal limits. Midline structures are intact. No significant cortical abnormality is identified. Evidence of previous Chiari decompression. Calvarial and superficial soft tissue signal is within normal limits. Orbits appear unremarkable. The paranasal sinuses and the mastoid air cells appear well aerated.     Impression: Impression: No acute process identified. Electronically Signed: Johan Hollis MD  10/30/2023 7:28 PM CDT  Workstation ID: [id number]    CT Head Without Contrast    Result Date: 10/30/2023  CT HEAD WO CONTRAST, CT CERVICAL SPINE WO CONTRAST Date of Exam: 10/30/2023 12:51 PM EDT Indication: LUE tingling. Comparison: MRI 9/16/2023. Technique: Axial CT images were obtained of the head and cervical spine without contrast administration.  Automated exposure control and iterative construction methods were used. Findings: CT head: Postoperative changes are present with the appearance of likely prior Chiari decompression with suboccipital craniectomy and posterior C1 arch resection noted. There is no evidence of intracranial hemorrhage, mass or mass effect. The ventricles are normal in size and configuration. The orbits are normal. The paranasal sinuses appear clear. The calvarium is otherwise intact. CT cervical spine: Posterior C1 arch resection noted as above. Cortical margins are intact without evidence of acute fracture. There is straightening of the usual cervical lordosis, otherwise without listhesis or subluxation. The paraspinal soft tissues demonstrate no acute or suspicious findings. The lung apices are clear. Multilevel spondylosis is present, with some areas of disc osteophyte complex formation and facet arthropathy, limited in characterization on CT but appearing  likely most advanced at  C3-4 and C4-5, with some at least moderate associated spinal canal and neuroforaminal narrowing present.     Impression: Impression: No acute intracranial abnormality. Redemonstrated postoperative changes from presumed prior Chiari decompression. No acute findings in the cervical spine. Multilevel cervical spondylosis is evident, appearing most advanced at C3-4 and C4-5. Electronically Signed: Jakob Perkins MD  10/30/2023 1:17 PM EDT  Workstation ID: [id number]    CT Cervical Spine Without Contrast    Result Date: 10/30/2023  CT HEAD WO CONTRAST, CT CERVICAL SPINE WO CONTRAST Date of Exam: 10/30/2023 12:51 PM EDT Indication: LUE tingling. Comparison: MRI 9/16/2023. Technique: Axial CT images were obtained of the head and cervical spine without contrast administration.  Automated exposure control and iterative construction methods were used. Findings: CT head: Postoperative changes are present with the appearance of likely prior Chiari decompression with suboccipital craniectomy and posterior C1 arch resection noted. There is no evidence of intracranial hemorrhage, mass or mass effect. The ventricles are normal in size and configuration. The orbits are normal. The paranasal sinuses appear clear. The calvarium is otherwise intact. CT cervical spine: Posterior C1 arch resection noted as above. Cortical margins are intact without evidence of acute fracture. There is straightening of the usual cervical lordosis, otherwise without listhesis or subluxation. The paraspinal soft tissues demonstrate no acute or suspicious findings. The lung apices are clear. Multilevel spondylosis is present, with some areas of disc osteophyte complex formation and facet arthropathy, limited in characterization on CT but appearing likely most advanced at  C3-4 and C4-5, with some at least moderate associated spinal canal and neuroforaminal narrowing present.     Impression: Impression: No acute intracranial  abnormality. Redemonstrated postoperative changes from presumed prior Chiari decompression. No acute findings in the cervical spine. Multilevel cervical spondylosis is evident, appearing most advanced at C3-4 and C4-5. Electronically Signed: Jakob Perkins MD  10/30/2023 1:17 PM EDT  Workstation ID: [id number]         Current medications:  Scheduled Meds:aspirin, 81 mg, Oral, Daily  atorvastatin, 10 mg, Oral, Daily  cefTRIAXone, 2,000 mg, Intravenous, Q24H  divalproex, 500 mg, Oral, Daily  empagliflozin, 10 mg, Oral, Daily  FLUoxetine, 20 mg, Oral, Daily  insulin detemir, 15 Units, Subcutaneous, Nightly  insulin lispro, 2-7 Units, Subcutaneous, 4x Daily AC & at Bedtime  Insulin Lispro, 5 Units, Subcutaneous, 4x Daily With Meals & Nightly  ipratropium-albuterol, 3 mL, Nebulization, 4x Daily - RT  isosorbide mononitrate, 30 mg, Oral, Daily  nicotine, 1 patch, Transdermal, Q24H  pantoprazole, 40 mg, Intravenous, BID AC  senna-docusate sodium, 2 tablet, Oral, BID   And  polyethylene glycol, 17 g, Oral, Daily  QUEtiapine, 50 mg, Oral, Nightly  sodium chloride, 10 mL, Intravenous, Q12H      Continuous Infusions:   PRN Meds:.  acetaminophen **OR** acetaminophen **OR** acetaminophen    aspirin-acetaminophen-caffeine    benzonatate    senna-docusate sodium **AND** polyethylene glycol **AND** bisacodyl **AND** bisacodyl    dextrose    dextrose    diphenhydrAMINE    glucagon (human recombinant)    ondansetron **OR** ondansetron    sodium chloride    sodium chloride    sodium chloride    Assessment & Plan   Assessment & Plan     Active Hospital Problems    Diagnosis  POA    **Community acquired pneumonia of left lower lobe of lung [J18.9]  Yes    Other constipation [K59.09]  Yes    Conversion disorder with attacks or seizures [F44.5]  Yes    Major depressive disorder, recurrent, severe with psychotic features [F33.3]  Yes    History of alcoholism [F10.21]  Not Applicable    Non-compliant patient [Z91.199]  Not Applicable    COPD  (chronic obstructive pulmonary disease) [J44.9]  Yes    Hypertension associated with diabetes [E11.59, I15.2]  Yes    Diabetes mellitus type 2 in obese [E11.69, E66.9]  Yes    Personality disorder, unspecified [F60.9]  Yes      Resolved Hospital Problems   No resolved problems to display.        Brief Hospital Course to date:  Ronnie He is a 42 y.o. male with PMH of HTN, HLD, DM2, ETOH/polysubstance abuse in remission, COPD and seizure disorder presents to the ED for shortness of breath.     This patient's problems and plans were partially entered by my partner and updated as appropriate by me 10/31/23.     LLL Pneumonia  COPD  -CT chest shows LLL pneumonia  -Rocephin and azithro  -Duonebs scheduled.  Encourage ambulation and pulmonary toileting.  -- No new labs today.  Improving.  Still complaining of very minimal shortness of breath with exertion.  Occasional cough.     Hematemesis - no further episodes  --Hgb 13.5  --Likely Ada Chairez tear  -continue protonix IV BID, no witnessed episodes here. If reoccurs will have GI evaluate.  No further.  Stable.  Monitor.     DM2 w/A1c 8  --24H glucose 151-247  -- Basal, SSI; adjust as needed  -- Started preprandial 10/29.  Glucose still running high.  Will increase basal/bolus regimen.     HTN  HLD  -Continue home aspirin, statin, lisinopril   --Stable     Seizure Disorder  -Continue home depakote  -Seizure precautions      Hx of Polysubstance Abuse  Hx of ETOH  -Patient reports being sober 3mo, 25 days  -UDS+ for opiates, received IV morphine at [place] 10/26  Addendum: Patient complaining of persistent left upper extremity hand tingling for several days.  Also complains of intermittent left lower extremity tingling.  History of cervical and lumbar spine issues.  Otherwise stable.    -- CT of the head/neck without contrast showed no intracranial abnormality; multilevel cervical spondylosis appearing most advanced at C3-4 and C4-5  -- Asking for medicine for  headache.  No narcotics.  We will give one-time dose of Toradol after CT scan.  (CTs completed.  No acute finding).  Symptoms improved.    Constipation  -- Stat KUB ordered yesterday 10/29.  -- KUB noted large stool burden, will increase bowel meds today and give one-time dose of lactulose  --BM x1 on 10/29 and 10/31.    Expected Discharge Location and Transportation:     Expected Discharge   Expected Discharge Date: 11/3/2023; Expected Discharge Time:      DVT prophylaxis:  Mechanical DVT prophylaxis orders are present.     AM-PAC 6 Clicks Score (PT): 19 (10/31/23 8295)    CODE STATUS:   Code Status and Medical Interventions:   Ordered at: 10/28/23 0308     Code Status (Patient has no pulse and is not breathing):    CPR (Attempt to Resuscitate)     Medical Interventions (Patient has pulse or is breathing):    Full Support       Soo Laguerre, APRN  10/31/23

## 2023-10-31 NOTE — PLAN OF CARE
Goal Outcome Evaluation:   Patient alert and oriented x4. Normal sinus rhythm to sinus tachycardia. Room air. Reports of headache and back pain, APRN notified multiple times, see orders. APRN notified of patient reports of left hand and foot numbness. Patient refused lactulose and reports that he had a bowel movement when he was in CT.

## 2023-10-31 NOTE — CASE MANAGEMENT/SOCIAL WORK
Continued Stay Note   [place]     Patient Name: Ronnie He  MRN: [id number]  Today's Date: 10/31/2023    Admit Date: 10/27/2023    Plan: SNF   Discharge Plan       Row Name 10/31/23 1551       Plan    Plan SNF    Plan Comments I reviewed PT recommendations of rehab with patient. List of SNF given with [facility] and [facility] selected. Will make referrals.    Final Discharge Disposition Code 03 - skilled nursing facility (SNF)                   Discharge Codes    No documentation.                 Expected Discharge Date and Time       Expected Discharge Date Expected Discharge Time    Nov 3, 2023               Radha Santillan RN

## 2023-10-31 NOTE — PLAN OF CARE
Goal Outcome Evaluation:  Plan of Care Reviewed With: patient           Outcome Evaluation: Pt presents with decreased LLE strength and coordination, balance and coordination below baseline, as well as, orthostatic hypotension contributing to impairments in transfers, ambulation, and overall fxnl mobility. Pt with symptomatic orthostatic drop in BP when transitioning from sit to stand. Pt will benefit from PT to address aforementioned deficits and return to PLOF. PT rec IRF upon dc.      Anticipated Discharge Disposition (PT): inpatient rehabilitation facility

## 2023-11-01 ENCOUNTER — APPOINTMENT (OUTPATIENT)
Dept: CT IMAGING | Facility: HOSPITAL | Age: 43
End: 2023-11-01
Payer: MEDICARE

## 2023-11-01 PROBLEM — R00.0 TACHYCARDIA: Status: ACTIVE | Noted: 2023-11-01

## 2023-11-01 LAB
GLUCOSE BLDC GLUCOMTR-MCNC: 208 MG/DL (ref 70–130)
GLUCOSE BLDC GLUCOMTR-MCNC: 245 MG/DL (ref 70–130)
GLUCOSE BLDC GLUCOMTR-MCNC: 283 MG/DL (ref 70–130)
GLUCOSE BLDC GLUCOMTR-MCNC: 399 MG/DL (ref 70–130)

## 2023-11-01 PROCEDURE — 25010000002 ONDANSETRON PER 1 MG

## 2023-11-01 PROCEDURE — G0378 HOSPITAL OBSERVATION PER HR: HCPCS

## 2023-11-01 PROCEDURE — 25010000002 DIPHENHYDRAMINE PER 50 MG: Performed by: NURSE PRACTITIONER

## 2023-11-01 PROCEDURE — 97165 OT EVAL LOW COMPLEX 30 MIN: CPT

## 2023-11-01 PROCEDURE — 97530 THERAPEUTIC ACTIVITIES: CPT

## 2023-11-01 PROCEDURE — 94664 DEMO&/EVAL PT USE INHALER: CPT

## 2023-11-01 PROCEDURE — 94761 N-INVAS EAR/PLS OXIMETRY MLT: CPT

## 2023-11-01 PROCEDURE — 25010000002 CEFTRIAXONE PER 250 MG

## 2023-11-01 PROCEDURE — 72128 CT CHEST SPINE W/O DYE: CPT

## 2023-11-01 PROCEDURE — 63710000001 INSULIN LISPRO (HUMAN) PER 5 UNITS

## 2023-11-01 PROCEDURE — 72131 CT LUMBAR SPINE W/O DYE: CPT

## 2023-11-01 PROCEDURE — 63710000001 INSULIN LISPRO (HUMAN) PER 5 UNITS: Performed by: NURSE PRACTITIONER

## 2023-11-01 PROCEDURE — 63710000001 INSULIN DETEMIR PER 5 UNITS: Performed by: NURSE PRACTITIONER

## 2023-11-01 PROCEDURE — 96376 TX/PRO/DX INJ SAME DRUG ADON: CPT

## 2023-11-01 PROCEDURE — 94799 UNLISTED PULMONARY SVC/PX: CPT

## 2023-11-01 PROCEDURE — 82948 REAGENT STRIP/BLOOD GLUCOSE: CPT

## 2023-11-01 PROCEDURE — 25810000003 SODIUM CHLORIDE 0.9 % SOLUTION: Performed by: NURSE PRACTITIONER

## 2023-11-01 RX ORDER — SODIUM CHLORIDE 9 MG/ML
100 INJECTION, SOLUTION INTRAVENOUS CONTINUOUS
Status: ACTIVE | OUTPATIENT
Start: 2023-11-01 | End: 2023-11-02

## 2023-11-01 RX ORDER — INSULIN LISPRO 100 [IU]/ML
6 INJECTION, SOLUTION INTRAVENOUS; SUBCUTANEOUS
Status: DISCONTINUED | OUTPATIENT
Start: 2023-11-01 | End: 2023-11-03 | Stop reason: HOSPADM

## 2023-11-01 RX ADMIN — INSULIN LISPRO 4 UNITS: 100 INJECTION, SOLUTION INTRAVENOUS; SUBCUTANEOUS at 17:21

## 2023-11-01 RX ADMIN — PANTOPRAZOLE SODIUM 40 MG: 40 INJECTION, POWDER, LYOPHILIZED, FOR SOLUTION INTRAVENOUS at 08:44

## 2023-11-01 RX ADMIN — ONDANSETRON 4 MG: 2 INJECTION INTRAMUSCULAR; INTRAVENOUS at 04:21

## 2023-11-01 RX ADMIN — INSULIN LISPRO 6 UNITS: 100 INJECTION, SOLUTION INTRAVENOUS; SUBCUTANEOUS at 21:30

## 2023-11-01 RX ADMIN — INSULIN DETEMIR 15 UNITS: 100 INJECTION, SOLUTION SUBCUTANEOUS at 21:28

## 2023-11-01 RX ADMIN — INSULIN LISPRO 6 UNITS: 100 INJECTION, SOLUTION INTRAVENOUS; SUBCUTANEOUS at 21:28

## 2023-11-01 RX ADMIN — CEFTRIAXONE 2000 MG: 2 INJECTION, POWDER, FOR SOLUTION INTRAMUSCULAR; INTRAVENOUS at 21:28

## 2023-11-01 RX ADMIN — FLUOXETINE 20 MG: 20 CAPSULE ORAL at 08:43

## 2023-11-01 RX ADMIN — IPRATROPIUM BROMIDE AND ALBUTEROL SULFATE 3 ML: 2.5; .5 SOLUTION RESPIRATORY (INHALATION) at 20:11

## 2023-11-01 RX ADMIN — ACETAMINOPHEN 650 MG: 325 TABLET ORAL at 12:30

## 2023-11-01 RX ADMIN — INSULIN LISPRO 6 UNITS: 100 INJECTION, SOLUTION INTRAVENOUS; SUBCUTANEOUS at 12:31

## 2023-11-01 RX ADMIN — IPRATROPIUM BROMIDE AND ALBUTEROL SULFATE 3 ML: 2.5; .5 SOLUTION RESPIRATORY (INHALATION) at 13:00

## 2023-11-01 RX ADMIN — POLYETHYLENE GLYCOL 3350 17 G: 17 POWDER, FOR SOLUTION ORAL at 08:44

## 2023-11-01 RX ADMIN — INSULIN LISPRO 6 UNITS: 100 INJECTION, SOLUTION INTRAVENOUS; SUBCUTANEOUS at 17:21

## 2023-11-01 RX ADMIN — ASPIRIN 81 MG CHEWABLE TABLET 81 MG: 81 TABLET CHEWABLE at 08:42

## 2023-11-01 RX ADMIN — DIPHENHYDRAMINE HYDROCHLORIDE 25 MG: 50 INJECTION INTRAMUSCULAR; INTRAVENOUS at 16:02

## 2023-11-01 RX ADMIN — SENNOSIDES AND DOCUSATE SODIUM 2 TABLET: 8.6; 5 TABLET ORAL at 08:44

## 2023-11-01 RX ADMIN — DIPHENHYDRAMINE HYDROCHLORIDE 25 MG: 50 INJECTION INTRAMUSCULAR; INTRAVENOUS at 21:28

## 2023-11-01 RX ADMIN — Medication 1 PATCH: at 08:46

## 2023-11-01 RX ADMIN — ONDANSETRON 4 MG: 2 INJECTION INTRAMUSCULAR; INTRAVENOUS at 10:28

## 2023-11-01 RX ADMIN — ISOSORBIDE MONONITRATE 30 MG: 30 TABLET, EXTENDED RELEASE ORAL at 08:43

## 2023-11-01 RX ADMIN — PANTOPRAZOLE SODIUM 40 MG: 40 INJECTION, POWDER, LYOPHILIZED, FOR SOLUTION INTRAVENOUS at 17:22

## 2023-11-01 RX ADMIN — ACETAMINOPHEN 650 MG: 325 TABLET ORAL at 01:05

## 2023-11-01 RX ADMIN — Medication 10 ML: at 09:18

## 2023-11-01 RX ADMIN — SODIUM CHLORIDE 100 ML/HR: 9 INJECTION, SOLUTION INTRAVENOUS at 16:02

## 2023-11-01 RX ADMIN — IPRATROPIUM BROMIDE AND ALBUTEROL SULFATE 3 ML: 2.5; .5 SOLUTION RESPIRATORY (INHALATION) at 08:04

## 2023-11-01 RX ADMIN — INSULIN LISPRO 3 UNITS: 100 INJECTION, SOLUTION INTRAVENOUS; SUBCUTANEOUS at 12:31

## 2023-11-01 RX ADMIN — EMPAGLIFLOZIN 10 MG: 10 TABLET, FILM COATED ORAL at 08:43

## 2023-11-01 RX ADMIN — ATORVASTATIN CALCIUM 10 MG: 10 TABLET, FILM COATED ORAL at 08:43

## 2023-11-01 RX ADMIN — QUETIAPINE FUMARATE 50 MG: 25 TABLET ORAL at 21:28

## 2023-11-01 RX ADMIN — ACETAMINOPHEN, ASPIRIN, CAFFEINE 1 TABLET: 250; 65; 250 TABLET, FILM COATED ORAL at 14:44

## 2023-11-01 RX ADMIN — INSULIN LISPRO 3 UNITS: 100 INJECTION, SOLUTION INTRAVENOUS; SUBCUTANEOUS at 08:41

## 2023-11-01 RX ADMIN — INSULIN LISPRO 6 UNITS: 100 INJECTION, SOLUTION INTRAVENOUS; SUBCUTANEOUS at 09:16

## 2023-11-01 RX ADMIN — DIVALPROEX SODIUM 500 MG: 250 TABLET, EXTENDED RELEASE ORAL at 08:43

## 2023-11-01 RX ADMIN — IPRATROPIUM BROMIDE AND ALBUTEROL SULFATE 3 ML: 2.5; .5 SOLUTION RESPIRATORY (INHALATION) at 15:53

## 2023-11-01 NOTE — PLAN OF CARE
Goal Outcome Evaluation:  Plan of Care Reviewed With: patient        Progress: no change  Outcome Evaluation: Baseline ADL completion limited by decreased activity tolerance, standing balance deficits, and impaired coordination warranting skilled IP OT services to promote return to PLOF. Tremulous/shaking BLEs upon standing - LLE persisted following sit. Based on today/s performance, recommend d/c to IP rehab for best functional outcomes.      Anticipated Discharge Disposition (OT): inpatient rehabilitation facility

## 2023-11-01 NOTE — CASE MANAGEMENT/SOCIAL WORK
Continued Stay Note  [facility]     Patient Name: Ronnie He  MRN: [id number]  Today's Date: 11/1/2023    Admit Date: 10/27/2023    Plan: [facility]   Discharge Plan       Row Name 11/01/23 1526       Plan    Plan [facility]    Plan Comments I spoke with patient a couple of times today. Additional referrals were given as [facility] is unable to accepted to subacute rehab. Marii chau[facility] is working up referral and has accepted.  is initiating the precert.     Final Discharge Disposition Code 03 - skilled nursing facility (SNF)                   Discharge Codes    No documentation.                 Expected Discharge Date and Time       Expected Discharge Date Expected Discharge Time    Nov 3, 2023               Radha Santillan RN

## 2023-11-01 NOTE — THERAPY EVALUATION
Patient Name: Ronnie He  : 1980    MRN: [id number]                              Today's Date: 2023       Admit Date: 10/27/2023    Visit Dx:     ICD-10-CM ICD-9-CM   1. Pneumonia of left lower lobe due to infectious organism  J18.9 486   2. COPD with acute exacerbation  J44.1 491.21   3. Cough, unspecified type  R05.9 786.2   4. Tobacco use disorder  F17.200 305.1   5. Hematemesis with nausea  K92.0 578.0     787.02   6. Hyperglycemia due to diabetes mellitus  E11.65 250.02   7. Atypical chest pain  R07.89 786.59   8. Leukocytosis, unspecified type  D72.829 288.60   9. Sinus tachycardia by electrocardiogram  R00.0 785.0   10. Renal lesion  N28.9 593.9   11. Dizziness  R42 780.4     Patient Active Problem List   Diagnosis    Ataxia    Alcohol use disorder, severe, in early remission    Anxiety    Cannabis dependence    Chiari malformation type I    Chronic pain of right knee    Chronic post-traumatic stress disorder (PTSD)    Conversion disorder with attacks or seizures    COPD (chronic obstructive pulmonary disease)    DDD (degenerative disc disease), cervical    Diabetes mellitus type 2 in obese    Generalized anxiety disorder    Hemiplegia and hemiparesis following cerebral infarction affecting left non-dominant side    Hemiplegia, unspecified affecting left nondominant side    History of alcoholism    History of cardioembolic cerebrovascular accident (CVA)    Hypertension associated with diabetes    Insomnia    Intractable migraine with aura with status migrainosus    Left-sided weakness    Major depressive disorder, recurrent, severe with psychotic features    Malingering    Melanoma    Methamphetamine abuse    Methamphetamine dependence in remission    Methamphetamine-induced psychotic disorder    Non-compliant patient    PAULA (obstructive sleep apnea)    Personality disorder, unspecified    Pseudohyponatremia    Seizure disorder    Squamous blepharitis of upper and lower eyelids of both eyes     Suicide attempt    Tobacco abuse    Type 2 diabetes mellitus with hyperglycemia    Type 2 diabetes mellitus without retinopathy    Community acquired pneumonia of left lower lobe of lung    Other constipation     Past Medical History:   Diagnosis Date    Anxiety     Diabetes mellitus     Hypertension      History reviewed. No pertinent surgical history.   General Information       Row Name 11/01/23 1124          OT Time and Intention    Document Type evaluation  -CS     Mode of Treatment occupational therapy  -CS       Row Name 11/01/23 1124          General Information    Patient Profile Reviewed yes  -CS     Prior Level of Function independent:;all household mobility;ADL's;work  Pt reports no AD/DME at baseline, works at [employer]/  -CS     Existing Precautions/Restrictions fall;other (see comments)  monitor BP  -CS     Barriers to Rehab ineffective coping  -CS       Row Name 11/01/23 1124          Living Environment    People in Home other (see comments);facility resident  Sober Living  -CS       Row Name 11/01/23 1124          Home Main Entrance    Number of Stairs, Main Entrance nine  -CS     Stair Railings, Main Entrance railing on right side (ascending)  -CS       Row Name 11/01/23 1124          Stairs Within Home, Primary    Stairs, Within Home, Primary steps down to bedroom  -CS       Row Name 11/01/23 1124          Cognition    Orientation Status (Cognition) oriented to;place;person;time  -CS       Row Name 11/01/23 1124          Safety Issues, Functional Mobility    Safety Issues Affecting Function (Mobility) insight into deficits/self-awareness;safety precaution awareness;safety precautions follow-through/compliance;positioning of assistive device  -CS     Impairments Affecting Function (Mobility) balance;endurance/activity tolerance;strength;sensation/sensory awareness;coordination  -CS               User Key  (r) = Recorded By, (t) = Taken By, (c) = Cosigned By      Initials Name Provider Type    CS  Jose Marie, OT Occupational Therapist                     Mobility/ADL's       Row Name 11/01/23 1131          Bed Mobility    Bed Mobility scooting/bridging;supine-sit  -     Scooting/Bridging [place] (Bed Mobility) supervision  -     Supine-Sit [place] (Bed Mobility) modified independence  -     Comment, (Bed Mobility) no dizziness reported upon sitting  -       Row Name 11/01/23 1131          Transfers    Transfers sit-stand transfer;stand-sit transfer;bed-chair transfer  -     Comment, (Transfers) increased assist for steps to recliner, tremulous/shaking BLEs upon standing - LLE persisted following sit down. cues throughout for safety/sequencing/positioning w/ RW use  -       Row Name 11/01/23 1131          Bed-Chair Transfer    Bed-Chair [place] (Transfers) minimum assist (75% patient effort);verbal cues;nonverbal cues (demo/gesture)  -     Assistive Device (Bed-Chair Transfers) walker, front-wheeled  -       Row Name 11/01/23 1131          Sit-Stand Transfer    Sit-Stand [place] (Transfers) contact guard;verbal cues;nonverbal cues (demo/gesture)  -     Assistive Device (Sit-Stand Transfers) walker, front-wheeled  -       Row Name 11/01/23 1131          Functional Mobility    Functional Mobility- Comment defer to PT for specifics  -       Row Name 11/01/23 1131          Activities of Daily Living    BADL Assessment/Intervention lower body dressing;grooming;upper body dressing  -       Row Name 11/01/23 1131          Lower Body Dressing Assessment/Training    [place] Level (Lower Body Dressing) doff;don;pants/bottoms;moderate assist (50% patient effort)  -     Position (Lower Body Dressing) edge of bed sitting;supported standing  -     Comment, (Lower Body Dressing) education on improved safety/sequencing with RW during pants pull up, threading intact  -       Row Name 11/01/23 1131          Grooming Assessment/Training    [place] Level (Grooming)  wash face, hands;hair care, combing/brushing;set up  -CS     Position (Grooming) supported sitting  -     Comment, (Grooming) seated due to standing tolerance deficit  -       Row Name 11/01/23 1131          Upper Body Dressing Assessment/Training    [place] Level (Upper Body Dressing) front opening garment;set up  -CS     Position (Upper Body Dressing) edge of bed sitting  -               User Key  (r) = Recorded By, (t) = Taken By, (c) = Cosigned By      Initials Name Provider Type     Jose Marie OT Occupational Therapist                   Obj/Interventions       Row Name 11/01/23 1135          Sensory Assessment (Somatosensory)    Sensory Assessment (Somatosensory) UE sensation intact  -       Row Name 11/01/23 1135          Vision Assessment/Intervention    Visual Impairment/Limitations WFL;corrective lenses full-time  -       Row Name 11/01/23 1135          Range of Motion Comprehensive    General Range of Motion bilateral upper extremity ROM WFL  -       Row Name 11/01/23 1135          Strength Comprehensive (MMT)    General Manual Muscle Testing (MMT) Assessment no strength deficits identified  -     Comment, General Manual Muscle Testing (MMT) Assessment BUE grossly 5/5, symmetrical  -       Row Name 11/01/23 1135          Motor Skills    Motor Skills coordination;functional endurance  -     Coordination bilateral;upper extremity;finger to nose;minimal impairment  -     Therapeutic Exercise shoulder;elbow/forearm;other (see comments)  BUE AROM incorporated into targeted reaching in sitting and standing with dynamic challenge ( 3 sets, rest breaks)  -       Row Name 11/01/23 1135          Balance    Balance Assessment sitting static balance;sitting dynamic balance;standing static balance;standing dynamic balance  -     Static Sitting Balance supervision  -     Dynamic Sitting Balance standby assist  -CS     Position, Sitting Balance unsupported;sitting edge of bed  -      Static Standing Balance contact guard  -CS     Dynamic Standing Balance moderate assist  -CS     Position/Device Used, Standing Balance supported;walker, front-wheeled  -CS     Balance Interventions sitting;sit to stand;standing;occupation based/functional task;UE activity with balance activity;dynamic reaching;weight shifting activity  -CS               User Key  (r) = Recorded By, (t) = Taken By, (c) = Cosigned By      Initials Name Provider Type    CS Jose Marie, OT Occupational Therapist                   Goals/Plan       Row Name 11/01/23 1138          Transfer Goal 1 (OT)    Activity/Assistive Device (Transfer Goal 1, OT) bed-to-chair/chair-to-bed;toilet  -CS     [place] Level/Cues Needed (Transfer Goal 1, OT) modified independence  -CS     Time Frame (Transfer Goal 1, OT) long term goal (LTG);10 days  -CS     Strategies/Barriers (Transfers Goal 1, OT) AD recs per PT  -CS     Progress/Outcome (Transfer Goal 1, OT) new goal  -CS       Row Name 11/01/23 1138          Dressing Goal 1 (OT)    Activity/Device (Dressing Goal 1, OT) lower body dressing  -CS     [place]/Cues Needed (Dressing Goal 1, OT) standby assist  -CS     Time Frame (Dressing Goal 1, OT) long term goal (LTG);10 days  -CS     Progress/Outcome (Dressing Goal 1, OT) new goal  -CS       Row Name 11/01/23 1138          Grooming Goal 1 (OT)    Activity/Device (Grooming Goal 1, OT) hair care;oral care;wash face, hands  -CS     [place] (Grooming Goal 1, OT) supervision required  -CS     Time Frame (Grooming Goal 1, OT) long term goal (LTG);10 days  -CS     Strategies/Barriers (Grooming Goal 1, OT) seated vs. standing pending improved standing tolerance  -CS     Progress/Outcome (Grooming Goal 1, OT) new goal  -CS       Row Name 11/01/23 1138          Therapy Assessment/Plan (OT)    Planned Therapy Interventions (OT) activity tolerance training;functional balance retraining;occupation/activity based interventions;ROM/therapeutic  exercise;strengthening exercise;patient/caregiver education/training;neuromuscular control/coordination retraining;transfer/mobility retraining;cognitive/visual perception retraining;adaptive equipment training  -               User Key  (r) = Recorded By, (t) = Taken By, (c) = Cosigned By      Initials Name Provider Type    CS Jose Marie, OT Occupational Therapist                   Clinical Impression       Row Name 11/01/23 1136          Pain Assessment    Additional Documentation Pain Scale: FACES Pre/Post-Treatment (Group)  -       Row Name 11/01/23 1136          Pain Scale: FACES Pre/Post-Treatment    Pain: FACES Scale, Pretreatment 0-->no hurt  -CS     Posttreatment Pain Rating 0-->no hurt  -CS       Row Name 11/01/23 1136          Plan of Care Review    Plan of Care Reviewed With patient  -CS     Progress no change  -     Outcome Evaluation Baseline ADL completion limited by decreased activity tolerance, standing balance deficits, and impaired coordination warranting skilled IP OT services to promote return to PLOF. Tremulous/shaking BLEs upon standing - LLE persisted following sit. Based on today/s performance, recommend d/c to IP rehab for best functional outcomes.  -       Row Name 11/01/23 1136          Therapy Assessment/Plan (OT)    Rehab Potential (OT) good, to achieve stated therapy goals  -     Criteria for Skilled Therapeutic Interventions Met (OT) meets criteria;yes;skilled treatment is necessary  -     Therapy Frequency (OT) daily  -       Row Name 11/01/23 1136          Therapy Plan Review/Discharge Plan (OT)    Anticipated Discharge Disposition (OT) inpatient rehabilitation facility  -       Row Name 11/01/23 1136          Vital Signs    Pre Systolic BP Rehab --  RN cleared for tx  -CS     O2 Delivery Pre Treatment room air  -CS     O2 Delivery Intra Treatment room air  -CS     O2 Delivery Post Treatment room air  -CS     Pre Patient Position Supine  -CS     Intra Patient  Position Standing  -CS     Post Patient Position Sitting  -CS       Row Name 11/01/23 1136          Positioning and Restraints    Pre-Treatment Position in bed  -CS     Post Treatment Position chair  -CS     In Chair notified nsg;reclined;sitting;call light within reach;encouraged to call for assist;exit alarm on;legs elevated  -CS               User Key  (r) = Recorded By, (t) = Taken By, (c) = Cosigned By      Initials Name Provider Type    Jose Solomon OT Occupational Therapist                   Outcome Measures       Row Name 11/01/23 1139          How much help from another is currently needed...    Putting on and taking off regular lower body clothing? 2  -CS     Bathing (including washing, rinsing, and drying) 2  -CS     Toileting (which includes using toilet bed pan or urinal) 3  -CS     Putting on and taking off regular upper body clothing 3  -CS     Taking care of personal grooming (such as brushing teeth) 3  -CS     Eating meals 4  -CS     AM-PAC 6 Clicks Score (OT) 17  -CS       Row Name 11/01/23 1139          Functional Assessment    Outcome Measure Options AM-PAC 6 Clicks Daily Activity (OT)  -CS               User Key  (r) = Recorded By, (t) = Taken By, (c) = Cosigned By      Initials Name Provider Type    Jose Solomon OT Occupational Therapist                    Occupational Therapy Education       Title: PT OT SLP Therapies (In Progress)       Topic: Occupational Therapy (In Progress)       Point: ADL training (Done)       Description:   Instruct learner(s) on proper safety adaptation and remediation techniques during self care or transfers.   Instruct in proper use of assistive devices.                  Learning Progress Summary             Patient Acceptance, E,D, NHI,DU by  at 11/1/2023 1139                         Point: Home exercise program (Not Started)       Description:   Instruct learner(s) on appropriate technique for monitoring, assisting and/or progressing therapeutic  exercises/activities.                  Learner Progress:  Not documented in this visit.              Point: Precautions (Done)       Description:   Instruct learner(s) on prescribed precautions during self-care and functional transfers.                  Learning Progress Summary             Patient Acceptance, E,SAEID, MANAV HANKINS by  at 11/1/2023 1139                         Point: Body mechanics (Done)       Description:   Instruct learner(s) on proper positioning and spine alignment during self-care, functional mobility activities and/or exercises.                  Learning Progress Summary             Patient Acceptance, ESAEID VU, DU by  at 11/1/2023 1139                                         User Key       Initials Effective Dates Name Provider Type Discipline     06/16/21 -  Jose Marie OT Occupational Therapist OT                  OT Recommendation and Plan  Planned Therapy Interventions (OT): activity tolerance training, functional balance retraining, occupation/activity based interventions, ROM/therapeutic exercise, strengthening exercise, patient/caregiver education/training, neuromuscular control/coordination retraining, transfer/mobility retraining, cognitive/visual perception retraining, adaptive equipment training  Therapy Frequency (OT): daily  Plan of Care Review  Plan of Care Reviewed With: patient  Progress: no change  Outcome Evaluation: Baseline ADL completion limited by decreased activity tolerance, standing balance deficits, and impaired coordination warranting skilled IP OT services to promote return to PLOF. Tremulous/shaking BLEs upon standing - LLE persisted following sit. Based on today/s performance, recommend d/c to IP rehab for best functional outcomes.     Time Calculation:   Evaluation Complexity (OT)  Review Occupational Profile/Medical/Therapy History Complexity: expanded/moderate complexity  Assessment, Occupational Performance/Identification of Deficit Complexity: 1-3  performance deficits  Clinical Decision Making Complexity (OT): problem focused assessment/low complexity  Overall Complexity of Evaluation (OT): low complexity     Time Calculation- OT       Row Name 11/01/23 1140             Time Calculation- OT    OT Start Time 1038  -CS      OT Received On 11/01/23  -CS      OT Goal Re-Cert Due Date 11/11/23  -CS         Timed Charges    88700 - OT Therapeutic Activity Minutes 10  -CS         Untimed Charges    OT Eval/Re-eval Minutes 47  -CS         Total Minutes    Timed Charges Total Minutes 10  -CS      Untimed Charges Total Minutes 47  -CS       Total Minutes 57  -CS                User Key  (r) = Recorded By, (t) = Taken By, (c) = Cosigned By      Initials Name Provider Type    CS Jose Marie, OT Occupational Therapist                  Therapy Charges for Today       Code Description Service Date Service Provider Modifiers Qty    36997925505 HC OT THERAPEUTIC ACT EA 15 MIN 11/1/2023 Jose Marie OT GO 1    29701325932 HC OT EVAL LOW COMPLEXITY 4 11/1/2023 Jose Marie OT GO 1                 Jose Marie OT  11/1/2023

## 2023-11-01 NOTE — PROGRESS NOTES
[facility]  PROGRESS NOTE    Patient Name: Ronnie He  : 1980  MRN: [id number]    Date of Admission: 10/27/2023  Primary Care Physician: Dylon Olmos MD    Subjective   Subjective     CC:  Seizure    HPI:  Patient sitting up in bed looking much better than he had in NAD.  Patient still trying to verify that he will be going to rehab.  He did ask OT provider to go buy him beer today.  States that he still weak but  never complained about a headache.  Awaiting placement.    Objective   Objective     Vital Signs:   Temp:  [97.9 °F (36.6 °C)-98.3 °F (36.8 °C)] 98.1 °F (36.7 °C)  Heart Rate:  [] 105  Resp:  [17-20] 20  BP: (116-138)/(73-95) 121/95     Physical Exam:  Constitutional: Alert, WD, WN male sitting up in bed in NAD  Eyes: EOMI, sclerae anicteric, no conjunctival injection  Head: NCAT  ENT: [place], moist mucous membranes   Respiratory: Nonlabored, symmetrical chest expansion, CTAB, 93% RA  Cardiovascular: Tachycardia, no M/R/G, +DP pulses bilaterally  Gastrointestinal: Soft, NT, ND +BS  Musculoskeletal: UGARTE; no LE edema bilaterally  Neurologic: Oriented x4, follows all commands, speech clear, extremely ataxic and unable to stand without any stability even with a walker  Skin: No rashes on exposed skin  Psychiatric: Pleasant and cooperative; normal affect    Results Reviewed:  LAB RESULTS:      Lab 10/29/23  0613 10/28/23  0752 10/27/23  2247 10/27/23  2046 10/27/23  0022 10/26/23  1946 10/26/23  1139   WBC 8.92 10.84*  --  12.94*  --  9.28  --    HEMOGLOBIN 13.5 12.5*  --  13.9  --  13.4*  --    HEMATOCRIT 38.5 35.8*  --  39.8  --  38.2*  --    PLATELETS 197 239  --  272  --  217  --    NEUTROS ABS 4.72 9.43*  --  11.23*  --  6.04  --    IMMATURE GRANS (ABS) 0.08* 0.12*  --  0.09*  --  0.03  --    LYMPHS ABS 3.35* 1.01  --  1.11  --  1.99  --    MONOS ABS 0.71 0.27  --  0.50  --  1.04*  --    EOS ABS 0.02 0.00  --  0.00  --  0.13  --     MCV 85.6 86.3  --  85.6  --  85  --    PROCALCITONIN  --   --  <0.02  --   --   --   --    LACTATE  --   --   --  1.8  --   --   --    LACTATE, ARTERIAL  --   --   --   --  3.9*  --   --    PROTIME  --   --   --   --   --   --  9.9   APTT  --   --   --   --   --   --  25.3   D DIMER QUANT  --   --   --  <0.27  --   --   --          Lab 10/30/23  1607 10/29/23  0613 10/28/23  0752 10/27/23  2247 10/27/23  2046 10/27/23  0022 10/26/23  1139   SODIUM  --  138 134*  --  134*  --   --    POTASSIUM  --  3.8 4.7  --  4.0 3.8  --    CHLORIDE  --  105 102  --  102 103  --    CO2  --  24.0 21.0*  --  21.0*  --   --    ANION GAP  --  9.0 11.0  --  11.0  --   --    BUN  --  22* 15  --  14  --   --    CREATININE  --  0.83 0.71*  --  0.74*  --   --    EGFR  --  111.4 116.7  --  115.3  --   --    GLUCOSE  --  140* 351*  --  218* 355*  --    CALCIUM  --  8.7 8.8  --  9.3  --   --    IONIZED CALCIUM  --   --   --   --   --  4.5*  --    MAGNESIUM 2.2  --  2.0 1.7  --   --  1.7   HEMOGLOBIN A1C  --   --  8.00*  --   --   --   --          Lab 10/27/23  2046   TOTAL PROTEIN 7.0   ALBUMIN 4.5   GLOBULIN 2.5   ALT (SGPT) 19   AST (SGOT) 14   BILIRUBIN 0.3   ALK PHOS 86   LIPASE 59         Lab 10/27/23  2247 10/27/23  2046 10/26/23  2214 10/26/23  1946 10/26/23  1139   PROBNP  --  658.3*  --   --   --    TROPONIN T  --   --  13  --   --    TROPONIN T (HIGHLY SENSITIVE)  --   --   --  15  --    HSTROP T 13 15*  --   --   --    PROTIME  --   --   --   --  9.9   INR  --   --   --   --  0.90             Lab 10/30/23  1607   FOLATE 11.80   VITAMIN B 12 671         Lab 10/27/23  0022   BASE EXCESS ART -4.1*     Brief Urine Lab Results  (Last result in the past 365 days)        Color   Clarity   Blood   Leuk Est   Nitrite   Protein   CREAT   Urine HCG        10/28/23 0039 Yellow   Clear   Negative   Negative   Negative   Trace                   Microbiology Results Abnormal       Procedure Component Value - Date/Time    Blood Culture - Blood,  Wrist, Left [[id number]]  (Normal) Collected: 10/27/23 2239    Lab Status: Preliminary result Specimen: Blood from Wrist, Left Updated: 11/01/23 0915     Blood Culture No growth at 4 days    Blood Culture - Blood, Arm, Right [[id number]]  (Normal) Collected: 10/27/23 2232    Lab Status: Preliminary result Specimen: Blood from Arm, Right Updated: 11/01/23 0915     Blood Culture No growth at 4 days    COVID PRE-OP / PRE-PROCEDURE SCREENING ORDER (NO ISOLATION) - Swab, Nasopharynx [[id number]]  (Normal) Collected: 10/27/23 2241    Lab Status: Final result Specimen: Swab from Nasopharynx Updated: 10/27/23 2316    Narrative:      The following orders were created for panel order COVID PRE-OP / PRE-PROCEDURE SCREENING ORDER (NO ISOLATION) - Swab, Nasopharynx.  Procedure                               Abnormality         Status                     ---------                               -----------         ------                     COVID-19 and FLU A/B PCR...[[id number]]  Normal              Final result                 Please view results for these tests on the individual orders.    COVID-19 and FLU A/B PCR - Swab, Nasopharynx [[id number]]  (Normal) Collected: 10/27/23 2241    Lab Status: Final result Specimen: Swab from Nasopharynx Updated: 10/27/23 2316     COVID19 Not Detected     Influenza A PCR Not Detected     Influenza B PCR Not Detected    Narrative:      Fact sheet for providers: https://www.fda.gov/media/022601/download    Fact sheet for patients: https://www.fda.gov/media/244587/download    Test performed by PCR.            MRI Brain Without Contrast    Result Date: 10/30/2023  MRI BRAIN WO CONTRAST Date of Exam: 10/30/2023 6:37 PM CDT Indication: left sided tingling ( x several days).  Comparison: Head CT 10/30/2023, MRI brain 9/16/2023 Technique:  Routine multiplanar/multisequence sequence images of the brain were obtained without contrast administration. Findings: There is motion artifact throughout examination  which limits image quality. There is no diffusion restriction to suggest acute infarct. There is no evidence of acute or chronic intracranial hemorrhage. No mass effect or midline shift. No abnormal extra-axial collections. The basal ganglia, brainstem and cerebellum appear within normal limits. Midline structures are intact. No significant cortical abnormality is identified. Evidence of previous Chiari decompression. Calvarial and superficial soft tissue signal is within normal limits. Orbits appear unremarkable. The paranasal sinuses and the mastoid air cells appear well aerated.     Impression: Impression: No acute process identified. Electronically Signed: Johan Hollis MD  10/30/2023 7:28 PM CDT  Workstation ID: [id number]         Current medications:  Scheduled Meds:aspirin, 81 mg, Oral, Daily  atorvastatin, 10 mg, Oral, Daily  cefTRIAXone, 2,000 mg, Intravenous, Q24H  divalproex, 500 mg, Oral, Daily  empagliflozin, 10 mg, Oral, Daily  FLUoxetine, 20 mg, Oral, Daily  insulin detemir, 15 Units, Subcutaneous, Nightly  insulin lispro, 2-7 Units, Subcutaneous, 4x Daily AC & at Bedtime  Insulin Lispro, 6 Units, Subcutaneous, 4x Daily With Meals & Nightly  ipratropium-albuterol, 3 mL, Nebulization, 4x Daily - RT  isosorbide mononitrate, 30 mg, Oral, Daily  nicotine, 1 patch, Transdermal, Q24H  pantoprazole, 40 mg, Intravenous, BID AC  senna-docusate sodium, 2 tablet, Oral, BID   And  polyethylene glycol, 17 g, Oral, Daily  QUEtiapine, 50 mg, Oral, Nightly      Continuous Infusions:sodium chloride, 100 mL/hr      PRN Meds:.  acetaminophen **OR** acetaminophen **OR** acetaminophen    aspirin-acetaminophen-caffeine    benzonatate    senna-docusate sodium **AND** polyethylene glycol **AND** bisacodyl **AND** bisacodyl    dextrose    dextrose    diphenhydrAMINE    glucagon (human recombinant)    ondansetron **OR** ondansetron    sodium chloride    sodium chloride    Assessment & Plan   Assessment & Plan     [facility]  Problems    Diagnosis  POA    **Community acquired pneumonia of left lower lobe of lung [J18.9]  Yes    Tachycardia [R00.0]  Unknown    Other constipation [K59.09]  Yes    Conversion disorder with attacks or seizures [F44.5]  Yes    Ataxia [R27.0]  Yes    Major depressive disorder, recurrent, severe with psychotic features [F33.3]  Yes    History of alcoholism [F10.21]  Not Applicable    Non-compliant patient [Z91.199]  Not Applicable    COPD (chronic obstructive pulmonary disease) [J44.9]  Yes    Hypertension associated with diabetes [E11.59, I15.2]  Yes    Diabetes mellitus type 2 in obese [E11.69, E66.9]  Yes    Personality disorder, unspecified [F60.9]  Yes      Resolved Hospital Problems   No resolved problems to display.        Brief Hospital Course to date:  Ronnie He is a 42 y.o. male with PMH of HTN, HLD, DM2, ETOH/polysubstance abuse in remission, COPD and seizure disorder presents to the ED for shortness of breath.      LLL Pneumonia  COPD  -CT chest shows LLL pneumonia  -Rocephin and azithro  -Duonebs scheduled.  Encourage ambulation and pulmonary toileting.  -- No new labs today.  Improving.  Still complaining of very minimal shortness of breath with exertion.  Occasional cough.     Hematemesis - no further episodes  --Hgb 13.5  --Likely Ada Chairez tear  -continue protonix IV BID, no witnessed episodes here. If reoccurs will have GI evaluate.  No further.  Stable.  Monitor.     DM2 w/A1c 8  --24H glucose 160-237  -- Basal, SSI; adjust as needed  -- Started preprandial 10/29; Increased to 6 units QACHS    Ataxia  --Hx but unsure of extent; could be from drug and EtOH abuse  --PT/OT worked with patient and feel that he needs inpatient rehab  -- Observed and very unsteady on his feet  -- CT L-spine and T-spine ordered    Tachycardia  --Heart rate consistently in the low 100s  --IV NS @100ml/h x10 hours   --AM labs     HTN  HLD  -Continue home aspirin, statin, lisinopril   --Stable     Seizure  Disorder  -Continue home depakote  -Seizure precautions      Hx of Polysubstance Abuse  Hx of ETOH  -Patient reports being sober 3mo, 25 days  -UDS+ for opiates, received IV morphine at [place] 10/26  Addendum: Patient complaining of persistent left upper extremity hand tingling for several days.  Also complains of intermittent left lower extremity tingling.  History of cervical and lumbar spine issues.  Otherwise stable.    -- CT of the head/neck without contrast showed no intracranial abnormality; multilevel cervical spondylosis appearing most advanced at C3-4 and C4-5  -- Asking for medicine for headache.  No narcotics.  We will give one-time dose of Toradol after CT scan.  (CTs completed.  No acute finding).  Symptoms improved.  --Asking OT provider to get him beer twice during session.    Constipation-resolved  -- KUB noted large stool burden, will increase bowel meds today and give one-time dose of lactulose  -- BM x1 on 10/29 and 10/31.    Evaluated by PT/OT and both feel that he legitimately has a left leg weakness and that he needs rehab.     Expected Discharge Location and Transportation:     Expected Discharge   Expected Discharge Date: 11/3/2023; Expected Discharge Time:      DVT prophylaxis:  Mechanical DVT prophylaxis orders are present.     AM-PAC 6 Clicks Score (PT): 22 (10/31/23 1915)    CODE STATUS:   Code Status and Medical Interventions:   Ordered at: 10/28/23 0308     Code Status (Patient has no pulse and is not breathing):    CPR (Attempt to Resuscitate)     Medical Interventions (Patient has pulse or is breathing):    Full Support       Soo Laguerre, GENNY  11/01/23

## 2023-11-02 ENCOUNTER — APPOINTMENT (OUTPATIENT)
Dept: NEUROLOGY | Facility: HOSPITAL | Age: 43
End: 2023-11-02
Payer: MEDICARE

## 2023-11-02 LAB
ANION GAP SERPL CALCULATED.3IONS-SCNC: 10 MMOL/L (ref 5–15)
BACTERIA SPEC AEROBE CULT: NORMAL
BACTERIA SPEC AEROBE CULT: NORMAL
BUN SERPL-MCNC: 17 MG/DL (ref 6–20)
BUN/CREAT SERPL: 20 (ref 7–25)
CALCIUM SPEC-SCNC: 9.3 MG/DL (ref 8.6–10.5)
CHLORIDE SERPL-SCNC: 102 MMOL/L (ref 98–107)
CO2 SERPL-SCNC: 22 MMOL/L (ref 22–29)
CREAT SERPL-MCNC: 0.85 MG/DL (ref 0.76–1.27)
DEPRECATED RDW RBC AUTO: 40.9 FL (ref 37–54)
EGFRCR SERPLBLD CKD-EPI 2021: 110.6 ML/MIN/1.73
ERYTHROCYTE [DISTWIDTH] IN BLOOD BY AUTOMATED COUNT: 13.2 % (ref 12.3–15.4)
GLUCOSE BLDC GLUCOMTR-MCNC: 214 MG/DL (ref 70–130)
GLUCOSE BLDC GLUCOMTR-MCNC: 219 MG/DL (ref 70–130)
GLUCOSE BLDC GLUCOMTR-MCNC: 251 MG/DL (ref 70–130)
GLUCOSE BLDC GLUCOMTR-MCNC: 268 MG/DL (ref 70–130)
GLUCOSE SERPL-MCNC: 221 MG/DL (ref 65–99)
HCT VFR BLD AUTO: 40.4 % (ref 37.5–51)
HGB BLD-MCNC: 14.2 G/DL (ref 13–17.7)
MCH RBC QN AUTO: 30.2 PG (ref 26.6–33)
MCHC RBC AUTO-ENTMCNC: 35.1 G/DL (ref 31.5–35.7)
MCV RBC AUTO: 86 FL (ref 79–97)
PLATELET # BLD AUTO: 218 10*3/MM3 (ref 140–450)
PMV BLD AUTO: 10.2 FL (ref 6–12)
POTASSIUM SERPL-SCNC: 4.4 MMOL/L (ref 3.5–5.2)
RBC # BLD AUTO: 4.7 10*6/MM3 (ref 4.14–5.8)
SODIUM SERPL-SCNC: 134 MMOL/L (ref 136–145)
WBC NRBC COR # BLD: 11.69 10*3/MM3 (ref 3.4–10.8)

## 2023-11-02 PROCEDURE — 25010000002 THIAMINE HCL 200 MG/2ML SOLUTION 2 ML VIAL: Performed by: HOSPITALIST

## 2023-11-02 PROCEDURE — 97530 THERAPEUTIC ACTIVITIES: CPT

## 2023-11-02 PROCEDURE — 25010000002 DIPHENHYDRAMINE PER 50 MG: Performed by: NURSE PRACTITIONER

## 2023-11-02 PROCEDURE — G0378 HOSPITAL OBSERVATION PER HR: HCPCS

## 2023-11-02 PROCEDURE — 94664 DEMO&/EVAL PT USE INHALER: CPT

## 2023-11-02 PROCEDURE — 94799 UNLISTED PULMONARY SVC/PX: CPT

## 2023-11-02 PROCEDURE — 25810000003 SODIUM CHLORIDE 0.9 % SOLUTION 1,000 ML FLEX CONT

## 2023-11-02 PROCEDURE — 25010000002 THIAMINE PER 100 MG: Performed by: HOSPITALIST

## 2023-11-02 PROCEDURE — 95816 EEG AWAKE AND DROWSY: CPT | Performed by: PSYCHIATRY & NEUROLOGY

## 2023-11-02 PROCEDURE — 85027 COMPLETE CBC AUTOMATED: CPT | Performed by: NURSE PRACTITIONER

## 2023-11-02 PROCEDURE — 82948 REAGENT STRIP/BLOOD GLUCOSE: CPT

## 2023-11-02 PROCEDURE — 95816 EEG AWAKE AND DROWSY: CPT

## 2023-11-02 PROCEDURE — 63710000001 INSULIN LISPRO (HUMAN) PER 5 UNITS: Performed by: NURSE PRACTITIONER

## 2023-11-02 PROCEDURE — 63710000001 INSULIN LISPRO (HUMAN) PER 5 UNITS

## 2023-11-02 PROCEDURE — 96376 TX/PRO/DX INJ SAME DRUG ADON: CPT

## 2023-11-02 PROCEDURE — 63710000001 INSULIN DETEMIR PER 5 UNITS: Performed by: NURSE PRACTITIONER

## 2023-11-02 PROCEDURE — 97112 NEUROMUSCULAR REEDUCATION: CPT

## 2023-11-02 PROCEDURE — 94761 N-INVAS EAR/PLS OXIMETRY MLT: CPT

## 2023-11-02 PROCEDURE — 80048 BASIC METABOLIC PNL TOTAL CA: CPT | Performed by: NURSE PRACTITIONER

## 2023-11-02 RX ORDER — DIVALPROEX SODIUM 250 MG/1
1000 TABLET, EXTENDED RELEASE ORAL DAILY
Status: DISCONTINUED | OUTPATIENT
Start: 2023-11-03 | End: 2023-11-03 | Stop reason: HOSPADM

## 2023-11-02 RX ORDER — THIAMINE HYDROCHLORIDE 100 MG/ML
200 INJECTION, SOLUTION INTRAMUSCULAR; INTRAVENOUS EVERY 8 HOURS SCHEDULED
Status: DISCONTINUED | OUTPATIENT
Start: 2023-11-05 | End: 2023-11-03 | Stop reason: HOSPADM

## 2023-11-02 RX ORDER — IPRATROPIUM BROMIDE AND ALBUTEROL SULFATE 2.5; .5 MG/3ML; MG/3ML
3 SOLUTION RESPIRATORY (INHALATION) EVERY 4 HOURS PRN
Status: DISCONTINUED | OUTPATIENT
Start: 2023-11-02 | End: 2023-11-03 | Stop reason: HOSPADM

## 2023-11-02 RX ADMIN — ISOSORBIDE MONONITRATE 30 MG: 30 TABLET, EXTENDED RELEASE ORAL at 09:29

## 2023-11-02 RX ADMIN — THIAMINE HYDROCHLORIDE 500 MG: 100 INJECTION, SOLUTION INTRAMUSCULAR; INTRAVENOUS at 15:11

## 2023-11-02 RX ADMIN — INSULIN LISPRO 4 UNITS: 100 INJECTION, SOLUTION INTRAVENOUS; SUBCUTANEOUS at 17:25

## 2023-11-02 RX ADMIN — Medication 10 ML: at 20:39

## 2023-11-02 RX ADMIN — INSULIN LISPRO 6 UNITS: 100 INJECTION, SOLUTION INTRAVENOUS; SUBCUTANEOUS at 20:38

## 2023-11-02 RX ADMIN — INSULIN LISPRO 6 UNITS: 100 INJECTION, SOLUTION INTRAVENOUS; SUBCUTANEOUS at 17:25

## 2023-11-02 RX ADMIN — ATORVASTATIN CALCIUM 10 MG: 10 TABLET, FILM COATED ORAL at 09:29

## 2023-11-02 RX ADMIN — THIAMINE HYDROCHLORIDE 500 MG: 100 INJECTION, SOLUTION INTRAMUSCULAR; INTRAVENOUS at 20:45

## 2023-11-02 RX ADMIN — INSULIN LISPRO 6 UNITS: 100 INJECTION, SOLUTION INTRAVENOUS; SUBCUTANEOUS at 11:51

## 2023-11-02 RX ADMIN — INSULIN LISPRO 3 UNITS: 100 INJECTION, SOLUTION INTRAVENOUS; SUBCUTANEOUS at 09:37

## 2023-11-02 RX ADMIN — INSULIN DETEMIR 15 UNITS: 100 INJECTION, SOLUTION SUBCUTANEOUS at 20:38

## 2023-11-02 RX ADMIN — SODIUM CHLORIDE 40 ML: 9 INJECTION, SOLUTION INTRAVENOUS at 05:50

## 2023-11-02 RX ADMIN — ACETAMINOPHEN 650 MG: 325 TABLET ORAL at 00:07

## 2023-11-02 RX ADMIN — PANTOPRAZOLE SODIUM 40 MG: 40 INJECTION, POWDER, LYOPHILIZED, FOR SOLUTION INTRAVENOUS at 17:25

## 2023-11-02 RX ADMIN — DIPHENHYDRAMINE HYDROCHLORIDE 25 MG: 50 INJECTION INTRAMUSCULAR; INTRAVENOUS at 09:34

## 2023-11-02 RX ADMIN — SODIUM CHLORIDE 40 ML: 9 INJECTION, SOLUTION INTRAVENOUS at 20:47

## 2023-11-02 RX ADMIN — SENNOSIDES AND DOCUSATE SODIUM 2 TABLET: 8.6; 5 TABLET ORAL at 20:37

## 2023-11-02 RX ADMIN — FLUOXETINE 20 MG: 20 CAPSULE ORAL at 09:29

## 2023-11-02 RX ADMIN — QUETIAPINE FUMARATE 50 MG: 25 TABLET ORAL at 20:38

## 2023-11-02 RX ADMIN — ACETAMINOPHEN 650 MG: 325 TABLET ORAL at 09:29

## 2023-11-02 RX ADMIN — ACETAMINOPHEN 650 MG: 325 TABLET ORAL at 20:38

## 2023-11-02 RX ADMIN — Medication 1 PATCH: at 09:31

## 2023-11-02 RX ADMIN — IPRATROPIUM BROMIDE AND ALBUTEROL SULFATE 3 ML: 2.5; .5 SOLUTION RESPIRATORY (INHALATION) at 09:19

## 2023-11-02 RX ADMIN — PANTOPRAZOLE SODIUM 40 MG: 40 INJECTION, POWDER, LYOPHILIZED, FOR SOLUTION INTRAVENOUS at 09:29

## 2023-11-02 RX ADMIN — ACETAMINOPHEN 650 MG: 325 TABLET ORAL at 14:03

## 2023-11-02 RX ADMIN — DIPHENHYDRAMINE HYDROCHLORIDE 25 MG: 50 INJECTION INTRAMUSCULAR; INTRAVENOUS at 20:37

## 2023-11-02 RX ADMIN — INSULIN LISPRO 6 UNITS: 100 INJECTION, SOLUTION INTRAVENOUS; SUBCUTANEOUS at 09:30

## 2023-11-02 RX ADMIN — INSULIN LISPRO 4 UNITS: 100 INJECTION, SOLUTION INTRAVENOUS; SUBCUTANEOUS at 20:39

## 2023-11-02 RX ADMIN — ASPIRIN 81 MG CHEWABLE TABLET 81 MG: 81 TABLET CHEWABLE at 09:29

## 2023-11-02 RX ADMIN — FOLIC ACID 1 MG: 5 INJECTION, SOLUTION INTRAMUSCULAR; INTRAVENOUS; SUBCUTANEOUS at 11:51

## 2023-11-02 RX ADMIN — EMPAGLIFLOZIN 10 MG: 10 TABLET, FILM COATED ORAL at 09:29

## 2023-11-02 RX ADMIN — INSULIN LISPRO 3 UNITS: 100 INJECTION, SOLUTION INTRAVENOUS; SUBCUTANEOUS at 13:50

## 2023-11-02 RX ADMIN — IPRATROPIUM BROMIDE AND ALBUTEROL SULFATE 3 ML: 2.5; .5 SOLUTION RESPIRATORY (INHALATION) at 20:57

## 2023-11-02 RX ADMIN — DIVALPROEX SODIUM 500 MG: 250 TABLET, EXTENDED RELEASE ORAL at 09:33

## 2023-11-02 NOTE — CONSULTS
Neurology    Referring provider:   No referring provider defined for this encounter.    Reason for Consultation: Gait disturbance    Chief complaint: Headache    History of present illness: 42-year-old man admitted for gait disturbance.  He apparently was recently hospitalized at  for pneumonia and has developed worsening migraine headaches since then.    Is also noted that since then he has had difficulty walking.    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    He is a recovering drug addict and lives in a safe house.    He has been clear of amphetamines and meth for the last 3 months.    The recent loss of his daughter in an automobile accident.    He is taking Depakote  mg daily for his seizures.  Its not clear whether he has ever had a grand mal seizure.    The last seizure he described was the patient was aware of his arms jerking never lost consciousness.    He has migraine headaches where he develops photophobia and double vision.  He has throbbing unilateral headache.    He is a diabetic with a recent hemoglobin A1c of 8.    No history of heart disease but for some reason he is on Imdur 330 mg daily.    Physical therapist who saw him on his previous admission noted 25 to 30 mm drop in his blood pressure with standing.        Review of Systems: Patient denies focal numbness or weakness.    He has no significant vision changes.    No GI upsets although he has apparently thrown up after the pneumonia when he was given some prednisone.    All other systems are reviewed and are negative.        Home meds:   Medications Prior to Admission   Medication Sig Dispense Refill Last Dose    aspirin 81 MG chewable tablet Chew 1 tablet Daily. 30 tablet 2 10/27/2023    atorvastatin (LIPITOR) 10 MG tablet Take 1 tablet by mouth Daily.   10/27/2023    divalproex (DEPAKOTE ER) 500 MG 24 hr tablet Take 1 tablet by mouth Daily. 30 tablet 2 10/27/2023    empagliflozin (Jardiance) 10 MG tablet tablet Take   "by mouth Daily.   10/27/2023    FLUoxetine (PROzac) 20 MG capsule Take 1 capsule by mouth Daily.   10/27/2023    insulin aspart (novoLOG FLEXPEN) 100 UNIT/ML solution pen-injector sc pen Inject 2-10 Units under the skin into the appropriate area as directed 3 (Three) Times a Day With Meals. ssi   10/27/2023    isosorbide mononitrate (IMDUR) 30 MG 24 hr tablet Take 1 tablet by mouth Daily.   Past Week    metFORMIN (GLUCOPHAGE) 1000 MG tablet Take 1 tablet by mouth 2 (Two) Times a Day With Meals.   10/27/2023    QUEtiapine (SEROquel) 50 MG tablet Take 1 tablet by mouth Every Night.   10/26/2023       History  Past Medical History:   Diagnosis Date    Anxiety     Diabetes mellitus     Hypertension    , History reviewed. No pertinent surgical history., History reviewed. No pertinent family history.,   Social History     Tobacco Use    Smoking status: Every Day     Packs/day: 0.50     Years: 25.00     Additional pack years: 0.00     Total pack years: 12.50     Types: Cigarettes    Smokeless tobacco: Never   Vaping Use    Vaping Use: Every day   Substance Use Topics    Alcohol use: Not Currently     Comment: pt states has been clean 90 days    Drug use: Yes     Types: Methamphetamines     Comment: pt states has been clean 90 days    and Allergies:  Eggs or egg-derived products,    Vital Signs   Blood pressure 125/81, pulse 99, temperature 97.8 °F (36.6 °C), temperature source Oral, resp. rate 16, height 180.3 cm (71\"), weight 103 kg (226 lb), SpO2 94%.  Body mass index is 31.52 kg/m².    Physical Exam:   General: Anxious white man              Head: Atraumatic.              Neck: Midline scar on the back of his neck apparently from Chiari malformation surgery.              Resp: Normal breath sound              Cor: Regular rhythm              Extremities: No edema              Skin: Warm and dry              Neuro: Mentally the patient is somewhat anxious but oriented to person place and time.  He tells me that he was " treated by his primary care doctor for his pneumonia but the records indicate he was treated at  as an inpatient.    Speech is articulate with no word finding problems.    Coordination is normal on finger-nose testing.  Standing balance shows some marked jerkiness which does not alter with opening or closing his eyes.    Cranial nerves show full eye movements with no nystagmus.  He has equal pupils.  Facial movement is symmetrical.    Facial sensation shows vibration splitting the midline on his forehead being decreased on the left-hand side.    Palate elevates normally tongue protrudes normally.    Reflexes are plus minus throughout.    Motor testing shows normal power and tone in all muscle groups.  He has no pronator drift..  His muscle tone is normal.    Sensory testing shows decreased sensation in both legs to vibratory sense and light touch.        Results Review: Urine drug screen done recently was positive for opiates    MRI shows evidence of previous craniectomy posteriorly.  No acute changes are seen.    No EEG is in the record.        Stress test at  July of this year showed no cardiac ischemia.    CT angiogram angiogram of the head and neck were normal.    MRI in the cervical and thoracic spine shows some spinal stenosis of mild to moderate degree in his cervical spine degenerative disease in his lumbar spine.        Labs:  Lab Results (last 72 hours)       Procedure Component Value Units Date/Time    Basic Metabolic Panel [[id number]]  (Abnormal) Collected: 11/02/23 1221    Specimen: Blood Updated: 11/02/23 1253     Glucose 221 mg/dL      BUN 17 mg/dL      Creatinine 0.85 mg/dL      Sodium 134 mmol/L      Potassium 4.4 mmol/L      Comment: Slight hemolysis detected by analyzer. Results may be affected.        Chloride 102 mmol/L      CO2 22.0 mmol/L      Calcium 9.3 mg/dL      BUN/Creatinine Ratio 20.0     Anion Gap 10.0 mmol/L      eGFR 110.6 mL/min/1.73     Narrative:      GFR Normal >60  Chronic  Kidney Disease <60  Kidney Failure <15      CBC (No Diff) [[id number]]  (Abnormal) Collected: 11/02/23 1221    Specimen: Blood Updated: 11/02/23 1232     WBC 11.69 10*3/mm3      RBC 4.70 10*6/mm3      Hemoglobin 14.2 g/dL      Hematocrit 40.4 %      MCV 86.0 fL      MCH 30.2 pg      MCHC 35.1 g/dL      RDW 13.2 %      RDW-SD 40.9 fl      MPV 10.2 fL      Platelets 218 10*3/mm3     POC Glucose Once [[id number]]  (Abnormal) Collected: 11/02/23 1135    Specimen: Blood Updated: 11/02/23 1137     Glucose 214 mg/dL     Blood Culture - Blood, Wrist, Left [[id number]]  (Normal) Collected: 10/27/23 2239    Specimen: Blood from Wrist, Left Updated: 11/02/23 0915     Blood Culture No growth at 5 days    Blood Culture - Blood, Arm, Right [[id number]]  (Normal) Collected: 10/27/23 2232    Specimen: Blood from Arm, Right Updated: 11/02/23 0915     Blood Culture No growth at 5 days    POC Glucose Once [[id number]]  (Abnormal) Collected: 11/02/23 0746    Specimen: Blood Updated: 11/02/23 0748     Glucose 219 mg/dL     POC Glucose Once [[id number]]  (Abnormal) Collected: 11/01/23 2000    Specimen: Blood Updated: 11/01/23 2002     Glucose 399 mg/dL     POC Glucose Once [[id number]]  (Abnormal) Collected: 11/01/23 1700    Specimen: Blood Updated: 11/01/23 1712     Glucose 283 mg/dL     POC Glucose Once [[id number]]  (Abnormal) Collected: 11/01/23 1130    Specimen: Blood Updated: 11/01/23 1132     Glucose 208 mg/dL     POC Glucose Once [[id number]]  (Abnormal) Collected: 11/01/23 0823    Specimen: Blood Updated: 11/01/23 0824     Glucose 245 mg/dL     POC Glucose Once [[id number]]  (Abnormal) Collected: 10/31/23 1851    Specimen: Blood Updated: 10/31/23 1852     Glucose 182 mg/dL     POC Glucose Once [[id number]]  (Abnormal) Collected: 10/31/23 1633    Specimen: Blood Updated: 10/31/23 1635     Glucose 160 mg/dL     POC Glucose Once [[id number]]  (Abnormal) Collected: 10/31/23 1118    Specimen: Blood Updated: 10/31/23 1121     Glucose 237 mg/dL      POC Glucose Once [[id number]]  (Abnormal) Collected: 10/31/23 0811    Specimen: Blood Updated: 10/31/23 0814     Glucose 227 mg/dL     POC Glucose Once [[id number]]  (Abnormal) Collected: 10/31/23 0647    Specimen: Blood Updated: 10/31/23 0657     Glucose 236 mg/dL     Vitamin B12 [[id number]]  (Normal) Collected: 10/30/23 1607    Specimen: Blood Updated: 10/31/23 0042     Vitamin B-12 671 pg/mL     Narrative:      Results may be falsely increased if patient taking Biotin.      Folate [[id number]]  (Normal) Collected: 10/30/23 1607    Specimen: Blood Updated: 10/31/23 0042     Folate 11.80 ng/mL     Narrative:      Results may be falsely increased if patient taking Biotin.      Vitamin D,25-Hydroxy [[id number]]  (Abnormal) Collected: 10/30/23 1607    Specimen: Blood Updated: 10/31/23 0042     25 Hydroxy, Vitamin D 21.6 ng/ml     Narrative:      Reference Range for Total Vitamin D 25(OH)     Deficiency <20.0 ng/mL   Insufficiency 21-29 ng/mL   Sufficiency  ng/mL  Toxicity >100 ng/ml      POC Glucose Once [[id number]]  (Abnormal) Collected: 10/30/23 2057    Specimen: Blood Updated: 10/30/23 2058     Glucose 246 mg/dL     Magnesium [[id number]]  (Normal) Collected: 10/30/23 1607    Specimen: Blood Updated: 10/30/23 1647     Magnesium 2.2 mg/dL     POC Glucose Once [[id number]]  (Abnormal) Collected: 10/30/23 1615    Specimen: Blood Updated: 10/30/23 1617     Glucose 206 mg/dL     POC Glucose Once [[id number]]  (Abnormal) Collected: 10/30/23 1349    Specimen: Blood Updated: 10/30/23 1351     Glucose 151 mg/dL             Rads:  Imaging Results (Last 72 Hours)       Procedure Component Value Units Date/Time    CT Lumbar Spine Without Contrast [[id number]] Collected: 11/01/23 2003     Updated: 11/01/23 2009    Narrative:      CT LUMBAR SPINE WO CONTRAST    Date of Exam: 11/1/2023 5:54 PM EDT    Indication: Ataxia, nontraumatic, lumbar pathology suspected  leg weakness and shaking.    Comparison: None  available.    Technique: Axial CT images were obtained of the lumbar spine without contrast administration.  Reconstructed coronal and sagittal images were also obtained. Automated exposure control and iterative construction methods were used.      Findings:  No acute fracture or traumatic malalignment.    Vertebral body heights are maintained.  Near complete lumbarization of S1.  Overall alignment is maintained. No significant listhesis.    Mild loss of intervertebral disc base height throughout the lumbar spine, most significantly at L1-L2. There is associated small anterior osteophytes.      The visualized paravertebral soft tissues are unremarkable.  The visualized intra-abdominal and intrapelvic structures are unremarkable.      Impression:      Impression:  No acute abnormality.        Electronically Signed: Gregory Fleming DO    11/1/2023 8:06 PM EDT    Workstation ID: [id number]    CT Thoracic Spine Without Contrast [[id number]] Collected: 11/01/23 1950     Updated: 11/01/23 1956    Narrative:      CT THORACIC SPINE WO CONTRAST    Date of Exam: 11/1/2023 5:54 PM EDT    Indication: Ataxia, nontraumatic, thoracic pathology suspected.    Comparison: None available.    Technique: Axial CT images were obtained of the thoracic spine without contrast administration.  Reconstructed coronal and sagittal images were also obtained. Automated exposure control and iterative construction methods were used.      Findings:  No acute fracture or traumatic malalignment.    Vertebral body heights are maintained.  Overall alignment is maintained.  No significant listhesis.    Mild loss of intervertebral disc base height throughout the thoracic spine. No evidence of severe spinal canal stenosis or severe bilateral neuroforaminal narrowing.    The paravertebral soft tissues and visualized intra-abdominal soft tissues are unremarkable.    Mild groundglass opacities in the right lower lobe in a tree-in-bud pattern is concerning for  an underlying infectious/inflammatory process.  Otherwise the visualized chest is unremarkable      Impression:      Impression:  No acute osseous abnormality.    Findings concerning for an underlying infectious/inflammatory process in the left lower lobe.        Electronically Signed: Gregory Fleming DO    11/1/2023 7:53 PM EDT    Workstation ID: [id number]    MRI Brain Without Contrast [[id number]] Collected: 10/30/23 2008     Updated: 10/30/23 2031    Narrative:      MRI BRAIN WO CONTRAST    Date of Exam: 10/30/2023 6:37 PM CDT    Indication: left sided tingling ( x several days).     Comparison: Head CT 10/30/2023, MRI brain 9/16/2023    Technique:  Routine multiplanar/multisequence sequence images of the brain were obtained without contrast administration.      Findings:  There is motion artifact throughout examination which limits image quality. There is no diffusion restriction to suggest acute infarct.     There is no evidence of acute or chronic intracranial hemorrhage. No mass effect or midline shift. No abnormal extra-axial collections.     The basal ganglia, brainstem and cerebellum appear within normal limits. Midline structures are intact. No significant cortical abnormality is identified.    Evidence of previous Chiari decompression. Calvarial and superficial soft tissue signal is within normal limits. Orbits appear unremarkable. The paranasal sinuses and the mastoid air cells appear well aerated.         Impression:      Impression:  No acute process identified.        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    Migraine headaches, worsened post pneumonia.    PNES by history.    Chiari malformation surgery remotely.    History of substance abuse,?  Remission       Plan:    EEG.    Increase Depakote to 1000 mg nightly for migraine prophylaxis.    Orthostatic blood pressure checks.    Likely  stop Imdur after blood pressure checks.    Would likely benefit from inpatient rehab.    Comment:   Patient has no history of coronary artery disease and the Imdur is likely causing some orthostatic hypotension which may be triggering his psychogenic symptoms.    I discussed the patients findings and my recommendations with patient and primary care team      Andry Valerio MD  11/02/23  13:28 EDT

## 2023-11-02 NOTE — CASE MANAGEMENT/SOCIAL WORK
Continued Stay Note  [facility]     Patient Name: Ronnie He  MRN: [id number]  Today's Date: 11/2/2023    Admit Date: 10/27/2023    Plan: [facility]   Discharge Plan       Row Name 11/02/23 1605       Plan    Plan [facility]    Plan Comments Patient has insurance approval in place for [facility]. I will make arrangements for transfer tomorrow, if medically ready.    Final Discharge Disposition Code 03 - skilled nursing facility (SNF)                   Discharge Codes    No documentation.                 Expected Discharge Date and Time       Expected Discharge Date Expected Discharge Time    Nov 3, 2023               Radha Santillan RN

## 2023-11-02 NOTE — THERAPY TREATMENT NOTE
Patient Name: Ronnie He  : 1980    MRN: [id number]                              Today's Date: 2023       Admit Date: 10/27/2023    Visit Dx:     ICD-10-CM ICD-9-CM   1. Pneumonia of left lower lobe due to infectious organism  J18.9 486   2. COPD with acute exacerbation  J44.1 491.21   3. Cough, unspecified type  R05.9 786.2   4. Tobacco use disorder  F17.200 305.1   5. Hematemesis with nausea  K92.0 578.0     787.02   6. Hyperglycemia due to diabetes mellitus  E11.65 250.02   7. Atypical chest pain  R07.89 786.59   8. Leukocytosis, unspecified type  D72.829 288.60   9. Sinus tachycardia by electrocardiogram  R00.0 785.0   10. Renal lesion  N28.9 593.9   11. Dizziness  R42 780.4     Patient Active Problem List   Diagnosis    Ataxia    Alcohol use disorder, severe, in early remission    Anxiety    Cannabis dependence    Chiari malformation type I    Chronic pain of right knee    Chronic post-traumatic stress disorder (PTSD)    Conversion disorder with attacks or seizures    COPD (chronic obstructive pulmonary disease)    DDD (degenerative disc disease), cervical    Diabetes mellitus type 2 in obese    Generalized anxiety disorder    Hemiplegia and hemiparesis following cerebral infarction affecting left non-dominant side    Hemiplegia, unspecified affecting left nondominant side    History of alcoholism    History of cardioembolic cerebrovascular accident (CVA)    Hypertension associated with diabetes    Insomnia    Intractable migraine with aura with status migrainosus    Left-sided weakness    Major depressive disorder, recurrent, severe with psychotic features    Malingering    Melanoma    Methamphetamine abuse    Methamphetamine dependence in remission    Methamphetamine-induced psychotic disorder    Non-compliant patient    PAULA (obstructive sleep apnea)    Personality disorder, unspecified    Pseudohyponatremia    Seizure disorder    Squamous blepharitis of upper and lower eyelids of both eyes     Suicide attempt    Tobacco abuse    Type 2 diabetes mellitus with hyperglycemia    Type 2 diabetes mellitus without retinopathy    Community acquired pneumonia of left lower lobe of lung    Other constipation    Tachycardia     Past Medical History:   Diagnosis Date    Anxiety     Diabetes mellitus     Hypertension      History reviewed. No pertinent surgical history.   General Information       Row Name 11/02/23 1406          Physical Therapy Time and Intention    Document Type therapy note (daily note)  -KR     Mode of Treatment individual therapy;physical therapy  -KR       Row Name 11/02/23 1406          General Information    Patient Profile Reviewed yes  -KR     Existing Precautions/Restrictions fall  -KR     Barriers to Rehab ineffective coping  -KR       Row Name 11/02/23 1406          Cognition    Orientation Status (Cognition) oriented x 3  -KR       Row Name 11/02/23 1406          Safety Issues, Functional Mobility    Safety Issues Affecting Function (Mobility) insight into deficits/self-awareness;safety precaution awareness;safety precautions follow-through/compliance  -KR     Impairments Affecting Function (Mobility) balance;endurance/activity tolerance;strength;sensation/sensory awareness;coordination  -KR               User Key  (r) = Recorded By, (t) = Taken By, (c) = Cosigned By      Initials Name Provider Type    KR Rosa M Wilburn, PT Physical Therapist                   Mobility       Row Name 11/02/23 1406          Bed Mobility    Bed Mobility supine-sit  -KR     Supine-Sit [place] (Bed Mobility) modified independence  -KR     Assistive Device (Bed Mobility) head of bed elevated  -KR       Row Name 11/02/23 1406          Transfers    Comment, (Transfers) BP sup: 129/84; BP sitting 122/80; BP standing 122/95; BP s/p ambulation 127/84.  -KR       Row Name 11/02/23 1406          Bed-Chair Transfer    Bed-Chair [place] (Transfers) minimum assist (75% patient effort);verbal  cues;nonverbal cues (demo/gesture)  -KR     Assistive Device (Bed-Chair Transfers) walker, front-wheeled  -KR     Comment, (Bed-Chair Transfer) lateral steps to chair. LLE very tremulous/ataxic with transfer.  -KR       Row Name 11/02/23 1406          Sit-Stand Transfer    Sit-Stand [place] (Transfers) contact guard;verbal cues;nonverbal cues (demo/gesture)  -KR     Assistive Device (Sit-Stand Transfers) walker, front-wheeled  -KR     Comment, (Sit-Stand Transfer) 1x from bed, 1x from chair.  -KR       Row Name 11/02/23 1406          Gait/Stairs (Locomotion)    [place] Level (Gait) minimum assist (75% patient effort);1 person assist;verbal cues;nonverbal cues (demo/gesture)  -KR     Assistive Device (Gait) walker, front-wheeled  -KR     Distance in Feet (Gait) 50  -KR     Deviations/Abnormal Patterns (Gait) cierra decreased;gait speed decreased;stride length decreased;weight shifting decreased  -KR     Comment, (Gait/Stairs) pt with ataxic LLE, Juanjose req'd for stability. Farther ambulation distance limited by pt c/o lightheadedness. /84 following ambulation trial.  -KR               User Key  (r) = Recorded By, (t) = Taken By, (c) = Cosigned By      Initials Name Provider Type    Rosa M Bautista PT Physical Therapist                   Obj/Interventions       Row Name 11/02/23 1411          Motor Skills    Motor Skills motor control/coordination interventions  -KR     Motor Control/Coordination Interventions other (see comments)  5x BLE toe taps to objects in various quadrants for motor control/coordination. Moderately impaired coordination and motor control noted in LLE, RLE intact.  -KR               User Key  (r) = Recorded By, (t) = Taken By, (c) = Cosigned By      Initials Name Provider Type    Rosa M Bautista PT Physical Therapist                   Goals/Plan    No documentation.                  Clinical Impression       Row Name 11/02/23 1412          Pain    Pretreatment Pain Rating  9/10  -KR     Posttreatment Pain Rating 9/10  -KR     Pain Location - head  -KR     Pre/Posttreatment Pain Comment nsg aware  -KR     Pain Intervention(s) Repositioned;Ambulation/increased activity  -KR       Row Name 11/02/23 1412          Plan of Care Review    Plan of Care Reviewed With patient  -KR     Progress improving  -KR     Outcome Evaluation Pt continues continues to demo decreased coordination and motor control in the LLE with functional tasks. No orthostatic BP measurements during session this date. Pt will continue to benefit from PT to address ongoing strength, balance, endurance, and coordination deficits.  -KR       Row Name 11/02/23 1412          Vital Signs    Pre Systolic BP Rehab 129  sup  -KR     Pre Treatment Diastolic BP 84  -KR     Intra Systolic BP Rehab 122  stand  -KR     Intra Treatment Diastolic BP 95  -KR     Post Systolic BP Rehab 127  s/p ambulation  -KR     Post Treatment Diastolic BP 84  -KR     Pre Patient Position Supine  -KR     Intra Patient Position Standing  -KR     Post Patient Position Sitting  -KR       Row Name 11/02/23 1412          Positioning and Restraints    Pre-Treatment Position in bed  -KR     Post Treatment Position chair  -KR     In Chair notified nsg;reclined;call light within reach;encouraged to call for assist;exit alarm on;waffle cushion;legs elevated  -KR               User Key  (r) = Recorded By, (t) = Taken By, (c) = Cosigned By      Initials Name Provider Type    KR Rosa M Wilburn, PT Physical Therapist                   Outcome Measures       Row Name 11/02/23 1414          How much help from another person do you currently need...    Turning from your back to your side while in flat bed without using bedrails? 4  -KR     Moving from lying on back to sitting on the side of a flat bed without bedrails? 4  -KR     Moving to and from a bed to a chair (including a wheelchair)? 3  -KR     Standing up from a chair using your arms (e.g., wheelchair, bedside  chair)? 3  -KR     Climbing 3-5 steps with a railing? 2  -KR     To walk in hospital room? 3  -KR     AM-PAC 6 Clicks Score (PT) 19  -KR     Highest level of mobility 6 --> Walked 10 steps or more  -KR               User Key  (r) = Recorded By, (t) = Taken By, (c) = Cosigned By      Initials Name Provider Type    Rosa M Bautista PT Physical Therapist                                 Physical Therapy Education       Title: PT OT SLP Therapies (In Progress)       Topic: Physical Therapy (Done)       Point: Mobility training (Done)       Learning Progress Summary             Patient Acceptance, E, VU by KR at 11/2/2023 1414    Acceptance, E, VU by KR at 10/31/2023 0932                         Point: Home exercise program (Done)       Learning Progress Summary             Patient Acceptance, E, VU by KR at 11/2/2023 1414    Acceptance, E, VU by KR at 10/31/2023 0932                         Point: Body mechanics (Done)       Learning Progress Summary             Patient Acceptance, E, VU by KR at 11/2/2023 1414    Acceptance, E, VU by KR at 10/31/2023 0932                         Point: Precautions (Done)       Learning Progress Summary             Patient Acceptance, E, VU by KR at 11/2/2023 1414    Acceptance, E, VU by KR at 10/31/2023 0932                                         User Key       Initials Effective Dates Name Provider Type Discipline    BRAULIO 12/30/22 -  Rosa M Wilburn PT Physical Therapist PT                  PT Recommendation and Plan  Planned Therapy Interventions (PT): balance training, bed mobility training, gait training, home exercise program, postural re-education, patient/family education, neuromuscular re-education, motor coordination training, ROM (range of motion), stair training, strengthening, stretching, transfer training  Plan of Care Reviewed With: patient  Progress: improving  Outcome Evaluation: Pt continues continues to demo decreased coordination and motor control in the LLE with  functional tasks. No orthostatic BP measurements during session this date. Pt will continue to benefit from PT to address ongoing strength, balance, endurance, and coordination deficits.     Time Calculation:   PT Evaluation Complexity  History, PT Evaluation Complexity: 3 or more personal factors and/or comorbidities  Examination of Body Systems (PT Eval Complexity): total of 4 or more elements  Clinical Presentation (PT Evaluation Complexity): stable  Clinical Decision Making (PT Evaluation Complexity): low complexity  Overall Complexity (PT Evaluation Complexity): low complexity     PT Charges       Row Name 11/02/23 1415             Time Calculation    Start Time 1315  -KR      PT Received On 11/02/23  -KR      PT Goal Re-Cert Due Date 11/10/23  -KR         Timed Charges    43343 -  PT Neuromuscular Reeducation Minutes 5  -KR      91981 - Gait Training Minutes  5  -KR      11310 - PT Therapeutic Activity Minutes 14  -KR         Total Minutes    Timed Charges Total Minutes 24  -KR       Total Minutes 24  -KR                User Key  (r) = Recorded By, (t) = Taken By, (c) = Cosigned By      Initials Name Provider Type    KR Rosa M Wilburn, PT Physical Therapist                  Therapy Charges for Today       Code Description Service Date Service Provider Modifiers Qty    08034827617 HC PT NEUROMUSC RE EDUCATION EA 15 MIN 11/2/2023 Rosa M Wilburn, PT GP 1    88288530073 HC PT THERAPEUTIC ACT EA 15 MIN 11/2/2023 Rosa M Wilburn, PT GP 1            PT G-Codes  Outcome Measure Options: AM-PAC 6 Clicks Daily Activity (OT)  AM-PAC 6 Clicks Score (PT): 19  AM-PAC 6 Clicks Score (OT): 17  PT Discharge Summary  Anticipated Discharge Disposition (PT): inpatient rehabilitation facility    Rosa M Wilburn PT  11/2/2023

## 2023-11-02 NOTE — DISCHARGE PLACEMENT REQUEST
"Marcos He (42 y.o. Male)       Date of Birth   1980    Social Security Number       Address   [address] [facility] [address]    Home Phone       MRN   [id number]       Zoroastrianism   Patient Refused    Marital Status   Single                            Admission Date   10/27/23    Admission Type   Emergency    Admitting Provider   Ilir Mai MD    Attending Provider   Ilir Mai MD    Department, Room/Bed   [facility] 3F, S315/1       Discharge Date       Discharge Disposition       Discharge Destination                                 Attending Provider: Ilir Mai MD    Allergies: Eggs Or Egg-derived Products    Isolation: None   Infection: None   Code Status: CPR    Ht: 180.3 cm (71\")   Wt: 103 kg (226 lb)    Admission Cmt: None   Principal Problem: Community acquired pneumonia of left lower lobe of lung [J18.9]                   Active Insurance as of 10/27/2023       Primary Coverage       Payor Plan Insurance Group Employer/Plan Group    HUMANA MEDICARE REPLACEMENT HUMANA MEDICARE REPLACEMENT [id number]       Payor Plan Address Payor Plan Phone Number Payor Plan Fax Number Effective Dates    [address] [address] [phone]  2023 - None Entered    [facility] [id number]         Subscriber Name Subscriber Birth Date Member ID       MARCOS HE 1980 [id number]                     Emergency Contacts        (Rel.) Home Phone Work Phone Mobile Phone    DEL CID,TAB (Sister) -- -- [phone]                 History & Physical        Niels Agrawal III, DO at 10/28/23 0220              [facility] Medicine Services  HISTORY AND PHYSICAL    Patient Name: Marcos He  : 1980  MRN: [id number]  Primary Care Physician: Dylon Olmos MD  Date of admission: 10/27/2023    Subjective  Subjective     Chief Complaint:  Shortness of breath    HPI:  Marcos He is a 42 y.o. male with PMH of HTN, HLD, " DM2, ETOH/polysubstance abuse in remission, COPD and seizure disorder presents to the ED for shortness of breath. Patient is currently in sober living. Patient has been short of breath and chest pain for about 4 days. He has had a cough without sputum production. He has had chills, but no fever. He was admitted to  and diagnosed with pneumonia. On discharge, patient was given doxy and prednisone. However, he took it today and had vomiting. He had bloody streaks in vomitus. He has not been able to have a BM in about 4 days. He has had poor PO intake due to nausea. He has had dizziness when sitting up or standing.         Review of Systems   Constitutional:  Positive for activity change, appetite change and chills. Negative for diaphoresis and fever.   HENT:  Positive for congestion. Negative for sore throat and trouble swallowing.    Eyes: Negative.    Respiratory:  Positive for cough, chest tightness and shortness of breath.    Cardiovascular: Negative.    Gastrointestinal:  Positive for constipation, nausea and vomiting. Negative for abdominal distention, abdominal pain, blood in stool and diarrhea.   Endocrine: Negative.    Genitourinary:  Negative for decreased urine volume, difficulty urinating and dysuria.   Musculoskeletal: Negative.    Skin: Negative.    Neurological:  Positive for dizziness and light-headedness. Negative for headaches.   Hematological: Negative.    Psychiatric/Behavioral:  Negative for agitation and confusion.                 Personal History     Past Medical History:   Diagnosis Date    Anxiety     Diabetes mellitus     Hypertension          Oncology Problem List:  Melanoma (01/21/2020; Status: Active)  Oncology/Hematology History       History reviewed. No pertinent surgical history.    Family History:  family history is not on file.     Social History:  reports that he has been smoking cigarettes. He has a 12.50 pack-year smoking history. He has never used smokeless tobacco. He reports  that he does not currently use alcohol. He reports current drug use. Drug: Methamphetamines.  Social History     Social History Narrative    Not on file       Medications:  aspirin and divalproex    No Known Allergies    Objective  Objective     Vital Signs:   Temp:  [97.9 °F (36.6 °C)] 97.9 °F (36.6 °C)  Heart Rate:  [] 99  Resp:  [18-20] 18  BP: (116-137)/(69-98) 120/86    Physical Exam  Vitals reviewed.   Constitutional:       General: He is not in acute distress.     Appearance: He is normal weight.   HENT:      Head: Normocephalic.      Nose: Nose normal. No congestion.      Mouth/Throat:      Mouth: Mucous membranes are moist.   Eyes:      Extraocular Movements: Extraocular movements intact.      Pupils: Pupils are equal, round, and reactive to light.   Cardiovascular:      Rate and Rhythm: Regular rhythm. Tachycardia present.      Pulses: Normal pulses.      Heart sounds: Normal heart sounds. No murmur heard.  Pulmonary:      Effort: Pulmonary effort is normal. No respiratory distress.      Breath sounds: No wheezing or rhonchi.   Abdominal:      General: Bowel sounds are normal. There is no distension.      Palpations: Abdomen is soft.      Tenderness: There is no abdominal tenderness.   Musculoskeletal:         General: No swelling. Normal range of motion.      Cervical back: Normal range of motion. No rigidity.   Skin:     General: Skin is warm.      Capillary Refill: Capillary refill takes less than 2 seconds.   Neurological:      General: No focal deficit present.      Mental Status: He is alert and oriented to person, place, and time. Mental status is at baseline.      Motor: No weakness.   Psychiatric:         Mood and Affect: Mood normal.         Behavior: Behavior normal.         Thought Content: Thought content normal.         Judgment: Judgment normal.            Result Review:  I have personally reviewed the results from the time of this admission to 10/28/2023 02:20 EDT and agree with these  findings:  [x]  Laboratory list / accordion  [x]  Microbiology  [x]  Radiology  []  EKG/Telemetry   []  Cardiology/Vascular   []  Pathology  [x]  Old records  []  Other:  Most notable findings include:     LAB RESULTS:      Lab 10/27/23  2046 10/27/23  0022 10/26/23  1946 10/26/23  1139   WBC 12.94*  --  9.28  --    HEMOGLOBIN 13.9  --  13.4*  --    HEMATOCRIT 39.8  --  38.2*  --    PLATELETS 272  --  217  --    NEUTROS ABS 11.23*  --  6.04  --    IMMATURE GRANS (ABS) 0.09*  --  0.03  --    LYMPHS ABS 1.11  --  1.99  --    MONOS ABS 0.50  --  1.04*  --    EOS ABS 0.00  --  0.13  --    MCV 85.6  --  85  --    LACTATE, ARTERIAL  --  3.9*  --   --    PROTIME  --   --   --  9.9   APTT  --   --   --  25.3   D DIMER QUANT <0.27  --   --   --          Lab 10/27/23  2046 10/27/23  0022 10/26/23  1139   SODIUM 134*  --   --    POTASSIUM 4.0 3.8  --    CHLORIDE 102 103  --    CO2 21.0*  --   --    ANION GAP 11.0  --   --    BUN 14  --   --    CREATININE 0.74*  --   --    EGFR 115.3  --   --    GLUCOSE 218* 355*  --    CALCIUM 9.3  --   --    IONIZED CALCIUM  --  4.5*  --    MAGNESIUM  --   --  1.7         Lab 10/27/23  2046   TOTAL PROTEIN 7.0   ALBUMIN 4.5   GLOBULIN 2.5   ALT (SGPT) 19   AST (SGOT) 14   BILIRUBIN 0.3   ALK PHOS 86   LIPASE 59         Lab 10/27/23  2247 10/27/23  2046 10/26/23  2214 10/26/23  1946 10/26/23  1139   PROBNP  --  658.3*  --   --   --    TROPONIN T  --   --  13  --   --    TROPONIN T (HIGHLY SENSITIVE)  --   --   --  15  --    HSTROP T 13 15*  --   --   --    PROTIME  --   --   --   --  9.9   INR  --   --   --   --  0.90                 Lab 10/27/23  0022   BASE EXCESS ART -4.1*     Brief Urine Lab Results  (Last result in the past 365 days)        Color   Clarity   Blood   Leuk Est   Nitrite   Protein   CREAT   Urine HCG        10/28/23 0039 Yellow   Clear   Negative   Negative   Negative   Trace                 Microbiology Results (last 10 days)       Procedure Component Value - Date/Time     COVID PRE-OP / PRE-PROCEDURE SCREENING ORDER (NO ISOLATION) - Swab, Nasopharynx [[id number]]  (Normal) Collected: 10/27/23 2241    Lab Status: Final result Specimen: Swab from Nasopharynx Updated: 10/27/23 2316    Narrative:      The following orders were created for panel order COVID PRE-OP / PRE-PROCEDURE SCREENING ORDER (NO ISOLATION) - Swab, Nasopharynx.  Procedure                               Abnormality         Status                     ---------                               -----------         ------                     COVID-19 and FLU A/B PCR...[[id number]]  Normal              Final result                 Please view results for these tests on the individual orders.    COVID-19 and FLU A/B PCR - Swab, Nasopharynx [[id number]]  (Normal) Collected: 10/27/23 2241    Lab Status: Final result Specimen: Swab from Nasopharynx Updated: 10/27/23 2316     COVID19 Not Detected     Influenza A PCR Not Detected     Influenza B PCR Not Detected    Narrative:      Fact sheet for providers: https://www.fda.gov/media/137117/download    Fact sheet for patients: https://www.fda.gov/media/495153/download    Test performed by PCR.            CT Abdomen Pelvis Without Contrast    Result Date: 10/27/2023  CT ABDOMEN PELVIS WO CONTRAST Date of Exam: 10/27/2023 11:26 PM EDT Indication: Abdominal pain, acute, nonlocalized constipation for 4 days, epigastric pain, vomiting, hematemesis. Comparison: None available. Technique: Axial CT images were obtained of the abdomen and pelvis without the administration of contrast. Reconstructed coronal and sagittal images were also obtained. Automated exposure control and iterative construction methods were used. Findings: There is a small focus of pneumonia in the left lower lobe posteriorly. The right lung base is clear. Heart size is normal. Distal esophagus is unremarkable. There are no acute findings in the superficial soft tissues. There are no acute osseous abnormalities or destructive  bone lesions. There are mild thoracolumbar degenerative changes, most pronounced at the L1-L2 disc. There is a transitional lumbosacral vertebra. The liver is homogeneous. There is gradient attenuation of the gallbladder lumen that may reflect stones or sludge. No evidence of acute cholecystitis. The bile ducts, pancreas, stomach, spleen and adrenal glands appear within normal limits. There is a small exophytic lesion at the inferior aspect of the left kidney measuring 12 mm, which is indeterminate in part due to mild motion and otherwise due to small size and noncontrast technique. The appendix is normal. The colon is normal. Small bowel is nondistended. Aorta is normal diameter. There is no ascites, pneumoperitoneum or lymphadenopathy. The prostate gland is normal size and the urinary bladder is nondistended.     Impression: Impression: 1.Small focus of pneumonia in the left lower lobe. 2.No acute abdominal or pelvic abnormality. 3.12 mm exophytic lesion at the inferior aspect of the left kidney. This is indeterminate in part due to motion and otherwise due to small size and noncontrast technique. Consider 3-month follow-up renal protocol CT or MRI to more definitively evaluate.. Electronically Signed: Yonatan Yun MD  10/27/2023 11:59 PM EDT  Workstation ID: [id number]    XR Chest 1 View    Result Date: 10/27/2023  XR CHEST 1 VW Date of Exam: 10/27/2023 9:40 PM EDT Indication: Chest Pain Triage Protocol Comparison: None available. Findings:  There is no acute infiltrate. There is no large pleural effusion. The cardiac silhouette is unremarkable. The visualized augusto structures demonstrate no abnormality.     Impression: Impression: No acute pulmonary disease. Electronically Signed: Jose Luis Davis MD  10/27/2023 9:59 PM EDT  Workstation ID: [id number]    CT Angiogram Chest Pulmonary Embolism    Result Date: 10/27/2023  CLINICAL INDICATION: Pulmonary embolism (PE) suspected, high prob TECHNIQUE: Imaging of the  chest was performed from thoracic inlet through upper abdomen, using spiral technique, following administration of IV contrast, Omnipaque 350, 100 mL per the pulmonary angiogram protocol. In addition, 3D images were created and reviewed. TOTAL DLP (Dose-Length Product): 496.99 mGy.cm. Please note: The reported value represents the total of one or more individual components during the CT acquisition on this date and at this time, and as such, the same value may appear in more than one CT report depending on the interpreting/reporting physicians. COMPARISON: Chest x-ray October 26, 2023 FINDINGS: Pulmonary Arteries/Vessels: Limited evaluation due to suboptimal bolus timing. However, no discrete pulmonary embolism noted within the main or segmental pulmonary arteries. Coronary artery calcifications. Right Heart Strain: No evidence of right heart strain. Pleural/Pericardial space: No pneumothorax.  No pleural effusions.  No pericardial effusion. Lymph Nodes: No lymphadenopathy within the chest by CT size criteria. Lungs: The central airways are patent. Tree-in-bud nodularity noted within the depending left lower lobe. No consolidation. Mediastinum: Otherwise unremarkable. Chest wall: No chest wall hematoma or contusion. Bones: No acute fracture within the chest. Mild degenerative and hypertrophic changes in the cervical thoracic and lumbar spine. Minimal curvature or scoliosis of the spine which might be positional. Upper Abdomen: Limited imaging of the upper abdomen is unremarkable.    Impression: No pulmonary embolism. Respiratory motion artifact and poor contrast opacification of peripheral vessels limits peripheral evaluation. No large central pulmonary emboli. Tree-in-bud, patchy nodularity noted within the dependent left lower lobe, which could be related to aspiration or atypical infectious process. CRITICAL RESULT:   No. COMMUNICATION: Per this written report. Preliminary report signed by Tony Jain  MD on 10/27/2023 5:21 AM By electronically signing this report, I, the attending physician, attest that I have personally reviewed the images/data for the above examination(s) and agree with the final edited report. Drafted by Tony Jain MD on 10/27/2023 5:13 AM Final report signed by Trenton Olivia MD on 10/27/2023 5:26 AM    XR Chest 1 View    Result Date: 10/26/2023  CLINICAL INDICATION: SOA TECHNIQUE: XR CHEST 1 VIEW COMPARISON: None. FINDINGS: No consolidation, effusion, or pneumothorax. The cardiac size within normal limits with unremarkable cardiac and mediastinal contours. No free subdiaphragmatic gas. No acute osseous findings.    Impression: No acute findings. CRITICAL RESULT:   No. COMMUNICATION: Per this written report. Drafted by Aki Tinoco MD on 10/26/2023 8:32 PM Final report signed by Aki Tinoco MD on 10/26/2023 8:32 PM    XR Chest 1 View    Result Date: 10/26/2023  PORTABLE CHEST HISTORY: Precordial chest pain. COMPARISON: None. FINDINGS: The heart is normal in size. The mediastinum is unremarkable. The lungs are clear. There is no pneumothorax.     Impression: No acute cardiopulmonary process. Images reviewed, interpreted, and dictated by Dr. RACHAEL Muller. Transcribed by Aleisha Duran PA-C.         Assessment & Plan  Assessment & Plan       Community acquired pneumonia of left lower lobe of lung    Conversion disorder with attacks or seizures    COPD (chronic obstructive pulmonary disease)    Diabetes mellitus type 2 in obese    History of alcoholism    Hypertension associated with diabetes    Non-compliant patient    Personality disorder, unspecified    [facility] is a 42 y.o. male with PMH of HTN, HLD, DM2, ETOH/polysubstance abuse in remission, COPD and seizure disorder presents to the ED for shortness of breath.     LLL Pneumonia  COPD  -CT chest shows small LLL pneumonia  -Rocephin and azithro  -Duonebs    Hematemesis   -Likely Ada Chairez tear  -With ETOH  "history, will give protonix IV BID  -Hgb stable, trend    DM2  -Pending HgbA1c  -ACHS finger   -SSI    HTN  HLD  -Continue home aspirin, statin, lisinopril     Seizure Disorder  -Continue home depakote  -Seizure precautions     Hx of Polysubstance Abuse  Hx of ETOH  -Patient reports being sober 3mo, 25 days  -UDS+ for opiates, received IV morphine at [facility] 10/26          DVT prophylaxis:  SCDs    CODE STATUS:  FULL       Expected Discharge  Expected discharge date/ time has not been documented.        Signature: Electronically signed by GENNY Henriquez, 10/28/23, 2:49 AM EDT.    Total APC Time: 60 minutes        Attending   Admission Attestation       I have performed an independent face-to-face diagnostic evaluation including performing an independent physical examination.  The documented plan of care above was reviewed and developed with the advanced practice clinician (APC).  I have updated the HPI as appropriate.    Brief HPI    42 M who states that he has noticed increased shortness of breath over the last 4 days.  He confirms increased frequency of cough over the last 4 days, but denies any production.  He states he was admitted to  yesterday and diagnosed with pneumonia, but then notes that he was discharged yesterday and prescribed doxycycline and prednisone on discharge.  He states that after taking that today (Friday), he had some nausea and emesis with some small \"streaks of blood\" in the vomited material.  He states after stopping the above medications, this has not recurred.  He confirms some chills over the last couple of days but no fever; he did not take a temperature during the episode of chills.    Attending Physical Exam:  Temp:  [97.9 °F (36.6 °C)-98.2 °F (36.8 °C)] 98.1 °F (36.7 °C)  Heart Rate:  [] 112  Resp:  [18-20] 20  BP: (116-137)/(69-98) 132/98    Constitutional: Awake, alert, NAD.  Eyes: PERRLA, sclerae anicteric, no conjunctival injection  HENT: NCAT, mucous membranes " moist  Neck: Supple, no thyromegaly, no lymphadenopathy, trachea midline  Respiratory: There is good air movement to auscultation bilaterally with some diminished sounds at the LLL, nonlabored respirations   Cardiovascular: Tachycardic with rate 104 on my exam, no murmurs, rubs, or gallops, palpable pedal pulses bilaterally  Gastrointestinal: Positive bowel sounds, soft, nontender, nondistended  Musculoskeletal: No bilateral ankle edema, no clubbing or cyanosis to extremities  Psychiatric: Appropriate affect, cooperative  Neurologic: Oriented x 3, strength symmetric in all extremities, Cranial Nerves grossly intact to confrontation, speech clear  Skin: No rashes, normal turgor.    Assessment and Plan:    See assessment and plan documented by APC above and updated/edited by me as appropriate.    Total time spent: 25 minutes  Time spent includes time reviewing chart, face-to-face time, counseling patient/family/caregiver, ordering medications/tests/procedures, communicating with other health care professionals, documenting clinical information in the electronic health record, and coordination of care.       Niels Agrawal III, DO  10/28/23                     Electronically signed by Niels Agrawal III, DO at 10/28/23 0454          Physician Progress Notes (most recent note)        Soo Laguerre APRN at 23 0836              [facility] Medicine Services  PROGRESS NOTE    Patient Name: Ronnie He  : 1980  MRN: [id number]    Date of Admission: 10/27/2023  Primary Care Physician: Dylon Olmos MD    Subjective   Subjective     CC:  Seizure    HPI:  Patient sitting up in bed looking much better than he had in NAD.  Patient still trying to verify that he will be going to rehab.  He did ask OT provider to go buy him beer today.  States that he still weak but  never complained about a headache.  Awaiting placement.    Objective   Objective     Vital  Signs:   Temp:  [97.9 °F (36.6 °C)-98.3 °F (36.8 °C)] 98.1 °F (36.7 °C)  Heart Rate:  [] 105  Resp:  [17-20] 20  BP: (116-138)/(73-95) 121/95     Physical Exam:  Constitutional: Alert, WD, WN male sitting up in bed in NAD  Eyes: EOMI, sclerae anicteric, no conjunctival injection  Head: NCAT  ENT: [place], moist mucous membranes   Respiratory: Nonlabored, symmetrical chest expansion, CTAB, 93% RA  Cardiovascular: Tachycardia, no M/R/G, +DP pulses bilaterally  Gastrointestinal: Soft, NT, ND +BS  Musculoskeletal: UGARTE; no LE edema bilaterally  Neurologic: Oriented x4, follows all commands, speech clear, extremely ataxic and unable to stand without any stability even with a walker  Skin: No rashes on exposed skin  Psychiatric: Pleasant and cooperative; normal affect    Results Reviewed:  LAB RESULTS:      Lab 10/29/23  0613 10/28/23  0752 10/27/23  2247 10/27/23  2046 10/27/23  0022 10/26/23  1946 10/26/23  1139   WBC 8.92 10.84*  --  12.94*  --  9.28  --    HEMOGLOBIN 13.5 12.5*  --  13.9  --  13.4*  --    HEMATOCRIT 38.5 35.8*  --  39.8  --  38.2*  --    PLATELETS 197 239  --  272  --  217  --    NEUTROS ABS 4.72 9.43*  --  11.23*  --  6.04  --    IMMATURE GRANS (ABS) 0.08* 0.12*  --  0.09*  --  0.03  --    LYMPHS ABS 3.35* 1.01  --  1.11  --  1.99  --    MONOS ABS 0.71 0.27  --  0.50  --  1.04*  --    EOS ABS 0.02 0.00  --  0.00  --  0.13  --    MCV 85.6 86.3  --  85.6  --  85  --    PROCALCITONIN  --   --  <0.02  --   --   --   --    LACTATE  --   --   --  1.8  --   --   --    LACTATE, ARTERIAL  --   --   --   --  3.9*  --   --    PROTIME  --   --   --   --   --   --  9.9   APTT  --   --   --   --   --   --  25.3   D DIMER QUANT  --   --   --  <0.27  --   --   --          Lab 10/30/23  1607 10/29/23  0613 10/28/23  0752 10/27/23  2247 10/27/23  2046 10/27/23  0022 10/26/23  1139   SODIUM  --  138 134*  --  134*  --   --    POTASSIUM  --  3.8 4.7  --  4.0 3.8  --    CHLORIDE  --  105 102  --  102 103  --    CO2  --   24.0 21.0*  --  21.0*  --   --    ANION GAP  --  9.0 11.0  --  11.0  --   --    BUN  --  22* 15  --  14  --   --    CREATININE  --  0.83 0.71*  --  0.74*  --   --    EGFR  --  111.4 116.7  --  115.3  --   --    GLUCOSE  --  140* 351*  --  218* 355*  --    CALCIUM  --  8.7 8.8  --  9.3  --   --    IONIZED CALCIUM  --   --   --   --   --  4.5*  --    MAGNESIUM 2.2  --  2.0 1.7  --   --  1.7   HEMOGLOBIN A1C  --   --  8.00*  --   --   --   --          Lab 10/27/23  2046   TOTAL PROTEIN 7.0   ALBUMIN 4.5   GLOBULIN 2.5   ALT (SGPT) 19   AST (SGOT) 14   BILIRUBIN 0.3   ALK PHOS 86   LIPASE 59         Lab 10/27/23  2247 10/27/23  2046 10/26/23  2214 10/26/23  1946 10/26/23  1139   PROBNP  --  658.3*  --   --   --    TROPONIN T  --   --  13  --   --    TROPONIN T (HIGHLY SENSITIVE)  --   --   --  15  --    HSTROP T 13 15*  --   --   --    PROTIME  --   --   --   --  9.9   INR  --   --   --   --  0.90             Lab 10/30/23  1607   FOLATE 11.80   VITAMIN B 12 671         Lab 10/27/23  0022   BASE EXCESS ART -4.1*     Brief Urine Lab Results  (Last result in the past 365 days)        Color   Clarity   Blood   Leuk Est   Nitrite   Protein   CREAT   Urine HCG        10/28/23 0039 Yellow   Clear   Negative   Negative   Negative   Trace                   Microbiology Results Abnormal       Procedure Component Value - Date/Time    Blood Culture - Blood, Wrist, Left [[id number]]  (Normal) Collected: 10/27/23 2239    Lab Status: Preliminary result Specimen: Blood from Wrist, Left Updated: 11/01/23 0915     Blood Culture No growth at 4 days    Blood Culture - Blood, Arm, Right [[id number]]  (Normal) Collected: 10/27/23 2232    Lab Status: Preliminary result Specimen: Blood from Arm, Right Updated: 11/01/23 0915     Blood Culture No growth at 4 days    COVID PRE-OP / PRE-PROCEDURE SCREENING ORDER (NO ISOLATION) - Swab, Nasopharynx [[id number]]  (Normal) Collected: 10/27/23 8110    Lab Status: Final result Specimen: Swab from  Nasopharynx Updated: 10/27/23 2316    Narrative:      The following orders were created for panel order COVID PRE-OP / PRE-PROCEDURE SCREENING ORDER (NO ISOLATION) - Swab, Nasopharynx.  Procedure                               Abnormality         Status                     ---------                               -----------         ------                     COVID-19 and FLU A/B PCR...[[id number]]  Normal              Final result                 Please view results for these tests on the individual orders.    COVID-19 and FLU A/B PCR - Swab, Nasopharynx [[id number]]  (Normal) Collected: 10/27/23 2241    Lab Status: Final result Specimen: Swab from Nasopharynx Updated: 10/27/23 2316     COVID19 Not Detected     Influenza A PCR Not Detected     Influenza B PCR Not Detected    Narrative:      Fact sheet for providers: https://www.fda.gov/media/798246/download    Fact sheet for patients: https://www.fda.gov/media/995193/download    Test performed by PCR.            MRI Brain Without Contrast    Result Date: 10/30/2023  MRI BRAIN WO CONTRAST Date of Exam: 10/30/2023 6:37 PM CDT Indication: left sided tingling ( x several days).  Comparison: Head CT 10/30/2023, MRI brain 9/16/2023 Technique:  Routine multiplanar/multisequence sequence images of the brain were obtained without contrast administration. Findings: There is motion artifact throughout examination which limits image quality. There is no diffusion restriction to suggest acute infarct. There is no evidence of acute or chronic intracranial hemorrhage. No mass effect or midline shift. No abnormal extra-axial collections. The basal ganglia, brainstem and cerebellum appear within normal limits. Midline structures are intact. No significant cortical abnormality is identified. Evidence of previous Chiari decompression. Calvarial and superficial soft tissue signal is within normal limits. Orbits appear unremarkable. The paranasal sinuses and the mastoid air cells appear  well aerated.     Impression: Impression: No acute process identified. Electronically Signed: Johan Hollis MD  10/30/2023 7:28 PM CDT  Workstation ID: [id number]         Current medications:  Scheduled Meds:aspirin, 81 mg, Oral, Daily  atorvastatin, 10 mg, Oral, Daily  cefTRIAXone, 2,000 mg, Intravenous, Q24H  divalproex, 500 mg, Oral, Daily  empagliflozin, 10 mg, Oral, Daily  FLUoxetine, 20 mg, Oral, Daily  insulin detemir, 15 Units, Subcutaneous, Nightly  insulin lispro, 2-7 Units, Subcutaneous, 4x Daily AC & at Bedtime  Insulin Lispro, 6 Units, Subcutaneous, 4x Daily With Meals & Nightly  ipratropium-albuterol, 3 mL, Nebulization, 4x Daily - RT  isosorbide mononitrate, 30 mg, Oral, Daily  nicotine, 1 patch, Transdermal, Q24H  pantoprazole, 40 mg, Intravenous, BID AC  senna-docusate sodium, 2 tablet, Oral, BID   And  polyethylene glycol, 17 g, Oral, Daily  QUEtiapine, 50 mg, Oral, Nightly      Continuous Infusions:sodium chloride, 100 mL/hr      PRN Meds:.  acetaminophen **OR** acetaminophen **OR** acetaminophen    aspirin-acetaminophen-caffeine    benzonatate    senna-docusate sodium **AND** polyethylene glycol **AND** bisacodyl **AND** bisacodyl    dextrose    dextrose    diphenhydrAMINE    glucagon (human recombinant)    ondansetron **OR** ondansetron    sodium chloride    sodium chloride    Assessment & Plan   Assessment & Plan     Active Hospital Problems    Diagnosis  POA    **Community acquired pneumonia of left lower lobe of lung [J18.9]  Yes    Tachycardia [R00.0]  Unknown    Other constipation [K59.09]  Yes    Conversion disorder with attacks or seizures [F44.5]  Yes    Ataxia [R27.0]  Yes    Major depressive disorder, recurrent, severe with psychotic features [F33.3]  Yes    History of alcoholism [F10.21]  Not Applicable    Non-compliant patient [Z91.199]  Not Applicable    COPD (chronic obstructive pulmonary disease) [J44.9]  Yes    Hypertension associated with diabetes [E11.59, I15.2]  Yes    Diabetes  mellitus type 2 in obese [E11.69, E66.9]  Yes    Personality disorder, unspecified [F60.9]  Yes      Resolved Hospital Problems   No resolved problems to display.        Brief Hospital Course to date:  Ronnie He is a 42 y.o. male with PMH of HTN, HLD, DM2, ETOH/polysubstance abuse in remission, COPD and seizure disorder presents to the ED for shortness of breath.      LLL Pneumonia  COPD  -CT chest shows LLL pneumonia  -Rocephin and azithro  -Duonebs scheduled.  Encourage ambulation and pulmonary toileting.  -- No new labs today.  Improving.  Still complaining of very minimal shortness of breath with exertion.  Occasional cough.     Hematemesis - no further episodes  --Hgb 13.5  --Likely Ada Chairez tear  -continue protonix IV BID, no witnessed episodes here. If reoccurs will have GI evaluate.  No further.  Stable.  Monitor.     DM2 w/A1c 8  --24H glucose 160-237  -- Basal, SSI; adjust as needed  -- Started preprandial 10/29; Increased to 6 units QACHS    Ataxia  --Hx but unsure of extent; could be from drug and EtOH abuse  --PT/OT worked with patient and feel that he needs inpatient rehab  -- Observed and very unsteady on his feet  -- CT L-spine and T-spine ordered    Tachycardia  --Heart rate consistently in the low 100s  --IV NS @100ml/h x10 hours   --AM labs     HTN  HLD  -Continue home aspirin, statin, lisinopril   --Stable     Seizure Disorder  -Continue home depakote  -Seizure precautions      Hx of Polysubstance Abuse  Hx of ETOH  -Patient reports being sober 3mo, 25 days  -UDS+ for opiates, received IV morphine at [place] 10/26  Addendum: Patient complaining of persistent left upper extremity hand tingling for several days.  Also complains of intermittent left lower extremity tingling.  History of cervical and lumbar spine issues.  Otherwise stable.    -- CT of the head/neck without contrast showed no intracranial abnormality; multilevel cervical spondylosis appearing most advanced at C3-4 and  C4-5  -- Asking for medicine for headache.  No narcotics.  We will give one-time dose of Toradol after CT scan.  (CTs completed.  No acute finding).  Symptoms improved.  --Asking OT provider to get him beer twice during session.    Constipation-resolved  -- KUB noted large stool burden, will increase bowel meds today and give one-time dose of lactulose  -- BM x1 on 10/29 and 10/31.    Evaluated by PT/OT and both feel that he legitimately has a left leg weakness and that he needs rehab.     Expected Discharge Location and Transportation:     Expected Discharge   Expected Discharge Date: 11/3/2023; Expected Discharge Time:      DVT prophylaxis:  Mechanical DVT prophylaxis orders are present.     AM-PAC 6 Clicks Score (PT): 22 (10/31/23 1915)    CODE STATUS:   Code Status and Medical Interventions:   Ordered at: 10/28/23 0308     Code Status (Patient has no pulse and is not breathing):    CPR (Attempt to Resuscitate)     Medical Interventions (Patient has pulse or is breathing):    Full Support       GENNY Lopez  23        Electronically signed by Soo Laguerre APRN at 23 1345          Physical Therapy Notes (most recent note)        Rosa M Wilburn, PT at 10/31/23 0818  Version 1 of 1         Patient Name: Ronnie He  : 1980    MRN: [id number]                              Today's Date: 10/31/2023       Admit Date: 10/27/2023    Visit Dx:     ICD-10-CM ICD-9-CM   1. Pneumonia of left lower lobe due to infectious organism  J18.9 486   2. COPD with acute exacerbation  J44.1 491.21   3. Cough, unspecified type  R05.9 786.2   4. Tobacco use disorder  F17.200 305.1   5. Hematemesis with nausea  K92.0 578.0     787.02   6. Hyperglycemia due to diabetes mellitus  E11.65 250.02   7. Atypical chest pain  R07.89 786.59   8. Leukocytosis, unspecified type  D72.829 288.60   9. Sinus tachycardia by electrocardiogram  R00.0 785.0   10. Renal lesion  N28.9 593.9   11. Dizziness  R42 780.4     Patient  Active Problem List   Diagnosis    Ataxia    Alcohol use disorder, severe, in early remission    Anxiety    Cannabis dependence    Chiari malformation type I    Chronic pain of right knee    Chronic post-traumatic stress disorder (PTSD)    Conversion disorder with attacks or seizures    COPD (chronic obstructive pulmonary disease)    DDD (degenerative disc disease), cervical    Diabetes mellitus type 2 in obese    Generalized anxiety disorder    Hemiplegia and hemiparesis following cerebral infarction affecting left non-dominant side    Hemiplegia, unspecified affecting left nondominant side    History of alcoholism    History of cardioembolic cerebrovascular accident (CVA)    Hypertension associated with diabetes    Insomnia    Intractable migraine with aura with status migrainosus    Left-sided weakness    Major depressive disorder, recurrent, severe with psychotic features    Malingering    Melanoma    Methamphetamine abuse    Methamphetamine dependence in remission    Methamphetamine-induced psychotic disorder    Non-compliant patient    PAULA (obstructive sleep apnea)    Personality disorder, unspecified    Pseudohyponatremia    Seizure disorder    Squamous blepharitis of upper and lower eyelids of both eyes    Suicide attempt    Tobacco abuse    Type 2 diabetes mellitus with hyperglycemia    Type 2 diabetes mellitus without retinopathy    Community acquired pneumonia of left lower lobe of lung    Other constipation     Past Medical History:   Diagnosis Date    Anxiety     Diabetes mellitus     Hypertension      History reviewed. No pertinent surgical history.   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 Works at [employer].  -KR     Existing Precautions/Restrictions fall;orthostatic hypotension  -KR     Barriers to Rehab ineffective coping  -KR       Row Name 10/31/23 0847          Living Environment    People in Home other (see comments)  lives in Sober Living  -KR       Row Name 10/31/23 0847          Home Main Entrance    Number of Stairs, Main Entrance nine  -KR     Stair Railings, Main Entrance railing on right side (ascending)  -KR       Row Name 10/31/23 0847          Stairs Within Home, Primary    Stairs, Within Home, Primary 9 steps down to bedroom  -KR     Number of Stairs, Within Home, Primary nine  -KR     Stair Railings, Within Home, Primary railing on left side (ascending)  -KR       Row Name 10/31/23 0847          Cognition    Orientation Status (Cognition) oriented to;person;verbal cues/prompts needed for orientation;time;place  -KR       Row Name 10/31/23 0847          Safety Issues, Functional Mobility    Safety Issues Affecting Function (Mobility) safety precaution awareness;safety precautions follow-through/compliance;insight into deficits/self-awareness  -KR     Impairments Affecting Function (Mobility) balance;endurance/activity tolerance;strength;sensation/sensory awareness;coordination  -KR               User Key  (r) = Recorded By, (t) = Taken By, (c) = Cosigned By      Initials Name Provider Type    KR Rosa M Wilburn, HALIMA Physical Therapist                   Mobility       Row Name 10/31/23 0849          Bed Mobility    Bed Mobility supine-sit  -KR     Supine-Sit [place] (Bed Mobility) modified independence  -KR     Assistive Device (Bed Mobility) head of bed elevated  -KR       Row Name 10/31/23 0849          Sit-Stand Transfer    Sit-Stand [place] (Transfers) contact guard  -     Assistive Device (Sit-Stand Transfers) walker, front-wheeled  -KR     Comment, (Sit-Stand Transfer)  1x from bed, 1x from chair. BP sitting 143/89; BP standing 122/87.  -KR       Row Name 10/31/23 0849          Gait/Stairs (Locomotion)    [place] Level (Gait) minimum assist (75% patient effort);1 person assist;verbal cues;nonverbal cues (demo/gesture)  -KR     Assistive Device (Gait) walker, front-wheeled  -KR     Distance in Feet (Gait) 14  -KR     Deviations/Abnormal Patterns (Gait) cierra decreased;gait speed decreased;stride length decreased;weight shifting decreased  -KR     Comment, (Gait/Stairs) pt with ataxic LLE and noteable instability. Farther ambulation distance limited by weakness. Pt reporting lightheadedness following ambulation trial.  -KR               User Key  (r) = Recorded By, (t) = Taken By, (c) = Cosigned By      Initials Name Provider Type    Rosa M Bautista, PT Physical Therapist                   Obj/Interventions       Row Name 10/31/23 0925          Range of Motion Comprehensive    General Range of Motion bilateral lower extremity ROM WNL  -KR       Row Name 10/31/23 0992          Strength Comprehensive (MMT)    General Manual Muscle Testing (MMT) Assessment lower extremity strength deficits identified  -KR     Comment, General Manual Muscle Testing (MMT) Assessment RLE 5/5, L hip flexion 3/5, L knee extension 3+/5, L knee flexion 3+/5, L ankle DF 3/5, L ankle PF 3/5  -KR       Row Name 10/31/23 0954          Motor Skills    Motor Skills coordination;functional endurance  -KR     Coordination left;lower extremity;heel to shin;moderate impairment;other (see comments)  BLE rapid toe taps WFL  -KR     Functional Endurance Pt instructed on and performed 10 breaths on IS. Pt also performed 10x B shoulder flexion with paced PLB.  -KR       Row Name 10/31/23 0919          Balance    Balance Assessment sitting static balance;sitting dynamic balance;standing static balance;standing dynamic balance  -KR     Static Sitting Balance standby assist  -KR     Dynamic Sitting Balance standby  assist  -KR     Position, Sitting Balance unsupported;sitting edge of bed;sitting in chair  -KR     Static Standing Balance contact guard  -KR     Dynamic Standing Balance minimal assist;1-person assist;non-verbal cues (demo/gesture);verbal cues  -KR     Position/Device Used, Standing Balance supported;walker, front-wheeled  -KR     Balance Interventions sitting;standing;sit to stand;supported;static;dynamic  -KR       Row Name 10/31/23 0925          Sensory Assessment (Somatosensory)    Sensory Assessment (Somatosensory) right LE;left LE  -KR     Left LE Sensory Assessment general sensation;light touch awareness;proprioception;intact;light touch localization;impaired  -KR     Right LE Sensory Assessment general sensation;light touch awareness;light touch localization;proprioception;intact  -KR               User Key  (r) = Recorded By, (t) = Taken By, (c) = Cosigned By      Initials Name Provider Type    Rosa M Bautista, PT Physical Therapist                   Goals/Plan       Row Name 10/31/23 0930          Bed Mobility Goal 1 (PT)    Activity/Assistive Device (Bed Mobility Goal 1, PT) bed mobility activities, all  -KR     [place] Level/Cues Needed (Bed Mobility Goal 1, PT) independent  -KR     Time Frame (Bed Mobility Goal 1, PT) long term goal (LTG);2 weeks  -KR       Row Name 10/31/23 0930          Transfer Goal 1 (PT)    Activity/Assistive Device (Transfer Goal 1, PT) sit-to-stand/stand-to-sit;bed-to-chair/chair-to-bed  -KR     [place] Level/Cues Needed (Transfer Goal 1, PT) independent  -KR     Time Frame (Transfer Goal 1, PT) long term goal (LTG);2 weeks  -KR       Row Name 10/31/23 0930          Gait Training Goal 1 (PT)    Activity/Assistive Device (Gait Training Goal 1, PT) gait (walking locomotion);improve balance and speed;increase endurance/gait distance  -KR     [place] Level (Gait Training Goal 1, PT) independent  -KR     Distance (Gait Training Goal 1, PT) 500  -KR     Time Frame  (Gait Training Goal 1, PT) long term goal (LTG);2 weeks  -KR       Row Name 10/31/23 0930          Stairs Goal 1 (PT)    Activity/Assistive Device (Stairs Goal 1, PT) stairs, all skills;using handrail, right  -KR     [place] Level/Cues Needed (Stairs Goal 1, PT) modified independence  -KR     Number of Stairs (Stairs Goal 1, PT) 9  -KR     Time Frame (Stairs Goal 1, PT) long term goal (LTG);2 weeks  -KR       Row Name 10/31/23 0930          Therapy Assessment/Plan (PT)    Planned Therapy Interventions (PT) balance training;bed mobility training;gait training;home exercise program;postural re-education;patient/family education;neuromuscular re-education;motor coordination training;ROM (range of motion);stair training;strengthening;stretching;transfer training  -KR               User Key  (r) = Recorded By, (t) = Taken By, (c) = Cosigned By      Initials Name Provider Type    KR Rosa M Wilburn, PT Physical Therapist                   Clinical Impression       Row Name 10/31/23 0928          Pain    Pretreatment Pain Rating 9/10  -KR     Posttreatment Pain Rating 9/10  -KR     Pain Location - head  -KR     Pre/Posttreatment Pain Comment nsg aware  -KR     Pain Intervention(s) Repositioned;Ambulation/increased activity  -KR       Row Name 10/31/23 0928          Plan of Care Review    Plan of Care Reviewed With patient  -KR     Outcome Evaluation Pt presents with decreased LLE strength and coordination, balance and coordination below baseline, as well as, orthostatic hypotension contributing to impairments in transfers, ambulation, and overall fxnl mobility. Pt with symptomatic orthostatic drop in BP when transitioning from sit to stand. Pt will benefit from PT to address aforementioned deficits and return to PLOF. PT rec IRF upon dc.  -KR       Row Name 10/31/23 0928          Therapy Assessment/Plan (PT)    Rehab Potential (PT) good, to achieve stated therapy goals  -KR     Criteria for Skilled Interventions Met  (PT) yes;skilled treatment is necessary  -KR     Therapy Frequency (PT) daily  -KR       Row Name 10/31/23 0928          Vital Signs    Pre Systolic BP Rehab 143  -KR     Pre Treatment Diastolic BP 89  -KR     Intra Systolic BP Rehab 122  -KR     Intra Treatment Diastolic BP 87  -KR     Post Systolic BP Rehab 134  -KR     Post Treatment Diastolic BP 91  -KR     Posttreatment Heart Rate (beats/min) 109  -KR     Post SpO2 (%) 94  -KR     O2 Delivery Post Treatment room air  -KR     Pre Patient Position Supine  -KR     Intra Patient Position Standing  -KR     Post Patient Position Sitting  -KR       Row Name 10/31/23 0928          Positioning and Restraints    Pre-Treatment Position in bed  -KR     Post Treatment Position chair  -KR     In Chair notified nsg;reclined;call light within reach;encouraged to call for assist;exit alarm on;legs elevated  -KR               User Key  (r) = Recorded By, (t) = Taken By, (c) = Cosigned By      Initials Name Provider Type    Rosa M Bautista PT Physical Therapist                   Outcome Measures       Row Name 10/31/23 0931          How much help from another person do you currently need...    Turning from your back to your side while in flat bed without using bedrails? 4  -KR     Moving from lying on back to sitting on the side of a flat bed without bedrails? 4  -KR     Moving to and from a bed to a chair (including a wheelchair)? 3  -KR     Standing up from a chair using your arms (e.g., wheelchair, bedside chair)? 3  -KR     Climbing 3-5 steps with a railing? 2  -KR     To walk in hospital room? 3  -KR     AM-PAC 6 Clicks Score (PT) 19  -KR     Highest level of mobility 6 --> Walked 10 steps or more  -KR               User Key  (r) = Recorded By, (t) = Taken By, (c) = Cosigned By      Initials Name Provider Type    Rosa M Bautista PT Physical Therapist                                 Physical Therapy Education       Title: PT OT SLP Therapies (Done)       Topic:  Physical Therapy (Done)       Point: Mobility training (Done)       Learning Progress Summary             Patient Acceptance, E, VU by KR at 10/31/2023 0932                         Point: Home exercise program (Done)       Learning Progress Summary             Patient Acceptance, E, VU by KR at 10/31/2023 0932                         Point: Body mechanics (Done)       Learning Progress Summary             Patient Acceptance, E, VU by KR at 10/31/2023 0932                         Point: Precautions (Done)       Learning Progress Summary             Patient Acceptance, E, VU by KR at 10/31/2023 0932                                         User Key       Initials Effective Dates Name Provider Type Discipline    BRAULIO 12/30/22 -  Rosa M Wilburn, PT Physical Therapist PT                  PT Recommendation and Plan  Planned Therapy Interventions (PT): balance training, bed mobility training, gait training, home exercise program, postural re-education, patient/family education, neuromuscular re-education, motor coordination training, ROM (range of motion), stair training, strengthening, stretching, transfer training  Plan of Care Reviewed With: patient  Outcome Evaluation: Pt presents with decreased LLE strength and coordination, balance and coordination below baseline, as well as, orthostatic hypotension contributing to impairments in transfers, ambulation, and overall fxnl mobility. Pt with symptomatic orthostatic drop in BP when transitioning from sit to stand. Pt will benefit from PT to address aforementioned deficits and return to PLOF. PT rec IRF upon dc.     Time Calculation:   PT Evaluation Complexity  History, PT Evaluation Complexity: 3 or more personal factors and/or comorbidities  Examination of Body Systems (PT Eval Complexity): total of 4 or more elements  Clinical Presentation (PT Evaluation Complexity): stable  Clinical Decision Making (PT Evaluation Complexity): low complexity  Overall Complexity (PT  Evaluation Complexity): low complexity     PT Charges       Row Name 10/31/23 0932             Time Calculation    Start Time 0818  -KR      PT Received On 10/31/23  -KR      PT Goal Re-Cert Due Date 11/10/23  -KR         Timed Charges    30655 - PT Therapeutic Exercise Minutes 8  -KR         Untimed Charges    PT Eval/Re-eval Minutes 47  -KR         Total Minutes    Timed Charges Total Minutes 8  -KR      Untimed Charges Total Minutes 47  -KR       Total Minutes 55  -KR                User Key  (r) = Recorded By, (t) = Taken By, (c) = Cosigned By      Initials Name Provider Type    Rosa M Bautista PT Physical Therapist                  Therapy Charges for Today       Code Description Service Date Service Provider Modifiers Qty    35261353154 HC PT THER PROC EA 15 MIN 10/31/2023 Rosa M Wilburn, PT GP 1    97205779373 HC PT EVAL LOW COMPLEXITY 4 10/31/2023 Rosa M Wilburn, PT GP 1            PT G-Codes  AM-PAC 6 Clicks Score (PT): 19  PT Discharge Summary  Anticipated Discharge Disposition (PT): inpatient rehabilitation facility    Rosa M Wilburn PT  10/31/2023      Electronically signed by Rosa M Wilburn PT at 10/31/23 0934          Occupational Therapy Notes (most recent note)        Jose Marie, OT at 23 1038          Patient Name: Ronnie He  : 1980    MRN: [id number]                              Today's Date: 2023       Admit Date: 10/27/2023    Visit Dx:     ICD-10-CM ICD-9-CM   1. Pneumonia of left lower lobe due to infectious organism  J18.9 486   2. COPD with acute exacerbation  J44.1 491.21   3. Cough, unspecified type  R05.9 786.2   4. Tobacco use disorder  F17.200 305.1   5. Hematemesis with nausea  K92.0 578.0     787.02   6. Hyperglycemia due to diabetes mellitus  E11.65 250.02   7. Atypical chest pain  R07.89 786.59   8. Leukocytosis, unspecified type  D72.829 288.60   9. Sinus tachycardia by electrocardiogram  R00.0 785.0   10. Renal lesion  N28.9 593.9   11.  Dizziness  R42 780.4     Patient Active Problem List   Diagnosis    Ataxia    Alcohol use disorder, severe, in early remission    Anxiety    Cannabis dependence    Chiari malformation type I    Chronic pain of right knee    Chronic post-traumatic stress disorder (PTSD)    Conversion disorder with attacks or seizures    COPD (chronic obstructive pulmonary disease)    DDD (degenerative disc disease), cervical    Diabetes mellitus type 2 in obese    Generalized anxiety disorder    Hemiplegia and hemiparesis following cerebral infarction affecting left non-dominant side    Hemiplegia, unspecified affecting left nondominant side    History of alcoholism    History of cardioembolic cerebrovascular accident (CVA)    Hypertension associated with diabetes    Insomnia    Intractable migraine with aura with status migrainosus    Left-sided weakness    Major depressive disorder, recurrent, severe with psychotic features    Malingering    Melanoma    Methamphetamine abuse    Methamphetamine dependence in remission    Methamphetamine-induced psychotic disorder    Non-compliant patient    PAULA (obstructive sleep apnea)    Personality disorder, unspecified    Pseudohyponatremia    Seizure disorder    Squamous blepharitis of upper and lower eyelids of both eyes    Suicide attempt    Tobacco abuse    Type 2 diabetes mellitus with hyperglycemia    Type 2 diabetes mellitus without retinopathy    Community acquired pneumonia of left lower lobe of lung    Other constipation     Past Medical History:   Diagnosis Date    Anxiety     Diabetes mellitus     Hypertension      History reviewed. No pertinent surgical history.   General Information       Row Name 11/01/23 1124          OT Time and Intention    Document Type evaluation  -CS     Mode of Treatment occupational therapy  -CS       Row Name 11/01/23 1124          General Information    Patient Profile Reviewed yes  -CS     Prior Level of Function independent:;all household  mobility;ADL's;work  Pt reports no AD/DME at baseline, works at [employer]  -CS     Existing Precautions/Restrictions fall;other (see comments)  monitor BP  -CS     Barriers to Rehab ineffective coping  -CS       Row Name 11/01/23 1124          Living Environment    People in Home other (see comments);facility resident  Sober Living  -CS       Row Name 11/01/23 1124          Home Main Entrance    Number of Stairs, Main Entrance nine  -CS     Stair Railings, Main Entrance railing on right side (ascending)  -CS       Row Name 11/01/23 1124          Stairs Within Home, Primary    Stairs, Within Home, Primary steps down to bedroom  -CS       Row Name 11/01/23 1124          Cognition    Orientation Status (Cognition) oriented to;place;person;time  -CS       Row Name 11/01/23 1124          Safety Issues, Functional Mobility    Safety Issues Affecting Function (Mobility) insight into deficits/self-awareness;safety precaution awareness;safety precautions follow-through/compliance;positioning of assistive device  -CS     Impairments Affecting Function (Mobility) balance;endurance/activity tolerance;strength;sensation/sensory awareness;coordination  -CS               User Key  (r) = Recorded By, (t) = Taken By, (c) = Cosigned By      Initials Name Provider Type    CS Jose Marie OT Occupational Therapist                     Mobility/ADL's       Row Name 11/01/23 1131          Bed Mobility    Bed Mobility scooting/bridging;supine-sit  -CS     Scooting/Bridging [place] (Bed Mobility) supervision  -CS     Supine-Sit [place] (Bed Mobility) modified independence  -CS     Comment, (Bed Mobility) no dizziness reported upon sitting  -CS       Row Name 11/01/23 1131          Transfers    Transfers sit-stand transfer;stand-sit transfer;bed-chair transfer  -CS     Comment, (Transfers) increased assist for steps to recliner, tremulous/shaking BLEs upon standing - LLE persisted following sit down. cues throughout for  safety/sequencing/positioning w/ RW use  -CS       Row Name 11/01/23 1131          Bed-Chair Transfer    Bed-Chair [place] (Transfers) minimum assist (75% patient effort);verbal cues;nonverbal cues (demo/gesture)  -CS     Assistive Device (Bed-Chair Transfers) walker, front-wheeled  -CS       Row Name 11/01/23 1131          Sit-Stand Transfer    Sit-Stand [place] (Transfers) contact guard;verbal cues;nonverbal cues (demo/gesture)  -CS     Assistive Device (Sit-Stand Transfers) walker, front-wheeled  -CS       Row Name 11/01/23 1131          Functional Mobility    Functional Mobility- Comment defer to PT for specifics  -CS       Row Name 11/01/23 1131          Activities of Daily Living    BADL Assessment/Intervention lower body dressing;grooming;upper body dressing  -CS       Row Name 11/01/23 1131          Lower Body Dressing Assessment/Training    [place] Level (Lower Body Dressing) doff;don;pants/bottoms;moderate assist (50% patient effort)  -CS     Position (Lower Body Dressing) edge of bed sitting;supported standing  -CS     Comment, (Lower Body Dressing) education on improved safety/sequencing with RW during pants pull up, threading intact  -CS       Row Name 11/01/23 1131          Grooming Assessment/Training    [place] Level (Grooming) wash face, hands;hair care, combing/brushing;set up  -CS     Position (Grooming) supported sitting  -CS     Comment, (Grooming) seated due to standing tolerance deficit  -CS       Row Name 11/01/23 1131          Upper Body Dressing Assessment/Training    [place] Level (Upper Body Dressing) front opening garment;set up  -CS     Position (Upper Body Dressing) edge of bed sitting  -CS               User Key  (r) = Recorded By, (t) = Taken By, (c) = Cosigned By      Initials Name Provider Type    Jose Solomon OT Occupational Therapist                   Obj/Interventions       Row Name 11/01/23 1135          Sensory Assessment (Somatosensory)     Sensory Assessment (Somatosensory) UE sensation intact  -[facility] Name 11/01/23 1135          Vision Assessment/Intervention    Visual Impairment/Limitations WFL;corrective lenses full-time  -[facility] Name 11/01/23 1135          Range of Motion Comprehensive    General Range of Motion bilateral upper extremity ROM WFL  -[facility] Name 11/01/23 1135          Strength Comprehensive (MMT)    General Manual Muscle Testing (MMT) Assessment no strength deficits identified  -     Comment, General Manual Muscle Testing (MMT) Assessment BUE grossly 5/5, symmetrical  -[facility] Name 11/01/23 1135          Motor Skills    Motor Skills coordination;functional endurance  -     Coordination bilateral;upper extremity;finger to nose;minimal impairment  -     Therapeutic Exercise shoulder;elbow/forearm;other (see comments)  BUE AROM incorporated into targeted reaching in sitting and standing with dynamic challenge ( 3 sets, rest breaks)  -[facility] Name 11/01/23 1135          Balance    Balance Assessment sitting static balance;sitting dynamic balance;standing static balance;standing dynamic balance  -     Static Sitting Balance supervision  -     Dynamic Sitting Balance standby assist  -     Position, Sitting Balance unsupported;sitting edge of bed  -     Static Standing Balance contact guard  -     Dynamic Standing Balance moderate assist  -     Position/Device Used, Standing Balance supported;walker, front-wheeled  -     Balance Interventions sitting;sit to stand;standing;occupation based/functional task;UE activity with balance activity;dynamic reaching;weight shifting activity  -               User Key  (r) = Recorded By, (t) = Taken By, (c) = Cosigned By      Initials Name Provider Type    Jose Solomon, OT Occupational Therapist                   Goals/Plan       [facility] Name 11/01/23 1138          Transfer Goal 1 (OT)    Activity/Assistive Device (Transfer Goal 1, OT)  bed-to-chair/chair-to-bed;toilet  -CS     [place] Level/Cues Needed (Transfer Goal 1, OT) modified independence  -CS     Time Frame (Transfer Goal 1, OT) long term goal (LTG);10 days  -CS     Strategies/Barriers (Transfers Goal 1, OT) AD recs per PT  -CS     Progress/Outcome (Transfer Goal 1, OT) new goal  -CS       Row Name 11/01/23 1138          Dressing Goal 1 (OT)    Activity/Device (Dressing Goal 1, OT) lower body dressing  -CS     [place]/Cues Needed (Dressing Goal 1, OT) standby assist  -CS     Time Frame (Dressing Goal 1, OT) long term goal (LTG);10 days  -CS     Progress/Outcome (Dressing Goal 1, OT) new goal  -CS       Row Name 11/01/23 1138          Grooming Goal 1 (OT)    Activity/Device (Grooming Goal 1, OT) hair care;oral care;wash face, hands  -CS     [place] (Grooming Goal 1, OT) supervision required  -CS     Time Frame (Grooming Goal 1, OT) long term goal (LTG);10 days  -CS     Strategies/Barriers (Grooming Goal 1, OT) seated vs. standing pending improved standing tolerance  -CS     Progress/Outcome (Grooming Goal 1, OT) new goal  -CS       Row Name 11/01/23 1138          Therapy Assessment/Plan (OT)    Planned Therapy Interventions (OT) activity tolerance training;functional balance retraining;occupation/activity based interventions;ROM/therapeutic exercise;strengthening exercise;patient/caregiver education/training;neuromuscular control/coordination retraining;transfer/mobility retraining;cognitive/visual perception retraining;adaptive equipment training  -CS               User Key  (r) = Recorded By, (t) = Taken By, (c) = Cosigned By      Initials Name Provider Type    CS Jose Marie, OT Occupational Therapist                   Clinical Impression       Row Name 11/01/23 1136          Pain Assessment    Additional Documentation Pain Scale: FACES Pre/Post-Treatment (Group)  -       Row Name 11/01/23 1136          Pain Scale: FACES Pre/Post-Treatment    Pain: FACES Scale,  Pretreatment 0-->no hurt  -CS     Posttreatment Pain Rating 0-->no hurt  -CS       Row Name 11/01/23 1136          Plan of Care Review    Plan of Care Reviewed With patient  -CS     Progress no change  -CS     Outcome Evaluation Baseline ADL completion limited by decreased activity tolerance, standing balance deficits, and impaired coordination warranting skilled IP OT services to promote return to PLOF. Tremulous/shaking BLEs upon standing - LLE persisted following sit. Based on today/s performance, recommend d/c to IP rehab for best functional outcomes.  -CS       Row Name 11/01/23 1136          Therapy Assessment/Plan (OT)    Rehab Potential (OT) good, to achieve stated therapy goals  -CS     Criteria for Skilled Therapeutic Interventions Met (OT) meets criteria;yes;skilled treatment is necessary  -CS     Therapy Frequency (OT) daily  -CS       Row Name 11/01/23 1136          Therapy Plan Review/Discharge Plan (OT)    Anticipated Discharge Disposition (OT) inpatient rehabilitation facility  -       Row Name 11/01/23 1136          Vital Signs    Pre Systolic BP Rehab --  RN cleared for tx  -CS     O2 Delivery Pre Treatment room air  -CS     O2 Delivery Intra Treatment room air  -CS     O2 Delivery Post Treatment room air  -CS     Pre Patient Position Supine  -CS     Intra Patient Position Standing  -CS     Post Patient Position Sitting  -CS       Row Name 11/01/23 1136          Positioning and Restraints    Pre-Treatment Position in bed  -CS     Post Treatment Position chair  -CS     In Chair notified nsg;reclined;sitting;call light within reach;encouraged to call for assist;exit alarm on;legs elevated  -CS               User Key  (r) = Recorded By, (t) = Taken By, (c) = Cosigned By      Initials Name Provider Type    CS Jose Marie, OT Occupational Therapist                   Outcome Measures       Row Name 11/01/23 1139          How much help from another is currently needed...    Putting on and taking  off regular lower body clothing? 2  -CS     Bathing (including washing, rinsing, and drying) 2  -CS     Toileting (which includes using toilet bed pan or urinal) 3  -CS     Putting on and taking off regular upper body clothing 3  -CS     Taking care of personal grooming (such as brushing teeth) 3  -CS     Eating meals 4  -CS     AM-PAC 6 Clicks Score (OT) 17  -CS       Row Name 11/01/23 1139          Functional Assessment    Outcome Measure Options AM-PAC 6 Clicks Daily Activity (OT)  -CS               User Key  (r) = Recorded By, (t) = Taken By, (c) = Cosigned By      Initials Name Provider Type    CS Jose Marie OT Occupational Therapist                    Occupational Therapy Education       Title: PT OT SLP Therapies (In Progress)       Topic: Occupational Therapy (In Progress)       Point: ADL training (Done)       Description:   Instruct learner(s) on proper safety adaptation and remediation techniques during self care or transfers.   Instruct in proper use of assistive devices.                  Learning Progress Summary             Patient Acceptance, E,D, NHI,MANAV by  at 11/1/2023 1139                         Point: Home exercise program (Not Started)       Description:   Instruct learner(s) on appropriate technique for monitoring, assisting and/or progressing therapeutic exercises/activities.                  Learner Progress:  Not documented in this visit.              Point: Precautions (Done)       Description:   Instruct learner(s) on prescribed precautions during self-care and functional transfers.                  Learning Progress Summary             Patient Acceptance, E,D, NHI,MANAV by  at 11/1/2023 1139                         Point: Body mechanics (Done)       Description:   Instruct learner(s) on proper positioning and spine alignment during self-care, functional mobility activities and/or exercises.                  Learning Progress Summary             Patient Acceptance, E,D, NHI,MANAV by SIDNEY  at 11/1/2023 1139                                         User Key       Initials Effective Dates Name Provider Type Discipline     06/16/21 -  Jose Marie OT Occupational Therapist OT                  OT Recommendation and Plan  Planned Therapy Interventions (OT): activity tolerance training, functional balance retraining, occupation/activity based interventions, ROM/therapeutic exercise, strengthening exercise, patient/caregiver education/training, neuromuscular control/coordination retraining, transfer/mobility retraining, cognitive/visual perception retraining, adaptive equipment training  Therapy Frequency (OT): daily  Plan of Care Review  Plan of Care Reviewed With: patient  Progress: no change  Outcome Evaluation: Baseline ADL completion limited by decreased activity tolerance, standing balance deficits, and impaired coordination warranting skilled IP OT services to promote return to PLOF. Tremulous/shaking BLEs upon standing - LLE persisted following sit. Based on today/s performance, recommend d/c to IP rehab for best functional outcomes.     Time Calculation:   Evaluation Complexity (OT)  Review Occupational Profile/Medical/Therapy History Complexity: expanded/moderate complexity  Assessment, Occupational Performance/Identification of Deficit Complexity: 1-3 performance deficits  Clinical Decision Making Complexity (OT): problem focused assessment/low complexity  Overall Complexity of Evaluation (OT): low complexity     Time Calculation- OT       Row Name 11/01/23 1140             Time Calculation- OT    OT Start Time 1038  -CS      OT Received On 11/01/23  -CS      OT Goal Re-Cert Due Date 11/11/23  -CS         Timed Charges    00334 - OT Therapeutic Activity Minutes 10  -CS         Untimed Charges    OT Eval/Re-eval Minutes 47  -CS         Total Minutes    Timed Charges Total Minutes 10  -CS      Untimed Charges Total Minutes 47  -CS       Total Minutes 57  -CS                User Key  (r) =  Recorded By, (t) = Taken By, (c) = Cosigned By      Initials Name Provider Type    CS Jose Marie OT Occupational Therapist                  Therapy Charges for Today       Code Description Service Date Service Provider Modifiers Qty    [id number]  OT THERAPEUTIC ACT EA 15 MIN 11/1/2023 Jose Marie OT GO 1    [id number]  OT EVAL LOW COMPLEXITY 4 11/1/2023 Jose Marie OT GO 1                 Jose Marie OT  11/1/2023    Electronically signed by Jose Marie OT at 11/01/23 1321

## 2023-11-02 NOTE — PROGRESS NOTES
[facility] Medicine Services  PROGRESS NOTE    Patient Name: Ronnie He  : 1980  MRN: [id number]    Date of Admission: 10/27/2023  Primary Care Physician: Dylon Olmos MD    Subjective   Subjective     CC:  Seizure    HPI:  Patient sitting up in bed looking much better than he had in NAD.  Patient still trying to verify that he will be going to rehab.  He did ask OT provider to go buy him beer today.  States that he still weak but  never complained about a headache.  Awaiting placement.    Objective   Objective     Vital Signs:   Temp:  [97.5 °F (36.4 °C)-98.1 °F (36.7 °C)] 98 °F (36.7 °C)  Heart Rate:  [] 115  Resp:  [16-20] 17  BP: (119-130)/(81-95) 122/95     Physical Exam:  Constitutional: Alert, WD, WN male sitting up in bed in NAD  Eyes: EOMI, sclerae anicteric, no conjunctival injection  Head: NCAT  ENT: [place], moist mucous membranes   Respiratory: Nonlabored, symmetrical chest expansion, CTAB, 93% RA  Cardiovascular: Tachycardia, no M/R/G, +DP pulses bilaterally  Gastrointestinal: Soft, NT, ND +BS  Musculoskeletal: UGARTE; no LE edema bilaterally  Neurologic: Oriented x4, follows all commands, speech clear, extremely ataxic and unable to stand without any stability even with a walker  Skin: No rashes on exposed skin  Psychiatric: Pleasant and cooperative; normal affect    Results Reviewed:  LAB RESULTS:      Lab 23  1221 10/29/23  0613 10/28/23  0752 10/27/23  2247 10/27/23  2046 10/27/23  0022 10/26/23  1946   WBC 11.69* 8.92 10.84*  --  12.94*  --  9.28   HEMOGLOBIN 14.2 13.5 12.5*  --  13.9  --  13.4*   HEMATOCRIT 40.4 38.5 35.8*  --  39.8  --  38.2*   PLATELETS 218 197 239  --  272  --  217   NEUTROS ABS  --  4.72 9.43*  --  11.23*  --  6.04   IMMATURE GRANS (ABS)  --  0.08* 0.12*  --  0.09*  --  0.03   LYMPHS ABS  --  3.35* 1.01  --  1.11  --  1.99   MONOS ABS  --  0.71 0.27  --  0.50  --  1.04*   EOS ABS  --  0.02 0.00  --  0.00  --  0.13    MCV 86.0 85.6 86.3  --  85.6  --  85   PROCALCITONIN  --   --   --  <0.02  --   --   --    LACTATE  --   --   --   --  1.8  --   --    LACTATE, ARTERIAL  --   --   --   --   --  3.9*  --    D DIMER QUANT  --   --   --   --  <0.27  --   --          Lab 11/02/23  1221 10/30/23  1607 10/29/23  0613 10/28/23  0752 10/27/23  2247 10/27/23  2046 10/27/23  0022   SODIUM 134*  --  138 134*  --  134*  --    POTASSIUM 4.4  --  3.8 4.7  --  4.0 3.8   CHLORIDE 102  --  105 102  --  102 103   CO2 22.0  --  24.0 21.0*  --  21.0*  --    ANION GAP 10.0  --  9.0 11.0  --  11.0  --    BUN 17  --  22* 15  --  14  --    CREATININE 0.85  --  0.83 0.71*  --  0.74*  --    EGFR 110.6  --  111.4 116.7  --  115.3  --    GLUCOSE 221*  --  140* 351*  --  218* 355*   CALCIUM 9.3  --  8.7 8.8  --  9.3  --    IONIZED CALCIUM  --   --   --   --   --   --  4.5*   MAGNESIUM  --  2.2  --  2.0 1.7  --   --    HEMOGLOBIN A1C  --   --   --  8.00*  --   --   --          Lab 10/27/23  2046   TOTAL PROTEIN 7.0   ALBUMIN 4.5   GLOBULIN 2.5   ALT (SGPT) 19   AST (SGOT) 14   BILIRUBIN 0.3   ALK PHOS 86   LIPASE 59         Lab 10/27/23  2247 10/27/23  2046 10/26/23  2214 10/26/23  1946   PROBNP  --  658.3*  --   --    TROPONIN T  --   --  13  --    TROPONIN T (HIGHLY SENSITIVE)  --   --   --  15   HSTROP T 13 15*  --   --              Lab 10/30/23  1607   FOLATE 11.80   VITAMIN B 12 671         Lab 10/27/23  0022   BASE EXCESS ART -4.1*     Brief Urine Lab Results  (Last result in the past 365 days)        Color   Clarity   Blood   Leuk Est   Nitrite   Protein   CREAT   Urine HCG        10/28/23 0039 Yellow   Clear   Negative   Negative   Negative   Trace                   Microbiology Results Abnormal       Procedure Component Value - Date/Time    Blood Culture - Blood, Wrist, Left [[id number]]  (Normal) Collected: 10/27/23 2239    Lab Status: Final result Specimen: Blood from Wrist, Left Updated: 11/02/23 0915     Blood Culture No growth at 5 days    Blood  Culture - Blood, Arm, Right [[id number]]  (Normal) Collected: 10/27/23 2232    Lab Status: Final result Specimen: Blood from Arm, Right Updated: 11/02/23 0915     Blood Culture No growth at 5 days    COVID PRE-OP / PRE-PROCEDURE SCREENING ORDER (NO ISOLATION) - Swab, Nasopharynx [[id number]]  (Normal) Collected: 10/27/23 2241    Lab Status: Final result Specimen: Swab from Nasopharynx Updated: 10/27/23 2316    Narrative:      The following orders were created for panel order COVID PRE-OP / PRE-PROCEDURE SCREENING ORDER (NO ISOLATION) - Swab, Nasopharynx.  Procedure                               Abnormality         Status                     ---------                               -----------         ------                     COVID-19 and FLU A/B PCR...[[id number]]  Normal              Final result                 Please view results for these tests on the individual orders.    COVID-19 and FLU A/B PCR - Swab, Nasopharynx [[id number]]  (Normal) Collected: 10/27/23 2241    Lab Status: Final result Specimen: Swab from Nasopharynx Updated: 10/27/23 2316     COVID19 Not Detected     Influenza A PCR Not Detected     Influenza B PCR Not Detected    Narrative:      Fact sheet for providers: https://www.fda.gov/media/577861/download    Fact sheet for patients: https://www.fda.gov/media/026524/download    Test performed by PCR.            EEG    Result Date: 11/2/2023  Reason for referral: 42 y.o.male with headaches, history of seizures Technical Summary:  A 19 channel digital EEG was performed using the international 10-20 placement system, including eye leads and EKG leads. Duration: 20 minutes Findings: The awake tracing shows diffuse low amplitude intermixed theta and alpha activity which is present symmetrically over both hemispheres.  At times a poorly regulated 10 Hz posterior rhythm is evident symmetrically over the occipital leads.  Drowsiness is seen with mild slowing of the background but stage II sleep is not  present.  Hyperventilation and photic stimulation are not performed.  No focal features or epileptiform activity are seen. Video: Available Technical quality: Superior EKG: Regular, 80 bpm SUMMARY: Normal EEG in the awake and lightly drowsy states No focal features or epileptiform activity are seen     Impression: Normal study This report is transcribed using the Dragon dictation system.      CT Lumbar Spine Without Contrast    Result Date: 11/1/2023  CT LUMBAR SPINE WO CONTRAST Date of Exam: 11/1/2023 5:54 PM EDT Indication: Ataxia, nontraumatic, lumbar pathology suspected leg weakness and shaking. Comparison: None available. Technique: Axial CT images were obtained of the lumbar spine without contrast administration.  Reconstructed coronal and sagittal images were also obtained. Automated exposure control and iterative construction methods were used. Findings: No acute fracture or traumatic malalignment. Vertebral body heights are maintained. Near complete lumbarization of S1. Overall alignment is maintained. No significant listhesis. Mild loss of intervertebral disc base height throughout the lumbar spine, most significantly at L1-L2. There is associated small anterior osteophytes. The visualized paravertebral soft tissues are unremarkable. The visualized intra-abdominal and intrapelvic structures are unremarkable.     Impression: Impression: No acute abnormality. Electronically Signed: Gregory Fleming DO  11/1/2023 8:06 PM EDT  Workstation ID: [id number]    CT Thoracic Spine Without Contrast    Result Date: 11/1/2023  CT THORACIC SPINE WO CONTRAST Date of Exam: 11/1/2023 5:54 PM EDT Indication: Ataxia, nontraumatic, thoracic pathology suspected. Comparison: None available. Technique: Axial CT images were obtained of the thoracic spine without contrast administration.  Reconstructed coronal and sagittal images were also obtained. Automated exposure control and iterative construction methods were used. Findings:  No acute fracture or traumatic malalignment. Vertebral body heights are maintained. Overall alignment is maintained. No significant listhesis. Mild loss of intervertebral disc base height throughout the thoracic spine. No evidence of severe spinal canal stenosis or severe bilateral neuroforaminal narrowing. The paravertebral soft tissues and visualized intra-abdominal soft tissues are unremarkable. Mild groundglass opacities in the right lower lobe in a tree-in-bud pattern is concerning for an underlying infectious/inflammatory process. Otherwise the visualized chest is unremarkable     Impression: Impression: No acute osseous abnormality. Findings concerning for an underlying infectious/inflammatory process in the left lower lobe. Electronically Signed: Gregory Fleming DO  11/1/2023 7:53 PM EDT  Workstation ID: [id number]         Current medications:  Scheduled Meds:aspirin, 81 mg, Oral, Daily  atorvastatin, 10 mg, Oral, Daily  [START ON 11/3/2023] divalproex, 1,000 mg, Oral, Daily  empagliflozin, 10 mg, Oral, Daily  FLUoxetine, 20 mg, Oral, Daily  folic acid 1 mg in sodium chloride 0.9 % 50 mL IVPB, 1 mg, Intravenous, Daily  insulin detemir, 15 Units, Subcutaneous, Nightly  insulin lispro, 2-7 Units, Subcutaneous, 4x Daily AC & at Bedtime  Insulin Lispro, 6 Units, Subcutaneous, 4x Daily With Meals & Nightly  isosorbide mononitrate, 30 mg, Oral, Daily  nicotine, 1 patch, Transdermal, Q24H  pantoprazole, 40 mg, Intravenous, BID AC  senna-docusate sodium, 2 tablet, Oral, BID   And  polyethylene glycol, 17 g, Oral, Daily  QUEtiapine, 50 mg, Oral, Nightly  thiamine (B-1) IV, 500 mg, Intravenous, Q8H   Followed by  [START ON 11/5/2023] thiamine (B-1) IV, 200 mg, Intravenous, Q8H   Followed by  [START ON 11/9/2023] thiamine, 100 mg, Oral, Daily      Continuous Infusions:     PRN Meds:.  acetaminophen **OR** acetaminophen **OR** acetaminophen    aspirin-acetaminophen-caffeine    benzonatate    senna-docusate sodium  **AND** polyethylene glycol **AND** bisacodyl **AND** bisacodyl    dextrose    dextrose    diphenhydrAMINE    glucagon (human recombinant)    ipratropium-albuterol    ondansetron **OR** ondansetron    sodium chloride    sodium chloride    Assessment & Plan   Assessment & Plan     Active Hospital Problems    Diagnosis  POA    **Community acquired pneumonia of left lower lobe of lung [J18.9]  Yes    Tachycardia [R00.0]  Unknown    Other constipation [K59.09]  Yes    Conversion disorder with attacks or seizures [F44.5]  Yes    Ataxia [R27.0]  Yes    Major depressive disorder, recurrent, severe with psychotic features [F33.3]  Yes    History of alcoholism [F10.21]  Not Applicable    Non-compliant patient [Z91.199]  Not Applicable    COPD (chronic obstructive pulmonary disease) [J44.9]  Yes    Hypertension associated with diabetes [E11.59, I15.2]  Yes    Diabetes mellitus type 2 in obese [E11.69, E66.9]  Yes    Personality disorder, unspecified [F60.9]  Yes      Resolved Hospital Problems   No resolved problems to display.        Brief Hospital Course to date:  Ronnie He is a 42 y.o. male with PMH of HTN, HLD, DM2, ETOH/polysubstance abuse in remission, COPD and seizure disorder presents to the ED for shortness of breath.      LLL Pneumonia  COPD  -CT chest shows LLL pneumonia  -Rocephin and azithro  -Duonebs scheduled.  Encourage ambulation and pulmonary toileting.  -- No new labs today.  Improving.  Still complaining of very minimal shortness of breath with exertion.  Occasional cough.     Hematemesis - no further episodes  --Hgb 13.5  --Likely Ada Chairez tear  -continue protonix IV BID, no witnessed episodes here. If reoccurs will have GI evaluate.  No further.  Stable.  Monitor.     DM2 w/A1c 8  --24H glucose 160-237  -- Basal, SSI; adjust as needed  -- Started preprandial 10/29; Increased to 6 units QACHS    Tachycardia  --Heart rate consistently in the low 100s  --IV NS @100ml/h x10 hours       HTN  HLD  -Continue home aspirin, statin, lisinopril   No history of coronary artery disease.  DC Imdur.  --Stable       Ataxia acute.  History of Brandi malformation surgery  Seizures with subtherapeutic Depakote:  Hx of Polysubstance Abuse  Alcohol abuse:  --Hx but unsure of extent; could be from drug and EtOH abuse  --PT/OT worked with patient and feel that he needs inpatient rehab  -- Observed and very unsteady on his fee.  -Patient reports being sober 3mo, 25 days  -UDS+ for opiates, received IV morphine at [facility] 10/26Patient complaining of persistent left upper extremity hand tingling for several days.  Also complains of intermittent left lower extremity tingling.  History of cervical and lumbar spine issues.  Otherwise stable.    -- CT of the head/neck without contrast showed no intracranial abnormality; multilevel cervical spondylosis appearing most advanced at C3-4 and C4-5  Plan:  -- CT L-spine and T-spine ordered and reviewed.  -- Neurology consulted for further eval before discharge.  -- Thiamine supplement started.    Constipation-resolved  -- KUB noted large stool burden, will increase bowel meds today and give one-time dose of lactulose  -- BM x1 on 10/29 and 10/31.    Evaluated by PT/OT and both feel that he legitimately has a left leg weakness and that he needs rehab.  Imaging reviewed.  Neurology consulted.    Expected Discharge Location and Transportation:     Expected Discharge   Expected Discharge Date: 11/3/2023; Expected Discharge Time:      DVT prophylaxis:  Mechanical DVT prophylaxis orders are present.     AM-PAC 6 Clicks Score (PT): 19 (11/02/23 8847)    CODE STATUS:   Code Status and Medical Interventions:   Ordered at: 10/28/23 0304     Code Status (Patient has no pulse and is not breathing):    CPR (Attempt to Resuscitate)     Medical Interventions (Patient has pulse or is breathing):    Full Support       Ilir Mai MD  11/02/23

## 2023-11-02 NOTE — PLAN OF CARE
Goal Outcome Evaluation:  Plan of Care Reviewed With: patient        Progress: improving  Outcome Evaluation: Pt continues continues to demo decreased coordination and motor control in the LLE with functional tasks. No orthostatic BP measurements during session this date. Pt will continue to benefit from PT to address ongoing strength, balance, endurance, and coordination deficits.      Anticipated Discharge Disposition (PT): inpatient rehabilitation facility

## 2023-11-03 VITALS
HEART RATE: 103 BPM | DIASTOLIC BLOOD PRESSURE: 91 MMHG | TEMPERATURE: 98 F | OXYGEN SATURATION: 94 % | HEIGHT: 71 IN | RESPIRATION RATE: 18 BRPM | BODY MASS INDEX: 31.64 KG/M2 | WEIGHT: 226 LBS | SYSTOLIC BLOOD PRESSURE: 129 MMHG

## 2023-11-03 PROBLEM — J18.9 PNEUMONIA, UNSPECIFIED ORGANISM: Status: ACTIVE | Noted: 2023-11-03

## 2023-11-03 LAB
GLUCOSE BLDC GLUCOMTR-MCNC: 125 MG/DL (ref 70–130)
GLUCOSE BLDC GLUCOMTR-MCNC: 265 MG/DL (ref 70–130)

## 2023-11-03 PROCEDURE — 94761 N-INVAS EAR/PLS OXIMETRY MLT: CPT

## 2023-11-03 PROCEDURE — 82948 REAGENT STRIP/BLOOD GLUCOSE: CPT

## 2023-11-03 PROCEDURE — 63710000001 INSULIN LISPRO (HUMAN) PER 5 UNITS: Performed by: NURSE PRACTITIONER

## 2023-11-03 PROCEDURE — 94799 UNLISTED PULMONARY SVC/PX: CPT

## 2023-11-03 PROCEDURE — 94664 DEMO&/EVAL PT USE INHALER: CPT

## 2023-11-03 PROCEDURE — G0378 HOSPITAL OBSERVATION PER HR: HCPCS

## 2023-11-03 PROCEDURE — 96376 TX/PRO/DX INJ SAME DRUG ADON: CPT

## 2023-11-03 RX ORDER — PANTOPRAZOLE SODIUM 40 MG/1
40 TABLET, DELAYED RELEASE ORAL DAILY
Qty: 30 TABLET | Refills: 0 | Status: SHIPPED | OUTPATIENT
Start: 2023-11-03

## 2023-11-03 RX ORDER — LANOLIN ALCOHOL/MO/W.PET/CERES
100 CREAM (GRAM) TOPICAL DAILY
Qty: 30 TABLET | Refills: 0 | Status: SHIPPED | OUTPATIENT
Start: 2023-11-09

## 2023-11-03 RX ORDER — DIVALPROEX SODIUM 500 MG/1
1000 TABLET, EXTENDED RELEASE ORAL DAILY
Qty: 60 TABLET | Refills: 0 | Status: SHIPPED | OUTPATIENT
Start: 2023-11-03

## 2023-11-03 RX ORDER — DIPHENHYDRAMINE HCL 25 MG
25 TABLET ORAL EVERY 6 HOURS PRN
Qty: 120 TABLET | Refills: 0 | Status: SHIPPED | OUTPATIENT
Start: 2023-11-03

## 2023-11-03 RX ORDER — NICOTINE 21 MG/24HR
1 PATCH, TRANSDERMAL 24 HOURS TRANSDERMAL
Qty: 30 EACH | Refills: 0 | Status: SHIPPED | OUTPATIENT
Start: 2023-11-03

## 2023-11-03 RX ADMIN — PANTOPRAZOLE SODIUM 40 MG: 40 INJECTION, POWDER, LYOPHILIZED, FOR SOLUTION INTRAVENOUS at 11:09

## 2023-11-03 RX ADMIN — SENNOSIDES AND DOCUSATE SODIUM 1 TABLET: 8.6; 5 TABLET ORAL at 08:03

## 2023-11-03 RX ADMIN — DIVALPROEX SODIUM 1000 MG: 250 TABLET, EXTENDED RELEASE ORAL at 11:12

## 2023-11-03 RX ADMIN — IPRATROPIUM BROMIDE AND ALBUTEROL SULFATE 3 ML: 2.5; .5 SOLUTION RESPIRATORY (INHALATION) at 11:12

## 2023-11-03 RX ADMIN — EMPAGLIFLOZIN 10 MG: 10 TABLET, FILM COATED ORAL at 11:12

## 2023-11-03 RX ADMIN — Medication 1 PATCH: at 11:11

## 2023-11-03 RX ADMIN — ASPIRIN 81 MG CHEWABLE TABLET 81 MG: 81 TABLET CHEWABLE at 11:10

## 2023-11-03 RX ADMIN — ACETAMINOPHEN 650 MG: 325 TABLET ORAL at 04:44

## 2023-11-03 RX ADMIN — INSULIN LISPRO 6 UNITS: 100 INJECTION, SOLUTION INTRAVENOUS; SUBCUTANEOUS at 11:09

## 2023-11-03 RX ADMIN — FLUOXETINE 20 MG: 20 CAPSULE ORAL at 11:11

## 2023-11-03 RX ADMIN — ATORVASTATIN CALCIUM 10 MG: 10 TABLET, FILM COATED ORAL at 11:12

## 2023-11-03 RX ADMIN — ISOSORBIDE MONONITRATE 30 MG: 30 TABLET, EXTENDED RELEASE ORAL at 11:10

## 2023-11-03 NOTE — PROGRESS NOTES
Neurology       Patient Care Team:  Dylon Olmos MD as PCP - General (Family Medicine)    Chief complaint: Gait disturbance    History:  Patient is off to rehab.  Tells me that he was not taking Imdur but was taking aspirin.    Worked well with PT.  He is off to rehab this afternoon.          Past Medical History:   Diagnosis Date    Anxiety     Diabetes mellitus     Hypertension        Vital Signs   Vitals:    11/03/23 0500 11/03/23 0729 11/03/23 1110 11/03/23 1112   BP:  129/91 129/91    BP Location:  Right arm     Patient Position:  Lying     Pulse: 86 84 103 103   Resp:  18  18   Temp:  98 °F (36.7 °C)     TempSrc:  Oral     SpO2:       Weight:       Height:           Physical Exam:   General: Awake and alert              Neuro: Oriented to person place and time.    Speech is normal.        Results Review:  EEG was normal.      Results from last 7 days   Lab Units 11/02/23  1221   WBC 10*3/mm3 11.69*   HEMOGLOBIN g/dL 14.2   HEMATOCRIT % 40.4   PLATELETS 10*3/mm3 218     Results from last 7 days   Lab Units 11/02/23  1221 10/29/23  0613 10/28/23  0752 10/27/23  2046   SODIUM mmol/L 134* 138 134* 134*   POTASSIUM mmol/L 4.4 3.8 4.7 4.0   CHLORIDE mmol/L 102 105 102 102   CO2 mmol/L 22.0 24.0 21.0* 21.0*   BUN mg/dL 17 22* 15 14   CREATININE mg/dL 0.85 0.83 0.71* 0.74*   CALCIUM mg/dL 9.3 8.7 8.8 9.3   BILIRUBIN mg/dL  --   --   --  0.3   ALK PHOS U/L  --   --   --  86   ALT (SGPT) U/L  --   --   --  19   AST (SGOT) U/L  --   --   --  14   GLUCOSE mg/dL 221* 140* 351* 218*       Imaging Results (Last 24 Hours)       ** No results found for the last 24 hours. **            Assessment:  Conversion disorder.    PNES.    Migraine headaches.        Plan:  Okay to discharge on Depakote 1000 mg once daily extended release.    Outpatient follow-up [facility] for headache follow-up.    Comment:  Complex scenario         I discussed the patients findings and my recommendations with patient and primary  care team    Andry Valerio MD  11/03/23  12:03 EDT

## 2023-11-03 NOTE — DISCHARGE PLACEMENT REQUEST
"Marcos He (42 y.o. Male)        [facility] [id number],   update due 11/7    [employer] wheelchair transport is 1 pm today     Date of Birth   1980    Social Security Number       Address   [address] [facility] [address]    Home Phone       MRN   [id number]       Scientologist   Patient Refused    Marital Status   Single                            Admission Date   10/27/23    Admission Type   Emergency    Admitting Provider   Ilir Mai MD    Attending Provider   Ilir Mai MD    Department, Room/Bed   [facility] 3F, S315/1       Discharge Date       Discharge Disposition   Skilled Nursing Facility (DC - External)    Discharge Destination                                 Attending Provider: Ilir Mai MD    Allergies: Eggs Or Egg-derived Products    Isolation: None   Infection: None   Code Status: CPR    Ht: 180.3 cm (71\")   Wt: 103 kg (226 lb)    Admission Cmt: None   Principal Problem: Community acquired pneumonia of left lower lobe of lung [J18.9]                   Active Insurance as of 10/27/2023       Primary Coverage       Payor Plan Insurance Group Employer/Plan Group    HUMANA MEDICARE REPLACEMENT HUMANA MEDICARE REPLACEMENT [id number]       Payor Plan Address Payor Plan Phone Number Payor Plan Fax Number Effective Dates    [address] [address] [phone]  5/1/2023 - None Entered    [facility] [id number]         Subscriber Name Subscriber Birth Date Member ID       MARCOS HE 1980 [id number]                     Emergency Contacts        (Rel.) Home Phone Work Phone Mobile Phone    DEL CID,TAB (Sister) -- -- [phone]              Insurance Information                  HUMANA MEDICARE REPLACEMENT/HUMANA MEDICARE REPLACEMENT Phone: [phone]    Subscriber: Marcos He Subscriber#: [id number]    Group#: [id number] Precert#: --               Discharge Summary        Pablo Caal APRN at 11/03/23 0909              [facility] " Hospital Medicine Services  DISCHARGE SUMMARY    Patient Name: Ronnie He  : 1980  MRN: [id number]    Date of Admission: 10/27/2023 10:14 PM  Date of Discharge:  23    Primary Care Physician: Dylon Olmos MD    Consults       Date and Time Order Name Status Description    2023  8:49 AM Inpatient Neurology Consult General Completed             Hospital Course     Presenting Problem:     Active Hospital Problems    Diagnosis  POA    **Community acquired pneumonia of left lower lobe of lung [J18.9]  Yes    Pneumonia, unspecified organism [J18.9]  Yes    Tachycardia [R00.0]  Unknown    Other constipation [K59.09]  Yes    Conversion disorder with attacks or seizures [F44.5]  Yes    Ataxia [R27.0]  Yes    Major depressive disorder, recurrent, severe with psychotic features [F33.3]  Yes    History of alcoholism [F10.21]  Not Applicable    Non-compliant patient [Z91.199]  Not Applicable    COPD (chronic obstructive pulmonary disease) [J44.9]  Yes    Hypertension associated with diabetes [E11.59, I15.2]  Yes    Diabetes mellitus type 2 in obese [E11.69, E66.9]  Yes    Personality disorder, unspecified [F60.9]  Yes      Resolved Hospital Problems   No resolved problems to display.          Hospital Course:  Ronnie He is a 42 y.o. male with PMH of HTN, HLD, DM2, ETOH/polysubstance abuse in remission, COPD and seizure disorder who presented to the ED on 10/27/2023 for shortness of breath.  CT of the chest revealed left lower lobe pneumonia.  He was treated with Rocephin and azithromycin with improvement in presenting symptoms.  We will continue scheduled DuoNebs due to history of COPD.  He is currently stable on room air.  PT/OT recommended inpatient rehab.  Case management has arranged for inpatient rehab at [facility].  He will be discharged today in stable condition.     LLL Pneumonia  COPD  -CT chest shows LLL pneumonia  -s/p Rocephin and azithro  -Duonebs scheduled.  Encourage  ambulation and pulmonary toileting.  -Currently on room air     Hematemesis - no further episodes  --Hgb 13.5  --Likely Ada Chairez tear  -continue protonix, no witnessed episodes here. If reoccurs will have GI evaluate.  No further.  Stable.  Monitor.     DM2 w/A1c 8  -- resume home insulin, metformin     Tachycardia  -- Resolved  -- S/p IVF     HTN  HLD  -Continue home aspirin, statin, lisinopril   No history of coronary artery disease.  DC Imdur.  --Stable     Ataxia acute.  History of Brandi malformation surgery  Seizures with subtherapeutic Depakote:  Hx of Polysubstance Abuse  Alcohol abuse:  --Hx but unsure of extent; could be from drug and EtOH abuse  --PT/OT recommended inpatient rehab  -- Observed and very unsteady on his feet.  -Patient reports being sober 3mo, 25 days  -UDS+ for opiates, received IV morphine at [facility] 10/26  -- CT of the head/neck without contrast showed no intracranial abnormality; multilevel cervical spondylosis appearing most advanced at C3-4 and C4-5     Constipation-resolved  -- BM x1 on 10/29 and 10/31.  -As needed bowel regimen per protocol     Discharge Follow Up Recommendations for outpatient labs/diagnostics:  Follow-up with PCP in 1 week.    Day of Discharge     HPI:   Patient seen resting in bed no apparent distress.  No acute events overnight per nursing.  We discussed plan to discharge to [place] today and he is agreeable.  We discussed the importance of taking all medications as prescribed and keeping all recommended follow-up appointments.  He expressed no additional concerns at this time.    Vital Signs:   Temp:  [97.8 °F (36.6 °C)-98.1 °F (36.7 °C)] 98 °F (36.7 °C)  Heart Rate:  [] 84  Resp:  [16-20] 18  BP: (112-133)/(71-95) 129/91      Physical Exam:  Constitutional: No acute distress, awake, alert  HENT: Surgical scar noted, mucous membranes moist  Respiratory: Clear to auscultation bilaterally, respiratory effort normal on room air  Cardiovascular: RRR, no  murmurs, cap refill brisk   Gastrointestinal: Positive bowel sounds, soft, nontender, nondistended  Musculoskeletal: No BLE edema   Psychiatric: Appropriate affect, cooperative  Neurologic: Oriented x 3, moves all extremities, speech clear  Skin: warm, dry, no visible rash     Pertinent  and/or Most Recent Results     LAB RESULTS:      Lab 11/02/23  1221 10/29/23  0613 10/28/23  0752 10/27/23  2247 10/27/23  2046   WBC 11.69* 8.92 10.84*  --  12.94*   HEMOGLOBIN 14.2 13.5 12.5*  --  13.9   HEMATOCRIT 40.4 38.5 35.8*  --  39.8   PLATELETS 218 197 239  --  272   NEUTROS ABS  --  4.72 9.43*  --  11.23*   IMMATURE GRANS (ABS)  --  0.08* 0.12*  --  0.09*   LYMPHS ABS  --  3.35* 1.01  --  1.11   MONOS ABS  --  0.71 0.27  --  0.50   EOS ABS  --  0.02 0.00  --  0.00   MCV 86.0 85.6 86.3  --  85.6   PROCALCITONIN  --   --   --  <0.02  --    LACTATE  --   --   --   --  1.8   D DIMER QUANT  --   --   --   --  <0.27         Lab 11/02/23  1221 10/30/23  1607 10/29/23  0613 10/28/23  0752 10/27/23  2247 10/27/23  2046   SODIUM 134*  --  138 134*  --  134*   POTASSIUM 4.4  --  3.8 4.7  --  4.0   CHLORIDE 102  --  105 102  --  102   CO2 22.0  --  24.0 21.0*  --  21.0*   ANION GAP 10.0  --  9.0 11.0  --  11.0   BUN 17  --  22* 15  --  14   CREATININE 0.85  --  0.83 0.71*  --  0.74*   EGFR 110.6  --  111.4 116.7  --  115.3   GLUCOSE 221*  --  140* 351*  --  218*   CALCIUM 9.3  --  8.7 8.8  --  9.3   MAGNESIUM  --  2.2  --  2.0 1.7  --    HEMOGLOBIN A1C  --   --   --  8.00*  --   --          Lab 10/27/23  2046   TOTAL PROTEIN 7.0   ALBUMIN 4.5   GLOBULIN 2.5   ALT (SGPT) 19   AST (SGOT) 14   BILIRUBIN 0.3   ALK PHOS 86   LIPASE 59         Lab 10/27/23  2247 10/27/23  2046   PROBNP  --  658.3*   HSTROP T 13 15*             Lab 10/30/23  1607   FOLATE 11.80   VITAMIN B 12 671         Brief Urine Lab Results  (Last result in the past 365 days)        Color   Clarity   Blood   Leuk Est   Nitrite   Protein   CREAT   Urine HCG         10/28/23 0039 Yellow   Clear   Negative   Negative   Negative   Trace                 Microbiology Results (last 10 days)       Procedure Component Value - Date/Time    COVID PRE-OP / PRE-PROCEDURE SCREENING ORDER (NO ISOLATION) - Swab, Nasopharynx [[id number]]  (Normal) Collected: 10/27/23 2241    Lab Status: Final result Specimen: Swab from Nasopharynx Updated: 10/27/23 2316    Narrative:      The following orders were created for panel order COVID PRE-OP / PRE-PROCEDURE SCREENING ORDER (NO ISOLATION) - Swab, Nasopharynx.  Procedure                               Abnormality         Status                     ---------                               -----------         ------                     COVID-19 and FLU A/B PCR...[[id number]]  Normal              Final result                 Please view results for these tests on the individual orders.    COVID-19 and FLU A/B PCR - Swab, Nasopharynx [[id number]]  (Normal) Collected: 10/27/23 2241    Lab Status: Final result Specimen: Swab from Nasopharynx Updated: 10/27/23 2316     COVID19 Not Detected     Influenza A PCR Not Detected     Influenza B PCR Not Detected    Narrative:      Fact sheet for providers: https://www.fda.gov/media/861147/download    Fact sheet for patients: https://www.fda.gov/media/448732/download    Test performed by PCR.    Blood Culture - Blood, Wrist, Left [[id number]]  (Normal) Collected: 10/27/23 2239    Lab Status: Final result Specimen: Blood from Wrist, Left Updated: 11/02/23 0915     Blood Culture No growth at 5 days    Blood Culture - Blood, Arm, Right [[id number]]  (Normal) Collected: 10/27/23 2232    Lab Status: Final result Specimen: Blood from Arm, Right Updated: 11/02/23 0915     Blood Culture No growth at 5 days            EEG    Result Date: 11/2/2023  Reason for referral: 42 y.o.male with headaches, history of seizures Technical Summary:  A 19 channel digital EEG was performed using the international 10-20 placement system, including  eye leads and EKG leads. Duration: 20 minutes Findings: The awake tracing shows diffuse low amplitude intermixed theta and alpha activity which is present symmetrically over both hemispheres.  At times a poorly regulated 10 Hz posterior rhythm is evident symmetrically over the occipital leads.  Drowsiness is seen with mild slowing of the background but stage II sleep is not present.  Hyperventilation and photic stimulation are not performed.  No focal features or epileptiform activity are seen. Video: Available Technical quality: Superior EKG: Regular, 80 bpm SUMMARY: Normal EEG in the awake and lightly drowsy states No focal features or epileptiform activity are seen     Normal study This report is transcribed using the Dragon dictation system.      CT Lumbar Spine Without Contrast    Result Date: 11/1/2023  CT LUMBAR SPINE WO CONTRAST Date of Exam: 11/1/2023 5:54 PM EDT Indication: Ataxia, nontraumatic, lumbar pathology suspected leg weakness and shaking. Comparison: None available. Technique: Axial CT images were obtained of the lumbar spine without contrast administration.  Reconstructed coronal and sagittal images were also obtained. Automated exposure control and iterative construction methods were used. Findings: No acute fracture or traumatic malalignment. Vertebral body heights are maintained. Near complete lumbarization of S1. Overall alignment is maintained. No significant listhesis. Mild loss of intervertebral disc base height throughout the lumbar spine, most significantly at L1-L2. There is associated small anterior osteophytes. The visualized paravertebral soft tissues are unremarkable. The visualized intra-abdominal and intrapelvic structures are unremarkable.     Impression: No acute abnormality. Electronically Signed: Gregory Fleming DO  11/1/2023 8:06 PM EDT  Workstation ID: [id number]    CT Thoracic Spine Without Contrast    Result Date: 11/1/2023  CT THORACIC SPINE WO CONTRAST Date of Exam:  11/1/2023 5:54 PM EDT Indication: Ataxia, nontraumatic, thoracic pathology suspected. Comparison: None available. Technique: Axial CT images were obtained of the thoracic spine without contrast administration.  Reconstructed coronal and sagittal images were also obtained. Automated exposure control and iterative construction methods were used. Findings: No acute fracture or traumatic malalignment. Vertebral body heights are maintained. Overall alignment is maintained. No significant listhesis. Mild loss of intervertebral disc base height throughout the thoracic spine. No evidence of severe spinal canal stenosis or severe bilateral neuroforaminal narrowing. The paravertebral soft tissues and visualized intra-abdominal soft tissues are unremarkable. Mild groundglass opacities in the right lower lobe in a tree-in-bud pattern is concerning for an underlying infectious/inflammatory process. Otherwise the visualized chest is unremarkable     Impression: No acute osseous abnormality. Findings concerning for an underlying infectious/inflammatory process in the left lower lobe. Electronically Signed: Gregory Fleming DO  11/1/2023 7:53 PM EDT  Workstation ID: [id number]    MRI Brain Without Contrast    Result Date: 10/30/2023  MRI BRAIN WO CONTRAST Date of Exam: 10/30/2023 6:37 PM CDT Indication: left sided tingling ( x several days).  Comparison: Head CT 10/30/2023, MRI brain 9/16/2023 Technique:  Routine multiplanar/multisequence sequence images of the brain were obtained without contrast administration. Findings: There is motion artifact throughout examination which limits image quality. There is no diffusion restriction to suggest acute infarct. There is no evidence of acute or chronic intracranial hemorrhage. No mass effect or midline shift. No abnormal extra-axial collections. The basal ganglia, brainstem and cerebellum appear within normal limits. Midline structures are intact. No significant cortical abnormality is  identified. Evidence of previous Chiari decompression. Calvarial and superficial soft tissue signal is within normal limits. Orbits appear unremarkable. The paranasal sinuses and the mastoid air cells appear well aerated.     Impression: No acute process identified. Electronically Signed: Johan Hollis MD  10/30/2023 7:28 PM CDT  Workstation ID: [id number]    CT Head Without Contrast    Result Date: 10/30/2023  CT HEAD WO CONTRAST, CT CERVICAL SPINE WO CONTRAST Date of Exam: 10/30/2023 12:51 PM EDT Indication: LUE tingling. Comparison: MRI 9/16/2023. Technique: Axial CT images were obtained of the head and cervical spine without contrast administration.  Automated exposure control and iterative construction methods were used. Findings: CT head: Postoperative changes are present with the appearance of likely prior Chiari decompression with suboccipital craniectomy and posterior C1 arch resection noted. There is no evidence of intracranial hemorrhage, mass or mass effect. The ventricles are normal in size and configuration. The orbits are normal. The paranasal sinuses appear clear. The calvarium is otherwise intact. CT cervical spine: Posterior C1 arch resection noted as above. Cortical margins are intact without evidence of acute fracture. There is straightening of the usual cervical lordosis, otherwise without listhesis or subluxation. The paraspinal soft tissues demonstrate no acute or suspicious findings. The lung apices are clear. Multilevel spondylosis is present, with some areas of disc osteophyte complex formation and facet arthropathy, limited in characterization on CT but appearing likely most advanced at  C3-4 and C4-5, with some at least moderate associated spinal canal and neuroforaminal narrowing present.     Impression: No acute intracranial abnormality. Redemonstrated postoperative changes from presumed prior Chiari decompression. No acute findings in the cervical spine. Multilevel cervical spondylosis  is evident, appearing most advanced at C3-4 and C4-5. Electronically Signed: Jakob Perkins MD  10/30/2023 1:17 PM EDT  Workstation ID: [id number]    CT Cervical Spine Without Contrast    Result Date: 10/30/2023  CT HEAD WO CONTRAST, CT CERVICAL SPINE WO CONTRAST Date of Exam: 10/30/2023 12:51 PM EDT Indication: LUE tingling. Comparison: MRI 9/16/2023. Technique: Axial CT images were obtained of the head and cervical spine without contrast administration.  Automated exposure control and iterative construction methods were used. Findings: CT head: Postoperative changes are present with the appearance of likely prior Chiari decompression with suboccipital craniectomy and posterior C1 arch resection noted. There is no evidence of intracranial hemorrhage, mass or mass effect. The ventricles are normal in size and configuration. The orbits are normal. The paranasal sinuses appear clear. The calvarium is otherwise intact. CT cervical spine: Posterior C1 arch resection noted as above. Cortical margins are intact without evidence of acute fracture. There is straightening of the usual cervical lordosis, otherwise without listhesis or subluxation. The paraspinal soft tissues demonstrate no acute or suspicious findings. The lung apices are clear. Multilevel spondylosis is present, with some areas of disc osteophyte complex formation and facet arthropathy, limited in characterization on CT but appearing likely most advanced at  C3-4 and C4-5, with some at least moderate associated spinal canal and neuroforaminal narrowing present.     Impression: No acute intracranial abnormality. Redemonstrated postoperative changes from presumed prior Chiari decompression. No acute findings in the cervical spine. Multilevel cervical spondylosis is evident, appearing most advanced at C3-4 and C4-5. Electronically Signed: Jakob Perkins MD  10/30/2023 1:17 PM EDT  Workstation ID: [id number]    XR Abdomen KUB    Result Date: 10/29/2023  XR ABDOMEN  KUB Date of Exam: 10/29/2023 12:51 PM EDT Indication: No BM and vomiting Comparison: CT October 27, 2023 Findings: There appears to be a large amount of formed stool in the colon. No definite small bowel dilatation is seen. There is mild distention of the stomach. No ectopic bowel gas is identified on these images. Probable phleboliths are noted in the pelvis.     Impression: 1.Large amount of formed stool in the colon suspicious for constipation. 2.No definite small bowel dilatation. Electronically Signed: Bartolome Randee  10/29/2023 1:08 PM EDT  Workstation ID: [id number]    CT Abdomen Pelvis Without Contrast    Result Date: 10/27/2023  CT ABDOMEN PELVIS WO CONTRAST Date of Exam: 10/27/2023 11:26 PM EDT Indication: Abdominal pain, acute, nonlocalized constipation for 4 days, epigastric pain, vomiting, hematemesis. Comparison: None available. Technique: Axial CT images were obtained of the abdomen and pelvis without the administration of contrast. Reconstructed coronal and sagittal images were also obtained. Automated exposure control and iterative construction methods were used. Findings: There is a small focus of pneumonia in the left lower lobe posteriorly. The right lung base is clear. Heart size is normal. Distal esophagus is unremarkable. There are no acute findings in the superficial soft tissues. There are no acute osseous abnormalities or destructive bone lesions. There are mild thoracolumbar degenerative changes, most pronounced at the L1-L2 disc. There is a transitional lumbosacral vertebra. The liver is homogeneous. There is gradient attenuation of the gallbladder lumen that may reflect stones or sludge. No evidence of acute cholecystitis. The bile ducts, pancreas, stomach, spleen and adrenal glands appear within normal limits. There is a small exophytic lesion at the inferior aspect of the left kidney measuring 12 mm, which is indeterminate in part due to mild motion and otherwise due to small size and  noncontrast technique. The appendix is normal. The colon is normal. Small bowel is nondistended. Aorta is normal diameter. There is no ascites, pneumoperitoneum or lymphadenopathy. The prostate gland is normal size and the urinary bladder is nondistended.     Impression: 1.Small focus of pneumonia in the left lower lobe. 2.No acute abdominal or pelvic abnormality. 3.12 mm exophytic lesion at the inferior aspect of the left kidney. This is indeterminate in part due to motion and otherwise due to small size and noncontrast technique. Consider 3-month follow-up renal protocol CT or MRI to more definitively evaluate.. Electronically Signed: Yonatan Yun MD  10/27/2023 11:59 PM EDT  Workstation ID: [id number]    XR Chest 1 View    Result Date: 10/27/2023  XR CHEST 1 VW Date of Exam: 10/27/2023 9:40 PM EDT Indication: Chest Pain Triage Protocol Comparison: None available. Findings:  There is no acute infiltrate. There is no large pleural effusion. The cardiac silhouette is unremarkable. The visualized augusto structures demonstrate no abnormality.     Impression: No acute pulmonary disease. Electronically Signed: Jose Luis Davis MD  10/27/2023 9:59 PM EDT  Workstation ID: [id number]    CT Angiogram Chest Pulmonary Embolism    Result Date: 10/27/2023  CLINICAL INDICATION: Pulmonary embolism (PE) suspected, high prob TECHNIQUE: Imaging of the chest was performed from thoracic inlet through upper abdomen, using spiral technique, following administration of IV contrast, Omnipaque 350, 100 mL per the pulmonary angiogram protocol. In addition, 3D images were created and reviewed. TOTAL DLP (Dose-Length Product): 496.99 mGy.cm. Please note: The reported value represents the total of one or more individual components during the CT acquisition on this date and at this time, and as such, the same value may appear in more than one CT report depending on the interpreting/reporting physicians. COMPARISON: Chest x-ray October 26, 2023  FINDINGS: Pulmonary Arteries/Vessels: Limited evaluation due to suboptimal bolus timing. However, no discrete pulmonary embolism noted within the main or segmental pulmonary arteries. Coronary artery calcifications. Right Heart Strain: No evidence of right heart strain. Pleural/Pericardial space: No pneumothorax.  No pleural effusions.  No pericardial effusion. Lymph Nodes: No lymphadenopathy within the chest by CT size criteria. Lungs: The central airways are patent. Tree-in-bud nodularity noted within the depending left lower lobe. No consolidation. Mediastinum: Otherwise unremarkable. Chest wall: No chest wall hematoma or contusion. Bones: No acute fracture within the chest. Mild degenerative and hypertrophic changes in the cervical thoracic and lumbar spine. Minimal curvature or scoliosis of the spine which might be positional. Upper Abdomen: Limited imaging of the upper abdomen is unremarkable.    No pulmonary embolism. Respiratory motion artifact and poor contrast opacification of peripheral vessels limits peripheral evaluation. No large central pulmonary emboli. Tree-in-bud, patchy nodularity noted within the dependent left lower lobe, which could be related to aspiration or atypical infectious process. CRITICAL RESULT:   No. COMMUNICATION: Per this written report. Preliminary report signed by Tony Jain MD on 10/27/2023 5:21 AM By electronically signing this report, I, the attending physician, attest that I have personally reviewed the images/data for the above examination(s) and agree with the final edited report. Drafted by Tnoy Jain MD on 10/27/2023 5:13 AM Final report signed by Trenton Olivia MD on 10/27/2023 5:26 AM    XR Chest 1 View    Result Date: 10/26/2023  CLINICAL INDICATION: SOA TECHNIQUE: XR CHEST 1 VIEW COMPARISON: None. FINDINGS: No consolidation, effusion, or pneumothorax. The cardiac size within normal limits with unremarkable cardiac and mediastinal contours.  No free subdiaphragmatic gas. No acute osseous findings.    No acute findings. CRITICAL RESULT:   No. COMMUNICATION: Per this written report. Drafted by Aki Tinoco MD on 10/26/2023 8:32 PM Final report signed by Aki Tinoco MD on 10/26/2023 8:32 PM    XR Chest 1 View    Result Date: 10/26/2023  PORTABLE CHEST HISTORY: Precordial chest pain. COMPARISON: None. FINDINGS: The heart is normal in size. The mediastinum is unremarkable. The lungs are clear. There is no pneumothorax.     No acute cardiopulmonary process. Images reviewed, interpreted, and dictated by Dr. RACHAEL Muller. Transcribed by Aleisha Duran PA-C.                 Plan for Follow-up of Pending Labs/Results: Follow-up as directed    Discharge Details        Discharge Medications        New Medications        Instructions Start Date   diphenhydrAMINE 25 MG tablet  Commonly known as: Benadryl Allergy   25 mg, Oral, Every 6 Hours PRN      nicotine 21 MG/24HR patch  Commonly known as: NICODERM CQ   1 patch, Transdermal, Every 24 Hours Scheduled      pantoprazole 40 MG EC tablet  Commonly known as: PROTONIX   40 mg, Oral, Daily      thiamine 100 MG tablet  Commonly known as: VITAMIN B1   100 mg, Oral, Daily   Start Date: November 9, 2023            Changes to Medications        Instructions Start Date   divalproex 500 MG 24 hr tablet  Commonly known as: DEPAKOTE ER  What changed: how much to take   1,000 mg, Oral, Daily             Continue These Medications        Instructions Start Date   aspirin 81 MG chewable tablet   81 mg, Oral, Daily      atorvastatin 10 MG tablet  Commonly known as: LIPITOR   10 mg, Oral, Daily      FLUoxetine 20 MG capsule  Commonly known as: PROzac   20 mg, Oral, Daily      insulin aspart 100 UNIT/ML solution pen-injector sc pen  Commonly known as: novoLOG FLEXPEN   2-10 Units, Subcutaneous, 3 Times Daily With Meals, ssi      Jardiance 10 MG tablet tablet  Generic drug: empagliflozin   Oral, Daily      metFORMIN 1000  MG tablet  Commonly known as: GLUCOPHAGE   1,000 mg, Oral, 2 Times Daily With Meals      QUEtiapine 50 MG tablet  Commonly known as: SEROquel   50 mg, Oral, Nightly             Stop These Medications      isosorbide mononitrate 30 MG 24 hr tablet  Commonly known as: IMDUR              Allergies   Allergen Reactions    Eggs Or Egg-Derived Products Hives         Discharge Disposition: [facility] ([facility] - External)    Diet:  Hospital:  Diet Order   Procedures    Diet: Cardiac Diets, Diabetic Diets; Healthy Heart (2-3 Na+); Consistent Carbohydrate; Texture: Regular Texture (IDDSI 7); Fluid Consistency: Thin (IDDSI 0)       Diet Instructions       Diet: Regular/House Diet, Cardiac Diets, Diabetic Diets; Healthy Heart (2-3 Na+); Regular Texture (IDDSI 7); Thin (IDDSI 0); Consistent Carbohydrate      Discharge Diet:  Regular/House Diet  Cardiac Diets  Diabetic Diets       Cardiac Diet: Healthy Heart (2-3 Na+)    Texture: Regular Texture (IDDSI 7)    Fluid Consistency: Thin (IDDSI 0)    Diabetic Diet: Consistent Carbohydrate             Activity: As tolerated  Activity Instructions       Activity as Tolerated                 CODE STATUS:    Code Status and Medical Interventions:   Ordered at: 10/28/23 0308     Code Status (Patient has no pulse and is not breathing):    CPR (Attempt to Resuscitate)     Medical Interventions (Patient has pulse or is breathing):    Full Support       No future appointments.    Additional Instructions for the Follow-ups that You Need to Schedule       Discharge Follow-up with PCP   As directed       Currently Documented PCP:    Dylon Olmos MD    PCP Phone Number:    [phone]     Follow Up Details: Follow-up with PCP in 1 week.                      GENNY Arroyo  11/03/23      Time Spent on Discharge:  I spent  41  minutes on this discharge activity which included: face-to-face encounter with the patient, reviewing the data in the system,  coordination of the care with the nursing staff as well as consultants, documentation, and entering orders.          Electronically signed by Pablo Caal APRN at 11/03/23 0917

## 2023-11-03 NOTE — CASE MANAGEMENT/SOCIAL WORK
"Case Management Discharge Note      Final Note: For today. If medically ready, plan transfer to [facility], insurance approved skilled. Nursing to call report to [phone]. Will need summary faxed to [phone]. Patient will need to be in the [address] entrance on or before 2 pm for the \"[facility].         Selected Continued Care - Admitted Since 10/27/2023       Destination Coordination complete.      Service Provider Selected Services Address Phone Fax Patient Preferred    [facility] [address] [address] [phone] --              Durable Medical Equipment    No services have been selected for the patient.                Dialysis/Infusion    No services have been selected for the patient.                Home Medical Care    No services have been selected for the patient.                Therapy    No services have been selected for the patient.                Community Resources    No services have been selected for the patient.                Community & DME    No services have been selected for the patient.                         Final Discharge Disposition Code: 03 - skilled nursing facility (SNF)  "

## 2023-11-03 NOTE — DISCHARGE PLACEMENT REQUEST
"Marcos He (42 y.o. Male)    From Radha Santillan ANASTACIO, RN, [facility]    [phone]   Date of Birth   1980    Social Security Number       Address   [address] [facility] [address]    Home Phone       MRN   [id number]       Zoroastrian   Patient Refused    Marital Status   Single                            Admission Date   10/27/23    Admission Type   Emergency    Admitting Provider   Ilir Mai MD    Attending Provider   Ilir Mai MD    Department, Room/Bed   [facility] 3F, S315/1       Discharge Date       Discharge Disposition   Skilled Nursing Facility (DC - External)    Discharge Destination                                 Attending Provider: Ilir Mai MD    Allergies: Eggs Or Egg-derived Products    Isolation: None   Infection: None   Code Status: CPR    Ht: 180.3 cm (71\")   Wt: 103 kg (226 lb)    Admission Cmt: None   Principal Problem: Community acquired pneumonia of left lower lobe of lung [J18.9]                   Active Insurance as of 10/27/2023       Primary Coverage       Payor Plan Insurance Group Employer/Plan Group    HUMANA MEDICARE REPLACEMENT HUMANA MEDICARE REPLACEMENT [id number]       Payor Plan Address Payor Plan Phone Number Payor Plan Fax Number Effective Dates    [address] [address] [phone]  5/1/2023 - None Entered    [facility] [address]         Subscriber Name Subscriber Birth Date Member ID       MARCOS HE 1980 [id number]                     Emergency Contacts        (Rel.) Home Phone Work Phone Mobile Phone    DEL CID,TAB (Sister) -- -- [phone]              Insurance Information                  HUMANA MEDICARE REPLACEMENT/HUMANA MEDICARE REPLACEMENT Phone: [phone]    Subscriber: Marcos He Subscriber#: [id number]    Group#: [id number] Precert#: --             History & Physical        Niels Agrawal III, DO at 10/28/23 0220              [facility] Medicine Services  HISTORY AND " PHYSICAL    Patient Name: Ronnie He  : 1980  MRN: [id number]  Primary Care Physician: Dylon Olmos MD  Date of admission: 10/27/2023    Subjective  Subjective     Chief Complaint:  Shortness of breath    HPI:  Ronnie He is a 42 y.o. male with PMH of HTN, HLD, DM2, ETOH/polysubstance abuse in remission, COPD and seizure disorder presents to the ED for shortness of breath. Patient is currently in sober living. Patient has been short of breath and chest pain for about 4 days. He has had a cough without sputum production. He has had chills, but no fever. He was admitted to  and diagnosed with pneumonia. On discharge, patient was given doxy and prednisone. However, he took it today and had vomiting. He had bloody streaks in vomitus. He has not been able to have a BM in about 4 days. He has had poor PO intake due to nausea. He has had dizziness when sitting up or standing.         Review of Systems   Constitutional:  Positive for activity change, appetite change and chills. Negative for diaphoresis and fever.   HENT:  Positive for congestion. Negative for sore throat and trouble swallowing.    Eyes: Negative.    Respiratory:  Positive for cough, chest tightness and shortness of breath.    Cardiovascular: Negative.    Gastrointestinal:  Positive for constipation, nausea and vomiting. Negative for abdominal distention, abdominal pain, blood in stool and diarrhea.   Endocrine: Negative.    Genitourinary:  Negative for decreased urine volume, difficulty urinating and dysuria.   Musculoskeletal: Negative.    Skin: Negative.    Neurological:  Positive for dizziness and light-headedness. Negative for headaches.   Hematological: Negative.    Psychiatric/Behavioral:  Negative for agitation and confusion.                 Personal History     Past Medical History:   Diagnosis Date    Anxiety     Diabetes mellitus     Hypertension          Oncology Problem List:  Melanoma (2020; Status:  Active)  Oncology/Hematology History       History reviewed. No pertinent surgical history.    Family History:  family history is not on file.     Social History:  reports that he has been smoking cigarettes. He has a 12.50 pack-year smoking history. He has never used smokeless tobacco. He reports that he does not currently use alcohol. He reports current drug use. Drug: Methamphetamines.  Social History     Social History Narrative    Not on file       Medications:  aspirin and divalproex    No Known Allergies    Objective  Objective     Vital Signs:   Temp:  [97.9 °F (36.6 °C)] 97.9 °F (36.6 °C)  Heart Rate:  [] 99  Resp:  [18-20] 18  BP: (116-137)/(69-98) 120/86    Physical Exam  Vitals reviewed.   Constitutional:       General: He is not in acute distress.     Appearance: He is normal weight.   HENT:      Head: Normocephalic.      Nose: Nose normal. No congestion.      Mouth/Throat:      Mouth: Mucous membranes are moist.   Eyes:      Extraocular Movements: Extraocular movements intact.      Pupils: Pupils are equal, round, and reactive to light.   Cardiovascular:      Rate and Rhythm: Regular rhythm. Tachycardia present.      Pulses: Normal pulses.      Heart sounds: Normal heart sounds. No murmur heard.  Pulmonary:      Effort: Pulmonary effort is normal. No respiratory distress.      Breath sounds: No wheezing or rhonchi.   Abdominal:      General: Bowel sounds are normal. There is no distension.      Palpations: Abdomen is soft.      Tenderness: There is no abdominal tenderness.   Musculoskeletal:         General: No swelling. Normal range of motion.      Cervical back: Normal range of motion. No rigidity.   Skin:     General: Skin is warm.      Capillary Refill: Capillary refill takes less than 2 seconds.   Neurological:      General: No focal deficit present.      Mental Status: He is alert and oriented to person, place, and time. Mental status is at baseline.      Motor: No weakness.   Psychiatric:          Mood and Affect: Mood normal.         Behavior: Behavior normal.         Thought Content: Thought content normal.         Judgment: Judgment normal.            Result Review:  I have personally reviewed the results from the time of this admission to 10/28/2023 02:20 EDT and agree with these findings:  [x]  Laboratory list / accordion  [x]  Microbiology  [x]  Radiology  []  EKG/Telemetry   []  Cardiology/Vascular   []  Pathology  [x]  Old records  []  Other:  Most notable findings include:     LAB RESULTS:      Lab 10/27/23  2046 10/27/23  0022 10/26/23  1946 10/26/23  1139   WBC 12.94*  --  9.28  --    HEMOGLOBIN 13.9  --  13.4*  --    HEMATOCRIT 39.8  --  38.2*  --    PLATELETS 272  --  217  --    NEUTROS ABS 11.23*  --  6.04  --    IMMATURE GRANS (ABS) 0.09*  --  0.03  --    LYMPHS ABS 1.11  --  1.99  --    MONOS ABS 0.50  --  1.04*  --    EOS ABS 0.00  --  0.13  --    MCV 85.6  --  85  --    LACTATE, ARTERIAL  --  3.9*  --   --    PROTIME  --   --   --  9.9   APTT  --   --   --  25.3   D DIMER QUANT <0.27  --   --   --          Lab 10/27/23  2046 10/27/23  0022 10/26/23  1139   SODIUM 134*  --   --    POTASSIUM 4.0 3.8  --    CHLORIDE 102 103  --    CO2 21.0*  --   --    ANION GAP 11.0  --   --    BUN 14  --   --    CREATININE 0.74*  --   --    EGFR 115.3  --   --    GLUCOSE 218* 355*  --    CALCIUM 9.3  --   --    IONIZED CALCIUM  --  4.5*  --    MAGNESIUM  --   --  1.7         Lab 10/27/23  2046   TOTAL PROTEIN 7.0   ALBUMIN 4.5   GLOBULIN 2.5   ALT (SGPT) 19   AST (SGOT) 14   BILIRUBIN 0.3   ALK PHOS 86   LIPASE 59         Lab 10/27/23  2247 10/27/23  2046 10/26/23  2214 10/26/23  1946 10/26/23  1139   PROBNP  --  658.3*  --   --   --    TROPONIN T  --   --  13  --   --    TROPONIN T (HIGHLY SENSITIVE)  --   --   --  15  --    HSTROP T 13 15*  --   --   --    PROTIME  --   --   --   --  9.9   INR  --   --   --   --  0.90                 Lab 10/27/23  0022   BASE EXCESS ART -4.1*     Brief Urine Lab  Results  (Last result in the past 365 days)        Color   Clarity   Blood   Leuk Est   Nitrite   Protein   CREAT   Urine HCG        10/28/23 0039 Yellow   Clear   Negative   Negative   Negative   Trace                 Microbiology Results (last 10 days)       Procedure Component Value - Date/Time    COVID PRE-OP / PRE-PROCEDURE SCREENING ORDER (NO ISOLATION) - Swab, Nasopharynx [[id number]]  (Normal) Collected: 10/27/23 2241    Lab Status: Final result Specimen: Swab from Nasopharynx Updated: 10/27/23 2316    Narrative:      The following orders were created for panel order COVID PRE-OP / PRE-PROCEDURE SCREENING ORDER (NO ISOLATION) - Swab, Nasopharynx.  Procedure                               Abnormality         Status                     ---------                               -----------         ------                     COVID-19 and FLU A/B PCR...[[id number]]  Normal              Final result                 Please view results for these tests on the individual orders.    COVID-19 and FLU A/B PCR - Swab, Nasopharynx [[id number]]  (Normal) Collected: 10/27/23 2241    Lab Status: Final result Specimen: Swab from Nasopharynx Updated: 10/27/23 2316     COVID19 Not Detected     Influenza A PCR Not Detected     Influenza B PCR Not Detected    Narrative:      Fact sheet for providers: https://www.fda.gov/media/349456/download    Fact sheet for patients: https://www.fda.gov/media/720666/download    Test performed by PCR.            CT Abdomen Pelvis Without Contrast    Result Date: 10/27/2023  CT ABDOMEN PELVIS WO CONTRAST Date of Exam: 10/27/2023 11:26 PM EDT Indication: Abdominal pain, acute, nonlocalized constipation for 4 days, epigastric pain, vomiting, hematemesis. Comparison: None available. Technique: Axial CT images were obtained of the abdomen and pelvis without the administration of contrast. Reconstructed coronal and sagittal images were also obtained. Automated exposure control and iterative construction  methods were used. Findings: There is a small focus of pneumonia in the left lower lobe posteriorly. The right lung base is clear. Heart size is normal. Distal esophagus is unremarkable. There are no acute findings in the superficial soft tissues. There are no acute osseous abnormalities or destructive bone lesions. There are mild thoracolumbar degenerative changes, most pronounced at the L1-L2 disc. There is a transitional lumbosacral vertebra. The liver is homogeneous. There is gradient attenuation of the gallbladder lumen that may reflect stones or sludge. No evidence of acute cholecystitis. The bile ducts, pancreas, stomach, spleen and adrenal glands appear within normal limits. There is a small exophytic lesion at the inferior aspect of the left kidney measuring 12 mm, which is indeterminate in part due to mild motion and otherwise due to small size and noncontrast technique. The appendix is normal. The colon is normal. Small bowel is nondistended. Aorta is normal diameter. There is no ascites, pneumoperitoneum or lymphadenopathy. The prostate gland is normal size and the urinary bladder is nondistended.     Impression: Impression: 1.Small focus of pneumonia in the left lower lobe. 2.No acute abdominal or pelvic abnormality. 3.12 mm exophytic lesion at the inferior aspect of the left kidney. This is indeterminate in part due to motion and otherwise due to small size and noncontrast technique. Consider 3-month follow-up renal protocol CT or MRI to more definitively evaluate.. Electronically Signed: Yonatan Yun MD  10/27/2023 11:59 PM EDT  Workstation ID: [id number]    XR Chest 1 View    Result Date: 10/27/2023  XR CHEST 1 VW Date of Exam: 10/27/2023 9:40 PM EDT Indication: Chest Pain Triage Protocol Comparison: None available. Findings:  There is no acute infiltrate. There is no large pleural effusion. The cardiac silhouette is unremarkable. The visualized augusto structures demonstrate no abnormality.      Impression: Impression: No acute pulmonary disease. Electronically Signed: Jose Luis Davis MD  10/27/2023 9:59 PM EDT  Workstation ID: [id number]    CT Angiogram Chest Pulmonary Embolism    Result Date: 10/27/2023  CLINICAL INDICATION: Pulmonary embolism (PE) suspected, high prob TECHNIQUE: Imaging of the chest was performed from thoracic inlet through upper abdomen, using spiral technique, following administration of IV contrast, Omnipaque 350, 100 mL per the pulmonary angiogram protocol. In addition, 3D images were created and reviewed. TOTAL DLP (Dose-Length Product): 496.99 mGy.cm. Please note: The reported value represents the total of one or more individual components during the CT acquisition on this date and at this time, and as such, the same value may appear in more than one CT report depending on the interpreting/reporting physicians. COMPARISON: Chest x-ray October 26, 2023 FINDINGS: Pulmonary Arteries/Vessels: Limited evaluation due to suboptimal bolus timing. However, no discrete pulmonary embolism noted within the main or segmental pulmonary arteries. Coronary artery calcifications. Right Heart Strain: No evidence of right heart strain. Pleural/Pericardial space: No pneumothorax.  No pleural effusions.  No pericardial effusion. Lymph Nodes: No lymphadenopathy within the chest by CT size criteria. Lungs: The central airways are patent. Tree-in-bud nodularity noted within the depending left lower lobe. No consolidation. Mediastinum: Otherwise unremarkable. Chest wall: No chest wall hematoma or contusion. Bones: No acute fracture within the chest. Mild degenerative and hypertrophic changes in the cervical thoracic and lumbar spine. Minimal curvature or scoliosis of the spine which might be positional. Upper Abdomen: Limited imaging of the upper abdomen is unremarkable.    Impression: No pulmonary embolism. Respiratory motion artifact and poor contrast opacification of peripheral vessels limits  peripheral evaluation. No large central pulmonary emboli. Tree-in-bud, patchy nodularity noted within the dependent left lower lobe, which could be related to aspiration or atypical infectious process. CRITICAL RESULT:   No. COMMUNICATION: Per this written report. Preliminary report signed by Tony Jain MD on 10/27/2023 5:21 AM By electronically signing this report, I, the attending physician, attest that I have personally reviewed the images/data for the above examination(s) and agree with the final edited report. Drafted by Tony Jain MD on 10/27/2023 5:13 AM Final report signed by Trenton Olivia MD on 10/27/2023 5:26 AM    XR Chest 1 View    Result Date: 10/26/2023  CLINICAL INDICATION: SOA TECHNIQUE: XR CHEST 1 VIEW COMPARISON: None. FINDINGS: No consolidation, effusion, or pneumothorax. The cardiac size within normal limits with unremarkable cardiac and mediastinal contours. No free subdiaphragmatic gas. No acute osseous findings.    Impression: No acute findings. CRITICAL RESULT:   No. COMMUNICATION: Per this written report. Drafted by Aki Tinoco MD on 10/26/2023 8:32 PM Final report signed by Aki Tinoco MD on 10/26/2023 8:32 PM    XR Chest 1 View    Result Date: 10/26/2023  PORTABLE CHEST HISTORY: Precordial chest pain. COMPARISON: None. FINDINGS: The heart is normal in size. The mediastinum is unremarkable. The lungs are clear. There is no pneumothorax.     Impression: No acute cardiopulmonary process. Images reviewed, interpreted, and dictated by Dr. RACHAEL Muller. Transcribed by Aleisha Duran PA-C.         Assessment & Plan  Assessment & Plan       Community acquired pneumonia of left lower lobe of lung    Conversion disorder with attacks or seizures    COPD (chronic obstructive pulmonary disease)    Diabetes mellitus type 2 in obese    History of alcoholism    Hypertension associated with diabetes    Non-compliant patient    Personality disorder,  "unspecified    Ronnie He is a 42 y.o. male with PMH of HTN, HLD, DM2, ETOH/polysubstance abuse in remission, COPD and seizure disorder presents to the ED for shortness of breath.     LLL Pneumonia  COPD  -CT chest shows small LLL pneumonia  -Rocephin and azithro  -Duonebs    Hematemesis   -Likely Ada Chairez tear  -With ETOH history, will give protonix IV BID  -Hgb stable, trend    DM2  -Pending HgbA1c  -ACHS finger   -SSI    HTN  HLD  -Continue home aspirin, statin, lisinopril     Seizure Disorder  -Continue home depakote  -Seizure precautions     Hx of Polysubstance Abuse  Hx of ETOH  -Patient reports being sober 3mo, 25 days  -UDS+ for opiates, received IV morphine at [facility] 10/26          DVT prophylaxis:  SCDs    CODE STATUS:  FULL       Expected Discharge  Expected discharge date/ time has not been documented.        Signature: Electronically signed by GENNY Henriquez, 10/28/23, 2:49 AM EDT.    Total APC Time: 60 minutes        Attending   Admission Attestation       I have performed an independent face-to-face diagnostic evaluation including performing an independent physical examination.  The documented plan of care above was reviewed and developed with the advanced practice clinician (APC).  I have updated the HPI as appropriate.    Brief HPI    42 M who states that he has noticed increased shortness of breath over the last 4 days.  He confirms increased frequency of cough over the last 4 days, but denies any production.  He states he was admitted to  yesterday and diagnosed with pneumonia, but then notes that he was discharged yesterday and prescribed doxycycline and prednisone on discharge.  He states that after taking that today (Friday), he had some nausea and emesis with some small \"streaks of blood\" in the vomited material.  He states after stopping the above medications, this has not recurred.  He confirms some chills over the last couple of days but no fever; he did not take a " temperature during the episode of chills.    Attending Physical Exam:  Temp:  [97.9 °F (36.6 °C)-98.2 °F (36.8 °C)] 98.1 °F (36.7 °C)  Heart Rate:  [] 112  Resp:  [18-20] 20  BP: (116-137)/(69-98) 132/98    Constitutional: Awake, alert, NAD.  Eyes: PERRLA, sclerae anicteric, no conjunctival injection  HENT: NCAT, mucous membranes moist  Neck: Supple, no thyromegaly, no lymphadenopathy, trachea midline  Respiratory: There is good air movement to auscultation bilaterally with some diminished sounds at the LLL, nonlabored respirations   Cardiovascular: Tachycardic with rate 104 on my exam, no murmurs, rubs, or gallops, palpable pedal pulses bilaterally  Gastrointestinal: Positive bowel sounds, soft, nontender, nondistended  Musculoskeletal: No bilateral ankle edema, no clubbing or cyanosis to extremities  Psychiatric: Appropriate affect, cooperative  Neurologic: Oriented x 3, strength symmetric in all extremities, Cranial Nerves grossly intact to confrontation, speech clear  Skin: No rashes, normal turgor.    Assessment and Plan:    See assessment and plan documented by APC above and updated/edited by me as appropriate.    Total time spent: 25 minutes  Time spent includes time reviewing chart, face-to-face time, counseling patient/family/caregiver, ordering medications/tests/procedures, communicating with other health care professionals, documenting clinical information in the electronic health record, and coordination of care.       Niels Agrawal III, DO  10/28/23                     Electronically signed by Niels Agrawal III, DO at 10/28/23 0454       Current Facility-Administered Medications   Medication Dose Route Frequency Provider Last Rate Last Admin    acetaminophen (TYLENOL) tablet 650 mg  650 mg Oral Q4H PRN [employer], Melany, APRN   650 mg at 11/03/23 0444    Or    acetaminophen (TYLENOL) 160 MG/5ML oral solution 650 mg  650 mg Oral Q4H PRN [employer], Melany, APRN        Or    acetaminophen  (TYLENOL) suppository 650 mg  650 mg Rectal Q4H PRN Melany Zapata APRN        aspirin chewable tablet 81 mg  81 mg Oral Daily Melany Zapata APRN   81 mg at 11/02/23 0929    aspirin-acetaminophen-caffeine (EXCEDRIN MIGRAINE) 250-250-65 MG per tablet 1 tablet  1 tablet Oral Q6H PRN Soo Laguerre APRN   1 tablet at 11/01/23 1444    atorvastatin (LIPITOR) tablet 10 mg  10 mg Oral Daily Melany Zapata APRN   10 mg at 11/02/23 0929    benzonatate (TESSALON) capsule 200 mg  200 mg Oral TID PRN Melany Zapata APRN        sennosides-docusate (PERICOLACE) 8.6-50 MG per tablet 2 tablet  2 tablet Oral BID Rosanne Ross APRN   2 tablet at 11/02/23 2037    And    polyethylene glycol (MIRALAX) packet 17 g  17 g Oral Daily Rosanne Ross APRN   17 g at 11/01/23 0844    And    bisacodyl (DULCOLAX) EC tablet 5 mg  5 mg Oral Daily PRN Rosanne Ross APRN        And    bisacodyl (DULCOLAX) suppository 10 mg  10 mg Rectal Daily PRN Rosanne Ross APRN        dextrose (D50W) (25 g/50 mL) IV injection 25 g  25 g Intravenous Q15 Min PRN Melany Zapata APRN        dextrose (GLUTOSE) oral gel 15 g  15 g Oral Q15 Min PRN Melany Zapata APRDONNA        diphenhydrAMINE (BENADRYL) injection 25 mg  25 mg Intravenous Q6H PRN Rosanne Ross APRN   25 mg at 11/02/23 2037    divalproex (DEPAKOTE ER) 24 hr tablet 1,000 mg  1,000 mg Oral Daily Andry Valerio MD        empagliflozin (JARDIANCE) tablet 10 mg  10 mg Oral Daily Bria Campos DO   10 mg at 11/02/23 0929    FLUoxetine (PROzac) capsule 20 mg  20 mg Oral Daily [employer], Melany, APRN   20 mg at 11/02/23 0929    folic acid 1 mg in sodium chloride 0.9 % 50 mL IVPB  1 mg Intravenous Daily Ilir Mai  mL/hr at 11/02/23 1151 1 mg at 11/02/23 1151    glucagon (GLUCAGEN) injection 1 mg  1 mg Intramuscular Q15 Min PRN Melany Zapata APRN        insulin detemir (LEVEMIR) injection 15 Units  15 Units Subcutaneous Nightly Cody  EGNNY Andersen   15 Units at 23    Insulin Lispro (humaLOG) injection 2-7 Units  2-7 Units Subcutaneous 4x Daily AC & at Bedtime Melany Zapata APRN   4 Units at 23    Insulin Lispro (humaLOG) injection 6 Units  6 Units Subcutaneous 4x Daily With Meals & Nightly Soo Laguerre APRN   6 Units at 23    ipratropium-albuterol (DUO-NEB) nebulizer solution 3 mL  3 mL Nebulization Q4H PRN Ilir Mai MD   3 mL at 23    isosorbide mononitrate (IMDUR) 24 hr tablet 30 mg  30 mg Oral Daily Melany Zapata APRN   30 mg at 23 0929    nicotine (NICODERM CQ) 21 MG/24HR patch 1 patch  1 patch Transdermal Q24H Bria Campos,    1 patch at 23 0931    ondansetron (ZOFRAN) tablet 4 mg  4 mg Oral Q6H PRN Melany Zapata, APRN   4 mg at 10/30/23 1609    Or    ondansetron (ZOFRAN) injection 4 mg  4 mg Intravenous Q6H PRN Melany Zapata, APRN   4 mg at 23 1028    pantoprazole (PROTONIX) injection 40 mg  40 mg Intravenous BID AC Melany Zapata APRN   40 mg at 23 1725    QUEtiapine (SEROquel) tablet 50 mg  50 mg Oral Nightly Melany Zapata APRN   50 mg at 23    sodium chloride 0.9 % flush 10 mL  10 mL Intravenous PRN Melany Zapata, APRN   10 mL at 23    sodium chloride 0.9 % infusion 40 mL  40 mL Intravenous PRN Melany Zapata APRN   40 mL at 23    thiamine (B-1) 500 mg in sodium chloride 0.9 % 100 mL IVPB  500 mg Intravenous Q8H Ilir Mai  mL/hr at 23 500 mg at 23    Followed by    [START ON 2023] thiamine (B-1) injection 200 mg  200 mg Intravenous Q8H Ilir Mai MD        Followed by    [START ON 2023] thiamine (VITAMIN B-1) tablet 100 mg  100 mg Oral Daily Ilir Mai MD            Physician Progress Notes (most recent note)        Ilir Mai MD at 23 [address] [place] [address] Medicine Services  PROGRESS NOTE    Patient Name: Ronnie He  :  1980  MRN: [id number]    Date of Admission: 10/27/2023  Primary Care Physician: Dylon Olmos MD    Subjective   Subjective     CC:  Seizure    HPI:  Patient sitting up in bed looking much better than he had in NAD.  Patient still trying to verify that he will be going to rehab.  He did ask OT provider to go buy him beer today.  States that he still weak but  never complained about a headache.  Awaiting placement.    Objective   Objective     Vital Signs:   Temp:  [97.5 °F (36.4 °C)-98.1 °F (36.7 °C)] 98 °F (36.7 °C)  Heart Rate:  [] 115  Resp:  [16-20] 17  BP: (119-130)/(81-95) 122/95     Physical Exam:  Constitutional: Alert, WD, WN male sitting up in bed in NAD  Eyes: EOMI, sclerae anicteric, no conjunctival injection  Head: NCAT  ENT: [place], moist mucous membranes   Respiratory: Nonlabored, symmetrical chest expansion, CTAB, 93% RA  Cardiovascular: Tachycardia, no M/R/G, +DP pulses bilaterally  Gastrointestinal: Soft, NT, ND +BS  Musculoskeletal: UGARTE; no LE edema bilaterally  Neurologic: Oriented x4, follows all commands, speech clear, extremely ataxic and unable to stand without any stability even with a walker  Skin: No rashes on exposed skin  Psychiatric: Pleasant and cooperative; normal affect    Results Reviewed:  LAB RESULTS:      Lab 11/02/23  1221 10/29/23  0613 10/28/23  0752 10/27/23  2247 10/27/23  2046 10/27/23  0022 10/26/23  1946   WBC 11.69* 8.92 10.84*  --  12.94*  --  9.28   HEMOGLOBIN 14.2 13.5 12.5*  --  13.9  --  13.4*   HEMATOCRIT 40.4 38.5 35.8*  --  39.8  --  38.2*   PLATELETS 218 197 239  --  272  --  217   NEUTROS ABS  --  4.72 9.43*  --  11.23*  --  6.04   IMMATURE GRANS (ABS)  --  0.08* 0.12*  --  0.09*  --  0.03   LYMPHS ABS  --  3.35* 1.01  --  1.11  --  1.99   MONOS ABS  --  0.71 0.27  --  0.50  --  1.04*   EOS ABS  --  0.02 0.00  --  0.00  --  0.13   MCV 86.0 85.6 86.3  --  85.6  --  85   PROCALCITONIN  --   --   --  <0.02  --   --   --    LACTATE  --   --    --   --  1.8  --   --    LACTATE, ARTERIAL  --   --   --   --   --  3.9*  --    D DIMER QUANT  --   --   --   --  <0.27  --   --          Lab 11/02/23  1221 10/30/23  1607 10/29/23  0613 10/28/23  0752 10/27/23  2247 10/27/23  2046 10/27/23  0022   SODIUM 134*  --  138 134*  --  134*  --    POTASSIUM 4.4  --  3.8 4.7  --  4.0 3.8   CHLORIDE 102  --  105 102  --  102 103   CO2 22.0  --  24.0 21.0*  --  21.0*  --    ANION GAP 10.0  --  9.0 11.0  --  11.0  --    BUN 17  --  22* 15  --  14  --    CREATININE 0.85  --  0.83 0.71*  --  0.74*  --    EGFR 110.6  --  111.4 116.7  --  115.3  --    GLUCOSE 221*  --  140* 351*  --  218* 355*   CALCIUM 9.3  --  8.7 8.8  --  9.3  --    IONIZED CALCIUM  --   --   --   --   --   --  4.5*   MAGNESIUM  --  2.2  --  2.0 1.7  --   --    HEMOGLOBIN A1C  --   --   --  8.00*  --   --   --          Lab 10/27/23  2046   TOTAL PROTEIN 7.0   ALBUMIN 4.5   GLOBULIN 2.5   ALT (SGPT) 19   AST (SGOT) 14   BILIRUBIN 0.3   ALK PHOS 86   LIPASE 59         Lab 10/27/23  2247 10/27/23  2046 10/26/23  2214 10/26/23  1946   PROBNP  --  658.3*  --   --    TROPONIN T  --   --  13  --    TROPONIN T (HIGHLY SENSITIVE)  --   --   --  15   HSTROP T 13 15*  --   --              Lab 10/30/23  1607   FOLATE 11.80   VITAMIN B 12 671         Lab 10/27/23  0022   BASE EXCESS ART -4.1*     Brief Urine Lab Results  (Last result in the past 365 days)        Color   Clarity   Blood   Leuk Est   Nitrite   Protein   CREAT   Urine HCG        10/28/23 0039 Yellow   Clear   Negative   Negative   Negative   Trace                   Microbiology Results Abnormal       Procedure Component Value - Date/Time    Blood Culture - Blood, Wrist, Left [[id number]]  (Normal) Collected: 10/27/23 2239    Lab Status: Final result Specimen: Blood from Wrist, Left Updated: 11/02/23 0915     Blood Culture No growth at 5 days    Blood Culture - Blood, Arm, Right [[id number]]  (Normal) Collected: 10/27/23 2232    Lab Status: Final result Specimen:  Blood from Arm, Right Updated: 11/02/23 0915     Blood Culture No growth at 5 days    COVID PRE-OP / PRE-PROCEDURE SCREENING ORDER (NO ISOLATION) - Swab, Nasopharynx [[id number]]  (Normal) Collected: 10/27/23 2241    Lab Status: Final result Specimen: Swab from Nasopharynx Updated: 10/27/23 2316    Narrative:      The following orders were created for panel order COVID PRE-OP / PRE-PROCEDURE SCREENING ORDER (NO ISOLATION) - Swab, Nasopharynx.  Procedure                               Abnormality         Status                     ---------                               -----------         ------                     COVID-19 and FLU A/B PCR...[[id number]]  Normal              Final result                 Please view results for these tests on the individual orders.    COVID-19 and FLU A/B PCR - Swab, Nasopharynx [[id number]]  (Normal) Collected: 10/27/23 2241    Lab Status: Final result Specimen: Swab from Nasopharynx Updated: 10/27/23 2316     COVID19 Not Detected     Influenza A PCR Not Detected     Influenza B PCR Not Detected    Narrative:      Fact sheet for providers: https://www.fda.gov/media/244015/download    Fact sheet for patients: https://www.fda.gov/media/280180/download    Test performed by PCR.            EEG    Result Date: 11/2/2023  Reason for referral: 42 y.o.male with headaches, history of seizures Technical Summary:  A 19 channel digital EEG was performed using the international 10-20 placement system, including eye leads and EKG leads. Duration: 20 minutes Findings: The awake tracing shows diffuse low amplitude intermixed theta and alpha activity which is present symmetrically over both hemispheres.  At times a poorly regulated 10 Hz posterior rhythm is evident symmetrically over the occipital leads.  Drowsiness is seen with mild slowing of the background but stage II sleep is not present.  Hyperventilation and photic stimulation are not performed.  No focal features or epileptiform activity are  seen. Video: Available Technical quality: Superior EKG: Regular, 80 bpm SUMMARY: Normal EEG in the awake and lightly drowsy states No focal features or epileptiform activity are seen     Impression: Normal study This report is transcribed using the Dragon dictation system.      CT Lumbar Spine Without Contrast    Result Date: 11/1/2023  CT LUMBAR SPINE WO CONTRAST Date of Exam: 11/1/2023 5:54 PM EDT Indication: Ataxia, nontraumatic, lumbar pathology suspected leg weakness and shaking. Comparison: None available. Technique: Axial CT images were obtained of the lumbar spine without contrast administration.  Reconstructed coronal and sagittal images were also obtained. Automated exposure control and iterative construction methods were used. Findings: No acute fracture or traumatic malalignment. Vertebral body heights are maintained. Near complete lumbarization of S1. Overall alignment is maintained. No significant listhesis. Mild loss of intervertebral disc base height throughout the lumbar spine, most significantly at L1-L2. There is associated small anterior osteophytes. The visualized paravertebral soft tissues are unremarkable. The visualized intra-abdominal and intrapelvic structures are unremarkable.     Impression: Impression: No acute abnormality. Electronically Signed: Gregory Fleming DO  11/1/2023 8:06 PM EDT  Workstation ID: [id number]    CT Thoracic Spine Without Contrast    Result Date: 11/1/2023  CT THORACIC SPINE WO CONTRAST Date of Exam: 11/1/2023 5:54 PM EDT Indication: Ataxia, nontraumatic, thoracic pathology suspected. Comparison: None available. Technique: Axial CT images were obtained of the thoracic spine without contrast administration.  Reconstructed coronal and sagittal images were also obtained. Automated exposure control and iterative construction methods were used. Findings: No acute fracture or traumatic malalignment. Vertebral body heights are maintained. Overall alignment is maintained.  No significant listhesis. Mild loss of intervertebral disc base height throughout the thoracic spine. No evidence of severe spinal canal stenosis or severe bilateral neuroforaminal narrowing. The paravertebral soft tissues and visualized intra-abdominal soft tissues are unremarkable. Mild groundglass opacities in the right lower lobe in a tree-in-bud pattern is concerning for an underlying infectious/inflammatory process. Otherwise the visualized chest is unremarkable     Impression: Impression: No acute osseous abnormality. Findings concerning for an underlying infectious/inflammatory process in the left lower lobe. Electronically Signed: Gregory Fleming DO  11/1/2023 7:53 PM EDT  Workstation ID: [id number]         Current medications:  Scheduled Meds:aspirin, 81 mg, Oral, Daily  atorvastatin, 10 mg, Oral, Daily  [START ON 11/3/2023] divalproex, 1,000 mg, Oral, Daily  empagliflozin, 10 mg, Oral, Daily  FLUoxetine, 20 mg, Oral, Daily  folic acid 1 mg in sodium chloride 0.9 % 50 mL IVPB, 1 mg, Intravenous, Daily  insulin detemir, 15 Units, Subcutaneous, Nightly  insulin lispro, 2-7 Units, Subcutaneous, 4x Daily AC & at Bedtime  Insulin Lispro, 6 Units, Subcutaneous, 4x Daily With Meals & Nightly  isosorbide mononitrate, 30 mg, Oral, Daily  nicotine, 1 patch, Transdermal, Q24H  pantoprazole, 40 mg, Intravenous, BID AC  senna-docusate sodium, 2 tablet, Oral, BID   And  polyethylene glycol, 17 g, Oral, Daily  QUEtiapine, 50 mg, Oral, Nightly  thiamine (B-1) IV, 500 mg, Intravenous, Q8H   Followed by  [START ON 11/5/2023] thiamine (B-1) IV, 200 mg, Intravenous, Q8H   Followed by  [START ON 11/9/2023] thiamine, 100 mg, Oral, Daily      Continuous Infusions:     PRN Meds:.  acetaminophen **OR** acetaminophen **OR** acetaminophen    aspirin-acetaminophen-caffeine    benzonatate    senna-docusate sodium **AND** polyethylene glycol **AND** bisacodyl **AND** bisacodyl    dextrose    dextrose    diphenhydrAMINE    glucagon  (human recombinant)    ipratropium-albuterol    ondansetron **OR** ondansetron    sodium chloride    sodium chloride    Assessment & Plan   Assessment & Plan     Active Hospital Problems    Diagnosis  POA    **Community acquired pneumonia of left lower lobe of lung [J18.9]  Yes    Tachycardia [R00.0]  Unknown    Other constipation [K59.09]  Yes    Conversion disorder with attacks or seizures [F44.5]  Yes    Ataxia [R27.0]  Yes    Major depressive disorder, recurrent, severe with psychotic features [F33.3]  Yes    History of alcoholism [F10.21]  Not Applicable    Non-compliant patient [Z91.199]  Not Applicable    COPD (chronic obstructive pulmonary disease) [J44.9]  Yes    Hypertension associated with diabetes [E11.59, I15.2]  Yes    Diabetes mellitus type 2 in obese [E11.69, E66.9]  Yes    Personality disorder, unspecified [F60.9]  Yes      Resolved Hospital Problems   No resolved problems to display.        Brief Hospital Course to date:  Ronnie He is a 42 y.o. male with PMH of HTN, HLD, DM2, ETOH/polysubstance abuse in remission, COPD and seizure disorder presents to the ED for shortness of breath.      LLL Pneumonia  COPD  -CT chest shows LLL pneumonia  -Rocephin and azithro  -Duonebs scheduled.  Encourage ambulation and pulmonary toileting.  -- No new labs today.  Improving.  Still complaining of very minimal shortness of breath with exertion.  Occasional cough.     Hematemesis - no further episodes  --Hgb 13.5  --Likely Ada Chairez tear  -continue protonix IV BID, no witnessed episodes here. If reoccurs will have GI evaluate.  No further.  Stable.  Monitor.     DM2 w/A1c 8  --24H glucose 160-237  -- Basal, SSI; adjust as needed  -- Started preprandial 10/29; Increased to 6 units QACHS    Tachycardia  --Heart rate consistently in the low 100s  --IV NS @100ml/h x10 hours      HTN  HLD  -Continue home aspirin, statin, lisinopril   No history of coronary artery disease.  DC Imdur.  --Stable       Ataxia  acute.  History of Brandi malformation surgery  Seizures with subtherapeutic Depakote:  Hx of Polysubstance Abuse  Alcohol abuse:  --Hx but unsure of extent; could be from drug and EtOH abuse  --PT/OT worked with patient and feel that he needs inpatient rehab  -- Observed and very unsteady on his fee.  -Patient reports being sober 3mo, 25 days  -UDS+ for opiates, received IV morphine at [facility] 10/26Patient complaining of persistent left upper extremity hand tingling for several days.  Also complains of intermittent left lower extremity tingling.  History of cervical and lumbar spine issues.  Otherwise stable.    -- CT of the head/neck without contrast showed no intracranial abnormality; multilevel cervical spondylosis appearing most advanced at C3-4 and C4-5  Plan:  -- CT L-spine and T-spine ordered and reviewed.  -- Neurology consulted for further eval before discharge.  -- Thiamine supplement started.    Constipation-resolved  -- KUB noted large stool burden, will increase bowel meds today and give one-time dose of lactulose  -- BM x1 on 10/29 and 10/31.    Evaluated by PT/OT and both feel that he legitimately has a left leg weakness and that he needs rehab.  Imaging reviewed.  Neurology consulted.    Expected Discharge Location and Transportation:     Expected Discharge   Expected Discharge Date: 11/3/2023; Expected Discharge Time:      DVT prophylaxis:  Mechanical DVT prophylaxis orders are present.     AM-PAC 6 Clicks Score (PT): 19 (11/02/23 1414)    CODE STATUS:   Code Status and Medical Interventions:   Ordered at: 10/28/23 0308     Code Status (Patient has no pulse and is not breathing):    CPR (Attempt to Resuscitate)     Medical Interventions (Patient has pulse or is breathing):    Full Support       Ilir Mai MD  11/02/23        Electronically signed by Ilir Mai MD at 11/02/23 7344          Physical Therapy Notes (most recent note)        Rosa M Wilburn, PT at 11/02/23 1319  Version 1 of 1          Patient Name: Ronnie He  : 1980    MRN: [id number]                              Today's Date: 2023       Admit Date: 10/27/2023    Visit Dx:     ICD-10-CM ICD-9-CM   1. Pneumonia of left lower lobe due to infectious organism  J18.9 486   2. COPD with acute exacerbation  J44.1 491.21   3. Cough, unspecified type  R05.9 786.2   4. Tobacco use disorder  F17.200 305.1   5. Hematemesis with nausea  K92.0 578.0     787.02   6. Hyperglycemia due to diabetes mellitus  E11.65 250.02   7. Atypical chest pain  R07.89 786.59   8. Leukocytosis, unspecified type  D72.829 288.60   9. Sinus tachycardia by electrocardiogram  R00.0 785.0   10. Renal lesion  N28.9 593.9   11. Dizziness  R42 780.4     Patient Active Problem List   Diagnosis    Ataxia    Alcohol use disorder, severe, in early remission    Anxiety    Cannabis dependence    Chiari malformation type I    Chronic pain of right knee    Chronic post-traumatic stress disorder (PTSD)    Conversion disorder with attacks or seizures    COPD (chronic obstructive pulmonary disease)    DDD (degenerative disc disease), cervical    Diabetes mellitus type 2 in obese    Generalized anxiety disorder    Hemiplegia and hemiparesis following cerebral infarction affecting left non-dominant side    Hemiplegia, unspecified affecting left nondominant side    History of alcoholism    History of cardioembolic cerebrovascular accident (CVA)    Hypertension associated with diabetes    Insomnia    Intractable migraine with aura with status migrainosus    Left-sided weakness    Major depressive disorder, recurrent, severe with psychotic features    Malingering    Melanoma    Methamphetamine abuse    Methamphetamine dependence in remission    Methamphetamine-induced psychotic disorder    Non-compliant patient    PAULA (obstructive sleep apnea)    Personality disorder, unspecified    Pseudohyponatremia    Seizure disorder    Squamous blepharitis of upper and lower eyelids of  both eyes    Suicide attempt    Tobacco abuse    Type 2 diabetes mellitus with hyperglycemia    Type 2 diabetes mellitus without retinopathy    Community acquired pneumonia of left lower lobe of lung    Other constipation    Tachycardia     Past Medical History:   Diagnosis Date    Anxiety     Diabetes mellitus     Hypertension      History reviewed. No pertinent surgical history.   General Information       Row Name 11/02/23 1406          Physical Therapy Time and Intention    Document Type therapy note (daily note)  -KR     Mode of Treatment individual therapy;physical therapy  -KR       Row Name 11/02/23 1406          General Information    Patient Profile Reviewed yes  -KR     Existing Precautions/Restrictions fall  -KR     Barriers to Rehab ineffective coping  -KR       Row Name 11/02/23 1406          Cognition    Orientation Status (Cognition) oriented x 3  -KR       Row Name 11/02/23 1406          Safety Issues, Functional Mobility    Safety Issues Affecting Function (Mobility) insight into deficits/self-awareness;safety precaution awareness;safety precautions follow-through/compliance  -KR     Impairments Affecting Function (Mobility) balance;endurance/activity tolerance;strength;sensation/sensory awareness;coordination  -KR               User Key  (r) = Recorded By, (t) = Taken By, (c) = Cosigned By      Initials Name Provider Type    KR Rosa M Wilburn, PT Physical Therapist                   Mobility       Row Name 11/02/23 1406          Bed Mobility    Bed Mobility supine-sit  -KR     Supine-Sit [place] (Bed Mobility) modified independence  -KR     Assistive Device (Bed Mobility) head of bed elevated  -KR       Row Name 11/02/23 1406          Transfers    Comment, (Transfers) BP sup: 129/84; BP sitting 122/80; BP standing 122/95; BP s/p ambulation 127/84.  -KR       Row Name 11/02/23 1406          Bed-Chair Transfer    Bed-Chair [place] (Transfers) minimum assist (75% patient effort);verbal  cues;nonverbal cues (demo/gesture)  -KR     Assistive Device (Bed-Chair Transfers) walker, front-wheeled  -KR     Comment, (Bed-Chair Transfer) lateral steps to chair. LLE very tremulous/ataxic with transfer.  -KR       Row Name 11/02/23 1406          Sit-Stand Transfer    Sit-Stand [place] (Transfers) contact guard;verbal cues;nonverbal cues (demo/gesture)  -KR     Assistive Device (Sit-Stand Transfers) walker, front-wheeled  -KR     Comment, (Sit-Stand Transfer) 1x from bed, 1x from chair.  -KR       Row Name 11/02/23 1406          Gait/Stairs (Locomotion)    [place] (Gait) minimum assist (75% patient effort);1 person assist;verbal cues;nonverbal cues (demo/gesture)  -KR     Assistive Device (Gait) walker, front-wheeled  -KR     Distance in Feet (Gait) 50  -KR     Deviations/Abnormal Patterns (Gait) cierra decreased;gait speed decreased;stride length decreased;weight shifting decreased  -KR     Comment, (Gait/Stairs) pt with ataxic LLE, Juanjose req'd for stability. Farther ambulation distance limited by pt c/o lightheadedness. /84 following ambulation trial.  -KR               User Key  (r) = Recorded By, (t) = Taken By, (c) = Cosigned By      Initials Name Provider Type    Rosa M Bautista PT Physical Therapist                   Obj/Interventions       Row Name 11/02/23 1411          Motor Skills    Motor Skills motor control/coordination interventions  -KR     Motor Control/Coordination Interventions other (see comments)  5x BLE toe taps to objects in various quadrants for motor control/coordination. Moderately impaired coordination and motor control noted in LLE, RLE intact.  -KR               User Key  (r) = Recorded By, (t) = Taken By, (c) = Cosigned By      Initials Name Provider Type    Rosa M Bautista PT Physical Therapist                   Goals/Plan    No documentation.                  Clinical Impression       Row Name 11/02/23 1412          Pain    Pretreatment Pain Rating  9/10  -KR     Posttreatment Pain Rating 9/10  -KR     Pain Location - head  -KR     Pre/Posttreatment Pain Comment nsg aware  -KR     Pain Intervention(s) Repositioned;Ambulation/increased activity  -KR       Row Name 11/02/23 1412          Plan of Care Review    Plan of Care Reviewed With patient  -KR     Progress improving  -KR     Outcome Evaluation Pt continues continues to demo decreased coordination and motor control in the LLE with functional tasks. No orthostatic BP measurements during session this date. Pt will continue to benefit from PT to address ongoing strength, balance, endurance, and coordination deficits.  -KR       Row Name 11/02/23 1412          Vital Signs    Pre Systolic BP Rehab 129  sup  -KR     Pre Treatment Diastolic BP 84  -KR     Intra Systolic BP Rehab 122  stand  -KR     Intra Treatment Diastolic BP 95  -KR     Post Systolic BP Rehab 127  s/p ambulation  -KR     Post Treatment Diastolic BP 84  -KR     Pre Patient Position Supine  -KR     Intra Patient Position Standing  -KR     Post Patient Position Sitting  -KR       Row Name 11/02/23 1412          Positioning and Restraints    Pre-Treatment Position in bed  -KR     Post Treatment Position chair  -KR     In Chair notified nsg;reclined;call light within reach;encouraged to call for assist;exit alarm on;waffle cushion;legs elevated  -KR               User Key  (r) = Recorded By, (t) = Taken By, (c) = Cosigned By      Initials Name Provider Type    KR Rosa M Wilburn, PT Physical Therapist                   Outcome Measures       Row Name 11/02/23 1414          How much help from another person do you currently need...    Turning from your back to your side while in flat bed without using bedrails? 4  -KR     Moving from lying on back to sitting on the side of a flat bed without bedrails? 4  -KR     Moving to and from a bed to a chair (including a wheelchair)? 3  -KR     Standing up from a chair using your arms (e.g., wheelchair, bedside  chair)? 3  -KR     Climbing 3-5 steps with a railing? 2  -KR     To walk in hospital room? 3  -KR     AM-PAC 6 Clicks Score (PT) 19  -KR     Highest level of mobility 6 --> Walked 10 steps or more  -KR               User Key  (r) = Recorded By, (t) = Taken By, (c) = Cosigned By      Initials Name Provider Type    Rosa M Bautista PT Physical Therapist                                 Physical Therapy Education       Title: PT OT SLP Therapies (In Progress)       Topic: Physical Therapy (Done)       Point: Mobility training (Done)       Learning Progress Summary             Patient Acceptance, E, VU by KR at 11/2/2023 1414    Acceptance, E, VU by KR at 10/31/2023 0932                         Point: Home exercise program (Done)       Learning Progress Summary             Patient Acceptance, E, VU by KR at 11/2/2023 1414    Acceptance, E, VU by KR at 10/31/2023 0932                         Point: Body mechanics (Done)       Learning Progress Summary             Patient Acceptance, E, VU by KR at 11/2/2023 1414    Acceptance, E, VU by KR at 10/31/2023 0932                         Point: Precautions (Done)       Learning Progress Summary             Patient Acceptance, E, VU by KR at 11/2/2023 1414    Acceptance, E, VU by KR at 10/31/2023 0932                                         User Key       Initials Effective Dates Name Provider Type Discipline    BRAULIO 12/30/22 -  oRsa M Wilburn PT Physical Therapist PT                  PT Recommendation and Plan  Planned Therapy Interventions (PT): balance training, bed mobility training, gait training, home exercise program, postural re-education, patient/family education, neuromuscular re-education, motor coordination training, ROM (range of motion), stair training, strengthening, stretching, transfer training  Plan of Care Reviewed With: patient  Progress: improving  Outcome Evaluation: Pt continues continues to demo decreased coordination and motor control in the LLE with  functional tasks. No orthostatic BP measurements during session this date. Pt will continue to benefit from PT to address ongoing strength, balance, endurance, and coordination deficits.     Time Calculation:   PT Evaluation Complexity  History, PT Evaluation Complexity: 3 or more personal factors and/or comorbidities  Examination of Body Systems (PT Eval Complexity): total of 4 or more elements  Clinical Presentation (PT Evaluation Complexity): stable  Clinical Decision Making (PT Evaluation Complexity): low complexity  Overall Complexity (PT Evaluation Complexity): low complexity     PT Charges       Row Name 11/02/23 1415             Time Calculation    Start Time 1315  -KR      PT Received On 11/02/23  -KR      PT Goal Re-Cert Due Date 11/10/23  -KR         Timed Charges    91467 -  PT Neuromuscular Reeducation Minutes 5  -KR      92937 - Gait Training Minutes  5  -KR      30242 - PT Therapeutic Activity Minutes 14  -KR         Total Minutes    Timed Charges Total Minutes 24  -KR       Total Minutes 24  -KR                User Key  (r) = Recorded By, (t) = Taken By, (c) = Cosigned By      Initials Name Provider Type    KR Rosa M Wilburn, PT Physical Therapist                  Therapy Charges for Today       Code Description Service Date Service Provider Modifiers Qty    21342153579 HC PT NEUROMUSC RE EDUCATION EA 15 MIN 11/2/2023 Rosa M Wilburn, PT GP 1    17682349523 HC PT THERAPEUTIC ACT EA 15 MIN 11/2/2023 Rosa M Wilburn, PT GP 1            PT G-Codes  Outcome Measure Options: AM-PAC 6 Clicks Daily Activity (OT)  AM-PAC 6 Clicks Score (PT): 19  AM-PAC 6 Clicks Score (OT): 17  PT Discharge Summary  Anticipated Discharge Disposition (PT): inpatient rehabilitation facility    Rosa M Wilburn PT  11/2/2023      Electronically signed by Rosa M Wilburn, PT at 11/02/23 1416          Occupational Therapy Notes (most recent note)        Jose Marie, OT at 11/01/23 1038          Patient Name: Ronnie  Ying  : 1980    MRN: [id number]                              Today's Date: 2023       Admit Date: 10/27/2023    Visit Dx:     ICD-10-CM ICD-9-CM   1. Pneumonia of left lower lobe due to infectious organism  J18.9 486   2. COPD with acute exacerbation  J44.1 491.21   3. Cough, unspecified type  R05.9 786.2   4. Tobacco use disorder  F17.200 305.1   5. Hematemesis with nausea  K92.0 578.0     787.02   6. Hyperglycemia due to diabetes mellitus  E11.65 250.02   7. Atypical chest pain  R07.89 786.59   8. Leukocytosis, unspecified type  D72.829 288.60   9. Sinus tachycardia by electrocardiogram  R00.0 785.0   10. Renal lesion  N28.9 593.9   11. Dizziness  R42 780.4     Patient Active Problem List   Diagnosis    Ataxia    Alcohol use disorder, severe, in early remission    Anxiety    Cannabis dependence    Chiari malformation type I    Chronic pain of right knee    Chronic post-traumatic stress disorder (PTSD)    Conversion disorder with attacks or seizures    COPD (chronic obstructive pulmonary disease)    DDD (degenerative disc disease), cervical    Diabetes mellitus type 2 in obese    Generalized anxiety disorder    Hemiplegia and hemiparesis following cerebral infarction affecting left non-dominant side    Hemiplegia, unspecified affecting left nondominant side    History of alcoholism    History of cardioembolic cerebrovascular accident (CVA)    Hypertension associated with diabetes    Insomnia    Intractable migraine with aura with status migrainosus    Left-sided weakness    Major depressive disorder, recurrent, severe with psychotic features    Malingering    Melanoma    Methamphetamine abuse    Methamphetamine dependence in remission    Methamphetamine-induced psychotic disorder    Non-compliant patient    PAULA (obstructive sleep apnea)    Personality disorder, unspecified    Pseudohyponatremia    Seizure disorder    Squamous blepharitis of upper and lower eyelids of both eyes    Suicide attempt     Tobacco abuse    Type 2 diabetes mellitus with hyperglycemia    Type 2 diabetes mellitus without retinopathy    Community acquired pneumonia of left lower lobe of lung    Other constipation     Past Medical History:   Diagnosis Date    Anxiety     Diabetes mellitus     Hypertension      History reviewed. No pertinent surgical history.   General Information       Row Name 11/01/23 1124          OT Time and Intention    Document Type evaluation  -CS     Mode of Treatment occupational therapy  -CS       Row Name 11/01/23 1124          General Information    Patient Profile Reviewed yes  -CS     Prior Level of Function independent:;all household mobility;ADL's;work  Pt reports no AD/DME at baseline, works at [employer]/  -CS     Existing Precautions/Restrictions fall;other (see comments)  monitor BP  -CS     Barriers to Rehab ineffective coping  -CS       Row Name 11/01/23 1124          Living Environment    People in Home other (see comments);facility resident  Sober Living  -CS       Row Name 11/01/23 1124          Home Main Entrance    Number of Stairs, Main Entrance nine  -CS     Stair Railings, Main Entrance railing on right side (ascending)  -CS       Row Name 11/01/23 1124          Stairs Within Home, Primary    Stairs, Within Home, Primary steps down to bedroom  -CS       Row Name 11/01/23 1124          Cognition    Orientation Status (Cognition) oriented to;place;person;time  -CS       Row Name 11/01/23 1124          Safety Issues, Functional Mobility    Safety Issues Affecting Function (Mobility) insight into deficits/self-awareness;safety precaution awareness;safety precautions follow-through/compliance;positioning of assistive device  -CS     Impairments Affecting Function (Mobility) balance;endurance/activity tolerance;strength;sensation/sensory awareness;coordination  -CS               User Key  (r) = Recorded By, (t) = Taken By, (c) = Cosigned By      Initials Name Provider Type    CS Jose Marie, OT  Occupational Therapist                     Mobility/ADL's       Row Name 11/01/23 1131          Bed Mobility    Bed Mobility scooting/bridging;supine-sit  -     Scooting/Bridging [place] (Bed Mobility) supervision  -     Supine-Sit [place] (Bed Mobility) modified independence  -     Comment, (Bed Mobility) no dizziness reported upon sitting  -       Row Name 11/01/23 1131          Transfers    Transfers sit-stand transfer;stand-sit transfer;bed-chair transfer  -     Comment, (Transfers) increased assist for steps to recliner, tremulous/shaking BLEs upon standing - LLE persisted following sit down. cues throughout for safety/sequencing/positioning w/ RW use  -       Row Name 11/01/23 1131          Bed-Chair Transfer    Bed-Chair [place] (Transfers) minimum assist (75% patient effort);verbal cues;nonverbal cues (demo/gesture)  -     Assistive Device (Bed-Chair Transfers) walker, front-wheeled  -       Row Name 11/01/23 1131          Sit-Stand Transfer    Sit-Stand [place] (Transfers) contact guard;verbal cues;nonverbal cues (demo/gesture)  -     Assistive Device (Sit-Stand Transfers) walker, front-wheeled  -       Row Name 11/01/23 1131          Functional Mobility    Functional Mobility- Comment defer to PT for specifics  -       Row Name 11/01/23 1131          Activities of Daily Living    BADL Assessment/Intervention lower body dressing;grooming;upper body dressing  -       Row Name 11/01/23 1131          Lower Body Dressing Assessment/Training    [place] Level (Lower Body Dressing) doff;don;pants/bottoms;moderate assist (50% patient effort)  -     Position (Lower Body Dressing) edge of bed sitting;supported standing  -     Comment, (Lower Body Dressing) education on improved safety/sequencing with RW during pants pull up, threading intact  -       Row Name 11/01/23 1131          Grooming Assessment/Training    [place] Level (Grooming) wash face,  hands;hair care, combing/brushing;set up  -CS     Position (Grooming) supported sitting  -     Comment, (Grooming) seated due to standing tolerance deficit  -       Row Name 11/01/23 1131          Upper Body Dressing Assessment/Training    [place] Level (Upper Body Dressing) front opening garment;set up  -CS     Position (Upper Body Dressing) edge of bed sitting  -               User Key  (r) = Recorded By, (t) = Taken By, (c) = Cosigned By      Initials Name Provider Type     Jose Marie OT Occupational Therapist                   Obj/Interventions       Row Name 11/01/23 1135          Sensory Assessment (Somatosensory)    Sensory Assessment (Somatosensory) UE sensation intact  -       Row Name 11/01/23 1135          Vision Assessment/Intervention    Visual Impairment/Limitations WFL;corrective lenses full-time  -       Row Name 11/01/23 1135          Range of Motion Comprehensive    General Range of Motion bilateral upper extremity ROM WFL  -       Row Name 11/01/23 1135          Strength Comprehensive (MMT)    General Manual Muscle Testing (MMT) Assessment no strength deficits identified  -     Comment, General Manual Muscle Testing (MMT) Assessment BUE grossly 5/5, symmetrical  -       Row Name 11/01/23 1135          Motor Skills    Motor Skills coordination;functional endurance  -     Coordination bilateral;upper extremity;finger to nose;minimal impairment  -     Therapeutic Exercise shoulder;elbow/forearm;other (see comments)  BUE AROM incorporated into targeted reaching in sitting and standing with dynamic challenge ( 3 sets, rest breaks)  -       Row Name 11/01/23 1135          Balance    Balance Assessment sitting static balance;sitting dynamic balance;standing static balance;standing dynamic balance  -     Static Sitting Balance supervision  -     Dynamic Sitting Balance standby assist  -CS     Position, Sitting Balance unsupported;sitting edge of bed  -     Static  Standing Balance contact guard  -CS     Dynamic Standing Balance moderate assist  -CS     Position/Device Used, Standing Balance supported;walker, front-wheeled  -CS     Balance Interventions sitting;sit to stand;standing;occupation based/functional task;UE activity with balance activity;dynamic reaching;weight shifting activity  -CS               User Key  (r) = Recorded By, (t) = Taken By, (c) = Cosigned By      Initials Name Provider Type    CS Jose Marie, OT Occupational Therapist                   Goals/Plan       Row Name 11/01/23 1138          Transfer Goal 1 (OT)    Activity/Assistive Device (Transfer Goal 1, OT) bed-to-chair/chair-to-bed;toilet  -CS     [place] Level/Cues Needed (Transfer Goal 1, OT) modified independence  -CS     Time Frame (Transfer Goal 1, OT) long term goal (LTG);10 days  -CS     Strategies/Barriers (Transfers Goal 1, OT) AD recs per PT  -CS     Progress/Outcome (Transfer Goal 1, OT) new goal  -CS       Row Name 11/01/23 1138          Dressing Goal 1 (OT)    Activity/Device (Dressing Goal 1, OT) lower body dressing  -CS     [place]/Cues Needed (Dressing Goal 1, OT) standby assist  -CS     Time Frame (Dressing Goal 1, OT) long term goal (LTG);10 days  -CS     Progress/Outcome (Dressing Goal 1, OT) new goal  -CS       Row Name 11/01/23 1138          Grooming Goal 1 (OT)    Activity/Device (Grooming Goal 1, OT) hair care;oral care;wash face, hands  -CS     [place] (Grooming Goal 1, OT) supervision required  -CS     Time Frame (Grooming Goal 1, OT) long term goal (LTG);10 days  -CS     Strategies/Barriers (Grooming Goal 1, OT) seated vs. standing pending improved standing tolerance  -CS     Progress/Outcome (Grooming Goal 1, OT) new goal  -CS       Row Name 11/01/23 1138          Therapy Assessment/Plan (OT)    Planned Therapy Interventions (OT) activity tolerance training;functional balance retraining;occupation/activity based interventions;ROM/therapeutic  exercise;strengthening exercise;patient/caregiver education/training;neuromuscular control/coordination retraining;transfer/mobility retraining;cognitive/visual perception retraining;adaptive equipment training  -               User Key  (r) = Recorded By, (t) = Taken By, (c) = Cosigned By      Initials Name Provider Type    CS Jose Marie, OT Occupational Therapist                   Clinical Impression       Row Name 11/01/23 1136          Pain Assessment    Additional Documentation Pain Scale: FACES Pre/Post-Treatment (Group)  -       Row Name 11/01/23 1136          Pain Scale: FACES Pre/Post-Treatment    Pain: FACES Scale, Pretreatment 0-->no hurt  -CS     Posttreatment Pain Rating 0-->no hurt  -CS       Row Name 11/01/23 1136          Plan of Care Review    Plan of Care Reviewed With patient  -CS     Progress no change  -     Outcome Evaluation Baseline ADL completion limited by decreased activity tolerance, standing balance deficits, and impaired coordination warranting skilled IP OT services to promote return to PLOF. Tremulous/shaking BLEs upon standing - LLE persisted following sit. Based on today/s performance, recommend d/c to IP rehab for best functional outcomes.  -       Row Name 11/01/23 1136          Therapy Assessment/Plan (OT)    Rehab Potential (OT) good, to achieve stated therapy goals  -     Criteria for Skilled Therapeutic Interventions Met (OT) meets criteria;yes;skilled treatment is necessary  -     Therapy Frequency (OT) daily  -       Row Name 11/01/23 1136          Therapy Plan Review/Discharge Plan (OT)    Anticipated Discharge Disposition (OT) inpatient rehabilitation facility  -       Row Name 11/01/23 1136          Vital Signs    Pre Systolic BP Rehab --  RN cleared for tx  -CS     O2 Delivery Pre Treatment room air  -CS     O2 Delivery Intra Treatment room air  -CS     O2 Delivery Post Treatment room air  -CS     Pre Patient Position Supine  -CS     Intra Patient  Position Standing  -CS     Post Patient Position Sitting  -CS       Row Name 11/01/23 1136          Positioning and Restraints    Pre-Treatment Position in bed  -CS     Post Treatment Position chair  -CS     In Chair notified nsg;reclined;sitting;call light within reach;encouraged to call for assist;exit alarm on;legs elevated  -CS               User Key  (r) = Recorded By, (t) = Taken By, (c) = Cosigned By      Initials Name Provider Type    Jose Solomon OT Occupational Therapist                   Outcome Measures       Row Name 11/01/23 1139          How much help from another is currently needed...    Putting on and taking off regular lower body clothing? 2  -CS     Bathing (including washing, rinsing, and drying) 2  -CS     Toileting (which includes using toilet bed pan or urinal) 3  -CS     Putting on and taking off regular upper body clothing 3  -CS     Taking care of personal grooming (such as brushing teeth) 3  -CS     Eating meals 4  -CS     AM-PAC 6 Clicks Score (OT) 17  -CS       Row Name 11/01/23 1139          Functional Assessment    Outcome Measure Options AM-PAC 6 Clicks Daily Activity (OT)  -CS               User Key  (r) = Recorded By, (t) = Taken By, (c) = Cosigned By      Initials Name Provider Type    Jose Solomon OT Occupational Therapist                    Occupational Therapy Education       Title: PT OT SLP Therapies (In Progress)       Topic: Occupational Therapy (In Progress)       Point: ADL training (Done)       Description:   Instruct learner(s) on proper safety adaptation and remediation techniques during self care or transfers.   Instruct in proper use of assistive devices.                  Learning Progress Summary             Patient Acceptance, E,D, NHI,DU by  at 11/1/2023 1139                         Point: Home exercise program (Not Started)       Description:   Instruct learner(s) on appropriate technique for monitoring, assisting and/or progressing therapeutic  exercises/activities.                  Learner Progress:  Not documented in this visit.              Point: Precautions (Done)       Description:   Instruct learner(s) on prescribed precautions during self-care and functional transfers.                  Learning Progress Summary             Patient Acceptance, E,SAEID, MANAV HANKINS by  at 11/1/2023 1139                         Point: Body mechanics (Done)       Description:   Instruct learner(s) on proper positioning and spine alignment during self-care, functional mobility activities and/or exercises.                  Learning Progress Summary             Patient Acceptance, ESAEID VU, DU by  at 11/1/2023 1139                                         User Key       Initials Effective Dates Name Provider Type Discipline     06/16/21 -  Jose Marie OT Occupational Therapist OT                  OT Recommendation and Plan  Planned Therapy Interventions (OT): activity tolerance training, functional balance retraining, occupation/activity based interventions, ROM/therapeutic exercise, strengthening exercise, patient/caregiver education/training, neuromuscular control/coordination retraining, transfer/mobility retraining, cognitive/visual perception retraining, adaptive equipment training  Therapy Frequency (OT): daily  Plan of Care Review  Plan of Care Reviewed With: patient  Progress: no change  Outcome Evaluation: Baseline ADL completion limited by decreased activity tolerance, standing balance deficits, and impaired coordination warranting skilled IP OT services to promote return to PLOF. Tremulous/shaking BLEs upon standing - LLE persisted following sit. Based on today/s performance, recommend d/c to IP rehab for best functional outcomes.     Time Calculation:   Evaluation Complexity (OT)  Review Occupational Profile/Medical/Therapy History Complexity: expanded/moderate complexity  Assessment, Occupational Performance/Identification of Deficit Complexity: 1-3  performance deficits  Clinical Decision Making Complexity (OT): problem focused assessment/low complexity  Overall Complexity of Evaluation (OT): low complexity     Time Calculation- OT       Row Name 11/01/23 1140             Time Calculation- OT    OT Start Time 1038  -CS      OT Received On 11/01/23  -CS      OT Goal Re-Cert Due Date 11/11/23  -CS         Timed Charges    19770 - OT Therapeutic Activity Minutes 10  -CS         Untimed Charges    OT Eval/Re-eval Minutes 47  -CS         Total Minutes    Timed Charges Total Minutes 10  -CS      Untimed Charges Total Minutes 47  -CS       Total Minutes 57  -CS                User Key  (r) = Recorded By, (t) = Taken By, (c) = Cosigned By      Initials Name Provider Type    CS Jose Marie OT Occupational Therapist                  Therapy Charges for Today       Code Description Service Date Service Provider Modifiers Qty    95958699929 HC OT THERAPEUTIC ACT EA 15 MIN 11/1/2023 Jose Marie OT GO 1    [id number] HC OT EVAL LOW COMPLEXITY 4 11/1/2023 Jose Marie OT GO 1                 Jose Marie OT  11/1/2023    Electronically signed by Jose Marie OT at 11/01/23 1321

## 2023-11-03 NOTE — DISCHARGE SUMMARY
[facility] Medicine Services  DISCHARGE SUMMARY    Patient Name: Ronnie He  : 1980  MRN: [id number]    Date of Admission: 10/27/2023 10:14 PM  Date of Discharge:  23    Primary Care Physician: Dylon Olmos MD    Consults       Date and Time Order Name Status Description    2023  8:49 AM Inpatient Neurology Consult General Completed             Hospital Course     Presenting Problem:     Active Hospital Problems    Diagnosis  POA    **Community acquired pneumonia of left lower lobe of lung [J18.9]  Yes    Pneumonia, unspecified organism [J18.9]  Yes    Tachycardia [R00.0]  Unknown    Other constipation [K59.09]  Yes    Conversion disorder with attacks or seizures [F44.5]  Yes    Ataxia [R27.0]  Yes    Major depressive disorder, recurrent, severe with psychotic features [F33.3]  Yes    History of alcoholism [F10.21]  Not Applicable    Non-compliant patient [Z91.199]  Not Applicable    COPD (chronic obstructive pulmonary disease) [J44.9]  Yes    Hypertension associated with diabetes [E11.59, I15.2]  Yes    Diabetes mellitus type 2 in obese [E11.69, E66.9]  Yes    Personality disorder, unspecified [F60.9]  Yes      Resolved Hospital Problems   No resolved problems to display.          Hospital Course:  Ronnie He is a 42 y.o. male with PMH of HTN, HLD, DM2, ETOH/polysubstance abuse in remission, COPD and seizure disorder who presented to the ED on 10/27/2023 for shortness of breath.  CT of the chest revealed left lower lobe pneumonia.  He was treated with Rocephin and azithromycin with improvement in presenting symptoms.  We will continue scheduled DuoNebs due to history of COPD.  He is currently stable on room air.  PT/OT recommended inpatient rehab.  Case management has arranged for inpatient rehab at [place].  He will be discharged today in stable condition.     LLL Pneumonia  COPD  -CT chest shows LLL pneumonia  -s/p Rocephin and  juan Guillen scheduled.  Encourage ambulation and pulmonary toileting.  -Currently on room air     Hematemesis - no further episodes  --Hgb 13.5  --Likely Ada Chairez tear  -continue protonix, no witnessed episodes here. If reoccurs will have GI evaluate.  No further.  Stable.  Monitor.     DM2 w/A1c 8  -- resume home insulin, metformin     Tachycardia  -- Resolved  -- S/p IVF     HTN  HLD  -Continue home aspirin, statin, lisinopril   No history of coronary artery disease.  DC Imdur.  --Stable     Ataxia acute.  History of Brandi malformation surgery  Seizures with subtherapeutic Depakote:  Hx of Polysubstance Abuse  Alcohol abuse:  --Hx but unsure of extent; could be from drug and EtOH abuse  --PT/OT recommended inpatient rehab  -- Observed and very unsteady on his feet.  -Patient reports being sober 3mo, 25 days  -UDS+ for opiates, received IV morphine at [facility] 10/26  -- CT of the head/neck without contrast showed no intracranial abnormality; multilevel cervical spondylosis appearing most advanced at C3-4 and C4-5     Constipation-resolved  -- BM x1 on 10/29 and 10/31.  -As needed bowel regimen per protocol     Discharge Follow Up Recommendations for outpatient labs/diagnostics:  Follow-up with PCP in 1 week.    Day of Discharge     HPI:   Patient seen resting in bed no apparent distress.  No acute events overnight per nursing.  We discussed plan to discharge to [facility] today and he is agreeable.  We discussed the importance of taking all medications as prescribed and keeping all recommended follow-up appointments.  He expressed no additional concerns at this time.    Vital Signs:   Temp:  [97.8 °F (36.6 °C)-98.1 °F (36.7 °C)] 98 °F (36.7 °C)  Heart Rate:  [] 84  Resp:  [16-20] 18  BP: (112-133)/(71-95) 129/91      Physical Exam:  Constitutional: No acute distress, awake, alert  HENT: Surgical scar noted, mucous membranes moist  Respiratory: Clear to auscultation bilaterally, respiratory effort  normal on room air  Cardiovascular: RRR, no murmurs, cap refill brisk   Gastrointestinal: Positive bowel sounds, soft, nontender, nondistended  Musculoskeletal: No BLE edema   Psychiatric: Appropriate affect, cooperative  Neurologic: Oriented x 3, moves all extremities, speech clear  Skin: warm, dry, no visible rash     Pertinent  and/or Most Recent Results     LAB RESULTS:      Lab 11/02/23  1221 10/29/23  0613 10/28/23  0752 10/27/23  2247 10/27/23  2046   WBC 11.69* 8.92 10.84*  --  12.94*   HEMOGLOBIN 14.2 13.5 12.5*  --  13.9   HEMATOCRIT 40.4 38.5 35.8*  --  39.8   PLATELETS 218 197 239  --  272   NEUTROS ABS  --  4.72 9.43*  --  11.23*   IMMATURE GRANS (ABS)  --  0.08* 0.12*  --  0.09*   LYMPHS ABS  --  3.35* 1.01  --  1.11   MONOS ABS  --  0.71 0.27  --  0.50   EOS ABS  --  0.02 0.00  --  0.00   MCV 86.0 85.6 86.3  --  85.6   PROCALCITONIN  --   --   --  <0.02  --    LACTATE  --   --   --   --  1.8   D DIMER QUANT  --   --   --   --  <0.27         Lab 11/02/23  1221 10/30/23  1607 10/29/23  0613 10/28/23  0752 10/27/23  2247 10/27/23  2046   SODIUM 134*  --  138 134*  --  134*   POTASSIUM 4.4  --  3.8 4.7  --  4.0   CHLORIDE 102  --  105 102  --  102   CO2 22.0  --  24.0 21.0*  --  21.0*   ANION GAP 10.0  --  9.0 11.0  --  11.0   BUN 17  --  22* 15  --  14   CREATININE 0.85  --  0.83 0.71*  --  0.74*   EGFR 110.6  --  111.4 116.7  --  115.3   GLUCOSE 221*  --  140* 351*  --  218*   CALCIUM 9.3  --  8.7 8.8  --  9.3   MAGNESIUM  --  2.2  --  2.0 1.7  --    HEMOGLOBIN A1C  --   --   --  8.00*  --   --          Lab 10/27/23  2046   TOTAL PROTEIN 7.0   ALBUMIN 4.5   GLOBULIN 2.5   ALT (SGPT) 19   AST (SGOT) 14   BILIRUBIN 0.3   ALK PHOS 86   LIPASE 59         Lab 10/27/23  2247 10/27/23  2046   PROBNP  --  658.3*   HSTROP T 13 15*             Lab 10/30/23  1607   FOLATE 11.80   VITAMIN B 12 671         Brief Urine Lab Results  (Last result in the past 365 days)        Color   Clarity   Blood   Leuk Est   Nitrite    Protein   CREAT   Urine HCG        10/28/23 0039 Yellow   Clear   Negative   Negative   Negative   Trace                 Microbiology Results (last 10 days)       Procedure Component Value - Date/Time    COVID PRE-OP / PRE-PROCEDURE SCREENING ORDER (NO ISOLATION) - Swab, Nasopharynx [[id number]]  (Normal) Collected: 10/27/23 2241    Lab Status: Final result Specimen: Swab from Nasopharynx Updated: 10/27/23 2316    Narrative:      The following orders were created for panel order COVID PRE-OP / PRE-PROCEDURE SCREENING ORDER (NO ISOLATION) - Swab, Nasopharynx.  Procedure                               Abnormality         Status                     ---------                               -----------         ------                     COVID-19 and FLU A/B PCR...[[id number]]  Normal              Final result                 Please view results for these tests on the individual orders.    COVID-19 and FLU A/B PCR - Swab, Nasopharynx [[id number]]  (Normal) Collected: 10/27/23 2241    Lab Status: Final result Specimen: Swab from Nasopharynx Updated: 10/27/23 2316     COVID19 Not Detected     Influenza A PCR Not Detected     Influenza B PCR Not Detected    Narrative:      Fact sheet for providers: https://www.fda.gov/media/211741/download    Fact sheet for patients: https://www.fda.gov/media/218223/download    Test performed by PCR.    Blood Culture - Blood, Wrist, Left [[id number]]  (Normal) Collected: 10/27/23 2239    Lab Status: Final result Specimen: Blood from Wrist, Left Updated: 11/02/23 0915     Blood Culture No growth at 5 days    Blood Culture - Blood, Arm, Right [[id number]]  (Normal) Collected: 10/27/23 2232    Lab Status: Final result Specimen: Blood from Arm, Right Updated: 11/02/23 0915     Blood Culture No growth at 5 days            EEG    Result Date: 11/2/2023  Reason for referral: 42 y.o.male with headaches, history of seizures Technical Summary:  A 19 channel digital EEG was performed using the international  10-20 placement system, including eye leads and EKG leads. Duration: 20 minutes Findings: The awake tracing shows diffuse low amplitude intermixed theta and alpha activity which is present symmetrically over both hemispheres.  At times a poorly regulated 10 Hz posterior rhythm is evident symmetrically over the occipital leads.  Drowsiness is seen with mild slowing of the background but stage II sleep is not present.  Hyperventilation and photic stimulation are not performed.  No focal features or epileptiform activity are seen. Video: Available Technical quality: Superior EKG: Regular, 80 bpm SUMMARY: Normal EEG in the awake and lightly drowsy states No focal features or epileptiform activity are seen     Normal study This report is transcribed using the Dragon dictation system.      CT Lumbar Spine Without Contrast    Result Date: 11/1/2023  CT LUMBAR SPINE WO CONTRAST Date of Exam: 11/1/2023 5:54 PM EDT Indication: Ataxia, nontraumatic, lumbar pathology suspected leg weakness and shaking. Comparison: None available. Technique: Axial CT images were obtained of the lumbar spine without contrast administration.  Reconstructed coronal and sagittal images were also obtained. Automated exposure control and iterative construction methods were used. Findings: No acute fracture or traumatic malalignment. Vertebral body heights are maintained. Near complete lumbarization of S1. Overall alignment is maintained. No significant listhesis. Mild loss of intervertebral disc base height throughout the lumbar spine, most significantly at L1-L2. There is associated small anterior osteophytes. The visualized paravertebral soft tissues are unremarkable. The visualized intra-abdominal and intrapelvic structures are unremarkable.     Impression: No acute abnormality. Electronically Signed: Gregory Fleming DO  11/1/2023 8:06 PM EDT  Workstation ID: [id number]    CT Thoracic Spine Without Contrast    Result Date: 11/1/2023  CT THORACIC  SPINE WO CONTRAST Date of Exam: 11/1/2023 5:54 PM EDT Indication: Ataxia, nontraumatic, thoracic pathology suspected. Comparison: None available. Technique: Axial CT images were obtained of the thoracic spine without contrast administration.  Reconstructed coronal and sagittal images were also obtained. Automated exposure control and iterative construction methods were used. Findings: No acute fracture or traumatic malalignment. Vertebral body heights are maintained. Overall alignment is maintained. No significant listhesis. Mild loss of intervertebral disc base height throughout the thoracic spine. No evidence of severe spinal canal stenosis or severe bilateral neuroforaminal narrowing. The paravertebral soft tissues and visualized intra-abdominal soft tissues are unremarkable. Mild groundglass opacities in the right lower lobe in a tree-in-bud pattern is concerning for an underlying infectious/inflammatory process. Otherwise the visualized chest is unremarkable     Impression: No acute osseous abnormality. Findings concerning for an underlying infectious/inflammatory process in the left lower lobe. Electronically Signed: Gregory Fleming DO  11/1/2023 7:53 PM EDT  Workstation ID: [id number]    MRI Brain Without Contrast    Result Date: 10/30/2023  MRI BRAIN WO CONTRAST Date of Exam: 10/30/2023 6:37 PM CDT Indication: left sided tingling ( x several days).  Comparison: Head CT 10/30/2023, MRI brain 9/16/2023 Technique:  Routine multiplanar/multisequence sequence images of the brain were obtained without contrast administration. Findings: There is motion artifact throughout examination which limits image quality. There is no diffusion restriction to suggest acute infarct. There is no evidence of acute or chronic intracranial hemorrhage. No mass effect or midline shift. No abnormal extra-axial collections. The basal ganglia, brainstem and cerebellum appear within normal limits. Midline structures are intact. No  significant cortical abnormality is identified. Evidence of previous Chiari decompression. Calvarial and superficial soft tissue signal is within normal limits. Orbits appear unremarkable. The paranasal sinuses and the mastoid air cells appear well aerated.     Impression: No acute process identified. Electronically Signed: Johan Hollis MD  10/30/2023 7:28 PM CDT  Workstation ID: [id number]    CT Head Without Contrast    Result Date: 10/30/2023  CT HEAD WO CONTRAST, CT CERVICAL SPINE WO CONTRAST Date of Exam: 10/30/2023 12:51 PM EDT Indication: LUE tingling. Comparison: MRI 9/16/2023. Technique: Axial CT images were obtained of the head and cervical spine without contrast administration.  Automated exposure control and iterative construction methods were used. Findings: CT head: Postoperative changes are present with the appearance of likely prior Chiari decompression with suboccipital craniectomy and posterior C1 arch resection noted. There is no evidence of intracranial hemorrhage, mass or mass effect. The ventricles are normal in size and configuration. The orbits are normal. The paranasal sinuses appear clear. The calvarium is otherwise intact. CT cervical spine: Posterior C1 arch resection noted as above. Cortical margins are intact without evidence of acute fracture. There is straightening of the usual cervical lordosis, otherwise without listhesis or subluxation. The paraspinal soft tissues demonstrate no acute or suspicious findings. The lung apices are clear. Multilevel spondylosis is present, with some areas of disc osteophyte complex formation and facet arthropathy, limited in characterization on CT but appearing likely most advanced at  C3-4 and C4-5, with some at least moderate associated spinal canal and neuroforaminal narrowing present.     Impression: No acute intracranial abnormality. Redemonstrated postoperative changes from presumed prior Chiari decompression. No acute findings in the cervical  spine. Multilevel cervical spondylosis is evident, appearing most advanced at C3-4 and C4-5. Electronically Signed: Jakob Perkins MD  10/30/2023 1:17 PM EDT  Workstation ID: [id number]    CT Cervical Spine Without Contrast    Result Date: 10/30/2023  CT HEAD WO CONTRAST, CT CERVICAL SPINE WO CONTRAST Date of Exam: 10/30/2023 12:51 PM EDT Indication: LUE tingling. Comparison: MRI 9/16/2023. Technique: Axial CT images were obtained of the head and cervical spine without contrast administration.  Automated exposure control and iterative construction methods were used. Findings: CT head: Postoperative changes are present with the appearance of likely prior Chiari decompression with suboccipital craniectomy and posterior C1 arch resection noted. There is no evidence of intracranial hemorrhage, mass or mass effect. The ventricles are normal in size and configuration. The orbits are normal. The paranasal sinuses appear clear. The calvarium is otherwise intact. CT cervical spine: Posterior C1 arch resection noted as above. Cortical margins are intact without evidence of acute fracture. There is straightening of the usual cervical lordosis, otherwise without listhesis or subluxation. The paraspinal soft tissues demonstrate no acute or suspicious findings. The lung apices are clear. Multilevel spondylosis is present, with some areas of disc osteophyte complex formation and facet arthropathy, limited in characterization on CT but appearing likely most advanced at  C3-4 and C4-5, with some at least moderate associated spinal canal and neuroforaminal narrowing present.     Impression: No acute intracranial abnormality. Redemonstrated postoperative changes from presumed prior Chiari decompression. No acute findings in the cervical spine. Multilevel cervical spondylosis is evident, appearing most advanced at C3-4 and C4-5. Electronically Signed: Jakob Perkins MD  10/30/2023 1:17 PM EDT  Workstation ID: [id number]    XR Abdomen  KUB    Result Date: 10/29/2023  XR ABDOMEN KUB Date of Exam: 10/29/2023 12:51 PM EDT Indication: No BM and vomiting Comparison: CT October 27, 2023 Findings: There appears to be a large amount of formed stool in the colon. No definite small bowel dilatation is seen. There is mild distention of the stomach. No ectopic bowel gas is identified on these images. Probable phleboliths are noted in the pelvis.     Impression: 1.Large amount of formed stool in the colon suspicious for constipation. 2.No definite small bowel dilatation. Electronically Signed: Bartolome Randee  10/29/2023 1:08 PM EDT  Workstation ID: [id number]    CT Abdomen Pelvis Without Contrast    Result Date: 10/27/2023  CT ABDOMEN PELVIS WO CONTRAST Date of Exam: 10/27/2023 11:26 PM EDT Indication: Abdominal pain, acute, nonlocalized constipation for 4 days, epigastric pain, vomiting, hematemesis. Comparison: None available. Technique: Axial CT images were obtained of the abdomen and pelvis without the administration of contrast. Reconstructed coronal and sagittal images were also obtained. Automated exposure control and iterative construction methods were used. Findings: There is a small focus of pneumonia in the left lower lobe posteriorly. The right lung base is clear. Heart size is normal. Distal esophagus is unremarkable. There are no acute findings in the superficial soft tissues. There are no acute osseous abnormalities or destructive bone lesions. There are mild thoracolumbar degenerative changes, most pronounced at the L1-L2 disc. There is a transitional lumbosacral vertebra. The liver is homogeneous. There is gradient attenuation of the gallbladder lumen that may reflect stones or sludge. No evidence of acute cholecystitis. The bile ducts, pancreas, stomach, spleen and adrenal glands appear within normal limits. There is a small exophytic lesion at the inferior aspect of the left kidney measuring 12 mm, which is indeterminate in part due to mild  motion and otherwise due to small size and noncontrast technique. The appendix is normal. The colon is normal. Small bowel is nondistended. Aorta is normal diameter. There is no ascites, pneumoperitoneum or lymphadenopathy. The prostate gland is normal size and the urinary bladder is nondistended.     Impression: 1.Small focus of pneumonia in the left lower lobe. 2.No acute abdominal or pelvic abnormality. 3.12 mm exophytic lesion at the inferior aspect of the left kidney. This is indeterminate in part due to motion and otherwise due to small size and noncontrast technique. Consider 3-month follow-up renal protocol CT or MRI to more definitively evaluate.. Electronically Signed: Yonatan Yun MD  10/27/2023 11:59 PM EDT  Workstation ID: [id number]    XR Chest 1 View    Result Date: 10/27/2023  XR CHEST 1 VW Date of Exam: 10/27/2023 9:40 PM EDT Indication: Chest Pain Triage Protocol Comparison: None available. Findings:  There is no acute infiltrate. There is no large pleural effusion. The cardiac silhouette is unremarkable. The visualized augusto structures demonstrate no abnormality.     Impression: No acute pulmonary disease. Electronically Signed: Jose Luis Davis MD  10/27/2023 9:59 PM EDT  Workstation ID: [id number]    CT Angiogram Chest Pulmonary Embolism    Result Date: 10/27/2023  CLINICAL INDICATION: Pulmonary embolism (PE) suspected, high prob TECHNIQUE: Imaging of the chest was performed from thoracic inlet through upper abdomen, using spiral technique, following administration of IV contrast, Omnipaque 350, 100 mL per the pulmonary angiogram protocol. In addition, 3D images were created and reviewed. TOTAL DLP (Dose-Length Product): 496.99 mGy.cm. Please note: The reported value represents the total of one or more individual components during the CT acquisition on this date and at this time, and as such, the same value may appear in more than one CT report depending on the interpreting/reporting physicians.  COMPARISON: Chest x-ray October 26, 2023 FINDINGS: Pulmonary Arteries/Vessels: Limited evaluation due to suboptimal bolus timing. However, no discrete pulmonary embolism noted within the main or segmental pulmonary arteries. Coronary artery calcifications. Right Heart Strain: No evidence of right heart strain. Pleural/Pericardial space: No pneumothorax.  No pleural effusions.  No pericardial effusion. Lymph Nodes: No lymphadenopathy within the chest by CT size criteria. Lungs: The central airways are patent. Tree-in-bud nodularity noted within the depending left lower lobe. No consolidation. Mediastinum: Otherwise unremarkable. Chest wall: No chest wall hematoma or contusion. Bones: No acute fracture within the chest. Mild degenerative and hypertrophic changes in the cervical thoracic and lumbar spine. Minimal curvature or scoliosis of the spine which might be positional. Upper Abdomen: Limited imaging of the upper abdomen is unremarkable.    No pulmonary embolism. Respiratory motion artifact and poor contrast opacification of peripheral vessels limits peripheral evaluation. No large central pulmonary emboli. Tree-in-bud, patchy nodularity noted within the dependent left lower lobe, which could be related to aspiration or atypical infectious process. CRITICAL RESULT:   No. COMMUNICATION: Per this written report. Preliminary report signed by Tony Jain MD on 10/27/2023 5:21 AM By electronically signing this report, I, the attending physician, attest that I have personally reviewed the images/data for the above examination(s) and agree with the final edited report. Drafted by Tony Jain MD on 10/27/2023 5:13 AM Final report signed by Trenton Olivia MD on 10/27/2023 5:26 AM    XR Chest 1 View    Result Date: 10/26/2023  CLINICAL INDICATION: SOA TECHNIQUE: XR CHEST 1 VIEW COMPARISON: None. FINDINGS: No consolidation, effusion, or pneumothorax. The cardiac size within normal limits with  unremarkable cardiac and mediastinal contours. No free subdiaphragmatic gas. No acute osseous findings.    No acute findings. CRITICAL RESULT:   No. COMMUNICATION: Per this written report. Drafted by Aki Tinoco MD on 10/26/2023 8:32 PM Final report signed by Aki Tinoco MD on 10/26/2023 8:32 PM    XR Chest 1 View    Result Date: 10/26/2023  PORTABLE CHEST HISTORY: Precordial chest pain. COMPARISON: None. FINDINGS: The heart is normal in size. The mediastinum is unremarkable. The lungs are clear. There is no pneumothorax.     No acute cardiopulmonary process. Images reviewed, interpreted, and dictated by Dr. RACHAEL Muller. Transcribed by Aleisha Duran PA-C.                 Plan for Follow-up of Pending Labs/Results: Follow-up as directed    Discharge Details        Discharge Medications        New Medications        Instructions Start Date   diphenhydrAMINE 25 MG tablet  Commonly known as: Benadryl Allergy   25 mg, Oral, Every 6 Hours PRN      nicotine 21 MG/24HR patch  Commonly known as: NICODERM CQ   1 patch, Transdermal, Every 24 Hours Scheduled      pantoprazole 40 MG EC tablet  Commonly known as: PROTONIX   40 mg, Oral, Daily      thiamine 100 MG tablet  Commonly known as: VITAMIN B1   100 mg, Oral, Daily   Start Date: November 9, 2023            Changes to Medications        Instructions Start Date   divalproex 500 MG 24 hr tablet  Commonly known as: DEPAKOTE ER  What changed: how much to take   1,000 mg, Oral, Daily             Continue These Medications        Instructions Start Date   aspirin 81 MG chewable tablet   81 mg, Oral, Daily      atorvastatin 10 MG tablet  Commonly known as: LIPITOR   10 mg, Oral, Daily      FLUoxetine 20 MG capsule  Commonly known as: PROzac   20 mg, Oral, Daily      insulin aspart 100 UNIT/ML solution pen-injector sc pen  Commonly known as: novoLOG FLEXPEN   2-10 Units, Subcutaneous, 3 Times Daily With Meals, ssi      Jardiance 10 MG tablet tablet  Generic drug:  empagliflozin   Oral, Daily      metFORMIN 1000 MG tablet  Commonly known as: GLUCOPHAGE   1,000 mg, Oral, 2 Times Daily With Meals      QUEtiapine 50 MG tablet  Commonly known as: SEROquel   50 mg, Oral, Nightly             Stop These Medications      isosorbide mononitrate 30 MG 24 hr tablet  Commonly known as: IMDUR              Allergies   Allergen Reactions    Eggs Or Egg-Derived Products Hives         Discharge Disposition: [place]  [facility] (ID - External)    Diet:  Hospital:  Diet Order   Procedures    Diet: Cardiac Diets, Diabetic Diets; Healthy Heart (2-3 Na+); Consistent Carbohydrate; Texture: Regular Texture (IDDSI 7); Fluid Consistency: Thin (IDDSI 0)       Diet Instructions       Diet: Regular/House Diet, Cardiac Diets, Diabetic Diets; Healthy Heart (2-3 Na+); Regular Texture (IDDSI 7); Thin (IDDSI 0); Consistent Carbohydrate      Discharge Diet:  Regular/House Diet  Cardiac Diets  Diabetic Diets       Cardiac Diet: Healthy Heart (2-3 Na+)    Texture: Regular Texture (IDDSI 7)    Fluid Consistency: Thin (IDDSI 0)    Diabetic Diet: Consistent Carbohydrate             Activity: As tolerated  Activity Instructions       Activity as Tolerated                 CODE STATUS:    Code Status and Medical Interventions:   Ordered at: 10/28/23 0308     Code Status (Patient has no pulse and is not breathing):    CPR (Attempt to Resuscitate)     Medical Interventions (Patient has pulse or is breathing):    Full Support       No future appointments.    Additional Instructions for the Follow-ups that You Need to Schedule       Discharge Follow-up with PCP   As directed       Currently Documented PCP:    Dylon Olmos MD    PCP Phone Number:    [phone]     Follow Up Details: Follow-up with PCP in 1 week.                      Pablo Caal, GENNY  11/03/23      Time Spent on Discharge:  I spent  41  minutes on this discharge activity which included: face-to-face encounter with the  patient, reviewing the data in the system, coordination of the care with the nursing staff as well as consultants, documentation, and entering orders.

## 2023-11-03 NOTE — CASE MANAGEMENT/SOCIAL WORK
Case Management Discharge Note      Final Note: [facility], swing bed. Insurance approval given.   nursing to call report to [phone]. I have faxed summary. [employer] wheelchair will be in his room at 1 pm for transport.    [facility] told me this am, unable to accept due to initial drug screen which they did not see until today. Marii with [facility] has spoken with patient. I will update patient on new destination     Patient is fine with [facility] bed. He expressed how upset he was that [facility] backed out on him.     Selected Continued Care - Admitted Since 10/27/2023       Destination Coordination complete.      Service Provider Selected Services Address Phone Fax Patient Preferred    [facility] SWING BED UNIT Skilled Nursing [address], [place] [address] [phone] --              Durable Medical Equipment    No services have been selected for the patient.                Dialysis/Infusion    No services have been selected for the patient.                Home Medical Care    No services have been selected for the patient.                Therapy    No services have been selected for the patient.                Community Resources    No services have been selected for the patient.                Community & DME    No services have been selected for the patient.                         Final Discharge Disposition Code: 61 - hospital-based swing bed

## 2023-12-26 ENCOUNTER — APPOINTMENT (OUTPATIENT)
Dept: GENERAL RADIOLOGY | Facility: HOSPITAL | Age: 43
End: 2023-12-26
Payer: MEDICARE

## 2023-12-26 ENCOUNTER — HOSPITAL ENCOUNTER (EMERGENCY)
Facility: HOSPITAL | Age: 43
Discharge: HOME | End: 2023-12-26
Attending: EMERGENCY MEDICINE | Admitting: EMERGENCY MEDICINE
Payer: MEDICARE

## 2023-12-26 VITALS
SYSTOLIC BLOOD PRESSURE: 146 MMHG | TEMPERATURE: 98 F | BODY MASS INDEX: 30.8 KG/M2 | DIASTOLIC BLOOD PRESSURE: 98 MMHG | HEART RATE: 80 BPM | OXYGEN SATURATION: 95 % | HEIGHT: 71 IN | RESPIRATION RATE: 19 BRPM | WEIGHT: 220 LBS

## 2023-12-26 DIAGNOSIS — R73.9 HYPERGLYCEMIA: ICD-10-CM

## 2023-12-26 DIAGNOSIS — R07.9 CHEST PAIN, UNSPECIFIED TYPE: Primary | ICD-10-CM

## 2023-12-26 LAB
ALBUMIN SERPL-MCNC: 4.2 G/DL (ref 3.5–5.2)
ALBUMIN/GLOB SERPL: 2.1 G/DL
ALP SERPL-CCNC: 74 U/L (ref 39–117)
ALT SERPL W P-5'-P-CCNC: 14 U/L (ref 1–41)
ANION GAP SERPL CALCULATED.3IONS-SCNC: 9 MMOL/L (ref 5–15)
AST SERPL-CCNC: 12 U/L (ref 1–40)
BASOPHILS # BLD AUTO: 0.05 10*3/MM3 (ref 0–0.2)
BASOPHILS NFR BLD AUTO: 0.6 % (ref 0–1.5)
BILIRUB SERPL-MCNC: 0.4 MG/DL (ref 0–1.2)
BILIRUB UR QL STRIP: NEGATIVE
BUN SERPL-MCNC: 13 MG/DL (ref 6–20)
BUN/CREAT SERPL: 16.7 (ref 7–25)
CALCIUM SPEC-SCNC: 8.9 MG/DL (ref 8.6–10.5)
CHLORIDE SERPL-SCNC: 102 MMOL/L (ref 98–107)
CLARITY UR: CLEAR
CO2 SERPL-SCNC: 26 MMOL/L (ref 22–29)
COLOR UR: YELLOW
CREAT SERPL-MCNC: 0.78 MG/DL (ref 0.76–1.27)
DEPRECATED RDW RBC AUTO: 39.1 FL (ref 37–54)
EGFRCR SERPLBLD CKD-EPI 2021: 112.8 ML/MIN/1.73
EOSINOPHIL # BLD AUTO: 0.12 10*3/MM3 (ref 0–0.4)
EOSINOPHIL NFR BLD AUTO: 1.4 % (ref 0.3–6.2)
ERYTHROCYTE [DISTWIDTH] IN BLOOD BY AUTOMATED COUNT: 12.9 % (ref 12.3–15.4)
GEN 5 2HR TROPONIN T REFLEX: 24 NG/L
GLOBULIN UR ELPH-MCNC: 2 GM/DL
GLUCOSE SERPL-MCNC: 270 MG/DL (ref 65–99)
GLUCOSE UR STRIP-MCNC: ABNORMAL MG/DL
HCT VFR BLD AUTO: 39.1 % (ref 37.5–51)
HGB BLD-MCNC: 13.8 G/DL (ref 13–17.7)
HGB UR QL STRIP.AUTO: NEGATIVE
HOLD SPECIMEN: NORMAL
IMM GRANULOCYTES # BLD AUTO: 0.03 10*3/MM3 (ref 0–0.05)
IMM GRANULOCYTES NFR BLD AUTO: 0.4 % (ref 0–0.5)
KETONES UR QL STRIP: NEGATIVE
LEUKOCYTE ESTERASE UR QL STRIP.AUTO: NEGATIVE
LIPASE SERPL-CCNC: 37 U/L (ref 13–60)
LYMPHOCYTES # BLD AUTO: 1.79 10*3/MM3 (ref 0.7–3.1)
LYMPHOCYTES NFR BLD AUTO: 20.9 % (ref 19.6–45.3)
MCH RBC QN AUTO: 29.7 PG (ref 26.6–33)
MCHC RBC AUTO-ENTMCNC: 35.3 G/DL (ref 31.5–35.7)
MCV RBC AUTO: 84.1 FL (ref 79–97)
MONOCYTES # BLD AUTO: 0.65 10*3/MM3 (ref 0.1–0.9)
MONOCYTES NFR BLD AUTO: 7.6 % (ref 5–12)
NEUTROPHILS NFR BLD AUTO: 5.93 10*3/MM3 (ref 1.7–7)
NEUTROPHILS NFR BLD AUTO: 69.1 % (ref 42.7–76)
NITRITE UR QL STRIP: NEGATIVE
NRBC BLD AUTO-RTO: 0 /100 WBC (ref 0–0.2)
NT-PROBNP SERPL-MCNC: 192 PG/ML (ref 0–450)
PH UR STRIP.AUTO: 6.5 [PH] (ref 5–8)
PLATELET # BLD AUTO: 196 10*3/MM3 (ref 140–450)
PMV BLD AUTO: 10.4 FL (ref 6–12)
POTASSIUM SERPL-SCNC: 3.7 MMOL/L (ref 3.5–5.2)
PROT SERPL-MCNC: 6.2 G/DL (ref 6–8.5)
PROT UR QL STRIP: NEGATIVE
QT INTERVAL: 356 MS
QT INTERVAL: 394 MS
QTC INTERVAL: 430 MS
QTC INTERVAL: 437 MS
RBC # BLD AUTO: 4.65 10*6/MM3 (ref 4.14–5.8)
SODIUM SERPL-SCNC: 137 MMOL/L (ref 136–145)
SP GR UR STRIP: 1.01 (ref 1–1.03)
TROPONIN T DELTA: -1 NG/L
TROPONIN T SERPL HS-MCNC: 25 NG/L
UROBILINOGEN UR QL STRIP: ABNORMAL
WBC NRBC COR # BLD AUTO: 8.57 10*3/MM3 (ref 3.4–10.8)
WHOLE BLOOD HOLD COAG: NORMAL
WHOLE BLOOD HOLD SPECIMEN: NORMAL

## 2023-12-26 PROCEDURE — 83690 ASSAY OF LIPASE: CPT

## 2023-12-26 PROCEDURE — 25010000002 KETOROLAC TROMETHAMINE PER 15 MG: Performed by: EMERGENCY MEDICINE

## 2023-12-26 PROCEDURE — 71045 X-RAY EXAM CHEST 1 VIEW: CPT

## 2023-12-26 PROCEDURE — 83880 ASSAY OF NATRIURETIC PEPTIDE: CPT

## 2023-12-26 PROCEDURE — 96374 THER/PROPH/DIAG INJ IV PUSH: CPT

## 2023-12-26 PROCEDURE — 84484 ASSAY OF TROPONIN QUANT: CPT

## 2023-12-26 PROCEDURE — 81003 URINALYSIS AUTO W/O SCOPE: CPT | Performed by: EMERGENCY MEDICINE

## 2023-12-26 PROCEDURE — 25010000002 ONDANSETRON PER 1 MG: Performed by: EMERGENCY MEDICINE

## 2023-12-26 PROCEDURE — 36415 COLL VENOUS BLD VENIPUNCTURE: CPT

## 2023-12-26 PROCEDURE — 93005 ELECTROCARDIOGRAM TRACING: CPT

## 2023-12-26 PROCEDURE — 85025 COMPLETE CBC W/AUTO DIFF WBC: CPT

## 2023-12-26 PROCEDURE — 99284 EMERGENCY DEPT VISIT MOD MDM: CPT

## 2023-12-26 PROCEDURE — 96376 TX/PRO/DX INJ SAME DRUG ADON: CPT

## 2023-12-26 PROCEDURE — 80053 COMPREHEN METABOLIC PANEL: CPT

## 2023-12-26 PROCEDURE — 96375 TX/PRO/DX INJ NEW DRUG ADDON: CPT

## 2023-12-26 RX ORDER — KETOROLAC TROMETHAMINE 15 MG/ML
15 INJECTION, SOLUTION INTRAMUSCULAR; INTRAVENOUS ONCE
Status: COMPLETED | OUTPATIENT
Start: 2023-12-26 | End: 2023-12-26

## 2023-12-26 RX ORDER — ASPIRIN 81 MG/1
324 TABLET, CHEWABLE ORAL ONCE
Status: COMPLETED | OUTPATIENT
Start: 2023-12-26 | End: 2023-12-26

## 2023-12-26 RX ORDER — ACETAMINOPHEN 500 MG
1000 TABLET ORAL ONCE
Status: COMPLETED | OUTPATIENT
Start: 2023-12-26 | End: 2023-12-26

## 2023-12-26 RX ORDER — ONDANSETRON 2 MG/ML
4 INJECTION INTRAMUSCULAR; INTRAVENOUS ONCE
Status: COMPLETED | OUTPATIENT
Start: 2023-12-26 | End: 2023-12-26

## 2023-12-26 RX ORDER — NITROGLYCERIN 0.4 MG/1
0.4 TABLET SUBLINGUAL
Status: DISCONTINUED | OUTPATIENT
Start: 2023-12-26 | End: 2023-12-26 | Stop reason: HOSPADM

## 2023-12-26 RX ORDER — SODIUM CHLORIDE 0.9 % (FLUSH) 0.9 %
10 SYRINGE (ML) INJECTION AS NEEDED
Status: DISCONTINUED | OUTPATIENT
Start: 2023-12-26 | End: 2023-12-26 | Stop reason: HOSPADM

## 2023-12-26 RX ADMIN — NITROGLYCERIN 0.4 MG: 0.4 TABLET SUBLINGUAL at 10:29

## 2023-12-26 RX ADMIN — ACETAMINOPHEN 1000 MG: 500 TABLET ORAL at 12:10

## 2023-12-26 RX ADMIN — ONDANSETRON 4 MG: 2 INJECTION INTRAMUSCULAR; INTRAVENOUS at 10:18

## 2023-12-26 RX ADMIN — ASPIRIN 243 MG: 81 TABLET, CHEWABLE ORAL at 09:57

## 2023-12-26 RX ADMIN — NITROGLYCERIN 0.4 MG: 0.4 TABLET SUBLINGUAL at 10:18

## 2023-12-26 RX ADMIN — KETOROLAC TROMETHAMINE 15 MG: 15 INJECTION, SOLUTION INTRAMUSCULAR; INTRAVENOUS at 11:02

## 2023-12-26 RX ADMIN — ONDANSETRON 4 MG: 2 INJECTION INTRAMUSCULAR; INTRAVENOUS at 12:13

## 2023-12-26 NOTE — ED PROVIDER NOTES
Subjective   History of Present Illness  43-year-old male presents for evaluation of chest pain.  Of note, the patient has a history of anxiety and mental illness.  He also has multiple risk factors for coronary artery disease including hypertension, diabetes, hyperlipidemia, smoking, and positive family history.  He notes that over the past 3 days he has been experiencing intermittent episodes of chest discomfort that seems to be worse in the mornings.  He also endorses dysuria.  He denies any accompanying vomiting or diaphoresis.  He endorses mild accompanying shortness of breath.  He denies any known exposures to anyone with COVID-19.  He is unsure as to what might be causing his symptoms and as a result came here to the ED to be evaluated today.      Review of Systems   Respiratory:  Positive for chest tightness and shortness of breath.    Cardiovascular:  Positive for chest pain.   Genitourinary:  Positive for dysuria.   All other systems reviewed and are negative.      Past Medical History:   Diagnosis Date   • Anxiety    • Diabetes mellitus    • Hypertension        Allergies   Allergen Reactions   • Eggs Or Egg-Derived Products Hives       History reviewed. No pertinent surgical history.    History reviewed. No pertinent family history.    Social History     Socioeconomic History   • Marital status: Single   Tobacco Use   • Smoking status: Every Day     Packs/day: 0.50     Years: 25.00     Additional pack years: 0.00     Total pack years: 12.50     Types: Cigarettes   • Smokeless tobacco: Never   Vaping Use   • Vaping Use: Every day   Substance and Sexual Activity   • Alcohol use: Not Currently     Comment: pt states has been clean 90 days   • Drug use: Yes     Types: Methamphetamines     Comment: pt states has been clean 90 days   • Sexual activity: Defer           Objective   Physical Exam  Vitals and nursing note reviewed.   Constitutional:       General: He is not in acute distress.     Appearance: He is  well-developed. He is not diaphoretic.      Comments: Nontoxic-appearing male   HENT:      Head: Normocephalic and atraumatic.   Neck:      Vascular: No JVD.   Cardiovascular:      Rate and Rhythm: Normal rate and regular rhythm.      Heart sounds: Normal heart sounds. No murmur heard.     No friction rub. No gallop.   Pulmonary:      Effort: Pulmonary effort is normal. No respiratory distress.      Breath sounds: Normal breath sounds. No wheezing or rales.   Abdominal:      General: Bowel sounds are normal. There is no distension.      Palpations: Abdomen is soft. There is no mass.      Tenderness: There is no abdominal tenderness. There is no guarding.   Musculoskeletal:         General: Normal range of motion.      Cervical back: Normal range of motion.      Right lower leg: No edema.      Left lower leg: No edema.   Skin:     General: Skin is warm and dry.      Coloration: Skin is not pale.      Findings: No erythema or rash.   Neurological:      Mental Status: He is alert and oriented to person, place, and time.   Psychiatric:         Thought Content: Thought content normal.         Judgment: Judgment normal.         Procedures           ED Course  ED Course as of 12/26/23 1602   Tue Dec 26, 2023   1032 43-year-old male presents for evaluation of chest pain.  Of note, the patient has multiple risk factors for coronary artery disease including hypertension, diabetes, hyperlipidemia, smoking, and positive family history.  He notes that over the past 3 days he has been experiencing intermittent episodes of chest discomfort that seems to be worse in the mornings.  He also endorses dysuria.  Given his ongoing symptoms, he came here to the ED to be evaluated.  On arrival, the patient is nontoxic-appearing.  Benign exam.  His initial EKG revealed normal sinus rhythm with a heart rate of 88 and no ST segments suggestive of or concerning for ischemia.  Differential diagnosis is quite broad.  We will obtain labs and  imaging, and we will reassess following initial interventions. [DD]   1051   Labs are remarkable for glucose of 270 and initial high-sensitivity troponin is elevated at 25.   [DD]   1052 HEART score of 4.  Low risk Well's and PERC negative. [DD]   1054 I discussed the patient's case with our cardiology team who will come to the emergency department to personally evaluate the patient. [DD]   1054 I did a thorough review of the patient's prior records.  He had a normal stress test back in July of this year.  He denies ever having a heart cath in the past. [DD]   1147 The patient was evaluated by Dr. Benavidez of cardiology who did not feel that the patient's chest pain was cardiac in nature.  I am in agreement given the patient's overall clinical picture. [DD]   1238 Upon reevaluation, the patient looks and feels well. [DD]   1238 Repeat troponin/EKG negative/unchanged.  Doubt ACS, PE, dissection, or emergent cardiothoracic process at this time based on exam, history, clinical presentation, gestalt, and objective findings in the ED.  The patient will follow up with the chest pain clinic within the next 72 hours for further outpatient work-up and evaluation.  Agreeable with plan and given appropriate strict return precautions.     [DD]      ED Course User Index  [DD] Alonso Shields MD                                   Recent Results (from the past 24 hour(s))   ECG 12 Lead ED Triage Standing Order; Chest Pain    Collection Time: 12/26/23  9:28 AM   Result Value Ref Range    QT Interval 356 ms    QTC Interval 430 ms   High Sensitivity Troponin T    Collection Time: 12/26/23 10:07 AM    Specimen: Blood   Result Value Ref Range    HS Troponin T 25 (H) <22 ng/L   Comprehensive Metabolic Panel    Collection Time: 12/26/23 10:07 AM    Specimen: Blood   Result Value Ref Range    Glucose 270 (H) 65 - 99 mg/dL    BUN 13 6 - 20 mg/dL    Creatinine 0.78 0.76 - 1.27 mg/dL    Sodium 137 136 - 145 mmol/L    Potassium 3.7 3.5 -  5.2 mmol/L    Chloride 102 98 - 107 mmol/L    CO2 26.0 22.0 - 29.0 mmol/L    Calcium 8.9 8.6 - 10.5 mg/dL    Total Protein 6.2 6.0 - 8.5 g/dL    Albumin 4.2 3.5 - 5.2 g/dL    ALT (SGPT) 14 1 - 41 U/L    AST (SGOT) 12 1 - 40 U/L    Alkaline Phosphatase 74 39 - 117 U/L    Total Bilirubin 0.4 0.0 - 1.2 mg/dL    Globulin 2.0 gm/dL    A/G Ratio 2.1 g/dL    BUN/Creatinine Ratio 16.7 7.0 - 25.0    Anion Gap 9.0 5.0 - 15.0 mmol/L    eGFR 112.8 >60.0 mL/min/1.73   Lipase    Collection Time: 12/26/23 10:07 AM    Specimen: Blood   Result Value Ref Range    Lipase 37 13 - 60 U/L   BNP    Collection Time: 12/26/23 10:07 AM    Specimen: Blood   Result Value Ref Range    proBNP 192.0 0.0 - 450.0 pg/mL   Green Top (Gel)    Collection Time: 12/26/23 10:07 AM   Result Value Ref Range    Extra Tube Hold for add-ons.    Lavender Top    Collection Time: 12/26/23 10:07 AM   Result Value Ref Range    Extra Tube hold for add-on    Gold Top - SST    Collection Time: 12/26/23 10:07 AM   Result Value Ref Range    Extra Tube Hold for add-ons.    Gray Top    Collection Time: 12/26/23 10:07 AM   Result Value Ref Range    Extra Tube Hold for add-ons.    Light Blue Top    Collection Time: 12/26/23 10:07 AM   Result Value Ref Range    Extra Tube Hold for add-ons.    CBC Auto Differential    Collection Time: 12/26/23 10:07 AM    Specimen: Blood   Result Value Ref Range    WBC 8.57 3.40 - 10.80 10*3/mm3    RBC 4.65 4.14 - 5.80 10*6/mm3    Hemoglobin 13.8 13.0 - 17.7 g/dL    Hematocrit 39.1 37.5 - 51.0 %    MCV 84.1 79.0 - 97.0 fL    MCH 29.7 26.6 - 33.0 pg    MCHC 35.3 31.5 - 35.7 g/dL    RDW 12.9 12.3 - 15.4 %    RDW-SD 39.1 37.0 - 54.0 fl    MPV 10.4 6.0 - 12.0 fL    Platelets 196 140 - 450 10*3/mm3    Neutrophil % 69.1 42.7 - 76.0 %    Lymphocyte % 20.9 19.6 - 45.3 %    Monocyte % 7.6 5.0 - 12.0 %    Eosinophil % 1.4 0.3 - 6.2 %    Basophil % 0.6 0.0 - 1.5 %    Immature Grans % 0.4 0.0 - 0.5 %    Neutrophils, Absolute 5.93 1.70 - 7.00 10*3/mm3     Lymphocytes, Absolute 1.79 0.70 - 3.10 10*3/mm3    Monocytes, Absolute 0.65 0.10 - 0.90 10*3/mm3    Eosinophils, Absolute 0.12 0.00 - 0.40 10*3/mm3    Basophils, Absolute 0.05 0.00 - 0.20 10*3/mm3    Immature Grans, Absolute 0.03 0.00 - 0.05 10*3/mm3    nRBC 0.0 0.0 - 0.2 /100 WBC   Urinalysis With Culture If Indicated - Urine, Clean Catch    Collection Time: 12/26/23 11:13 AM    Specimen: Urine, Clean Catch   Result Value Ref Range    Color, UA Yellow Yellow, Straw    Appearance, UA Clear Clear    pH, UA 6.5 5.0 - 8.0    Specific Gravity, UA 1.015 1.001 - 1.030    Glucose, UA >=1000 mg/dL (3+) (A) Negative    Ketones, UA Negative Negative    Bilirubin, UA Negative Negative    Blood, UA Negative Negative    Protein, UA Negative Negative    Leuk Esterase, UA Negative Negative    Nitrite, UA Negative Negative    Urobilinogen, UA 0.2 E.U./dL 0.2 - 1.0 E.U./dL   ECG 12 Lead ED Triage Standing Order; Chest Pain    Collection Time: 12/26/23 11:50 AM   Result Value Ref Range    QT Interval 394 ms    QTC Interval 437 ms   High Sensitivity Troponin T 2Hr    Collection Time: 12/26/23 11:53 AM    Specimen: Blood   Result Value Ref Range    HS Troponin T 24 (H) <22 ng/L    Troponin T Delta -1 >=-4 - <+4 ng/L     Note: In addition to lab results from this visit, the labs listed above may include labs taken at another facility or during a different encounter within the last 24 hours. Please correlate lab times with ED admission and discharge times for further clarification of the services performed during this visit.    XR Chest 1 View   Final Result   Impression:   No evidence of active chest disease.         Electronically Signed: Yury Sorensen MD     12/26/2023 10:12 AM EST     Workstation ID: [id number]        Vitals:    12/26/23 1115 12/26/23 1130 12/26/23 1245 12/26/23 1300   BP: 118/93 124/80 125/81 146/98   Pulse: 81 72 73 80   Resp:       Temp:       TempSrc:       SpO2: 95% 95% 96% 95%   Weight:       Height:          Medications   aspirin chewable tablet 324 mg (243 mg Oral Given 12/26/23 0957)   ondansetron (ZOFRAN) injection 4 mg (4 mg Intravenous Given 12/26/23 1018)   ketorolac (TORADOL) injection 15 mg (15 mg Intravenous Given 12/26/23 1102)   acetaminophen (TYLENOL) tablet 1,000 mg (1,000 mg Oral Given 12/26/23 1210)   ondansetron (ZOFRAN) injection 4 mg (4 mg Intravenous Given 12/26/23 1213)     ECG/EMG Results (last 24 hours)       ** No results found for the last 24 hours. **          ECG 12 Lead ED Triage Standing Order; Chest Pain   Final Result   Test Reason : CP   Blood Pressure :   */*   mmHG   Vent. Rate :  74 BPM     Atrial Rate :  74 BPM      P-R Int : 160 ms          QRS Dur :  94 ms       QT Int : 394 ms       P-R-T Axes :  54 -24   6 degrees      QTc Int : 437 ms      Normal sinus rhythm with sinus arrhythmia   Minimal voltage criteria for LVH, may be normal variant   Septal infarct (cited on or before 27-OCT-2023)   Abnormal ECG   When compared with ECG of 26-DEC-2023 09:28, (Unconfirmed)   No significant change was found   Confirmed by MD Shields Michael ([id number]) on 12/26/2023 1:59:11 PM      Referred By: EDMD           Confirmed By: Michoacano Shields MD      ECG 12 Lead ED Triage Standing Order; Chest Pain   Final Result   Test Reason : ED Triage Standing Order~   Blood Pressure :   */*   mmHG   Vent. Rate :  88 BPM     Atrial Rate :  88 BPM      P-R Int : 156 ms          QRS Dur :  98 ms       QT Int : 356 ms       P-R-T Axes :  58 -22  29 degrees      QTc Int : 430 ms      Normal sinus rhythm   Minimal voltage criteria for LVH, may be normal variant ( R in aVL )   Anterior infarct (cited on or before 27-OCT-2023)   Abnormal ECG   When compared with ECG of 27-OCT-2023 22:57,   No significant change was found   Confirmed by MD Sheilds Michael ([id number]) on 12/26/2023 1:58:32 PM      Referred By:            Confirmed By: Michoacano Shields MD                    Medical Decision Making  Problems Addressed:  Chest pain,  unspecified type: complicated acute illness or injury  Hyperglycemia: complicated acute illness or injury    Risk  OTC drugs.  Prescription drug management.        Final diagnoses:   Chest pain, unspecified type   Hyperglycemia       ED Disposition  ED Disposition       ED Disposition   Discharge    Condition   Stable    Comment   --               [facility] CARDIOLOGY  [address]  [address]  [facility] [id number]  [phone]  In 3 days           Medication List      No changes were made to your prescriptions during this visit.            Alonso Shields MD  12/26/23 7988

## 2023-12-26 NOTE — CONSULTS
Ronnie He  [id number]  1980   LOS: 0 days   Patient Care Team:  Dylon Olmos MD as PCP - General (Family Medicine)    ID: 43-year-old single white male unemployed [employer]  living in a sober house from [place], [place].    Chief Complaint:  CHEST PAIN    Problem List:  Chronic tobacco use (0.5 packs/day cigarettes)  Type 2 diabetes mellitus (hemoglobin A1c 8% October 2023)  Mild (type I) obesity (BMI 30.7)  Chest pain syndrome of uncertain etiology-possible musculoskeletal chest discomfort  A.  Normal myocardial perfusion study with normal LVEF ([facility] July 2023)  B.  Normal chest CTA ([facility] November 2023)  C.  Recurrent chest pain syndrome with acceptable electrocardiogram/chest x-ray/initial troponin, December 2023  5.  COPD with recent hospitalization for community-acquired left lower lobe pneumonia and tachycardia, October 2023  6.  Reported conversion disorder with intermittent attacks/seizures  7.  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  Remote major depressive disorder recurrent and severe with psychotic features, autumn 2023  9.  Chronic hypertension-probably essential  10.  Unspecified personality disorder-data deficit  11.  Remote treatment for melanoma-data deficit, January 2020  12.  Remote apparent neurosurgical intervention for Chiari malformation-data deficit    Allergies   Allergen Reactions    Eggs Or Egg-Derived Products Hives     (Not in a hospital admission)    Scheduled Meds:   Continuous Infusions:   PRN Meds:.  nitroglycerin    sodium chloride       History of Present Illness:      Cardiology consultation is requested in [facility] ED on this younger middle-aged gentleman; there are no ED provider records or list of medications available to review.  The patient was discharged following an 8-day hospitalization in late October-early November 2023 for treatment of community-acquired left lower  "lobe pneumonia and tachycardia with conversion disorder with attacks or seizures and ataxia and major depressive disorder with history of alcoholism and noncompliance in the setting of chronic hypertension, type 2 diabetes mellitus, personality disorder, and COPD.  Patient presents with a 3-day history of \"unrelieved severe chest pain\" described as a \"sharp hurting pain\" with associated tenderness in the left upper chest and infraclavicular area radiating to the left arm.  There is no history of thoracoabdominal trauma and the patient denies accompanying complaints of exertional component to the chest pain, acute tachypnea/dyspnea, cough, sputum production, pleurisy, or hemoptysis.  No focal sensory or motor changes in left upper extremity.  Symptoms have not been improved with sublingual nitroglycerin as well as 15 mg of IV Toradol per patient's history.  He denies orthopnea, PND, claudication, tachypalpitation, cardiac arrhythmia, or diagnosis of myocardial infarction/CHF.  No history of LHC.  Additionally, the patient notes inability to eat for the past 72 hours with significant odynophagia and dysphagia as well as frequent nausea and occasional emesis with 1 episode of loose stool on presentation to ED.  He requests admission to hospital.  No fever, chills, or abdominal pain.  No increased work of breathing noted on history and initial examination with normal room air oximetry (96%).    Cardiac risk factors: diabetes mellitus, hypertension, male gender, obesity (BMI >= 30 kg/m2), sedentary lifestyle, and smoking/ tobacco exposure.    Social History     Socioeconomic History    Marital status: Single   Tobacco Use    Smoking status: Every Day     Packs/day: 0.50     Years: 25.00     Additional pack years: 0.00     Total pack years: 12.50     Types: Cigarettes    Smokeless tobacco: Never   Vaping Use    Vaping Use: Every day   Substance and Sexual Activity    Alcohol use: Not Currently     Comment: pt states has " "been clean 90 days    Drug use: Yes     Types: Methamphetamines     Comment: pt states has been clean 90 days    Sexual activity: Defer     History reviewed. No pertinent family history.    Review of Systems  10 point review of systems was completed, positives outlined in the HPI, and otherwise all other systems are negative.      Objective:       Physical Exam  /89   Pulse 95   Temp 98 °F (36.7 °C) (Oral)   Resp 19   Ht 180.3 cm (71\")   Wt 99.8 kg (220 lb)   SpO2 94%   BMI 30.68 kg/m²       12/26/23  0929   Weight: 99.8 kg (220 lb)     Body mass index is 30.68 kg/m².  No intake or output data in the 24 hours ending 12/26/23 1106    General Appearance:  Alert, cooperative, no distress, appears stated age   Head:  Normocephalic, without obvious abnormality, atraumatic   Eyes:  PERRL, conjunctivae/corneas clear, EOM's intact, fundi benign, both eyes   Throat: Lips, mucosa, and tongue normal   Neck: Supple, symmetrical, trachea midline, no adenopathy, thyroid: not enlarged, symmetric, no tenderness/mass/nodules, no carotid bruit or JVD   Lungs:   Clear to auscultation bilaterally, respirations unlabored; marked tenderness to palpation in the left upper infraclavicular chest area that reproduces patient's chest pain   Heart:  Regular rate and rhythm, S1, S2 normal, no murmur, rub or gallop   Abdomen:   Soft, nontender, no masses, no organomegaly, bowel sounds audible x4   Extremities: No edema, normal range of motion   Pulses: 2+ and symmetric   Skin: Skin color, texture, turgor normal, no rashes or lesions   Neurologic: Normal     Cardiographics:    EKG:    Normal sinus rhythm  Minimal voltage criteria for LVH, may be normal variant ( R in aVL )  Anterior infarct (cited on or before 27-OCT-2023)  Abnormal ECG  When compared with ECG of 27-OCT-2023 22:57,  No significant change was found    Imaging:    Chest x-ray:    Findings:    The heart, mediastinum and pulmonary vasculature appear within normal limits. " Lungs appear normally expanded and clear. No effusion or pneumothorax is seen.     IMPRESSION: No evidence of active chest disease.    Lab Review   Results from last 7 days   Lab Units 12/26/23  1007   SODIUM mmol/L 137   POTASSIUM mmol/L 3.7   CHLORIDE mmol/L 102   CO2 mmol/L 26.0   BUN mg/dL 13   CREATININE mg/dL 0.78   GLUCOSE mg/dL 270*   CALCIUM mg/dL 8.9     Results from last 7 days   Lab Units 12/26/23  1007   WBC 10*3/mm3 8.57   HEMOGLOBIN g/dL 13.8   HEMATOCRIT % 39.1   PLATELETS 10*3/mm3 196     Results from last 7 days   Lab Units 12/26/23  1007   HSTROP T ng/L 25*     NO URINALYSIS / CK / D-DIMER  UDS (12/3/2023): Negative  proBNP: 192.0  ALBUMIN: 4.2  LFTs: WNL  LIPASE: 37     Assessment:      Atypical probably musculoskeletal chest discomfort.  Patient has multiple risk factors for obstructive coronary disease but current symptoms do not suggest acute coronary artery syndrome or decompensated congestive heart failure.  I am uncertain of his home medications and cannot advise about any further alterations.  I am uncertain whether empiric long-acting ISMN would be of benefit.  I do note that he has had 16 CT scans performed over the past 3 months.      Plan:     1.  Obtain repeat troponin to see if there is any progressive elevation and if present would repeat electrocardiogram and obtain echocardiogram to assess for regional wall motion abnormality  2.  Defer MPS/LHC at this time  3.  Consider higher dose of IV Toradol plus or minus outpatient empiric proton pump inhibitor as well as naproxen  4.  Consider D-dimer/CK assessment   5.  Consider outpatient [facility] follow-up as necessary    Discussed with Dr. Shields and patient.

## 2024-01-03 ENCOUNTER — TELEPHONE (OUTPATIENT)
Dept: CARDIOLOGY | Facility: HOSPITAL | Age: 44
End: 2024-01-03
Payer: MEDICARE

## 2024-01-04 NOTE — TELEPHONE ENCOUNTER
Patient was referred to [facility] by the [facility]. Several attempts have been made to contact the patient with no success. Letter was mailed to patient to contact the office to schedule.

## 2024-01-07 NOTE — NUR
CASE MANAGEMENT: REVIEW 



3/19/2019



SI: ACUTE CORONARY SYNDROME. HYPERGLYCEMIA

T 97.6 HR 70 RR 19 B/P 113/62 SATS 97% ON VENTURI MASK 

 



IS: LEVEMIR SUBQ Q12H 

ASA PO QD 

LEXAPRO PO QD

NOVOLOG SUBQ TIAC 

DEPAKOTE PO Q12H 



**: MED/SURG STATUS awaiting on further orders will adjust heparin accordingly

## 2024-12-13 NOTE — GENERAL PROGRESS NOTE
Assessment/Plan


Problem List:  


(1) COPD (chronic obstructive pulmonary disease)


ICD Codes:  J44.9 - Chronic obstructive pulmonary disease, unspecified


SNOMED:  72986945


(2) Diabetes mellitus


ICD Codes:  E11.9 - Diabetes mellitus


SNOMED:  41599698


(3) Peripheral neuropathy


ICD Codes:  G62.9 - Peripheral neuropathy


SNOMED:  22145168


Assessment/Plan


increase Levemir to 38 units qhs 


continue Novolog 10 units ac tid 


continue NSSI ac / hs 


stable for discharge from DM stand point





Subjective


Allergies:  


Coded Allergies:  


     HALOPERIDOL (Verified  Allergy, Unknown, 2/5/19)


Subjective


events noted 


fasting glucose is elevated 











Item Value  Date Time


 


Bedside Blood Glucose 254 mg/dl H 2/11/19 0645


 


Bedside Blood Glucose 289 mg/dl H 2/10/19 2124


 


Bedside Blood Glucose 206 mg/dl H 2/10/19 1658


 


Bedside Blood Glucose 185 mg/dl H 2/10/19 1208


 


Bedside Blood Glucose 123 mg/dl H 2/10/19 0631











Objective





Last 24 Hour Vital Signs








  Date Time  Temp Pulse Resp B/P (MAP) Pulse Ox O2 Delivery O2 Flow Rate FiO2


 


2/11/19 01:36  89 16  97 Room Air  21


 


2/11/19 01:24  95 16  93 Room Air  21


 


2/11/19 00:00 98.2 91 19 110/68 (82) 92   


 


2/10/19 22:10  105 20  92 Room Air  21


 


2/10/19 22:02  103 20  93 Room Air  21


 


2/10/19 22:02  103 20   Room Air  21


 


2/10/19 21:00      Room Air  


 


2/10/19 20:00 98.3 91 19 136/77 (96) 96   


 


2/10/19 16:00 98.3 95 20 127/78 (94) 97   


 


2/10/19 13:16  102 18  96 Room Air  21


 


2/10/19 13:06  92 18  96 Room Air  21


 


2/10/19 12:00 97.7 88 20 126/74 (91) 95   


 


2/10/19 09:00      Room Air  


 


2/10/19 08:00 99.0 99 21 123/71 (88) 95   


 


2/10/19 07:31  92 18  98 Room Air  21


 


2/10/19 07:24  80 18  95 Room Air  21

















Intake and Output  


 


 2/10/19 2/11/19





 19:00 07:00


 


Intake Total 1600 ml 100 ml


 


Output Total 2000 ml 


 


Balance -400 ml 100 ml


 


  


 


Intake Oral 800 ml 


 


IV Total 800 ml 100 ml


 


Output Urine Total 2000 ml 








Height (Feet):  5


Height (Inches):  11.00


Weight (Pounds):  267


General Appearance:  no apparent distress


Neck:  normal alignment


Cardiovascular:  normal rate


Respiratory/Chest:  lungs clear


Abdomen:  normal bowel sounds


Edema:  no edema noted Arm (L), no edema noted Arm (R), no edema noted Leg (L), 

no edema noted Leg (R), no edema noted Pedal (L), no edema noted Pedal (R), no 

edema noted Generalized


Objective





Current Medications








 Medications


  (Trade)  Dose


 Ordered  Sig/Henry


 Route


 PRN Reason  Start Time


 Stop Time Status Last Admin


Dose Admin


 


 Acetaminophen


  (Tylenol)  650 mg  Q4H  PRN


 ORAL


 fever (temp>100.5F)  2/6/19 16:30


 3/7/19 16:29   


 


 


 Al Hydroxide/Mg


 Hydroxide


  (Mylanta II)  30 ml  Q6H  PRN


 ORAL


 dyspepsia  2/6/19 16:30


 3/7/19 16:29   


 


 


 Albuterol/


 Ipratropium


  (Albuterol/


 Ipratropium)  3 ml  Q6HRT


 HHN


   2/7/19 13:00


 2/12/19 12:59  2/11/19 01:24


 


 


 Alprazolam


  (Xanax)  1 mg  Q6H  PRN


 ORAL


 For Anxiety  2/6/19 16:30


 2/13/19 16:29  2/10/19 15:42


 


 


 Aspirin


  (ASA)  81 mg  DAILY


 ORAL


   2/7/19 09:00


 3/8/19 08:59  2/10/19 08:48


 


 


 Clonidine HCl


  (Catapres Tab)  0.1 mg  Q4H  PRN


 ORAL


 sbp more than 160  2/6/19 16:30


 3/7/19 16:29   


 


 


 Dextrose


  (Dextrose 50%)  25 ml  Q30M  PRN


 IV


 Hypoglycemia  2/6/19 16:15


 3/7/19 21:44   


 


 


 Dextrose


  (Dextrose 50%)  50 ml  Q30M  PRN


 IV


 Hypoglycemia  2/6/19 16:15


 3/7/19 21:44   


 


 


 Divalproex Sodium


  (Depakote)  500 mg  Q12HR


 ORAL


   2/6/19 21:00


 3/7/19 20:59  2/10/19 21:17


 


 


 Escitalopram


 Oxalate


  (Lexapro)  10 mg  DAILY


 ORAL


   2/7/19 09:00


 3/8/19 08:59  2/10/19 08:48


 


 


 Heparin Sodium


  (Porcine)


  (Heparin 5000


 units/ml)  5,000 units  EVERY 12  HOURS


 SUBQ


   2/6/19 21:00


 3/7/19 20:59  2/9/19 09:43


 


 


 Insulin Aspart


  (NovoLOG)    BEFORE MEALS AND  HS


 SUBQ


   2/6/19 17:30


 3/7/19 17:29  2/11/19 06:45


 


 


 Insulin Aspart


  (NovoLOG)  10 units  NOVOTIAC


 SUBQ


   2/7/19 11:50


 3/8/19 06:29  2/11/19 06:45


 


 


 Insulin Detemir


  (Levemir)  30 units  BEDTIME


 SUBQ


   2/7/19 21:00


 3/7/19 21:59  2/10/19 21:24


 


 


 Morphine Sulfate


  (Morphine


 Sulfate)  2 mg  Q4H  PRN


 IVP


 Severe Pain (Pain Scale 7-10)  2/6/19 16:30


 2/12/19 16:29  2/11/19 03:56


 


 


 Nicotine


  (Nicoderm)  1 patch  Q24H


 TDERMAL


   2/8/19 20:00


 3/10/19 19:59  2/10/19 21:18


 


 


 Nitroglycerin


  (Ntg)  0.4 mg  Q5M X 3 DOSES PRN


 SL


 Prn Chest Pain  2/6/19 16:30


 3/7/19 16:29   


 


 


 Ondansetron HCl


  (Zofran)  4 mg  Q6H  PRN


 IVP


 Nausea & Vomiting  2/6/19 16:30


 3/7/19 16:29  2/10/19 14:49


 


 


 Polyethylene


 Glycol


  (Miralax)  17 gm  HSPRN  PRN


 ORAL


 Constipation  2/6/19 21:00


 3/7/19 20:59   


 


 


 Promethazine HCl/


 Codeine


  (Phenergan with


 Codeine)  5 ml  Q4H  PRN


 ORAL


 For Cough  2/7/19 12:15


 3/9/19 12:14   


 


 


 Quetiapine


 Fumarate


  (SEROquel)  200 mg  BEDTIME


 ORAL


   2/6/19 21:00


 3/7/19 20:59  2/10/19 21:17


 


 


 Sodium Chloride  1,000 ml @ 


 100 mls/hr  Q10H


 IVLG


   2/6/19 16:15


 3/7/19 19:29  2/11/19 06:46


 


 


 Temazepam


  (Restoril)  15 mg  HSPRN  PRN


 ORAL


 Insomnia  2/6/19 21:00


 2/12/19 20:59   


 

















Dusty Denis MD Feb 11, 2019 06:59 English

## 2025-05-30 NOTE — DISCHARGE SUMMARY
Case emailed to cath lab scheduling.   Hospital Medicine Discharge Summary    Patient ID: Paras Mcguire      Patient's PCP: Leon Allen MD    Admit Date: 4/22/2021     Discharge Date: 4/26/2021      Admitting Physician: Félix Stack DO     Discharge Physician: Sarbjit Dolan MD     Discharge Diagnoses: Active Hospital Problems    Diagnosis     COPD with acute exacerbation ([facility] [address] [J44.1]        The patient was seen and examined on day of discharge and this discharge summary is in conjunction with any daily progress note from day of discharge. Hospital Course:   Patient presented to the emergency department this evening reporting multiple falls, increased weakness, productive cough of green phlegm, fevers although not actually taken his temp and left hand pain. Symptom onset approximately 2 to 3 days ago. He is taken no additional medications. He denies any trauma to the hands.       SIRS versus sepsis: 2/2-> HCAP, COPD - resolved  Pro-Johnathon 0.02, lactic acid 1.4, no current evidence of pyrexia, no encephalopathy, no hypoxia, no hypotension        HCAP: Inpatient hospitalization April 12 through April 15  Chest x-ray indicating perihilar markings, productive Cough, fever, fatigue, weakness,  tobacco smoker, positive known COPD nonoxygen dependent  Covid vaccination series completed  Covid tneg  Leukocytosis: WBC 12.1 w/ left shift  Procalcitonin 0.02  Blood cultures x2 neg  MRSA nasal ordered  Legionella/Strep pneumonia ag sent  On Cefepime and vancomycin-->PO abx on dc     COPD: Not oxygen dependent  Symptomatic with wheezing congestion and productive cough  Oxygen therapy: Titrate maintaining saturation greater than 92%, currently on room air  Patient is not typically on inhaled steroids and reports he has not seen a pulmonologist  Continue HCAP antibiotic regimen  DuoNeb   IV solumedrol-->PO pred taper on dc     Gout: Left hand, left third finger  Uric acid 11.2-->7.6, pain tenderness and erythema of the left hand, left third metacarpal, and evidence on left hand x-ray  Supportive: IV steroids, oral NSAIDs/colchicine-->PO meds on dc  Tramadol short-term for the acute pain, added IV morphine tid prn  Cool compress  Improved     Seizure disorder: Continue Depakote per home regimen     DM II - uncontrolled 2/2 steroids  Hypoglycemia protocol, SSI, FSBS  Carb controlled diet  Started lantus - increased dose     HTN: Continue aspirin, statin, lisinopril regimen         Physical Exam Performed:     /78   Pulse 89   Temp 98.4 °F (36.9 °C) (Oral)   Resp 18   Ht 5' 11\" (1.803 m)   Wt 243 lb 13.3 oz (110.6 kg)   SpO2 95%   BMI 34.01 kg/m²     General appearance: NAD  Lungs: minimal exp wheeze  Heart: Tachycardic regular rhythm.    Abdomen: Soft, non-tender or non-distended without rigidity or guarding and positive bowel sounds   Extremities: left hand swelling resolved  Neurologic: Alert and oriented X 3, neurovascularly intact with sensory/motor intact upper extremities/lower extremities, bilaterally.  Cranial nerves: II-XII intact, grossly non-focal.  Mental status: Alert, oriented  Capillary Refill: Acceptable  < 3 seconds  Peripheral Pulses: +3 Easily felt, not easily obliterated with pressure       Labs: For convenience and continuity at follow-up the following most recent labs are provided:      CBC:    Lab Results   Component Value Date    WBC 12.4 04/26/2021    HGB 11.8 04/26/2021    HCT 35.5 04/26/2021     04/26/2021       Renal:    Lab Results   Component Value Date     04/26/2021    K 4.7 04/26/2021    K 4.1 04/23/2021    CL 97 04/26/2021    CO2 25 04/26/2021    BUN 25 04/26/2021    CREATININE 0.7 04/26/2021    CALCIUM 9.0 04/26/2021         Significant Diagnostic Studies    Radiology:   XR CHEST (2 VW)   Final Result   Normal chest with right-sided PICC. No acute abnormality. XR HAND LEFT (MIN 3 VIEWS)   Final Result   Edema throughout the 3rd digit.   Paratracheal erosion in the proximal phalanx   as described. This could represent inflammatory arthritis given indication. Follow-up recommended. XR CHEST (2 VW)   Final Result   Increased perihilar markings.   Atelectasis and infectious or inflammatory   airway process are in the differential.                Consults:     IP CONSULT TO HOSPITALIST  PHARMACY TO DOSE VANCOMYCIN  PHARMACY TO DOSE VANCOMYCIN    Disposition:  home     Condition at Discharge: Stable    Discharge Instructions/Follow-up:  PCP in 1 week    Code Status:  Prior     Activity: activity as tolerated    Diet: diabetic diet      Discharge Medications:     Discharge Medication List as of 4/26/2021  3:01 PM           Details   indomethacin (INDOCIN) 25 MG capsule Take 1 capsule by mouth 3 times daily (with meals) for 10 days, Disp-30 capsule, R-0Normal      colchicine (COLCRYS) 0.6 MG tablet Take 1 tablet by mouth daily for 10 days, Disp-10 tablet, R-0Normal      levoFLOXacin (LEVAQUIN) 750 MG tablet Take 1 tablet by mouth daily for 5 days, Disp-5 tablet, R-0Normal      predniSONE (DELTASONE) 10 MG tablet Take 4 tablets once a day for 3 days, then take 3 tablets once a day for 3 days, then take 2 tablets once a day for 3 days, then take 1 tablet once a day for 3 days, then stop., Disp-30 tablet, R-0Normal      allopurinol (ZYLOPRIM) 100 MG tablet Take 1 tablet by mouth daily, Disp-90 tablet, R-1Normal              Details   aspirin 81 MG EC tablet Take 81 mg by mouth nightly Historical Med      atorvastatin (LIPITOR) 10 MG tablet Take 10 mg by mouth daily Historical Med      divalproex (DEPAKOTE) 250 MG DR tablet Take 750 mg by mouth every 12 hoursHistorical Med      metFORMIN (GLUCOPHAGE) 500 MG tablet Take 500 mg by mouth dailyHistorical Med      lisinopril (PRINIVIL;ZESTRIL) 5 MG tablet Take 5 mg by mouth dailyHistorical Med      sertraline (ZOLOFT) 50 MG tablet Take 50 mg by mouth dailyHistorical Med      tiZANidine (ZANAFLEX) 4 MG tablet Take 4 mg by mouth every 6 hours as neededHistorical Med      traZODone (DESYREL) 50 MG tablet Take 50 mg by mouth nightlyHistorical Med      ibuprofen (ADVIL;MOTRIN) 600 MG tablet Take 600 mg by mouth every 8 hours as neededHistorical Med             Time Spent on discharge is more than 30 minutes in the examination, evaluation, counseling and review of medications and discharge plan. Signed:    Feroz Nugent MD   5/17/2021      Thank you Kendall Lind MD for the opportunity to be involved in this patient's care. If you have any questions or concerns please feel free to contact me at [phone].